# Patient Record
Sex: MALE | Race: WHITE | NOT HISPANIC OR LATINO | Employment: OTHER | URBAN - METROPOLITAN AREA
[De-identification: names, ages, dates, MRNs, and addresses within clinical notes are randomized per-mention and may not be internally consistent; named-entity substitution may affect disease eponyms.]

---

## 2017-01-09 ENCOUNTER — HOSPITAL ENCOUNTER (OUTPATIENT)
Dept: CT IMAGING | Facility: HOSPITAL | Age: 57
Discharge: HOME | End: 2017-01-09
Admitting: PHYSICIAN ASSISTANT

## 2017-01-09 DIAGNOSIS — I73.9 PVD (PERIPHERAL VASCULAR DISEASE) WITH CLAUDICATION (HCC): ICD-10-CM

## 2017-01-09 LAB — CREAT BLDA-MCNC: 0.9 MG/DL (ref 0.6–1.3)

## 2017-01-09 PROCEDURE — 0 IOPAMIDOL PER 1 ML: Performed by: PHYSICIAN ASSISTANT

## 2017-01-09 PROCEDURE — 82565 ASSAY OF CREATININE: CPT

## 2017-01-09 PROCEDURE — 75635 CT ANGIO ABDOMINAL ARTERIES: CPT

## 2017-01-09 RX ADMIN — IOPAMIDOL 90 ML: 755 INJECTION, SOLUTION INTRAVENOUS at 15:00

## 2017-01-11 ENCOUNTER — PREP FOR SURGERY (OUTPATIENT)
Dept: CARDIAC SURGERY | Facility: CLINIC | Age: 57
End: 2017-01-11

## 2017-01-11 DIAGNOSIS — I73.9 PERIPHERAL VASCULAR DISEASE (HCC): Primary | ICD-10-CM

## 2017-01-11 DIAGNOSIS — I73.9 PAD (PERIPHERAL ARTERY DISEASE) (HCC): Primary | ICD-10-CM

## 2017-01-12 RX ORDER — CHLORHEXIDINE GLUCONATE 500 MG/1
1 CLOTH TOPICAL EVERY 12 HOURS PRN
Status: CANCELLED | OUTPATIENT
Start: 2017-01-16

## 2017-01-16 ENCOUNTER — APPOINTMENT (OUTPATIENT)
Dept: PREADMISSION TESTING | Facility: HOSPITAL | Age: 57
End: 2017-01-16

## 2017-01-16 ENCOUNTER — HOSPITAL ENCOUNTER (OUTPATIENT)
Dept: GENERAL RADIOLOGY | Facility: HOSPITAL | Age: 57
Discharge: HOME | End: 2017-01-16
Admitting: PHYSICIAN ASSISTANT

## 2017-01-16 VITALS — BODY MASS INDEX: 41.25 KG/M2 | HEIGHT: 65 IN | WEIGHT: 247.58 LBS

## 2017-01-16 DIAGNOSIS — I73.9 PERIPHERAL VASCULAR DISEASE (HCC): ICD-10-CM

## 2017-01-16 LAB
ABO GROUP BLD: NORMAL
ANION GAP SERPL CALCULATED.3IONS-SCNC: 15 MMOL/L (ref 3–11)
APTT PPP: 29.4 SECONDS (ref 24–31)
BLD GP AB SCN SERPL QL: NEGATIVE
BUN BLD-MCNC: 25 MG/DL (ref 9–23)
BUN/CREAT SERPL: 20.8 (ref 7–25)
CALCIUM SPEC-SCNC: 10.3 MG/DL (ref 8.7–10.4)
CHLORIDE SERPL-SCNC: 96 MMOL/L (ref 99–109)
CO2 SERPL-SCNC: 26 MMOL/L (ref 20–31)
CREAT BLD-MCNC: 1.2 MG/DL (ref 0.6–1.3)
DEPRECATED RDW RBC AUTO: 59.8 FL (ref 37–54)
ERYTHROCYTE [DISTWIDTH] IN BLOOD BY AUTOMATED COUNT: 17.3 % (ref 11.3–14.5)
GFR SERPL CREATININE-BSD FRML MDRD: 63 ML/MIN/1.73
GLUCOSE BLD-MCNC: 95 MG/DL (ref 70–100)
HBA1C MFR BLD: 6.2 % (ref 4.8–5.6)
HCT VFR BLD AUTO: 38.9 % (ref 38.9–50.9)
HGB BLD-MCNC: 13.2 G/DL (ref 13.1–17.5)
INR PPP: 1.04
MCH RBC QN AUTO: 31.9 PG (ref 27–31)
MCHC RBC AUTO-ENTMCNC: 33.9 G/DL (ref 32–36)
MCV RBC AUTO: 94 FL (ref 80–99)
PLATELET # BLD AUTO: 135 10*3/MM3 (ref 150–450)
PMV BLD AUTO: 8.9 FL (ref 6–12)
POTASSIUM BLD-SCNC: 4.8 MMOL/L (ref 3.5–5.5)
PROTHROMBIN TIME: 11.3 SECONDS (ref 9.6–11.5)
RBC # BLD AUTO: 4.14 10*6/MM3 (ref 4.2–5.76)
RH BLD: POSITIVE
SODIUM BLD-SCNC: 137 MMOL/L (ref 132–146)
WBC NRBC COR # BLD: 4.01 10*3/MM3 (ref 3.5–10.8)

## 2017-01-16 PROCEDURE — 86920 COMPATIBILITY TEST SPIN: CPT

## 2017-01-16 RX ORDER — HYDROCODONE BITARTRATE AND ACETAMINOPHEN 7.5; 325 MG/1; MG/1
1 TABLET ORAL 2 TIMES DAILY PRN
COMMUNITY
End: 2017-03-22 | Stop reason: HOSPADM

## 2017-01-16 RX ORDER — FUROSEMIDE 20 MG/1
20 TABLET ORAL DAILY PRN
COMMUNITY
End: 2017-03-22 | Stop reason: HOSPADM

## 2017-01-16 RX ORDER — PANTOPRAZOLE SODIUM 40 MG/1
40 TABLET, DELAYED RELEASE ORAL EVERY OTHER DAY
COMMUNITY

## 2017-01-16 RX ORDER — TIZANIDINE 4 MG/1
4 TABLET ORAL NIGHTLY PRN
Status: ON HOLD | COMMUNITY
End: 2019-10-30

## 2017-01-16 RX ORDER — TAMSULOSIN HYDROCHLORIDE 0.4 MG/1
0.4 CAPSULE ORAL DAILY
COMMUNITY

## 2017-01-16 RX ORDER — FERROUS SULFATE TAB EC 324 MG (65 MG FE EQUIVALENT) 324 (65 FE) MG
324 TABLET DELAYED RESPONSE ORAL
COMMUNITY

## 2017-01-16 RX ORDER — ALBUTEROL SULFATE 90 UG/1
2 AEROSOL, METERED RESPIRATORY (INHALATION) EVERY 4 HOURS PRN
COMMUNITY

## 2017-01-16 NOTE — DISCHARGE INSTRUCTIONS
The following information and instructions were given:    NPO after MN except sips of water with routine prescribed medication (except blood thinner, diabetes, or weight reducing medication) unless otherwise instructed by your physician.  Do not eat, drink, smoke or chew gum after MN the night before surgery. This also includes no mints.    DO NOT shave, wear makeup or dark nail polish.    Remove all jewelry (advised to go to jeweler if unable to remove).    Leave anything you consider valuable at home.    Leave your suitcase in the car until after your surgery.    Bring the following with you (if applicable)   -picture ID and insurance cards   -Co-pay/deductible required by insurance   -Medications in the original bottles (not a list) including all over-the-counter  medications if not brought to PAT   -Copy of advance directive, living will or power of  documents if not  brought to PAT   -CPAP or BIPAP mask and tubing (do not bring machine)   -Skin prep instructions sheet   -PAT Pass   Education booklet, brochure, handout or binder given to patient.    Pain Control After Surgery handout given to patient.    Respirex use (handout given to patient) and pneumonia prevention.    Signs and Symptoms of infection.    DVT Prevention stressing the importance of ambulation.

## 2017-01-17 ENCOUNTER — ANESTHESIA EVENT (OUTPATIENT)
Dept: PERIOP | Facility: HOSPITAL | Age: 57
End: 2017-01-17

## 2017-01-17 ENCOUNTER — HOSPITAL ENCOUNTER (INPATIENT)
Facility: HOSPITAL | Age: 57
LOS: 1 days | Discharge: HOME | End: 2017-01-18
Attending: THORACIC SURGERY (CARDIOTHORACIC VASCULAR SURGERY) | Admitting: THORACIC SURGERY (CARDIOTHORACIC VASCULAR SURGERY)

## 2017-01-17 ENCOUNTER — APPOINTMENT (OUTPATIENT)
Dept: GENERAL RADIOLOGY | Facility: HOSPITAL | Age: 57
End: 2017-01-17

## 2017-01-17 ENCOUNTER — ANESTHESIA (OUTPATIENT)
Dept: PERIOP | Facility: HOSPITAL | Age: 57
End: 2017-01-17

## 2017-01-17 DIAGNOSIS — I73.9 PERIPHERAL VASCULAR DISEASE (HCC): ICD-10-CM

## 2017-01-17 LAB — GLUCOSE BLDC GLUCOMTR-MCNC: 117 MG/DL (ref 70–130)

## 2017-01-17 PROCEDURE — 25010000002 FENTANYL CITRATE (PF) 100 MCG/2ML SOLUTION: Performed by: NURSE ANESTHETIST, CERTIFIED REGISTERED

## 2017-01-17 PROCEDURE — 25010000002 NEOSTIGMINE 10 MG/10ML SOLUTION: Performed by: NURSE ANESTHETIST, CERTIFIED REGISTERED

## 2017-01-17 PROCEDURE — 25010000002 DEXAMETHASONE PER 1 MG: Performed by: NURSE ANESTHETIST, CERTIFIED REGISTERED

## 2017-01-17 PROCEDURE — 25010000002 CEFUROXIME PER 750 MG: Performed by: PHYSICIAN ASSISTANT

## 2017-01-17 PROCEDURE — C1769 GUIDE WIRE: HCPCS | Performed by: THORACIC SURGERY (CARDIOTHORACIC VASCULAR SURGERY)

## 2017-01-17 PROCEDURE — S0260 H&P FOR SURGERY: HCPCS | Performed by: THORACIC SURGERY (CARDIOTHORACIC VASCULAR SURGERY)

## 2017-01-17 PROCEDURE — 25010000002 PHENYLEPHRINE PER 1 ML: Performed by: NURSE ANESTHETIST, CERTIFIED REGISTERED

## 2017-01-17 PROCEDURE — 25010000002 MORPHINE PER 10 MG: Performed by: THORACIC SURGERY (CARDIOTHORACIC VASCULAR SURGERY)

## 2017-01-17 PROCEDURE — P9612 CATHETERIZE FOR URINE SPEC: HCPCS

## 2017-01-17 PROCEDURE — 82962 GLUCOSE BLOOD TEST: CPT

## 2017-01-17 PROCEDURE — 25010000002 HEPARIN (PORCINE) PER 1000 UNITS: Performed by: NURSE ANESTHETIST, CERTIFIED REGISTERED

## 2017-01-17 PROCEDURE — 94640 AIRWAY INHALATION TREATMENT: CPT

## 2017-01-17 PROCEDURE — B41GYZZ FLUOROSCOPY OF LEFT LOWER EXTREMITY ARTERIES USING OTHER CONTRAST: ICD-10-PCS | Performed by: THORACIC SURGERY (CARDIOTHORACIC VASCULAR SURGERY)

## 2017-01-17 PROCEDURE — 047L0DZ DILATION OF LEFT FEMORAL ARTERY WITH INTRALUMINAL DEVICE, OPEN APPROACH: ICD-10-PCS | Performed by: THORACIC SURGERY (CARDIOTHORACIC VASCULAR SURGERY)

## 2017-01-17 PROCEDURE — 25010000002 ONDANSETRON PER 1 MG: Performed by: NURSE ANESTHETIST, CERTIFIED REGISTERED

## 2017-01-17 PROCEDURE — 25010000002 PROPOFOL 10 MG/ML EMULSION: Performed by: NURSE ANESTHETIST, CERTIFIED REGISTERED

## 2017-01-17 PROCEDURE — C1876 STENT, NON-COA/NON-COV W/DEL: HCPCS | Performed by: THORACIC SURGERY (CARDIOTHORACIC VASCULAR SURGERY)

## 2017-01-17 PROCEDURE — C1894 INTRO/SHEATH, NON-LASER: HCPCS | Performed by: THORACIC SURGERY (CARDIOTHORACIC VASCULAR SURGERY)

## 2017-01-17 PROCEDURE — 75710 ARTERY X-RAYS ARM/LEG: CPT | Performed by: THORACIC SURGERY (CARDIOTHORACIC VASCULAR SURGERY)

## 2017-01-17 PROCEDURE — 37226 PR REVSC OPN/PRQ FEM/POP W/STNT/ANGIOP SM VSL: CPT | Performed by: THORACIC SURGERY (CARDIOTHORACIC VASCULAR SURGERY)

## 2017-01-17 PROCEDURE — C1725 CATH, TRANSLUMIN NON-LASER: HCPCS | Performed by: THORACIC SURGERY (CARDIOTHORACIC VASCULAR SURGERY)

## 2017-01-17 PROCEDURE — C1887 CATHETER, GUIDING: HCPCS | Performed by: THORACIC SURGERY (CARDIOTHORACIC VASCULAR SURGERY)

## 2017-01-17 PROCEDURE — 75710 ARTERY X-RAYS ARM/LEG: CPT

## 2017-01-17 PROCEDURE — 25010000002 HEPARIN (PORCINE) PER 1000 UNITS: Performed by: THORACIC SURGERY (CARDIOTHORACIC VASCULAR SURGERY)

## 2017-01-17 PROCEDURE — 0 IOPAMIDOL 61 % SOLUTION: Performed by: THORACIC SURGERY (CARDIOTHORACIC VASCULAR SURGERY)

## 2017-01-17 DEVICE — IMPLANTABLE DEVICE: Type: IMPLANTABLE DEVICE | Site: LEG | Status: FUNCTIONAL

## 2017-01-17 RX ORDER — BUPIVACAINE HYDROCHLORIDE AND EPINEPHRINE 5; 5 MG/ML; UG/ML
INJECTION, SOLUTION EPIDURAL; INTRACAUDAL; PERINEURAL AS NEEDED
Status: DISCONTINUED | OUTPATIENT
Start: 2017-01-17 | End: 2017-01-17 | Stop reason: HOSPADM

## 2017-01-17 RX ORDER — TAMSULOSIN HYDROCHLORIDE 0.4 MG/1
0.4 CAPSULE ORAL DAILY
Status: DISCONTINUED | OUTPATIENT
Start: 2017-01-17 | End: 2017-01-18 | Stop reason: HOSPADM

## 2017-01-17 RX ORDER — MORPHINE SULFATE 10 MG/ML
4 INJECTION INTRAMUSCULAR; INTRAVENOUS; SUBCUTANEOUS
Status: DISCONTINUED | OUTPATIENT
Start: 2017-01-17 | End: 2017-01-17 | Stop reason: SDUPTHER

## 2017-01-17 RX ORDER — TIZANIDINE 4 MG/1
4 TABLET ORAL NIGHTLY PRN
Status: DISCONTINUED | OUTPATIENT
Start: 2017-01-17 | End: 2017-01-18 | Stop reason: HOSPADM

## 2017-01-17 RX ORDER — MORPHINE SULFATE 10 MG/ML
2 INJECTION INTRAMUSCULAR; INTRAVENOUS; SUBCUTANEOUS
Status: DISCONTINUED | OUTPATIENT
Start: 2017-01-17 | End: 2017-01-18 | Stop reason: HOSPADM

## 2017-01-17 RX ORDER — SODIUM CHLORIDE 450 MG/100ML
100 INJECTION, SOLUTION INTRAVENOUS CONTINUOUS
Status: DISCONTINUED | OUTPATIENT
Start: 2017-01-17 | End: 2017-01-18 | Stop reason: HOSPADM

## 2017-01-17 RX ORDER — IODIXANOL 320 MG/ML
INJECTION, SOLUTION INTRAVASCULAR AS NEEDED
Status: DISCONTINUED | OUTPATIENT
Start: 2017-01-17 | End: 2017-01-17 | Stop reason: HOSPADM

## 2017-01-17 RX ORDER — PROPOFOL 10 MG/ML
VIAL (ML) INTRAVENOUS AS NEEDED
Status: DISCONTINUED | OUTPATIENT
Start: 2017-01-17 | End: 2017-01-17 | Stop reason: SURG

## 2017-01-17 RX ORDER — LIDOCAINE HYDROCHLORIDE 10 MG/ML
INJECTION, SOLUTION EPIDURAL; INFILTRATION; INTRACAUDAL; PERINEURAL AS NEEDED
Status: DISCONTINUED | OUTPATIENT
Start: 2017-01-17 | End: 2017-01-17 | Stop reason: SURG

## 2017-01-17 RX ORDER — DEXAMETHASONE SODIUM PHOSPHATE 4 MG/ML
INJECTION, SOLUTION INTRA-ARTICULAR; INTRALESIONAL; INTRAMUSCULAR; INTRAVENOUS; SOFT TISSUE AS NEEDED
Status: DISCONTINUED | OUTPATIENT
Start: 2017-01-17 | End: 2017-01-17 | Stop reason: SURG

## 2017-01-17 RX ORDER — ONDANSETRON 2 MG/ML
4 INJECTION INTRAMUSCULAR; INTRAVENOUS ONCE AS NEEDED
Status: DISCONTINUED | OUTPATIENT
Start: 2017-01-17 | End: 2017-01-17 | Stop reason: HOSPADM

## 2017-01-17 RX ORDER — FAMOTIDINE 20 MG/1
20 TABLET, FILM COATED ORAL 2 TIMES DAILY
Status: DISCONTINUED | OUTPATIENT
Start: 2017-01-17 | End: 2017-01-17 | Stop reason: HOSPADM

## 2017-01-17 RX ORDER — ATORVASTATIN CALCIUM 40 MG/1
40 TABLET, FILM COATED ORAL DAILY
Status: DISCONTINUED | OUTPATIENT
Start: 2017-01-17 | End: 2017-01-18 | Stop reason: HOSPADM

## 2017-01-17 RX ORDER — LISINOPRIL 40 MG/1
40 TABLET ORAL DAILY
Status: DISCONTINUED | OUTPATIENT
Start: 2017-01-17 | End: 2017-01-18 | Stop reason: HOSPADM

## 2017-01-17 RX ORDER — FENTANYL CITRATE 50 UG/ML
50 INJECTION, SOLUTION INTRAMUSCULAR; INTRAVENOUS
Status: DISCONTINUED | OUTPATIENT
Start: 2017-01-17 | End: 2017-01-17 | Stop reason: HOSPADM

## 2017-01-17 RX ORDER — NEOSTIGMINE METHYLSULFATE 1 MG/ML
INJECTION, SOLUTION INTRAVENOUS AS NEEDED
Status: DISCONTINUED | OUTPATIENT
Start: 2017-01-17 | End: 2017-01-17 | Stop reason: SURG

## 2017-01-17 RX ORDER — MORPHINE SULFATE 4 MG/ML
4 INJECTION, SOLUTION INTRAMUSCULAR; INTRAVENOUS
Status: DISCONTINUED | OUTPATIENT
Start: 2017-01-17 | End: 2017-01-18 | Stop reason: HOSPADM

## 2017-01-17 RX ORDER — ONDANSETRON 2 MG/ML
INJECTION INTRAMUSCULAR; INTRAVENOUS AS NEEDED
Status: DISCONTINUED | OUTPATIENT
Start: 2017-01-17 | End: 2017-01-17 | Stop reason: SURG

## 2017-01-17 RX ORDER — PANTOPRAZOLE SODIUM 40 MG/1
40 TABLET, DELAYED RELEASE ORAL EVERY OTHER DAY
Status: DISCONTINUED | OUTPATIENT
Start: 2017-01-18 | End: 2017-01-18 | Stop reason: HOSPADM

## 2017-01-17 RX ORDER — BUDESONIDE AND FORMOTEROL FUMARATE DIHYDRATE 80; 4.5 UG/1; UG/1
2 AEROSOL RESPIRATORY (INHALATION)
Status: DISCONTINUED | OUTPATIENT
Start: 2017-01-17 | End: 2017-01-18 | Stop reason: HOSPADM

## 2017-01-17 RX ORDER — HYDROCHLOROTHIAZIDE 25 MG/1
25 TABLET ORAL DAILY
Status: DISCONTINUED | OUTPATIENT
Start: 2017-01-17 | End: 2017-01-18 | Stop reason: HOSPADM

## 2017-01-17 RX ORDER — LIDOCAINE HYDROCHLORIDE 10 MG/ML
1 INJECTION, SOLUTION INFILTRATION; PERINEURAL ONCE
Status: COMPLETED | OUTPATIENT
Start: 2017-01-17 | End: 2017-01-17

## 2017-01-17 RX ORDER — GLYCOPYRROLATE 0.2 MG/ML
INJECTION INTRAMUSCULAR; INTRAVENOUS AS NEEDED
Status: DISCONTINUED | OUTPATIENT
Start: 2017-01-17 | End: 2017-01-17 | Stop reason: SURG

## 2017-01-17 RX ORDER — FUROSEMIDE 40 MG/1
20 TABLET ORAL DAILY PRN
Status: DISCONTINUED | OUTPATIENT
Start: 2017-01-17 | End: 2017-01-18 | Stop reason: HOSPADM

## 2017-01-17 RX ORDER — FERROUS SULFATE 325(65) MG
325 TABLET ORAL
Status: DISCONTINUED | OUTPATIENT
Start: 2017-01-17 | End: 2017-01-18 | Stop reason: HOSPADM

## 2017-01-17 RX ORDER — ATRACURIUM BESYLATE 10 MG/ML
INJECTION, SOLUTION INTRAVENOUS AS NEEDED
Status: DISCONTINUED | OUTPATIENT
Start: 2017-01-17 | End: 2017-01-17 | Stop reason: SURG

## 2017-01-17 RX ORDER — CLOPIDOGREL BISULFATE 75 MG/1
75 TABLET ORAL DAILY
Status: DISCONTINUED | OUTPATIENT
Start: 2017-01-18 | End: 2017-01-18 | Stop reason: HOSPADM

## 2017-01-17 RX ORDER — SODIUM CHLORIDE, SODIUM LACTATE, POTASSIUM CHLORIDE, CALCIUM CHLORIDE 600; 310; 30; 20 MG/100ML; MG/100ML; MG/100ML; MG/100ML
100 INJECTION, SOLUTION INTRAVENOUS CONTINUOUS
Status: DISCONTINUED | OUTPATIENT
Start: 2017-01-17 | End: 2017-01-18 | Stop reason: ALTCHOICE

## 2017-01-17 RX ORDER — MEPERIDINE HYDROCHLORIDE 25 MG/ML
12.5 INJECTION INTRAMUSCULAR; INTRAVENOUS; SUBCUTANEOUS
Status: DISCONTINUED | OUTPATIENT
Start: 2017-01-17 | End: 2017-01-17 | Stop reason: HOSPADM

## 2017-01-17 RX ORDER — CHLORHEXIDINE GLUCONATE 500 MG/1
1 CLOTH TOPICAL EVERY 12 HOURS PRN
Status: DISCONTINUED | OUTPATIENT
Start: 2017-01-17 | End: 2017-01-17 | Stop reason: HOSPADM

## 2017-01-17 RX ORDER — HYDROCODONE BITARTRATE AND ACETAMINOPHEN 7.5; 325 MG/1; MG/1
1 TABLET ORAL EVERY 4 HOURS PRN
Status: DISCONTINUED | OUTPATIENT
Start: 2017-01-17 | End: 2017-01-18 | Stop reason: HOSPADM

## 2017-01-17 RX ORDER — ASPIRIN 81 MG/1
81 TABLET, CHEWABLE ORAL DAILY
Status: DISCONTINUED | OUTPATIENT
Start: 2017-01-17 | End: 2017-01-18 | Stop reason: HOSPADM

## 2017-01-17 RX ORDER — HEPARIN SODIUM 1000 [USP'U]/ML
INJECTION, SOLUTION INTRAVENOUS; SUBCUTANEOUS AS NEEDED
Status: DISCONTINUED | OUTPATIENT
Start: 2017-01-17 | End: 2017-01-17 | Stop reason: SURG

## 2017-01-17 RX ORDER — FENTANYL CITRATE 50 UG/ML
INJECTION, SOLUTION INTRAMUSCULAR; INTRAVENOUS AS NEEDED
Status: DISCONTINUED | OUTPATIENT
Start: 2017-01-17 | End: 2017-01-17 | Stop reason: SURG

## 2017-01-17 RX ORDER — SODIUM CHLORIDE, SODIUM LACTATE, POTASSIUM CHLORIDE, CALCIUM CHLORIDE 600; 310; 30; 20 MG/100ML; MG/100ML; MG/100ML; MG/100ML
20 INJECTION, SOLUTION INTRAVENOUS CONTINUOUS
Status: DISCONTINUED | OUTPATIENT
Start: 2017-01-17 | End: 2017-01-18 | Stop reason: SDUPTHER

## 2017-01-17 RX ADMIN — EPHEDRINE SULFATE 10 MG: 50 INJECTION INTRAMUSCULAR; INTRAVENOUS; SUBCUTANEOUS at 09:02

## 2017-01-17 RX ADMIN — SODIUM CHLORIDE, POTASSIUM CHLORIDE, SODIUM LACTATE AND CALCIUM CHLORIDE: 600; 310; 30; 20 INJECTION, SOLUTION INTRAVENOUS at 09:25

## 2017-01-17 RX ADMIN — HEPARIN SODIUM 5000 UNITS: 1000 INJECTION, SOLUTION INTRAVENOUS; SUBCUTANEOUS at 08:56

## 2017-01-17 RX ADMIN — LIDOCAINE HYDROCHLORIDE 50 MG: 10 INJECTION, SOLUTION EPIDURAL; INFILTRATION; INTRACAUDAL; PERINEURAL at 08:26

## 2017-01-17 RX ADMIN — SODIUM CHLORIDE, POTASSIUM CHLORIDE, SODIUM LACTATE AND CALCIUM CHLORIDE: 600; 310; 30; 20 INJECTION, SOLUTION INTRAVENOUS at 08:19

## 2017-01-17 RX ADMIN — FENTANYL CITRATE 50 MCG: 50 INJECTION, SOLUTION INTRAMUSCULAR; INTRAVENOUS at 09:15

## 2017-01-17 RX ADMIN — EPHEDRINE SULFATE 5 MG: 50 INJECTION INTRAMUSCULAR; INTRAVENOUS; SUBCUTANEOUS at 08:56

## 2017-01-17 RX ADMIN — GLYCOPYRROLATE 0.4 MG: 0.2 INJECTION, SOLUTION INTRAMUSCULAR; INTRAVENOUS at 09:25

## 2017-01-17 RX ADMIN — SODIUM CHLORIDE, POTASSIUM CHLORIDE, SODIUM LACTATE AND CALCIUM CHLORIDE 20 ML/HR: 600; 310; 30; 20 INJECTION, SOLUTION INTRAVENOUS at 07:33

## 2017-01-17 RX ADMIN — ONDANSETRON 4 MG: 2 INJECTION INTRAMUSCULAR; INTRAVENOUS at 09:25

## 2017-01-17 RX ADMIN — FENTANYL CITRATE 50 MCG: 50 INJECTION, SOLUTION INTRAMUSCULAR; INTRAVENOUS at 10:55

## 2017-01-17 RX ADMIN — HYDROCHLOROTHIAZIDE 25 MG: 25 TABLET ORAL at 17:05

## 2017-01-17 RX ADMIN — FAMOTIDINE 20 MG: 20 TABLET ORAL at 07:20

## 2017-01-17 RX ADMIN — FENTANYL CITRATE 50 MCG: 50 INJECTION, SOLUTION INTRAMUSCULAR; INTRAVENOUS at 08:44

## 2017-01-17 RX ADMIN — FENTANYL CITRATE 50 MCG: 50 INJECTION, SOLUTION INTRAMUSCULAR; INTRAVENOUS at 09:30

## 2017-01-17 RX ADMIN — FENTANYL CITRATE 50 MCG: 50 INJECTION, SOLUTION INTRAMUSCULAR; INTRAVENOUS at 11:49

## 2017-01-17 RX ADMIN — HYDROCODONE BITARTRATE AND ACETAMINOPHEN 1 TABLET: 7.5; 325 TABLET ORAL at 21:18

## 2017-01-17 RX ADMIN — SODIUM CHLORIDE 100 ML/HR: 4.5 INJECTION, SOLUTION INTRAVENOUS at 10:38

## 2017-01-17 RX ADMIN — CEFUROXIME 1.5 G: 1.5 INJECTION, SOLUTION INTRAVENOUS at 08:19

## 2017-01-17 RX ADMIN — EPHEDRINE SULFATE 5 MG: 50 INJECTION INTRAMUSCULAR; INTRAVENOUS; SUBCUTANEOUS at 08:37

## 2017-01-17 RX ADMIN — TAMSULOSIN HYDROCHLORIDE 0.4 MG: 0.4 CAPSULE ORAL at 17:04

## 2017-01-17 RX ADMIN — BUDESONIDE AND FORMOTEROL FUMARATE DIHYDRATE 2 PUFF: 80; 4.5 AEROSOL RESPIRATORY (INHALATION) at 21:20

## 2017-01-17 RX ADMIN — DEXAMETHASONE SODIUM PHOSPHATE 4 MG: 4 INJECTION, SOLUTION INTRAMUSCULAR; INTRAVENOUS at 08:34

## 2017-01-17 RX ADMIN — LISINOPRIL 40 MG: 40 TABLET ORAL at 17:04

## 2017-01-17 RX ADMIN — FENTANYL CITRATE 100 MCG: 50 INJECTION, SOLUTION INTRAMUSCULAR; INTRAVENOUS at 08:26

## 2017-01-17 RX ADMIN — LIDOCAINE HYDROCHLORIDE 1 ML: 10 INJECTION, SOLUTION EPIDURAL; INFILTRATION; INTRACAUDAL; PERINEURAL at 07:47

## 2017-01-17 RX ADMIN — PHENYLEPHRINE HYDROCHLORIDE 100 MCG: 10 INJECTION INTRAVENOUS at 09:35

## 2017-01-17 RX ADMIN — DESMOPRESSIN ACETATE 40 MG: 0.2 TABLET ORAL at 13:33

## 2017-01-17 RX ADMIN — Medication 325 MG: at 17:05

## 2017-01-17 RX ADMIN — ASPIRIN 81 MG: 81 TABLET, CHEWABLE ORAL at 12:05

## 2017-01-17 RX ADMIN — ATRACURIUM BESYLATE 50 MG: 10 INJECTION, SOLUTION INTRAVENOUS at 08:26

## 2017-01-17 RX ADMIN — PROPOFOL 150 MG: 10 INJECTION, EMULSION INTRAVENOUS at 08:26

## 2017-01-17 RX ADMIN — HYDROCODONE BITARTRATE AND ACETAMINOPHEN 1 TABLET: 7.5; 325 TABLET ORAL at 10:32

## 2017-01-17 RX ADMIN — HYDROCODONE BITARTRATE AND ACETAMINOPHEN 1 TABLET: 7.5; 325 TABLET ORAL at 15:56

## 2017-01-17 RX ADMIN — MORPHINE SULFATE 4 MG: 4 INJECTION, SOLUTION INTRAMUSCULAR; INTRAVENOUS at 13:32

## 2017-01-17 RX ADMIN — NEOSTIGMINE METHYLSULFATE 3 MG: 1 INJECTION, SOLUTION INTRAVENOUS at 09:25

## 2017-01-17 NOTE — PLAN OF CARE
Problem: Patient Care Overview (Adult)  Goal: Plan of Care Review  Outcome: Ongoing (interventions implemented as appropriate)    01/17/17 6057   Outcome Evaluation   Outcome Summary/Follow up Plan Pt arrived to floor at 1400. Pt stable with no s/s of distress. VSS. Left groin site stable, no redness, drainage or swelling. Bedrest ends at 1800 1/17/2017. Pt resting well with no c/o pain at this time.        Goal: Adult Individualization and Mutuality  Outcome: Ongoing (interventions implemented as appropriate)  Goal: Discharge Needs Assessment  Outcome: Ongoing (interventions implemented as appropriate)    Problem: Revascularization/PAD, Lower Extremity (Adult)  Goal: Signs and Symptoms of Listed Potential Problems Will be Absent or Manageable (Revascularization/PAD, Lower Extremity)  Outcome: Ongoing (interventions implemented as appropriate)

## 2017-01-17 NOTE — OP NOTE
Operative Report    Preop Diagnosis: Left leg claudication.  Left superficial femoral artery stenosis documented by CT angiogram.  Previous right femoral to popliteal bypass.        Procedure: Left femoral artery cutdown.  Arteriogram of the left leg arteries.  Angioplasty and stent of the left superficial femoral artery with a 6 x 80 E V3 ever Flex self-expanding stent.  And a completion arteriogram left leg.  Total fluoroscopy time 6 minutes.  Total contrast 60 mL.        Surgeons: Heladio Cruz PAC        Indication: This patient is status post previous right femoral to popliteal bypass.  He had recently developed new onset claudication in the left leg is lifestyle limiting and documented by CT angiogram and abnormal duplex findings he understood the nature the procedure risk of bleeding infection contrast reaction and limb loss and no guarantees were made as to outcome and he agreed to proceed.        Description: Supine position.  Incision made below the inguinal crease over the proximal left superficial femoral artery this was done because the scan had demonstrated no disease proximal to this patient has a large and unfriendly pannus and significant truncal obesity and we wanted to avoid an incision over the proper common femoral artery.  Upon exposing the proximal superficial femoral artery a 4 Greek sheath was placed in retrograde fashion and a hand-held injection demonstrated no significant disease in the left common iliac left external iliac and left common femoral.  The sheath was then removed and over a guidewire was placed and antegrade fashion and a hand-held injection demonstrated severe heavy calcific disease in the mid to distal left superficial femoral artery.  I was ultimately able to cross this with a series of guide catheters and a hydrophilic Glidewire.  A hand-held injection confirmed I was in the true lumen at this point a 6 x 80 ever Flex stent was placed and then  ballooned with a 6 mm balloon to 12 cecilia.  A completion run demonstrated resolution of the stenosis with normal flow to the foot and ankle and a good-quality Doppler signal was obtained in the anterior tibial dorsalis pedis and posterior tibial vessel superficial femoral arteries primarily repaired with a proline suture.

## 2017-01-17 NOTE — IP AVS SNAPSHOT
AFTER VISIT SUMMARY             Sai Weinberg           About your hospitalization     You were admitted on:  January 17, 2017 You last received care in the:  [address]       Procedures & Surgeries      Procedure(s) (LRB):  AORTAGRAM WITH RUNOFFS and  STENT left SFA (N/A)  left femoral cutdown with aortagram and left SFA stent and angioplasty (N/A)     1/17/2017     Surgeon(s):  Heladio Rosa MD  -------------------      Medications    If you or your caregiver advised us that you are currently taking a medication and that medication is marked below as “Resume”, this simply indicates that we have reviewed those medications to make sure our new therapy recommendations do not interfere.  If you have concerns about medications other than those new ones which we are prescribing today, please consult the physician who prescribed them (or your primary physician).  Our review of your home medications is not meant to indicate that we are directing their use.             Your Medications      CONTINUE taking these medications     albuterol 108 (90 BASE) MCG/ACT inhaler   Inhale 2 puffs Every 4 (Four) Hours As Needed for wheezing.   Commonly known as:  PROVENTIL HFA;VENTOLIN HFA           aspirin 81 MG tablet   Take 81 mg by mouth daily.           atorvastatin 40 MG tablet   Take 40 mg by mouth daily.   Last time this was given:  1/17/2017  1:33 PM   Commonly known as:  LIPITOR           clopidogrel 75 MG tablet   Take 75 mg by mouth daily.   Commonly known as:  PLAVIX           ferrous sulfate 324 (65 FE) MG tablet delayed-release EC tablet   Take 324 mg by mouth Daily With Breakfast.           fluticasone-salmeterol 100-50 MCG/DOSE DISKUS   Inhale 2 puffs 2 (Two) Times a Day.   Commonly known as:  ADVAIR           furosemide 20 MG tablet   Take 20 mg by mouth Daily As Needed (SWELLING).   Commonly known as:  LASIX           hydrochlorothiazide 12.5 MG tablet   Take 25 mg by mouth Daily.   Last time  this was given:  1/17/2017  5:05 PM   Commonly known as:  HYDRODIURIL           HYDROcodone-acetaminophen 7.5-325 MG per tablet   Take 1 tablet by mouth 2 (Two) Times a Day As Needed for moderate pain (4-6).   Last time this was given:  1/17/2017  9:18 PM   Commonly known as:  NORCO           lisinopril 30 MG tablet   Take 40 mg by mouth Daily.   Last time this was given:  1/17/2017  5:04 PM   Commonly known as:  PRINIVIL,ZESTRIL           metFORMIN 500 MG tablet   Take 850 mg by mouth 2 (Two) Times a Day With Meals.   Commonly known as:  GLUCOPHAGE           pantoprazole 40 MG EC tablet   Take 40 mg by mouth Every Other Day.   Last time this was given:  1/18/2017  7:14 AM   Commonly known as:  PROTONIX           tamsulosin 0.4 MG capsule 24 hr capsule   Take 0.4 mg by mouth Daily.   Last time this was given:  1/17/2017  5:04 PM   Commonly known as:  FLOMAX           tiZANidine 4 MG tablet   Take 4 mg by mouth At Night As Needed for muscle spasms.   Commonly known as:  ZANAFLEX           VITAMIN D PO   Take 2,000 Units by mouth Daily.                      Your Medications      Your Medication List           Morning Noon Evening Bedtime As Needed    albuterol 108 (90 BASE) MCG/ACT inhaler   Inhale 2 puffs Every 4 (Four) Hours As Needed for wheezing.   Commonly known as:  PROVENTIL HFA;VENTOLIN HFA                                   aspirin 81 MG tablet   Take 81 mg by mouth daily.                                   atorvastatin 40 MG tablet   Take 40 mg by mouth daily.   Commonly known as:  LIPITOR                                   clopidogrel 75 MG tablet   Take 75 mg by mouth daily.   Commonly known as:  PLAVIX                                   ferrous sulfate 324 (65 FE) MG tablet delayed-release EC tablet   Take 324 mg by mouth Daily With Breakfast.                                   fluticasone-salmeterol 100-50 MCG/DOSE DISKUS   Inhale 2 puffs 2 (Two) Times a Day.   Commonly known as:  ADVAIR                                       furosemide 20 MG tablet   Take 20 mg by mouth Daily As Needed (SWELLING).   Commonly known as:  LASIX                                   hydrochlorothiazide 12.5 MG tablet   Take 25 mg by mouth Daily.   Commonly known as:  HYDRODIURIL                                   HYDROcodone-acetaminophen 7.5-325 MG per tablet   Take 1 tablet by mouth 2 (Two) Times a Day As Needed for moderate pain (4-6).   Commonly known as:  NORCO                                   lisinopril 30 MG tablet   Take 40 mg by mouth Daily.   Commonly known as:  PRINIVIL,ZESTRIL                                   metFORMIN 500 MG tablet   Take 850 mg by mouth 2 (Two) Times a Day With Meals.   Commonly known as:  GLUCOPHAGE                                      pantoprazole 40 MG EC tablet   Take 40 mg by mouth Every Other Day.   Commonly known as:  PROTONIX                                   tamsulosin 0.4 MG capsule 24 hr capsule   Take 0.4 mg by mouth Daily.   Commonly known as:  FLOMAX                                   tiZANidine 4 MG tablet   Take 4 mg by mouth At Night As Needed for muscle spasms.   Commonly known as:  ZANAFLEX                                   VITAMIN D PO   Take 2,000 Units by mouth Daily.                                            Instructions for After Discharge        Activity Instructions     Resume activity as tolerated.            Discharge References/Attachments     PERIPHERAL VASCULAR DISEASE (ENGLISH)    GROIN SURGICAL SITE CARE (ENGLISH)       Follow-ups for After Discharge        Follow-up Information     Follow up with Heladio Rosa MD .    Specialties:  Cardiothoracic Surgery, Cardiology    Why:  3 weeks  order faxed to office    Contact information:    [address]   [address] [place] [id number] [phone]        [email or web address] Signup     [facility] allows you to send messages to your doctor, view your test results, renew your prescriptions, schedule appointments, and more. To  sign up, go to [employer] and click on the Sign Up Now link in the New User? box. Enter your [employer] Activation Code exactly as it appears below along with the last four digits of your Social Security Number and your Date of Birth () to complete the sign-up process. If you do not sign up before the expiration date, you must request a new code.    [employer] Activation Code: [email or web address]  Expires: 2017  8:31 AM    If you have questions, you can email [email or web address] or call [phone] to talk to our [employer] staff. Remember, [employer] is NOT to be used for urgent needs. For medical emergencies, dial 911.           Summary of Your Hospitalization        Reason for Hospitalization     Your primary diagnosis was:  Not on File    Your diagnoses also included:  Peripheral Vascular Disease      Care Providers     Provider Service Role Specialty    Heladio Rosa MD Cardiothoracic Surgery Attending Provider Cardiothoracic Surgery    Heladio Rosa MD Cardiothoracic Surgery Surgeon  Cardiothoracic Surgery      Your Allergies  Date Reviewed: 2017    No active allergies      Patient Belongings Returned     Document Return of Belongings Flowsheet     Were the patient bedside belongings sent home?   Yes   Belongings Retrieved from Security & Sent Home   N/A    Belongings Sent to Safe   --   Medications Retrieved from Pharmacy & Sent Home   N/A              More Information      Peripheral Vascular Disease  Peripheral vascular disease (PVD) is a disease of the blood vessels that are not part of your heart and brain. A simple term for PVD is poor circulation. In most cases, PVD narrows the blood vessels that carry blood from your heart to the rest of your body. This can result in a decreased supply of blood to your arms, legs, and internal organs, like your stomach or kidneys. However, it most often affects a person's lower legs and feet.  There are two types of PVD.  · Organic PVD.  This is the more common type. It is caused by damage to the structure of blood vessels.  · Functional PVD. This is caused by conditions that make blood vessels contract and tighten (spasm).  Without treatment, PVD tends to get worse over time.  PVD can also lead to acute ischemic limb. This is when an arm or limb suddenly has trouble getting enough blood. This is a medical emergency.  CAUSES  Each type of PVD has many different causes. The most common cause of PVD is buildup of a fatty material (plaque) inside of your arteries (atherosclerosis). Small amounts of plaque can break off from the walls of the blood vessels and become lodged in a smaller artery. This blocks blood flow and can cause acute ischemic limb.  Other common causes of PVD include:  · Blood clots that form inside of blood vessels.  · Injuries to blood vessels.  · Diseases that cause inflammation of blood vessels or cause blood vessel spasms.  · Health behaviors and health history that increase your risk of developing PVD.  RISK FACTORS   You may have a greater risk of PVD if you:  · Have a family history of PVD.  · Have certain medical conditions, including:    High cholesterol.    Diabetes.    High blood pressure (hypertension).    Coronary heart disease.    Past problems with blood clots.    Past injury, such as burns or a broken bone. These may have damaged blood vessels in your limbs.    Buerger disease. This is caused by inflamed blood vessels in your hands and feet.    Some forms of arthritis.    Rare birth defects that affect the arteries in your legs.  · Use tobacco.  · Do not get enough exercise.  · Are obese.  · Are age 50 or older.  SIGNS AND SYMPTOMS   PVD may cause many different symptoms. Your symptoms depend on what part of your body is not getting enough blood. Some common signs and symptoms include:  · Cramps in your lower legs. This may be a symptom of poor leg circulation (claudication).  · Pain and weakness in your legs while  you are physically active that goes away when you rest (intermittent claudication).  · Leg pain when at rest.  · Leg numbness, tingling, or weakness.  · Coldness in a leg or foot, especially when compared with the other leg.  · Skin or hair changes. These can include:    Hair loss.    Shiny skin.    Pale or bluish skin.    Thick toenails.  · Inability to get or maintain an erection (erectile dysfunction).  People with PVD are more prone to developing ulcers and sores on their toes, feet, or legs. These may take longer than normal to heal.  DIAGNOSIS  Your health care provider may diagnose PVD from your signs and symptoms. The health care provider will also do a physical exam. You may have tests to find out what is causing your PVD and determine its severity. Tests may include:  · Blood pressure recordings from your arms and legs and measurements of the strength of your pulses (pulse volume recordings).  · Imaging studies using sound waves to take pictures of the blood flow through your blood vessels (Doppler ultrasound).  · Injecting a dye into your blood vessels before having imaging studies using:    X-rays (angiogram or arteriogram).    Computer-generated X-rays (CT angiogram).    A powerful electromagnetic field and a computer (magnetic resonance angiogram or MRA).  TREATMENT  Treatment for PVD depends on the cause of your condition and the severity of your symptoms. It also depends on your age. Underlying causes need to be treated and controlled. These include long-lasting (chronic) conditions, such as diabetes, high cholesterol, and high blood pressure. You may need to first try making lifestyle changes and taking medicines. Surgery may be needed if these do not work.  Lifestyle changes may include:  · Quitting smoking.  · Exercising regularly.  · Following a low-fat, low-cholesterol diet.  Medicines may include:  · Blood thinners to prevent blood clots.  · Medicines to improve blood flow.  · Medicines to  improve your blood cholesterol levels.  Surgical procedures may include:  · A procedure that uses an inflated balloon to open a blocked artery and improve blood flow (angioplasty).  · A procedure to put in a tube (stent) to keep a blocked artery open (stent implant).  · Surgery to reroute blood flow around a blocked artery (peripheral bypass surgery).  · Surgery to remove dead tissue from an infected wound on the affected limb.  · Amputation. This is surgical removal of the affected limb. This may be necessary in cases of acute ischemic limb that are not improved through medical or surgical treatments.  HOME CARE INSTRUCTIONS  · Take medicines only as directed by your health care provider.  · Do not use any tobacco products, including cigarettes, chewing tobacco, or electronic cigarettes.  If you need help quitting, ask your health care provider.  · Lose weight if you are overweight, and maintain a healthy weight as directed by your health care provider.  · Eat a diet that is low in fat and cholesterol. If you need help, ask your health care provider.  · Exercise regularly. Ask your health care provider to suggest some good activities for you.  · Use compression stockings or other mechanical devices as directed by your health care provider.  · Take good care of your feet.    Wear comfortable shoes that fit well.    Check your feet often for any cuts or sores.  SEEK MEDICAL CARE IF:  · You have cramps in your legs while walking.  · You have leg pain when you are at rest.  · You have coldness in a leg or foot.  · Your skin changes.  · You have erectile dysfunction.  · You have cuts or sores on your feet that are not healing.  SEEK IMMEDIATE MEDICAL CARE IF:  · Your arm or leg turns cold and blue.  · Your arms or legs become red, warm, swollen, painful, or numb.  · You have chest pain or trouble breathing.  · You suddenly have weakness in your face, arm, or leg.  · You become very confused or lose the ability to  speak.  · You suddenly have a very bad headache or lose your vision.     This information is not intended to replace advice given to you by your health care provider. Make sure you discuss any questions you have with your health care provider.     Document Released: 01/25/2006 Document Revised: 01/08/2016 Document Reviewed: 05/28/2015  [employer] Interactive Patient Education ©2016 [employer]          Groin Surgical Site Care  Refer to this sheet in the next few weeks. These instructions provide you with information about caring for yourself after your procedure. Your health care provider may also give you more specific instructions. Your treatment has been planned according to current medical practices, but problems sometimes occur. Call your health care provider if you have any problems or questions after your procedure.  WHAT TO EXPECT AFTER THE PROCEDURE  After your procedure, it is typical to have the following:  · Bruising at the groin site that usually fades within 1-2 weeks.  · Blood collecting in the tissue (hematoma) that may be painful to the touch. It should usually decrease in size and tenderness within 1-2 weeks.  HOME CARE INSTRUCTIONS  · Take medicines only as directed by your health care provider.  · You may shower 24-48 hours after the procedure or as directed by your health care provider. Remove the bandage (dressing) and gently wash the site with plain soap and water. Pat the area dry with a clean towel. Do not rub the site, because this may cause bleeding.  · Do not take baths, swim, or use a hot tub until your health care provider approves.  · Check your insertion site every day for redness, swelling, or drainage.  · Do not apply powder or lotion to the site.  · Limit use of stairs to twice a day for the first 2-3 days or as directed by your health care provider.  · Do not squat for the first 2-3 days or as directed by your health care provider.  · Do not lift over 10 lb (4.5 kg) for 5 days  after your procedure or as directed by your health care provider.  · Ask your health care provider when it is okay to:    Return to work or school.    Resume usual physical activities or sports.    Resume sexual activity.  · Do not drive home if you are discharged the same day as the procedure. Have someone else drive you.  · You may drive 24 hours after the procedure unless otherwise instructed by your health care provider.  · Do not operate machinery or power tools for 24 hours after the procedure or as directed by your health care provider.  · If your procedure was done as an outpatient procedure, which means that you went home the same day as your procedure, a responsible adult should be with you for the first 24 hours after you arrive home.  · Keep all follow-up visits as directed by your health care provider. This is important.  SEEK MEDICAL CARE IF:  · You have a fever.  · You have chills.  · You have increased bleeding from the groin site. Hold pressure on the site.  SEEK IMMEDIATE MEDICAL CARE IF:  · You have unusual pain at the groin site.  · You have redness, warmth, or swelling at the groin site.  · You have drainage (other than a small amount of blood on the dressing) from the groin site.  · The groin site is bleeding, and the bleeding does not stop after 30 minutes of holding steady pressure on the site.  · Your leg or foot becomes pale, cool, tingly, or numb.     This information is not intended to replace advice given to you by your health care provider. Make sure you discuss any questions you have with your health care provider.     Document Released: 08/21/2015 Document Reviewed: 08/21/2015  [employer] Interactive Patient Education ©2016 [employer]         PREVENTING SURGICAL SITE INFECTIONS     Surgical Site Infections FAQs  What is a Surgical Site Infection (SSI)?  A surgical site infection is an infection that occurs after surgery in the part of the body where the surgery took place. Most  patients who have surgery do not develop an infection. However, infections develop in about 1 to 3 out of every 100 patients who have surgery.  Some of the common symptoms of a surgical site infection are:  · Redness and pain around the area where you had surgery  · Drainage of cloudy fluid from your surgical wound  · Fever  Can SSIs be treated?  Yes. Most surgical site infections can be treated with antibiotics. The antibiotic given to you depends on the bacteria (germs) causing the infection. Sometimes patients with SSIs also need another surgery to treat the infection.  What are some of the things that hospitals are doing to prevent SSIs?  To prevent SSIs, doctors, nurses, and other healthcare providers:  · Clean their hands and arms up to their elbows with an antiseptic agent just before the surgery.  · Clean their hands with soap and water or an alcohol-based hand rub before and after caring for each patient.  · May remove some of your hair immediately before your surgery using electric clippers if the hair is in the same area where the procedure will occur. They should not shave you with a razor.  · Wear special hair covers, masks, gowns, and gloves during surgery to keep the surgery area clean.  · Give you antibiotics before your surgery starts. In most cases, you should get antibiotics within 60 minutes before the surgery starts and the antibiotics should be stopped within 24 hours after surgery.  · Clean the skin at the site of your surgery with a special soap that kills germs.  What can I do to help prevent SSIs?  Before your surgery:  · Tell your doctor about other medical problems you may have. Health problems such as allergies, diabetes, and obesity could affect your surgery and your treatment.  · Quit smoking. Patients who smoke get more infections. Talk to your doctor about how you can quit before your surgery.  · Do not shave near where you will have surgery. Shaving with a razor can irritate your  skin and make it easier to develop an infection.  At the time of your surgery:  · Speak up if someone tries to shave you with a razor before surgery. Ask why you need to be shaved and talk with your surgeon if you have any concerns.  · Ask if you will get antibiotics before surgery.  After your surgery:  · Make sure that your healthcare providers clean their hands before examining you, either with soap and water or an alcohol-based hand rub.    If you do not see your providers clean their hands, please ask them to do so.  · Family and friends who visit you should not touch the surgical wound or dressings.  · Family and friends should clean their hands with soap and water or an alcohol-based hand rub before and after visiting you. If you do not see them clean their hands, ask them to clean their hands.  What do I need to do when I go home from the hospital?  · Before you go home, your doctor or nurse should explain everything you need to know about taking care of your wound. Make sure you understand how to care for your wound before you leave the hospital.  · Always clean your hands before and after caring for your wound.  · Before you go home, make sure you know who to contact if you have questions or problems after you get home.  · If you have any symptoms of an infection, such as redness and pain at the surgery site, drainage, or fever, call your doctor immediately.  If you have additional questions, please ask your doctor or nurse.  Developed and co-sponsored by The Society for Healthcare Epidemiology of [place] (SHEA); Infectious Diseases Society of [place] (IDSA); American Hospital Association; Association for Professionals in Infection Control and Epidemiology (APIC); Centers for Disease Control and Prevention (CDC); and The Joint Commission.     This information is not intended to replace advice given to you by your health care provider. Make sure you discuss any questions you have with your health care  provider.     Document Released: 12/23/2014 Document Revised: 01/08/2016 Document Reviewed: 03/02/2016  [employer] Interactive Patient Education ©2016 [employer]             SYMPTOMS OF A STROKE    Call 911 or have someone take you to the Emergency Department if you have any of the following:    · Sudden numbness or weakness of your face, arm or leg especially on one side of the body  · Sudden confusion, diffiiculty speaking or trouble understanding   · Changes in your vision or loss of sight in one eye  · Sudden severe headache with no known cause  · sudden dizziness, trouble walking, loss of balance or coordination    It is important to seek emergency care right away if you have further stroke symptoms. If you get emergency help quickly, the powerful clot-dissolving medicines can reduce the disabilities caused by a stroke.     For more information:    American Stroke Association  5-513-7-STROKE  www.strokeassociation.org           IF YOU SMOKE OR USE TOBACCO PLEASE READ THE FOLLOWING:    Why is smoking bad for me?  Smoking increases the risk of heart disease, lung disease, vascular disease, stroke, and cancer.     If you smoke, STOP!    If you would like more information on quitting smoking, please visit the [employer] website: [email or web address]   This link will provide additional resources including the QUIT line and the [employer] support groups.     For more information:    American Cancer Society  ([phone] 230-4722    American Heart Association  1-408.586.4145               YOU ARE THE MOST IMPORTANT FACTOR IN YOUR RECOVERY.     Follow all instructions carefully.     I have reviewed my discharge instructions with my nurse, including the following information, if applicable:     Information about my illness and diagnosis   Follow up appointments (including lab draws)   Wound Care   Equipment Needs   Medications (new and continuing) along with side  effects   Preventative information such as vaccines and smoking cessations   Diet   Pain   I know when to contact my Doctor's office or seek emergency care      I want my nurse to describe the side effects of my medications: YES NO   If the answer is no, I understand the side effects of my medications: YES NO   My nurse described the side effects of my medications in a way that I could understand: YES NO   I have taken my personal belongings and my own medications with me at discharge: YES NO            I have received this information and my questions have been answered. I have discussed any concerns I see with this plan with the nurse or physician. I understand these instructions.    Signature of Patient or Responsible Person: _____________________________________    Date: _________________  Time: __________________    Signature of Healthcare Provider: _______________________________________  Date: _________________  Time: __________________

## 2017-01-17 NOTE — H&P
12/07/2016  Patient Information  Sai Weinberg   [address] [facility] [place] [address]   1960  'PCP/Referring Physician'  Dewayne Cole MD  [phone]  No ref. provider found          Chief Complaint   Patient presents with   • Follow-up       Having pain and swelling in left leg.   • Peripheral Vascular Disease         History of Present Illness:   This patient is status post right femoral to popliteal bypass over one year ago. He states his claudication symptoms on the right have completely resolved and they were resolved on the last visit with me in April of last year. He states now the claudication symptoms in his leg on the left side has increased significantly. He says he is unable to walk from the car less than 100 feet into the office waiting room before he has pain in the left calf and has to stop walking. He also has edema of both lower extremities and I have explained to him this is not due to arterial insufficiency but due to venous insufficiency and his truncal obesity. He has no rest pain at this time and no evidence of nonhealing or slow healing ulcers. He has an abnormal ankle brachial index on the left of 0.76 and an arterial duplex scan also demonstrating severe atherosclerotic disease in the left femoral and superficial femoral artery system. At this point he has significant risk factors, significant left leg claudication at a short distance, and 2 different noninvasive studies confirming left leg arterial insufficiency. Furthermore he has had a known occluded right sided superficial femoral artery which was corrected over one year ago with resolution of his symptoms.            Patient Active Problem List   Diagnosis   • Dyslipidemia   • Arthritis   • COLD (chronic obstructive lung disease)   • Diabetes mellitus   • Esophageal reflux   • Hypertension   • Malignant neoplasm of colon   • Heart attack   • Peripheral vascular disease   • Chewing tobacco use   • Peripheral arterial  disease       Medical History         Past Medical History   Diagnosis Date   • Arthritis     • Cataract     • COLD (chronic obstructive lung disease)     • Diabetes mellitus     • Dyslipidemia     • Esophageal reflux     • Flu     • Heart attack     • Hypertension     • Malignant neoplasm of colon     • Peripheral vascular disease            Surgical History           Past Surgical History   Procedure Laterality Date   • Colon surgery       • Bypass graft           non-vein - femoral-popliteal right   • Other surgical history           PTA ILIAC STENOSIS WITH STENT            Current Outpatient Prescriptions:   • aspirin 81 MG tablet, Take 81 mg by mouth daily., Disp: , Rfl:   • atenolol (TENORMIN) 25 MG tablet, Take 25 mg by mouth daily., Disp: , Rfl:   • atorvastatin (LIPITOR) 40 MG tablet, Take 40 mg by mouth daily., Disp: , Rfl:   • Cholecalciferol (VITAMIN D PO), Take by mouth., Disp: , Rfl:   • clopidogrel (PLAVIX) 75 MG tablet, Take 75 mg by mouth daily., Disp: , Rfl:   • fenofibrate 160 MG tablet, Take 160 mg by mouth daily., Disp: , Rfl:   • Fluticasone-Salmeterol (ADVAIR DISKUS IN), Inhale., Disp: , Rfl:   • hydrochlorothiazide (HYDRODIURIL) 12.5 MG tablet, Take 12.5 mg by mouth daily., Disp: , Rfl:   • ipratropium-albuterol (COMBIVENT)  MCG/ACT inhaler, Inhale 2 puffs every 6 (six) hours as needed for wheezing., Disp: , Rfl:   • lisinopril (PRINIVIL,ZESTRIL) 30 MG tablet, Take 30 mg by mouth daily., Disp: , Rfl:   • metFORMIN (GLUCOPHAGE) 500 MG tablet, Take 500 mg by mouth 2 (two) times a day with meals., Disp: , Rfl:   • omeprazole (PriLOSEC) 20 MG capsule, Take 20 mg by mouth daily., Disp: , Rfl:   • vitamin B-12 (CYANOCOBALAMIN) 500 MCG tablet, Take 500 mcg by mouth daily., Disp: , Rfl:   Allergies no known allergies   Social History    Social History            Social History   • Marital status:        Spouse name: N/A   • Number of children: N/A   • Years of education: N/A             Occupational History   • DISABLED          BACK AND LUNG PROBLEMS             Social History Main Topics   • Smoking status: Former Smoker       Packs/day: 2.00       Types: Cigarettes       Start date: 1980       Quit date: 2015   • Smokeless tobacco: Current User       Types: Chew         Comment: Stopped smoking 1 year ago. Smoked 35 years up to 2ppd.   • Alcohol use No   • Drug use: No   • Sexual activity: Not on file           Other Topics Concern   • Not on file      Social History Narrative               Family History   Problem Relation Age of Onset   • Lung cancer Mother     • Heart attack Mother     • Hypertension Mother     • Diabetes Mother     • Diabetes Father     • Hypertension Father     • Heart attack Father        Review of Systems   Constitution: Negative for chills, fever, malaise/fatigue, night sweats and weight loss.   HENT: Positive for hearing loss. Negative for headaches, odynophagia and sore throat.   Cardiovascular: Positive for claudication, dyspnea on exertion and leg swelling. Negative for chest pain, orthopnea and palpitations.   Respiratory: Positive for cough and shortness of breath. Negative for hemoptysis.   Endocrine: Negative for cold intolerance, heat intolerance, polydipsia, polyphagia and polyuria.   Hematologic/Lymphatic: Bruises/bleeds easily.   Skin: Positive for rash. Negative for itching.   Musculoskeletal: Negative for joint pain, joint swelling and myalgias.   Gastrointestinal: Negative for abdominal pain, constipation, diarrhea, hematemesis, hematochezia, melena, nausea and vomiting.   Genitourinary: Positive for frequency. Negative for dysuria and hematuria.   Neurological: Negative for focal weakness, numbness and seizures.   Psychiatric/Behavioral: Negative for suicidal ideas.   All other systems reviewed and are negative.      Vitals        Vitals:     12/07/16 0815   BP: (!) 146/107   BP Location: Left arm   Pulse: 82   Weight: 250 lb (113 kg)  "  Height: 65\" (165.1 cm)         Physical Exam   CONSTITUTIONAL: Alert and conversant, Well dressed, Well nourished, No acute distress  EYES: Sclera clean, Anicteric, Pupils equal  ENT: No nasal deviation, Trachea midline  NECK: No neck masses, Supple  LUNGS: No wheezing, Cough, non-congested  HEART: No rubs, No murmurs  ABDOMEN: Soft, non-distended, No masses, Non tender to palpation significant obesity NEURO: No motor deficits, No sensory deficits, Cranial Nerves 2 through 12 grossly intact  PSYCHIATRIC: Oriented to person, place and time, No memory deficits, Mood appropriate  VASCULAR: I am unable to palpate a posterior tibial or dorsalis pedis pulse on the left. The right foot is warm and viable. The left foot is cool but is viable. There is significant ankle edema bilaterally.     Labs/Imaging:   I have reviewed the office notes from Dr. Cole. I reviewed the abnormal arterial duplex and ankle-brachial index study.     Assessment/Plan:   As per my long discussion above, this patient has had a previous right femoral to popliteal bypass for an occluded right superficial femoral artery with resolution of his symptoms. He now has significant claudication symptoms on the left. He has an abnormal arterial duplex on the left leg as well as an abnormal ankle brachial index on the left demonstrating left superficial femoral and femoral artery stenosis. At this point, I would proceed with a CT angiogram and runoff for further evaluation. I would avoid an arteriogram formally at this time. He is aware of the risk of this bleeding, contrast reaction and nephrotoxicity and is agreeable to proceed.  Patient Active Problem List   Diagnosis   • Dyslipidemia   • Arthritis   • COLD (chronic obstructive lung disease)   • Diabetes mellitus   • Esophageal reflux   • Hypertension   • Malignant neoplasm of colon   • Heart attack   • Peripheral vascular disease   • Chewing tobacco use   • Peripheral arterial disease      Signed by: " Heladio Rosa M.D.      IMPRESSION:from CT angiogram of 1/9/17  See full discussion above. The most significant findings are  a patent right femoral-popliteal graft and severe calcific stenosis of  the midportion of the left superficial femoral artery.    Felt best treated by femoral cutdown, possible endarterectomy and or possible stent placement after angiogram.  No changes in exam or symptoms.  Lungs clear bilat.  CV without rubs murmers or gallops.    1/17/17    CC: Dewayne Cole MD

## 2017-01-18 VITALS
WEIGHT: 248 LBS | TEMPERATURE: 97.6 F | OXYGEN SATURATION: 97 % | RESPIRATION RATE: 16 BRPM | BODY MASS INDEX: 41.32 KG/M2 | DIASTOLIC BLOOD PRESSURE: 55 MMHG | HEIGHT: 65 IN | HEART RATE: 81 BPM | SYSTOLIC BLOOD PRESSURE: 115 MMHG

## 2017-01-18 LAB
ANION GAP SERPL CALCULATED.3IONS-SCNC: 8 MMOL/L (ref 3–11)
BASOPHILS # BLD AUTO: 0.02 10*3/MM3 (ref 0–0.2)
BASOPHILS NFR BLD AUTO: 0.3 % (ref 0–1)
BUN BLD-MCNC: 24 MG/DL (ref 9–23)
BUN/CREAT SERPL: 24 (ref 7–25)
CALCIUM SPEC-SCNC: 9.5 MG/DL (ref 8.7–10.4)
CHLORIDE SERPL-SCNC: 95 MMOL/L (ref 99–109)
CO2 SERPL-SCNC: 29 MMOL/L (ref 20–31)
CREAT BLD-MCNC: 1 MG/DL (ref 0.6–1.3)
DEPRECATED RDW RBC AUTO: 59.4 FL (ref 37–54)
EOSINOPHIL # BLD AUTO: 0.02 10*3/MM3 (ref 0.1–0.3)
EOSINOPHIL NFR BLD AUTO: 0.3 % (ref 0–3)
ERYTHROCYTE [DISTWIDTH] IN BLOOD BY AUTOMATED COUNT: 17.1 % (ref 11.3–14.5)
GFR SERPL CREATININE-BSD FRML MDRD: 77 ML/MIN/1.73
GLUCOSE BLD-MCNC: 117 MG/DL (ref 70–100)
HCT VFR BLD AUTO: 32.4 % (ref 38.9–50.9)
HGB BLD-MCNC: 11.3 G/DL (ref 13.1–17.5)
IMM GRANULOCYTES # BLD: 0.01 10*3/MM3 (ref 0–0.03)
IMM GRANULOCYTES NFR BLD: 0.1 % (ref 0–0.6)
LYMPHOCYTES # BLD AUTO: 1.42 10*3/MM3 (ref 0.6–4.8)
LYMPHOCYTES NFR BLD AUTO: 20.2 % (ref 24–44)
MCH RBC QN AUTO: 32.8 PG (ref 27–31)
MCHC RBC AUTO-ENTMCNC: 34.9 G/DL (ref 32–36)
MCV RBC AUTO: 93.9 FL (ref 80–99)
MONOCYTES # BLD AUTO: 0.85 10*3/MM3 (ref 0–1)
MONOCYTES NFR BLD AUTO: 12.1 % (ref 0–12)
NEUTROPHILS # BLD AUTO: 4.71 10*3/MM3 (ref 1.5–8.3)
NEUTROPHILS NFR BLD AUTO: 67 % (ref 41–71)
PLATELET # BLD AUTO: 127 10*3/MM3 (ref 150–450)
PMV BLD AUTO: 9.1 FL (ref 6–12)
POTASSIUM BLD-SCNC: 4.1 MMOL/L (ref 3.5–5.5)
RBC # BLD AUTO: 3.45 10*6/MM3 (ref 4.2–5.76)
SODIUM BLD-SCNC: 132 MMOL/L (ref 132–146)
WBC NRBC COR # BLD: 7.03 10*3/MM3 (ref 3.5–10.8)

## 2017-01-18 PROCEDURE — 80048 BASIC METABOLIC PNL TOTAL CA: CPT | Performed by: PHYSICIAN ASSISTANT

## 2017-01-18 PROCEDURE — 99231 SBSQ HOSP IP/OBS SF/LOW 25: CPT | Performed by: THORACIC SURGERY (CARDIOTHORACIC VASCULAR SURGERY)

## 2017-01-18 PROCEDURE — 85025 COMPLETE CBC W/AUTO DIFF WBC: CPT | Performed by: PHYSICIAN ASSISTANT

## 2017-01-18 RX ADMIN — PANTOPRAZOLE SODIUM 40 MG: 40 TABLET, DELAYED RELEASE ORAL at 07:14

## 2017-01-18 RX ADMIN — Medication 325 MG: at 09:06

## 2017-01-18 RX ADMIN — ASPIRIN 81 MG: 81 TABLET, CHEWABLE ORAL at 09:07

## 2017-01-18 RX ADMIN — DESMOPRESSIN ACETATE 40 MG: 0.2 TABLET ORAL at 09:07

## 2017-01-18 RX ADMIN — LISINOPRIL 40 MG: 40 TABLET ORAL at 09:07

## 2017-01-18 RX ADMIN — CLOPIDOGREL 75 MG: 75 TABLET, FILM COATED ORAL at 09:06

## 2017-01-18 NOTE — PROGRESS NOTES
CTS Progress Note       LOS: 1 day   Patient Care Team:  Dewayne Cole MD as PCP - General  Dewayne Cole MD as PCP - Family Medicine        Vital Signs  Temp:  [97.6 °F (36.4 °C)-98.5 °F (36.9 °C)] 97.6 °F (36.4 °C)  Heart Rate:  [62-93] 75  Resp:  [16-22] 16  BP: (100-151)/(43-78) 141/71    Physical Exam: Patient states left foot is warm and pain-free.  Left posterior tibial pulses palpable.       Results     Results from last 7 days  Lab Units 01/18/17  0531   WBC 10*3/mm3 7.03   HEMOGLOBIN g/dL 11.3*   HEMATOCRIT % 32.4*   PLATELETS 10*3/mm3 127*       Results from last 7 days  Lab Units 01/18/17  0531   SODIUM mmol/L 132   POTASSIUM mmol/L 4.1   CHLORIDE mmol/L 95*   TOTAL CO2 mmol/L 29.0   BUN mg/dL 24*   CREATININE mg/dL 1.00   GLUCOSE mg/dL 117*   CALCIUM mg/dL 9.5           Imaging Results (last 24 hours)     Procedure Component Value Units Date/Time    XR Cole OR Procedure [[id number]] Resulted:  01/17/17 0953     Updated:  01/17/17 0953          Assessment    Active Problems:    Peripheral vascular disease   satisfactory with result thus far plan to discharge home today.  Complete instructions given to the patient      Plan   Discharge patient home today.  Patient to remove left leg dressing in 48 hours on Friday, January 20.  Patient may shower on Saturday, January 21.  Aspirin and Plavix on discharge    Heladio Rosa MD  01/18/17  6:51 AM

## 2017-01-18 NOTE — PROGRESS NOTES
Discharge Planning Assessment  [facility]     Patient Name: Sai Weinberg  MRN: [id number]  Today's Date: 1/18/2017    Admit Date: 1/17/2017          Discharge Needs Assessment       01/18/17 0849    Living Environment    Lives With alone    Living Arrangements mobile home    Provides Primary Care For no one    Quality Of Family Relationships supportive;helpful;involved    Able to Return to Prior Living Arrangements yes    Discharge Needs Assessment    Concerns To Be Addressed no discharge needs identified;denies needs/concerns at this time    Anticipated Changes Related to Illness none    Equipment Currently Used at Home cane, straight    Equipment Needed After Discharge none    Transportation Available car;family or friend will provide    Discharge Disposition still a patient    Discharge Contact Information if Applicable [phone]            Discharge Plan       01/18/17 0857    Case Management/Social Work Plan    Plan Home    Patient/Family In Agreement With Plan yes    Additional Comments Met with pt at . Denies HH or DME. Is independent of ADL's but uses a cane prn. Denies any d/c needs at this time.         Discharge Placement     No information found        Expected Discharge Date and Time     Expected Discharge Date Expected Discharge Time    Jan 18, 2017               Demographic Summary       01/18/17 0846    Referral Information    Referral Source admission list    Contact Information    Permission Granted to Share Information With     Primary Care Physician Information    Name Dr. Dewayne Cole             Functional Status       01/18/17 0848    Functional Status Prior    Ambulation 1-->assistive equipment   cane    Transferring 0-->independent    Toileting 0-->independent    Bathing 0-->independent    Dressing 0-->independent    Eating 0-->independent    Communication 0-->understands/communicates without difficulty    Swallowing 0-->swallows foods/liquids without difficulty    IADL     Medications independent    Meal Preparation independent    Housekeeping independent    Laundry independent    Shopping independent    Oral Care independent    Activity Tolerance    Current Activity Limitations none    Usual Activity Tolerance moderate    Current Activity Tolerance fair            Psychosocial     None            Abuse/Neglect     None            Legal     None            Substance Abuse     None            Patient Forms     None          Fern Silvestre

## 2017-01-18 NOTE — PAYOR COMM NOTE
"Sai Hastings (56 y.o. Male) NOTIFICATION OF DISCHARGE ON 1/18/17.    Date of Birth Social Security Number Address Home Phone MRN    1960  [address] [address] [phone] [id number]    Denominational Marital Status          None        Admission Date Admission Type Admitting Provider Attending Provider Department, Room/Bed    1/17/17 Elective Heladio Rosa MD  [facility] 6A, N601/1    Discharge Date Discharge Disposition Discharge Destination        1/18/2017 Home or Self Care             Attending Provider: (none)    Allergies:  No Known Allergies    Isolation:  None   Infection:  None   Code Status:  FULL    Ht:  65\" (165.1 cm)   Wt:  248 lb (112 kg)    Admission Cmt:  None   Principal Problem:  None                Active Insurance as of 1/17/2017     Primary Coverage     Payor Plan Insurance Group Employer/Plan Group    [employer] [place] [employer] [place]      Payor Plan Address Payor Plan Phone Number Effective From Effective To    [address] [address] [phone] 10/21/2016     [place] [address]       Subscriber Name Subscriber Birth Date Member ID       SAI HASTINGS 1960 [id number]                 Emergency Contacts      (Rel.) Home Phone Work Phone Mobile Phone    Karina Brennan (Daughter) [phone] -- [phone]            Discharge Order     Start     Ordered    01/18/17 0758  Discharge patient  Once     Expected Discharge Date:  01/18/17    Discharge Disposition:  Home or Self Care        01/18/17 0757          "

## 2017-01-18 NOTE — PLAN OF CARE
Problem: Patient Care Overview (Adult)  Goal: Plan of Care Review  Outcome: Ongoing (interventions implemented as appropriate)    01/18/17 0457   Outcome Evaluation   Outcome Summary/Follow up Plan VSS. pt only c/o pain at beginning of shift that was relieved with lortab. no other complaints of pain or discomfort. pt has been ambulating in room with no issues. has been on his CPAP all evening. groin dressing stable with no drainage.   Coping/Psychosocial Response Interventions   Plan Of Care Reviewed With patient   Patient Care Overview   Progress improving         Problem: Revascularization/PAD, Lower Extremity (Adult)  Goal: Signs and Symptoms of Listed Potential Problems Will be Absent or Manageable (Revascularization/PAD, Lower Extremity)  Outcome: Ongoing (interventions implemented as appropriate)    01/18/17 6369   Revascularization/PAD, Lower Extremity   Problems Assessed (Lower Extremity Revascularization/PAD) all   Problems Present (Lower Extremity Revascularization/PAD) none

## 2017-01-18 NOTE — PAYOR COMM NOTE
"Rogelio Hastings (56 y.o. Male) Initial notification and clinicals.  Thanks,Jodi Pablo, RN      Date of Birth Social Security Number Address Home Phone MRN    1960  [address] [facility] [phone] [id number]    Yazidi Marital Status          None        Admission Date Admission Type Admitting Provider Attending Provider Department, Room/Bed    1/17/17 Elective Heladio Rosa MD Mitchell, Robert O, MD [facility] 6A, N601/1    Discharge Date Discharge Disposition Discharge Destination                      Attending Provider: Heladio Rosa MD     Allergies:  No Known Allergies    Isolation:  None   Infection:  None   Code Status:  FULL    Ht:  65\" (165.1 cm)   Wt:  248 lb (112 kg)    Admission Cmt:  None   Principal Problem:  None                Active Insurance as of 1/17/2017     Primary Coverage     Payor Plan Insurance Group Employer/Plan Group    [employer] [place] [employer] [place]ICA[place]      Payor Plan Address Payor Plan Phone Number Effective From Effective To    [address] [address] [phone] 10/21/2016     [place] [address]       Subscriber Name Subscriber Birth Date Member ID       ROGELIO HASTINGS 1960 [id number]                 Emergency Contacts      (Rel.) Home Phone Work Phone Mobile Phone    Karina Brennan (Daughter) [phone] -- [phone]               History & Physical      Heladio Rosa MD at 1/17/2017  8:03 AM          12/07/2016  Patient Information  Rogelio Hastings   [address] [facility] [address]   1960  'PCP/Referring Physician'  Dewayne Cole MD  [phone]  No ref. provider found          Chief Complaint   Patient presents with   • Follow-up       Having pain and swelling in left leg.   • Peripheral Vascular Disease         History of Present Illness:   This patient is status post right femoral to popliteal bypass over one year ago. He states his claudication symptoms on the " right have completely resolved and they were resolved on the last visit with me in April of last year. He states now the claudication symptoms in his leg on the left side has increased significantly. He says he is unable to walk from the car less than 100 feet into the office waiting room before he has pain in the left calf and has to stop walking. He also has edema of both lower extremities and I have explained to him this is not due to arterial insufficiency but due to venous insufficiency and his truncal obesity. He has no rest pain at this time and no evidence of nonhealing or slow healing ulcers. He has an abnormal ankle brachial index on the left of 0.76 and an arterial duplex scan also demonstrating severe atherosclerotic disease in the left femoral and superficial femoral artery system. At this point he has significant risk factors, significant left leg claudication at a short distance, and 2 different noninvasive studies confirming left leg arterial insufficiency. Furthermore he has had a known occluded right sided superficial femoral artery which was corrected over one year ago with resolution of his symptoms.            Patient Active Problem List   Diagnosis   • Dyslipidemia   • Arthritis   • COLD (chronic obstructive lung disease)   • Diabetes mellitus   • Esophageal reflux   • Hypertension   • Malignant neoplasm of colon   • Heart attack   • Peripheral vascular disease   • Chewing tobacco use   • Peripheral arterial disease       Medical History         Past Medical History   Diagnosis Date   • Arthritis     • Cataract     • COLD (chronic obstructive lung disease)     • Diabetes mellitus     • Dyslipidemia     • Esophageal reflux     • Flu     • Heart attack     • Hypertension     • Malignant neoplasm of colon     • Peripheral vascular disease            Surgical History           Past Surgical History   Procedure Laterality Date   • Colon surgery       • Bypass graft           non-vein -  femoral-popliteal right   • Other surgical history           PTA ILIAC STENOSIS WITH STENT            Current Outpatient Prescriptions:   • aspirin 81 MG tablet, Take 81 mg by mouth daily., Disp: , Rfl:   • atenolol (TENORMIN) 25 MG tablet, Take 25 mg by mouth daily., Disp: , Rfl:   • atorvastatin (LIPITOR) 40 MG tablet, Take 40 mg by mouth daily., Disp: , Rfl:   • Cholecalciferol (VITAMIN D PO), Take by mouth., Disp: , Rfl:   • clopidogrel (PLAVIX) 75 MG tablet, Take 75 mg by mouth daily., Disp: , Rfl:   • fenofibrate 160 MG tablet, Take 160 mg by mouth daily., Disp: , Rfl:   • Fluticasone-Salmeterol (ADVAIR DISKUS IN), Inhale., Disp: , Rfl:   • hydrochlorothiazide (HYDRODIURIL) 12.5 MG tablet, Take 12.5 mg by mouth daily., Disp: , Rfl:   • ipratropium-albuterol (COMBIVENT)  MCG/ACT inhaler, Inhale 2 puffs every 6 (six) hours as needed for wheezing., Disp: , Rfl:   • lisinopril (PRINIVIL,ZESTRIL) 30 MG tablet, Take 30 mg by mouth daily., Disp: , Rfl:   • metFORMIN (GLUCOPHAGE) 500 MG tablet, Take 500 mg by mouth 2 (two) times a day with meals., Disp: , Rfl:   • omeprazole (PriLOSEC) 20 MG capsule, Take 20 mg by mouth daily., Disp: , Rfl:   • vitamin B-12 (CYANOCOBALAMIN) 500 MCG tablet, Take 500 mcg by mouth daily., Disp: , Rfl:   Allergies no known allergies   Social History    Social History            Social History   • Marital status:        Spouse name: N/A   • Number of children: N/A   • Years of education: N/A            Occupational History   • DISABLED          BACK AND LUNG PROBLEMS             Social History Main Topics   • Smoking status: Former Smoker       Packs/day: 2.00       Types: Cigarettes       Start date: 1980       Quit date: 2015   • Smokeless tobacco: Current User       Types: Chew         Comment: Stopped smoking 1 year ago. Smoked 35 years up to 2ppd.   • Alcohol use No   • Drug use: No   • Sexual activity: Not on file           Other Topics Concern   • Not on file  "     Social History Narrative               Family History   Problem Relation Age of Onset   • Lung cancer Mother     • Heart attack Mother     • Hypertension Mother     • Diabetes Mother     • Diabetes Father     • Hypertension Father     • Heart attack Father        Review of Systems   Constitution: Negative for chills, fever, malaise/fatigue, night sweats and weight loss.   HENT: Positive for hearing loss. Negative for headaches, odynophagia and sore throat.   Cardiovascular: Positive for claudication, dyspnea on exertion and leg swelling. Negative for chest pain, orthopnea and palpitations.   Respiratory: Positive for cough and shortness of breath. Negative for hemoptysis.   Endocrine: Negative for cold intolerance, heat intolerance, polydipsia, polyphagia and polyuria.   Hematologic/Lymphatic: Bruises/bleeds easily.   Skin: Positive for rash. Negative for itching.   Musculoskeletal: Negative for joint pain, joint swelling and myalgias.   Gastrointestinal: Negative for abdominal pain, constipation, diarrhea, hematemesis, hematochezia, melena, nausea and vomiting.   Genitourinary: Positive for frequency. Negative for dysuria and hematuria.   Neurological: Negative for focal weakness, numbness and seizures.   Psychiatric/Behavioral: Negative for suicidal ideas.   All other systems reviewed and are negative.      Vitals        Vitals:     12/07/16 0815   BP: (!) 146/107   BP Location: Left arm   Pulse: 82   Weight: 250 lb (113 kg)   Height: 65\" (165.1 cm)         Physical Exam   CONSTITUTIONAL: Alert and conversant, Well dressed, Well nourished, No acute distress  EYES: Sclera clean, Anicteric, Pupils equal  ENT: No nasal deviation, Trachea midline  NECK: No neck masses, Supple  LUNGS: No wheezing, Cough, non-congested  HEART: No rubs, No murmurs  ABDOMEN: Soft, non-distended, No masses, Non tender to palpation significant obesity NEURO: No motor deficits, No sensory deficits, Cranial Nerves 2 through 12 grossly " intact  PSYCHIATRIC: Oriented to person, place and time, No memory deficits, Mood appropriate  VASCULAR: I am unable to palpate a posterior tibial or dorsalis pedis pulse on the left. The right foot is warm and viable. The left foot is cool but is viable. There is significant ankle edema bilaterally.     Labs/Imaging:   I have reviewed the office notes from Dr. Cole. I reviewed the abnormal arterial duplex and ankle-brachial index study.     Assessment/Plan:   As per my long discussion above, this patient has had a previous right femoral to popliteal bypass for an occluded right superficial femoral artery with resolution of his symptoms. He now has significant claudication symptoms on the left. He has an abnormal arterial duplex on the left leg as well as an abnormal ankle brachial index on the left demonstrating left superficial femoral and femoral artery stenosis. At this point, I would proceed with a CT angiogram and runoff for further evaluation. I would avoid an arteriogram formally at this time. He is aware of the risk of this bleeding, contrast reaction and nephrotoxicity and is agreeable to proceed.      Patient Active Problem List   Diagnosis   • Dyslipidemia   • Arthritis   • COLD (chronic obstructive lung disease)   • Diabetes mellitus   • Esophageal reflux   • Hypertension   • Malignant neoplasm of colon   • Heart attack   • Peripheral vascular disease   • Chewing tobacco use   • Peripheral arterial disease      Signed by: Heladio Rosa M.D.      IMPRESSION:from CT angiogram of 1/9/17  See full discussion above. The most significant findings are  a patent right femoral-popliteal graft and severe calcific stenosis of  the midportion of the left superficial femoral artery.    Felt best treated by femoral cutdown, possible endarterectomy and or possible stent placement after angiogram.  No changes in exam or symptoms.  Lungs clear bilat.  CV without rubs murmers or gallops.    1/17/17    CC: Dewayne  MD Douglas        Electronically signed by Heladio Rosa MD at 1/17/2017  8:16 AM        Vital Signs (last 24 hours)       01/16 0700  -  01/17 0659 01/17 0700  -  01/18 0232   Most Recent    Temp (°F)   97.6 -  98.5     97.6 (36.4)    Heart Rate   62 -  93     81    Resp   16 -  22     18    BP   100/43 -  151/72     113/55    SpO2 (%)   92 -  98     95          Intake & Output (last day)       01/17 0701 - 01/18 0700    P.O. 480    I.V. (mL/kg) 1412 (12.6)    Total Intake(mL/kg) 1892 (16.8)    Urine (mL/kg/hr) 800    Stool 0    Total Output 800    Net +1092         Unmeasured Urine Occurrence 1 x    Unmeasured Stool Occurrence 0 x        Hospital Medications (all)       Dose Frequency Start End    albuterol (PROVENTIL) nebulizer solution 2.5 mg 2.5 mg Every 4 Hours PRN 1/17/2017     Sig - Route: Take 0.5 mL by nebulization Every 4 (Four) Hours As Needed for wheezing. - Nebulization    aspirin chewable tablet 81 mg 81 mg Daily 1/17/2017     Sig - Route: Chew 1 tablet Daily. - Oral    atorvastatin (LIPITOR) tablet 40 mg 40 mg Daily 1/17/2017     Sig - Route: Take 1 tablet by mouth Daily. - Oral    budesonide-formoterol (SYMBICORT) 80-4.5 MCG/ACT inhaler 2 puff 2 puff 2 Times Daily - RT 1/17/2017     Sig - Route: Inhale 2 puffs 2 (Two) Times a Day. - Inhalation    cefuroxime (ZINACEF) IVPB 1.5 g 1.5 g Once 1/17/2017 1/17/2017    Sig - Route: Infuse 50 mL into a venous catheter 1 (One) Time. - Intravenous    clopidogrel (PLAVIX) tablet 75 mg 75 mg Daily 1/18/2017     Sig - Route: Take 1 tablet by mouth Daily. - Oral    ferrous sulfate tablet 325 mg 325 mg Daily With Breakfast 1/17/2017     Sig - Route: Take 1 tablet by mouth Daily With Breakfast. - Oral    furosemide (LASIX) tablet 20 mg 20 mg Daily PRN 1/17/2017     Sig - Route: Take 0.5 tablets by mouth Daily As Needed (SWELLING). - Oral    hydrochlorothiazide (HYDRODIURIL) tablet 25 mg 25 mg Daily 1/17/2017     Sig - Route: Take 1 tablet by mouth Daily. - Oral     HYDROcodone-acetaminophen (NORCO) 7.5-325 MG per tablet 1 tablet 1 tablet Every 4 Hours PRN 1/17/2017 1/27/2017    Sig - Route: Take 1 tablet by mouth Every 4 (Four) Hours As Needed for moderate pain (4-6). - Oral    lactated ringers infusion 20 mL/hr Continuous 1/17/2017     Sig - Route: Infuse 20 mL/hr into a venous catheter Continuous. - Intravenous    lactated ringers infusion 100 mL/hr Continuous 1/17/2017     Sig - Route: Infuse 100 mL/hr into a venous catheter Continuous. - Intravenous    lidocaine (XYLOCAINE) 1 % injection 1 mL 1 mL Once 1/17/2017 1/17/2017    Sig - Route: Inject 1 mL into the skin 1 (One) Time. - Intradermal    lisinopril (PRINIVIL,ZESTRIL) tablet 40 mg 40 mg Daily 1/17/2017     Sig - Route: Take 1 tablet by mouth Daily. - Oral    Morphine injection 2 mg 2 mg Every 3 Hours PRN 1/17/2017 1/27/2017    Sig - Route: Infuse 0.2 mL into a venous catheter Every 3 (Three) Hours As Needed for moderate pain (4-6). - Intravenous    Morphine sulfate (PF) injection 4 mg 4 mg Every 3 Hours PRN 1/17/2017 1/27/2017    Sig - Route: Infuse 1 mL into a venous catheter Every 3 (Three) Hours As Needed for severe pain (7-10). - Intravenous    pantoprazole (PROTONIX) EC tablet 40 mg 40 mg Every Other Day 1/18/2017     Sig - Route: Take 1 tablet by mouth Every Other Day. - Oral    sodium chloride 0.45 % infusion 100 mL/hr Continuous 1/17/2017     Sig - Route: Infuse 100 mL/hr into a venous catheter Continuous. - Intravenous    tamsulosin (FLOMAX) 24 hr capsule 0.4 mg 0.4 mg Daily 1/17/2017     Sig - Route: Take 1 capsule by mouth Daily. - Oral    tiZANidine (ZANAFLEX) tablet 4 mg 4 mg Nightly PRN 1/17/2017     Sig - Route: Take 1 tablet by mouth At Night As Needed for muscle spasms. - Oral    bupivacaine-EPINEPHrine PF (MARCAINE w/EPI) 0.5% -1:502906 injection (Discontinued)  As Needed 1/17/2017 1/17/2017    Sig: As Needed.    Reason for Discontinue: Patient Discharge    Chlorhexidine Gluconate Cloth 2 % pads 1  application (Discontinued) 1 application Every 12 Hours PRN 1/17/2017 1/17/2017    Sig - Route: Apply 1 application topically Every 12 (Twelve) Hours As Needed (12 hours night before surgery and repeat in AM prior to surgery.). - Topical    Reason for Discontinue: Patient Transfer    famotidine (PEPCID) tablet 20 mg (Discontinued) 20 mg 2 Times Daily 1/17/2017 1/17/2017    Sig - Route: Take 1 tablet by mouth 2 (Two) Times a Day. - Oral    Reason for Discontinue: Patient Transfer    FentaNYL Citrate (PF) (SUBLIMAZE) injection 50 mcg (Discontinued) 50 mcg Every 15 Minutes PRN 1/17/2017 1/17/2017    Sig - Route: Infuse 1 mL into a venous catheter Every 15 (Fifteen) Minutes As Needed for moderate pain (4-6) or severe pain (7-10). - Intravenous    Reason for Discontinue: Patient Transfer    iodixanol (VISIPAQUE) 320 MG/ML injection (Discontinued)  As Needed 1/17/2017 1/17/2017    Sig: As Needed.    Reason for Discontinue: Patient Discharge    iopamidol (ISOVUE-300) 61 % injection (Discontinued)  As Needed 1/17/2017 1/17/2017    Sig: As Needed.    Reason for Discontinue: Patient Discharge    meperidine (DEMEROL) injection 12.5 mg (Discontinued) 12.5 mg Every 5 Minutes PRN 1/17/2017 1/17/2017    Sig - Route: Infuse 0.5 mL into a venous catheter Every 5 (Five) Minutes As Needed for shivering (May repeat x 1). - Intravenous    Reason for Discontinue: Patient Transfer    Morphine injection 4 mg (Discontinued) 4 mg Every 3 Hours PRN 1/17/2017 1/17/2017    Sig - Route: Infuse 0.4 mL into a venous catheter Every 3 (Three) Hours As Needed for severe pain (7-10). - Intravenous    Reason for Discontinue: Duplicate order    ondansetron (ZOFRAN) injection 4 mg (Discontinued) 4 mg Once As Needed 1/17/2017 1/17/2017    Sig - Route: Infuse 2 mL into a venous catheter 1 (One) Time As Needed for nausea or vomiting. - Intravenous    Reason for Discontinue: Patient Transfer    sodium chloride 1,000 mL with heparin (porcine) 10,000 Units  mixture (Discontinued)  As Needed 1/17/2017 1/17/2017    Sig: As Needed.    Reason for Discontinue: Patient Discharge          Lab Results (last 24 hours)     Procedure Component Value Units Date/Time    POC Glucose Fingerstick [[id number]]  (Normal) Collected:  01/17/17 0728    Specimen:  Blood Updated:  01/17/17 0740     Glucose 117 mg/dL     Narrative:       Meter: [id number] : [id number] Michael Narayanan          Imaging Results (last 24 hours)     Procedure Component Value Units Date/Time    XR [place] OR Procedure [[id number]] Resulted:  01/17/17 0953     Updated:  01/17/17 0953             Operative/Procedure Notes (last 24 hours) (Notes from 1/17/2017  2:33 AM through 1/18/2017  2:33 AM)      Heladio Rosa MD at 1/17/2017  9:35 AM  Version 1 of 1         Operative Report    Preop Diagnosis: Left leg claudication.  Left superficial femoral artery stenosis documented by CT angiogram.  Previous right femoral to popliteal bypass.        Procedure: Left femoral artery cutdown.  Arteriogram of the left leg arteries.  Angioplasty and stent of the left superficial femoral artery with a 6 x 80 E V3 ever Flex self-expanding stent.  And a completion arteriogram left leg.  Total fluoroscopy time 6 minutes.  Total contrast 60 mL.        Surgeons: Heladio Cruz PAC        Indication: This patient is status post previous right femoral to popliteal bypass.  He had recently developed new onset claudication in the left leg is lifestyle limiting and documented by CT angiogram and abnormal duplex findings he understood the nature the procedure risk of bleeding infection contrast reaction and limb loss and no guarantees were made as to outcome and he agreed to proceed.        Description: Supine position.  Incision made below the inguinal crease over the proximal left superficial femoral artery this was done because the scan had demonstrated no disease proximal to this patient has a large and unfriendly  pannus and significant truncal obesity and we wanted to avoid an incision over the proper common femoral artery.  Upon exposing the proximal superficial femoral artery a 4 Chadian sheath was placed in retrograde fashion and a hand-held injection demonstrated no significant disease in the left common iliac left external iliac and left common femoral.  The sheath was then removed and over a guidewire was placed and antegrade fashion and a hand-held injection demonstrated severe heavy calcific disease in the mid to distal left superficial femoral artery.  I was ultimately able to cross this with a series of guide catheters and a hydrophilic Glidewire.  A hand-held injection confirmed I was in the true lumen at this point a 6 x 80 ever Flex stent was placed and then ballooned with a 6 mm balloon to 12 cecilia.  A completion run demonstrated resolution of the stenosis with normal flow to the foot and ankle and a good-quality Doppler signal was obtained in the anterior tibial dorsalis pedis and posterior tibial vessel superficial femoral arteries primarily repaired with a proline suture.             Electronically signed by Heladio Rosa MD at 1/17/2017  9:38 AM        Physician Progress Notes (last 24 hours) (Notes from 1/17/2017  2:33 AM through 1/18/2017  2:33 AM)     No notes of this type exist for this encounter.        Consult Notes (last 24 hours) (Notes from 1/17/2017  2:33 AM through 1/18/2017  2:33 AM)     No notes of this type exist for this encounter.

## 2017-01-20 LAB
ABO + RH BLD: NORMAL
ABO + RH BLD: NORMAL
BH BB BLOOD EXPIRATION DATE: NORMAL
BH BB BLOOD EXPIRATION DATE: NORMAL
BH BB BLOOD TYPE BARCODE: 6200
BH BB BLOOD TYPE BARCODE: 6200
BH BB DISPENSE STATUS: NORMAL
BH BB DISPENSE STATUS: NORMAL
BH BB PRODUCT CODE: NORMAL
BH BB PRODUCT CODE: NORMAL
BH BB UNIT NUMBER: NORMAL
BH BB UNIT NUMBER: NORMAL
CROSSMATCH INTERPRETATION: NORMAL
CROSSMATCH INTERPRETATION: NORMAL
UNIT  ABO: NORMAL
UNIT  ABO: NORMAL
UNIT  RH: NORMAL
UNIT  RH: NORMAL

## 2017-03-12 ENCOUNTER — HOSPITAL ENCOUNTER (INPATIENT)
Facility: HOSPITAL | Age: 57
LOS: 10 days | Discharge: HOME HEALTH | End: 2017-03-22
Attending: INTERNAL MEDICINE | Admitting: INTERNAL MEDICINE

## 2017-03-12 ENCOUNTER — APPOINTMENT (OUTPATIENT)
Dept: GENERAL RADIOLOGY | Facility: HOSPITAL | Age: 57
End: 2017-03-12

## 2017-03-12 DIAGNOSIS — Z74.09 IMPAIRED FUNCTIONAL MOBILITY, BALANCE, GAIT, AND ENDURANCE: ICD-10-CM

## 2017-03-12 DIAGNOSIS — Z78.9 IMPAIRED MOBILITY AND ADLS: ICD-10-CM

## 2017-03-12 DIAGNOSIS — S71.102A OPEN WOUND OF LEFT HIP AND THIGH WITH COMPLICATION, INITIAL ENCOUNTER: Primary | ICD-10-CM

## 2017-03-12 DIAGNOSIS — S71.002A OPEN WOUND OF LEFT HIP AND THIGH WITH COMPLICATION, INITIAL ENCOUNTER: Primary | ICD-10-CM

## 2017-03-12 DIAGNOSIS — Z74.09 IMPAIRED MOBILITY AND ADLS: ICD-10-CM

## 2017-03-12 PROBLEM — S81.802A LEG WOUND, LEFT: Status: ACTIVE | Noted: 2017-03-12

## 2017-03-12 PROBLEM — J60 COAL WORKERS PNEUMOCONIOSIS (HCC): Chronic | Status: ACTIVE | Noted: 2017-03-12

## 2017-03-12 PROBLEM — D50.9 IRON DEFICIENCY ANEMIA: Chronic | Status: ACTIVE | Noted: 2017-03-12

## 2017-03-12 PROBLEM — N17.9 AKI (ACUTE KIDNEY INJURY) (HCC): Status: ACTIVE | Noted: 2017-03-12

## 2017-03-12 PROBLEM — I25.10 CORONARY ARTERY DISEASE INVOLVING NATIVE HEART: Status: ACTIVE | Noted: 2017-03-12

## 2017-03-12 LAB
ALBUMIN SERPL-MCNC: 3.6 G/DL (ref 3.2–4.8)
ALBUMIN/GLOB SERPL: 0.9 G/DL (ref 1.5–2.5)
ALP SERPL-CCNC: 53 U/L (ref 25–100)
ALT SERPL W P-5'-P-CCNC: 31 U/L (ref 7–40)
ANION GAP SERPL CALCULATED.3IONS-SCNC: 4 MMOL/L (ref 3–11)
AST SERPL-CCNC: 63 U/L (ref 0–33)
BASOPHILS # BLD AUTO: 0.03 10*3/MM3 (ref 0–0.2)
BASOPHILS NFR BLD AUTO: 0.8 % (ref 0–1)
BILIRUB SERPL-MCNC: 1.2 MG/DL (ref 0.3–1.2)
BUN BLD-MCNC: 11 MG/DL (ref 9–23)
BUN/CREAT SERPL: 13.8 (ref 7–25)
CALCIUM SPEC-SCNC: 9.5 MG/DL (ref 8.7–10.4)
CHLORIDE SERPL-SCNC: 98 MMOL/L (ref 99–109)
CO2 SERPL-SCNC: 29 MMOL/L (ref 20–31)
CREAT BLD-MCNC: 0.8 MG/DL (ref 0.6–1.3)
D-LACTATE SERPL-SCNC: 1.4 MMOL/L (ref 0.5–2)
DEPRECATED RDW RBC AUTO: 52.6 FL (ref 37–54)
EOSINOPHIL # BLD AUTO: 0.11 10*3/MM3 (ref 0.1–0.3)
EOSINOPHIL NFR BLD AUTO: 2.8 % (ref 0–3)
ERYTHROCYTE [DISTWIDTH] IN BLOOD BY AUTOMATED COUNT: 14.6 % (ref 11.3–14.5)
GFR SERPL CREATININE-BSD FRML MDRD: 100 ML/MIN/1.73
GLOBULIN UR ELPH-MCNC: 3.8 GM/DL
GLUCOSE BLD-MCNC: 89 MG/DL (ref 70–100)
GLUCOSE BLDC GLUCOMTR-MCNC: 92 MG/DL (ref 70–130)
HCT VFR BLD AUTO: 30.5 % (ref 38.9–50.9)
HGB BLD-MCNC: 10.3 G/DL (ref 13.1–17.5)
IMM GRANULOCYTES # BLD: 0.02 10*3/MM3 (ref 0–0.03)
IMM GRANULOCYTES NFR BLD: 0.5 % (ref 0–0.6)
INR PPP: 1.11
LYMPHOCYTES # BLD AUTO: 1.31 10*3/MM3 (ref 0.6–4.8)
LYMPHOCYTES NFR BLD AUTO: 33.2 % (ref 24–44)
MCH RBC QN AUTO: 33.7 PG (ref 27–31)
MCHC RBC AUTO-ENTMCNC: 33.8 G/DL (ref 32–36)
MCV RBC AUTO: 99.7 FL (ref 80–99)
MONOCYTES # BLD AUTO: 0.46 10*3/MM3 (ref 0–1)
MONOCYTES NFR BLD AUTO: 11.7 % (ref 0–12)
NEUTROPHILS # BLD AUTO: 2.01 10*3/MM3 (ref 1.5–8.3)
NEUTROPHILS NFR BLD AUTO: 51 % (ref 41–71)
PLATELET # BLD AUTO: 148 10*3/MM3 (ref 150–450)
PMV BLD AUTO: 9.1 FL (ref 6–12)
POTASSIUM BLD-SCNC: 4.5 MMOL/L (ref 3.5–5.5)
PROT SERPL-MCNC: 7.4 G/DL (ref 5.7–8.2)
PROTHROMBIN TIME: 12.1 SECONDS (ref 9.6–11.5)
RBC # BLD AUTO: 3.06 10*6/MM3 (ref 4.2–5.76)
SODIUM BLD-SCNC: 131 MMOL/L (ref 132–146)
WBC NRBC COR # BLD: 3.94 10*3/MM3 (ref 3.5–10.8)

## 2017-03-12 PROCEDURE — 80053 COMPREHEN METABOLIC PANEL: CPT | Performed by: FAMILY MEDICINE

## 2017-03-12 PROCEDURE — 93005 ELECTROCARDIOGRAM TRACING: CPT | Performed by: FAMILY MEDICINE

## 2017-03-12 PROCEDURE — 85025 COMPLETE CBC W/AUTO DIFF WBC: CPT | Performed by: FAMILY MEDICINE

## 2017-03-12 PROCEDURE — 87040 BLOOD CULTURE FOR BACTERIA: CPT | Performed by: FAMILY MEDICINE

## 2017-03-12 PROCEDURE — 25010000002 VANCOMYCIN 1750 MG/500ML SOLUTION: Performed by: FAMILY MEDICINE

## 2017-03-12 PROCEDURE — 25010000002 HYDROMORPHONE PER 4 MG: Performed by: FAMILY MEDICINE

## 2017-03-12 PROCEDURE — 82962 GLUCOSE BLOOD TEST: CPT

## 2017-03-12 PROCEDURE — 71010 HC CHEST PA OR AP: CPT

## 2017-03-12 PROCEDURE — 99223 1ST HOSP IP/OBS HIGH 75: CPT | Performed by: FAMILY MEDICINE

## 2017-03-12 PROCEDURE — 85610 PROTHROMBIN TIME: CPT | Performed by: FAMILY MEDICINE

## 2017-03-12 PROCEDURE — 83605 ASSAY OF LACTIC ACID: CPT | Performed by: FAMILY MEDICINE

## 2017-03-12 PROCEDURE — 25010000002 PIPERACILLIN SOD-TAZOBACTAM PER 1 G: Performed by: FAMILY MEDICINE

## 2017-03-12 RX ORDER — VANCOMYCIN/0.9 % SOD CHLORIDE 1.75 G/5
1750 PLASTIC BAG, INJECTION (ML) INTRAVENOUS ONCE
Status: COMPLETED | OUTPATIENT
Start: 2017-03-12 | End: 2017-03-12

## 2017-03-12 RX ORDER — NICOTINE POLACRILEX 4 MG
15 LOZENGE BUCCAL
Status: DISCONTINUED | OUTPATIENT
Start: 2017-03-12 | End: 2017-03-22 | Stop reason: HOSPADM

## 2017-03-12 RX ORDER — FERROUS SULFATE 325(65) MG
325 TABLET ORAL 2 TIMES DAILY WITH MEALS
Status: DISCONTINUED | OUTPATIENT
Start: 2017-03-12 | End: 2017-03-22 | Stop reason: HOSPADM

## 2017-03-12 RX ORDER — NALOXONE HCL 0.4 MG/ML
0.4 VIAL (ML) INJECTION
Status: DISCONTINUED | OUTPATIENT
Start: 2017-03-12 | End: 2017-03-22 | Stop reason: HOSPADM

## 2017-03-12 RX ORDER — ATORVASTATIN CALCIUM 40 MG/1
40 TABLET, FILM COATED ORAL DAILY
Status: DISCONTINUED | OUTPATIENT
Start: 2017-03-13 | End: 2017-03-22 | Stop reason: HOSPADM

## 2017-03-12 RX ORDER — DEXTROSE AND SODIUM CHLORIDE 5; .45 G/100ML; G/100ML
50 INJECTION, SOLUTION INTRAVENOUS CONTINUOUS
Status: DISCONTINUED | OUTPATIENT
Start: 2017-03-13 | End: 2017-03-14

## 2017-03-12 RX ORDER — POTASSIUM CHLORIDE 750 MG/1
10 TABLET, FILM COATED, EXTENDED RELEASE ORAL 2 TIMES DAILY
COMMUNITY
End: 2017-03-22 | Stop reason: HOSPADM

## 2017-03-12 RX ORDER — HYDROCODONE BITARTRATE AND ACETAMINOPHEN 7.5; 325 MG/1; MG/1
1 TABLET ORAL 2 TIMES DAILY PRN
Status: DISCONTINUED | OUTPATIENT
Start: 2017-03-12 | End: 2017-03-22 | Stop reason: HOSPADM

## 2017-03-12 RX ORDER — SODIUM CHLORIDE 0.9 % (FLUSH) 0.9 %
1-10 SYRINGE (ML) INJECTION AS NEEDED
Status: DISCONTINUED | OUTPATIENT
Start: 2017-03-12 | End: 2017-03-22 | Stop reason: HOSPADM

## 2017-03-12 RX ORDER — TIZANIDINE 4 MG/1
4 TABLET ORAL NIGHTLY PRN
Status: DISCONTINUED | OUTPATIENT
Start: 2017-03-12 | End: 2017-03-22 | Stop reason: HOSPADM

## 2017-03-12 RX ORDER — PROMETHAZINE HYDROCHLORIDE 25 MG/ML
12.5 INJECTION, SOLUTION INTRAMUSCULAR; INTRAVENOUS EVERY 6 HOURS PRN
Status: DISCONTINUED | OUTPATIENT
Start: 2017-03-12 | End: 2017-03-22 | Stop reason: HOSPADM

## 2017-03-12 RX ORDER — DEXTROSE MONOHYDRATE 25 G/50ML
25 INJECTION, SOLUTION INTRAVENOUS
Status: DISCONTINUED | OUTPATIENT
Start: 2017-03-12 | End: 2017-03-22 | Stop reason: HOSPADM

## 2017-03-12 RX ORDER — SULFAMETHOXAZOLE AND TRIMETHOPRIM 800; 160 MG/1; MG/1
1 TABLET ORAL 2 TIMES DAILY
COMMUNITY
End: 2017-03-22 | Stop reason: HOSPADM

## 2017-03-12 RX ORDER — PROMETHAZINE HYDROCHLORIDE 12.5 MG/1
12.5 TABLET ORAL EVERY 6 HOURS PRN
Status: DISCONTINUED | OUTPATIENT
Start: 2017-03-12 | End: 2017-03-22 | Stop reason: HOSPADM

## 2017-03-12 RX ORDER — ERGOCALCIFEROL (VITAMIN D2) 10 MCG
2000 TABLET ORAL DAILY
Status: DISCONTINUED | OUTPATIENT
Start: 2017-03-13 | End: 2017-03-14

## 2017-03-12 RX ORDER — PROMETHAZINE HYDROCHLORIDE 12.5 MG/1
12.5 SUPPOSITORY RECTAL EVERY 6 HOURS PRN
Status: DISCONTINUED | OUTPATIENT
Start: 2017-03-12 | End: 2017-03-22 | Stop reason: HOSPADM

## 2017-03-12 RX ORDER — IPRATROPIUM BROMIDE AND ALBUTEROL SULFATE 2.5; .5 MG/3ML; MG/3ML
3 SOLUTION RESPIRATORY (INHALATION)
Status: DISCONTINUED | OUTPATIENT
Start: 2017-03-12 | End: 2017-03-16

## 2017-03-12 RX ORDER — CALCIUM CARBONATE 200(500)MG
2 TABLET,CHEWABLE ORAL 2 TIMES DAILY PRN
Status: DISCONTINUED | OUTPATIENT
Start: 2017-03-12 | End: 2017-03-22 | Stop reason: HOSPADM

## 2017-03-12 RX ORDER — BUDESONIDE AND FORMOTEROL FUMARATE DIHYDRATE 80; 4.5 UG/1; UG/1
2 AEROSOL RESPIRATORY (INHALATION)
Status: DISCONTINUED | OUTPATIENT
Start: 2017-03-12 | End: 2017-03-22 | Stop reason: HOSPADM

## 2017-03-12 RX ORDER — HYDROMORPHONE HYDROCHLORIDE 1 MG/ML
0.5 INJECTION, SOLUTION INTRAMUSCULAR; INTRAVENOUS; SUBCUTANEOUS
Status: DISCONTINUED | OUTPATIENT
Start: 2017-03-12 | End: 2017-03-22 | Stop reason: HOSPADM

## 2017-03-12 RX ORDER — TAMSULOSIN HYDROCHLORIDE 0.4 MG/1
0.4 CAPSULE ORAL DAILY
Status: DISCONTINUED | OUTPATIENT
Start: 2017-03-13 | End: 2017-03-22 | Stop reason: HOSPADM

## 2017-03-12 RX ORDER — PANTOPRAZOLE SODIUM 40 MG/1
40 TABLET, DELAYED RELEASE ORAL EVERY OTHER DAY
Status: DISCONTINUED | OUTPATIENT
Start: 2017-03-12 | End: 2017-03-22 | Stop reason: HOSPADM

## 2017-03-12 RX ORDER — BISACODYL 10 MG
10 SUPPOSITORY, RECTAL RECTAL DAILY PRN
Status: DISCONTINUED | OUTPATIENT
Start: 2017-03-12 | End: 2017-03-15 | Stop reason: SDUPTHER

## 2017-03-12 RX ADMIN — PANTOPRAZOLE SODIUM 40 MG: 40 TABLET, DELAYED RELEASE ORAL at 22:38

## 2017-03-12 RX ADMIN — TAZOBACTAM SODIUM AND PIPERACILLIN SODIUM 3.38 G: 375; 3 INJECTION, SOLUTION INTRAVENOUS at 22:37

## 2017-03-12 RX ADMIN — DEXTROSE AND SODIUM CHLORIDE 100 ML/HR: 5; 450 INJECTION, SOLUTION INTRAVENOUS at 23:52

## 2017-03-12 RX ADMIN — Medication 1750 MG: at 23:52

## 2017-03-12 RX ADMIN — HYDROMORPHONE HYDROCHLORIDE 0.5 MG: 1 INJECTION, SOLUTION INTRAMUSCULAR; INTRAVENOUS; SUBCUTANEOUS at 22:37

## 2017-03-12 RX ADMIN — Medication 325 MG: at 22:38

## 2017-03-13 ENCOUNTER — ANESTHESIA (OUTPATIENT)
Dept: PERIOP | Facility: HOSPITAL | Age: 57
End: 2017-03-13

## 2017-03-13 ENCOUNTER — APPOINTMENT (OUTPATIENT)
Dept: ULTRASOUND IMAGING | Facility: HOSPITAL | Age: 57
End: 2017-03-13

## 2017-03-13 ENCOUNTER — ANESTHESIA EVENT (OUTPATIENT)
Dept: PERIOP | Facility: HOSPITAL | Age: 57
End: 2017-03-13

## 2017-03-13 LAB
ABO GROUP BLD: NORMAL
ANION GAP SERPL CALCULATED.3IONS-SCNC: 5 MMOL/L (ref 3–11)
BLD GP AB SCN SERPL QL: NEGATIVE
BUN BLD-MCNC: 10 MG/DL (ref 9–23)
BUN/CREAT SERPL: 12.5 (ref 7–25)
CALCIUM SPEC-SCNC: 9.3 MG/DL (ref 8.7–10.4)
CHLORIDE SERPL-SCNC: 98 MMOL/L (ref 99–109)
CO2 SERPL-SCNC: 28 MMOL/L (ref 20–31)
CREAT BLD-MCNC: 0.8 MG/DL (ref 0.6–1.3)
GFR SERPL CREATININE-BSD FRML MDRD: 100 ML/MIN/1.73
GLUCOSE BLD-MCNC: 100 MG/DL (ref 70–100)
GLUCOSE BLDC GLUCOMTR-MCNC: 114 MG/DL (ref 70–130)
GLUCOSE BLDC GLUCOMTR-MCNC: 127 MG/DL (ref 70–130)
GLUCOSE BLDC GLUCOMTR-MCNC: 149 MG/DL (ref 70–130)
HBA1C MFR BLD: 5.3 % (ref 4.8–5.6)
POTASSIUM BLD-SCNC: 4.3 MMOL/L (ref 3.5–5.5)
RH BLD: POSITIVE
SODIUM BLD-SCNC: 131 MMOL/L (ref 132–146)

## 2017-03-13 PROCEDURE — 25010000002 HYDROMORPHONE PER 4 MG: Performed by: FAMILY MEDICINE

## 2017-03-13 PROCEDURE — 82962 GLUCOSE BLOOD TEST: CPT

## 2017-03-13 PROCEDURE — 99233 SBSQ HOSP IP/OBS HIGH 50: CPT | Performed by: FAMILY MEDICINE

## 2017-03-13 PROCEDURE — 87186 SC STD MICRODIL/AGAR DIL: CPT | Performed by: FAMILY MEDICINE

## 2017-03-13 PROCEDURE — 93010 ELECTROCARDIOGRAM REPORT: CPT | Performed by: INTERNAL MEDICINE

## 2017-03-13 PROCEDURE — 25010000002 FENTANYL CITRATE (PF) 100 MCG/2ML SOLUTION: Performed by: NURSE ANESTHETIST, CERTIFIED REGISTERED

## 2017-03-13 PROCEDURE — 87147 CULTURE TYPE IMMUNOLOGIC: CPT | Performed by: FAMILY MEDICINE

## 2017-03-13 PROCEDURE — 25010000002 PIPERACILLIN SOD-TAZOBACTAM PER 1 G: Performed by: FAMILY MEDICINE

## 2017-03-13 PROCEDURE — 80048 BASIC METABOLIC PNL TOTAL CA: CPT | Performed by: FAMILY MEDICINE

## 2017-03-13 PROCEDURE — 99024 POSTOP FOLLOW-UP VISIT: CPT | Performed by: THORACIC SURGERY (CARDIOTHORACIC VASCULAR SURGERY)

## 2017-03-13 PROCEDURE — 10180 I&D COMPLEX PO WOUND INFCTJ: CPT | Performed by: THORACIC SURGERY (CARDIOTHORACIC VASCULAR SURGERY)

## 2017-03-13 PROCEDURE — 25010000002 PHENYLEPHRINE PER 1 ML: Performed by: NURSE ANESTHETIST, CERTIFIED REGISTERED

## 2017-03-13 PROCEDURE — 94799 UNLISTED PULMONARY SVC/PX: CPT

## 2017-03-13 PROCEDURE — 86920 COMPATIBILITY TEST SPIN: CPT

## 2017-03-13 PROCEDURE — 87205 SMEAR GRAM STAIN: CPT | Performed by: FAMILY MEDICINE

## 2017-03-13 PROCEDURE — 86900 BLOOD TYPING SEROLOGIC ABO: CPT | Performed by: PHYSICIAN ASSISTANT

## 2017-03-13 PROCEDURE — 25010000002 ONDANSETRON PER 1 MG: Performed by: NURSE ANESTHETIST, CERTIFIED REGISTERED

## 2017-03-13 PROCEDURE — 86901 BLOOD TYPING SEROLOGIC RH(D): CPT | Performed by: PHYSICIAN ASSISTANT

## 2017-03-13 PROCEDURE — 94640 AIRWAY INHALATION TREATMENT: CPT

## 2017-03-13 PROCEDURE — 87077 CULTURE AEROBIC IDENTIFY: CPT | Performed by: FAMILY MEDICINE

## 2017-03-13 PROCEDURE — 25010000002 PROPOFOL 10 MG/ML EMULSION: Performed by: NURSE ANESTHETIST, CERTIFIED REGISTERED

## 2017-03-13 PROCEDURE — 86850 RBC ANTIBODY SCREEN: CPT | Performed by: PHYSICIAN ASSISTANT

## 2017-03-13 PROCEDURE — 76882 US LMTD JT/FCL EVL NVASC XTR: CPT

## 2017-03-13 PROCEDURE — 87070 CULTURE OTHR SPECIMN AEROBIC: CPT | Performed by: FAMILY MEDICINE

## 2017-03-13 PROCEDURE — 0J9C0ZZ DRAINAGE OF PELVIC REGION SUBCUTANEOUS TISSUE AND FASCIA, OPEN APPROACH: ICD-10-PCS | Performed by: THORACIC SURGERY (CARDIOTHORACIC VASCULAR SURGERY)

## 2017-03-13 PROCEDURE — 25010000002 VANCOMYCIN 1750 MG/500ML SOLUTION: Performed by: FAMILY MEDICINE

## 2017-03-13 PROCEDURE — 83036 HEMOGLOBIN GLYCOSYLATED A1C: CPT | Performed by: FAMILY MEDICINE

## 2017-03-13 PROCEDURE — 25010000002 VANCOMYCIN 1750 MG/500ML SOLUTION: Performed by: PHYSICIAN ASSISTANT

## 2017-03-13 RX ORDER — VANCOMYCIN/0.9 % SOD CHLORIDE 1.75 G/5
15 PLASTIC BAG, INJECTION (ML) INTRAVENOUS ONCE
Status: DISCONTINUED | OUTPATIENT
Start: 2017-03-13 | End: 2017-03-13 | Stop reason: SDUPTHER

## 2017-03-13 RX ORDER — ONDANSETRON 2 MG/ML
4 INJECTION INTRAMUSCULAR; INTRAVENOUS ONCE AS NEEDED
Status: DISCONTINUED | OUTPATIENT
Start: 2017-03-13 | End: 2017-03-13 | Stop reason: HOSPADM

## 2017-03-13 RX ORDER — ASPIRIN 81 MG/1
81 TABLET, CHEWABLE ORAL DAILY
Status: DISCONTINUED | OUTPATIENT
Start: 2017-03-14 | End: 2017-03-22 | Stop reason: HOSPADM

## 2017-03-13 RX ORDER — SODIUM CHLORIDE, SODIUM LACTATE, POTASSIUM CHLORIDE, CALCIUM CHLORIDE 600; 310; 30; 20 MG/100ML; MG/100ML; MG/100ML; MG/100ML
9 INJECTION, SOLUTION INTRAVENOUS CONTINUOUS
Status: DISCONTINUED | OUTPATIENT
Start: 2017-03-13 | End: 2017-03-19

## 2017-03-13 RX ORDER — ONDANSETRON 4 MG/1
4 TABLET, FILM COATED ORAL EVERY 6 HOURS PRN
Status: DISCONTINUED | OUTPATIENT
Start: 2017-03-13 | End: 2017-03-22 | Stop reason: HOSPADM

## 2017-03-13 RX ORDER — SODIUM CHLORIDE 0.9 % (FLUSH) 0.9 %
1-10 SYRINGE (ML) INJECTION AS NEEDED
Status: DISCONTINUED | OUTPATIENT
Start: 2017-03-13 | End: 2017-03-13 | Stop reason: HOSPADM

## 2017-03-13 RX ORDER — FENTANYL CITRATE 50 UG/ML
INJECTION, SOLUTION INTRAMUSCULAR; INTRAVENOUS AS NEEDED
Status: DISCONTINUED | OUTPATIENT
Start: 2017-03-13 | End: 2017-03-13 | Stop reason: SURG

## 2017-03-13 RX ORDER — DEXTROSE MONOHYDRATE 25 G/50ML
25 INJECTION, SOLUTION INTRAVENOUS
Status: DISCONTINUED | OUTPATIENT
Start: 2017-03-13 | End: 2017-03-13 | Stop reason: HOSPADM

## 2017-03-13 RX ORDER — PROPOFOL 10 MG/ML
VIAL (ML) INTRAVENOUS AS NEEDED
Status: DISCONTINUED | OUTPATIENT
Start: 2017-03-13 | End: 2017-03-13 | Stop reason: SURG

## 2017-03-13 RX ORDER — LIDOCAINE HYDROCHLORIDE 10 MG/ML
INJECTION, SOLUTION INFILTRATION; PERINEURAL AS NEEDED
Status: DISCONTINUED | OUTPATIENT
Start: 2017-03-13 | End: 2017-03-13 | Stop reason: SURG

## 2017-03-13 RX ORDER — BISACODYL 10 MG
10 SUPPOSITORY, RECTAL RECTAL DAILY PRN
Status: DISCONTINUED | OUTPATIENT
Start: 2017-03-13 | End: 2017-03-22 | Stop reason: HOSPADM

## 2017-03-13 RX ORDER — LIDOCAINE HYDROCHLORIDE 10 MG/ML
1 INJECTION, SOLUTION EPIDURAL; INFILTRATION; INTRACAUDAL; PERINEURAL ONCE
Status: DISCONTINUED | OUTPATIENT
Start: 2017-03-13 | End: 2017-03-13 | Stop reason: HOSPADM

## 2017-03-13 RX ORDER — ONDANSETRON 2 MG/ML
INJECTION INTRAMUSCULAR; INTRAVENOUS AS NEEDED
Status: DISCONTINUED | OUTPATIENT
Start: 2017-03-13 | End: 2017-03-13 | Stop reason: SURG

## 2017-03-13 RX ORDER — ONDANSETRON 2 MG/ML
4 INJECTION INTRAMUSCULAR; INTRAVENOUS EVERY 6 HOURS PRN
Status: DISCONTINUED | OUTPATIENT
Start: 2017-03-13 | End: 2017-03-22 | Stop reason: HOSPADM

## 2017-03-13 RX ORDER — SENNA AND DOCUSATE SODIUM 50; 8.6 MG/1; MG/1
2 TABLET, FILM COATED ORAL 2 TIMES DAILY PRN
Status: DISCONTINUED | OUTPATIENT
Start: 2017-03-13 | End: 2017-03-22 | Stop reason: HOSPADM

## 2017-03-13 RX ORDER — CLOPIDOGREL BISULFATE 75 MG/1
75 TABLET ORAL DAILY
Status: DISCONTINUED | OUTPATIENT
Start: 2017-03-14 | End: 2017-03-22 | Stop reason: HOSPADM

## 2017-03-13 RX ORDER — FENTANYL CITRATE 50 UG/ML
50 INJECTION, SOLUTION INTRAMUSCULAR; INTRAVENOUS
Status: DISCONTINUED | OUTPATIENT
Start: 2017-03-13 | End: 2017-03-13 | Stop reason: HOSPADM

## 2017-03-13 RX ORDER — FAMOTIDINE 20 MG/1
20 TABLET, FILM COATED ORAL
Status: DISCONTINUED | OUTPATIENT
Start: 2017-03-13 | End: 2017-03-13 | Stop reason: HOSPADM

## 2017-03-13 RX ORDER — VANCOMYCIN/0.9 % SOD CHLORIDE 1.75 G/5
1750 PLASTIC BAG, INJECTION (ML) INTRAVENOUS EVERY 12 HOURS
Status: DISCONTINUED | OUTPATIENT
Start: 2017-03-13 | End: 2017-03-14

## 2017-03-13 RX ORDER — SODIUM CHLORIDE 450 MG/100ML
35 INJECTION, SOLUTION INTRAVENOUS CONTINUOUS
Status: DISCONTINUED | OUTPATIENT
Start: 2017-03-13 | End: 2017-03-17

## 2017-03-13 RX ORDER — HYDROMORPHONE HYDROCHLORIDE 1 MG/ML
0.5 INJECTION, SOLUTION INTRAMUSCULAR; INTRAVENOUS; SUBCUTANEOUS
Status: DISCONTINUED | OUTPATIENT
Start: 2017-03-13 | End: 2017-03-13 | Stop reason: HOSPADM

## 2017-03-13 RX ORDER — NICOTINE POLACRILEX 4 MG
15 LOZENGE BUCCAL
Status: DISCONTINUED | OUTPATIENT
Start: 2017-03-13 | End: 2017-03-13 | Stop reason: HOSPADM

## 2017-03-13 RX ADMIN — IPRATROPIUM BROMIDE AND ALBUTEROL SULFATE 3 ML: .5; 3 SOLUTION RESPIRATORY (INHALATION) at 15:42

## 2017-03-13 RX ADMIN — IPRATROPIUM BROMIDE AND ALBUTEROL SULFATE 3 ML: .5; 3 SOLUTION RESPIRATORY (INHALATION) at 18:48

## 2017-03-13 RX ADMIN — SODIUM CHLORIDE, POTASSIUM CHLORIDE, SODIUM LACTATE AND CALCIUM CHLORIDE 9 ML/HR: 600; 310; 30; 20 INJECTION, SOLUTION INTRAVENOUS at 09:27

## 2017-03-13 RX ADMIN — Medication 1750 MG: at 21:25

## 2017-03-13 RX ADMIN — HYDROMORPHONE HYDROCHLORIDE 0.5 MG: 1 INJECTION, SOLUTION INTRAMUSCULAR; INTRAVENOUS; SUBCUTANEOUS at 20:45

## 2017-03-13 RX ADMIN — HYDROCODONE BITARTRATE AND ACETAMINOPHEN 1 TABLET: 7.5; 325 TABLET ORAL at 14:02

## 2017-03-13 RX ADMIN — LIDOCAINE HYDROCHLORIDE 40 MG: 10 INJECTION, SOLUTION INFILTRATION; PERINEURAL at 11:18

## 2017-03-13 RX ADMIN — IPRATROPIUM BROMIDE AND ALBUTEROL SULFATE 3 ML: .5; 3 SOLUTION RESPIRATORY (INHALATION) at 08:03

## 2017-03-13 RX ADMIN — Medication 1750 MG: at 13:58

## 2017-03-13 RX ADMIN — FAMOTIDINE 20 MG: 20 TABLET, FILM COATED ORAL at 09:28

## 2017-03-13 RX ADMIN — HYDROMORPHONE HYDROCHLORIDE 0.5 MG: 1 INJECTION, SOLUTION INTRAMUSCULAR; INTRAVENOUS; SUBCUTANEOUS at 22:34

## 2017-03-13 RX ADMIN — SODIUM CHLORIDE 35 ML/HR: 4.5 INJECTION, SOLUTION INTRAVENOUS at 13:58

## 2017-03-13 RX ADMIN — Medication 10 ML: at 09:28

## 2017-03-13 RX ADMIN — Medication 325 MG: at 13:58

## 2017-03-13 RX ADMIN — FENTANYL CITRATE 50 MCG: 50 INJECTION, SOLUTION INTRAMUSCULAR; INTRAVENOUS at 12:15

## 2017-03-13 RX ADMIN — PROPOFOL 150 MG: 10 INJECTION, EMULSION INTRAVENOUS at 11:18

## 2017-03-13 RX ADMIN — TAZOBACTAM SODIUM AND PIPERACILLIN SODIUM 3.38 G: 375; 3 INJECTION, SOLUTION INTRAVENOUS at 13:58

## 2017-03-13 RX ADMIN — ONDANSETRON 4 MG: 2 INJECTION INTRAMUSCULAR; INTRAVENOUS at 11:31

## 2017-03-13 RX ADMIN — FENTANYL CITRATE 50 MCG: 50 INJECTION, SOLUTION INTRAMUSCULAR; INTRAVENOUS at 11:18

## 2017-03-13 RX ADMIN — BUDESONIDE AND FORMOTEROL FUMARATE DIHYDRATE 2 PUFF: 80; 4.5 AEROSOL RESPIRATORY (INHALATION) at 08:03

## 2017-03-13 RX ADMIN — PHENYLEPHRINE HYDROCHLORIDE 100 MCG: 10 INJECTION INTRAVENOUS at 11:25

## 2017-03-13 RX ADMIN — HYDROMORPHONE HYDROCHLORIDE 0.5 MG: 1 INJECTION, SOLUTION INTRAMUSCULAR; INTRAVENOUS; SUBCUTANEOUS at 15:01

## 2017-03-13 RX ADMIN — TAZOBACTAM SODIUM AND PIPERACILLIN SODIUM 3.38 G: 375; 3 INJECTION, SOLUTION INTRAVENOUS at 05:56

## 2017-03-13 RX ADMIN — TAZOBACTAM SODIUM AND PIPERACILLIN SODIUM 3.38 G: 375; 3 INJECTION, SOLUTION INTRAVENOUS at 17:52

## 2017-03-13 RX ADMIN — BUDESONIDE AND FORMOTEROL FUMARATE DIHYDRATE 2 PUFF: 80; 4.5 AEROSOL RESPIRATORY (INHALATION) at 18:48

## 2017-03-13 RX ADMIN — Medication 325 MG: at 17:52

## 2017-03-13 RX ADMIN — FENTANYL CITRATE 50 MCG: 50 INJECTION, SOLUTION INTRAMUSCULAR; INTRAVENOUS at 11:48

## 2017-03-13 NOTE — PROGRESS NOTES
Discharge Planning Assessment  [facility]     Patient Name: Sai Weinberg  MRN: [id number]  Today's Date: 3/13/2017    Admit Date: 3/12/2017          Discharge Needs Assessment       03/13/17 1328    Living Environment    Lives With alone    Living Arrangements mobile home   He lives alone in a mobile home in [facility].  His wife lives next door in a house.   She can assist him at discharge.     Provides Primary Care For no one, unable/limited ability to care for self    Quality Of Family Relationships supportive    Able to Return to Prior Living Arrangements yes    Discharge Needs Assessment    Concerns To Be Addressed basic needs concerns    Readmission Within The Last 30 Days previous discharge plan unsuccessful    Outpatient/Agency/Support Group Needs --   He has never used/needed HH.     Equipment Currently Used at Home cane, straight   Uses a straight cane at home.     Transportation Available car;family or friend will provide            Discharge Plan       03/13/17 5740    Case Management/Social Work Plan    Plan Home    Patient/Family In Agreement With Plan yes    Additional Comments Spoke to Mr. Weinberg at bed and his wife.  He lives in a mobile home in [facility].  His wife lives next door in a house.  He is fairly independent c ADL's.  He uses a cane as needed.  He has never used HH or home O2.  His goal is to return home c assist from his wife.  CM will follow.        Discharge Placement     No information found        Expected Discharge Date and Time     Expected Discharge Date Expected Discharge Time    Mar 15, 2017               Demographic Summary       03/13/17 1327    Referral Information    Admission Type inpatient    Arrived From admitted as an inpatient    Referral Source admission list    Reason For Consult discharge planning    Contact Information    Permission Granted to Share Information With             Functional Status       03/13/17 1327    Functional Status Current     Ambulation 1-->assistive equipment    Transferring 2-->assistive person    Toileting 2-->assistive person    Bathing 0-->independent    Dressing 0-->independent    Eating 0-->independent    Communication 0-->understands/communicates without difficulty    Swallowing (if score 2 or more for any item, consult Rehab Services) 0-->swallows foods/liquids without difficulty    Functional Status Prior    Ambulation 1-->assistive equipment    Transferring 0-->independent    Toileting 0-->independent    Bathing 0-->independent    Dressing 0-->independent    Eating 0-->independent    Communication 0-->understands/communicates without difficulty    Swallowing 0-->swallows foods/liquids without difficulty    IADL    Medications independent    Meal Preparation independent    Housekeeping independent    Laundry independent    Shopping independent    Oral Care independent            Psychosocial     None            Abuse/Neglect     None            Legal     None            Substance Abuse     None            Patient Forms     None          Dilcia Kim RN

## 2017-03-13 NOTE — PROGRESS NOTES
CTS Progress Note       LOS: 1 day   Patient Care Team:  Dewayne Cole MD as PCP - General  Dewayne Cole MD as PCP - Family Medicine        Vital Signs  Temp:  [97.8 °F (36.6 °C)-98 °F (36.7 °C)] 97.8 °F (36.6 °C)  Heart Rate:  [65-77] 65  Resp:  [18] 18  BP: (128-139)/(62-94) 139/94    Physical Exam: Left groin wound is swollen and erythematous and appears to have clear serous drainage but is significantly swollen the left foot warm and viable       Results     Results from last 7 days  Lab Units 03/12/17  2218   WBC 10*3/mm3 3.94   HEMOGLOBIN g/dL 10.3*   HEMATOCRIT % 30.5*   PLATELETS 10*3/mm3 148*       Results from last 7 days  Lab Units 03/13/17  0631   SODIUM mmol/L 131*   POTASSIUM mmol/L 4.3   CHLORIDE mmol/L 98*   TOTAL CO2 mmol/L 28.0   BUN mg/dL 10   CREATININE mg/dL 0.80   GLUCOSE mg/dL 100   CALCIUM mg/dL 9.3           Imaging Results (last 24 hours)     Procedure Component Value Units Date/Time    XR Chest 1 View [[id number]]      Updated:  03/12/17 2300          Assessment    Principal Problem:    Open wound of left hip and thigh with complication  Active Problems:    COPD (chronic obstructive pulmonary disease)    Diabetes mellitus    Hypertension    Peripheral vascular disease    LANA (acute kidney injury)    Coronary artery disease involving native heart    Iron deficiency anemia    Coal workers pneumoconiosis    Leg wound, left    This patient status post previous right femoropopliteal bypass and most recently in January left femoral cutdown with antegrade catheter-based intervention of the left superficial femoral artery.  He admits to number of FOLLOW-ups recently has presented with a swollen and probably infected left groin in an outside hospital and transferred here.  This wound will need incision and drainage today patient is agreeable to proceed    Plan    nothing by mouth.  Permit for incision and drainage of left groin for today.    Heladio Rosa MD  03/13/17  7:45 AM

## 2017-03-13 NOTE — CONSULTS
[facility]    INPATIENT CONSULT NOTE / INITIAL HOSPITAL VISIT      PATIENT NAME:  Sai Weinberg  YOB: 1960  MEDICAL RECORD NUMBER:  [id number]    Date of Admission:  3/12/2017  Date of Consult: 3/13/2017    Requesting Provider: Heladio Rosa MD  Evaluating Physician: David Nguyen MD      Reason for Consultation:   Left groin/thigh abscess and cellulitis, status post recent left femoral cutdown    History of Present Illness:  Patient is a 56 y.o. male with a past medical history significant for obesity, diabetes, and peripheral vascular disease who was admitted to [facility] on 3/12/2017 after presenting to [facility] with complaints of left groin drainage and left thigh edema/erythema/warmth.  Patient previously underwent a right femoral popliteal bypass graft in 1/2016.  Patient had developed new onset claudication in the left leg and underwent left femoral cutdown with angioplasty and stent of the left superficial femoral artery on 1/17/17.  Patient states that he did well for around 3 weeks postoperatively.  Then, the incision suddenly opened up one evening around 3 weeks ago.  Since that time, he has had significant amounts of drainage from the left groin.  This is mostly pink and bloody.  He denies any purulent drainage or foul smell.  He was seen by his primary care physician in [place] and given an unknown antibiotic.  This did not help the drainage.  He did briefly have a few days of fevers, chills, and sweats but this has resolved.  Because of the ongoing drainage, he was metastatic to the hospital in [place].  He was placed on vancomycin and Zosyn and transferred to [facility] to be evaluated by Dr. Rosa.  Patient underwent incision and drainage with 200 mL of serous fluid removed.    I have been consulted to assist in workup and antimicrobial management of left groin/thigh abscess and cellulitis, status post recent left  femoral cutdown.    Past Medical History   Diagnosis Date   • Arthritis    • Black lung disease    • BPH (benign prostatic hyperplasia)    • Cataract    • Cataract    • COLD (chronic obstructive lung disease)    • Colon polyps    • Diabetes mellitus      SINCE 2014   • Dyslipidemia    • Esophageal reflux    • Flu      HX   • Heart attack      2006   • Herniated lumbar intervertebral disc    • Hypertension    • Malignant neoplasm of colon    • Peripheral vascular disease    • Sleep apnea with use of continuous positive airway pressure (CPAP)    • Wears dentures    • Wears glasses        Past Surgical History   Procedure Laterality Date   • Colon surgery     • Bypass graft       non-vein - femoral-popliteal right   • Other surgical history       PTA ILIAC STENOSIS WITH STENT   • Cardiac catheterization       NO INTERVENTION   • Colonoscopy     • Aortagram N/A 1/17/2017     Procedure: AORTAGRAM WITH RUNOFFS and  STENT left SFA;  Surgeon: Heladio Rosa MD;  Location:  [facility] OR ;  Service:    • Angioplasty femoral artery N/A 1/17/2017     Procedure: left femoral cutdown with aortagram and left SFA stent and angioplasty;  Surgeon: Heladio Rosa MD;  Location:  [facility] OR ;  Service:        Family History   Problem Relation Age of Onset   • Lung cancer Mother    • Heart attack Mother    • Hypertension Mother    • Diabetes Mother    • Diabetes Father    • Hypertension Father    • Heart attack Father        Social History     Social History   • Marital status:      Spouse name: N/A   • Number of children: N/A   • Years of education: N/A     Occupational History   • DISABLED       BACK AND LUNG PROBLEMS     Social History Main Topics   • Smoking status: Former Smoker     Packs/day: 2.00     Types: Cigarettes     Start date: 1980     Quit date: 2015   • Smokeless tobacco: Current User     Types: Chew      Comment: Stopped smoking 1 year ago. Smoked 35 years up to 2ppd.   • Alcohol  use No   • Drug use: No   • Sexual activity: Defer     Other Topics Concern   • Not on file     Social History Narrative         Allergies:  No Known Allergies    Home Medications:  No current facility-administered medications on file prior to encounter.      Current Outpatient Prescriptions on File Prior to Encounter   Medication Sig Dispense Refill   • albuterol (PROVENTIL HFA;VENTOLIN HFA) 108 (90 BASE) MCG/ACT inhaler Inhale 2 puffs Every 4 (Four) Hours As Needed for wheezing.     • aspirin 81 MG tablet Take 81 mg by mouth daily.     • atorvastatin (LIPITOR) 40 MG tablet Take 40 mg by mouth daily.     • Cholecalciferol (VITAMIN D PO) Take 2,000 Units by mouth Daily.     • clopidogrel (PLAVIX) 75 MG tablet Take 75 mg by mouth daily.     • ferrous sulfate 324 (65 FE) MG tablet delayed-release EC tablet Take 324 mg by mouth Daily With Breakfast.     • fluticasone-salmeterol (ADVAIR) 100-50 MCG/DOSE DISKUS Inhale 2 puffs 2 (Two) Times a Day.     • furosemide (LASIX) 20 MG tablet Take 20 mg by mouth Daily As Needed (SWELLING).     • hydrochlorothiazide (HYDRODIURIL) 12.5 MG tablet Take 25 mg by mouth Daily.     • HYDROcodone-acetaminophen (NORCO) 7.5-325 MG per tablet Take 1 tablet by mouth 2 (Two) Times a Day As Needed for moderate pain (4-6).     • lisinopril (PRINIVIL,ZESTRIL) 30 MG tablet Take 40 mg by mouth Daily.     • metFORMIN (GLUCOPHAGE) 500 MG tablet Take 850 mg by mouth 2 (Two) Times a Day With Meals.     • pantoprazole (PROTONIX) 40 MG EC tablet Take 40 mg by mouth Every Other Day.     • tamsulosin (FLOMAX) 0.4 MG capsule 24 hr capsule Take 0.4 mg by mouth Daily.     • tiZANidine (ZANAFLEX) 4 MG tablet Take 4 mg by mouth At Night As Needed for muscle spasms.         Current Hospital Medications:  Current Facility-Administered Medications   Medication Dose Route Frequency Provider Last Rate Last Dose   • ! vancomycin trough 11:30 3-14 hold for level > 20   Does not apply Continuous PRN Maribel Botello MD        • [START ON 3/14/2017] aspirin chewable tablet 81 mg  81 mg Oral Daily ROSCOE Hatch       • atorvastatin (LIPITOR) tablet 40 mg  40 mg Oral Daily Maribel Botello MD       • bisacodyl (DULCOLAX) EC tablet 5 mg  5 mg Oral Daily PRN Maribel Botello MD       • bisacodyl (DULCOLAX) suppository 10 mg  10 mg Rectal Daily PRN Maribel Botello MD       • bisacodyl (DULCOLAX) suppository 10 mg  10 mg Rectal Daily PRN ROSCOE Hatch       • budesonide-formoterol (SYMBICORT) 80-4.5 MCG/ACT inhaler 2 puff  2 puff Inhalation BID - RT Maribel Botello MD   2 puff at 03/13/17 0803   • calcium carbonate (TUMS) chewable tablet 500 mg (200 mg elemental)  2 tablet Oral BID PRN Maribel Botello MD       • [START ON 3/14/2017] cefuroxime (ZINACEF) IVPB 1.5 g  1.5 g Intravenous On Call to OR ROSCOE Kennedy       • [START ON 3/14/2017] clopidogrel (PLAVIX) tablet 75 mg  75 mg Oral Daily ROSCOE Hatch       • dextrose (D50W) solution 25 g  25 g Intravenous Q15 Min PRN Maribel Botello MD       • dextrose (GLUTOSE) oral gel 15 g  15 g Oral Q15 Min PRN Maribel Botello MD       • dextrose 5 % and sodium chloride 0.45 % infusion  100 mL/hr Intravenous Continuous Maribel Botello  mL/hr at 03/12/17 2352 100 mL/hr at 03/12/17 2352   • ferrous sulfate tablet 325 mg  325 mg Oral BID With Meals Maribel Botello MD   325 mg at 03/13/17 1358   • glucagon (GLUCAGEN) injection 1 mg  1 mg Subcutaneous Q15 Min PRN Maribel Botello MD       • HYDROcodone-acetaminophen (NORCO) 7.5-325 MG per tablet 1 tablet  1 tablet Oral BID PRN Maribel Botello MD   1 tablet at 03/13/17 1402   • HYDROmorphone (DILAUDID) injection 0.5 mg  0.5 mg Intravenous Q2H PRN Maribel Botello MD   0.5 mg at 03/12/17 1407    And   • naloxone (NARCAN) injection 0.4 mg  0.4 mg Intravenous Q5 Min PRN Maribel Botello MD       • insulin lispro (humaLOG) injection 2-7 Units  2-7 Units Subcutaneous 4x Daily AC & at Bedtime Maribel Botello MD   2 Units at  03/12/17 2243   • ipratropium-albuterol (DUO-NEB) nebulizer solution 3 mL  3 mL Nebulization 4x Daily - RT Maribel Botello MD   3 mL at 03/13/17 0803   • lactated ringers infusion  9 mL/hr Intravenous Continuous Edi Chirinos MD 9 mL/hr at 03/13/17 0927 9 mL/hr at 03/13/17 0927   • ondansetron (ZOFRAN) tablet 4 mg  4 mg Oral Q6H PRN ROSCOE Hatch        Or   • ondansetron (ZOFRAN) injection 4 mg  4 mg Intravenous Q6H PRN ROSCOE Hatch       • pantoprazole (PROTONIX) EC tablet 40 mg  40 mg Oral Every Other Day Maribel Botello MD   40 mg at 03/12/17 2238   • Pharmacy to dose vancomycin   Does not apply Continuous PRN Maribel Botello MD       • piperacillin-tazobactam (ZOSYN) 3.375 g in dextrose 50 mL IVPB (premix)  3.375 g Intravenous Q6H Maribel Botello MD 0 mL/hr at 03/12/17 2354 3.375 g at 03/13/17 1358   • promethazine (PHENERGAN) tablet 12.5 mg  12.5 mg Oral Q6H PRN Maribel Botello MD        Or   • promethazine (PHENERGAN) injection 12.5 mg  12.5 mg Intramuscular Q6H PRN Maribel Botello MD        Or   • promethazine (PHENERGAN) suppository 12.5 mg  12.5 mg Rectal Q6H PRN Maribel Botello MD       • sennosides-docusate sodium (SENOKOT-S) 8.6-50 MG tablet 2 tablet  2 tablet Oral BID PRN ROSCOE Hatch       • sodium chloride 0.45 % infusion  35 mL/hr Intravenous Continuous ROSCOE Hatch 35 mL/hr at 03/13/17 1358 35 mL/hr at 03/13/17 1358   • sodium chloride 0.9 % flush 1-10 mL  1-10 mL Intravenous PRN Maribel Botello MD       • tamsulosin (FLOMAX) 24 hr capsule 0.4 mg  0.4 mg Oral Daily Maribel Botello MD       • tiZANidine (ZANAFLEX) tablet 4 mg  4 mg Oral Nightly PRN Maribel Botello MD       • vancomycin IVPB 1750 mg in 0.9% Sodium Chloride (premix) 500 mL  1,750 mg Intravenous Q12H Maribel Botello MD   1,750 mg at 03/13/17 1358   • vancomycin IVPB 1750 mg in 0.9% Sodium Chloride (premix) 500 mL  15 mg/kg Intravenous Once ROSCOE Hatch       • Vitamin D (Cholecalciferol)  (CHOLECALCIFEROL) tablet 2,000 Units  2,000 Units Oral Daily Maribel Botello MD           Antimicrobials:  IV Anti-Infectives     Ordered     Dose/Rate Route Frequency Start Stop    03/13/17 0904  cefuroxime (ZINACEF) IVPB 1.5 g     Ordering Provider:  ROSCOE Kennedy    1.5 g  over 30 Minutes Intravenous On Call to O.R. 03/14/17 0600 03/15/17 0559    03/13/17 1145  vancomycin IVPB 1750 mg in 0.9% Sodium Chloride (premix) 500 mL     Ordering Provider:  ROSCOE Hatch    15 mg/kg × 115 kg Intravenous Once 03/13/17 2000      03/13/17 0103  vancomycin IVPB 1750 mg in 0.9% Sodium Chloride (premix) 500 mL     Ordering Provider:  Maribel Botello MD    1,750 mg Intravenous Every 12 Hours 03/13/17 1200      03/12/17 2243  vancomycin IVPB 1750 mg in 0.9% Sodium Chloride (premix) 500 mL     Ordering Provider:  Maribel Botello MD    1,750 mg Intravenous Once 03/12/17 2315 03/12/17 2352    03/12/17 2158  piperacillin-tazobactam (ZOSYN) 3.375 g in dextrose 50 mL IVPB (premix)     Ordering Provider:  Maribel Botello MD    3.375 g Intravenous Every 6 Hours Scheduled 03/12/17 2215      03/12/17 2158  Pharmacy to dose vancomycin     Ordering Provider:  Maribel Botello MD     Does not apply Continuous PRN 03/12/17 2150            Review of Systems:   Constitutional:  Transient fever, chills, or sweats; now resolved.  Appetite good.  No fatigue. No weight loss.  HEENT:  No new vision, hearing or throat complaints.  No oral sores.  No headache, photophobia or neck stiffness.  CV:  No chest pain, palpitations, or syncope.  Respiratory:  No SOB, cough, or hemoptysis.  GI:  No nausea, vomiting, or diarrhea.  No hematochezia, melena, or hematemesis. No jaundice or liver disease.  :  No dysuria, hematuria, urgency, or flank pain.  Lymph:  No swollen lymph nodes in neck, axilla, or groin.   Hematologic:  No easy bruising or bleeding.  Musculoskeletal:  No new swelling or pain of joints.  No new back pain.  Neurologic:  No  "acute focal weakness or numbness.  No seizures.  Skin:  No rashes, ulcers, or lesions.       Vital Signs:  Temp (24hrs), Av.4 °F (36.9 °C), Min:97.8 °F (36.6 °C), Max:98.6 °F (37 °C)    Visit Vitals   • /68   • Pulse 71   • Temp 98.3 °F (36.8 °C) (Axillary)   • Resp 18   • Ht 65\" (165.1 cm)   • Wt 253 lb 4 oz (115 kg)   • SpO2 95%   • BMI 42.14 kg/m2       Physical Examination:  GENERAL: Awake and alert, in no acute distress.   LINES:  Left arm peripheral IV  HEENT: Normocephalic, atraumatic. No conjunctival injection or subconjunctival hemorrhage. No icterus.  CV: RRR.  2/6 systolic murmur.  Normal S1S2.  LUNGS: Clear to auscultation bilaterally without wheezing, rales, rhonchi. Normal respiratory effort.  ABDOMEN: Soft, nontender, nondistended. Positive bowel sounds. No rebound or guarding.  No hepatosplenomegaly.  EXT:  No clubbing, cyanosis, or edema.  + bilateral pedal pulses.  : Normal appearing genitalia without Nunn catheter.  MSK: Left thigh is a bit edematous, erythematous, and warm.  There is a large dressing over the left groin with Betadine on the dressing.  SKIN: Warm and dry without rash or ulcer.  NEURO: A&Ox4. No focal deficits.  Face symmetric.  Speech fluent.  Moves all extremities well.   PSYCHIATRIC: Normal insight and judgement.  Cooperative.  Normal affect.      Laboratory Data:      Results from last 7 days  Lab Units 17  2218   WBC 10*3/mm3 3.94   HEMOGLOBIN g/dL 10.3*   HEMATOCRIT % 30.5*   PLATELETS 10*3/mm3 148*       Results from last 7 days  Lab Units 17  0631   SODIUM mmol/L 131*   POTASSIUM mmol/L 4.3   CHLORIDE mmol/L 98*   TOTAL CO2 mmol/L 28.0   BUN mg/dL 10   CREATININE mg/dL 0.80   GLUCOSE mg/dL 100   CALCIUM mg/dL 9.3       Results from last 7 days  Lab Units 17  2218   ALK PHOS U/L 53   BILIRUBIN mg/dL 1.2   ALT (SGPT) U/L 31   AST (SGOT) U/L 63*             Estimated Creatinine Clearance: 120.9 mL/min (by C-G formula based on Cr of " 0.8).    Results from last 7 days  Lab Units 03/12/17  2218   LACTATE mmol/L 1.4                 Microbiology:    BLOOD CULTURE   Date Value Ref Range Status   03/12/2017 No growth at less than 24 hours  Preliminary   03/12/2017 No growth at less than 24 hours  Preliminary     Wound Culture [[id number]] Collected: 03/13/17 0032        Lab Status: Preliminary result Specimen: Wound from Leg, Left Updated: 03/13/17 0757        Gram Stain Result Rare (1+) Epithelial cells seen          No WBCs seen          No organisms seen             Radiology:  Imaging Results (last 72 hours)     Procedure Component Value Units Date/Time    US Nonvascular Extremity Limited [[id number]] Collected:  03/13/17 0827     Updated:  03/13/17 0827    Narrative:       EXAMINATION: US NONVASCULAR EXTREMITY LIMITED-     INDICATION: Left femoral mass.     TECHNIQUE: Sonographic imaging was obtained of the left inguinal region  in both the sagittal and transverse planes.     COMPARISON: None.     FINDINGS: In the area of interest, there is a large complex hypoechoic  structure with internal septations. The area of interest appears to  extend to the skin surface. There is no evidence of internal blood flow.  This area measures 9.6 x 4.4 x 7.3 cm. Findings may suggest evidence of  a complex hematoma or seroma. There is edema identified in the  surrounding subcutaneous tissues.       Impression:       Large complex hypoechoic structure with internal septations  seen in the area of interest extending to the skin surface suggesting a  large complex hematoma or seroma. There is no internal blood flow. Edema  in the surrounding subcutaneous tissues. Consider CT scan for further  evaluation if clinically indicated with IV contrast.      D:  03/13/2017  E:  03/13/2017       XR Chest 1 View [[id number]] Collected:  03/13/17 1009     Updated:  03/13/17 1010    Narrative:       EXAMINATION: XR CHEST 1 VW-      INDICATION: COPD and coal workers' pneumoconiosis,  possible preop.      COMPARISON: 01/16/2017.     FINDINGS: Portable chest reveals heart to be borderline enlarged.  The  lung fields are grossly clear. No focal parenchymal opacification is  present.  No pleural effusion or pneumothorax. Degenerative change is  seen within the spine. Pulmonary vascularity is within normal limits.             Impression:       Stable chronic changes seen within the lung fields  bilaterally. The heart is enlarged with degenerative changes seen within  the spine.     D:  03/13/2017  E:  03/13/2017           I have personally reviewed the radiology images.          IMPRESSION:  56 y.o. male with history of diabetes and peripheral vascular disease who recently underwent a left femoral cutdown with angioplasty and stent placement is admitted with postoperative left thigh cellulitis and wound dehiscence with seroma versus abscess formation.   Incision and drainage performed with 200 milliliters of serous fluid.  Exam is consistent with a mild cellulitis of the left thigh.  Patient reported copious amounts of serosanguineous drainage at home for the past 2-3 weeks.    PROBLEM LIST:   1.  Postoperative left femoral fluid collection, seroma versus abscess, status post recent left femoral cutdown with left superficial femoral artery angioplasty with stent (no graft).  Status post incision and drainage with 200 mL serous fluid removed.  Culture is pending.  2.  Left thigh cellulitis, related to #1.  3.  Controlled diabetes mellitus type 2.  4.  Obesity.  5.  Peripheral vascular disease, status post prior right femoral to popliteal bypass graft and more recent left femoral cutdown with angioplasty and stent.    PLAN:  1.  Continue IV vancomycin and Zosyn for now, to cover for potential of hospital acquired organisms including MRSA and Pseudomonas.  2.  Follow up on pending culture data and tailor antibiotics as appropriate.  3.  Trend vitals and labs, including temperature, WBC, and Cr.  4.   Assess for adverse drug reactions from antibiotics and follow vancomycin troughs.  5.  Final plans pending clinical course.  Patient is agreeable to IV antibiotics at home if needed.  Pending culture data, we may be able to do an infusion of a long-acting IV antibiotic such as Dalvance prior to discharge given that he lives a distance away.    Thank you for the consultation.  I will continue to follow along with you.  Plan discussed with patient and family.    David Nguyen MD  3/13/2017  2:59 PM      Portions of this note may have been created with a voice recognition program.  Efforts were made to edit the dictations.  However, words are occassionally mistranscribed and sound alike errors may occur.

## 2017-03-13 NOTE — H&P
"    [facility] Medicine Services  HISTORY AND PHYSICAL    Primary Care Physician: Dewayne Cole MD    Subjective     Chief Complaint:  Left leg cellulitis    History of Present Illness:   Mr. Weinberg is a 56 year old  man who was transferred from [facility] this evening.  He was admitted there 3/11/2017 for left leg cellulitis associated with recent left superficial artery cutdown/angioplasty/stent performed by Dr. Rosa on 1/18/2017.  He did not follow up with Dr. Rosa.  About 3 weeks after the procedure the patient states the wound \"broke open\" and has been draining clear-pink tinged to \"pure blood\" ever since.  It has been painful and warm.  It has never looked frankly purulent or had a bad odor.  On Monday 3/6/2017, he saw his PCP who prescribed PO antibiotics (data deficit).  Yesterday, he saw his PCP again because there was no improvement and the patient was sent to the ER in [place], was admitted and given IV Vanco and Zosyn.  He had a negative venous duplex for DVT but no further imaging was done.  He was sent to [facility] today for higher level of care and to see Dr. Rosa who did his surgery.    He did have fever beginning 3/6 or 3/7 lasting for 3-4 days associated with cough, sore throat, congestion, nausea and vomiting.  He thought he had the flu and did not seek care.  He states that his wound did not change at all during this time.  Otherwise he has not had fever, chills, syncope, chest pain, palpitations, increased LE edema, abdominal pain, melena, hematochezia or increased SOA from baseline.    Review of Systems   Otherwise complete ROS performed and negative except as mentioned in the HPI.    Past Medical History   Diagnosis Date   • Arthritis    • Black lung disease    • BPH (benign prostatic hyperplasia)    • Cataract    • Cataract    • COLD (chronic obstructive lung disease)    • Colon polyps    • Diabetes mellitus      SINCE 2014   • Dyslipidemia    • " Esophageal reflux    • Flu      HX   • Heart attack      2006   • Herniated lumbar intervertebral disc    • Hypertension    • Malignant neoplasm of colon    • Peripheral vascular disease    • Sleep apnea with use of continuous positive airway pressure (CPAP)    • Wears dentures    • Wears glasses        Past Surgical History   Procedure Laterality Date   • Colon surgery     • Bypass graft       non-vein - femoral-popliteal right   • Other surgical history       PTA ILIAC STENOSIS WITH STENT   • Cardiac catheterization       NO INTERVENTION   • Colonoscopy     • Aortagram N/A 1/17/2017     Procedure: AORTAGRAM WITH RUNOFFS and  STENT left SFA;  Surgeon: Heladio Rosa MD;  Location: [facility] OR 15;  Service:    • Angioplasty femoral artery N/A 1/17/2017     Procedure: left femoral cutdown with aortagram and left SFA stent and angioplasty;  Surgeon: Heladio Rosa MD;  Location:  [facility] OR ;  Service:        Family History   Problem Relation Age of Onset   • Lung cancer Mother    • Heart attack Mother    • Hypertension Mother    • Diabetes Mother    • Diabetes Father    • Hypertension Father    • Heart attack Father        Social History     Social History   • Marital status:      Spouse name: N/A   • Number of children: N/A   • Years of education: N/A     Occupational History   • DISABLED       BACK AND LUNG PROBLEMS     Social History Main Topics   • Smoking status: Former Smoker     Packs/day: 2.00     Types: Cigarettes     Start date: 1980     Quit date: 2015   • Smokeless tobacco: Current User     Types: Chew      Comment: Stopped smoking 1 year ago. Smoked 35 years up to 2ppd.   • Alcohol use No   • Drug use: No   • Sexual activity: Defer     Other Topics Concern   • Not on file     Social History Narrative       Medications:  Prescriptions Prior to Admission   Medication Sig Dispense Refill Last Dose   • potassium chloride (K-DUR) 10 MEQ CR tablet Take 10 mEq by mouth 2  (Two) Times a Day.      • sulfamethoxazole-trimethoprim (BACTRIM DS,SEPTRA DS) 800-160 MG per tablet Take 1 tablet by mouth 2 (Two) Times a Day.      • albuterol (PROVENTIL HFA;VENTOLIN HFA) 108 (90 BASE) MCG/ACT inhaler Inhale 2 puffs Every 4 (Four) Hours As Needed for wheezing.   1/16/2017 at 2200   • aspirin 81 MG tablet Take 81 mg by mouth daily.   1/15/2017   • atorvastatin (LIPITOR) 40 MG tablet Take 40 mg by mouth daily.   1/16/2017 at 0800   • Cholecalciferol (VITAMIN D PO) Take 2,000 Units by mouth Daily.   1/16/2017 at 0800   • clopidogrel (PLAVIX) 75 MG tablet Take 75 mg by mouth daily.   1/15/2017   • ferrous sulfate 324 (65 FE) MG tablet delayed-release EC tablet Take 324 mg by mouth Daily With Breakfast.   1/16/2017 at 0800   • fluticasone-salmeterol (ADVAIR) 100-50 MCG/DOSE DISKUS Inhale 2 puffs 2 (Two) Times a Day.   1/16/2017 at 0800   • furosemide (LASIX) 20 MG tablet Take 20 mg by mouth Daily As Needed (SWELLING).   1/16/2017 at 0800   • hydrochlorothiazide (HYDRODIURIL) 12.5 MG tablet Take 25 mg by mouth Daily.   1/16/2017 at 0800   • HYDROcodone-acetaminophen (NORCO) 7.5-325 MG per tablet Take 1 tablet by mouth 2 (Two) Times a Day As Needed for moderate pain (4-6).   1/16/2017 at 1900   • lisinopril (PRINIVIL,ZESTRIL) 30 MG tablet Take 40 mg by mouth Daily.   1/17/2017 at 0530   • metFORMIN (GLUCOPHAGE) 500 MG tablet Take 850 mg by mouth 2 (Two) Times a Day With Meals.   1/16/2017 at 0800   • pantoprazole (PROTONIX) 40 MG EC tablet Take 40 mg by mouth Every Other Day.   1/16/2017 at 0800   • tamsulosin (FLOMAX) 0.4 MG capsule 24 hr capsule Take 0.4 mg by mouth Daily.   1/16/2017 at 0800   • tiZANidine (ZANAFLEX) 4 MG tablet Take 4 mg by mouth At Night As Needed for muscle spasms.   1/16/2017 at 2000       Allergies:  No Known Allergies      Objective     Physical Exam:  Vital Signs: There were no vitals taken for this visit.  Physical Exam     Constitutional: sitting on edge of bed, left leg  dangling off bed, in pain, awake, alert  Eyes: PERRLA, sclerae anicteric, no conjunctival injection  Neck: supple, no thyromegaly, trachea midline  Respiratory: Clear to auscultation bilaterally, nonlabored respirations   Cardiovascular: RRR, no murmurs, rubs, or gallops, palpable pedal pulses bilaterally  Gastrointestinal: Positive bowel sounds, soft, nontender, nondistended, obese  Musculoskeletal: 1+ bilateral ankle edema, no clubbing or cyanosis to bilateral lower extremities.  Left pedal pulse non palpable, but doppler +.  Foot warm.  Psychiatric: oriented x 3, appropriate affect, cooperative  Neurologic: Strength symmetric in all extremities, Cranial Nerves grossly intact to confrontation   Left leg, Cutdown site open, draining serous liquid, non purulent, no odor.  There is a large non-pulsatile mass, approximately 4x7 cm in an ovoid shape around the entry site.  There is some tenderness and a minimal amount of redness around the site.    Results Reviewed:        I reviewed a progress note, H and P, lab data and a radiology report of venous doppler studies from the outside hospital.    I have personally reviewed and interpreted available lab data, radiology studies and ECG obtained at time of admission.     Assessment / Plan     Assessment/Problem List:   Principal Problem:    Open wound of left hip and thigh with complication  Active Problems:    COPD (chronic obstructive pulmonary disease)    Diabetes mellitus    Hypertension    Peripheral vascular disease    LANA (acute kidney injury)    Coronary artery disease involving native heart    Iron deficiency anemia    Coal workers pneumoconiosis      Plan:  Left leg wound:  --s/p femoral cutdown and stenting/angioplasty of left superficial artery  --Cellulitis versus pseudoaneurysm or other structural issue: no elevated WBC or convincing fever.  Getting stat ultrasound  --Continue Vanco and Zosyn for now  --Follow outside cultures and repeat here (blood, wound).   If infected may require ID consultation.  --Consult Dr. Rosa (Dr. St accepted over weekend)  --NPO after midnight in case surgical intervention required    LANA:  --Up from baseline 1.0.  --Pharmacy to dose Vanco  --Holding ACE-I, lasix and HCTZ  --Could be from recent bactrim use  --Fluids  --Recheck in AM    T2DM:  --Hold orals, A1C, SSI, Accuchecks    COPD, Coal workers' pneumoconiosis  --Currently Stable    HTN:  --Hold ACE-I,  HCTZ due to LANA  --Monitor, add PRN's if needed    Iron deficiency anemia, chronic: (stable)  --11/33 at outside hospital   --Monitor/recheck here    Peripheral Vascular disease:  --See #1    CAD:  --Currently stable        DVT prophylaxis: Mechanical given possible surgical intervention  Code Status: Full  Admission Status: Patient will be admitted to  [facility]: Patient with complication of surgical wound of unknown etiology, requiring urgent workup and treatment as well as specialty consultation.  Diabetes is complicated all aspects of care.     Maribel Botello MD 03/12/17 9:40 PM

## 2017-03-13 NOTE — ANESTHESIA POSTPROCEDURE EVALUATION
Patient: Sai Weinberg    Procedure Summary     Date Anesthesia Start Anesthesia Stop Room / Location    03/13/17 1114 1141 [facility] 08 / [facility]       Procedure Diagnosis Surgeon Provider    INCISION AND DRAINAGE LEFT GROIN HEMATOMA (N/A Groin) No diagnosis on file. MD Edi Hernandes MD          Anesthesia Type: general  Last vitals  BP      Temp      Pulse     Resp      SpO2        Post Anesthesia Care and Evaluation    Patient location during evaluation: PACU  Patient participation: complete - patient participated  Level of consciousness: awake and alert  Pain score: 0  Pain management: adequate  Airway patency: patent  Anesthetic complications: No anesthetic complications  PONV Status: none  Cardiovascular status: hemodynamically stable and acceptable  Respiratory status: nonlabored ventilation, acceptable and nasal cannula  Hydration status: acceptable    Comments: Pt transported to PACU with O2 via nasal cannula at 6 L/MIN. Vital signs stable.  Pt shows no signs of distress.  Report given to PACU RN.

## 2017-03-13 NOTE — PLAN OF CARE
Problem: Patient Care Overview (Adult)  Goal: Plan of Care Review  Outcome: Ongoing (interventions implemented as appropriate)    03/13/17 0440   Coping/Psychosocial Response Interventions   Plan Of Care Reviewed With patient   Patient Care Overview   Progress no change   Outcome Evaluation   Outcome Summary/Follow up Plan Pt new admit this shift per Dr. Botello. Anticipate consult from Dr. Rosa today. POC to be determined.         Problem: Infection, Risk/Actual (Adult)  Goal: Identify Related Risk Factors and Signs and Symptoms  Outcome: Ongoing (interventions implemented as appropriate)  Goal: Infection Prevention/Resolution  Outcome: Ongoing (interventions implemented as appropriate)

## 2017-03-13 NOTE — ANESTHESIA PROCEDURE NOTES
Airway  Urgency: elective    Date/Time: 3/13/2017 11:19 AM  Airway not difficult    General Information and Staff    Patient location during procedure: OR  CRNA: LUZ MARINA ALEXANDRE    Indications and Patient Condition  Indications for airway management: airway protection    Preoxygenated: yes  Mask difficulty assessment: 1 - vent by mask    Final Airway Details  Final airway type: supraglottic airway      Successful airway: classic  Size 5    Number of attempts at approach: 1    Additional Comments  Smooth IV induction.  LMA placed without difficulty, ventilation with assist, equal breath sounds and symmetric chest rise and fall

## 2017-03-13 NOTE — PAYOR COMM NOTE
Rogeloi Weinberg (56 y.o. Male)     INITIAL NOTIFICATION AND CLINICALS      Date of Birth Social Security Number Address Home Phone MRN    [id number]  [id number] [facility] [address] [phone] [id number]    Taoist Marital Status          None        Admission Date Admission Type Admitting Provider Attending Provider Department, Room/Bed    3/12/17 Elective Maribel Botello MD Beck, Jennifer L, MD [facility] 6B, N635/1    Discharge Date Discharge Disposition Discharge Destination                      Attending Provider: Maribel Botello MD     Allergies:  No Known Allergies    Isolation:  None   Infection:  None   Code Status:  FULL    Ht:  --   Wt:  --    Admission Cmt:  None   Principal Problem:  Open wound of left hip and thigh with complication [S71.002A,S71.102A] More...                 Active Insurance as of 3/12/2017     Primary Coverage     Payor Plan Insurance Group Employer/Plan Group    [employer] [place] [employer] [place]ICA[place]      Payor Plan Address Payor Plan Phone Number Effective From Effective To    [address] [address] [phone] 10/21/2016     [place] [address]       Subscriber Name Subscriber Birth Date Member ID       ROGELIO WEINBERG [id number]                 Emergency Contacts      (Rel.) Home Phone Work Phone Mobile Phone    Karina Brennan (Daughter) [phone] -- --            Insurance Information                [facility] MEDICAID Phone: [phone]    Subscriber: Rogelio Weinberg Subscriber#: [id number]    Group#:  Precert#:              History & Physical      Maribel Botello MD at 3/12/2017  9:19 PM              [facility] Medicine Services  HISTORY AND PHYSICAL    Primary Care Physician: Dewayne Cole MD    Subjective     Chief Complaint:  Left leg cellulitis    History of Present Illness:   Mr. Weinberg is a 56 year old  man who was transferred from [facility] this  "evening.  He was admitted there 3/11/2017 for left leg cellulitis associated with recent left superficial artery cutdown/angioplasty/stent performed by Dr. Rosa on 1/18/2017.  He did not follow up with Dr. Rosa.  About 3 weeks after the procedure the patient states the wound \"broke open\" and has been draining clear-pink tinged to \"pure blood\" ever since.  It has been painful and warm.  It has never looked frankly purulent or had a bad odor.  On Monday 3/6/2017, he saw his PCP who prescribed PO antibiotics (data deficit).  Yesterday, he saw his PCP again because there was no improvement and the patient was sent to the ER in [place], was admitted and given IV Vanco and Zosyn.  He had a negative venous duplex for DVT but no further imaging was done.  He was sent to [facility] today for higher level of care and to see Dr. Rosa who did his surgery.    He did have fever beginning 3/6 or 3/7 lasting for 3-4 days associated with cough, sore throat, congestion, nausea and vomiting.  He thought he had the flu and did not seek care.  He states that his wound did not change at all during this time.  Otherwise he has not had fever, chills, syncope, chest pain, palpitations, increased LE edema, abdominal pain, melena, hematochezia or increased SOA from baseline.    Review of Systems   Otherwise complete ROS performed and negative except as mentioned in the HPI.    Past Medical History   Diagnosis Date   • Arthritis    • Black lung disease    • BPH (benign prostatic hyperplasia)    • Cataract    • Cataract    • COLD (chronic obstructive lung disease)    • Colon polyps    • Diabetes mellitus      SINCE 2014   • Dyslipidemia    • Esophageal reflux    • Flu      HX   • Heart attack      2006   • Herniated lumbar intervertebral disc    • Hypertension    • Malignant neoplasm of colon    • Peripheral vascular disease    • Sleep apnea with use of continuous positive airway pressure (CPAP)    • Wears dentures    • Wears glasses  "       Past Surgical History   Procedure Laterality Date   • Colon surgery     • Bypass graft       non-vein - femoral-popliteal right   • Other surgical history       PTA ILIAC STENOSIS WITH STENT   • Cardiac catheterization       NO INTERVENTION   • Colonoscopy     • Aortagram N/A 1/17/2017     Procedure: AORTAGRAM WITH RUNOFFS and  STENT left SFA;  Surgeon: Heladio Rosa MD;  Location: [facility] OR 15;  Service:    • Angioplasty femoral artery N/A 1/17/2017     Procedure: left femoral cutdown with aortagram and left SFA stent and angioplasty;  Surgeon: Heladio Rosa MD;  Location: [facility] HYBRID OR 15;  Service:        Family History   Problem Relation Age of Onset   • Lung cancer Mother    • Heart attack Mother    • Hypertension Mother    • Diabetes Mother    • Diabetes Father    • Hypertension Father    • Heart attack Father        Social History     Social History   • Marital status:      Spouse name: N/A   • Number of children: N/A   • Years of education: N/A     Occupational History   • DISABLED       BACK AND LUNG PROBLEMS     Social History Main Topics   • Smoking status: Former Smoker     Packs/day: 2.00     Types: Cigarettes     Start date: 1980     Quit date: 2015   • Smokeless tobacco: Current User     Types: Chew      Comment: Stopped smoking 1 year ago. Smoked 35 years up to 2ppd.   • Alcohol use No   • Drug use: No   • Sexual activity: Defer     Other Topics Concern   • Not on file     Social History Narrative       Medications:  Prescriptions Prior to Admission   Medication Sig Dispense Refill Last Dose   • potassium chloride (K-DUR) 10 MEQ CR tablet Take 10 mEq by mouth 2 (Two) Times a Day.      • sulfamethoxazole-trimethoprim (BACTRIM DS,SEPTRA DS) 800-160 MG per tablet Take 1 tablet by mouth 2 (Two) Times a Day.      • albuterol (PROVENTIL HFA;VENTOLIN HFA) 108 (90 BASE) MCG/ACT inhaler Inhale 2 puffs Every 4 (Four) Hours As Needed for wheezing.   1/16/2017 at 2200    • aspirin 81 MG tablet Take 81 mg by mouth daily.   1/15/2017   • atorvastatin (LIPITOR) 40 MG tablet Take 40 mg by mouth daily.   1/16/2017 at 0800   • Cholecalciferol (VITAMIN D PO) Take 2,000 Units by mouth Daily.   1/16/2017 at 0800   • clopidogrel (PLAVIX) 75 MG tablet Take 75 mg by mouth daily.   1/15/2017   • ferrous sulfate 324 (65 FE) MG tablet delayed-release EC tablet Take 324 mg by mouth Daily With Breakfast.   1/16/2017 at 0800   • fluticasone-salmeterol (ADVAIR) 100-50 MCG/DOSE DISKUS Inhale 2 puffs 2 (Two) Times a Day.   1/16/2017 at 0800   • furosemide (LASIX) 20 MG tablet Take 20 mg by mouth Daily As Needed (SWELLING).   1/16/2017 at 0800   • hydrochlorothiazide (HYDRODIURIL) 12.5 MG tablet Take 25 mg by mouth Daily.   1/16/2017 at 0800   • HYDROcodone-acetaminophen (NORCO) 7.5-325 MG per tablet Take 1 tablet by mouth 2 (Two) Times a Day As Needed for moderate pain (4-6).   1/16/2017 at 1900   • lisinopril (PRINIVIL,ZESTRIL) 30 MG tablet Take 40 mg by mouth Daily.   1/17/2017 at 0530   • metFORMIN (GLUCOPHAGE) 500 MG tablet Take 850 mg by mouth 2 (Two) Times a Day With Meals.   1/16/2017 at 0800   • pantoprazole (PROTONIX) 40 MG EC tablet Take 40 mg by mouth Every Other Day.   1/16/2017 at 0800   • tamsulosin (FLOMAX) 0.4 MG capsule 24 hr capsule Take 0.4 mg by mouth Daily.   1/16/2017 at 0800   • tiZANidine (ZANAFLEX) 4 MG tablet Take 4 mg by mouth At Night As Needed for muscle spasms.   1/16/2017 at 2000       Allergies:  No Known Allergies      Objective     Physical Exam:  Vital Signs: There were no vitals taken for this visit.  Physical Exam     Constitutional: sitting on edge of bed, left leg dangling off bed, in pain, awake, alert  Eyes: PERRLA, sclerae anicteric, no conjunctival injection  Neck: supple, no thyromegaly, trachea midline  Respiratory: Clear to auscultation bilaterally, nonlabored respirations   Cardiovascular: RRR, no murmurs, rubs, or gallops, palpable pedal pulses  bilaterally  Gastrointestinal: Positive bowel sounds, soft, nontender, nondistended, obese  Musculoskeletal: 1+ bilateral ankle edema, no clubbing or cyanosis to bilateral lower extremities.  Left pedal pulse non palpable, but doppler +.  Foot warm.  Psychiatric: oriented x 3, appropriate affect, cooperative  Neurologic: Strength symmetric in all extremities, Cranial Nerves grossly intact to confrontation   Left leg, Cutdown site open, draining serous liquid, non purulent, no odor.  There is a large non-pulsatile mass, approximately 4x7 cm in an ovoid shape around the entry site.  There is some tenderness and a minimal amount of redness around the site.    Results Reviewed:        I reviewed a progress note, H and P, lab data and a radiology report of venous doppler studies from the outside hospital.    I have personally reviewed and interpreted available lab data, radiology studies and ECG obtained at time of admission.     Assessment / Plan     Assessment/Problem List:   Principal Problem:    Open wound of left hip and thigh with complication  Active Problems:    COPD (chronic obstructive pulmonary disease)    Diabetes mellitus    Hypertension    Peripheral vascular disease    LANA (acute kidney injury)    Coronary artery disease involving native heart    Iron deficiency anemia    Coal workers pneumoconiosis      Plan:  Left leg wound:  --s/p femoral cutdown and stenting/angioplasty of left superficial artery  --Cellulitis versus pseudoaneurysm or other structural issue: no elevated WBC or convincing fever.  Getting stat ultrasound  --Continue Vanco and Zosyn for now  --Follow outside cultures and repeat here (blood, wound).  If infected may require ID consultation.  --Consult Dr. Rosa (Dr. St accepted over weekend)  --NPO after midnight in case surgical intervention required    LANA:  --Up from baseline 1.0.  --Pharmacy to dose Vanco  --Holding ACE-I, lasix and HCTZ  --Could be from recent bactrim  use  --Fluids  --Recheck in AM    T2DM:  --Hold orals, A1C, SSI, Accuchecks    COPD, Coal workers' pneumoconiosis  --Currently Stable    HTN:  --Hold ACE-I,  HCTZ due to LANA  --Monitor, add PRN's if needed    Iron deficiency anemia, chronic: (stable)  --11/33 at outside hospital   --Monitor/recheck here    Peripheral Vascular disease:  --See #1    CAD:  --Currently stable        DVT prophylaxis: Mechanical given possible surgical intervention  Code Status: Full  Admission Status: Patient will be admitted to  [facility]: Patient with complication of surgical wound of unknown etiology, requiring urgent workup and treatment as well as specialty consultation.  Diabetes is complicated all aspects of care.     Maribel Botello MD 03/12/17 9:40 PM           Electronically signed by Maribel Botello MD at 3/12/2017 10:00 PM        Hospital Medications (active)       Dose Frequency Start End    atorvastatin (LIPITOR) tablet 40 mg 40 mg Daily 3/13/2017     Sig - Route: Take 1 tablet by mouth Daily. - Oral    bisacodyl (DULCOLAX) EC tablet 5 mg 5 mg Daily PRN 3/12/2017     Sig - Route: Take 1 tablet by mouth Daily As Needed for Constipation. - Oral    bisacodyl (DULCOLAX) suppository 10 mg 10 mg Daily PRN 3/12/2017     Sig - Route: Insert 1 suppository into the rectum Daily As Needed for Constipation. - Rectal    budesonide-formoterol (SYMBICORT) 80-4.5 MCG/ACT inhaler 2 puff 2 puff 2 Times Daily - RT 3/12/2017     Sig - Route: Inhale 2 puffs 2 (Two) Times a Day. - Inhalation    calcium carbonate (TUMS) chewable tablet 500 mg (200 mg elemental) 2 tablet 2 Times Daily PRN 3/12/2017     Sig - Route: Chew 1,000 mg 2 (Two) Times a Day As Needed for heartburn. - Oral    dextrose (D50W) solution 25 g 25 g Every 15 Minutes PRN 3/12/2017     Sig - Route: Infuse 50 mL into a venous catheter Every 15 (Fifteen) Minutes As Needed for low blood sugar (Blood Sugar Less Than 70, Patient Has IV Access - Unresponsive, NPO or Unable To Safely  "Swallow). - Intravenous    dextrose (GLUTOSE) oral gel 15 g 15 g Every 15 Minutes PRN 3/12/2017     Sig - Route: Take 15 g by mouth Every 15 (Fifteen) Minutes As Needed for low blood sugar (Blood Sugar Less Than 70, Patient Alert, Is Not NPO & Can Safely Swallow). - Oral    dextrose 5 % and sodium chloride 0.45 % infusion 100 mL/hr Continuous 3/13/2017     Sig - Route: Infuse 100 mL/hr into a venous catheter Continuous. - Intravenous    ferrous sulfate tablet 325 mg 325 mg 2 Times Daily With Meals 3/12/2017     Sig - Route: Take 1 tablet by mouth 2 (Two) Times a Day With Meals. - Oral    glucagon (GLUCAGEN) injection 1 mg 1 mg Every 15 Minutes PRN 3/12/2017     Sig - Route: Inject 1 mg under the skin Every 15 (Fifteen) Minutes As Needed (Blood Glucose Less Than 70 - Patient Without IV Access - Unresponsive, NPO or Unable To Safely Swallow). - Subcutaneous    HYDROcodone-acetaminophen (NORCO) 7.5-325 MG per tablet 1 tablet 1 tablet 2 Times Daily PRN 3/12/2017     Sig - Route: Take 1 tablet by mouth 2 (Two) Times a Day As Needed for moderate pain (4-6). - Oral    HYDROmorphone (DILAUDID) injection 0.5 mg 0.5 mg Every 2 Hours PRN 3/12/2017 3/22/2017    Sig - Route: Infuse 0.5 mg into a venous catheter Every 2 (Two) Hours As Needed for severe pain (7-10). - Intravenous    Linked Group 1:  \"And\" Linked Group Details        insulin lispro (humaLOG) injection 2-7 Units 2-7 Units 4 Times Daily Before Meals & Nightly 3/12/2017     Sig - Route: Inject 2-7 Units under the skin 4 (Four) Times a Day Before Meals & at Bedtime. - Subcutaneous    ipratropium-albuterol (DUO-NEB) nebulizer solution 3 mL 3 mL 4 Times Daily - RT 3/12/2017     Sig - Route: Take 3 mL by nebulization 4 (Four) Times a Day. - Nebulization    naloxone (NARCAN) injection 0.4 mg 0.4 mg Every 5 Minutes PRN 3/12/2017     Sig - Route: Infuse 1 mL into a venous catheter Every 5 (Five) Minutes As Needed for respiratory depression. - Intravenous    Linked Group 1:  " "\"And\" Linked Group Details        pantoprazole (PROTONIX) EC tablet 40 mg 40 mg Every Other Day 3/12/2017     Sig - Route: Take 1 tablet by mouth Every Other Day. - Oral    Pharmacy to dose vancomycin  Continuous PRN 3/12/2017     Sig - Route: Continuous As Needed for consult. - Does not apply    piperacillin-tazobactam (ZOSYN) 3.375 g in dextrose 50 mL IVPB (premix) 3.375 g Every 6 Hours Scheduled 3/12/2017     Sig - Route: Infuse 50 mL into a venous catheter Every 6 (Six) Hours. - Intravenous    promethazine (PHENERGAN) injection 12.5 mg 12.5 mg Every 6 Hours PRN 3/12/2017     Sig - Route: Inject 0.5 mL into the shoulder, thigh, or buttocks Every 6 (Six) Hours As Needed for Nausea or Vomiting. - Intramuscular    Linked Group 2:  \"Or\" Linked Group Details        promethazine (PHENERGAN) suppository 12.5 mg 12.5 mg Every 6 Hours PRN 3/12/2017     Sig - Route: Insert 1 suppository into the rectum Every 6 (Six) Hours As Needed for Nausea or Vomiting. - Rectal    Linked Group 2:  \"Or\" Linked Group Details        promethazine (PHENERGAN) tablet 12.5 mg 12.5 mg Every 6 Hours PRN 3/12/2017     Sig - Route: Take 1 tablet by mouth Every 6 (Six) Hours As Needed for Nausea or Vomiting. - Oral    Linked Group 2:  \"Or\" Linked Group Details        sodium chloride 0.9 % flush 1-10 mL 1-10 mL As Needed 3/12/2017     Sig - Route: Infuse 1-10 mL into a venous catheter As Needed for line care. - Intravenous    tamsulosin (FLOMAX) 24 hr capsule 0.4 mg 0.4 mg Daily 3/13/2017     Sig - Route: Take 1 capsule by mouth Daily. - Oral    tiZANidine (ZANAFLEX) tablet 4 mg 4 mg Nightly PRN 3/12/2017     Sig - Route: Take 1 tablet by mouth At Night As Needed for muscle spasms. - Oral    Vitamin D (Cholecalciferol) (CHOLECALCIFEROL) tablet 2,000 Units 2,000 Units Daily 3/13/2017     Sig - Route: Take 5 tablets by mouth Daily. - Oral            Lab Results (last 24 hours)     Procedure Component Value Units Date/Time    POC Glucose Fingerstick " [[id number]]  (Normal) Collected:  03/12/17 1942    Specimen:  Blood Updated:  03/12/17 1943     Glucose 92 mg/dL     Narrative:       Meter: [id number] : [id number] [facility]        Imaging Results (last 24 hours)     ** No results found for the last 24 hours. **        Physician Progress Notes (last 24 hours) (Notes from 3/11/2017 10:23 PM through 3/12/2017 10:23 PM)     No notes of this type exist for this encounter.        Consult Notes (last 24 hours) (Notes from 3/11/2017 10:23 PM through 3/12/2017 10:23 PM)     No notes of this type exist for this encounter.

## 2017-03-13 NOTE — PROGRESS NOTES
Discharge Planning Assessment  [facility]     Patient Name: Sai Weinberg  MRN: [id number]  Today's Date: 3/13/2017    Admit Date: 3/12/2017          Discharge Needs Assessment       03/13/17 1328    Living Environment    Lives With alone    Living Arrangements mobile home   He lives alone in a mobile home in [facility].  His wife lives next door in a house.   She can assist him at discharge.     Provides Primary Care For no one, unable/limited ability to care for self    Quality Of Family Relationships supportive    Able to Return to Prior Living Arrangements yes    Discharge Needs Assessment    Concerns To Be Addressed basic needs concerns    Readmission Within The Last 30 Days previous discharge plan unsuccessful    Outpatient/Agency/Support Group Needs --   He has never used/needed HH.     Equipment Currently Used at Home cane, straight   Uses a straight cane at home.     Transportation Available car;family or friend will provide            Discharge Plan       03/13/17 1334    Case Management/Social Work Plan    Additional Comments He states his [employer] policy is current and up to date.      03/13/17 1330    Case Management/Social Work Plan    Plan Home    Patient/Family In Agreement With Plan yes    Additional Comments Spoke to Mr. Weinberg at bed and his wife.  He lives in a mobile home in [place].  His wife lives next door in a house.  He is fairly independent c ADL's.  He uses a cane as needed.  He has never used HH or home O2.  His goal is to return home c assist from his wife.  CM will follow.        Discharge Placement     No information found        Expected Discharge Date and Time     Expected Discharge Date Expected Discharge Time    Mar 15, 2017               Demographic Summary       03/13/17 1327    Referral Information    Admission Type inpatient    Arrived From admitted as an inpatient    Referral Source admission list    Reason For Consult discharge planning    Contact Information     Permission Granted to Share Information With             Functional Status       03/13/17 7594    Functional Status Current    Ambulation 1-->assistive equipment    Transferring 2-->assistive person    Toileting 2-->assistive person    Bathing 0-->independent    Dressing 0-->independent    Eating 0-->independent    Communication 0-->understands/communicates without difficulty    Swallowing (if score 2 or more for any item, consult Rehab Services) 0-->swallows foods/liquids without difficulty    Functional Status Prior    Ambulation 1-->assistive equipment    Transferring 0-->independent    Toileting 0-->independent    Bathing 0-->independent    Dressing 0-->independent    Eating 0-->independent    Communication 0-->understands/communicates without difficulty    Swallowing 0-->swallows foods/liquids without difficulty    IADL    Medications independent    Meal Preparation independent    Housekeeping independent    Laundry independent    Shopping independent    Oral Care independent            Psychosocial     None            Abuse/Neglect     None            Legal     None            Substance Abuse     None            Patient Forms     None          Dilcia Kim RN

## 2017-03-13 NOTE — PROGRESS NOTES
[facility] Medicine Services  INPATIENT PROGRESS NOTE    Date of Admission: 3/12/2017  Length of Stay: 1  Primary Care Physician: Dewayne Cole MD    Subjective   CC:femoral mass  HPI: Pt seen in preop. Going for debridement of Left groin incision.  Denies complaints       Review Of Systems:   Review of Systems   Constitutional: Positive for fever. Negative for fatigue.   Genitourinary: Negative.    Musculoskeletal: Positive for myalgias.   Skin: Positive for color change and rash.   Psychiatric/Behavioral: Negative.          Objective      Temp:  [97.8 °F (36.6 °C)-98.6 °F (37 °C)] 98.4 °F (36.9 °C)  Heart Rate:  [65-82] 69  Resp:  [18-20] 18  BP: (120-141)/(62-94) 136/70  Physical Exam  Constitutional: awake, alert, comfortable, well developed   Eyes: PERRLA, sclerae anicteric, no conjunctival injection  Neck: supple, no thyromegaly, trachea midline  Respiratory: Clear to auscultation bilaterally, nonlabored respirations   Cardiovascular: RRR, no murmurs, rubs, or gallops, palpable pedal pulses bilaterally  Gastrointestinal: Positive bowel sounds, soft, nontender, nondistended, obese  Musculoskeletal: 1+ bilateral ankle edema, no clubbing or cyanosis to bilateral lower extremities. Left pedal pulse non palpable, but doppler +. Foot warm.  Psychiatric: oriented x 3, appropriate affect, cooperative  Neurologic: Strength symmetric in all extremities, Cranial Nerves grossly intact to confrontation   Left leg, Cutdown site open, draining serous liquid, non purulent, no odor. There is a large non-pulsatile mass, approximately 4x7 cm in an ovoid shape around the entry site. There is some tenderness and a minimal amount of redness around the site    Results Review:    I have reviewed the labs, radiology results and diagnostic studies.      Results from last 7 days  Lab Units 03/12/17  2218   WBC 10*3/mm3 3.94   HEMOGLOBIN g/dL 10.3*   PLATELETS 10*3/mm3 148*       Results from last 7  days  Lab Units 03/13/17  0631   SODIUM mmol/L 131*   POTASSIUM mmol/L 4.3   CHLORIDE mmol/L 98*   TOTAL CO2 mmol/L 28.0   BUN mg/dL 10   CREATININE mg/dL 0.80   GLUCOSE mg/dL 100   CALCIUM mg/dL 9.3       Culture Data: Cultures:    BLOOD CULTURE   Date Value Ref Range Status   03/12/2017 No growth at less than 24 hours  Preliminary   03/12/2017 No growth at less than 24 hours  Preliminary       Radiology Data:     I have reviewed the medications.    Assessment/Plan     Problem List  Principal Problem:    Open wound of left hip and thigh with complication  Active Problems:    COPD (chronic obstructive pulmonary disease)    Diabetes mellitus    Hypertension    Peripheral vascular disease    LANA (acute kidney injury)    Coronary artery disease involving native heart    Iron deficiency anemia    Coal workers pneumoconiosis    Leg wound, left           Assessment/Plan:    Plan:  Left leg wound:  --s/p femoral cutdown and stenting/angioplasty of left superficial artery  --Continue Vanco and Zosyn   --Follow outside cultures and repeat here (blood, wound). If infected may require ID consultation.  --Dr. Rosa did ID today of lesion, await culture results  --consult ID      LANA:  --Up from baseline 1.0 on admssion, improved now   --Pharmacy to dose Vanco  --Holding ACE-I, lasix and HCTZ  --Could be from recent bactrim use  --follow Cr.      T2DM:  --Hold orals, A1C, SSI, Accuchecks     COPD, Coal workers' pneumoconiosis  --Currently Stable     HTN:  --Hold ACE-I, HCTZ due to LANA  --Monitor, add PRN's if needed     Iron deficiency anemia, chronic: (stable)  --11/33 at outside hospital   --Monitor/recheck here     Peripheral Vascular disease:     CAD:  --Currently stable     DVT prophylaxis: Mechanical given possible surgical intervention  Code Status: Full    DVT prophylaxis:  Discharge Planning: I expect patient to be discharged in a few days    Vangie Dangelo DO   03/13/17   2:32 PM    Please note that portions of  this note may have been completed with a voice recognition program. Efforts were made to edit the dictations, but occasionally words are mistranscribed.

## 2017-03-13 NOTE — OP NOTE
Operative Report    Preop Diagnosis: Infected left groin incision        Procedure: Incisional debridement left groin incision.        Surgeons: Heladio Cruz PAC        Indication: This patient had had a left femoral cutdown in January 2017 with antegrade exposure of the left superficial femoral artery for catheter-based intervention.  We did not cannulate the patient's right femoral artery as it had a previous femoral to popliteal bypass.  He has missed a number of follow-up appointments.  He was recently seen with swelling and edema and drainage of the left femoral incision with partial dehiscence.  He was aware of the risk of the procedure and our plans to open this wound and do dressing changes and packed the wound open.  He agreed to proceed        Description: Supine position.  Sterile prep.  Approximately 4 cm in length of the old incision was reopened.  Clear watery tissue and some bloody tissue were removed nearly 200 mL of serous fluid were removed.  We irrigated this area copiously with antibiotics solution packed this with Betadine gauze.  The approximate length of the incision was 4 cm the depth was 3 cm.  The width was 2 cm.  No other tissue was excised.

## 2017-03-14 LAB
ANION GAP SERPL CALCULATED.3IONS-SCNC: 1 MMOL/L (ref 3–11)
BASOPHILS # BLD AUTO: 0.02 10*3/MM3 (ref 0–0.2)
BASOPHILS NFR BLD AUTO: 0.5 % (ref 0–1)
BUN BLD-MCNC: 7 MG/DL (ref 9–23)
BUN/CREAT SERPL: 7 (ref 7–25)
CALCIUM SPEC-SCNC: 9.8 MG/DL (ref 8.7–10.4)
CHLORIDE SERPL-SCNC: 96 MMOL/L (ref 99–109)
CO2 SERPL-SCNC: 34 MMOL/L (ref 20–31)
CREAT BLD-MCNC: 1 MG/DL (ref 0.6–1.3)
DEPRECATED RDW RBC AUTO: 55 FL (ref 37–54)
EOSINOPHIL # BLD AUTO: 0.12 10*3/MM3 (ref 0.1–0.3)
EOSINOPHIL NFR BLD AUTO: 2.7 % (ref 0–3)
ERYTHROCYTE [DISTWIDTH] IN BLOOD BY AUTOMATED COUNT: 14.9 % (ref 11.3–14.5)
GFR SERPL CREATININE-BSD FRML MDRD: 77 ML/MIN/1.73
GLUCOSE BLD-MCNC: 107 MG/DL (ref 70–100)
GLUCOSE BLDC GLUCOMTR-MCNC: 117 MG/DL (ref 70–130)
GLUCOSE BLDC GLUCOMTR-MCNC: 120 MG/DL (ref 70–130)
GLUCOSE BLDC GLUCOMTR-MCNC: 141 MG/DL (ref 70–130)
GLUCOSE BLDC GLUCOMTR-MCNC: 148 MG/DL (ref 70–130)
HCT VFR BLD AUTO: 27.7 % (ref 38.9–50.9)
HGB BLD-MCNC: 9.2 G/DL (ref 13.1–17.5)
IMM GRANULOCYTES # BLD: 0.02 10*3/MM3 (ref 0–0.03)
IMM GRANULOCYTES NFR BLD: 0.5 % (ref 0–0.6)
LYMPHOCYTES # BLD AUTO: 1.41 10*3/MM3 (ref 0.6–4.8)
LYMPHOCYTES NFR BLD AUTO: 31.8 % (ref 24–44)
MCH RBC QN AUTO: 33.5 PG (ref 27–31)
MCHC RBC AUTO-ENTMCNC: 33.2 G/DL (ref 32–36)
MCV RBC AUTO: 100.7 FL (ref 80–99)
MONOCYTES # BLD AUTO: 0.51 10*3/MM3 (ref 0–1)
MONOCYTES NFR BLD AUTO: 11.5 % (ref 0–12)
NEUTROPHILS # BLD AUTO: 2.36 10*3/MM3 (ref 1.5–8.3)
NEUTROPHILS NFR BLD AUTO: 53 % (ref 41–71)
PLATELET # BLD AUTO: 153 10*3/MM3 (ref 150–450)
PMV BLD AUTO: 9.2 FL (ref 6–12)
POTASSIUM BLD-SCNC: 4.1 MMOL/L (ref 3.5–5.5)
RBC # BLD AUTO: 2.75 10*6/MM3 (ref 4.2–5.76)
SODIUM BLD-SCNC: 131 MMOL/L (ref 132–146)
VANCOMYCIN TROUGH SERPL-MCNC: 15.6 MCG/ML (ref 10–20)
WBC NRBC COR # BLD: 4.44 10*3/MM3 (ref 3.5–10.8)

## 2017-03-14 PROCEDURE — 97165 OT EVAL LOW COMPLEX 30 MIN: CPT

## 2017-03-14 PROCEDURE — 99024 POSTOP FOLLOW-UP VISIT: CPT | Performed by: THORACIC SURGERY (CARDIOTHORACIC VASCULAR SURGERY)

## 2017-03-14 PROCEDURE — 80202 ASSAY OF VANCOMYCIN: CPT | Performed by: FAMILY MEDICINE

## 2017-03-14 PROCEDURE — 94640 AIRWAY INHALATION TREATMENT: CPT

## 2017-03-14 PROCEDURE — 94799 UNLISTED PULMONARY SVC/PX: CPT

## 2017-03-14 PROCEDURE — 25010000002 FUROSEMIDE PER 20 MG: Performed by: PHYSICIAN ASSISTANT

## 2017-03-14 PROCEDURE — 25010000002 VANCOMYCIN 1750 MG/500ML SOLUTION: Performed by: FAMILY MEDICINE

## 2017-03-14 PROCEDURE — 85025 COMPLETE CBC W/AUTO DIFF WBC: CPT | Performed by: PHYSICIAN ASSISTANT

## 2017-03-14 PROCEDURE — 99233 SBSQ HOSP IP/OBS HIGH 50: CPT | Performed by: FAMILY MEDICINE

## 2017-03-14 PROCEDURE — 97162 PT EVAL MOD COMPLEX 30 MIN: CPT

## 2017-03-14 PROCEDURE — 82962 GLUCOSE BLOOD TEST: CPT

## 2017-03-14 PROCEDURE — 25010000002 PIPERACILLIN SOD-TAZOBACTAM PER 1 G: Performed by: FAMILY MEDICINE

## 2017-03-14 PROCEDURE — 80048 BASIC METABOLIC PNL TOTAL CA: CPT | Performed by: FAMILY MEDICINE

## 2017-03-14 PROCEDURE — 25010000002 HEPARIN (PORCINE) PER 1000 UNITS: Performed by: FAMILY MEDICINE

## 2017-03-14 PROCEDURE — 25010000002 HYDROMORPHONE PER 4 MG: Performed by: FAMILY MEDICINE

## 2017-03-14 RX ORDER — MELATONIN
2000 DAILY
Status: DISCONTINUED | OUTPATIENT
Start: 2017-03-14 | End: 2017-03-22 | Stop reason: HOSPADM

## 2017-03-14 RX ORDER — FUROSEMIDE 40 MG/1
40 TABLET ORAL DAILY
Status: DISCONTINUED | OUTPATIENT
Start: 2017-03-14 | End: 2017-03-17

## 2017-03-14 RX ORDER — HEPARIN SODIUM 5000 [USP'U]/ML
5000 INJECTION, SOLUTION INTRAVENOUS; SUBCUTANEOUS EVERY 8 HOURS SCHEDULED
Status: DISCONTINUED | OUTPATIENT
Start: 2017-03-14 | End: 2017-03-22 | Stop reason: HOSPADM

## 2017-03-14 RX ORDER — VANCOMYCIN/0.9 % SOD CHLORIDE 1.5G/250ML
1500 PLASTIC BAG, INJECTION (ML) INTRAVENOUS EVERY 12 HOURS
Status: DISCONTINUED | OUTPATIENT
Start: 2017-03-15 | End: 2017-03-16

## 2017-03-14 RX ORDER — FUROSEMIDE 10 MG/ML
60 INJECTION INTRAMUSCULAR; INTRAVENOUS ONCE
Status: COMPLETED | OUTPATIENT
Start: 2017-03-14 | End: 2017-03-14

## 2017-03-14 RX ADMIN — Medication: at 13:32

## 2017-03-14 RX ADMIN — Medication 325 MG: at 17:17

## 2017-03-14 RX ADMIN — HEPARIN SODIUM 5000 UNITS: 5000 INJECTION, SOLUTION INTRAVENOUS; SUBCUTANEOUS at 20:53

## 2017-03-14 RX ADMIN — VANCOMYCIN HYDROCHLORIDE 1500 MG: 1 INJECTION, POWDER, LYOPHILIZED, FOR SOLUTION INTRAVENOUS at 23:18

## 2017-03-14 RX ADMIN — HYDROMORPHONE HYDROCHLORIDE 0.5 MG: 1 INJECTION, SOLUTION INTRAMUSCULAR; INTRAVENOUS; SUBCUTANEOUS at 17:22

## 2017-03-14 RX ADMIN — TAZOBACTAM SODIUM AND PIPERACILLIN SODIUM 3.38 G: 375; 3 INJECTION, SOLUTION INTRAVENOUS at 17:16

## 2017-03-14 RX ADMIN — IPRATROPIUM BROMIDE AND ALBUTEROL SULFATE 3 ML: .5; 3 SOLUTION RESPIRATORY (INHALATION) at 20:09

## 2017-03-14 RX ADMIN — TAZOBACTAM SODIUM AND PIPERACILLIN SODIUM 3.38 G: 375; 3 INJECTION, SOLUTION INTRAVENOUS at 11:32

## 2017-03-14 RX ADMIN — TAMSULOSIN HYDROCHLORIDE 0.4 MG: 0.4 CAPSULE ORAL at 08:30

## 2017-03-14 RX ADMIN — DESMOPRESSIN ACETATE 40 MG: 0.2 TABLET ORAL at 08:30

## 2017-03-14 RX ADMIN — TAZOBACTAM SODIUM AND PIPERACILLIN SODIUM 3.38 G: 375; 3 INJECTION, SOLUTION INTRAVENOUS at 23:18

## 2017-03-14 RX ADMIN — TAZOBACTAM SODIUM AND PIPERACILLIN SODIUM 3.38 G: 375; 3 INJECTION, SOLUTION INTRAVENOUS at 05:23

## 2017-03-14 RX ADMIN — BUDESONIDE AND FORMOTEROL FUMARATE DIHYDRATE 2 PUFF: 80; 4.5 AEROSOL RESPIRATORY (INHALATION) at 20:10

## 2017-03-14 RX ADMIN — BUDESONIDE AND FORMOTEROL FUMARATE DIHYDRATE 2 PUFF: 80; 4.5 AEROSOL RESPIRATORY (INHALATION) at 09:03

## 2017-03-14 RX ADMIN — TAZOBACTAM SODIUM AND PIPERACILLIN SODIUM 3.38 G: 375; 3 INJECTION, SOLUTION INTRAVENOUS at 01:01

## 2017-03-14 RX ADMIN — HYDROMORPHONE HYDROCHLORIDE 0.5 MG: 1 INJECTION, SOLUTION INTRAMUSCULAR; INTRAVENOUS; SUBCUTANEOUS at 05:23

## 2017-03-14 RX ADMIN — HYDROMORPHONE HYDROCHLORIDE 0.5 MG: 1 INJECTION, SOLUTION INTRAMUSCULAR; INTRAVENOUS; SUBCUTANEOUS at 08:33

## 2017-03-14 RX ADMIN — Medication 1750 MG: at 12:26

## 2017-03-14 RX ADMIN — ASPIRIN 81 MG: 81 TABLET, CHEWABLE ORAL at 08:30

## 2017-03-14 RX ADMIN — IPRATROPIUM BROMIDE AND ALBUTEROL SULFATE 3 ML: .5; 3 SOLUTION RESPIRATORY (INHALATION) at 15:59

## 2017-03-14 RX ADMIN — FUROSEMIDE 60 MG: 10 INJECTION, SOLUTION INTRAMUSCULAR; INTRAVENOUS at 08:30

## 2017-03-14 RX ADMIN — VITAMIN D, TAB 1000IU (100/BT) 2000 UNITS: 25 TAB at 12:25

## 2017-03-14 RX ADMIN — IPRATROPIUM BROMIDE AND ALBUTEROL SULFATE 3 ML: .5; 3 SOLUTION RESPIRATORY (INHALATION) at 12:15

## 2017-03-14 RX ADMIN — FUROSEMIDE 40 MG: 40 TABLET ORAL at 17:22

## 2017-03-14 RX ADMIN — IPRATROPIUM BROMIDE AND ALBUTEROL SULFATE 3 ML: .5; 3 SOLUTION RESPIRATORY (INHALATION) at 09:03

## 2017-03-14 RX ADMIN — HYDROMORPHONE HYDROCHLORIDE 0.5 MG: 1 INJECTION, SOLUTION INTRAMUSCULAR; INTRAVENOUS; SUBCUTANEOUS at 20:54

## 2017-03-14 RX ADMIN — Medication 325 MG: at 08:30

## 2017-03-14 RX ADMIN — Medication 1 ML: at 20:52

## 2017-03-14 RX ADMIN — CLOPIDOGREL BISULFATE 75 MG: 75 TABLET, FILM COATED ORAL at 08:30

## 2017-03-14 RX ADMIN — PANTOPRAZOLE SODIUM 40 MG: 40 TABLET, DELAYED RELEASE ORAL at 08:30

## 2017-03-14 NOTE — PROGRESS NOTES
Acute Care - Physical Therapy Initial Evaluation  [facility]     Patient Name: Sai Weinberg  : 1960  MRN: [id number]  Today's Date: 3/14/2017   Onset of Illness/Injury or Date of Surgery Date: 17  Date of Referral to PT: 17  Referring Physician: MD Ayan      Admit Date: 3/12/2017     Visit Dx:    ICD-10-CM ICD-9-CM   1. Open wound of left hip and thigh with complication, initial encounter S71.002A 890.1    S71.102A    2. Impaired mobility and ADLs Z74.09 799.89   3. Impaired functional mobility, balance, gait, and endurance Z74.09 V49.89     Patient Active Problem List   Diagnosis   • Dyslipidemia   • Arthritis   • COPD (chronic obstructive pulmonary disease)   • Diabetes mellitus   • Esophageal reflux   • Hypertension   • Malignant neoplasm of colon   • Heart attack   • Peripheral vascular disease   • Chewing tobacco use   • Peripheral arterial disease   • LANA (acute kidney injury)   • Coronary artery disease involving native heart   • Iron deficiency anemia   • Coal workers pneumoconiosis   • Open wound of left hip and thigh with complication   • Leg wound, left     Past Medical History   Diagnosis Date   • Arthritis    • Black lung disease    • BPH (benign prostatic hyperplasia)    • Cataract    • Cataract    • COLD (chronic obstructive lung disease)    • Colon polyps    • Diabetes mellitus      SINCE    • Dyslipidemia    • Esophageal reflux    • Flu      HX   • Heart attack         • Herniated lumbar intervertebral disc    • Hypertension    • Malignant neoplasm of colon    • Peripheral vascular disease    • Sleep apnea with use of continuous positive airway pressure (CPAP)    • Wears dentures    • Wears glasses      Past Surgical History   Procedure Laterality Date   • Colon surgery     • Bypass graft       non-vein - femoral-popliteal right   • Other surgical history       PTA ILIAC STENOSIS WITH STENT   • Cardiac catheterization       NO INTERVENTION   • Colonoscopy     •  "Aortagram N/A 1/17/2017     Procedure: AORTAGRAM WITH RUNOFFS and  STENT left SFA;  Surgeon: Heladio Rosa MD;  Location:  [facility];  Service:    • Angioplasty femoral artery N/A 1/17/2017     Procedure: left femoral cutdown with aortagram and left SFA stent and angioplasty;  Surgeon: Heladio Rosa MD;  Location:  [facility];  Service:    • Femoral femoral bypass N/A 3/13/2017     Procedure: INCISION AND DRAINAGE LEFT GROIN HEMATOMA;  Surgeon: Heladio Rosa MD;  Location:  [facility];  Service:           PT ASSESSMENT (last 72 hours)      PT Evaluation       03/14/17 1358 03/14/17 0804    Rehab Evaluation    Document Type evaluation  -LS evaluation  -CL    Subjective Information agree to therapy;complains of;pain  -LS agree to therapy;complains of;pain  -CL    Patient Effort, Rehab Treatment good  -LS good  -CL    Symptoms Noted During/After Treatment  other (see comments)   Itching, sweating, RN aware  -CL    General Information    Patient Profile Review yes  -LS yes  -CL    Onset of Illness/Injury or Date of Surgery Date 03/12/17  -LS 03/12/17  -CL    Referring Physician MD Ayan  -LS MD Ayan  -CL    Pertinent History Of Current Problem To OSH with LLE cellulitis due to recent L superficial artery cutdown/angioplasty/stent 1/18/17; recent increase in wound site. Transferred to [facility] for HLOC. S/p L hip wound I and D on 3/13.   -LS Pt admitted to OSH on 03/11/17 2/2 to LLE cellulitis d/t recent L superficial artery cutdown/angioplasty/stent on 01/18/17. Pt presented to PCP on 03/06/17 2/2 to wound \"breaking open\" and presented to OSH ED. Pt t/f to [facility] for HLOC. PMH: COLD, Black Lung Disease  -CL    Precautions/Limitations fall precautions;other (see comments)   no prolonged sitting at 90 deg L hip flex  -LS fall precautions;oxygen therapy device and L/min;other (see comments)   No prolonged L hip flexion at 90 degrees  -CL    Prior Level of Function independent:;all household " mobility;mod assist:;dressing;bathing;dependent:;driving  -LS independent:;all household mobility;transfer;bed mobility;mod assist:;dressing;bathing;dependent:;driving   Assist for LBD and LBB  -CL    Equipment Currently Used at Home cane, straight  -LS cane, straight  -CL    Plans/Goals Discussed With patient and family;agreed upon  -LS patient and family;agreed upon  -CL    Risks Reviewed patient and family:;LOB;dizziness;increased discomfort  -LS patient and family:;LOB;nausea/vomiting;dizziness;increased discomfort;lines disloged  -CL    Benefits Reviewed patient and family:;improve function;increase independence;increase strength;increase balance  -LS patient and family:;improve function;increase independence;increase strength;increase balance;decrease pain;increase knowledge  -CL    Barriers to Rehab previous functional deficit  -LS previous functional deficit  -CL    Living Environment    Lives With alone  -LS alone  -CL    Living Arrangements mobile home  -LS mobile home  -CL    Home Accessibility stairs within home;tub/shower is not walk in  -LS stairs to enter home;tub/shower is not walk in  -CL    Number of Stairs to Enter Home 7  -LS 7  -CL    Stair Railings at Home  none  -CL    Living Environment Comment  Per CM, pt's wife lives in house next door. Pt reports wife is available for assist 24/7.   -CL    Clinical Impression    Date of Referral to PT 03/12/17  -LS     PT Diagnosis impaired functional mobility  -LS     Patient/Family Goals Statement return to PLOF; go home  -LS     Criteria for Skilled Therapeutic Interventions Met yes;treatment indicated  -LS     Rehab Potential good, to achieve stated therapy goals  -LS     Vital Signs    Pre Systolic BP Rehab 131  -  -CL    Pre Treatment Diastolic BP 57  -LS 63  -CL    Post Systolic BP Rehab  132  -CL    Post Treatment Diastolic BP  59  -CL    Pretreatment Heart Rate (beats/min) 93  -LS 82  -CL    Posttreatment Heart Rate (beats/min) 110  -LS 77   -CL    Pre SpO2 (%)  96  -CL    O2 Delivery Pre Treatment room air  -LS supplemental O2   2L  -CL    Post SpO2 (%) 95  -LS 94  -CL    O2 Delivery Post Treatment room air  -LS room air  -CL    Pre Patient Position Supine  -LS Supine  -CL    Intra Patient Position Standing  -LS Standing  -CL    Post Patient Position Sitting  -LS Sitting   reclined  -CL    Pain Assessment    Pain Assessment 0-10  -LS 0-10  -CL    Pain Score 5  -LS 7  -CL    Post Pain Score 8  -LS 7  -CL    Pain Type Chronic pain  -LS Acute pain  -CL    Pain Location Back  -LS Leg  -CL    Pain Orientation  Left  -CL    Pain Intervention(s) Repositioned;Ambulation/increased activity  -LS Medication (See MAR);Repositioned;Ambulation/increased activity   RN aware  -CL    Response to Interventions tolerated  -LS Tolerated.   -CL    Cognitive Assessment/Intervention    Current Cognitive/Communication Assessment functional  -LS functional  -CL    Orientation Status oriented x 4  -LS oriented x 4  -CL    Follows Commands/Answers Questions able to follow single-step instructions;100% of the time;needs cueing  -% of the time  -CL    Personal Safety mild impairment;decreased awareness, need for assist;decreased awareness, need for safety  -LS mild impairment;decreased awareness, need for assist;decreased awareness, need for safety  -CL    Personal Safety Interventions fall prevention program maintained;gait belt;nonskid shoes/slippers when out of bed  -LS fall prevention program maintained;gait belt;nonskid shoes/slippers when out of bed  -CL    ROM (Range of Motion)    General ROM no range of motion deficits identified   BLEs  -LS no range of motion deficits identified  -CL    General ROM Detail  BUE grossly WFL.   -CL    MMT (Manual Muscle Testing)    General MMT Assessment lower extremity strength deficits identified  -LS no strength deficits identified  -CL    General MMT Assessment Detail  BUE grossly WFL.   -CL    Lower Extremity    Lower Ext Manual  Muscle Testing Detail BLEs grossly 4/5  -LS     Bed Mobility, Assessment/Treatment    Bed Mobility, Assistive Device head of bed elevated;bed rails  -LS head of bed elevated;bed rails  -CL    Bed Mob, Supine to Sit, Harris supervision required;verbal cues required  -LS supervision required;verbal cues required  -CL    Bed Mobility, Comment  VCs for safety d/t lines.   -CL    Transfer Assessment/Treatment    Transfers, Bed-Chair Harris  minimum assist (75% patient effort);2 person assist required;verbal cues required  -CL    Transfers, Sit-Stand Harris contact guard assist;verbal cues required  -LS minimum assist (75% patient effort);2 person assist required;verbal cues required  -CL    Transfers, Stand-Sit Harris contact guard assist;verbal cues required  -LS contact guard assist;2 person assist required;verbal cues required  -CL    Transfer, Impairments strength decreased;impaired balance  -LS     Transfer, Comment VC's for hand placement.   -LS Pt declined to utilize RW for t/f, utilized HHA. Pt educated on benefits of utilizing RW for next t/f or ambulation for safety. Pt ambulated ~5-6 steps to chair w/ VCs for safety and sequencing of steps.   -CL    Gait Assessment/Treatment    Gait, Harris Level contact guard assist;verbal cues required  -LS     Gait, Assistive Device --   pt declined RWx use  -LS     Gait, Distance (Feet) 120  -LS     Gait, Gait Deviations forward flexed posture;step length decreased;remedios decreased  -LS     Gait, Comment Distance limited by c/o SOA (pt reports this is baseline; he only ambulated short distances PTA). VC's for PLB; 1 brief standing rest break.   -LS     Motor Skills/Interventions    Additional Documentation Balance Skills Training (Group)  -LS Balance Skills Training (Group)  -CL    Balance Skills Training    Sitting-Level of Assistance Close supervision  -LS Close supervision  -CL    Sitting-Balance Support Right upper extremity  supported;Left upper extremity supported  -LS Right upper extremity supported;Left upper extremity supported;Feet supported  -CL    Sitting-Balance Activities  Reaching for objects;Trunk control activities  -CL    Standing-Level of Assistance Contact guard  -LS Minimum assistance  -CL    Static Standing Balance Support  Right upper extremity supported;Left upper extremity supported  -CL    Standing-Balance Activities  Weight Shift R-L;Weight Shift A-P  -CL    Gait Balance-Level of Assistance  Minimum assistance;x2  -CL    Gait Balance Support  Left upper extremity supported  -CL    Gait Balance Activities  stepping over object  -CL    Therapy Exercises    Bilateral Lower Extremities AROM:;10 reps;sitting;ankle pumps/circles  -LS     Sensory Assessment/Intervention    Light Touch RLE;LLE  -LS LUE;RUE  -CL    LUE Light Touch  WNL  -CL    RUE Light Touch  WNL  -CL    LLE Light Touch WNL  -LS     RLE Light Touch WNL   slight tingling at inner thight; baseline  -LS     Positioning and Restraints    Pre-Treatment Position in bed  -LS in bed  -CL    Post Treatment Position chair  -LS chair  -CL    In Chair notified nsg;sitting;encouraged to call for assist;with family/caregiver  -LS notified nsg;reclined;call light within reach;encouraged to call for assist;with family/caregiver;with nsg;legs elevated  -CL      03/13/17 1328 03/12/17 1945    General Information    Equipment Currently Used at Home cane, straight   Uses a straight cane at home.   -     Living Environment    Lives With alone  - alone  -LSA    Living Arrangements mobile home   He lives alone in a mobile home in [place].  His wife lives next door in a house.   She can assist him at discharge.   - mobile home  -LSA    Home Accessibility  no concerns  -LSA    Stair Railings at Home  none  -LSA    Type of Financial/Environmental Concern  none  -LSA    Transportation Available car;family or friend will provide  - car;family or friend will provide  -LSA       03/12/17 1940       General Information    Equipment Currently Used at Home cane, straight  -LSA       User Key  (r) = Recorded By, (t) = Taken By, (c) = Cosigned By    Initials Name Provider Type    LS Leeanna Conroy, PT Physical Therapist    SNEHAL Velasco, RN Registered Nurse     Dilcia Kim RN Case Manager    CL Shweta Mobley, OT Occupational Therapist          Physical Therapy Education     Title: PT OT SLP Therapies (Active)     Topic: Physical Therapy (Active)     Point: Mobility training (Active)    Learning Progress Summary    Learner Readiness Method Response Comment Documented by Status   Patient Acceptance E,D NR Edu re: benefit of further ambulation throughout day with NSG and of sets of LE HEP.  03/14/17 1529 Active               Point: Home exercise program (Active)    Learning Progress Summary    Learner Readiness Method Response Comment Documented by Status   Patient Acceptance E,D NR Edu re: benefit of further ambulation throughout day with NSG and of sets of LE HEP.  03/14/17 1529 Active               Point: Body mechanics (Active)    Learning Progress Summary    Learner Readiness Method Response Comment Documented by Status   Patient Acceptance E,D NR Edu re: benefit of further ambulation throughout day with NSG and of sets of LE HEP.  03/14/17 1529 Active               Point: Precautions (Active)    Learning Progress Summary    Learner Readiness Method Response Comment Documented by Status   Patient Acceptance E,D NR Edu re: benefit of further ambulation throughout day with NSG and of sets of LE HEP.  03/14/17 1529 Active                      User Key     Initials Effective Dates Name Provider Type Discipline     06/19/15 -  Leeanna Conroy, PT Physical Therapist PT                PT Recommendation and Plan  Anticipated Discharge Disposition: home with assist  PT Frequency: daily  Plan of Care Review  Plan Of Care Reviewed With: patient, spouse  Outcome Summary/Follow up  Plan: PT evaluation complete. Pt demonstrates decreased indep re: functional mobility, warranting short episode of PT to promote PLOF and safe d/c. Recommend attempt at gait with STC next session. Ambulation limited today by c/o SOA.           IP PT Goals       03/14/17 1530          Bed Mobility PT LTG    Bed Mobility PT LTG, Date Established 03/14/17  -LS      Bed Mobility PT LTG, Time to Achieve 2 wks  -LS      Bed Mobility PT LTG, Activity Type supine to sit/sit to supine  -LS      Bed Mobility PT LTG, Josephine Level independent  -LS      Transfer Training PT LTG    Transfer Training PT LTG, Date Established 03/14/17  -LS      Transfer Training PT LTG, Time to Achieve 2 wks  -LS      Transfer Training PT LTG, Activity Type sit to stand/stand to sit  -LS      Transfer Training PT LTG, Josephine Level conditional independence  -LS      Transfer Training PT LTG, Assist Device cane, straight  -LS      Gait Training PT LTG    Gait Training Goal PT LTG, Date Established 03/14/17  -LS      Gait Training Goal PT LTG, Time to Achieve 2 wks  -LS      Gait Training Goal PT LTG, Josephine Level conditional independence  -LS      Gait Training Goal PT LTG, Assist Device cane, straight  -LS      Gait Training Goal PT LTG, Distance to Achieve 200  -LS      Stair Training PT LTG    Stair Training Goal PT LTG, Date Established 03/14/17  -LS      Stair Training Goal PT LTG, Time to Achieve 2 wks  -LS      Stair Training Goal PT LTG, Number of Steps 7  -LS      Stair Training Goal PT LTG, Josephine Level contact guard assist  -LS      Stair Training Goal PT LTG, Assist Device 1 handrail  -LS        User Key  (r) = Recorded By, (t) = Taken By, (c) = Cosigned By    Initials Name Provider Type    HIRAL Conroy, PT Physical Therapist                Outcome Measures       03/14/17 1358 03/14/17 0804       How much help from another person do you currently need...    Turning from your back to your side while in flat bed  without using bedrails? 4  -LS      Moving from lying on back to sitting on the side of a flat bed without bedrails? 4  -LS      Moving to and from a bed to a chair (including a wheelchair)? 3  -LS      Standing up from a chair using your arms (e.g., wheelchair, bedside chair)? 3  -LS      Climbing 3-5 steps with a railing? 2  -LS      To walk in hospital room? 3  -LS      AM-PAC 6 Clicks Score 19  -LS      How much help from another is currently needed...    Putting on and taking off regular lower body clothing?  2  -CL     Bathing (including washing, rinsing, and drying)  2  -CL     Toileting (which includes using toilet bed pan or urinal)  3  -CL     Putting on and taking off regular upper body clothing  3  -CL     Taking care of personal grooming (such as brushing teeth)  4  -CL     Eating meals  4  -CL     Score  18  -CL     Functional Assessment    Outcome Measure Options AM-PAC 6 Clicks Basic Mobility (PT)  -LS AM-PAC 6 Clicks Daily Activity (OT)  -CL       User Key  (r) = Recorded By, (t) = Taken By, (c) = Cosigned By    Initials Name Provider Type    HIRAL Conroy, PT Physical Therapist    CL Shweta Mobley, OT Occupational Therapist           Time Calculation:         PT Charges       03/14/17 1534          Time Calculation    Start Time 1358  -LS      PT Received On 03/14/17  -      PT Goal Re-Cert Due Date 03/24/17  -        User Key  (r) = Recorded By, (t) = Taken By, (c) = Cosigned By    Initials Name Provider Type    HIRAL Conroy, PT Physical Therapist          Therapy Charges for Today     Code Description Service Date Service Provider Modifiers Qty    [id number]  PT EVAL MOD COMPLEXITY 4 3/14/2017 Leeanna Conroy, PT GP 1    70747130880 HC PT THER SUPP EA 15 MIN 3/14/2017 Leeanna Conroy, PT GP 2          PT G-Codes  Outcome Measure Options: AM-PAC 6 Clicks Basic Mobility (PT)      Leeanna Conroy, PT  3/14/2017

## 2017-03-14 NOTE — PROGRESS NOTES
[facility] Medicine Services  INPATIENT PROGRESS NOTE    Date of Admission: 3/12/2017  Length of Stay: 2  Primary Care Physician: Dewayne Cole MD    Subjective   CC:femoral mass  HPI: Sitting up in chair, family at bedside. Denies complaints.       Review Of Systems:   Review of Systems   Constitutional: Positive for fever. Negative for fatigue.   Genitourinary: Negative.    Musculoskeletal: Positive for myalgias.   Skin: Positive for color change and rash.   Psychiatric/Behavioral: Negative.          Objective      Temp:  [98 °F (36.7 °C)-98.5 °F (36.9 °C)] 98.3 °F (36.8 °C)  Heart Rate:  [75-92] 78  Resp:  [12-18] 12  BP: (124-133)/(57-69) 131/57  Physical Exam  Constitutional: awake, alert, comfortable, well developed   Eyes: PERRLA, sclerae anicteric, no conjunctival injection  Neck: supple, no thyromegaly, trachea midline  Respiratory: Clear to auscultation bilaterally, nonlabored respirations   Cardiovascular: RRR, pos YEYO, rubs, or gallops, palpable pedal pulses bilaterally  Gastrointestinal: Positive bowel sounds, soft, nontender, nondistended, obese  Musculoskeletal: 1+ bilateral ankle edema, no clubbing or cyanosis to bilateral lower extremities. Left pedal pulse non palpable, but doppler +. Foot warm.  Psychiatric: oriented x 3, appropriate affect, cooperative  Neurologic: Strength symmetric in all extremities, Cranial Nerves grossly intact to confrontation     Results Review:    I have reviewed the labs, radiology results and diagnostic studies.      Results from last 7 days  Lab Units 03/14/17  0454   WBC 10*3/mm3 4.44   HEMOGLOBIN g/dL 9.2*   PLATELETS 10*3/mm3 153       Results from last 7 days  Lab Units 03/14/17  1111   SODIUM mmol/L 131*   POTASSIUM mmol/L 4.1   CHLORIDE mmol/L 96*   TOTAL CO2 mmol/L 34.0*   BUN mg/dL 7*   CREATININE mg/dL 1.00   GLUCOSE mg/dL 107*   CALCIUM mg/dL 9.8       Culture Data: Cultures:    BLOOD CULTURE   Date Value Ref Range Status    03/12/2017 No growth at less than 24 hours  Preliminary   03/12/2017 No growth at less than 24 hours  Preliminary       Radiology Data:     I have reviewed the medications.    Assessment/Plan     Problem List  Principal Problem:    Open wound of left hip and thigh with complication  Active Problems:    COPD (chronic obstructive pulmonary disease)    Diabetes mellitus    Hypertension    Peripheral vascular disease    LANA (acute kidney injury)    Coronary artery disease involving native heart    Iron deficiency anemia    Coal workers pneumoconiosis    Leg wound, left           Assessment/Plan:    Plan:  Left leg wound:  --s/p femoral cutdown and stenting/angioplasty of left superficial artery  --Continue Vanco and Zosyn , Id following   --Follow outside cultures and repeaedt here (blood, wound).   --Dr. Rosa did ID yesterday of lesion, await culture results       LANA:  --Up from baseline 1.0 on admssion, improved now   --Pharmacy to dose Vanco  --Holding ACE-I, lasix and HCTZ initally will restart lasix   --Could be from recent bactrim use  --follow Cr.      T2DM:  --Hold orals, A1C, SSI, Accuchecks     COPD, Coal workers' pneumoconiosis  --Currently Stable     HTN:  --Hold ACE-I, HCTZ due to LANA  --Monitor, add PRN's if needed     Iron deficiency anemia, chronic: (stable)  --11/33 at outside hospital , down to 9 now  --will follow     [facility]  --followed by Cards per pt     Peripheral Vascular disease:     CAD:  --Currently stable     Code Status: Full    DVT prophylaxis:sq heparin, teds and scds   Discharge Planning: I expect patient to be discharged in a few days    Vangie Dangelo,    03/14/17   4:32 PM    Please note that portions of this note may have been completed with a voice recognition program. Efforts were made to edit the dictations, but occasionally words are mistranscribed.

## 2017-03-14 NOTE — PROGRESS NOTES
Acute Care - Occupational Therapy Initial Evaluation  [facility]     Patient Name: Sai Weinberg  : 1960  MRN: [id number]  Today's Date: 3/14/2017  Onset of Illness/Injury or Date of Surgery Date: 17  Date of Referral to OT: 17  Referring Physician: MD Ayan    Admit Date: 3/12/2017       ICD-10-CM ICD-9-CM   1. Open wound of left hip and thigh with complication, initial encounter S71.002A 890.1    S71.102A    2. Impaired mobility and ADLs Z74.09 799.89     Patient Active Problem List   Diagnosis   • Dyslipidemia   • Arthritis   • COPD (chronic obstructive pulmonary disease)   • Diabetes mellitus   • Esophageal reflux   • Hypertension   • Malignant neoplasm of colon   • Heart attack   • Peripheral vascular disease   • Chewing tobacco use   • Peripheral arterial disease   • LANA (acute kidney injury)   • Coronary artery disease involving native heart   • Iron deficiency anemia   • Coal workers pneumoconiosis   • Open wound of left hip and thigh with complication   • Leg wound, left     Past Medical History   Diagnosis Date   • Arthritis    • Black lung disease    • BPH (benign prostatic hyperplasia)    • Cataract    • Cataract    • COLD (chronic obstructive lung disease)    • Colon polyps    • Diabetes mellitus      SINCE    • Dyslipidemia    • Esophageal reflux    • Flu      HX   • Heart attack         • Herniated lumbar intervertebral disc    • Hypertension    • Malignant neoplasm of colon    • Peripheral vascular disease    • Sleep apnea with use of continuous positive airway pressure (CPAP)    • Wears dentures    • Wears glasses      Past Surgical History   Procedure Laterality Date   • Colon surgery     • Bypass graft       non-vein - femoral-popliteal right   • Other surgical history       PTA ILIAC STENOSIS WITH STENT   • Cardiac catheterization       NO INTERVENTION   • Colonoscopy     • Aortagram N/A 2017     Procedure: AORTAGRAM WITH RUNOFFS and  STENT left SFA;  Surgeon:  "Heladio Rosa MD;  Location: [facility] HYBRID OR 15;  Service:    • Angioplasty femoral artery N/A 1/17/2017     Procedure: left femoral cutdown with aortagram and left SFA stent and angioplasty;  Surgeon: Heladio Rosa MD;  Location: [facility] HYBRID OR 15;  Service:           OT ASSESSMENT FLOWSHEET (last 72 hours)      OT Evaluation       03/14/17 0804 03/13/17 1328 03/13/17 1327 03/12/17 1945 03/12/17 1940    Rehab Evaluation    Document Type evaluation  -CL        Subjective Information agree to therapy;complains of;pain  -CL        Patient Effort, Rehab Treatment good  -CL        Symptoms Noted During/After Treatment other (see comments)   Itching, sweating, RN aware  -CL        General Information    Patient Profile Review yes  -CL        Onset of Illness/Injury or Date of Surgery Date 03/12/17  -CL        Referring Physician MD Ayan  -CL        Pertinent History Of Current Problem Pt admitted to OSH on 03/11/17 2/2 to LLE cellulitis d/t recent L superficial artery cutdown/angioplasty/stent on 01/18/17. Pt presented to PCP on 03/06/17 2/2 to wound \"breaking open\" and presented to OSH ED. Pt t/f to [facility] for HLOC. PMH: COLD, Black Lung Disease  -CL        Precautions/Limitations fall precautions;oxygen therapy device and L/min;other (see comments)   No prolonged L hip flexion at 90 degrees  -CL        Prior Level of Function independent:;all household mobility;transfer;bed mobility;mod assist:;dressing;bathing;dependent:;driving   Assist for LBD and LBB  -CL        Equipment Currently Used at Home cane, straight  -CL cane, straight   Uses a straight cane at home.   -HH   cane, straight  -LS    Plans/Goals Discussed With patient and family;agreed upon  -CL        Risks Reviewed patient and family:;LOB;nausea/vomiting;dizziness;increased discomfort;lines disloged  -CL        Benefits Reviewed patient and family:;improve function;increase independence;increase strength;increase balance;decrease pain;increase " knowledge  -CL        Barriers to Rehab previous functional deficit  -CL        Living Environment    Lives With alone  -CL alone  -HH  alone  -LS     Living Arrangements mobile home  -CL mobile home   He lives alone in a mobile home in [facility].  His wife lives next door in a house.   She can assist him at discharge.   -HH  mobile home  -LS     Home Accessibility stairs to enter home;tub/shower is not walk in  -CL   no concerns  -LS     Number of Stairs to Enter Home 7  -CL        Stair Railings at Home none  -CL   none  -LS     Type of Financial/Environmental Concern    none  -LS     Transportation Available  car;family or friend will provide  -HH  car;family or friend will provide  -LS     Living Environment Comment Per CM, pt's wife lives in house next door. Pt reports wife is available for assist 24/7.   -CL        Clinical Impression    Date of Referral to OT 03/12/17  -CL        OT Diagnosis Decreased independence in ADLs.   -CL        Patient/Family Goals Statement Return home w/ assist.   -CL        Criteria for Skilled Therapeutic Interventions Met yes;treatment indicated  -CL        Rehab Potential good, to achieve stated therapy goals  -CL        Therapy Frequency daily  -CL        Anticipated Equipment Needs At Discharge --   TBD  -CL        Anticipated Discharge Disposition home with assist;home with home health  -CL        Functional Level Prior    Ambulation   1-->assistive equipment  -HH      Transferring   0-->independent  -HH      Toileting   0-->independent  -HH      Bathing   0-->independent  -HH      Dressing   0-->independent  -HH      Eating   0-->independent  -HH      Communication   0-->understands/communicates without difficulty  -HH      Swallowing   0-->swallows foods/liquids without difficulty  -HH      Vital Signs    Pre Systolic BP Rehab 124  -CL        Pre Treatment Diastolic BP 63  -CL        Post Systolic BP Rehab 132  -CL        Post Treatment Diastolic BP 59  -CL         Pretreatment Heart Rate (beats/min) 82  -CL        Posttreatment Heart Rate (beats/min) 77  -CL        Pre SpO2 (%) 96  -CL        O2 Delivery Pre Treatment supplemental O2   2L  -CL        Post SpO2 (%) 94  -CL        O2 Delivery Post Treatment room air  -CL        Pre Patient Position Supine  -CL        Intra Patient Position Standing  -CL        Post Patient Position Sitting   reclined  -CL        Pain Assessment    Pain Assessment 0-10  -CL        Pain Score 7  -CL        Post Pain Score 7  -CL        Pain Type Acute pain  -CL        Pain Location Leg  -CL        Pain Orientation Left  -CL        Pain Intervention(s) Medication (See MAR);Repositioned;Ambulation/increased activity   RN aware  -CL        Response to Interventions Tolerated.   -CL        Cognitive Assessment/Intervention    Current Cognitive/Communication Assessment functional  -CL        Orientation Status oriented x 4  -CL        Follows Commands/Answers Questions 100% of the time  -CL        Personal Safety mild impairment;decreased awareness, need for assist;decreased awareness, need for safety  -CL        Personal Safety Interventions fall prevention program maintained;gait belt;nonskid shoes/slippers when out of bed  -CL        ROM (Range of Motion)    General ROM no range of motion deficits identified  -CL        General ROM Detail BUE grossly WFL.   -CL        MMT (Manual Muscle Testing)    General MMT Assessment no strength deficits identified  -CL        General MMT Assessment Detail BUE grossly WFL.   -CL        Bed Mobility, Assessment/Treatment    Bed Mobility, Assistive Device head of bed elevated;bed rails  -CL        Bed Mob, Supine to Sit, [place] supervision required;verbal cues required  -CL        Bed Mobility, Comment VCs for safety d/t lines.   -CL        Transfer Assessment/Treatment    Transfers, Bed-Chair [place] minimum assist (75% patient effort);2 person assist required;verbal cues required  -CL         Transfers, Sit-Stand [place] minimum assist (75% patient effort);2 person assist required;verbal cues required  -CL        Transfers, Stand-Sit [place] contact guard assist;2 person assist required;verbal cues required  -CL        Transfer, Comment Pt declined to utilize RW for t/f, utilized HHA. Pt educated on benefits of utilizing RW for next t/f or ambulation for safety. Pt ambulated ~5-6 steps to chair w/ VCs for safety and sequencing of steps.   -CL        Upper Body Dressing Assessment/Training    UB Dressing Assess/Train, Clothing Type doffing:;donning:;hospital gown  -CL        UB Dressing Assess/Train, Position supported sitting  -CL        UB Dressing Assess/Train, [place] moderate assist (50% patient effort);verbal cues required  -CL        UB Dressing Assess/Train, Comment Increased assist d/t lines.   -CL        Motor Skills/Interventions    Additional Documentation Balance Skills Training (Group)  -CL        Balance Skills Training    Sitting-Level of Assistance Close supervision  -CL        Sitting-Balance Support Right upper extremity supported;Left upper extremity supported;Feet supported  -CL        Sitting-Balance Activities Reaching for objects;Trunk control activities  -CL        Standing-Level of Assistance Minimum assistance  -CL        Static Standing Balance Support Right upper extremity supported;Left upper extremity supported  -CL        Standing-Balance Activities Weight Shift R-L;Weight Shift A-P  -CL        Gait Balance-Level of Assistance Minimum assistance;x2  -CL        Gait Balance Support Left upper extremity supported  -CL        Gait Balance Activities stepping over object  -CL        Sensory Assessment/Intervention    Light Touch LUE;RUE  -CL        LUE Light Touch WNL  -CL        RUE Light Touch WNL  -CL        General Therapy Interventions    Planned Therapy Interventions adaptive equipment training;ADL retraining;bed mobility training;energy  conservation;transfer training  -CL        Positioning and Restraints    Pre-Treatment Position in bed  -CL        Post Treatment Position chair  -CL        In Chair notified nsg;reclined;call light within reach;encouraged to call for assist;with family/caregiver;with nsg;legs elevated  -          03/12/17 1938                Functional Level Prior    Ambulation 1-->assistive equipment  -LS        Transferring 0-->independent  -LS        Toileting 0-->independent  -LS        Bathing 0-->independent  -LS        Dressing 0-->independent  -LS        Eating 0-->independent  -LS        Communication 0-->understands/communicates without difficulty  -LS        Swallowing 0-->swallows foods/liquids without difficulty  -LS          User Key  (r) = Recorded By, (t) = Taken By, (c) = Cosigned By    Initials Name Effective Dates    LS Tracy Velasco RN 06/16/16 -      Dilcia Kim RN 03/14/16 -     CL Shweta Mobley OT 06/08/16 -            Occupational Therapy Education     Title: PT OT SLP Therapies (Active)     Topic: Occupational Therapy (Active)     Point: ADL training (Active)    Description: Instruct learner(s) on proper safety adaptation and remediation techniques during self care or transfers.   Instruct in proper use of assistive devices.    Learning Progress Summary    Learner Readiness Method Response Comment Documented by Status   Patient Acceptance E,D NR Pt educated on appropriate safety precautions, benefits of a RW, appropriate t/f techniques, and benefits of therapy.  03/14/17 0944 Active   Family Acceptance E,D NR Pt educated on appropriate safety precautions, benefits of a RW, appropriate t/f techniques, and benefits of therapy.  03/14/17 0944 Active               Point: Precautions (Active)    Description: Instruct learner(s) on prescribed precautions during self-care and functional transfers.    Learning Progress Summary    Learner Readiness Method Response Comment Documented by Status    Patient Acceptance E,D NR Pt educated on appropriate safety precautions, benefits of a RW, appropriate t/f techniques, and benefits of therapy. CL 03/14/17 0944 Active   Family Acceptance E,D NR Pt educated on appropriate safety precautions, benefits of a RW, appropriate t/f techniques, and benefits of therapy. CL 03/14/17 0944 Active               Point: Body mechanics (Active)    Description: Instruct learner(s) on proper positioning and spine alignment during self-care, functional mobility activities and/or exercises.    Learning Progress Summary    Learner Readiness Method Response Comment Documented by Status   Patient Acceptance E,D NR Pt educated on appropriate safety precautions, benefits of a RW, appropriate t/f techniques, and benefits of therapy. CL 03/14/17 0944 Active   Family Acceptance E,D NR Pt educated on appropriate safety precautions, benefits of a RW, appropriate t/f techniques, and benefits of therapy. CL 03/14/17 0944 Active                      User Key     Initials Effective Dates Name Provider Type Discipline     06/08/16 -  Shweta Mobley, OT Occupational Therapist OT                  OT Recommendation and Plan  Anticipated Equipment Needs At Discharge:  (TBD)  Anticipated Discharge Disposition: home with assist, home with home health  Planned Therapy Interventions: adaptive equipment training, ADL retraining, bed mobility training, energy conservation, transfer training  Therapy Frequency: daily  Plan of Care Review  Plan Of Care Reviewed With: patient  Progress: progress toward functional goals as expected  Outcome Summary/Follow up Plan: OT Eval complete. Pt presents w/ decreased functional independence from baseline. Recommend cont skilled IPOT intervention for ADL retraining, pt education, and t/f training to increase safety/functional independence upon DC. Recommend pt DC home w/ assist and HH OT/PT services.           OT Goals       03/14/17 0945          Transfer Training OT LTG     Transfer Training OT LTG, Date Established 03/14/17  -CL      Transfer Training OT LTG, Time to Achieve by discharge  -CL      Transfer Training OT LTG, Activity Type bed to chair /chair to bed;sit to stand/stand to sit;toilet  -CL      Transfer Training OT LTG, [place] Level contact guard assist  -CL      Transfer Training OT LTG, Additional Goal AAD  -CL      Patient Education OT LTG    Patient Education OT LTG, Date Established 03/14/17  -CL      Patient Education OT LTG, Time to Achieve by discharge  -CL      Patient Education OT LTG, Education Type precautions per surgeon;posture/body mechanics;positioning;1 hand/rashad technique;home safety;adaptive equipment mgmt;energy conservation;adaptive breathing  -CL      Patient Education OT LTG, Education Understanding verbalizes understanding  -CL      Bathing OT LTG    Bathing Goal OT LTG, Date Established 03/14/17  -CL      Bathing Goal OT LTG, Time to Achieve by discharge  -CL      Bathing Goal OT LTG, Activity Type simulates;lower body bathing  -CL      Bathing Goal OT LTG, [place] Level supervision required  -CL      Bathing Goal OT LTG, Assist Device sponge, long handled  -CL      LB Dressing OT LTG    LB Dressing Goal OT LTG, Date Established 03/14/17  -CL      LB Dressing Goal OT LTG, Time to Achieve by discharge  -CL      LB Dressing Goal OT LTG, Activity Type Don/doff socks  -CL      LB Dressing Goal OT LTG, [place] Level moderate assist (50% patient effort)  -CL      LB Dressing Goal OT LTG, Additional Goal AAD  -CL        User Key  (r) = Recorded By, (t) = Taken By, (c) = Cosigned By    Initials Name Provider Type    CL Shweta Mobley OT Occupational Therapist                Outcome Measures       03/14/17 0804          How much help from another is currently needed...    Putting on and taking off regular lower body clothing? 2  -CL      Bathing (including washing, rinsing, and drying) 2  -CL      Toileting (which includes using toilet bed pan  or urinal) 3  -CL      Putting on and taking off regular upper body clothing 3  -CL      Taking care of personal grooming (such as brushing teeth) 4  -CL      Eating meals 4  -CL      Score 18  -CL      Functional Assessment    Outcome Measure Options AM-PAC 6 Clicks Daily Activity (OT)  -CL        User Key  (r) = Recorded By, (t) = Taken By, (c) = Cosigned By    Initials Name Provider Type    CL Shwtea Mobley OT Occupational Therapist          Time Calculation:   OT Start Time: 0804    Therapy Charges for Today     Code Description Service Date Service Provider Modifiers Qty    47222171241  OT EVAL LOW COMPLEXITY 4 3/14/2017 Shweta Mobley OT GO 1    [id number]  OT THER SUPP EA 15 MIN 3/14/2017 Shweta Mobley OT GO 1               Shweta Mobley OT  3/14/2017

## 2017-03-14 NOTE — PROGRESS NOTES
CTS Progress Note       LOS: 2 days   Patient Care Team:  Dewayne Cole MD as PCP - General  Dewayne Cole MD as PCP - Family Medicine        Vital Signs  Temp:  [98.1 °F (36.7 °C)-98.6 °F (37 °C)] 98.3 °F (36.8 °C)  Heart Rate:  [68-87] 87  Resp:  [18-20] 18  BP: (120-141)/(59-79) 124/63    Physical Exam: Left leg much less swollen and much less erythematous   Results     Results from last 7 days  Lab Units 03/14/17  0454   WBC 10*3/mm3 4.44   HEMOGLOBIN g/dL 9.2*   HEMATOCRIT % 27.7*   PLATELETS 10*3/mm3 153       Results from last 7 days  Lab Units 03/13/17  0631   SODIUM mmol/L 131*   POTASSIUM mmol/L 4.3   CHLORIDE mmol/L 98*   TOTAL CO2 mmol/L 28.0   BUN mg/dL 10   CREATININE mg/dL 0.80   GLUCOSE mg/dL 100   CALCIUM mg/dL 9.3           Imaging Results (last 24 hours)     Procedure Component Value Units Date/Time    US Nonvascular Extremity Limited [[id number]] Collected:  03/13/17 0827     Updated:  03/13/17 0827    Narrative:       EXAMINATION: US NONVASCULAR EXTREMITY LIMITED-     INDICATION: Left femoral mass.     TECHNIQUE: Sonographic imaging was obtained of the left inguinal region  in both the sagittal and transverse planes.     COMPARISON: None.     FINDINGS: In the area of interest, there is a large complex hypoechoic  structure with internal septations. The area of interest appears to  extend to the skin surface. There is no evidence of internal blood flow.  This area measures 9.6 x 4.4 x 7.3 cm. Findings may suggest evidence of  a complex hematoma or seroma. There is edema identified in the  surrounding subcutaneous tissues.       Impression:       Large complex hypoechoic structure with internal septations  seen in the area of interest extending to the skin surface suggesting a  large complex hematoma or seroma. There is no internal blood flow. Edema  in the surrounding subcutaneous tissues. Consider CT scan for further  evaluation if clinically indicated with IV contrast.      D:   03/13/2017  E:  03/13/2017       XR Chest 1 View [[id number]] Collected:  03/13/17 1009     Updated:  03/13/17 1010    Narrative:       EXAMINATION: XR CHEST 1 VW-      INDICATION: COPD and coal workers' pneumoconiosis, possible preop.      COMPARISON: 01/16/2017.     FINDINGS: Portable chest reveals heart to be borderline enlarged.  The  lung fields are grossly clear. No focal parenchymal opacification is  present.  No pleural effusion or pneumothorax. Degenerative change is  seen within the spine. Pulmonary vascularity is within normal limits.             Impression:       Stable chronic changes seen within the lung fields  bilaterally. The heart is enlarged with degenerative changes seen within  the spine.     D:  03/13/2017  E:  03/13/2017             Assessment    Principal Problem:    Open wound of left hip and thigh with complication  Active Problems:    COPD (chronic obstructive pulmonary disease)    Diabetes mellitus    Hypertension    Peripheral vascular disease    LANA (acute kidney injury)    Coronary artery disease involving native heart    Iron deficiency anemia    Coal workers pneumoconiosis    Leg wound, left    Yesterday I completed incision and drainage of a large fluid collection in the left groin.  Today I will begin twice a day dressing changes with Dakin's solution.  From a surgical standpoint I think he will probably need 72 hours of twice a day dressing changes in the hospital.  And then potentially discharge home with home health and normal saline dressing changes at time of discharge.  He is becoming somewhat fluid overloaded probably from the IV antibiotics and I will give one dose of Lasix.    Plan   Begin wet-to-dry dressing changes to the left groin twice a day with 1/8 strength Dakin solution.  Lasix 60 mg IV ×1 now.  Anticipate discharge home with home health and normal saline dressing changes in  probable 3-4 days.    Heladio Rosa MD  03/14/17  6:11 AM

## 2017-03-14 NOTE — PLAN OF CARE
Problem: Patient Care Overview (Adult)  Goal: Plan of Care Review  Outcome: Ongoing (interventions implemented as appropriate)    03/14/17 0945   Coping/Psychosocial Response Interventions   Plan Of Care Reviewed With patient   Patient Care Overview   Progress progress toward functional goals as expected   Outcome Evaluation   Outcome Summary/Follow up Plan OT Eval complete. Pt presents w/ decreased functional independence from baseline. Recommend cont skilled IPOT intervention for ADL retraining, pt education, and t/f training to increase safety/functional independence upon DC. Recommend pt DC home w/ assist and HH OT/PT services.          Problem: Inpatient Occupational Therapy  Goal: Transfer Training Goal 1 LTG- OT  Outcome: Ongoing (interventions implemented as appropriate)    03/14/17 0945   Transfer Training OT LTG   Transfer Training OT LTG, Date Established 03/14/17   Transfer Training OT LTG, Time to Achieve by discharge   Transfer Training OT LTG, Activity Type bed to chair /chair to bed;sit to stand/stand to sit;toilet   Transfer Training OT LTG, [place] Level contact guard assist   Transfer Training OT LTG, Additional Goal AAD       Goal: Patient Education Goal LTG- OT  Outcome: Ongoing (interventions implemented as appropriate)    03/14/17 0945   Patient Education OT LTG   Patient Education OT LTG, Date Established 03/14/17   Patient Education OT LTG, Time to Achieve by discharge   Patient Education OT LTG, Education Type precautions per surgeon;posture/body mechanics;positioning;1 hand/rashad technique;home safety;adaptive equipment mgmt;energy conservation;adaptive breathing   Patient Education OT LTG, Education Understanding verbalizes understanding       Goal: Bathing Goal LTG- OT  Outcome: Ongoing (interventions implemented as appropriate)    03/14/17 0945   Bathing OT LTG   Bathing Goal OT LTG, Date Established 03/14/17   Bathing Goal OT LTG, Time to Achieve by discharge   Bathing Goal OT LTG,  Activity Type simulates;lower body bathing   Bathing Goal OT LTG, [place] Level supervision required   Bathing Goal OT LTG, Assist Device sponge, long handled       Goal: LB Dressing Goal LTG- OT  Outcome: Ongoing (interventions implemented as appropriate)    03/14/17 0945   LB Dressing OT LTG   LB Dressing Goal OT LTG, Date Established 03/14/17   LB Dressing Goal OT LTG, Time to Achieve by discharge   LB Dressing Goal OT LTG, Activity Type Don/doff socks   LB Dressing Goal OT LTG, [place] Level moderate assist (50% patient effort)   LB Dressing Goal OT LTG, Additional Goal AAD

## 2017-03-14 NOTE — PLAN OF CARE
Problem: Patient Care Overview (Adult)  Goal: Plan of Care Review  Outcome: Ongoing (interventions implemented as appropriate)    03/14/17 1530   Coping/Psychosocial Response Interventions   Plan Of Care Reviewed With patient;spouse   Outcome Evaluation   Outcome Summary/Follow up Plan PT evaluation complete. Pt demonstrates decreased indep re: functional mobility, warranting short episode of PT to promote PLOF and safe d/c. Recommend attempt at gait with STC next session. Ambulation limited today by c/o SOA.          Problem: Inpatient Physical Therapy  Goal: Bed Mobility Goal LTG- PT  Outcome: Ongoing (interventions implemented as appropriate)    03/14/17 1530   Bed Mobility PT LTG   Bed Mobility PT LTG, Date Established 03/14/17   Bed Mobility PT LTG, Time to Achieve 2 wks   Bed Mobility PT LTG, Activity Type supine to sit/sit to supine   Bed Mobility PT LTG, [place] Level independent       Goal: Transfer Training Goal 1 LTG- PT  Outcome: Ongoing (interventions implemented as appropriate)    03/14/17 1530   Transfer Training PT LTG   Transfer Training PT LTG, Date Established 03/14/17   Transfer Training PT LTG, Time to Achieve 2 wks   Transfer Training PT LTG, Activity Type sit to stand/stand to sit   Transfer Training PT LTG, [place] Level conditional independence   Transfer Training PT LTG, Assist Device cane, straight       Goal: Gait Training Goal LTG- PT  Outcome: Ongoing (interventions implemented as appropriate)    03/14/17 1530   Gait Training PT LTG   Gait Training Goal PT LTG, Date Established 03/14/17   Gait Training Goal PT LTG, Time to Achieve 2 wks   Gait Training Goal PT LTG, [place] Level conditional independence   Gait Training Goal PT LTG, Assist Device cane, straight   Gait Training Goal PT LTG, Distance to Achieve 200       Goal: Stair Training Goal LTG- PT  Outcome: Ongoing (interventions implemented as appropriate)    03/14/17 1530   Stair Training PT LTG   Stair Training  Goal PT LTG, Date Established 03/14/17   Stair Training Goal PT LTG, Time to Achieve 2 wks   Stair Training Goal PT LTG, Number of Steps 7   Stair Training Goal PT LTG, [place] Level contact guard assist   Stair Training Goal PT LTG, Assist Device 1 handrail

## 2017-03-14 NOTE — PLAN OF CARE
Problem: Patient Care Overview (Adult)  Goal: Plan of Care Review  Outcome: Ongoing (interventions implemented as appropriate)    03/14/17 0451   Coping/Psychosocial Response Interventions   Plan Of Care Reviewed With patient;spouse   Patient Care Overview   Progress no change   Outcome Evaluation   Outcome Summary/Follow up Plan Patient rested well overnight. Pt hopes to d/c bedrest today.         Problem: Infection, Risk/Actual (Adult)  Goal: Identify Related Risk Factors and Signs and Symptoms  Outcome: Ongoing (interventions implemented as appropriate)  Goal: Infection Prevention/Resolution  Outcome: Ongoing (interventions implemented as appropriate)

## 2017-03-14 NOTE — PROGRESS NOTES
[facility]    INPATIENT PROGRESS NOTE  3/14/2017      PATIENT NAME: Sai Weinberg  :  1960  MRN:  [id number]  Date of Admission:  3/12/2017      Antimicrobials:  IV Anti-Infectives     Ordered     Dose/Rate Route Frequency Start Stop    17 1246  vancomycin IVPB 1500 mg in 0.9% NaCl (Premix) 500 mL     Ordering Provider:  Wesly Almazan RPH    1,500 mg Intravenous Every 12 Hours 03/15/17 0000      17 0904  cefuroxime (ZINACEF) IVPB 1.5 g     Ordering Provider:  ROSCOE Kennedy    1.5 g  over 30 Minutes Intravenous On Call to O.R. 17 0600 03/15/17 0559    17 2243  vancomycin IVPB 1750 mg in 0.9% Sodium Chloride (premix) 500 mL     Ordering Provider:  Maribel Botello MD    1,750 mg Intravenous Once 17 2315 17 2352    17 2158  piperacillin-tazobactam (ZOSYN) 3.375 g in dextrose 50 mL IVPB (premix)     Ordering Provider:  Maribel Botello MD    3.375 g Intravenous Every 6 Hours Scheduled 17 2215      17 2158  Pharmacy to dose vancomycin     Ordering Provider:  Maribel Botello MD     Does not apply Continuous PRN 17 2150            MAR reviewed.  Pertinent other medications include:  insulin      Reason for consultation:  Left groin/thigh abscess and cellulitis, status post recent left femoral cutdown    Interval history:  Patient doing well today.  No new issues.  Tolerating antibiotics well without diarrhea or rashes.  Dressing at the left groin was changed today with Dakin solution.  No fevers or chills.  Plans to remain hospitalized for the next 3-4 days for wound care, antibiotics, and clinical monitoring.  He also received a dose of Lasix this morning for fluid overload.  Patient reports good urine output.    ROS:  No fevers/chills overnight.  No new rashes.  No SOB or chest pain.  No nausea/vomiting/diarrhea.  Denies side effects from antimicrobials.    Objective:  Temp (24hrs), Av.3 °F (36.8 °C), Min:98  "°F (36.7 °C), Max:98.5 °F (36.9 °C)    Visit Vitals   • /59   • Pulse 85   • Temp 98 °F (36.7 °C) (Axillary)   • Resp 12   • Ht 65\" (165.1 cm)   • Wt 253 lb 4 oz (115 kg)   • SpO2 91%   • BMI 42.14 kg/m2       Physical Examination:  GENERAL: Awake and alert, in no acute distress.   LINES: Left arm peripheral IV  HEENT: Normocephalic, atraumatic. No conjunctival injection or subconjunctival hemorrhage. No icterus.  CV: RRR.  2/6 systolic murmur. Normal S1S2.  LUNGS: Clear to auscultation bilaterally without wheezing, rales, rhonchi. Normal respiratory effort.  ABDOMEN: Soft, nontender, nondistended. Positive bowel sounds.  MSK: Left thigh with improved edema, erythema, and warmth. There is a large clean dressing over the left groin.  SKIN: Warm and dry without rash or ulcer.  NEURO: A&Ox4. No focal deficits. Face symmetric. Speech fluent. Moves all extremities well.   PSYCHIATRIC: Normal insight and judgement. Cooperative. Normal affect.    Laboratory Data:      Results from last 7 days  Lab Units 03/14/17  0454 03/12/17  2218   WBC 10*3/mm3 4.44 3.94   HEMOGLOBIN g/dL 9.2* 10.3*   HEMATOCRIT % 27.7* 30.5*   PLATELETS 10*3/mm3 153 148*       Results from last 7 days  Lab Units 03/14/17  1111   SODIUM mmol/L 131*   POTASSIUM mmol/L 4.1   CHLORIDE mmol/L 96*   TOTAL CO2 mmol/L 34.0*   BUN mg/dL 7*   CREATININE mg/dL 1.00   GLUCOSE mg/dL 107*   CALCIUM mg/dL 9.8       Results from last 7 days  Lab Units 03/14/17  1111   VANCOMYCIN TR mcg/mL 15.60           Microbiology:  BLOOD CULTURE   Date Value Ref Range Status   03/12/2017 No growth at 24 hours  Preliminary   03/12/2017 No growth at 24 hours  Preliminary           WOUND CULTURE   Date Value Ref Range Status   03/13/2017 Scant growth (1+) Non-Lactose  (A)  Preliminary           Radiology:  None new      DISCUSSION:  56 y.o. male with history of diabetes and peripheral vascular disease who recently underwent a left femoral cutdown with angioplasty and " stent placement is admitted with postoperative left thigh cellulitis and wound dehiscence with seroma versus abscess formation. Incision and drainage performed with 200 milliliters of serous fluid. Exam is consistent with a mild cellulitis of the left thigh. Patient reported copious amounts of serosanguineous drainage at home for the past 2-3 weeks.     PROBLEM LIST:   1. Postoperative left femoral fluid collection, seroma plus likely abscess, status post recent left femoral cutdown with left superficial femoral artery angioplasty with stent (no graft). Status post incision and drainage with 200 mL serous fluid removed. Culture is preliminarily positive for a non-lactose fermenting gram-negative danny, suspicious for Pseudomonas.  2. Left thigh cellulitis, related to #1.  Improving.  3. Controlled diabetes mellitus type 2.  4. Obesity.  5. Peripheral vascular disease, status post prior right femoral to popliteal bypass graft and more recent left femoral cutdown with angioplasty and stent.     PLAN:  1. Continue IV vancomycin and Zosyn for now, to cover for potential of hospital acquired organisms including MRSA and Pseudomonas - non-lactose  preliminarily in culture, suspicious for Pseudomonas.  2. Follow up on pending culture data and tailor antibiotics as appropriate.  3. Trending vitals and labs, including temperature, WBC, and Cr.  4. Assessing for adverse drug reactions from antibiotics and following vancomycin troughs.  5. Final plans pending clinical course.  Hold off on PICC for now, pending culture data.  He will remain hospitalized for the next several days for close wound care and IV antibiotics.     Plan discussed with patient and family.    David Nguyen MD  3/14/2017  1:44 PM      Portions of this note may have been created with a voice recognition program.  Efforts were made to edit the dictations.  However, words are occassionally mistranscribed and sound alike errors may occur.

## 2017-03-14 NOTE — PROGRESS NOTES
Pharmacokinetic Consult - Vancomycin Dosing    Sai Weinberg is a 56 y.o. male who has been consulted for vancomycin dosing for leg wound/SSTI    Current Antimicrobials       * Vancomycin 1750mg q12h       * Zosyn 3.375gm q6h    Relevant clinical data and objective history reviewed:    Estimated Creatinine Clearance: 96.7 mL/min (by C-G formula based on Cr of 1).  Weight: 253 lb 4 oz (115 kg)  98 °F (36.7 °C) (Axillary)     Lab Results   Component Value Date    [facility] 15.60 03/14/2017   Vancomycin trough level drawn 11.5 hours following 3rd dose    Results from last 7 days   Lab Units 03/14/17  1111 03/13/17  0631 03/12/17  2218   SODIUM mmol/L 131* 131* 131*   POTASSIUM mmol/L 4.1 4.3 4.5   CHLORIDE mmol/L 96* 98* 98*   TOTAL CO2 mmol/L 34.0* 28.0 29.0   BUN mg/dL 7* 10 11   CREATININE mg/dL 1.00 0.80 0.80   CALCIUM mg/dL 9.8 9.3 9.5   BILIRUBIN mg/dL --  --  1.2   ALK PHOS U/L --  --  53   ALT (SGPT) U/L --  --  31   AST (SGOT) U/L --  --  63*   GLUCOSE mg/dL 107* 100 89    Results from last 7 days   Lab Units 03/14/17  0454 03/12/17  2218   WBC 10*3/mm3 4.44 3.94   HEMOGLOBIN g/dL 9.2* 10.3*   HEMATOCRIT % 27.7* 30.5*   PLATELETS 10*3/mm3 153 148*        Specimen: Wound from Leg, Left Updated: 03/14/17 1051        Wound Culture Scant growth (1+) Non-Lactose          Specimen: Blood from Arm, Right & right arm Updated: 03/13/17 2301        Blood Culture No growth at 24 hours         Assessment/Plan    3/14 Vancomycin trough - 15.6 drawn approximately 11.5 hours following 3rd dose  Level reflective of steady state concentrations  Goal trough: 10-15 mcg/ml  Will reduce to vancomycin 1500mg q12h  Consider trough level Friday and monitor BMP to monitor for potential nephrotoxicity  Will follow,    Thanks  Wesly Almazan [facility]  3/14/2017  12:45 PM

## 2017-03-15 LAB
ANION GAP SERPL CALCULATED.3IONS-SCNC: 7 MMOL/L (ref 3–11)
BUN BLD-MCNC: 6 MG/DL (ref 9–23)
BUN/CREAT SERPL: 6 (ref 7–25)
CALCIUM SPEC-SCNC: 9.1 MG/DL (ref 8.7–10.4)
CHLORIDE SERPL-SCNC: 98 MMOL/L (ref 99–109)
CO2 SERPL-SCNC: 28 MMOL/L (ref 20–31)
CREAT BLD-MCNC: 1 MG/DL (ref 0.6–1.3)
DEPRECATED RDW RBC AUTO: 54.8 FL (ref 37–54)
ERYTHROCYTE [DISTWIDTH] IN BLOOD BY AUTOMATED COUNT: 14.8 % (ref 11.3–14.5)
GFR SERPL CREATININE-BSD FRML MDRD: 77 ML/MIN/1.73
GLUCOSE BLD-MCNC: 116 MG/DL (ref 70–100)
GLUCOSE BLDC GLUCOMTR-MCNC: 111 MG/DL (ref 70–130)
GLUCOSE BLDC GLUCOMTR-MCNC: 117 MG/DL (ref 70–130)
GLUCOSE BLDC GLUCOMTR-MCNC: 124 MG/DL (ref 70–130)
GLUCOSE BLDC GLUCOMTR-MCNC: 124 MG/DL (ref 70–130)
GLUCOSE BLDC GLUCOMTR-MCNC: 136 MG/DL (ref 70–130)
HCT VFR BLD AUTO: 27.8 % (ref 38.9–50.9)
HGB BLD-MCNC: 9.4 G/DL (ref 13.1–17.5)
MCH RBC QN AUTO: 33.7 PG (ref 27–31)
MCHC RBC AUTO-ENTMCNC: 33.8 G/DL (ref 32–36)
MCV RBC AUTO: 99.6 FL (ref 80–99)
PLATELET # BLD AUTO: 174 10*3/MM3 (ref 150–450)
PMV BLD AUTO: 9.6 FL (ref 6–12)
POTASSIUM BLD-SCNC: 3.5 MMOL/L (ref 3.5–5.5)
RBC # BLD AUTO: 2.79 10*6/MM3 (ref 4.2–5.76)
SODIUM BLD-SCNC: 133 MMOL/L (ref 132–146)
WBC NRBC COR # BLD: 3.94 10*3/MM3 (ref 3.5–10.8)

## 2017-03-15 PROCEDURE — 25010000002 HYDROMORPHONE PER 4 MG: Performed by: FAMILY MEDICINE

## 2017-03-15 PROCEDURE — 85027 COMPLETE CBC AUTOMATED: CPT | Performed by: FAMILY MEDICINE

## 2017-03-15 PROCEDURE — 82962 GLUCOSE BLOOD TEST: CPT

## 2017-03-15 PROCEDURE — 80048 BASIC METABOLIC PNL TOTAL CA: CPT | Performed by: FAMILY MEDICINE

## 2017-03-15 PROCEDURE — 99232 SBSQ HOSP IP/OBS MODERATE 35: CPT | Performed by: FAMILY MEDICINE

## 2017-03-15 PROCEDURE — 25010000002 PIPERACILLIN SOD-TAZOBACTAM PER 1 G: Performed by: FAMILY MEDICINE

## 2017-03-15 PROCEDURE — 99024 POSTOP FOLLOW-UP VISIT: CPT | Performed by: THORACIC SURGERY (CARDIOTHORACIC VASCULAR SURGERY)

## 2017-03-15 PROCEDURE — 25010000002 VANCOMYCIN HCL IN NACL 1.5-0.9 GM/500ML-% SOLUTION

## 2017-03-15 PROCEDURE — 25010000002 HEPARIN (PORCINE) PER 1000 UNITS: Performed by: FAMILY MEDICINE

## 2017-03-15 RX ADMIN — ASPIRIN 81 MG: 81 TABLET, CHEWABLE ORAL at 09:06

## 2017-03-15 RX ADMIN — HEPARIN SODIUM 5000 UNITS: 5000 INJECTION, SOLUTION INTRAVENOUS; SUBCUTANEOUS at 05:26

## 2017-03-15 RX ADMIN — FUROSEMIDE 40 MG: 40 TABLET ORAL at 09:07

## 2017-03-15 RX ADMIN — TAZOBACTAM SODIUM AND PIPERACILLIN SODIUM 3.38 G: 375; 3 INJECTION, SOLUTION INTRAVENOUS at 17:13

## 2017-03-15 RX ADMIN — HYDROMORPHONE HYDROCHLORIDE 0.5 MG: 1 INJECTION, SOLUTION INTRAMUSCULAR; INTRAVENOUS; SUBCUTANEOUS at 06:06

## 2017-03-15 RX ADMIN — Medication 325 MG: at 17:13

## 2017-03-15 RX ADMIN — CLOPIDOGREL BISULFATE 75 MG: 75 TABLET, FILM COATED ORAL at 09:06

## 2017-03-15 RX ADMIN — HYDROMORPHONE HYDROCHLORIDE 0.5 MG: 1 INJECTION, SOLUTION INTRAMUSCULAR; INTRAVENOUS; SUBCUTANEOUS at 09:07

## 2017-03-15 RX ADMIN — Medication: at 22:08

## 2017-03-15 RX ADMIN — TAZOBACTAM SODIUM AND PIPERACILLIN SODIUM 3.38 G: 375; 3 INJECTION, SOLUTION INTRAVENOUS at 12:40

## 2017-03-15 RX ADMIN — TIZANIDINE 4 MG: 4 TABLET ORAL at 22:07

## 2017-03-15 RX ADMIN — TAMSULOSIN HYDROCHLORIDE 0.4 MG: 0.4 CAPSULE ORAL at 09:07

## 2017-03-15 RX ADMIN — VANCOMYCIN HYDROCHLORIDE 1500 MG: 1 INJECTION, POWDER, LYOPHILIZED, FOR SOLUTION INTRAVENOUS at 13:03

## 2017-03-15 RX ADMIN — Medication 325 MG: at 09:06

## 2017-03-15 RX ADMIN — DESMOPRESSIN ACETATE 40 MG: 0.2 TABLET ORAL at 09:06

## 2017-03-15 RX ADMIN — TAZOBACTAM SODIUM AND PIPERACILLIN SODIUM 3.38 G: 375; 3 INJECTION, SOLUTION INTRAVENOUS at 05:26

## 2017-03-15 RX ADMIN — HYDROMORPHONE HYDROCHLORIDE 0.5 MG: 1 INJECTION, SOLUTION INTRAMUSCULAR; INTRAVENOUS; SUBCUTANEOUS at 22:07

## 2017-03-15 RX ADMIN — Medication: at 09:07

## 2017-03-15 RX ADMIN — VITAMIN D, TAB 1000IU (100/BT) 2000 UNITS: 25 TAB at 09:06

## 2017-03-15 RX ADMIN — HEPARIN SODIUM 5000 UNITS: 5000 INJECTION, SOLUTION INTRAVENOUS; SUBCUTANEOUS at 13:02

## 2017-03-15 RX ADMIN — VANCOMYCIN HYDROCHLORIDE 1500 MG: 1 INJECTION, POWDER, LYOPHILIZED, FOR SOLUTION INTRAVENOUS at 23:41

## 2017-03-15 RX ADMIN — HEPARIN SODIUM 5000 UNITS: 5000 INJECTION, SOLUTION INTRAVENOUS; SUBCUTANEOUS at 22:07

## 2017-03-15 NOTE — PROGRESS NOTES
"[facility]    INPATIENT PROGRESS NOTE  3/15/2017      PATIENT NAME: Sai Weinberg  :  1960  MRN:  [id number]  Date of Admission:  3/12/2017      Antimicrobials:  IV Anti-Infectives     Ordered     Dose/Rate Route Frequency Start Stop    17 1246  vancomycin IVPB 1500 mg in 0.9% NaCl (Premix) 500 mL     Ordering Provider:  Wesly Almazan RPH    1,500 mg Intravenous Every 12 Hours 03/15/17 0000      17 0904  cefuroxime (ZINACEF) IVPB 1.5 g     Ordering Provider:  ROSCOE Kennedy    1.5 g  over 30 Minutes Intravenous On Call to O.R. 17 0600 03/15/17 0559    17 2243  vancomycin IVPB 1750 mg in 0.9% Sodium Chloride (premix) 500 mL     Ordering Provider:  Maribel Botello MD    1,750 mg Intravenous Once 17 2315 17 2352    17 2158  piperacillin-tazobactam (ZOSYN) 3.375 g in dextrose 50 mL IVPB (premix)     Ordering Provider:  Maribel Botello MD    3.375 g Intravenous Every 6 Hours Scheduled 17 2215      17 2158  Pharmacy to dose vancomycin     Ordering Provider:  Maribel Botello MD     Does not apply Continuous PRN 17 2150            MAR reviewed.  Pertinent other medications include:  insulin    Reason for consultation:  Left groin/thigh abscess and cellulitis, status post recent left femoral cutdown    Interval history:  Patient doing well today.  Ambulating more.  Continues to have some serous drainage from his left groin.  No side effects from antibiotics.  No fevers or chills.  No diarrhea.    ROS:  No fevers/chills overnight.  No new rashes.  No SOB or chest pain.  No nausea/vomiting/diarrhea.  Denies side effects from antimicrobials.    Objective:  Temp (24hrs), Av.5 °F (36.9 °C), Min:97.6 °F (36.4 °C), Max:99 °F (37.2 °C)    Visit Vitals   • /73 (BP Location: Right arm, Patient Position: Sitting)   • Pulse 80   • Temp 97.6 °F (36.4 °C) (Axillary)   • Resp 20   • Ht 65\" (165.1 cm)   • Wt 253 lb 4 oz " (115 kg)   • SpO2 91%   • BMI 42.14 kg/m2       Physical Examination:  GENERAL: Awake and alert, in no acute distress.   LINES: Left arm peripheral IV  HEENT: Normocephalic, atraumatic. No conjunctival injection or subconjunctival hemorrhage. No icterus.  CV: RRR. 2/6 systolic murmur. Normal S1S2.  LUNGS: Clear to auscultation bilaterally without wheezing, rales, rhonchi. Normal respiratory effort.  ABDOMEN: Soft, nontender, nondistended. Positive bowel sounds.  MSK: Left thigh with improved edema, erythema, and warmth. There is a large clean dressing over the left groin with serous drainage; mild induration surrounding the packed lesion.  SKIN: Warm and dry without rash or ulcer.  NEURO: A&Ox4. No focal deficits. Face symmetric. Speech fluent. Moves all extremities well.   PSYCHIATRIC: Normal insight and judgement. Cooperative. Normal affect.       Laboratory Data:      Results from last 7 days  Lab Units 03/15/17  0644 03/14/17  0454 03/12/17  2218   WBC 10*3/mm3 3.94 4.44 3.94   HEMOGLOBIN g/dL 9.4* 9.2* 10.3*   HEMATOCRIT % 27.8* 27.7* 30.5*   PLATELETS 10*3/mm3 174 153 148*       Results from last 7 days  Lab Units 03/15/17  0644   SODIUM mmol/L 133   POTASSIUM mmol/L 3.5   CHLORIDE mmol/L 98*   TOTAL CO2 mmol/L 28.0   BUN mg/dL 6*   CREATININE mg/dL 1.00   GLUCOSE mg/dL 116*   CALCIUM mg/dL 9.1       Results from last 7 days  Lab Units 03/12/17  2218   ALK PHOS U/L 53   BILIRUBIN mg/dL 1.2   ALT (SGPT) U/L 31   AST (SGOT) U/L 63*             Estimated Creatinine Clearance: 96.7 mL/min (by C-G formula based on Cr of 1).    Results from last 7 days  Lab Units 03/12/17  2218   LACTATE mmol/L 1.4           Results from last 7 days  Lab Units 03/14/17  1111   VANCOMYCIN TR mcg/mL 15.60           Microbiology:  BLOOD CULTURE   Date Value Ref Range Status   03/12/2017 No growth at 2 days  Preliminary   03/12/2017 No growth at 2 days  Preliminary             WOUND CULTURE   Date Value Ref Range Status   03/13/2017  Scant growth (1+) Pseudomonas aeruginosa (A)  Preliminary   03/13/2017 (A)  Preliminary    Scant growth (1+) Staphylococcus, coagulase negative      Pseudomonas aeruginosa          ALYSA         Aztreonam 16  Intermediate         Cefepime 16  Intermediate         Ceftazidime >16  Resistant         Gentamicin <=4  Susceptible         Levofloxacin <=2  Susceptible         Meropenem <=1  Susceptible         Piperacillin + Tazobactam <=16  Susceptible         Tobramycin <=4  Susceptible             EKG reviewed.  QTc Int : 463 ms      DISCUSSION:  56 y.o. male with history of diabetes and peripheral vascular disease who recently underwent a left femoral cutdown with angioplasty and stent placement is admitted with postoperative left thigh cellulitis and wound dehiscence with seroma versus abscess formation. Incision and drainage performed with 200 milliliters of serous fluid. Exam is consistent with a mild cellulitis of the left thigh. Patient reported copious amounts of serosanguineous drainage at home for the past 2-3 weeks.      PROBLEM LIST:   1. Postoperative left femoral fluid collection, seroma plus likely abscess, status post recent left femoral cutdown with left superficial femoral artery angioplasty with stent (no graft). Status post incision and drainage with 200 mL serous fluid removed. Culture is preliminarily positive for Pseudomonas and CoNS.  2. Left thigh cellulitis, related to #1. Improving.  3. Controlled diabetes mellitus type 2.  4. Obesity.  5. Peripheral vascular disease, status post prior right femoral to popliteal bypass graft and more recent left femoral cutdown with angioplasty and stent.  6. Prolonged QTc.      PLAN:  1. Continue IV vancomycin and Zosyn for now, to cover for cultured CoNS and Pseudomonas.  2. Pseudomonas is susceptible to fluoroquinolone but patient with prolonged QTc on EKG, so probably wise not to use oral Levaquin for discharge.  No other oral options for Pseudomonas.  May  need a PICC and home IV antibiotic infusion.  Will discuss with patient tomorrow.  He is willing to do dressing changes at home.  Await final ID and susceptibility of CoNS.  3. Probably home in next 1-2 days.    David Nguyen MD  3/15/2017  4:40 PM      Portions of this note may have been created with a voice recognition program.  Efforts were made to edit the dictations.  However, words are occassionally mistranscribed and sound alike errors may occur.

## 2017-03-15 NOTE — PLAN OF CARE
Problem: Patient Care Overview (Adult)  Goal: Plan of Care Review  Outcome: Ongoing (interventions implemented as appropriate)  Goal: Discharge Needs Assessment  Outcome: Ongoing (interventions implemented as appropriate)    Problem: Infection, Risk/Actual (Adult)  Goal: Identify Related Risk Factors and Signs and Symptoms  Outcome: Ongoing (interventions implemented as appropriate)  Goal: Infection Prevention/Resolution  Outcome: Ongoing (interventions implemented as appropriate)

## 2017-03-15 NOTE — PROGRESS NOTES
CTS Progress Note      POD #2 s/p I&D of Left Groin        Subjective  Doing well without complaints sitting up in chair.    Objective    Physical Exam:   Vital Signs   Temp:  [98 °F (36.7 °C)-98.9 °F (37.2 °C)] 98.5 °F (36.9 °C)  Heart Rate:  [75-94] 89  Resp:  [12-20] 20  BP: (123-132)/(57-78) 123/78   GEN: NAD   RESP: Clear to auscultation bilaterally no wheezes, rales or rhonchi    CV: Regular rate and rhythm with a II/VI holosystolic murmur   ABD: Soft, nontender/nondistended with normoactive bowel sounds    EXT: Warm with good color well-perfused 1-2+ bilateral lower extremity edema   INT: Left groin wet to dry dressing with mild serosanguineous drainage otherwise clean and intact    Intake/Output Summary (Last 24 hours) at 03/15/17 0844  Last data filed at 03/15/17 0801   Gross per 24 hour   Intake   4905 ml   Output    550 ml   Net   4355 ml     Results       Results from last 7 days  Lab Units 03/15/17  0644   WBC 10*3/mm3 3.94   HEMOGLOBIN g/dL 9.4*   HEMATOCRIT % 27.8*   PLATELETS 10*3/mm3 174       Results from last 7 days  Lab Units 03/15/17  0644   SODIUM mmol/L 133   POTASSIUM mmol/L 3.5   CHLORIDE mmol/L 98*   TOTAL CO2 mmol/L 28.0   BUN mg/dL 6*   CREATININE mg/dL 1.00   GLUCOSE mg/dL 116*   CALCIUM mg/dL 9.1       Results from last 7 days  Lab Units 03/12/17  2218   INR  1.11       Left groin wound cultures: Scant growth of non-lactose fermenting GNR    Assessment/Plan     Principal Problem:    Open wound of left hip and thigh with complication  Active Problems:    COPD (chronic obstructive pulmonary disease)    Diabetes mellitus    Hypertension    Peripheral vascular disease    LANA (acute kidney injury)    Coronary artery disease involving native heart    Iron deficiency anemia    Coal workers pneumoconiosis    Leg wound, left        Plan   Continue with BID dressing changes.  Await final cultures and sensitivities.  Continue IV antibiotics per ID.  Home later this week or over the weekend with  home health and BID saline wet to dry dressing changes.    ROSCOE Arciniega  03/15/17  8:44 AM    As above.  NTA to current Rx regimen.      Trung Rodriguez MD  [facility]  03/15/17   9:08 AM

## 2017-03-15 NOTE — PROGRESS NOTES
[facility] Medicine Services  INPATIENT PROGRESS NOTE    Date of Admission: 3/12/2017  Length of Stay: 3  Primary Care Physician: Dewayne Cole MD    Subjective   CC:femoral mass  HPI: Sitting up in chair, no family present.  Denies any complaints      Review Of Systems:   Review of Systems   Constitutional: Positive for fever. Negative for fatigue.   Genitourinary: Negative.    Musculoskeletal: Positive for myalgias.   Skin: Positive for color change and rash.   Psychiatric/Behavioral: Negative.          Objective      Temp:  [97.6 °F (36.4 °C)-99 °F (37.2 °C)] 97.6 °F (36.4 °C)  Heart Rate:  [76-97] 80  Resp:  [18-20] 20  BP: (123-137)/(58-78) 137/73  Physical Exam  Constitutional: awake, alert, comfortable, well developed   Eyes: PERRLA, sclerae anicteric, no conjunctival injection  Neck: supple, no thyromegaly, trachea midline  Respiratory: Clear to auscultation bilaterally, nonlabored respirations   Cardiovascular: RRR, pos YEYO, rubs, or gallops, palpable pedal pulses bilaterally  Gastrointestinal: Positive bowel sounds, soft, nontender, nondistended, obese  Musculoskeletal: 1+ bilateral ankle edema, no clubbing or cyanosis to bilateral lower extremities. Left pedal pulse non palpable, but doppler +. Foot warm.  Psychiatric: oriented x 3, appropriate affect, cooperative  Neurologic: Strength symmetric in all extremities, Cranial Nerves grossly intact to confrontation   Skin, left inguinal wound dressed with clean dressing, no erythema  Results Review:    I have reviewed the labs, radiology results and diagnostic studies.      Results from last 7 days  Lab Units 03/15/17  0644   WBC 10*3/mm3 3.94   HEMOGLOBIN g/dL 9.4*   PLATELETS 10*3/mm3 174       Results from last 7 days  Lab Units 03/15/17  0644   SODIUM mmol/L 133   POTASSIUM mmol/L 3.5   CHLORIDE mmol/L 98*   TOTAL CO2 mmol/L 28.0   BUN mg/dL 6*   CREATININE mg/dL 1.00   GLUCOSE mg/dL 116*   CALCIUM mg/dL 9.1       Culture  Data: Cultures:    BLOOD CULTURE   Date Value Ref Range Status   03/12/2017 No growth at less than 24 hours  Preliminary   03/12/2017 No growth at less than 24 hours  Preliminary       Radiology Data:     I have reviewed the medications.    Assessment/Plan     Problem List  Principal Problem:    Open wound of left hip and thigh with complication  Active Problems:    COPD (chronic obstructive pulmonary disease)    Diabetes mellitus    Hypertension    Peripheral vascular disease    LANA (acute kidney injury)    Coronary artery disease involving native heart    Iron deficiency anemia    Coal workers pneumoconiosis    Leg wound, left           Assessment/Plan:    Plan:  Left leg wound:  --s/p femoral cutdown and stenting/angioplasty of left superficial artery, continue twice a day dressing changes  --Continue Vanco and Zosyn , Id following , may need peak and home IV antibiotic infusion final ID plan discussed with patient tomorrow  --Wound positive for coag-negative staph and pseudomonas  --Dr. Rosa did ID of lesion on 3-14,     LANA:  --Up from baseline 1.0 on admssion, improved now   --Pharmacy to dose Vanco  --Holding ACE-I,  and HCTZ , l restarted lasix   --Could be from recent bactrim use  --follow Cr.      T2DM:  --Hold orals, A1C, SSI, Accuchecks     COPD, Coal workers' pneumoconiosis  --Currently Stable     HTN:  --Hold ACE-I, HCTZ due to LANA  --Monitor, add PRN's if needed     Iron deficiency anemia, chronic: (stable)  --11/33 at outside hospital , down to 9 now  --will follow     Heart Murmer  --followed by Cards per pt, sees Abordo      Peripheral Vascular disease:     CAD:  --Currently stable     Code Status: Full    DVT prophylaxis:sq heparin, teds and scds   Discharge Planning: I expect patient to be discharged in a few days    Vangie Dangelo, DO   03/15/17   6:15 PM    Please note that portions of this note may have been completed with a voice recognition program. Efforts were made to edit the  dictations, but occasionally words are mistranscribed.

## 2017-03-15 NOTE — PLAN OF CARE
Problem: Patient Care Overview (Adult)  Goal: Plan of Care Review  Outcome: Ongoing (interventions implemented as appropriate)    03/15/17 4998   Coping/Psychosocial Response Interventions   Plan Of Care Reviewed With patient   Patient Care Overview   Progress improving   Outcome Evaluation   Outcome Summary/Follow up Plan Pt continuing with wet-to-dry dressing changes to left groin site with Dakins. Pt rested well overnight with no acute overnight events. Continue with current POC.         Problem: Infection, Risk/Actual (Adult)  Goal: Identify Related Risk Factors and Signs and Symptoms  Outcome: Ongoing (interventions implemented as appropriate)  Goal: Infection Prevention/Resolution  Outcome: Ongoing (interventions implemented as appropriate)

## 2017-03-16 LAB
ANION GAP SERPL CALCULATED.3IONS-SCNC: 7 MMOL/L (ref 3–11)
BACTERIA SPEC AEROBE CULT: ABNORMAL
BACTERIA SPEC AEROBE CULT: ABNORMAL
BASOPHILS # BLD AUTO: 0.03 10*3/MM3 (ref 0–0.2)
BASOPHILS NFR BLD AUTO: 0.8 % (ref 0–1)
BUN BLD-MCNC: 6 MG/DL (ref 9–23)
BUN/CREAT SERPL: 5 (ref 7–25)
CALCIUM SPEC-SCNC: 9.3 MG/DL (ref 8.7–10.4)
CHLORIDE SERPL-SCNC: 101 MMOL/L (ref 99–109)
CO2 SERPL-SCNC: 27 MMOL/L (ref 20–31)
CREAT BLD-MCNC: 1.2 MG/DL (ref 0.6–1.3)
DEPRECATED RDW RBC AUTO: 54.3 FL (ref 37–54)
EOSINOPHIL # BLD AUTO: 0.13 10*3/MM3 (ref 0.1–0.3)
EOSINOPHIL NFR BLD AUTO: 3.5 % (ref 0–3)
ERYTHROCYTE [DISTWIDTH] IN BLOOD BY AUTOMATED COUNT: 14.9 % (ref 11.3–14.5)
GFR SERPL CREATININE-BSD FRML MDRD: 63 ML/MIN/1.73
GLUCOSE BLD-MCNC: 99 MG/DL (ref 70–100)
GLUCOSE BLDC GLUCOMTR-MCNC: 115 MG/DL (ref 70–130)
GLUCOSE BLDC GLUCOMTR-MCNC: 115 MG/DL (ref 70–130)
GLUCOSE BLDC GLUCOMTR-MCNC: 117 MG/DL (ref 70–130)
GLUCOSE BLDC GLUCOMTR-MCNC: 127 MG/DL (ref 70–130)
GRAM STN SPEC: ABNORMAL
HCT VFR BLD AUTO: 29.1 % (ref 38.9–50.9)
HGB BLD-MCNC: 9.7 G/DL (ref 13.1–17.5)
IMM GRANULOCYTES # BLD: 0.02 10*3/MM3 (ref 0–0.03)
IMM GRANULOCYTES NFR BLD: 0.5 % (ref 0–0.6)
LYMPHOCYTES # BLD AUTO: 1.03 10*3/MM3 (ref 0.6–4.8)
LYMPHOCYTES NFR BLD AUTO: 27.9 % (ref 24–44)
MCH RBC QN AUTO: 33.3 PG (ref 27–31)
MCHC RBC AUTO-ENTMCNC: 33.3 G/DL (ref 32–36)
MCV RBC AUTO: 100 FL (ref 80–99)
MONOCYTES # BLD AUTO: 0.44 10*3/MM3 (ref 0–1)
MONOCYTES NFR BLD AUTO: 11.9 % (ref 0–12)
NEUTROPHILS # BLD AUTO: 2.04 10*3/MM3 (ref 1.5–8.3)
NEUTROPHILS NFR BLD AUTO: 55.4 % (ref 41–71)
PLATELET # BLD AUTO: 181 10*3/MM3 (ref 150–450)
PMV BLD AUTO: 9.7 FL (ref 6–12)
POTASSIUM BLD-SCNC: 4 MMOL/L (ref 3.5–5.5)
RBC # BLD AUTO: 2.91 10*6/MM3 (ref 4.2–5.76)
SODIUM BLD-SCNC: 135 MMOL/L (ref 132–146)
WBC NRBC COR # BLD: 3.69 10*3/MM3 (ref 3.5–10.8)

## 2017-03-16 PROCEDURE — 99232 SBSQ HOSP IP/OBS MODERATE 35: CPT | Performed by: INTERNAL MEDICINE

## 2017-03-16 PROCEDURE — 25010000002 HEPARIN (PORCINE) PER 1000 UNITS: Performed by: FAMILY MEDICINE

## 2017-03-16 PROCEDURE — 25010000002 HYDROMORPHONE PER 4 MG: Performed by: FAMILY MEDICINE

## 2017-03-16 PROCEDURE — 80048 BASIC METABOLIC PNL TOTAL CA: CPT | Performed by: FAMILY MEDICINE

## 2017-03-16 PROCEDURE — 99024 POSTOP FOLLOW-UP VISIT: CPT | Performed by: THORACIC SURGERY (CARDIOTHORACIC VASCULAR SURGERY)

## 2017-03-16 PROCEDURE — 94640 AIRWAY INHALATION TREATMENT: CPT

## 2017-03-16 PROCEDURE — 82962 GLUCOSE BLOOD TEST: CPT

## 2017-03-16 PROCEDURE — 25010000002 VANCOMYCIN HCL IN NACL 1.5-0.9 GM/500ML-% SOLUTION

## 2017-03-16 PROCEDURE — 02HV33Z INSERTION OF INFUSION DEVICE INTO SUPERIOR VENA CAVA, PERCUTANEOUS APPROACH: ICD-10-PCS | Performed by: INTERNAL MEDICINE

## 2017-03-16 PROCEDURE — 85025 COMPLETE CBC W/AUTO DIFF WBC: CPT | Performed by: FAMILY MEDICINE

## 2017-03-16 PROCEDURE — 94799 UNLISTED PULMONARY SVC/PX: CPT

## 2017-03-16 PROCEDURE — 97116 GAIT TRAINING THERAPY: CPT

## 2017-03-16 PROCEDURE — 97110 THERAPEUTIC EXERCISES: CPT

## 2017-03-16 PROCEDURE — 25010000002 PIPERACILLIN SOD-TAZOBACTAM PER 1 G: Performed by: FAMILY MEDICINE

## 2017-03-16 RX ORDER — IPRATROPIUM BROMIDE AND ALBUTEROL SULFATE 2.5; .5 MG/3ML; MG/3ML
3 SOLUTION RESPIRATORY (INHALATION) EVERY 4 HOURS PRN
Status: DISCONTINUED | OUTPATIENT
Start: 2017-03-16 | End: 2017-03-22 | Stop reason: HOSPADM

## 2017-03-16 RX ORDER — LINEZOLID 600 MG/1
600 TABLET, FILM COATED ORAL EVERY 12 HOURS SCHEDULED
Status: DISCONTINUED | OUTPATIENT
Start: 2017-03-16 | End: 2017-03-22 | Stop reason: HOSPADM

## 2017-03-16 RX ADMIN — TAMSULOSIN HYDROCHLORIDE 0.4 MG: 0.4 CAPSULE ORAL at 08:22

## 2017-03-16 RX ADMIN — Medication: at 20:56

## 2017-03-16 RX ADMIN — HYDROMORPHONE HYDROCHLORIDE 0.5 MG: 1 INJECTION, SOLUTION INTRAMUSCULAR; INTRAVENOUS; SUBCUTANEOUS at 21:12

## 2017-03-16 RX ADMIN — LINEZOLID 600 MG: 600 TABLET, FILM COATED ORAL at 20:54

## 2017-03-16 RX ADMIN — HYDROCODONE BITARTRATE AND ACETAMINOPHEN 1 TABLET: 7.5; 325 TABLET ORAL at 20:54

## 2017-03-16 RX ADMIN — TAZOBACTAM SODIUM AND PIPERACILLIN SODIUM 3.38 G: 375; 3 INJECTION, SOLUTION INTRAVENOUS at 12:37

## 2017-03-16 RX ADMIN — Medication 325 MG: at 17:39

## 2017-03-16 RX ADMIN — HYDROMORPHONE HYDROCHLORIDE 0.5 MG: 1 INJECTION, SOLUTION INTRAMUSCULAR; INTRAVENOUS; SUBCUTANEOUS at 09:15

## 2017-03-16 RX ADMIN — Medication: at 09:15

## 2017-03-16 RX ADMIN — VITAMIN D, TAB 1000IU (100/BT) 2000 UNITS: 25 TAB at 08:22

## 2017-03-16 RX ADMIN — TAZOBACTAM SODIUM AND PIPERACILLIN SODIUM 3.38 G: 375; 3 INJECTION, SOLUTION INTRAVENOUS at 06:37

## 2017-03-16 RX ADMIN — PANTOPRAZOLE SODIUM 40 MG: 40 TABLET, DELAYED RELEASE ORAL at 08:23

## 2017-03-16 RX ADMIN — DESMOPRESSIN ACETATE 40 MG: 0.2 TABLET ORAL at 08:23

## 2017-03-16 RX ADMIN — HEPARIN SODIUM 5000 UNITS: 5000 INJECTION, SOLUTION INTRAVENOUS; SUBCUTANEOUS at 15:05

## 2017-03-16 RX ADMIN — Medication 325 MG: at 08:23

## 2017-03-16 RX ADMIN — FUROSEMIDE 40 MG: 40 TABLET ORAL at 08:23

## 2017-03-16 RX ADMIN — BUDESONIDE AND FORMOTEROL FUMARATE DIHYDRATE 2 PUFF: 80; 4.5 AEROSOL RESPIRATORY (INHALATION) at 07:51

## 2017-03-16 RX ADMIN — ASPIRIN 81 MG: 81 TABLET, CHEWABLE ORAL at 08:22

## 2017-03-16 RX ADMIN — HYDROMORPHONE HYDROCHLORIDE 0.5 MG: 1 INJECTION, SOLUTION INTRAMUSCULAR; INTRAVENOUS; SUBCUTANEOUS at 19:24

## 2017-03-16 RX ADMIN — HEPARIN SODIUM 5000 UNITS: 5000 INJECTION, SOLUTION INTRAVENOUS; SUBCUTANEOUS at 06:37

## 2017-03-16 RX ADMIN — BUDESONIDE AND FORMOTEROL FUMARATE DIHYDRATE 2 PUFF: 80; 4.5 AEROSOL RESPIRATORY (INHALATION) at 20:18

## 2017-03-16 RX ADMIN — TAZOBACTAM SODIUM AND PIPERACILLIN SODIUM 3.38 G: 375; 3 INJECTION, SOLUTION INTRAVENOUS at 02:00

## 2017-03-16 RX ADMIN — IPRATROPIUM BROMIDE AND ALBUTEROL SULFATE 3 ML: .5; 3 SOLUTION RESPIRATORY (INHALATION) at 07:51

## 2017-03-16 RX ADMIN — HYDROMORPHONE HYDROCHLORIDE 0.5 MG: 1 INJECTION, SOLUTION INTRAMUSCULAR; INTRAVENOUS; SUBCUTANEOUS at 06:37

## 2017-03-16 RX ADMIN — VANCOMYCIN HYDROCHLORIDE 1500 MG: 1 INJECTION, POWDER, LYOPHILIZED, FOR SOLUTION INTRAVENOUS at 12:38

## 2017-03-16 RX ADMIN — HEPARIN SODIUM 5000 UNITS: 5000 INJECTION, SOLUTION INTRAVENOUS; SUBCUTANEOUS at 20:54

## 2017-03-16 RX ADMIN — TIZANIDINE 4 MG: 4 TABLET ORAL at 21:12

## 2017-03-16 RX ADMIN — TAZOBACTAM SODIUM AND PIPERACILLIN SODIUM 3.38 G: 375; 3 INJECTION, SOLUTION INTRAVENOUS at 17:39

## 2017-03-16 RX ADMIN — CLOPIDOGREL BISULFATE 75 MG: 75 TABLET, FILM COATED ORAL at 08:23

## 2017-03-16 RX ADMIN — HYDROMORPHONE HYDROCHLORIDE 0.5 MG: 1 INJECTION, SOLUTION INTRAMUSCULAR; INTRAVENOUS; SUBCUTANEOUS at 13:01

## 2017-03-16 NOTE — PLAN OF CARE
Problem: Patient Care Overview (Adult)  Goal: Plan of Care Review  Outcome: Ongoing (interventions implemented as appropriate)    03/16/17 0436   Coping/Psychosocial Response Interventions   Plan Of Care Reviewed With patient   Patient Care Overview   Progress improving   Outcome Evaluation   Outcome Summary/Follow up Plan Continue wet to dry dressing changes bid.         Problem: Infection, Risk/Actual (Adult)  Goal: Identify Related Risk Factors and Signs and Symptoms  Outcome: Ongoing (interventions implemented as appropriate)

## 2017-03-16 NOTE — PLAN OF CARE
"Problem: Patient Care Overview (Adult)  Goal: Plan of Care Review  Outcome: Ongoing (interventions implemented as appropriate)    03/16/17 1059   Coping/Psychosocial Response Interventions   Plan Of Care Reviewed With patient   Patient Care Overview   Progress improving   Outcome Evaluation   Outcome Summary/Follow up Plan Pt. demo improved safety and independence w/ func. mob, ambulating 150 ft w/ HHA; however, patient readily fatigues and required 3 standing rest breaks. Pt. declining use of RW; recommend gait training w/ STC next visit. Pt w/ h/o falls at home d/t L LE \"giving out.\" Provided patient educ regarding benefits and improved safety of ambulating w/ AD. Cont PT per POC.          Problem: Inpatient Physical Therapy  Goal: Bed Mobility Goal LTG- PT  Outcome: Ongoing (interventions implemented as appropriate)    03/14/17 1530 03/16/17 1059   Bed Mobility PT LTG   Bed Mobility PT LTG, Date Established 03/14/17 --    Bed Mobility PT LTG, Time to Achieve 2 wks --    Bed Mobility PT LTG, Activity Type supine to sit/sit to supine --    Bed Mobility PT LTG, [place] Level independent --    Bed Mobility PT LTG, Outcome --  goal ongoing       Goal: Transfer Training Goal 1 LTG- PT  Outcome: Ongoing (interventions implemented as appropriate)    03/14/17 1530 03/16/17 1059   Transfer Training PT LTG   Transfer Training PT LTG, Date Established 03/14/17 --    Transfer Training PT LTG, Time to Achieve 2 wks --    Transfer Training PT LTG, Activity Type sit to stand/stand to sit --    Transfer Training PT LTG, [place] Level conditional independence --    Transfer Training PT LTG, Assist Device cane, straight --    Transfer Training PT LTG, Outcome --  goal ongoing       Goal: Gait Training Goal LTG- PT  Outcome: Ongoing (interventions implemented as appropriate)    03/14/17 1530 03/16/17 1059   Gait Training PT LTG   Gait Training Goal PT LTG, Date Established 03/14/17 --    Gait Training Goal PT LTG, Time to " Achieve 2 wks --    Gait Training Goal PT LTG, [place] Level conditional independence --    Gait Training Goal PT LTG, Assist Device cane, straight --    Gait Training Goal PT LTG, Distance to Achieve 200 --    Gait Training Goal PT LTG, Outcome --  goal ongoing       Goal: Stair Training Goal LTG- PT  Outcome: Ongoing (interventions implemented as appropriate)    03/14/17 1530 03/16/17 1059   Stair Training PT LTG   Stair Training Goal PT LTG, Date Established 03/14/17 --    Stair Training Goal PT LTG, Time to Achieve 2 wks --    Stair Training Goal PT LTG, Number of Steps 7 --    Stair Training Goal PT LTG, [place] Level contact guard assist --    Stair Training Goal PT LTG, Assist Device 1 handrail --    Stair Training Goal PT LTG, Outcome --  goal ongoing

## 2017-03-16 NOTE — PROGRESS NOTES
Acute Care - Physical Therapy Treatment Note  [facility]     Patient Name: Sai Weinberg  : 1960  MRN: [id number]  Today's Date: 3/16/2017  Onset of Illness/Injury or Date of Surgery Date: 17  Date of Referral to PT: 17  Referring Physician: MD Ayan    Admit Date: 3/12/2017    Visit Dx:    ICD-10-CM ICD-9-CM   1. Open wound of left hip and thigh with complication, initial encounter S71.002A 890.1    S71.102A    2. Impaired mobility and ADLs Z74.09 799.89   3. Impaired functional mobility, balance, gait, and endurance Z74.09 V49.89     Patient Active Problem List   Diagnosis   • Dyslipidemia   • Arthritis   • COPD (chronic obstructive pulmonary disease)   • Diabetes mellitus   • Esophageal reflux   • Hypertension   • Malignant neoplasm of colon   • Heart attack   • Peripheral vascular disease   • Chewing tobacco use   • Peripheral arterial disease   • LANA (acute kidney injury)   • Coronary artery disease involving native heart   • Iron deficiency anemia   • Coal workers pneumoconiosis   • Open wound of left hip and thigh with complication   • Leg wound, left               Adult Rehabilitation Note       17 0930          Rehab Assessment/Intervention    Discipline physical therapist  -MB      Document Type therapy note (daily note)  -MB      Subjective Information agree to therapy;complains of;fatigue;pain  -MB      Patient Effort, Rehab Treatment good  -MB      Symptoms Noted During/After Treatment fatigue;increased pain  -MB      Precautions/Limitations fall precautions;other (see comments)   No prolonged sitting at 90 degrees L hip flexion.  -MB      Recorded by [MB] Emmie Thompson, PT      Vital Signs    Pre SpO2 (%) 96  -MB      O2 Delivery Pre Treatment room air  -MB      Post SpO2 (%) 92  -MB      O2 Delivery Post Treatment room air  -MB      Pre Patient Position Sitting  -MB      Intra Patient Position Standing  -MB      Post Patient Position Sitting  -MB      Recorded by [MB]  Emmie Thompson, PT      Pain Assessment    Pain Assessment 0-10  -MB      Pain Score 10  -MB      Post Pain Score 5  -MB      Pain Type Chronic pain  -MB      Pain Location Knee  -MB      Pain Orientation Left  -MB      Pain Intervention(s) Repositioned;Medication (See MAR);Ambulation/increased activity   Nsg aware.  -MB      Response to Interventions Improved  -MB      Recorded by [MB] Emmie Thompson, PT      Cognitive Assessment/Intervention    Current Cognitive/Communication Assessment functional  -MB      Orientation Status oriented x 4  -MB      Follows Commands/Answers Questions 100% of the time;able to follow single-step instructions  -MB      Personal Safety mild impairment;decreased awareness, need for assist;decreased awareness, need for safety  -MB      Personal Safety Interventions fall prevention program maintained;gait belt;muscle strengthening facilitated;nonskid shoes/slippers when out of bed  -MB      Recorded by [MB] Emmie Thompson, PT      Bed Mobility, Assessment/Treatment    Bed Mob, Supine to Sit, [place] not tested   Pt. received [facility].  -MB      Recorded by [MB] Emmie Thompson, PT      Transfer Assessment/Treatment    Transfers, Sit-Stand [place] contact guard assist  -MB      Transfers, Stand-Sit [place] contact guard assist;verbal cues required  -MB      Transfers, Sit-Stand-Sit, Assist Device other (see comments)   HHA.  Pt. declined RW.  -MB      Transfer, Safety Issues balance decreased during turns;weight-shifting ability decreased  -MB      Transfer, Impairments strength decreased;impaired balance;pain  -MB      Transfer, Comment Pt. declined RW for transfer.  Pt. required VCs for forward weight shift and safe hand placement.  -MB      Recorded by [MB] Emmie Thompson, PT      Gait Assessment/Treatment    Gait, [place] Level contact guard assist;verbal cues required  -MB      Gait, Assistive Device --   HHA; Pt. declined RW.  -MB      Gait, Distance  (Feet) 150  -MB      Gait, Gait Pattern Analysis swing-through gait  -MB      Gait, Gait Deviations left:;antalgic;remedios decreased;bilateral:;step length decreased  -MB      Gait, Safety Issues balance decreased during turns;weight-shifting ability decreased  -MB      Gait, Impairments strength decreased;impaired balance;pain  -MB      Gait, Comment Pt. amb slowly w/ HHA and VCs for PLB.  Pt. required 3 standing rest breaks and c/o increased L knee pain w/ ambulation.  Provided education regarding use of AD to improve safety, dec fall risk, and dec pain while ambulating.  -MB      Recorded by [MB] Emmie Thompson, MARJORIE      Motor Skills/Interventions    Additional Documentation Balance Skills Training (Group)  -MB      Recorded by [MB] Emmie Thompson PT      Balance Skills Training    Standing-Level of Assistance Contact guard  -MB      Static Standing Balance Support Left upper extremity supported  -MB      Standing-Balance Activities Weight Shift R-L;Weight Shift A-P  -MB      Gait Balance-Level of Assistance Contact guard  -MB      Gait Balance Support Left upper extremity supported  -MB      Gait Balance Activities scanning environment R/L;stepping over object  -MB      Recorded by [MB] Emmie Thompson PT      Therapy Exercises    Bilateral Lower Extremities AROM:;10 reps;sitting;ankle pumps/circles;LAQ  -MB      Recorded by [MB] Emmie Thompson PT      Positioning and Restraints    Pre-Treatment Position sitting in chair/recliner  -MB      Post Treatment Position chair  -MB      In Chair notified nsg;sitting;call light within reach;encouraged to call for assist;legs elevated  -MB      Recorded by [MB] Emmie Thompson PT        User Key  (r) = Recorded By, (t) = Taken By, (c) = Cosigned By    Initials Name Effective Dates    SUE Thompson, PT 03/14/16 -                 IP PT Goals       03/16/17 1059 03/14/17 1530       Bed Mobility PT LTG    Bed Mobility PT LTG, Date Established   03/14/17  -LS     Bed Mobility PT LTG, Time to Achieve  2 wks  -LS     Bed Mobility PT LTG, Activity Type  supine to sit/sit to supine  -LS     Bed Mobility PT LTG, [place] Level  independent  -LS     Bed Mobility PT LTG, Outcome goal ongoing  -MB      Transfer Training PT LTG    Transfer Training PT LTG, Date Established  03/14/17  -LS     Transfer Training PT LTG, Time to Achieve  2 wks  -LS     Transfer Training PT LTG, Activity Type  sit to stand/stand to sit  -LS     Transfer Training PT LTG, [place] Level  conditional independence  -LS     Transfer Training PT LTG, Assist Device  cane, straight  -LS     Transfer Training PT LTG, Outcome goal ongoing  -MB      Gait Training PT LTG    Gait Training Goal PT LTG, Date Established  03/14/17  -LS     Gait Training Goal PT LTG, Time to Achieve  2 wks  -LS     Gait Training Goal PT LTG, [place] Level  conditional independence  -LS     Gait Training Goal PT LTG, Assist Device  cane, straight  -LS     Gait Training Goal PT LTG, Distance to Achieve  200  -LS     Gait Training Goal PT LTG, Outcome goal ongoing  -MB      Stair Training PT LTG    Stair Training Goal PT LTG, Date Established  03/14/17  -LS     Stair Training Goal PT LTG, Time to Achieve  2 wks  -LS     Stair Training Goal PT LTG, Number of Steps  7  -LS     Stair Training Goal PT LTG, [place] Level  contact guard assist  -LS     Stair Training Goal PT LTG, Assist Device  1 handrail  -LS     Stair Training Goal PT LTG, Outcome goal ongoing  -MB        User Key  (r) = Recorded By, (t) = Taken By, (c) = Cosigned By    Initials Name Provider Type    LS Leeanna Conroy, PT Physical Therapist    SUE Thompson, PT Physical Therapist          Physical Therapy Education     Title: PT OT SLP Therapies (Active)     Topic: Physical Therapy (Active)     Point: Mobility training (Active)    Learning Progress Summary    Learner Readiness Method Response Comment Documented by Status   Patient  Acceptance E NR fall precautions, PLB, gait mechanics, home safety MB 03/16/17 1059 Active    Acceptance E,D NR Edu re: benefit of further ambulation throughout day with NSG and of sets of LE HEP.  03/14/17 1529 Active               Point: Home exercise program (Active)    Learning Progress Summary    Learner Readiness Method Response Comment Documented by Status   Patient Acceptance E NR fall precautions, PLB, gait mechanics, home safety MB 03/16/17 1059 Active    Acceptance E,D NR Edu re: benefit of further ambulation throughout day with NSG and of sets of LE HEP.  03/14/17 1529 Active               Point: Body mechanics (Active)    Learning Progress Summary    Learner Readiness Method Response Comment Documented by Status   Patient Acceptance E NR fall precautions, PLB, gait mechanics, home safety MB 03/16/17 1059 Active    Acceptance E,D NR Edu re: benefit of further ambulation throughout day with NSG and of sets of LE HEP.  03/14/17 1529 Active               Point: Precautions (Active)    Learning Progress Summary    Learner Readiness Method Response Comment Documented by Status   Patient Acceptance E NR fall precautions, PLB, gait mechanics, home safety MB 03/16/17 1059 Active    Acceptance E,D NR Edu re: benefit of further ambulation throughout day with NSG and of sets of LE HEP.  03/14/17 1529 Active                      User Key     Initials Effective Dates Name Provider Type Discipline     06/19/15 -  Leeanna Conroy, PT Physical Therapist PT    MB 03/14/16 -  Emmie Thompson, PT Physical Therapist PT                    PT Recommendation and Plan  Anticipated Discharge Disposition: home with assist  PT Frequency: daily  Plan of Care Review  Plan Of Care Reviewed With: patient  Progress: improving  Outcome Summary/Follow up Plan: PtElizabeth metcalf improved safety and independence w/ func. mob, ambulating 150 ft w/ HHA;  however, patient readily fatigues and required 3 standing rest breaks.  Pt. declining  "use of RW; recommend gait training w/ STC next visit.  Pt w/ h/o falls at home d/t L LE \"giving out.\"  Provided patient educ regarding benefits and improved safety of ambulating w/ AD.  Cont PT per POC.            Outcome Measures       03/16/17 0930 03/14/17 1358 03/14/17 0804    How much help from another person do you currently need...    Turning from your back to your side while in flat bed without using bedrails? 4  -MB 4  -LS     Moving from lying on back to sitting on the side of a flat bed without bedrails? 4  -MB 4  -LS     Moving to and from a bed to a chair (including a wheelchair)? 3  -MB 3  -LS     Standing up from a chair using your arms (e.g., wheelchair, bedside chair)? 3  -MB 3  -LS     Climbing 3-5 steps with a railing? 2  -MB 2  -LS     To walk in hospital room? 3  -MB 3  -LS     AM-PAC 6 Clicks Score 19  -MB 19  -LS     How much help from another is currently needed...    Putting on and taking off regular lower body clothing?   2  -CL    Bathing (including washing, rinsing, and drying)   2  -CL    Toileting (which includes using toilet bed pan or urinal)   3  -CL    Putting on and taking off regular upper body clothing   3  -CL    Taking care of personal grooming (such as brushing teeth)   4  -CL    Eating meals   4  -CL    Score   18  -CL    Functional Assessment    Outcome Measure Options AM-PAC 6 Clicks Basic Mobility (PT)  -MB AM-PAC 6 Clicks Basic Mobility (PT)  -LS AM-PAC 6 Clicks Daily Activity (OT)  -CL      User Key  (r) = Recorded By, (t) = Taken By, (c) = Cosigned By    Initials Name Provider Type    LS Leeanna Conroy, PT Physical Therapist    MB Emmie Thompson, PT Physical Therapist    MIRANDA Mobley, OT Occupational Therapist           Time Calculation:         PT Charges       03/16/17 1103          Time Calculation    Start Time 0930  -MB      PT Received On 03/16/17  -MB      PT Goal Re-Cert Due Date 03/24/17  -MB      Time Calculation- PT    Total Timed Code Minutes- PT 34 " minute(s)  -MB        User Key  (r) = Recorded By, (t) = Taken By, (c) = Cosigned By    Initials Name Provider Type    SUE Thompson, PT Physical Therapist          Therapy Charges for Today     Code Description Service Date Service Provider Modifiers Qty    72102203409 HC GAIT TRAINING EA 15 MIN 3/16/2017 Emmie Thompson, PT GP 1    [id number]  PT THER PROC EA 15 MIN 3/16/2017 Emmie Thompson, PT GP 1          PT G-Codes  Outcome Measure Options: AM-PAC 6 Clicks Basic Mobility (PT)    Emmie Thompson, PT  3/16/2017

## 2017-03-16 NOTE — PROGRESS NOTES
Continued Stay Note   [facility]     Patient Name: Sai Weinberg  MRN: [id number]  Today's Date: 3/16/2017    Admit Date: 3/12/2017          Discharge Plan       03/16/17 1027    Case Management/Social Work Plan    Plan home with home health    Patient/Family In Agreement With Plan yes    Additional Comments Met with patient at bedside. His plan remains to return home at discharge with the assistance of his family. He is agreeable to home health at [place] and does not have a preference for  provider ([facility]). Family can transport via car at discharge. Will await home health orders and recs from ID. CM following.               Discharge Codes     None        Expected Discharge Date and Time     Expected Discharge Date Expected Discharge Time    Mar 17, 2017             Kathryn Pozo RN

## 2017-03-16 NOTE — PROGRESS NOTES
[facility] Medicine Services  INPATIENT PROGRESS NOTE    Date of Admission: 3/12/2017  Length of Stay: 4  Primary Care Physician: Dewayne Cole MD    Subjective   CC:femoral mass  HPI:sitting on chair , no complaints today , denies fever,chest pain/SOB        Review of Systems   No fever , No Chest pain   Otherwise the 10 system review was negative      Objective      Temp:  [97.7 °F (36.5 °C)-98.8 °F (37.1 °C)] 98.5 °F (36.9 °C)  Heart Rate:  [] 77  Resp:  [12-18] 18  BP: (127-148)/(60-82) 144/60  Physical Exam     Constitutional: awake, alert, comfortable, well developed   Eyes: PERRLA, sclerae anicteric, no conjunctival injection  Neck: supple, , trachea midline  Respiratory: Clear to auscultation bilaterally, nonlabored respirations   Cardiovascular: RRR, pos YEYO, rubs, or gallops.  Gastrointestinal: Positive bowel sounds, soft, nontender, nondistended, obese  Psychiatric: oriented x 3, appropriate affect, cooperative  Neurologic: Strength symmetric in all extremities, Cranial Nerves grossly intact to confrontation   Skin, left inguinal wound dressed with clean dressing    Results Review:    I have reviewed the labs, radiology results and diagnostic studies.      Results from last 7 days  Lab Units 03/16/17  0539   WBC 10*3/mm3 3.69   HEMOGLOBIN g/dL 9.7*   PLATELETS 10*3/mm3 181       Results from last 7 days  Lab Units 03/16/17  0539   SODIUM mmol/L 135   POTASSIUM mmol/L 4.0   CHLORIDE mmol/L 101   TOTAL CO2 mmol/L 27.0   BUN mg/dL 6*   CREATININE mg/dL 1.20   GLUCOSE mg/dL 99   CALCIUM mg/dL 9.3       Culture Data: Cultures:    BLOOD CULTURE   Date Value Ref Range Status   03/12/2017 No growth at 3 days  Preliminary   03/12/2017 No growth at 3 days  Preliminary     WOUND CULTURE   Date Value Ref Range Status   03/13/2017 Scant growth (1+) Pseudomonas aeruginosa (A)  Final   03/13/2017 Scant growth (1+) Staphylococcus epidermidis (A)  Final     Comment:     This isolate  does not demonstrate inducible clindamycin resistance in vitro.         Radiology Data:     I have reviewed the medications.    Assessment/Plan     Problem List  Principal Problem:    Open wound of left hip and thigh with complication  Active Problems:    COPD (chronic obstructive pulmonary disease)    Diabetes mellitus    Hypertension    Peripheral vascular disease    LANA (acute kidney injury)    Coronary artery disease involving native heart    Iron deficiency anemia    Coal workers pneumoconiosis    Leg wound, left           Assessment/Plan:    Left leg wound:  --s/p femoral stenting/angioplasty of left superficial artery, continue twice a day dressing changes  --Continue Vanco and Zosyn , Id following   --Wound positive for coag-negative staph and pseudomonas  --Dr. Rosa did I and D of lesion on 3-14     LANA:  --Up from baseline 1.0 on admssion, now up to 1.2 today  --Pharmacy to dose Vanco  --Could be from recent bactrim use  --follow Cr.       T2DM:  --Hold orals, A1C, SSI, Accuchecks      COPD, Coal workers' pneumoconiosis  --Currently Stable      HTN:  --Hold ACE-I, HCTZ due to LANA  --Monitor, add PRN's if needed      Iron deficiency anemia, chronic: (stable)  --11/33 at outside hospital , down to 9 now  --will follow      Heart [place]  --followed by Cards per pt, sees [facility]       Peripheral Vascular disease.      CAD:  --Currently stable      Code Status: Full     DVT prophylaxis:sq heparin, teds and scds       Discharge Planning: I expect patient to be discharged to home  in several days days.    Jamarcus Carl MD   03/16/17   3:30 PM    Please note that portions of this note may have been completed with a voice recognition program. Efforts were made to edit the dictations, but occasionally words are mistranscribed.

## 2017-03-16 NOTE — PROGRESS NOTES
"[facility]    INPATIENT PROGRESS NOTE  3/16/2017      PATIENT NAME: Sai Weinberg  :  1960  MRN:  [id number]  Date of Admission:  3/12/2017      Antimicrobials:  IV Anti-Infectives     Ordered     Dose/Rate Route Frequency Start Stop    17 1246  vancomycin IVPB 1500 mg in 0.9% NaCl (Premix) 500 mL     Ordering Provider:  Wesly Almzaan RPH    1,500 mg Intravenous Every 12 Hours 03/15/17 0000      17 0904  cefuroxime (ZINACEF) IVPB 1.5 g     Ordering Provider:  ROSCOE Kennedy    1.5 g  over 30 Minutes Intravenous On Call to O.R. 17 0600 03/15/17 0559    17 2243  vancomycin IVPB 1750 mg in 0.9% Sodium Chloride (premix) 500 mL     Ordering Provider:  Maribel Botello MD    1,750 mg Intravenous Once 17 2315 17 2352    17 2158  piperacillin-tazobactam (ZOSYN) 3.375 g in dextrose 50 mL IVPB (premix)     Ordering Provider:  Maribel Botello MD    3.375 g Intravenous Every 6 Hours Scheduled 17 2215      17 2158  Pharmacy to dose vancomycin     Ordering Provider:  Maribel Botello MD     Does not apply Continuous PRN 17 2150            MAR reviewed.  Pertinent other medications include:  Insulin, Plavix, Protonix      Reason for consultation:  Left groin/thigh abscess and cellulitis, status post recent left femoral cutdown    Interval history:  Doing well today.  Dressing changed with Dakin's this am.  No fevers.  No diarrhea.  Wants to go home soon.    ROS:  No fevers/chills overnight.  No new rashes.  No SOB or chest pain.  No nausea/vomiting/diarrhea.  Denies side effects from antimicrobials.      Objective:  Temp (24hrs), Av.3 °F (36.8 °C), Min:97.7 °F (36.5 °C), Max:98.8 °F (37.1 °C)    Visit Vitals   • /60   • Pulse 77   • Temp 98.5 °F (36.9 °C) (Oral)   • Resp 18   • Ht 65\" (165.1 cm)   • Wt 253 lb 4 oz (115 kg)   • SpO2 91%   • BMI 42.14 kg/m2       Physical Examination:  GENERAL: Awake and alert, in " no acute distress.   LINES: Left arm peripheral IV  HEENT: Normocephalic, atraumatic. No conjunctival injection or subconjunctival hemorrhage. No icterus.  MSK: Left thigh with improved but ongoing edema, erythema, and warmth. There is a large clean dressing over the left groin with serous drainage; mild induration surrounding the packed lesion continues.  SKIN: Warm and dry without rash.  NEURO: A&Ox4. No focal deficits. Face symmetric. Speech fluent. Moves all extremities well.   PSYCHIATRIC: Normal insight and judgement. Cooperative. Normal affect.       Laboratory Data:      Results from last 7 days  Lab Units 03/16/17  0539 03/15/17  0644 03/14/17  0454   WBC 10*3/mm3 3.69 3.94 4.44   HEMOGLOBIN g/dL 9.7* 9.4* 9.2*   HEMATOCRIT % 29.1* 27.8* 27.7*   PLATELETS 10*3/mm3 181 174 153       Results from last 7 days  Lab Units 03/16/17  0539   SODIUM mmol/L 135   POTASSIUM mmol/L 4.0   CHLORIDE mmol/L 101   TOTAL CO2 mmol/L 27.0   BUN mg/dL 6*   CREATININE mg/dL 1.20   GLUCOSE mg/dL 99   CALCIUM mg/dL 9.3       Results from last 7 days  Lab Units 03/12/17  2218   ALK PHOS U/L 53   BILIRUBIN mg/dL 1.2   ALT (SGPT) U/L 31   AST (SGOT) U/L 63*             Estimated Creatinine Clearance: 80.6 mL/min (by C-G formula based on Cr of 1.2).    Results from last 7 days  Lab Units 03/12/17  2218   LACTATE mmol/L 1.4           Results from last 7 days  Lab Units 03/14/17  1111   VANCOMYCIN TR mcg/mL 15.60         Microbiology:  BLOOD CULTURE   Date Value Ref Range Status   03/12/2017 No growth at 3 days  Preliminary   03/12/2017 No growth at 3 days  Preliminary        WOUND CULTURE   Date Value Ref Range Status   03/13/2017 Scant growth (1+) Pseudomonas aeruginosa (A)  Final   03/13/2017 Scant growth (1+) Staphylococcus epidermidis (A)  Final     Comment:     This isolate does not demonstrate inducible clindamycin resistance in vitro.                   Susceptibility         Pseudomonas aeruginosa Staphylococcus epidermidis          ALYSA ALYSA         Aztreonam 16  Intermediate          Cefepime 16  Intermediate          Ceftazidime >16  Resistant          Clindamycin  <=0.5  Susceptible         Daptomycin  <=0.5  Susceptible         Erythromycin  >4  Resistant         Gentamicin <=4  Susceptible <=4  Susceptible         Levofloxacin <=2  Susceptible <=1  Susceptible         Linezolid  <=1  Susceptible         Meropenem <=1  Susceptible          Oxacillin  >2  Resistant         Penicillin G  >8  Resistant         Piperacillin + Tazobactam <=16  Susceptible          Quinupristin + Dalfopristin  <=0.5  Susceptible         Rifampin  <=1  Susceptible         Tetracycline  <=4  Susceptible         Tobramycin <=4  Susceptible          Trimethoprim + Sulfamethoxazole  >2/38  Resistant         Vancomycin  1  Susceptible                                   Radiology:  None new      DISCUSSION:  56 y.o. male with history of diabetes and peripheral vascular disease who recently underwent a left femoral cutdown with angioplasty and stent placement is admitted with postoperative left thigh cellulitis and wound dehiscence with seroma versus abscess formation. Incision and drainage performed with 200 milliliters of serous fluid. Exam is consistent with a mild cellulitis of the left thigh. Patient reported copious amounts of serosanguineous drainage at home for the past 2-3 weeks.      PROBLEM LIST:   1. Postoperative left femoral fluid collection, seroma plus likely abscess, status post recent left femoral cutdown with left superficial femoral artery angioplasty with stent (no graft). Status post incision and drainage with 200 mL serous fluid removed. Culture is positive for Pseudomonas (S-FQ, Zosyn, meropenem; intermediate to cefepime and aztreonam; resistant to ceftazidime) and Staph epi, methicillin resistant.  2. Left thigh cellulitis, related to #1. Improving.  3. Controlled diabetes mellitus type 2.  4. Obesity.  5. Peripheral vascular disease, status  post prior right femoral to popliteal bypass graft and more recent left femoral cutdown with angioplasty and stent.  6. Prolonged QTc.  Pseudomonas is susceptible to fluoroquinolone but patient with prolonged QTc on EKG, so probably wise not to use oral Levaquin for discharge.      PLAN:  1.  Discussed IV home infusion and patient willing to do PICC.  As we cannot do FQ given prolonged QTc, best option is going to be continuous infusion Zosyn, given the susceptibility profile.  2.  Transition to linezolid for MRSE; can do oral at home - will be simpler than IV vancomycin infusions multiple times daily at home and trough monitoring.  3.  Proceed with PICC.  4.  Patient agreeable to home IV abx infusion, PICC, and home dressing changes.  Will consult CM to arrange.  F/U with me in office in one week.  Probably will need 2 more weeks of IV therapy.    DISCHARGE ORDERS:  Okay from my perspective for discharge tomorrow if PICC placed and home antibiotics arranged.  Discharge plan discussed with patient.  Patient is to be discharged on Zosyn 13.5 g iv daily continuous infusion and linezolid 600 mg po bid for tentatively 2 weeks, through 3/31. IV antibiotics to be supplied by home infusion and given via PICC.  Weekly labs to include CBC/diff, CMP, ESR, CRP and to be faxed to [facility] office at [phone] ATTN: Dr. Nguyen.    Weekly PICC dressing changes to be performed by home health.    Follow-up with me at [facility] office ([phone]) in one week.    Utilization management:  I spent a total of 30 minutes on coordination of care for this discharge.      David Nguyen MD  3/16/2017  4:28 PM      Portions of this note may have been created with a voice recognition program.  Efforts were made to edit the dictations.  However, words are occassionally mistranscribed and sound alike errors may occur.

## 2017-03-16 NOTE — PROGRESS NOTES
CTS Progress Note      POD 3 s/p I&D of left groin        Subjective  No complaints.  Feels good today.  States he's less swollen than recently      Objective    Physical Exam:   Vital Signs   Temp:  [97.6 °F (36.4 °C)-99 °F (37.2 °C)] 97.7 °F (36.5 °C)  Heart Rate:  [76-97] 83  Resp:  [12-20] 12  BP: (123-148)/(58-82) 143/82   GEN: NAD   CV: Clear with positive systolic murmur    RESP: Clear to auscultation unlabored    EXT: Warm with trace edema   Incision: Bandage with scant serous drainage      Results       Results from last 7 days  Lab Units 03/16/17  0539   WBC 10*3/mm3 3.69   HEMOGLOBIN g/dL 9.7*   HEMATOCRIT % 29.1*   PLATELETS 10*3/mm3 181       Results from last 7 days  Lab Units 03/16/17  0539   SODIUM mmol/L 135   POTASSIUM mmol/L 4.0   CHLORIDE mmol/L 101   TOTAL CO2 mmol/L 27.0   BUN mg/dL 6*   CREATININE mg/dL 1.20   GLUCOSE mg/dL 99   CALCIUM mg/dL 9.3       Results from last 7 days  Lab Units 03/12/17  2218   INR  1.11           Assessment/Plan   Status post I&D of left groin status post left femoral artery cutdown with stenting and angioplasty of the left SFA  Principal Problem:    Open wound of left hip and thigh with complication  Active Problems:    COPD (chronic obstructive pulmonary disease)    Diabetes mellitus    Hypertension    Peripheral vascular disease    LANA (acute kidney injury)    Coronary artery disease involving native heart    Iron deficiency anemia    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  Home soon with wound care and antibiotics    ROSCOE Justice  03/16/17  8:11 AM        As above.  Wants to go home.  OK to D/C when all agree and HH arranged for wound care and antibiotics.      Trung Rodriguez MD  [facility]  03/16/17   10:51 AM

## 2017-03-17 LAB
ABO + RH BLD: NORMAL
ABO + RH BLD: NORMAL
ANION GAP SERPL CALCULATED.3IONS-SCNC: 3 MMOL/L (ref 3–11)
ANION GAP SERPL CALCULATED.3IONS-SCNC: 4 MMOL/L (ref 3–11)
BACTERIA SPEC AEROBE CULT: NORMAL
BACTERIA SPEC AEROBE CULT: NORMAL
BH BB BLOOD EXPIRATION DATE: NORMAL
BH BB BLOOD EXPIRATION DATE: NORMAL
BH BB BLOOD TYPE BARCODE: 6200
BH BB BLOOD TYPE BARCODE: 6200
BH BB DISPENSE STATUS: NORMAL
BH BB DISPENSE STATUS: NORMAL
BH BB PRODUCT CODE: NORMAL
BH BB PRODUCT CODE: NORMAL
BH BB UNIT NUMBER: NORMAL
BH BB UNIT NUMBER: NORMAL
BUN BLD-MCNC: 10 MG/DL (ref 9–23)
BUN BLD-MCNC: 11 MG/DL (ref 9–23)
BUN/CREAT SERPL: 4.5 (ref 7–25)
BUN/CREAT SERPL: 5 (ref 7–25)
CALCIUM SPEC-SCNC: 9 MG/DL (ref 8.7–10.4)
CALCIUM SPEC-SCNC: 9.1 MG/DL (ref 8.7–10.4)
CHLORIDE SERPL-SCNC: 98 MMOL/L (ref 99–109)
CHLORIDE SERPL-SCNC: 98 MMOL/L (ref 99–109)
CO2 SERPL-SCNC: 28 MMOL/L (ref 20–31)
CO2 SERPL-SCNC: 30 MMOL/L (ref 20–31)
CREAT BLD-MCNC: 2.2 MG/DL (ref 0.6–1.3)
CREAT BLD-MCNC: 2.2 MG/DL (ref 0.6–1.3)
CROSSMATCH INTERPRETATION: NORMAL
CROSSMATCH INTERPRETATION: NORMAL
DEPRECATED RDW RBC AUTO: 53.6 FL (ref 37–54)
ERYTHROCYTE [DISTWIDTH] IN BLOOD BY AUTOMATED COUNT: 14.8 % (ref 11.3–14.5)
GFR SERPL CREATININE-BSD FRML MDRD: 31 ML/MIN/1.73
GFR SERPL CREATININE-BSD FRML MDRD: 31 ML/MIN/1.73
GLUCOSE BLD-MCNC: 103 MG/DL (ref 70–100)
GLUCOSE BLD-MCNC: 93 MG/DL (ref 70–100)
GLUCOSE BLDC GLUCOMTR-MCNC: 113 MG/DL (ref 70–130)
GLUCOSE BLDC GLUCOMTR-MCNC: 85 MG/DL (ref 70–130)
GLUCOSE BLDC GLUCOMTR-MCNC: 94 MG/DL (ref 70–130)
GLUCOSE BLDC GLUCOMTR-MCNC: 97 MG/DL (ref 70–130)
HCT VFR BLD AUTO: 27.3 % (ref 38.9–50.9)
HGB BLD-MCNC: 9.1 G/DL (ref 13.1–17.5)
MCH RBC QN AUTO: 33.1 PG (ref 27–31)
MCHC RBC AUTO-ENTMCNC: 33.3 G/DL (ref 32–36)
MCV RBC AUTO: 99.3 FL (ref 80–99)
PLATELET # BLD AUTO: 187 10*3/MM3 (ref 150–450)
PMV BLD AUTO: 9.4 FL (ref 6–12)
POTASSIUM BLD-SCNC: 3.4 MMOL/L (ref 3.5–5.5)
POTASSIUM BLD-SCNC: 3.8 MMOL/L (ref 3.5–5.5)
RBC # BLD AUTO: 2.75 10*6/MM3 (ref 4.2–5.76)
SODIUM BLD-SCNC: 130 MMOL/L (ref 132–146)
SODIUM BLD-SCNC: 131 MMOL/L (ref 132–146)
UNIT  ABO: NORMAL
UNIT  ABO: NORMAL
UNIT  RH: NORMAL
UNIT  RH: NORMAL
WBC NRBC COR # BLD: 4.35 10*3/MM3 (ref 3.5–10.8)

## 2017-03-17 PROCEDURE — 80048 BASIC METABOLIC PNL TOTAL CA: CPT | Performed by: INTERNAL MEDICINE

## 2017-03-17 PROCEDURE — 25010000002 PIPERACILLIN SOD-TAZOBACTAM PER 1 G: Performed by: FAMILY MEDICINE

## 2017-03-17 PROCEDURE — C1894 INTRO/SHEATH, NON-LASER: HCPCS

## 2017-03-17 PROCEDURE — 97530 THERAPEUTIC ACTIVITIES: CPT

## 2017-03-17 PROCEDURE — 94640 AIRWAY INHALATION TREATMENT: CPT

## 2017-03-17 PROCEDURE — 99232 SBSQ HOSP IP/OBS MODERATE 35: CPT | Performed by: NURSE PRACTITIONER

## 2017-03-17 PROCEDURE — 82962 GLUCOSE BLOOD TEST: CPT

## 2017-03-17 PROCEDURE — 80048 BASIC METABOLIC PNL TOTAL CA: CPT | Performed by: NURSE PRACTITIONER

## 2017-03-17 PROCEDURE — 25010000002 HEPARIN (PORCINE) PER 1000 UNITS: Performed by: FAMILY MEDICINE

## 2017-03-17 PROCEDURE — 25010000002 HYDROMORPHONE PER 4 MG: Performed by: FAMILY MEDICINE

## 2017-03-17 PROCEDURE — 97116 GAIT TRAINING THERAPY: CPT

## 2017-03-17 PROCEDURE — 99024 POSTOP FOLLOW-UP VISIT: CPT | Performed by: THORACIC SURGERY (CARDIOTHORACIC VASCULAR SURGERY)

## 2017-03-17 PROCEDURE — 85027 COMPLETE CBC AUTOMATED: CPT | Performed by: INTERNAL MEDICINE

## 2017-03-17 RX ORDER — SODIUM CHLORIDE 0.9 % (FLUSH) 0.9 %
10 SYRINGE (ML) INJECTION AS NEEDED
Status: DISCONTINUED | OUTPATIENT
Start: 2017-03-17 | End: 2017-03-22 | Stop reason: HOSPADM

## 2017-03-17 RX ORDER — OXYCODONE AND ACETAMINOPHEN 7.5; 325 MG/1; MG/1
1 TABLET ORAL EVERY 6 HOURS PRN
Qty: 20 TABLET | Refills: 0 | Status: SHIPPED | OUTPATIENT
Start: 2017-03-17 | End: 2017-03-27

## 2017-03-17 RX ORDER — SODIUM CHLORIDE 9 MG/ML
75 INJECTION, SOLUTION INTRAVENOUS CONTINUOUS
Status: DISCONTINUED | OUTPATIENT
Start: 2017-03-17 | End: 2017-03-19

## 2017-03-17 RX ORDER — OXYCODONE AND ACETAMINOPHEN 7.5; 325 MG/1; MG/1
1 TABLET ORAL EVERY 6 HOURS PRN
Status: DISCONTINUED | OUTPATIENT
Start: 2017-03-17 | End: 2017-03-22 | Stop reason: HOSPADM

## 2017-03-17 RX ADMIN — TAZOBACTAM SODIUM AND PIPERACILLIN SODIUM 3.38 G: 375; 3 INJECTION, SOLUTION INTRAVENOUS at 17:22

## 2017-03-17 RX ADMIN — TAZOBACTAM SODIUM AND PIPERACILLIN SODIUM 3.38 G: 375; 3 INJECTION, SOLUTION INTRAVENOUS at 12:10

## 2017-03-17 RX ADMIN — FUROSEMIDE 40 MG: 40 TABLET ORAL at 08:53

## 2017-03-17 RX ADMIN — CLOPIDOGREL BISULFATE 75 MG: 75 TABLET, FILM COATED ORAL at 08:52

## 2017-03-17 RX ADMIN — HEPARIN SODIUM 5000 UNITS: 5000 INJECTION, SOLUTION INTRAVENOUS; SUBCUTANEOUS at 20:45

## 2017-03-17 RX ADMIN — HYDROMORPHONE HYDROCHLORIDE 0.5 MG: 1 INJECTION, SOLUTION INTRAMUSCULAR; INTRAVENOUS; SUBCUTANEOUS at 10:00

## 2017-03-17 RX ADMIN — Medication: at 08:53

## 2017-03-17 RX ADMIN — DESMOPRESSIN ACETATE 40 MG: 0.2 TABLET ORAL at 08:52

## 2017-03-17 RX ADMIN — TAZOBACTAM SODIUM AND PIPERACILLIN SODIUM 3.38 G: 375; 3 INJECTION, SOLUTION INTRAVENOUS at 00:34

## 2017-03-17 RX ADMIN — TAMSULOSIN HYDROCHLORIDE 0.4 MG: 0.4 CAPSULE ORAL at 08:53

## 2017-03-17 RX ADMIN — LINEZOLID 600 MG: 600 TABLET, FILM COATED ORAL at 08:52

## 2017-03-17 RX ADMIN — Medication 325 MG: at 17:22

## 2017-03-17 RX ADMIN — HYDROMORPHONE HYDROCHLORIDE 0.5 MG: 1 INJECTION, SOLUTION INTRAMUSCULAR; INTRAVENOUS; SUBCUTANEOUS at 13:31

## 2017-03-17 RX ADMIN — BUDESONIDE AND FORMOTEROL FUMARATE DIHYDRATE 2 PUFF: 80; 4.5 AEROSOL RESPIRATORY (INHALATION) at 08:33

## 2017-03-17 RX ADMIN — HYDROMORPHONE HYDROCHLORIDE 0.5 MG: 1 INJECTION, SOLUTION INTRAMUSCULAR; INTRAVENOUS; SUBCUTANEOUS at 06:08

## 2017-03-17 RX ADMIN — ASPIRIN 81 MG: 81 TABLET, CHEWABLE ORAL at 08:53

## 2017-03-17 RX ADMIN — OXYCODONE HYDROCHLORIDE AND ACETAMINOPHEN 1 TABLET: 7.5; 325 TABLET ORAL at 20:45

## 2017-03-17 RX ADMIN — TAZOBACTAM SODIUM AND PIPERACILLIN SODIUM 3.38 G: 375; 3 INJECTION, SOLUTION INTRAVENOUS at 05:31

## 2017-03-17 RX ADMIN — VITAMIN D, TAB 1000IU (100/BT) 2000 UNITS: 25 TAB at 08:52

## 2017-03-17 RX ADMIN — HYDROMORPHONE HYDROCHLORIDE 0.5 MG: 1 INJECTION, SOLUTION INTRAMUSCULAR; INTRAVENOUS; SUBCUTANEOUS at 18:25

## 2017-03-17 RX ADMIN — LINEZOLID 600 MG: 600 TABLET, FILM COATED ORAL at 20:45

## 2017-03-17 RX ADMIN — Medication 325 MG: at 08:52

## 2017-03-17 RX ADMIN — HEPARIN SODIUM 5000 UNITS: 5000 INJECTION, SOLUTION INTRAVENOUS; SUBCUTANEOUS at 13:24

## 2017-03-17 RX ADMIN — HEPARIN SODIUM 5000 UNITS: 5000 INJECTION, SOLUTION INTRAVENOUS; SUBCUTANEOUS at 05:56

## 2017-03-17 NOTE — THERAPY DISCHARGE NOTE
Acute Care - Occupational Therapy Treatment Note/Discharge  [facility]     Patient Name: Sai Weinberg  : 1960  MRN: [id number]  Today's Date: 3/17/2017  Onset of Illness/Injury or Date of Surgery Date: 17  Date of Referral to OT: 17  Referring Physician: MD Ayan      Admit Date: 3/12/2017    Visit Dx:     ICD-10-CM ICD-9-CM   1. Open wound of left hip and thigh with complication, initial encounter S71.002A 890.1    S71.102A    2. Impaired mobility and ADLs Z74.09 799.89   3. Impaired functional mobility, balance, gait, and endurance Z74.09 V49.89     Patient Active Problem List   Diagnosis   • Dyslipidemia   • Arthritis   • COPD (chronic obstructive pulmonary disease)   • Diabetes mellitus   • Esophageal reflux   • Hypertension   • Malignant neoplasm of colon   • Heart attack   • Peripheral vascular disease   • Chewing tobacco use   • Peripheral arterial disease   • LANA (acute kidney injury)   • Coronary artery disease involving native heart   • Iron deficiency anemia   • Coal workers pneumoconiosis   • Open wound of left hip and thigh with complication   • Leg wound, left             Adult Rehabilitation Note       17 1005 17 0930       Rehab Assessment/Intervention    Discipline occupational therapist  -MATT physical therapist  -MB     Document Type therapy note (daily note);discharge summary  -MATT therapy note (daily note)  -MB     Subjective Information agree to therapy;no complaints  -MATT agree to therapy;complains of;fatigue;pain  -MB     Patient Effort, Rehab Treatment excellent  -MATT good  -MB     Symptoms Noted During/After Treatment none  -MATT fatigue;increased pain  -MB     Precautions/Limitations fall precautions  -MATT fall precautions;other (see comments)   No prolonged sitting at 90 degrees L hip flexion.  -MB     Equipment Issued to Patient bathing equipment;dressing equipment  -MATT      Recorded by [MATT] Leela Burkett, OT [MB] Emmie Thompson PT     Vital Signs    Pre  Systolic BP Rehab 142  -MATT      Pre Treatment Diastolic BP 76  -AMTT      Pretreatment Heart Rate (beats/min) 80  -MATT      Pre SpO2 (%)  96  -MB     O2 Delivery Pre Treatment  room air  -MB     Post SpO2 (%)  92  -MB     O2 Delivery Post Treatment  room air  -MB     Pre Patient Position Supine  -MATT Sitting  -MB     Intra Patient Position Standing  -MATT Standing  -MB     Post Patient Position Sitting  -MATT Sitting  -MB     Recorded by [MATT] Leela Burkett, OT [MB] Emmie Thompson, PT     Pain Assessment    Pain Assessment No/denies pain  -MATT 0-10  -MB     Pain Score  10  -MB     Post Pain Score  5  -MB     Pain Type  Chronic pain  -MB     Pain Location  Knee  -MB     Pain Orientation  Left  -MB     Pain Intervention(s)  Repositioned;Medication (See MAR);Ambulation/increased activity   Nsg aware.  -MB     Response to Interventions  Improved  -MB     Recorded by [MATT] Leela Burkett, OT [MB] Emmie Thompson, PT     Cognitive Assessment/Intervention    Current Cognitive/Communication Assessment functional  -MATT functional  -MB     Orientation Status  oriented x 4  -MB     Follows Commands/Answers Questions 100% of the time;able to follow single-step instructions  -MATT 100% of the time;able to follow single-step instructions  -MB     Personal Safety WNL/WFL  -MATT mild impairment;decreased awareness, need for assist;decreased awareness, need for safety  -MB     Personal Safety Interventions fall prevention program maintained;nonskid shoes/slippers when out of bed  -MATT fall prevention program maintained;gait belt;muscle strengthening facilitated;nonskid shoes/slippers when out of bed  -MB     Recorded by [MATT] Leela Burkett, OT [MB] Emmie Thompson, PT     Bed Mobility, Assessment/Treatment    Bed Mobility, Scoot/Bridge, [place] conditional independence  -MATT      Bed Mob, Supine to Sit, [place] conditional independence  -MATT not tested   Pt. received [facility].  -MB     Recorded by [MATT] Leela Burkett OT [MB] Emmie MICHAELS  Jay, PT     Transfer Assessment/Treatment    Transfers, Bed-Chair [place] independent  -MATT      Transfers, Chair-Bed [place] independent  -MATT      Transfers, Sit-Stand [place] independent  -MATT contact guard assist  -MB     Transfers, Stand-Sit [place] independent  -MATT contact guard assist;verbal cues required  -MB     Transfers, Sit-Stand-Sit, Assist Device  other (see comments)   HHA.  Pt. declined RW.  -MB     Transfer, Safety Issues  balance decreased during turns;weight-shifting ability decreased  -MB     Transfer, Impairments  strength decreased;impaired balance;pain  -MB     Transfer, Comment  Pt. declined RW for transfer.  Pt. required VCs for forward weight shift and safe hand placement.  -MB     Recorded by [MATT] Leela Burkett OT [MB] Emmie Thompson, PT     Gait Assessment/Treatment    Gait, [place] Level  contact guard assist;verbal cues required  -MB     Gait, Assistive Device  --   HHA; Pt. declined RW.  -MB     Gait, Distance (Feet)  150  -MB     Gait, Gait Pattern Analysis  swing-through gait  -MB     Gait, Gait Deviations  left:;antalgic;remedios decreased;bilateral:;step length decreased  -MB     Gait, Safety Issues  balance decreased during turns;weight-shifting ability decreased  -MB     Gait, Impairments  strength decreased;impaired balance;pain  -MB     Gait, Comment  Pt. amb slowly w/ HHA and VCs for PLB.  Pt. required 3 standing rest breaks and c/o increased L knee pain w/ ambulation.  Provided education regarding use of AD to improve safety, dec fall risk, and dec pain while ambulating.  -MB     Recorded by  [MB] Emmie Thompson, PT     Lower Body Bathing Assessment/Training    LB Bathing Assess/Train Assistive Device long-handled sponge  -MATT      LB Bathing Assess/Train, Position sitting  -MATT      LB Bathing Assess/Train, [place] Level set up required;verbal cues required  -MATT      LB Bathing Assess/Train, Impairments ROM decreased;decreased  flexibility;pain  -MATT      LB Bathing Assess/Train, Comment Post education patient was able to simulate washing legs and feet with bath spong and toe sponge.  -MATT      Recorded by [MATT] Leela Burkett OT      Lower Body Dressing Assessment/Training    LB Dressing Assess/Train, Clothing Type doffing:;donning:;slipper socks;pants;shoes  -MATT      LB Dressing Assess/Train, Assist Device sock-aid;long-handled shoe horn;reacher  -MATT      LB Dressing Assess/Train, Position sitting  -MATT      LB Dressing Assess/Train, [place] set up required;supervision required  -MATT      LB Dressing Assess/Train, Impairments ROM decreased;decreased flexibility;pain  -MATT      LB Dressing Assess/Train, Comment Pt. post education and demonstration using AE he was able to demonstrate use back.  -MATT      Recorded by [MATT] Leela Burkett OT      Motor Skills/Interventions    Additional Documentation  Balance Skills Training (Group)  -MB     Recorded by  [MB] Emmie Thompson, PT     Balance Skills Training    Sitting-Level of Assistance Independent  -MATT      Standing-Level of Assistance Independent  -MATT Contact guard  -MB     Static Standing Balance Support  Left upper extremity supported  -MB     Standing-Balance Activities  Weight Shift R-L;Weight Shift A-P  -MB     Gait Balance-Level of Assistance  Contact guard  -MB     Gait Balance Support  Left upper extremity supported  -MB     Gait Balance Activities  scanning environment R/L;stepping over object  -MB     Recorded by [MATT] Leela Burkett OT [MB] Emmie Thompson, PT     Therapy Exercises    Bilateral Lower Extremities  AROM:;10 reps;sitting;ankle pumps/circles;LAQ  -MB     Recorded by  [MB] Emmie Thompson, PT     Positioning and Restraints    Pre-Treatment Position in bed  -MATT sitting in chair/recliner  -MB     Post Treatment Position chair  -MATT chair  -MB     In Chair sitting;call light within reach;encouraged to call for assist  -MATT notified nsg;sitting;call light within  reach;encouraged to call for assist;legs elevated  -MB     Recorded by [MATT] Leela Burkett, OT [MB] Emmie Thompson, PT       User Key  (r) = Recorded By, (t) = Taken By, (c) = Cosigned By    Initials Name Effective Dates    MATT Shermanny Kathy Burkett, OT 06/22/15 -     SUE Thompson, PT 03/14/16 -                 OT Goals       03/17/17 1057 03/14/17 0945       Transfer Training OT LTG    Transfer Training OT LTG, Date Established  03/14/17  -CL     Transfer Training OT LTG, Time to Achieve  by discharge  -CL     Transfer Training OT LTG, Activity Type  bed to chair /chair to bed;sit to stand/stand to sit;toilet  -CL     Transfer Training OT LTG, [place] Level  contact guard assist  -CL     Transfer Training OT LTG, Additional Goal  AAD  -CL     Transfer Training OT LTG, Outcome goal met   pt demonstrated or simulated all  -MATT      Patient Education OT LTG    Patient Education OT LTG, Date Established  03/14/17  -CL     Patient Education OT LTG, Time to Achieve  by discharge  -CL     Patient Education OT LTG, Education Type  precautions per surgeon;posture/body mechanics;positioning;1 hand/rashad technique;home safety;adaptive equipment mgmt;energy conservation;adaptive breathing  -CL     Patient Education OT LTG, Education Understanding  verbalizes understanding  -CL     Patient Education OT LTG Outcome goal met  -MATT      Bathing OT LTG    Bathing Goal OT LTG, Date Established  03/14/17  -CL     Bathing Goal OT LTG, Time to Achieve  by discharge  -CL     Bathing Goal OT LTG, Activity Type  simulates;lower body bathing  -CL     Bathing Goal OT LTG, [place] Level  supervision required  -CL     Bathing Goal OT LTG, Assist Device  sponge, long handled  -CL     Bathing Goal OT LTG, Outcome goal met  -MATT      LB Dressing OT LTG    LB Dressing Goal OT LTG, Date Established  03/14/17  -CL     LB Dressing Goal OT LTG, Time to Achieve  by discharge  -CL     LB Dressing Goal OT LTG, Activity Type  Don/doff socks   -CL     LB Dressing Goal OT LTG, [place] Level  moderate assist (50% patient effort)  -CL     LB Dressing Goal OT LTG, Additional Goal  AAD  -CL     LB Dressing Goal OT LTG, Outcome goal met   also met with pants and shoes  -MATT        User Key  (r) = Recorded By, (t) = Taken By, (c) = Cosigned By    Initials Name Provider Type    MATT Burkett, OT Occupational Therapist    CL Shweta Mobley, OT Occupational Therapist          Occupational Therapy Education     Title: PT OT SLP Therapies (Active)     Topic: Occupational Therapy (Active)     Point: ADL training (Active)    Description: Instruct learner(s) on proper safety adaptation and remediation techniques during self care or transfers.   Instruct in proper use of assistive devices.    Learning Progress Summary    Learner Readiness Method Response Comment Documented by Status   Patient Acceptance E,D,H ELENA BULLOCK AE issued and educ given and pt. demonstrated back.  Pt. then issued EC/WS handout and reviewed packet with pt.  Pt. verb. understanding stating things already doing and things could do.  03/17/17 1056 Done    Acceptance E,D NR Pt educated on appropriate safety precautions, benefits of a RW, appropriate t/f techniques, and benefits of therapy.  03/14/17 0944 Active   Family Acceptance E,D NR Pt educated on appropriate safety precautions, benefits of a RW, appropriate t/f techniques, and benefits of therapy.  03/14/17 0944 Active               Point: Precautions (Active)    Description: Instruct learner(s) on prescribed precautions during self-care and functional transfers.    Learning Progress Summary    Learner Readiness Method Response Comment Documented by Status   Patient Acceptance E,D,H ELENA BULLOCK AE issued and educ given and pt. demonstrated back.  Pt. then issued EC/WS handout and reviewed packet with pt.  Pt. verb. understanding stating things already doing and things could do.  03/17/17 1056 Done    Acceptance E,D NR Pt educated on  appropriate safety precautions, benefits of a RW, appropriate t/f techniques, and benefits of therapy.  03/14/17 0944 Active   Family Acceptance E,D NR Pt educated on appropriate safety precautions, benefits of a RW, appropriate t/f techniques, and benefits of therapy.  03/14/17 0944 Active               Point: Body mechanics (Active)    Description: Instruct learner(s) on proper positioning and spine alignment during self-care, functional mobility activities and/or exercises.    Learning Progress Summary    Learner Readiness Method Response Comment Documented by Status   Patient Acceptance E,D NR Pt educated on appropriate safety precautions, benefits of a RW, appropriate t/f techniques, and benefits of therapy.  03/14/17 0944 Active   Family Acceptance E,D NR Pt educated on appropriate safety precautions, benefits of a RW, appropriate t/f techniques, and benefits of therapy.  03/14/17 0944 Active                      User Key     Initials Effective Dates Name Provider Type Discipline    MATT 06/22/15 -  Leela Burkett, OT Occupational Therapist OT    CL 06/08/16 -  Shweta Mobley, OT Occupational Therapist OT                OT Recommendation and Plan  Anticipated Equipment Needs At Discharge:  (TBD)  Anticipated Discharge Disposition: home with assist, home with home health  Planned Therapy Interventions: adaptive equipment training, ADL retraining, bed mobility training, energy conservation, transfer training  Therapy Frequency: daily  Plan of Care Review  Plan Of Care Reviewed With: patient  Progress: progress toward functional goals as expected  Outcome Summary/Follow up Plan: Pt. met all goals dressing and undressing self Post AE issued and educated on.  Pt. performing transfers without assist, but still limited endurance.  EC/WS handouts issued and reviewed with pt.  All goals met ready for possible discharge home today.          Outcome Measures       03/17/17 1005 03/16/17 0930 03/14/17 1358    How much  help from another person do you currently need...    Turning from your back to your side while in flat bed without using bedrails?  4  -MB 4  -LS    Moving from lying on back to sitting on the side of a flat bed without bedrails?  4  -MB 4  -LS    Moving to and from a bed to a chair (including a wheelchair)?  3  -MB 3  -LS    Standing up from a chair using your arms (e.g., wheelchair, bedside chair)?  3  -MB 3  -LS    Climbing 3-5 steps with a railing?  2  -MB 2  -LS    To walk in hospital room?  3  -MB 3  -LS    AM-PAC 6 Clicks Score  19  -MB 19  -LS    How much help from another is currently needed...    Putting on and taking off regular lower body clothing? 3   set up only  -MATT      Bathing (including washing, rinsing, and drying) 3   set up only  -MATT      Toileting (which includes using toilet bed pan or urinal) 4  -MATT      Putting on and taking off regular upper body clothing 4  -MATT      Taking care of personal grooming (such as brushing teeth) 4  -MATT      Eating meals 4  -MATT      Score 22  -MATT      Functional Assessment    Outcome Measure Options AM-PAC 6 Clicks Daily Activity (OT)  -MATT AM-PAC 6 Clicks Basic Mobility (PT)  -MB AM-PAC 6 Clicks Basic Mobility (PT)  -      User Key  (r) = Recorded By, (t) = Taken By, (c) = Cosigned By    Initials Name Provider Type    MATT Burkett OT Occupational Therapist    HIRAL Conroy, PT Physical Therapist    SUE Thompson, PT Physical Therapist           Time Calculation:          Time Calculation- OT       03/17/17 1100          Time Calculation- OT    OT Start Time 1005  -      Total Timed Code Minutes- OT 36 minute(s)  -MATT      OT Received On 03/17/17  -MATT      OT Goal Re-Cert Due Date 03/24/17  -MATT        User Key  (r) = Recorded By, (t) = Taken By, (c) = Cosigned By    Initials Name Provider Type    MATT Bukrett OT Occupational Therapist          Therapy Charges for Today     Code Description Service Date Service Provider Modifiers Qty     [id number]  OT THERAPEUTIC ACT EA 15 MIN 3/17/2017 Leela Burkett OT GO 2               OT Discharge Summary  Anticipated Discharge Disposition: home with assist, home with home health  Reason for Discharge: All goals achieved  Outcomes Achieved: Able to achieve all goals within established timeline  Discharge Destination: Home with assist, Home with home health    Leela Burkett OT  3/17/2017

## 2017-03-17 NOTE — PROGRESS NOTES
"Adult Nutrition  Assessment/PES    Patient Name:  Sai Weinberg  YOB: 1960  MRN: [id number]  Admit Date:  3/12/2017    Assessment Date:  3/17/2017        Reason for Assessment       03/17/17 1236    Reason for Assessment    Endocrine DM    Pulmonary/Critical Care COPD      03/17/17 1233    Reason for Assessment    Reason For Assessment/Visit length of stay    Time Spent (min) 20    Other diagnosis --   open wound L hip/thigh with complication, left leg wound;  s/p I & D  left groin this adm.                Anthropometrics       03/17/17 1234    Anthropometrics    Height 165.1 cm (65\")    Weight 115 kg (253 lb)    Ideal Body Weight (IBW)    Ideal Body Weight (IBW), Male (kg) 62.51    % Ideal Body Weight 183.97    Body Mass Index (BMI)    BMI (kg/m2) 42.19            Labs/Tests/Procedures/Meds       03/17/17 1234    Labs/Tests/Procedures/Meds    Labs/Tests Review Reviewed                Nutrition Prescription Ordered       03/17/17 1234    Nutrition Prescription PO    Current PO Diet Regular    Common Modifiers Cardiac;Consistent Carbohydrate            Evaluation of Received Nutrient/Fluid Intake       03/17/17 1234    PO Evaluation    Number of Meals 4    % PO Intake 100              Problem/Interventions:        Problem 1       03/17/17 1236    Nutrition Diagnoses Problem 1    Problem 1 Increased Nutrient Needs    Macronutrient Protein    Etiology (related to) Medical Diagnosis   open wound L hip/thigh    Signs/Symptoms (evidenced by) --   open wound documented and pt s/p I &D L groin this adm.                    Intervention Goal       03/17/17 1235    Intervention Goal    PO Maintain intake            Nutrition Intervention       03/17/17 1235    Nutrition Intervention    RD/Tech Action Follow Tx progress;Supplement provided            Nutrition Prescription       03/17/17 1238    Nutrition Prescription PO    New PO Prescription Ordered? Yes      03/17/17 1235    Nutrition Prescription PO    PO " Prescription Begin/change supplement    Supplement Boost Glucose Control    Supplement Frequency 2 times a day   one boost glucose control and one prostat/d            Education/Evaluation       03/17/17 1235    Monitor/Evaluation    Monitor Per protocol        Comments:      Electronically signed by:  Mili Young MS,RD,LD  03/17/17 12:40 PM

## 2017-03-17 NOTE — PROGRESS NOTES
"[facility]    INPATIENT PROGRESS NOTE  3/17/2017      PATIENT NAME: Sai Weinberg  :  1960  MRN:  [id number]  Date of Admission:  3/12/2017      Antimicrobials:  linezolid 600 mg po bid  Zosyn 3.375 g iv q6h    MAR reviewed.      Reason for consultation:  Left groin/thigh abscess and cellulitis, status post recent left femoral cutdown; Pseudomonas and MRSE    Interval history:  PICC placed today.  Discharge on hold today due to increasing Cr.  Urinating well.  No diarrhea.  Drinking plenty of water.  Vancomycin stopped yesterday and started on linezolid.    ROS:  No fevers/chills overnight.  No new rashes.  No SOB or chest pain.  No nausea/vomiting/diarrhea.  Denies side effects from antimicrobials.    Objective:  Temp (24hrs), Av.3 °F (36.8 °C), Min:97.5 °F (36.4 °C), Max:99.1 °F (37.3 °C)    Visit Vitals   • /76 (BP Location: Left arm, Patient Position: Lying)   • Pulse 71   • Temp 97.5 °F (36.4 °C) (Oral)   • Resp 16   • Ht 65\" (165.1 cm)   • Wt 253 lb (115 kg)   • SpO2 91%   • BMI 42.1 kg/m2       Physical Examination:  GENERAL: Awake and alert, in no acute distress.   LINES: right UE PICC.  HEENT: Normocephalic, atraumatic. No conjunctival injection or subconjunctival hemorrhage. No icterus.  MSK: Left thigh with improved edema and nearly resolved erythema, and warmth. There is a large clean dressing over the left groin.  SKIN: Warm and dry without rash.  EXT:  Chronic 2+ B JACQUELINE.  Dry skin on legs.  NEURO: A&Ox4. No focal deficits. Face symmetric. Speech fluent. Moves all extremities well.   PSYCHIATRIC: Normal insight and judgement. Cooperative. Normal affect.    Laboratory Data:      Results from last 7 days  Lab Units 17  0549 17  0539 03/15/17  0644   WBC 10*3/mm3 4.35 3.69 3.94   HEMOGLOBIN g/dL 9.1* 9.7* 9.4*   HEMATOCRIT % 27.3* 29.1* 27.8*   PLATELETS 10*3/mm3 187 181 174       Results from last 7 days  Lab Units 17  1239   SODIUM mmol/L " 130*   POTASSIUM mmol/L 3.4*   CHLORIDE mmol/L 98*   TOTAL CO2 mmol/L 28.0   BUN mg/dL 11   CREATININE mg/dL 2.20*   GLUCOSE mg/dL 103*   CALCIUM mg/dL 9.1     Cr up from 1.2          Results from last 7 days  Lab Units 03/14/17  1111   VANCOMYCIN TR mcg/mL 15.60           Microbiology:  BLOOD CULTURE   Date Value Ref Range Status   03/12/2017 No growth at 4 days  Preliminary   03/12/2017 No growth at 4 days  Preliminary           WOUND CULTURE   Date Value Ref Range Status   03/13/2017 Scant growth (1+) Pseudomonas aeruginosa (A)  Final   03/13/2017 Scant growth (1+) Staphylococcus epidermidis (A)  Final     Comment:     This isolate does not demonstrate inducible clindamycin resistance in vitro.           Radiology:  None new    DISCUSSION:  56 y.o. male with history of diabetes and peripheral vascular disease who recently underwent a left femoral cutdown with angioplasty and stent placement is admitted with postoperative left thigh cellulitis and wound dehiscence with seroma versus abscess formation. Incision and drainage performed with 200 milliliters of serous fluid. Exam is consistent with a mild cellulitis of the left thigh. Patient reported copious amounts of serosanguineous drainage at home for 2-3 weeks.      PROBLEM LIST:   1. Postoperative left femoral fluid collection, seroma plus likely abscess, status post recent left femoral cutdown with left superficial femoral artery angioplasty with stent (no graft). Status post incision and drainage with 200 mL serous fluid removed. Culture is positive for Pseudomonas (S-FQ, Zosyn, meropenem; intermediate to cefepime and aztreonam; resistant to ceftazidime) and Staph epi, methicillin resistant.  2. Left thigh cellulitis, related to #1. Slowly improving.  3. Controlled diabetes mellitus type 2.  4. Obesity.  5. Peripheral vascular disease, status post prior right femoral to popliteal bypass graft and more recent left femoral cutdown with angioplasty and stent.  6.  Prolonged QTc. Pseudomonas is susceptible to fluoroquinolone but patient with prolonged QTc on EKG, so probably wise not to use oral Levaquin for discharge.  7. LANA, new.  Possible vancomycin contributing.      PLAN:  1. PICC placed today.   2. As we cannot use FQ given prolonged QTc, best option for home is continuous infusion Zosyn, given the susceptibility profile.  3. Continue linezolid for MRSE; will be simpler than IV vancomycin infusions multiple times daily at home and trough monitoring.  Vancomycin may be contributing to his LANA, as well, and this has been discontinued.  4. Home infusion has been arranged.  Pt ok for discharge from my perspective over the weekend if Cr is improving.       DISCHARGE ORDERS:  Okay from my perspective for discharge over weekend if renal function improving.  Discharge plan discussed with patient.  Patient is to be discharged on Zosyn 13.5 g iv daily continuous infusion and linezolid 600 mg po bid (script on chart) for tentatively 2 weeks, through 3/31. IV antibiotics to be supplied by home infusion and given via PICC.  Weekly labs to include CBC/diff, CMP, ESR, CRP and to be faxed to [facility] office at [phone] ATTN: Dr. Nguyen.   Weekly PICC dressing changes to be performed by [facility].   Follow-up with me at [facility] office ([phone]) in one week.    David Nguyen MD  3/17/2017  3:56 PM      Portions of this note may have been created with a voice recognition program.  Efforts were made to edit the dictations.  However, words are occassionally mistranscribed and sound alike errors may occur.

## 2017-03-17 NOTE — PLAN OF CARE
Problem: Patient Care Overview (Adult)  Goal: Plan of Care Review  Outcome: Ongoing (interventions implemented as appropriate)    03/17/17 0326   Coping/Psychosocial Response Interventions   Plan Of Care Reviewed With patient   Patient Care Overview   Progress improving       Goal: Adult Individualization and Mutuality  Outcome: Ongoing (interventions implemented as appropriate)  Goal: Discharge Needs Assessment  Outcome: Ongoing (interventions implemented as appropriate)    03/17/17 0326   Discharge Needs Assessment   Concerns To Be Addressed basic needs concerns   Readmission Within The Last 30 Days previous discharge plan unsuccessful   Community Agency Name(S) awaiting [facility] placement   Equipment Needed After Discharge wound care supplies   Current Health   Outpatient/Agency/Support Group Needs inpatient rehabilitation facility (specify)   Self-Care   Equipment Currently Used at Home cane, straight   Living Environment   Transportation Available car;family or friend will provide         Problem: Infection, Risk/Actual (Adult)  Goal: Identify Related Risk Factors and Signs and Symptoms  Outcome: Ongoing (interventions implemented as appropriate)    03/17/17 0326   Infection, Risk/Actual   Infection, Risk/Actual: Related Risk Factors skin integrity impairment;surgery/procedure;tissue perfusion altered   Signs and Symptoms (Infection, Risk/Actual) drainage;edema;weakness       Goal: Infection Prevention/Resolution  Outcome: Ongoing (interventions implemented as appropriate)    03/17/17 0326   Infection, Risk/Actual (Adult)   Infection Prevention/Resolution making progress toward outcome

## 2017-03-17 NOTE — PLAN OF CARE
Problem: Patient Care Overview (Adult)  Goal: Plan of Care Review  Outcome: Ongoing (interventions implemented as appropriate)    03/17/17 1332   Coping/Psychosocial Response Interventions   Plan Of Care Reviewed With patient   Patient Care Overview   Progress progress toward functional goals as expected   Outcome Evaluation   Outcome Summary/Follow up Plan Pt with limited ambulation d/t SOA and L knee pain/buckling. Reports this is as far as he ever walks at home. Pt did not require direct assistance with ambulation today, but still displays safety issues with OOB mobility. Pt will continue to benefit from IPPT to address these concerns.         Problem: Inpatient Physical Therapy  Goal: Bed Mobility Goal LTG- PT  Outcome: Ongoing (interventions implemented as appropriate)    03/17/17 1332   Bed Mobility PT LTG   Bed Mobility PT LTG, Outcome goal ongoing       Goal: Transfer Training Goal 1 LTG- PT  Outcome: Outcome(s) achieved Date Met:  03/17/17 03/14/17 1530 03/17/17 1332   Transfer Training PT LTG   Transfer Training PT LTG, Date Established 03/14/17 --    Transfer Training PT LTG, Time to Achieve 2 wks --    Transfer Training PT LTG, Activity Type sit to stand/stand to sit --    Transfer Training PT LTG, [place] Level conditional independence --    Transfer Training PT LTG, Assist Device cane, straight --    Transfer Training PT LTG, Outcome --  goal met       Goal: Gait Training Goal LTG- PT  Outcome: Ongoing (interventions implemented as appropriate)    03/14/17 1530 03/17/17 1332   Gait Training PT LTG   Gait Training Goal PT LTG, Date Established 03/14/17 --    Gait Training Goal PT LTG, Time to Achieve 2 wks --    Gait Training Goal PT LTG, [place] Level conditional independence --    Gait Training Goal PT LTG, Assist Device cane, straight --    Gait Training Goal PT LTG, Distance to Achieve 200 --    Gait Training Goal PT LTG, Outcome --  goal ongoing       Goal: Stair Training Goal LTG-  PT  Outcome: Ongoing (interventions implemented as appropriate)    03/14/17 1530 03/17/17 1332   Stair Training PT LTG   Stair Training Goal PT LTG, Date Established 03/14/17 --    Stair Training Goal PT LTG, Time to Achieve 2 wks --    Stair Training Goal PT LTG, Number of Steps 7 --    Stair Training Goal PT LTG, [place] Level contact guard assist --    Stair Training Goal PT LTG, Assist Device 1 handrail --    Stair Training Goal PT LTG, Outcome --  goal ongoing

## 2017-03-17 NOTE — PROGRESS NOTES
[facility] Medicine Services  INPATIENT PROGRESS NOTE    Date of Admission: 3/12/2017  Length of Stay: 5  Primary Care Physician: Dewayne Cole MD    Subjective   CC:  femoral mass  HPI:  Patient is sitting up in chair without any new complaints or issues.  Denies chest pain, shortness of breath or abdominal pain.  No fever or chills.  Making arrangements for discharge home with home health.        Review of Systems   Gen-no fevers, chills  CV-no chest pain, palpitations  Resp-no cough, + dyspnea with exertion  GI-no N/V/D, abd pain    Objective      Temp:  [97.5 °F (36.4 °C)-99.1 °F (37.3 °C)] 97.5 °F (36.4 °C)  Heart Rate:  [58-77] 71  Resp:  [16-20] 16  BP: (107-152)/(52-76) 142/76  Physical Exam     Constitutional: awake, alert, comfortable, well developed, sitting up in chair  Eyes: PERRLA, sclerae anicteric, no conjunctival injection  Respiratory: Clear to auscultation bilaterally, nonlabored respirations   Cardiovascular: RRR, pos YEYO, rubs, or gallops.  Gastrointestinal: Positive bowel sounds, soft, nontender, nondistended, obese  Psychiatric: oriented x 3, appropriate affect, cooperative  Neurologic: Strength symmetric in all extremities, Cranial Nerves grossly intact to confrontation   Skin, left inguinal wound dressed with clean dressing    Results Review:    I have reviewed the labs, radiology results and diagnostic studies.      Results from last 7 days  Lab Units 03/17/17  0549   WBC 10*3/mm3 4.35   HEMOGLOBIN g/dL 9.1*   PLATELETS 10*3/mm3 187       Results from last 7 days  Lab Units 03/17/17  1239   SODIUM mmol/L 130*   POTASSIUM mmol/L 3.4*   CHLORIDE mmol/L 98*   TOTAL CO2 mmol/L 28.0   BUN mg/dL 11   CREATININE mg/dL 2.20*   GLUCOSE mg/dL 103*   CALCIUM mg/dL 9.1       Culture Data: Cultures:    BLOOD CULTURE   Date Value Ref Range Status   03/12/2017 No growth at 3 days  Preliminary   03/12/2017 No growth at 3 days  Preliminary     WOUND CULTURE   Date Value Ref  Range Status   03/13/2017 Scant growth (1+) Pseudomonas aeruginosa (A)  Final   03/13/2017 Scant growth (1+) Staphylococcus epidermidis (A)  Final     Comment:     This isolate does not demonstrate inducible clindamycin resistance in vitro.         Radiology Data:     I have reviewed the medications.    Assessment/Plan     Problem List  Principal Problem:    Open wound of left hip and thigh with complication  Active Problems:    COPD (chronic obstructive pulmonary disease)    Diabetes mellitus    Hypertension    Peripheral vascular disease    LANA (acute kidney injury)    Coronary artery disease involving native heart    Iron deficiency anemia    Coal workers pneumoconiosis    Leg wound, left           Assessment/Plan:    Left leg wound:  --s/p femoral stenting/angioplasty of left superficial artery, continue twice a day dressing changes  --Continue IV zosyn and PO linezolid, ID following   --Wound positive for coag-negative staph and pseudomonas  --Dr. Rosa did I and D of lesion on 3-14     LANA:  --increased to 2.2 today (1.2 yesterday), repeated and same result  --Could be from recent bactrim use  --monitor   --hold Lasix  --Bladder scan  --NS @75 overnight      T2DM:  --Hold orals, A1C, SSI, Accuchecks      COPD, Coal workers' pneumoconiosis  --Currently Stable      HTN:  --Hold ACE-I, HCTZ due to LANA  --Monitor, add PRN's if needed      Iron deficiency anemia, chronic: (stable)  --11/33 at outside hospital , down to 9 now  --will follow      Heart [place]  --followed by Cards per pt, sees [facility]       Peripheral Vascular disease.      CAD:  --Currently stable      Code Status: Full     DVT prophylaxis:sq heparin, teds and scds       Discharge Planning: I expect patient to be discharged to home tomorrow if creatinine better.  CM has made arrangements for home health for IV infusion, dressing change, PICC care and weekly labs faxed to [facility].  Zosyn to be delivered to hospital room prior to discharge but cannot  deliver until discharge order placed.      Suzy Boogie, APRN   03/17/17   2:04 PM    Please note that portions of this note may have been completed with a voice recognition program. Efforts were made to edit the dictations, but occasionally words are mistranscribed.

## 2017-03-17 NOTE — PAYOR COMM NOTE
"Rogelio Hastings (56 y.o. Male) CLINICAL UPDATE.    Date of Birth Social Security Number Address Home Phone MRN    1960  [address] [facility] [address] [phone] [id number]    Faith Marital Status          None        Admission Date Admission Type Admitting Provider Attending Provider Department, Room/Bed    3/12/17 Elective Jamarcus Carl MD Kakaji, Hazem, MD [facility] 6B, N635/1    Discharge Date Discharge Disposition Discharge Destination                      Attending Provider: Jamarcus Carl MD     Allergies:  No Known Allergies    Isolation:  None   Infection:  None   Code Status:  FULL    Ht:  65\" (165.1 cm)   Wt:  253 lb (115 kg)    Admission Cmt:  None   Principal Problem:  Open wound of left hip and thigh with complication [S71.002A,S71.102A] More...                 Active Insurance as of 3/12/2017     Primary Coverage     Payor Plan Insurance Group Employer/Plan Group    [employer] [place] [employer] [place]      Payor Plan Address Payor Plan Phone Number Effective From Effective To    [address] [address] [phone] 10/21/2016     [place] [address]       Subscriber Name Subscriber Birth Date Member ID       ROGELIO HASTINGS 1960 [id number]                 Emergency Contacts      (Rel.) Home Phone Work Phone Mobile Phone    Karina Brennan (Daughter) [phone] -- --            Lab Results (last 72 hours)     Procedure Component Value Units Date/Time    POC Glucose Fingerstick [[id number]]  (Abnormal) Collected:  03/14/17 1647    Specimen:  Blood Updated:  03/14/17 1649     Glucose 141 (H) mg/dL     Narrative:       Meter: [id number] : [id number] Amanda Zapata    POC Glucose Fingerstick [[id number]]  (Abnormal) Collected:  03/14/17 2059    Specimen:  Blood Updated:  03/14/17 2101     Glucose 148 (H) mg/dL     Narrative:       Meter: [id number] : [id number] Gloria Cortes    POC Glucose Fingerstick [[id number]]  (Abnormal) Collected: "  03/15/17 0530    Specimen:  Blood Updated:  03/15/17 0531     Glucose 136 (H) mg/dL     Narrative:       Meter: [id number] : [id number] Gloria Cortes    POC Glucose Fingerstick [[id number]]  (Normal) Collected:  03/15/17 0732    Specimen:  Blood Updated:  03/15/17 0734     Glucose 124 mg/dL     Narrative:       Meter: [id number] : [id number] Amanda Zapata    CBC (No Diff) [[id number]]  (Abnormal) Collected:  03/15/17 0644    Specimen:  Blood Updated:  03/15/17 0736     WBC 3.94 10*3/mm3      RBC 2.79 (L) 10*6/mm3      Hemoglobin 9.4 (L) g/dL      Hematocrit 27.8 (L) %      MCV 99.6 (H) fL      MCH 33.7 (H) pg      MCHC 33.8 g/dL      RDW 14.8 (H) %      RDW-SD 54.8 (H) fl      MPV 9.6 fL      Platelets 174 10*3/mm3     Basic Metabolic Panel [[id number]]  (Abnormal) Collected:  03/15/17 0644    Specimen:  Blood Updated:  03/15/17 0758     Glucose 116 (H) mg/dL      BUN 6 (L) mg/dL      Creatinine 1.00 mg/dL      Sodium 133 mmol/L      Potassium 3.5 mmol/L      Chloride 98 (L) mmol/L      CO2 28.0 mmol/L      Calcium 9.1 mg/dL      eGFR Non African Amer 77 mL/min/1.73      BUN/Creatinine Ratio 6.0 (L)      Anion Gap 7.0 mmol/L     Narrative:       National Kidney Foundation Guidelines    Stage                           Description                             GFR                      1                               Normal or High                          90+  2                               Mild decrease                            60-89  3                               Moderate decrease                   30-59  4                               Severe decrease                       15-29  5                               Kidney failure                             <15    POC Glucose Fingerstick [[id number]]  (Normal) Collected:  03/15/17 1142    Specimen:  Blood Updated:  03/15/17 1146     Glucose 117 mg/dL     Narrative:       Meter: [id number] : [id number] Angelito Evans    POC Glucose Fingerstick [[id number]]   (Normal) Collected:  03/15/17 1705    Specimen:  Blood Updated:  03/15/17 1709     Glucose 124 mg/dL     Narrative:       Meter: [id number] : [id number] Angelito Evans    POC Glucose Fingerstick [[id number]]  (Normal) Collected:  03/15/17 2030    Specimen:  Blood Updated:  03/15/17 2031     Glucose 111 mg/dL     Narrative:       Meter: [id number] : [id number] Francisco Coburn    CBC & Differential [[id number]] Collected:  03/16/17 0539    Specimen:  Blood Updated:  03/16/17 0620    Narrative:       The following orders were created for panel order CBC & Differential.  Procedure                               Abnormality         Status                     ---------                               -----------         ------                     CBC Auto Differential[[id number]]         Abnormal            Final result                 Please view results for these tests on the individual orders.    CBC Auto Differential [[id number]]  (Abnormal) Collected:  03/16/17 0539    Specimen:  Blood Updated:  03/16/17 0620     WBC 3.69 10*3/mm3      RBC 2.91 (L) 10*6/mm3      Hemoglobin 9.7 (L) g/dL      Hematocrit 29.1 (L) %      .0 (H) fL      MCH 33.3 (H) pg      MCHC 33.3 g/dL      RDW 14.9 (H) %      RDW-SD 54.3 (H) fl      MPV 9.7 fL      Platelets 181 10*3/mm3      Neutrophil % 55.4 %      Lymphocyte % 27.9 %      Monocyte % 11.9 %      Eosinophil % 3.5 (H) %      Basophil % 0.8 %      Immature Grans % 0.5 %      Neutrophils, Absolute 2.04 10*3/mm3      Lymphocytes, Absolute 1.03 10*3/mm3      Monocytes, Absolute 0.44 10*3/mm3      Eosinophils, Absolute 0.13 10*3/mm3      Basophils, Absolute 0.03 10*3/mm3      Immature Grans, Absolute 0.02 10*3/mm3     Basic Metabolic Panel [[id number]]  (Abnormal) Collected:  03/16/17 0539    Specimen:  Blood Updated:  03/16/17 0642     Glucose 99 mg/dL      BUN 6 (L) mg/dL      Creatinine 1.20 mg/dL      Sodium 135 mmol/L      Potassium 4.0 mmol/L      Chloride 101 mmol/L      CO2 27.0  mmol/L      Calcium 9.3 mg/dL      eGFR Non African Amer 63 mL/min/1.73      BUN/Creatinine Ratio 5.0 (L)      Anion Gap 7.0 mmol/L     Narrative:       National Kidney Foundation Guidelines    Stage                           Description                             GFR                      1                               Normal or High                          90+  2                               Mild decrease                            60-89  3                               Moderate decrease                   30-59  4                               Severe decrease                       15-29  5                               Kidney failure                             <15    POC Glucose Fingerstick [[id number]]  (Normal) Collected:  03/16/17 0820    Specimen:  Blood Updated:  03/16/17 0821     Glucose 117 mg/dL     Narrative:       Meter: [id number] : [id number] [place]    POC Glucose Fingerstick [[id number]]  (Normal) Collected:  03/16/17 1143    Specimen:  Blood Updated:  03/16/17 1145     Glucose 115 mg/dL     Narrative:       Meter: [id number] : [id number] [place]    Wound Culture [[id number]]  (Abnormal)  (Susceptibility) Collected:  03/13/17 0032    Specimen:  Wound from Leg, Left Updated:  03/16/17 1207     Wound Culture Scant growth (1+) Pseudomonas aeruginosa (A)              Scant growth (1+) Staphylococcus epidermidis (A)      This isolate does not demonstrate inducible clindamycin resistance in vitro.          Gram Stain Result Rare (1+) Epithelial cells seen       No WBCs seen       No organisms seen     Susceptibility      Pseudomonas aeruginosa     ALYSA     Aztreonam 16 ug/ml Intermediate     Cefepime 16 ug/ml Intermediate     Ceftazidime >16 ug/ml Resistant     Gentamicin <=4 ug/ml Susceptible     Levofloxacin <=2 ug/ml Susceptible     Meropenem <=1 ug/ml Susceptible     Piperacillin + Tazobactam <=16 ug/ml Susceptible     Tobramycin <=4 ug/ml Susceptible                Susceptibility       Staphylococcus epidermidis     ALYSA     Clindamycin <=0.5 ug/ml Susceptible     Daptomycin <=0.5 ug/ml Susceptible     Erythromycin >4 ug/ml Resistant     Gentamicin <=4 ug/ml Susceptible     Levofloxacin <=1 ug/ml Susceptible     Linezolid <=1 ug/ml Susceptible     Oxacillin >2 ug/ml Resistant     Penicillin G >8 ug/ml Resistant     Quinupristin + Dalfopristin <=0.5 ug/ml Susceptible     Rifampin <=1 ug/ml Susceptible     Tetracycline <=4 ug/ml Susceptible     Trimethoprim + Sulfamethoxazole >2/38 ug/ml Resistant     Vancomycin 1 ug/ml Susceptible                    POC Glucose Fingerstick [[id number]]  (Normal) Collected:  03/16/17 1650    Specimen:  Blood Updated:  03/16/17 1651     Glucose 115 mg/dL     Narrative:       Meter: [id number] : [id number] Amanda Zapata    POC Glucose Fingerstick [[id number]]  (Normal) Collected:  03/16/17 2025    Specimen:  Blood Updated:  03/16/17 2028     Glucose 127 mg/dL     Narrative:       Meter: [id number] : [id number] Costella Procesa    Blood Culture [[id number]]  (Normal) Collected:  03/12/17 2218    Specimen:  Blood from Arm, Right Updated:  03/16/17 2301     Blood Culture No growth at 4 days     Blood Culture [[id number]]  (Normal) Collected:  03/12/17 2218    Specimen:  Blood from Hand, Right Updated:  03/16/17 2301     Blood Culture No growth at 4 days     CBC (No Diff) [[id number]]  (Abnormal) Collected:  03/17/17 0549    Specimen:  Blood Updated:  03/17/17 0654     WBC 4.35 10*3/mm3      RBC 2.75 (L) 10*6/mm3      Hemoglobin 9.1 (L) g/dL      Hematocrit 27.3 (L) %      MCV 99.3 (H) fL      MCH 33.1 (H) pg      MCHC 33.3 g/dL      RDW 14.8 (H) %      RDW-SD 53.6 fl      MPV 9.4 fL      Platelets 187 10*3/mm3     Basic Metabolic Panel [[id number]]  (Abnormal) Collected:  03/17/17 0549    Specimen:  Blood Updated:  03/17/17 0724     Glucose 93 mg/dL      BUN 10 mg/dL      Creatinine 2.20 (H) mg/dL      Sodium 131 (L) mmol/L      Potassium 3.8 mmol/L      Chloride 98 (L)  mmol/L      CO2 30.0 mmol/L      Calcium 9.0 mg/dL      eGFR Non African Amer 31 (L) mL/min/1.73      BUN/Creatinine Ratio 4.5 (L)      Anion Gap 3.0 mmol/L     Narrative:       National Kidney Foundation Guidelines    Stage                           Description                             GFR                      1                               Normal or High                          90+  2                               Mild decrease                            60-89  3                               Moderate decrease                   30-59  4                               Severe decrease                       15-29  5                               Kidney failure                             <15    POC Glucose Fingerstick [[id number]]  (Normal) Collected:  03/17/17 0832    Specimen:  Blood Updated:  03/17/17 0846     Glucose 113 mg/dL     Narrative:       Meter: [id number] : [id number] [employer]    POC Glucose Fingerstick [[id number]]  (Normal) Collected:  03/17/17 1213    Specimen:  Blood Updated:  03/17/17 1222     Glucose 97 mg/dL     Narrative:       Meter: [id number] : [id number] [employer]    Basic Metabolic Panel [[id number]]  (Abnormal) Collected:  03/17/17 1239    Specimen:  Blood Updated:  03/17/17 1319     Glucose 103 (H) mg/dL      BUN 11 mg/dL      Creatinine 2.20 (H) mg/dL      Sodium 130 (L) mmol/L      Potassium 3.4 (L) mmol/L      Chloride 98 (L) mmol/L      CO2 28.0 mmol/L      Calcium 9.1 mg/dL      eGFR Non African Amer 31 (L) mL/min/1.73      BUN/Creatinine Ratio 5.0 (L)      Anion Gap 4.0 mmol/L     Narrative:       National Kidney Foundation Guidelines    Stage                           Description                             GFR                      1                               Normal or High                          90+  2                               Mild decrease                            60-89  3                               Moderate decrease                   30-59  4                                Severe decrease                       15-29  5                               Kidney failure                             <15          Imaging Results (last 72 hours)     Procedure Component Value Units Date/Time    XR Chest 1 View [[id number]] Collected:  03/13/17 1009     Updated:  03/15/17 0935    Narrative:       EXAMINATION: XR CHEST 1 VW-      INDICATION: COPD and coal workers' pneumoconiosis, possible preop.      COMPARISON: 01/16/2017.     FINDINGS: Portable chest reveals heart to be borderline enlarged.  The  lung fields are grossly clear. No focal parenchymal opacification is  present.  No pleural effusion or pneumothorax. Degenerative change is  seen within the spine. Pulmonary vascularity is within normal limits.             Impression:       Stable chronic changes seen within the lung fields  bilaterally. The heart is enlarged with degenerative changes seen within  the spine.     D:  03/13/2017  E:  03/13/2017     This report was finalized on 3/15/2017 9:33 AM by Dr. Naty Panchal MD.       US Nonvascular Extremity Limited [[id number]] Collected:  03/13/17 0827     Updated:  03/15/17 0943    Narrative:       EXAMINATION: US NONVASCULAR EXTREMITY LIMITED-     INDICATION: Left femoral mass.     TECHNIQUE: Sonographic imaging was obtained of the left inguinal region  in both the sagittal and transverse planes.     COMPARISON: None.     FINDINGS: In the area of interest, there is a large complex hypoechoic  structure with internal septations. The area of interest appears to  extend to the skin surface. There is no evidence of internal blood flow.  This area measures 9.6 x 4.4 x 7.3 cm. Findings may suggest evidence of  a complex hematoma or seroma. There is edema identified in the  surrounding subcutaneous tissues.       Impression:       Large complex hypoechoic structure with internal septations  seen in the area of interest extending to the skin surface suggesting a  large complex  hematoma or seroma. There is no internal blood flow. Edema  in the surrounding subcutaneous tissues. Consider CT scan for further  evaluation if clinically indicated with IV contrast.      D:  03/13/2017  E:  03/13/2017     This report was finalized on 3/15/2017 9:41 AM by Dr. Naty Panchal MD.             ECG/EMG Results (last 72 hours)     ** No results found for the last 72 hours. **        Consult Orders (last 72 hours) (72h ago through future)    Start     Ordered    03/17/17 1316  Insert PICC line per MD  Once     Provider:  (Not yet assigned)    03/17/17 1315    03/17/17 0838  Inpatient Consult to Case Management   Once     Comments:  Patient will need home health for saline wet-to-dry twice a day dressing changes and wound management.   Provider:  (Not yet assigned)    03/17/17 0837    03/16/17 1641  Inpatient Consult For PICC  Once     Comments:  After Line Placement, Flushes and Line Care Should Be Ordered Using the Line Care (Flushes & Dressing Changes) - All Line Types Order Set   Provider:  (Not yet assigned)    03/16/17 1641    03/16/17 1640  Inpatient Consult to Case Management   Once     Comments:  Zosyn 13.5 g iv daily continuous infusion and linezolid 600 mg po bid for tentatively 2 weeks, through 3/31. IV antibiotics to be supplied by home infusion and given via PICC.  Script for linezolid on chart.  Weekly labs to include CBC/diff, CMP, ESR, CRP and to be faxed to [facility] office at [phone] ATTN: Dr. Nguyen.  Weekly PICC dressing changes to be performed by home health.   Provider:  (Not yet assigned)    03/16/17 1641          Operative/Procedure Notes (last 72 hours) (Notes from 3/14/2017  4:46 PM through 3/17/2017  4:46 PM)     No notes of this type exist for this encounter.           Physician Progress Notes (last 72 hours) (Notes from 3/14/2017  4:46 PM through 3/17/2017  4:46 PM)      Trung Rodriguez MD at 3/15/2017  8:44 AM  Version 1 of 1         CTS  Progress Note      POD #2 s/p I&D of Left Groin        Subjective  Doing well without complaints sitting up in chair.    Objective    Physical Exam:   Vital Signs   Temp:  [98 °F (36.7 °C)-98.9 °F (37.2 °C)] 98.5 °F (36.9 °C)  Heart Rate:  [75-94] 89  Resp:  [12-20] 20  BP: (123-132)/(57-78) 123/78   GEN: NAD   RESP: Clear to auscultation bilaterally no wheezes, rales or rhonchi    CV: Regular rate and rhythm with a II/VI holosystolic murmur   ABD: Soft, nontender/nondistended with normoactive bowel sounds    EXT: Warm with good color well-perfused 1-2+ bilateral lower extremity edema   INT: Left groin wet to dry dressing with mild serosanguineous drainage otherwise clean and intact    Intake/Output Summary (Last 24 hours) at 03/15/17 0844  Last data filed at 03/15/17 0801   Gross per 24 hour   Intake   4905 ml   Output    550 ml   Net   4355 ml     Results       Results from last 7 days  Lab Units 03/15/17  0644   WBC 10*3/mm3 3.94   HEMOGLOBIN g/dL 9.4*   HEMATOCRIT % 27.8*   PLATELETS 10*3/mm3 174       Results from last 7 days  Lab Units 03/15/17  0644   SODIUM mmol/L 133   POTASSIUM mmol/L 3.5   CHLORIDE mmol/L 98*   TOTAL CO2 mmol/L 28.0   BUN mg/dL 6*   CREATININE mg/dL 1.00   GLUCOSE mg/dL 116*   CALCIUM mg/dL 9.1       Results from last 7 days  Lab Units 03/12/17  2218   INR  1.11     groin wound cultures: Scant growth of non-lactose fermenting GNR    Assessment&#47;Plan     Principal Problem:    Open wound of left hip and thigh with complication  Active Problems:    COPD (chronic obstructive pulmonary disease)    Diabetes mellitus    Hypertension    Peripheral vascular disease    LANA (acute kidney injury)    Coronary artery disease involving native heart    Iron deficiency anemia    Coal workers pneumoconiosis    Leg wound, left        Plan   Continue with BID dressing changes.final cultures and sensitivities.IV antibiotics per ID.later this week or over the weekend with home health and BID saline wet to  dry dressing changes.    ROSCOE Arciniega  03/15/17  8:44 AM  above.  NTA to current Rx regimen.      Trung Rodriguez MD  [facility]  03/15/17   9:08 AM                     Electronically signed by Trung Rodriguez MD at 3/15/2017  9:08 AM      David Nguyen MD at 3/15/2017  4:40 PM  Version 1 of [address] [place] [address] [facility]    INPATIENT PROGRESS NOTE  3/15/2017      PATIENT NAME: Sai Weinberg  :  1960  MRN:  [id number]  Date of Admission:  3/12/2017      Antimicrobials:  IV Anti-Infectives     Ordered     Dose/Rate Route Frequency Start Stop    17 1246  vancomycin IVPB 1500 mg in 0.9% NaCl (Premix) 500 mL     Ordering Provider:  Wesly Almazan RPH    1,500 mg Intravenous Every 12 Hours 03/15/17 0000      17 0904  cefuroxime (ZINACEF) IVPB 1.5 g     Ordering Provider:  ROSCOE Kennedy    1.5 g  over 30 Minutes Intravenous On Call to O.R. 17 0600 03/15/17 0559    17 2243  vancomycin IVPB 1750 mg in 0.9% Sodium Chloride (premix) 500 mL     Ordering Provider:  Maribel Botello MD    1,750 mg Intravenous Once 17 2315 17 2352    17 2158  piperacillin-tazobactam (ZOSYN) 3.375 g in dextrose 50 mL IVPB (premix)     Ordering Provider:  Maribel Botello MD    3.375 g Intravenous Every 6 Hours Scheduled 17 2215      17 2158  Pharmacy to dose vancomycin     Ordering Provider:  Maribel Botello MD     Does not apply Continuous PRN 17 2150            MAR reviewed.  Pertinent other medications include:  insulin    Reason for consultation:  Left groin/thigh abscess and cellulitis, status post recent left femoral cutdown    Interval history:  Patient doing well today.  Ambulating more.  Continues to have some serous drainage from his left groin.  No side effects from antibiotics.  No fevers or chills.  No diarrhea.    ROS:  No fevers/chills overnight.  No new rashes.  No SOB or chest pain.  No nausea/vomiting/diarrhea.  Denies side  "effects from antimicrobials.    Objective:  Temp (24hrs), Av.5 °F (36.9 °C), Min:97.6 °F (36.4 °C), Max:99 °F (37.2 °C)    Visit Vitals   • /73 (BP Location: Right arm, Patient Position: Sitting)   • Pulse 80   • Temp 97.6 °F (36.4 °C) (Axillary)   • Resp 20   • Ht 65\" (165.1 cm)   • Wt 253 lb 4 oz (115 kg)   • SpO2 91%   • BMI 42.14 kg/m2       Physical Examination:  GENERAL: Awake and alert, in no acute distress.   LINES: Left arm peripheral IV  HEENT: Normocephalic, atraumatic. No conjunctival injection or subconjunctival hemorrhage. No icterus.  CV: RRR. 2/6 systolic murmur. Normal S1S2.  LUNGS: Clear to auscultation bilaterally without wheezing, rales, rhonchi. Normal respiratory effort.  ABDOMEN: Soft, nontender, nondistended. Positive bowel sounds.  MSK: Left thigh with improved edema, erythema, and warmth. There is a large clean dressing over the left groin with serous drainage; mild induration surrounding the packed lesion.  SKIN: Warm and dry without rash or ulcer.  NEURO: A&Ox4. No focal deficits. Face symmetric. Speech fluent. Moves all extremities well.   PSYCHIATRIC: Normal insight and judgement. Cooperative. Normal affect.       Laboratory Data:      Results from last 7 days  Lab Units 03/15/17  0644 17  0454 17  2218   WBC 10*3/mm3 3.94 4.44 3.94   HEMOGLOBIN g/dL 9.4* 9.2* 10.3*   HEMATOCRIT % 27.8* 27.7* 30.5*   PLATELETS 10*3/mm3 174 153 148*       Results from last 7 days  Lab Units 03/15/17  0644   SODIUM mmol/L 133   POTASSIUM mmol/L 3.5   CHLORIDE mmol/L 98*   TOTAL CO2 mmol/L 28.0   BUN mg/dL 6*   CREATININE mg/dL 1.00   GLUCOSE mg/dL 116*   CALCIUM mg/dL 9.1       Results from last 7 days  Lab Units 17  2218   ALK PHOS U/L 53   BILIRUBIN mg/dL 1.2   ALT (SGPT) U/L 31   AST (SGOT) U/L 63*             Estimated Creatinine Clearance: 96.7 mL/min (by C-G formula based on Cr of 1).    Results from last 7 days  Lab Units 17  2218   LACTATE mmol/L 1.4       "     Results from last 7 days  Lab Units 03/14/17  1111   VANCOMYCIN TR mcg/mL 15.60           Microbiology:  BLOOD CULTURE   Date Value Ref Range Status   03/12/2017 No growth at 2 days  Preliminary   03/12/2017 No growth at 2 days  Preliminary             WOUND CULTURE   Date Value Ref Range Status   03/13/2017 Scant growth (1+) Pseudomonas aeruginosa (A)  Preliminary   03/13/2017 (A)  Preliminary    Scant growth (1+) Staphylococcus, coagulase negative      Pseudomonas aeruginosa          ALYSA         Aztreonam 16  Intermediate         Cefepime 16  Intermediate         Ceftazidime >16  Resistant         Gentamicin <=4  Susceptible         Levofloxacin <=2  Susceptible         Meropenem <=1  Susceptible         Piperacillin + Tazobactam <=16  Susceptible         Tobramycin <=4  Susceptible             EKG reviewed.  QTc Int : 463 ms      DISCUSSION:  56 y.o. male with history of diabetes and peripheral vascular disease who recently underwent a left femoral cutdown with angioplasty and stent placement is admitted with postoperative left thigh cellulitis and wound dehiscence with seroma versus abscess formation. Incision and drainage performed with 200 milliliters of serous fluid. Exam is consistent with a mild cellulitis of the left thigh. Patient reported copious amounts of serosanguineous drainage at home for the past 2-3 weeks.      PROBLEM LIST:   1. Postoperative left femoral fluid collection, seroma plus likely abscess, status post recent left femoral cutdown with left superficial femoral artery angioplasty with stent (no graft). Status post incision and drainage with 200 mL serous fluid removed. Culture is preliminarily positive for Pseudomonas and CoNS.  2. Left thigh cellulitis, related to #1. Improving.  3. Controlled diabetes mellitus type 2.  4. Obesity.  5. Peripheral vascular disease, status post prior right femoral to popliteal bypass graft and more recent left femoral cutdown with angioplasty and  stent.  6. Prolonged QTc.      PLAN:  1. Continue IV vancomycin and Zosyn for now, to cover for cultured CoNS and Pseudomonas.  2. Pseudomonas is susceptible to fluoroquinolone but patient with prolonged QTc on EKG, so probably wise not to use oral Levaquin for discharge.  No other oral options for Pseudomonas.  May need a PICC and home IV antibiotic infusion.  Will discuss with patient tomorrow.  He is willing to do dressing changes at home.  Await final ID and susceptibility of CoNS.  3. Probably home in next 1-2 days.    David Nguyen MD  3/15/2017  4:40 PM      Portions of this note may have been created with a voice recognition program.  Efforts were made to edit the dictations.  However, words are occassionally mistranscribed and sound alike errors may occur.       Electronically signed by David Nguyen MD at 3/15/2017  4:50 PM      Vangie Dangelo DO at 3/15/2017  6:14 PM  Version 1 of 1             [facility] Medicine Services  INPATIENT PROGRESS NOTE    Date of Admission: 3/12/2017  Length of Stay: 3  Primary Care Physician: Dewayne Cole MD    Subjective   CC:femoral mass  HPI: Sitting up in chair, no family present.  Denies any complaints      Review Of Systems:   Review of Systems   Constitutional: Positive for fever. Negative for fatigue.   Genitourinary: Negative.    Musculoskeletal: Positive for myalgias.   Skin: Positive for color change and rash.   Psychiatric/Behavioral: Negative.          Objective      Temp:  [97.6 °F (36.4 °C)-99 °F (37.2 °C)] 97.6 °F (36.4 °C)  Heart Rate:  [76-97] 80  Resp:  [18-20] 20  BP: (123-137)/(58-78) 137/73  Physical Exam  Constitutional: awake, alert, comfortable, well developed   Eyes: PERRLA, sclerae anicteric, no conjunctival injection  Neck: supple, no thyromegaly, trachea midline  Respiratory: Clear to auscultation bilaterally, nonlabored respirations   Cardiovascular: RRR, pos YEYO, rubs, or gallops, palpable pedal pulses  bilaterally  Gastrointestinal: Positive bowel sounds, soft, nontender, nondistended, obese  Musculoskeletal: 1+ bilateral ankle edema, no clubbing or cyanosis to bilateral lower extremities. Left pedal pulse non palpable, but doppler +. Foot warm.  Psychiatric: oriented x 3, appropriate affect, cooperative  Neurologic: Strength symmetric in all extremities, Cranial Nerves grossly intact to confrontation   Skin, left inguinal wound dressed with clean dressing, no erythema  Results Review:    I have reviewed the labs, radiology results and diagnostic studies.      Results from last 7 days  Lab Units 03/15/17  0644   WBC 10*3/mm3 3.94   HEMOGLOBIN g/dL 9.4*   PLATELETS 10*3/mm3 174       Results from last 7 days  Lab Units 03/15/17  0644   SODIUM mmol/L 133   POTASSIUM mmol/L 3.5   CHLORIDE mmol/L 98*   TOTAL CO2 mmol/L 28.0   BUN mg/dL 6*   CREATININE mg/dL 1.00   GLUCOSE mg/dL 116*   CALCIUM mg/dL 9.1       Culture Data: Cultures:    BLOOD CULTURE   Date Value Ref Range Status   03/12/2017 No growth at less than 24 hours  Preliminary   03/12/2017 No growth at less than 24 hours  Preliminary       Radiology Data:     I have reviewed the medications.    Assessment/Plan     Problem List  Principal Problem:    Open wound of left hip and thigh with complication  Active Problems:    COPD (chronic obstructive pulmonary disease)    Diabetes mellitus    Hypertension    Peripheral vascular disease    LANA (acute kidney injury)    Coronary artery disease involving native heart    Iron deficiency anemia    Coal workers pneumoconiosis    Leg wound, left           Assessment/Plan:    Plan:  Left leg wound:  --s/p femoral cutdown and stenting/angioplasty of left superficial artery, continue twice a day dressing changes  --Continue Vanco and Zosyn , Id following , may need peak and home IV antibiotic infusion final ID plan discussed with patient tomorrow  --Wound positive for coag-negative staph and pseudomonas  --Dr. Rosa did ID  of lesion on 3-14,     LANA:  --Up from baseline 1.0 on admssion, improved now   --Pharmacy to dose Vanco  --Holding ACE-I,  and HCTZ , l restarted lasix   --Could be from recent bactrim use  --follow Cr.      T2DM:  --Hold orals, A1C, SSI, Accuchecks     COPD, Coal workers' pneumoconiosis  --Currently Stable     HTN:  --Hold ACE-I, HCTZ due to LANA  --Monitor, add PRN's if needed     Iron deficiency anemia, chronic: (stable)  --11/33 at outside hospital , down to 9 now  --will follow     Heart Murmer  --followed by Cards per pt, sees Abordo      Peripheral Vascular disease:     CAD:  --Currently stable     Code Status: Full    DVT prophylaxis:sq heparin, teds and scds   Discharge Planning: I expect patient to be discharged in a few days    Vangie Dangelo DO   03/15/17   6:15 PM    Please note that portions of this note may have been completed with a voice recognition program. Efforts were made to edit the dictations, but occasionally words are mistranscribed.       Electronically signed by Vangie Dangelo DO at 3/15/2017  6:18 PM      Trung Rodriguez MD at 3/16/2017  8:11 AM  Version 2 of 2           CTS Progress Note      POD 3 s/p I&D of left groin        Subjectivecomplaints.  Feels good today.  States he's less swollen than recently      Objective    Physical Exam:   Vital Signs   Temp:  [97.6 °F (36.4 °C)-99 °F (37.2 °C)] 97.7 °F (36.5 °C)  Heart Rate:  [76-97] 83  Resp:  [12-20] 12  BP: (123-148)/(58-82) 143/82   GEN: NAD   CV: Clear with positive systolic murmur    RESP: Clear to auscultation unlabored    EXT: Warm with trace edema   Incision: Bandage with scant serous drainage      Results       Results from last 7 days  Lab Units 03/16/17  0539   WBC 10*3/mm3 3.69   HEMOGLOBIN g/dL 9.7*   HEMATOCRIT % 29.1*   PLATELETS 10*3/mm3 181       Results from last 7 days  Lab Units 03/16/17  0539   SODIUM mmol/L 135   POTASSIUM mmol/L 4.0   CHLORIDE mmol/L 101   TOTAL CO2 mmol/L 27.0   BUN mg/dL 6*   CREATININE  mg/dL 1.20   GLUCOSE mg/dL 99   CALCIUM mg/dL 9.3       Results from last 7 days  Lab Units 03/12/17  2218   INR  1.11           Assessment&#47;Plan   Status post I&D of left groin status post left femoral artery cutdown with stenting and angioplasty of the left SFA  Principal Problem:    Open wound of left hip and thigh with complication  Active Problems:    COPD (chronic obstructive pulmonary disease)    Diabetes mellitus    Hypertension    Peripheral vascular disease    LANA (acute kidney injury)    Coronary artery disease involving native heart    Iron deficiency anemia    Coal workers pneumoconiosis    Leg wound, left        Plan wound care.  Home soon with wound care and antibiotics    ROSCOE Justice  03/16/17  8:11 AM        As above.  Wants to go home.  OK to D/C when all agree and HH arranged for wound care and antibiotics.      Trung Rodriguez MD  CTSurgery  03/16/17   10:51 AM                   Electronically signed by Trung Rodriguez MD at 3/16/2017 10:51 AM      ROSCOE Sosa at 3/16/2017  8:11 AM  Version 1 of 2           CTS Progress Note      POD 3 s/p I&D of left groin        Subjectivecomplaints.  Feels good today.  States he's less swollen than recently      Objective    Physical Exam:   Vital Signs   Temp:  [97.6 °F (36.4 °C)-99 °F (37.2 °C)] 97.7 °F (36.5 °C)  Heart Rate:  [76-97] 83  Resp:  [12-20] 12  BP: (123-148)/(58-82) 143/82   GEN: NAD   CV: Clear with positive systolic murmur    RESP: Clear to auscultation unlabored    EXT: Warm with trace edema   Incision: Bandage with scant serous drainage      Results       Results from last 7 days  Lab Units 03/16/17  0539   WBC 10*3/mm3 3.69   HEMOGLOBIN g/dL 9.7*   HEMATOCRIT % 29.1*   PLATELETS 10*3/mm3 181       Results from last 7 days  Lab Units 03/16/17  0539   SODIUM mmol/L 135   POTASSIUM mmol/L 4.0   CHLORIDE mmol/L 101   TOTAL CO2 mmol/L 27.0   BUN mg/dL 6*   CREATININE mg/dL 1.20   GLUCOSE mg/dL 99   CALCIUM mg/dL 9.3       Results  from last 7 days  Lab Units 03/12/17  2218   INR  1.11           Assessment&#47;Plan   Status post I&D of left groin status post left femoral artery cutdown with stenting and angioplasty of the left SFA  Principal Problem:    Open wound of left hip and thigh with complication  Active Problems:    COPD (chronic obstructive pulmonary disease)    Diabetes mellitus    Hypertension    Peripheral vascular disease    LANA (acute kidney injury)    Coronary artery disease involving native heart    Iron deficiency anemia    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  Home soon with wound care and antibiotics    ROSCOE Justice  03/16/17  8:11 AM                     Electronically signed by ROSCOE Sosa at 3/16/2017  8:14 AM      Jamarcus Carl MD at 3/16/2017  3:29 PM  Version 1 of 1             [facility] Medicine Services  INPATIENT PROGRESS NOTE    Date of Admission: 3/12/2017  Length of Stay: 4  Primary Care Physician: Dewayne Cole MD    Subjective   CC:femoral mass  HPI:sitting on chair , no complaints today , denies fever,chest pain/SOB        Review of Systems   No fever , No Chest pain   Otherwise the 10 system review was negative      Objective      Temp:  [97.7 °F (36.5 °C)-98.8 °F (37.1 °C)] 98.5 °F (36.9 °C)  Heart Rate:  [] 77  Resp:  [12-18] 18  BP: (127-148)/(60-82) 144/60  Physical Exam     Constitutional: awake, alert, comfortable, well developed   Eyes: PERRLA, sclerae anicteric, no conjunctival injection  Neck: supple, , trachea midline  Respiratory: Clear to auscultation bilaterally, nonlabored respirations   Cardiovascular: RRR, pos YEYO, rubs, or gallops.  Gastrointestinal: Positive bowel sounds, soft, nontender, nondistended, obese  Psychiatric: oriented x 3, appropriate affect, cooperative  Neurologic: Strength symmetric in all extremities, Cranial Nerves grossly intact to confrontation   Skin, left inguinal wound dressed  with clean dressing    Results Review:    I have reviewed the labs, radiology results and diagnostic studies.      Results from last 7 days  Lab Units 03/16/17  0539   WBC 10*3/mm3 3.69   HEMOGLOBIN g/dL 9.7*   PLATELETS 10*3/mm3 181       Results from last 7 days  Lab Units 03/16/17  0539   SODIUM mmol/L 135   POTASSIUM mmol/L 4.0   CHLORIDE mmol/L 101   TOTAL CO2 mmol/L 27.0   BUN mg/dL 6*   CREATININE mg/dL 1.20   GLUCOSE mg/dL 99   CALCIUM mg/dL 9.3       Culture Data: Cultures:    BLOOD CULTURE   Date Value Ref Range Status   03/12/2017 No growth at 3 days  Preliminary   03/12/2017 No growth at 3 days  Preliminary     WOUND CULTURE   Date Value Ref Range Status   03/13/2017 Scant growth (1+) Pseudomonas aeruginosa (A)  Final   03/13/2017 Scant growth (1+) Staphylococcus epidermidis (A)  Final     Comment:     This isolate does not demonstrate inducible clindamycin resistance in vitro.         Radiology Data:     I have reviewed the medications.    Assessment/Plan     Problem List  Principal Problem:    Open wound of left hip and thigh with complication  Active Problems:    COPD (chronic obstructive pulmonary disease)    Diabetes mellitus    Hypertension    Peripheral vascular disease    LANA (acute kidney injury)    Coronary artery disease involving native heart    Iron deficiency anemia    Coal workers pneumoconiosis    Leg wound, left           Assessment/Plan:    Left leg wound:  --s/p femoral stenting/angioplasty of left superficial artery, continue twice a day dressing changes  --Continue Vanco and Zosyn , Id following   --Wound positive for coag-negative staph and pseudomonas  --Dr. Rosa did I and D of lesion on 3-14     LANA:  --Up from baseline 1.0 on admssion, now up to 1.2 today  --Pharmacy to dose Vanco  --Could be from recent bactrim use  --follow Cr.       T2DM:  --Hold orals, A1C, SSI, Accuchecks      COPD, Coal workers' pneumoconiosis  --Currently Stable      HTN:  --Hold ACE-I, HCTZ due to  LANA  --Monitor, add PRN's if needed      Iron deficiency anemia, chronic: (stable)  -- at outside hospital , down to 9 now  --will follow      Heart Murmer  --followed by Cards per pt, sees Abordo       Peripheral Vascular disease.      CAD:  --Currently stable      Code Status: Full     DVT prophylaxis:sq heparin, teds and scds       Discharge Planning: I expect patient to be discharged to home  in several days days.    Jamarcus Carl MD   17   3:30 PM    Please note that portions of this note may have been completed with a voice recognition program. Efforts were made to edit the dictations, but occasionally words are mistranscribed.     Electronically signed by Jamarcus Carl MD at 3/16/2017  3:37 PM      David Nguyen MD at 3/16/2017  4:28 PM  Version 1 of 1         [facility]    INPATIENT PROGRESS NOTE  3/16/2017      PATIENT NAME: Sai Weinberg  :  1960  MRN:  [id number]  Date of Admission:  3/12/2017      Antimicrobials:  IV Anti-Infectives     Ordered     Dose/Rate Route Frequency Start Stop    17 1246  vancomycin IVPB 1500 mg in 0.9% NaCl (Premix) 500 mL     Ordering Provider:  Wesly Almazan RPH    1,500 mg Intravenous Every 12 Hours 03/15/17 0000      17 0904  cefuroxime (ZINACEF) IVPB 1.5 g     Ordering Provider:  ROSCOE Kennedy    1.5 g  over 30 Minutes Intravenous On Call to O.R. 17 0600 03/15/17 0559    17 2243  vancomycin IVPB 1750 mg in 0.9% Sodium Chloride (premix) 500 mL     Ordering Provider:  Maribel Botello MD    1,750 mg Intravenous Once 17 2315 17 2352    17 215  piperacillin-tazobactam (ZOSYN) 3.375 g in dextrose 50 mL IVPB (premix)     Ordering Provider:  Maribel Botello MD    3.375 g Intravenous Every 6 Hours Scheduled 17 2215      17 215  Pharmacy to dose vancomycin     Ordering Provider:  Maribel Botello MD     Does not apply Continuous PRN 17            MAR  "reviewed.  Pertinent other medications include:  Insulin, Plavix, Protonix      Reason for consultation:  Left groin/thigh abscess and cellulitis, status post recent left femoral cutdown    Interval history:  Doing well today.  Dressing changed with Dakin's this am.  No fevers.  No diarrhea.  Wants to go home soon.    ROS:  No fevers/chills overnight.  No new rashes.  No SOB or chest pain.  No nausea/vomiting/diarrhea.  Denies side effects from antimicrobials.      Objective:  Temp (24hrs), Av.3 °F (36.8 °C), Min:97.7 °F (36.5 °C), Max:98.8 °F (37.1 °C)    Visit Vitals   • /60   • Pulse 77   • Temp 98.5 °F (36.9 °C) (Oral)   • Resp 18   • Ht 65\" (165.1 cm)   • Wt 253 lb 4 oz (115 kg)   • SpO2 91%   • BMI 42.14 kg/m2       Physical Examination:  GENERAL: Awake and alert, in no acute distress.   LINES: Left arm peripheral IV  HEENT: Normocephalic, atraumatic. No conjunctival injection or subconjunctival hemorrhage. No icterus.  MSK: Left thigh with improved but ongoing edema, erythema, and warmth. There is a large clean dressing over the left groin with serous drainage; mild induration surrounding the packed lesion continues.  SKIN: Warm and dry without rash.  NEURO: A&Ox4. No focal deficits. Face symmetric. Speech fluent. Moves all extremities well.   PSYCHIATRIC: Normal insight and judgement. Cooperative. Normal affect.       Laboratory Data:      Results from last 7 days  Lab Units 17  0539 03/15/17  0644 17  0454   WBC 10*3/mm3 3.69 3.94 4.44   HEMOGLOBIN g/dL 9.7* 9.4* 9.2*   HEMATOCRIT % 29.1* 27.8* 27.7*   PLATELETS 10*3/mm3 181 174 153       Results from last 7 days  Lab Units 17  0539   SODIUM mmol/L 135   POTASSIUM mmol/L 4.0   CHLORIDE mmol/L 101   TOTAL CO2 mmol/L 27.0   BUN mg/dL 6*   CREATININE mg/dL 1.20   GLUCOSE mg/dL 99   CALCIUM mg/dL 9.3       Results from last 7 days  Lab Units 17  2218   ALK PHOS U/L 53   BILIRUBIN mg/dL 1.2   ALT (SGPT) U/L 31   AST (SGOT) U/L " 63*             Estimated Creatinine Clearance: 80.6 mL/min (by C-G formula based on Cr of 1.2).    Results from last 7 days  Lab Units 03/12/17  2218   LACTATE mmol/L 1.4           Results from last 7 days  Lab Units 03/14/17  1111   VANCOMYCIN TR mcg/mL 15.60         Microbiology:  BLOOD CULTURE   Date Value Ref Range Status   03/12/2017 No growth at 3 days  Preliminary   03/12/2017 No growth at 3 days  Preliminary        WOUND CULTURE   Date Value Ref Range Status   03/13/2017 Scant growth (1+) Pseudomonas aeruginosa (A)  Final   03/13/2017 Scant growth (1+) Staphylococcus epidermidis (A)  Final     Comment:     This isolate does not demonstrate inducible clindamycin resistance in vitro.                   Susceptibility         Pseudomonas aeruginosa Staphylococcus epidermidis         ALYSA ALYSA         Aztreonam 16  Intermediate          Cefepime 16  Intermediate          Ceftazidime >16  Resistant          Clindamycin  <=0.5  Susceptible         Daptomycin  <=0.5  Susceptible         Erythromycin  >4  Resistant         Gentamicin <=4  Susceptible <=4  Susceptible         Levofloxacin <=2  Susceptible <=1  Susceptible         Linezolid  <=1  Susceptible         Meropenem <=1  Susceptible          Oxacillin  >2  Resistant         Penicillin G  >8  Resistant         Piperacillin + Tazobactam <=16  Susceptible          Quinupristin + Dalfopristin  <=0.5  Susceptible         Rifampin  <=1  Susceptible         Tetracycline  <=4  Susceptible         Tobramycin <=4  Susceptible          Trimethoprim + Sulfamethoxazole  >2/38  Resistant         Vancomycin  1  Susceptible                                   Radiology:  None new      DISCUSSION:  56 y.o. male with history of diabetes and peripheral vascular disease who recently underwent a left femoral cutdown with angioplasty and stent placement is admitted with postoperative left thigh cellulitis and wound dehiscence with seroma versus abscess formation. Incision and  drainage performed with 200 milliliters of serous fluid. Exam is consistent with a mild cellulitis of the left thigh. Patient reported copious amounts of serosanguineous drainage at home for the past 2-3 weeks.      PROBLEM LIST:   1. Postoperative left femoral fluid collection, seroma plus likely abscess, status post recent left femoral cutdown with left superficial femoral artery angioplasty with stent (no graft). Status post incision and drainage with 200 mL serous fluid removed. Culture is positive for Pseudomonas (S-FQ, Zosyn, meropenem; intermediate to cefepime and aztreonam; resistant to ceftazidime) and Staph epi, methicillin resistant.  2. Left thigh cellulitis, related to #1. Improving.  3. Controlled diabetes mellitus type 2.  4. Obesity.  5. Peripheral vascular disease, status post prior right femoral to popliteal bypass graft and more recent left femoral cutdown with angioplasty and stent.  6. Prolonged QTc.  Pseudomonas is susceptible to fluoroquinolone but patient with prolonged QTc on EKG, so probably wise not to use oral Levaquin for discharge.      PLAN:  1.  Discussed IV home infusion and patient willing to do PICC.  As we cannot do FQ given prolonged QTc, best option is going to be continuous infusion Zosyn, given the susceptibility profile.  2.  Transition to linezolid for MRSE; can do oral at home - will be simpler than IV vancomycin infusions multiple times daily at home and trough monitoring.  3.  Proceed with PICC.  4.  Patient agreeable to home IV abx infusion, PICC, and home dressing changes.  Will consult CM to arrange.  F/U with me in office in one week.  Probably will need 2 more weeks of IV therapy.    DISCHARGE ORDERS:  Okay from my perspective for discharge tomorrow if PICC placed and home antibiotics arranged.  Discharge plan discussed with patient.  Patient is to be discharged on Zosyn 13.5 g iv daily continuous infusion and linezolid 600 mg po bid for tentatively 2 weeks, through  3/31. IV antibiotics to be supplied by home infusion and given via PICC.  Weekly labs to include CBC/diff, CMP, ESR, CRP and to be faxed to [facility] office at [phone] ATTN: Dr. Nguyen.    Weekly PICC dressing changes to be performed by home health.    Follow-up with me at [facility] office ([phone]) in one week.    Utilization management:  I spent a total of 30 minutes on coordination of care for this discharge.      David Nguyen MD  3/16/2017  4:28 PM      Portions of this note may have been created with a voice recognition program.  Efforts were made to edit the dictations.  However, words are occassionally mistranscribed and sound alike errors may occur.       Electronically signed by David Nguyen MD at 3/16/2017  4:39 PM      Trung Rodriguez MD at 3/17/2017  7:28 AM  Version 2 of 2         CTS Progress Note      POD 3 s/p I&D of left groin    Subjectivewell without complaints, lying in bed.  Denies any fever or chills.      Objective    Physical Exam:   Vital Signs   Temp:  [97.5 °F (36.4 °C)-99.1 °F (37.3 °C)] 97.5 °F (36.4 °C)  Heart Rate:  [] 58  Resp:  [12-18] 18  BP: (107-152)/(52-67) 107/55   GEN: NAD   RESP: Clear to auscultation bilaterally    CV: Regular rate and rhythm, no murmurs, rubs or gallops   ABD: Soft, nontender/nondistended with normoactive bowel sounds    EXT: Warm with good color and well-perfused with 2-3+ bilateral lower extremity edema   INT: Left groin/thigh incision with continued wet-to-dry dressing changes with mild serosanguineous drainage, erythema resolving       Intake/Output Summary (Last 24 hours) at 03/17/17 0728  Last data filed at 03/17/17 0450   Gross per 24 hour   Intake   1340 ml   Output   1775 ml   Net   -435 ml     Results       Results from last 7 days  Lab Units 03/17/17  0549   WBC 10*3/mm3 4.35   HEMOGLOBIN g/dL 9.1*   HEMATOCRIT % 27.3*   PLATELETS 10*3/mm3 187       Results from last 7 days  Lab Units 03/17/17  0549   SODIUM mmol/L 131*   POTASSIUM mmol/L  3.8   CHLORIDE mmol/L 98*   TOTAL CO2 mmol/L 30.0   BUN mg/dL 10   CREATININE mg/dL 2.20*   GLUCOSE mg/dL 93   CALCIUM mg/dL 9.0       Results from last 7 days  Lab Units 03/12/17  2218   INR  1.11         Assessment&#47;Plan     Principal Problem:    Open wound of left hip and thigh with complication  Active Problems:    COPD (chronic obstructive pulmonary disease)    Diabetes mellitus    Hypertension    Peripheral vascular disease    LANA (acute kidney injury)    Coronary artery disease involving native heart    Iron deficiency anemia    Coal workers pneumoconiosis    Leg wound, left        Plan DC home from a surgical perspective.with saline wet-to-dry dressing changes twice a day at home with home health  IV antibiotics per ID  PICC line to be placed today  Follow up with Dr Rosa in office in 1-2 weeks.    ROSCOE Arciniega  03/17/17  7:28 AM  above.  Agree.      Trung Rodriguez MD  CTSurgery  03/17/17   3:53 PM                     Electronically signed by Trung Rodriguez MD at 3/17/2017  3:53 PM      ROSCOE Hatch at 3/17/2017  7:28 AM  Version 1 of 2         CTS Progress Note      POD 3 s/p I&D of left groin    Subjectivewell without complaints, lying in bed.  Denies any fever or chills.      Objective    Physical Exam:   Vital Signs   Temp:  [97.5 °F (36.4 °C)-99.1 °F (37.3 °C)] 97.5 °F (36.4 °C)  Heart Rate:  [] 58  Resp:  [12-18] 18  BP: (107-152)/(52-67) 107/55   GEN: NAD   RESP: Clear to auscultation bilaterally    CV: Regular rate and rhythm, no murmurs, rubs or gallops   ABD: Soft, nontender/nondistended with normoactive bowel sounds    EXT: Warm with good color and well-perfused with 2-3+ bilateral lower extremity edema   INT: Left groin/thigh incision with continued wet-to-dry dressing changes with mild serosanguineous drainage, erythema resolving       Intake/Output Summary (Last 24 hours) at 03/17/17 0728  Last data filed at 03/17/17 0450   Gross per 24 hour   Intake   1340 ml   Output    1775 ml   Net   -435 ml     Results       Results from last 7 days  Lab Units 03/17/17  0549   WBC 10*3/mm3 4.35   HEMOGLOBIN g/dL 9.1*   HEMATOCRIT % 27.3*   PLATELETS 10*3/mm3 187       Results from last 7 days  Lab Units 03/17/17  0549   SODIUM mmol/L 131*   POTASSIUM mmol/L 3.8   CHLORIDE mmol/L 98*   TOTAL CO2 mmol/L 30.0   BUN mg/dL 10   CREATININE mg/dL 2.20*   GLUCOSE mg/dL 93   CALCIUM mg/dL 9.0       Results from last 7 days  Lab Units 03/12/17  2218   INR  1.11         Assessment&#47;Plan     Principal Problem:    Open wound of left hip and thigh with complication  Active Problems:    COPD (chronic obstructive pulmonary disease)    Diabetes mellitus    Hypertension    Peripheral vascular disease    LANA (acute kidney injury)    Coronary artery disease involving native heart    Iron deficiency anemia    Coal workers pneumoconiosis    Leg wound, left        Plan   May DC home from a surgical perspective.with saline wet-to-dry dressing changes twice a day at home with home health  IV antibiotics per ID  PICC line to be placed today  Follow up with Dr Rosa in office in 1-2 weeks.    ROSCOE Arciniega  03/17/17  7:28 AM                   Electronically signed by ROSCOE Hatch at 3/17/2017  8:36 AM      GLORIA Elliott at 3/17/2017  2:04 PM  Version 1 of 1             [facility] Medicine Services  INPATIENT PROGRESS NOTE    Date of Admission: 3/12/2017  Length of Stay: 5  Primary Care Physician: Dewayne Cole MD    Subjective   CC:  femoral mass  HPI:  Patient is sitting up in chair without any new complaints or issues.  Denies chest pain, shortness of breath or abdominal pain.  No fever or chills.  Making arrangements for discharge home with home health.        Review of Systems   Gen-no fevers, chills  CV-no chest pain, palpitations  Resp-no cough, + dyspnea with exertion  GI-no N/V/D, abd pain    Objective      Temp:  [97.5 °F (36.4 °C)-99.1 °F (37.3 °C)] 97.5 °F  (36.4 °C)  Heart Rate:  [58-77] 71  Resp:  [16-20] 16  BP: (107-152)/(52-76) 142/76  Physical Exam     Constitutional: awake, alert, comfortable, well developed, sitting up in chair  Eyes: PERRLA, sclerae anicteric, no conjunctival injection  Respiratory: Clear to auscultation bilaterally, nonlabored respirations   Cardiovascular: RRR, pos YEYO, rubs, or gallops.  Gastrointestinal: Positive bowel sounds, soft, nontender, nondistended, obese  Psychiatric: oriented x 3, appropriate affect, cooperative  Neurologic: Strength symmetric in all extremities, Cranial Nerves grossly intact to confrontation   Skin, left inguinal wound dressed with clean dressing    Results Review:    I have reviewed the labs, radiology results and diagnostic studies.      Results from last 7 days  Lab Units 03/17/17  0549   WBC 10*3/mm3 4.35   HEMOGLOBIN g/dL 9.1*   PLATELETS 10*3/mm3 187       Results from last 7 days  Lab Units 03/17/17  1239   SODIUM mmol/L 130*   POTASSIUM mmol/L 3.4*   CHLORIDE mmol/L 98*   TOTAL CO2 mmol/L 28.0   BUN mg/dL 11   CREATININE mg/dL 2.20*   GLUCOSE mg/dL 103*   CALCIUM mg/dL 9.1       Culture Data: Cultures:    BLOOD CULTURE   Date Value Ref Range Status   03/12/2017 No growth at 3 days  Preliminary   03/12/2017 No growth at 3 days  Preliminary     WOUND CULTURE   Date Value Ref Range Status   03/13/2017 Scant growth (1+) Pseudomonas aeruginosa (A)  Final   03/13/2017 Scant growth (1+) Staphylococcus epidermidis (A)  Final     Comment:     This isolate does not demonstrate inducible clindamycin resistance in vitro.         Radiology Data:     I have reviewed the medications.    Assessment/Plan     Problem List  Principal Problem:    Open wound of left hip and thigh with complication  Active Problems:    COPD (chronic obstructive pulmonary disease)    Diabetes mellitus    Hypertension    Peripheral vascular disease    LANA (acute kidney injury)    Coronary artery disease involving native heart    Iron deficiency  anemia    Coal workers pneumoconiosis    Leg wound, left           Assessment/Plan:    Left leg wound:  --s/p femoral stenting/angioplasty of left superficial artery, continue twice a day dressing changes  --Continue IV zosyn and PO linezolid, ID following   --Wound positive for coag-negative staph and pseudomonas  --Dr. Rosa did I and D of lesion on 3-14     LANA:  --increased to 2.2 today (1.2 yesterday), repeated and same result  --Could be from recent bactrim use  --monitor   --hold Lasix  --Bladder scan  --NS @75 overnight      T2DM:  --Hold orals, A1C, SSI, Accuchecks      COPD, Coal workers' pneumoconiosis  --Currently Stable      HTN:  --Hold ACE-I, HCTZ due to LANA  --Monitor, add PRN's if needed      Iron deficiency anemia, chronic: (stable)  -- at outside hospital , down to 9 now  --will follow      Heart Murmer  --followed by Cards per pt, sees Abordo       Peripheral Vascular disease.      CAD:  --Currently stable      Code Status: Full     DVT prophylaxis:sq heparin, teds and scds       Discharge Planning: I expect patient to be discharged to home tomorrow if creatinine better.  CM has made arrangements for home health for IV infusion, dressing change, PICC care and weekly labs faxed to [facility].  Zosyn to be delivered to hospital room prior to discharge but cannot deliver until discharge order placed.      GLORIA Elliott   17   2:04 PM    Please note that portions of this note may have been completed with a voice recognition program. Efforts were made to edit the dictations, but occasionally words are mistranscribed.     Electronically signed by GLORIA Elliott at 3/17/2017  2:14 PM      David Nguyen MD at 3/17/2017  3:56 PM  Version 1 of 1         [facility]    INPATIENT PROGRESS NOTE  3/17/2017      PATIENT NAME: Sai Weinberg  :  1960  MRN:  [id number]  Date of Admission:  3/12/2017      Antimicrobials:  linezolid 600 mg po bid  Zosyn 3.375 g  "iv q6h    MAR reviewed.      Reason for consultation:  Left groin/thigh abscess and cellulitis, status post recent left femoral cutdown; Pseudomonas and MRSE    Interval history:  PICC placed today.  Discharge on hold today due to increasing Cr.  Urinating well.  No diarrhea.  Drinking plenty of water.  Vancomycin stopped yesterday and started on linezolid.    ROS:  No fevers/chills overnight.  No new rashes.  No SOB or chest pain.  No nausea/vomiting/diarrhea.  Denies side effects from antimicrobials.    Objective:  Temp (24hrs), Av.3 °F (36.8 °C), Min:97.5 °F (36.4 °C), Max:99.1 °F (37.3 °C)    Visit Vitals   • /76 (BP Location: Left arm, Patient Position: Lying)   • Pulse 71   • Temp 97.5 °F (36.4 °C) (Oral)   • Resp 16   • Ht 65\" (165.1 cm)   • Wt 253 lb (115 kg)   • SpO2 91%   • BMI 42.1 kg/m2       Physical Examination:  GENERAL: Awake and alert, in no acute distress.   LINES: right UE PICC.  HEENT: Normocephalic, atraumatic. No conjunctival injection or subconjunctival hemorrhage. No icterus.  MSK: Left thigh with improved edema and nearly resolved erythema, and warmth. There is a large clean dressing over the left groin.  SKIN: Warm and dry without rash.  EXT:  Chronic 2+ B JACQUELINE.  Dry skin on legs.  NEURO: A&Ox4. No focal deficits. Face symmetric. Speech fluent. Moves all extremities well.   PSYCHIATRIC: Normal insight and judgement. Cooperative. Normal affect.    Laboratory Data:      Results from last 7 days  Lab Units 17  0549 17  0539 03/15/17  0644   WBC 10*3/mm3 4.35 3.69 3.94   HEMOGLOBIN g/dL 9.1* 9.7* 9.4*   HEMATOCRIT % 27.3* 29.1* 27.8*   PLATELETS 10*3/mm3 187 181 174       Results from last 7 days  Lab Units 17  1239   SODIUM mmol/L 130*   POTASSIUM mmol/L 3.4*   CHLORIDE mmol/L 98*   TOTAL CO2 mmol/L 28.0   BUN mg/dL 11   CREATININE mg/dL 2.20*   GLUCOSE mg/dL 103*   CALCIUM mg/dL 9.1     Cr up from 1.2          Results from last 7 days  Lab Units 17  1111 "   VANCOMYCIN TR mcg/mL 15.60           Microbiology:  BLOOD CULTURE   Date Value Ref Range Status   03/12/2017 No growth at 4 days  Preliminary   03/12/2017 No growth at 4 days  Preliminary           WOUND CULTURE   Date Value Ref Range Status   03/13/2017 Scant growth (1+) Pseudomonas aeruginosa (A)  Final   03/13/2017 Scant growth (1+) Staphylococcus epidermidis (A)  Final     Comment:     This isolate does not demonstrate inducible clindamycin resistance in vitro.           Radiology:  None new    DISCUSSION:  56 y.o. male with history of diabetes and peripheral vascular disease who recently underwent a left femoral cutdown with angioplasty and stent placement is admitted with postoperative left thigh cellulitis and wound dehiscence with seroma versus abscess formation. Incision and drainage performed with 200 milliliters of serous fluid. Exam is consistent with a mild cellulitis of the left thigh. Patient reported copious amounts of serosanguineous drainage at home for 2-3 weeks.      PROBLEM LIST:   1. Postoperative left femoral fluid collection, seroma plus likely abscess, status post recent left femoral cutdown with left superficial femoral artery angioplasty with stent (no graft). Status post incision and drainage with 200 mL serous fluid removed. Culture is positive for Pseudomonas (S-FQ, Zosyn, meropenem; intermediate to cefepime and aztreonam; resistant to ceftazidime) and Staph epi, methicillin resistant.  2. Left thigh cellulitis, related to #1. Slowly improving.  3. Controlled diabetes mellitus type 2.  4. Obesity.  5. Peripheral vascular disease, status post prior right femoral to popliteal bypass graft and more recent left femoral cutdown with angioplasty and stent.  6. Prolonged QTc. Pseudomonas is susceptible to fluoroquinolone but patient with prolonged QTc on EKG, so probably wise not to use oral Levaquin for discharge.  7. LANA, new.  Possible vancomycin contributing.      PLAN:  1. PICC placed  today.   2. As we cannot use FQ given prolonged QTc, best option for home is continuous infusion Zosyn, given the susceptibility profile.  3. Continue linezolid for MRSLUCIAN; will be simpler than IV vancomycin infusions multiple times daily at home and trough monitoring.  Vancomycin may be contributing to his LANA, as well, and this has been discontinued.  4. Home infusion has been arranged.  Pt ok for discharge from my perspective over the weekend if Cr is improving.       DISCHARGE ORDERS:  Okay from my perspective for discharge over weekend if renal function improving.  Discharge plan discussed with patient.  Patient is to be discharged on Zosyn 13.5 g iv daily continuous infusion and linezolid 600 mg po bid (script on chart) for tentatively 2 weeks, through 3/31. IV antibiotics to be supplied by home infusion and given via PICC.  Weekly labs to include CBC/diff, CMP, ESR, CRP and to be faxed to [facility] office at [phone] ATTN: Dr. Nguyen.   Weekly PICC dressing changes to be performed by [facility].   Follow-up with me at [facility] office ([phone]) in one week.    David Nguyen MD  3/17/2017  3:56 PM      Portions of this note may have been created with a voice recognition program.  Efforts were made to edit the dictations.  However, words are occassionally mistranscribed and sound alike errors may occur.       Electronically signed by David Nguyen MD at 3/17/2017  4:05 PM        Consult Notes (last 72 hours) (Notes from 3/14/2017  4:46 PM through 3/17/2017  4:46 PM)     No notes of this type exist for this encounter.

## 2017-03-17 NOTE — DISCHARGE INSTR - OTHER ORDERS
Your antibiotic infusions of Merrem are being provided by [facility]. They will deliver the medication to your room prior to discharge. If you have any questions or concerns, please call their office at [phone].     Home health nursing has been arranged through [facility] to assist with wound care and home infusion. They are scheduled to see you tomorrow, Thursday, March 23. If you have not heard from them by tomorrow, please call their office at [phone].

## 2017-03-17 NOTE — PROGRESS NOTES
Continued Stay Note  [facility]     Patient Name: Sai Weinberg  MRN: [id number]  Today's Date: 3/17/2017    Admit Date: 3/12/2017          Discharge Plan       03/17/17 0932    Case Management/Social Work Plan    Plan home with home health    Patient/Family In Agreement With Plan yes    Additional Comments Referral received to arrange for home antibiotics and home health. Per order, patient will need Zosyn 13.5g IV daily continuous infusion and linezolid 600mg PO BID for tentatively 2 weeks. Call to Jodi at [facility], she will run patient's insurance and notify CM of cost to patient. Patient to receive PICC line today for infusions. Patient stated he did not have a preference for home health. Patient will need  SN for home abx infusions and dressing changes/wound management. Patient stated his wife can assist him with infusions and wound care. Will await call back from Home Infusion. CM following.               Discharge Codes     None        Expected Discharge Date and Time     Expected Discharge Date Expected Discharge Time    Mar 17, 2017             Kathryn Pozo RN

## 2017-03-17 NOTE — PROGRESS NOTES
Continued Stay Note  [facility]     Patient Name: Sai Weinberg  MRN: [id number]  Today's Date: 3/17/2017    Admit Date: 3/12/2017          Discharge Plan       03/17/17 1003    Case Management/Social Work Plan    Plan home with home health    Patient/Family In Agreement With Plan yes    Additional Comments Per Jodi at [facility], patient is covered in full for continuous infusion of Zosyn and there is no cost. Jodi will have infusion delivered to patient's room today prior to discharge. Call to [facility] [employer] [facility] [phone], spoke with Joya. Provided demographic and clinical information via phone and efaxed orders and clinicals to her at [phone]. She states they will call CM back if there are any questions. She will have patient seen tomorrow for SN for wound care/home infusion. Updated patient on plan, he is in agreement. No other needs voiced at this time. CM following.       03/17/17 0932    Case Management/Social Work Plan    Plan home with home health    Patient/Family In Agreement With Plan yes    Additional Comments Referral received to arrange for home antibiotics and home health. Per order, patient will need Zosyn 13.5g IV daily continuous infusion and linezolid 600mg PO BID for tentatively 2 weeks. Call to Jodi at [facility], she will run patient's insurance and notify CM of cost to patient. Patient to receive PICC line today for infusions. Patient stated he did not have a preference for home health. Patient will need  SN for home abx infusions and dressing changes/wound management. Patient stated his wife can assist him with infusions and wound care. Will await call back from Home Infusion. CM following.               Discharge Codes     None        Expected Discharge Date and Time     Expected Discharge Date Expected Discharge Time    Mar 17, 2017             Kathryn Pozo, RN

## 2017-03-17 NOTE — PROGRESS NOTES
CTS Progress Note      POD 3 s/p I&D of left groin    Subjective  Doing well without complaints, lying in bed.  Denies any fever or chills.      Objective    Physical Exam:   Vital Signs   Temp:  [97.5 °F (36.4 °C)-99.1 °F (37.3 °C)] 97.5 °F (36.4 °C)  Heart Rate:  [] 58  Resp:  [12-18] 18  BP: (107-152)/(52-67) 107/55   GEN: NAD   RESP: Clear to auscultation bilaterally    CV: Regular rate and rhythm, no murmurs, rubs or gallops   ABD: Soft, nontender/nondistended with normoactive bowel sounds    EXT: Warm with good color and well-perfused with 2-3+ bilateral lower extremity edema   INT: Left groin/thigh incision with continued wet-to-dry dressing changes with mild serosanguineous drainage, erythema resolving       Intake/Output Summary (Last 24 hours) at 03/17/17 0728  Last data filed at 03/17/17 0450   Gross per 24 hour   Intake   1340 ml   Output   1775 ml   Net   -435 ml     Results       Results from last 7 days  Lab Units 03/17/17  0549   WBC 10*3/mm3 4.35   HEMOGLOBIN g/dL 9.1*   HEMATOCRIT % 27.3*   PLATELETS 10*3/mm3 187       Results from last 7 days  Lab Units 03/17/17  0549   SODIUM mmol/L 131*   POTASSIUM mmol/L 3.8   CHLORIDE mmol/L 98*   TOTAL CO2 mmol/L 30.0   BUN mg/dL 10   CREATININE mg/dL 2.20*   GLUCOSE mg/dL 93   CALCIUM mg/dL 9.0       Results from last 7 days  Lab Units 03/12/17  2218   INR  1.11         Assessment/Plan     Principal Problem:    Open wound of left hip and thigh with complication  Active Problems:    COPD (chronic obstructive pulmonary disease)    Diabetes mellitus    Hypertension    Peripheral vascular disease    LANA (acute kidney injury)    Coronary artery disease involving native heart    Iron deficiency anemia    Coal workers pneumoconiosis    Leg wound, left        Plan   May DC home from a surgical perspective.  Continue with saline wet-to-dry dressing changes twice a day at home with home health  IV antibiotics per ID  PICC line to be placed today  Follow up with  Dr Rosa in office in 1-2 weeks.    ROSCOE Arciniega  03/17/17  7:28 AM    As above.  Agree.      Trung Rodriguez MD  CTSurgery  03/17/17   3:53 PM

## 2017-03-17 NOTE — DISCHARGE PLACEMENT REQUEST
"Rogelio Hastings (56 y.o. Male)     : YAZ EARLY [phone]    Date of Birth Social Security Number Address Home Phone MRN    1960  [address] [address] [phone] [id number]    Adventism Marital Status          None        Admission Date Admission Type Admitting Provider Attending Provider Department, Room/Bed    3/12/17 Elective Jamarcus Carl MD Kakaji, Hazem, MD [facility] 6B, N635/1    Discharge Date Discharge Disposition Discharge Destination                      Attending Provider: Jamarcus Carl MD     Allergies:  No Known Allergies    Isolation:  None   Infection:  None   Code Status:  FULL    Ht:  65\" (165.1 cm)   Wt:  253 lb 4 oz (115 kg)    Admission Cmt:  None   Principal Problem:  Open wound of left hip and thigh with complication [S71.002A,S71.102A] More...                 Active Insurance as of 3/12/2017     Primary Coverage     Payor Plan Insurance Group Employer/Plan Group    [employer] [place] [employer] [place]ICA[place]      Payor Plan Address Payor Plan Phone Number Effective From Effective To    [address] [address] [phone] 10/21/2016     [place] [address]       Subscriber Name Subscriber Birth Date Member ID       ROGELIO HASTINGS 1960 [id number]                 Emergency Contacts      (Rel.) Home Phone Work Phone Mobile Phone    Karina Brennan (Daughter) [phone] -- --               History & Physical      Maribel Botello MD at 3/12/2017  9:19 PM              [facility] Medicine Services  HISTORY AND PHYSICAL    Primary Care Physician: Dewayne Cole MD    Subjective     Chief Complaint:  Left leg cellulitis    History of Present Illness:   Mr. Hastings is a 56 year old  man who was transferred from [facility] this evening.  He was admitted there 3/11/2017 for left leg cellulitis associated with recent left superficial artery cutdown/angioplasty/stent performed by Dr." "Moe on 1/18/2017.  He did not follow up with Dr. Rosa.  About 3 weeks after the procedure the patient states the wound \"broke open\" and has been draining clear-pink tinged to \"pure blood\" ever since.  It has been painful and warm.  It has never looked frankly purulent or had a bad odor.  On Monday 3/6/2017, he saw his PCP who prescribed PO antibiotics (data deficit).  Yesterday, he saw his PCP again because there was no improvement and the patient was sent to the ER in [place], was admitted and given IV Vanco and Zosyn.  He had a negative venous duplex for DVT but no further imaging was done.  He was sent to [facility] today for higher level of care and to see Dr. Rosa who did his surgery.    He did have fever beginning 3/6 or 3/7 lasting for 3-4 days associated with cough, sore throat, congestion, nausea and vomiting.  He thought he had the flu and did not seek care.  He states that his wound did not change at all during this time.  Otherwise he has not had fever, chills, syncope, chest pain, palpitations, increased LE edema, abdominal pain, melena, hematochezia or increased SOA from baseline.    Review of Systems   Otherwise complete ROS performed and negative except as mentioned in the HPI.    Past Medical History   Diagnosis Date   • Arthritis    • Black lung disease    • BPH (benign prostatic hyperplasia)    • Cataract    • Cataract    • COLD (chronic obstructive lung disease)    • Colon polyps    • Diabetes mellitus      SINCE 2014   • Dyslipidemia    • Esophageal reflux    • Flu      HX   • Heart attack      2006   • Herniated lumbar intervertebral disc    • Hypertension    • Malignant neoplasm of colon    • Peripheral vascular disease    • Sleep apnea with use of continuous positive airway pressure (CPAP)    • Wears dentures    • Wears glasses        Past Surgical History   Procedure Laterality Date   • Colon surgery     • Bypass graft       non-vein - femoral-popliteal right   • Other surgical history  "      PTA ILIAC STENOSIS WITH STENT   • Cardiac catheterization       NO INTERVENTION   • Colonoscopy     • Aortagram N/A 1/17/2017     Procedure: AORTAGRAM WITH RUNOFFS and  STENT left SFA;  Surgeon: Heladio Rosa MD;  Location: [facility] HYBRID OR 15;  Service:    • Angioplasty femoral artery N/A 1/17/2017     Procedure: left femoral cutdown with aortagram and left SFA stent and angioplasty;  Surgeon: Heladio Rosa MD;  Location: [facility] HYBRID OR 15;  Service:        Family History   Problem Relation Age of Onset   • Lung cancer Mother    • Heart attack Mother    • Hypertension Mother    • Diabetes Mother    • Diabetes Father    • Hypertension Father    • Heart attack Father        Social History     Social History   • Marital status:      Spouse name: N/A   • Number of children: N/A   • Years of education: N/A     Occupational History   • DISABLED       BACK AND LUNG PROBLEMS     Social History Main Topics   • Smoking status: Former Smoker     Packs/day: 2.00     Types: Cigarettes     Start date: 1980     Quit date: 2015   • Smokeless tobacco: Current User     Types: Chew      Comment: Stopped smoking 1 year ago. Smoked 35 years up to 2ppd.   • Alcohol use No   • Drug use: No   • Sexual activity: Defer     Other Topics Concern   • Not on file     Social History Narrative       Medications:  Prescriptions Prior to Admission   Medication Sig Dispense Refill Last Dose   • potassium chloride (K-DUR) 10 MEQ CR tablet Take 10 mEq by mouth 2 (Two) Times a Day.      • sulfamethoxazole-trimethoprim (BACTRIM DS,SEPTRA DS) 800-160 MG per tablet Take 1 tablet by mouth 2 (Two) Times a Day.      • albuterol (PROVENTIL HFA;VENTOLIN HFA) 108 (90 BASE) MCG/ACT inhaler Inhale 2 puffs Every 4 (Four) Hours As Needed for wheezing.   1/16/2017 at 2200   • aspirin 81 MG tablet Take 81 mg by mouth daily.   1/15/2017   • atorvastatin (LIPITOR) 40 MG tablet Take 40 mg by mouth daily.   1/16/2017 at 0800   •  Cholecalciferol (VITAMIN D PO) Take 2,000 Units by mouth Daily.   1/16/2017 at 0800   • clopidogrel (PLAVIX) 75 MG tablet Take 75 mg by mouth daily.   1/15/2017   • ferrous sulfate 324 (65 FE) MG tablet delayed-release EC tablet Take 324 mg by mouth Daily With Breakfast.   1/16/2017 at 0800   • fluticasone-salmeterol (ADVAIR) 100-50 MCG/DOSE DISKUS Inhale 2 puffs 2 (Two) Times a Day.   1/16/2017 at 0800   • furosemide (LASIX) 20 MG tablet Take 20 mg by mouth Daily As Needed (SWELLING).   1/16/2017 at 0800   • hydrochlorothiazide (HYDRODIURIL) 12.5 MG tablet Take 25 mg by mouth Daily.   1/16/2017 at 0800   • HYDROcodone-acetaminophen (NORCO) 7.5-325 MG per tablet Take 1 tablet by mouth 2 (Two) Times a Day As Needed for moderate pain (4-6).   1/16/2017 at 1900   • lisinopril (PRINIVIL,ZESTRIL) 30 MG tablet Take 40 mg by mouth Daily.   1/17/2017 at 0530   • metFORMIN (GLUCOPHAGE) 500 MG tablet Take 850 mg by mouth 2 (Two) Times a Day With Meals.   1/16/2017 at 0800   • pantoprazole (PROTONIX) 40 MG EC tablet Take 40 mg by mouth Every Other Day.   1/16/2017 at 0800   • tamsulosin (FLOMAX) 0.4 MG capsule 24 hr capsule Take 0.4 mg by mouth Daily.   1/16/2017 at 0800   • tiZANidine (ZANAFLEX) 4 MG tablet Take 4 mg by mouth At Night As Needed for muscle spasms.   1/16/2017 at 2000       Allergies:  No Known Allergies      Objective     Physical Exam:  Vital Signs: There were no vitals taken for this visit.  Physical Exam     Constitutional: sitting on edge of bed, left leg dangling off bed, in pain, awake, alert  Eyes: PERRLA, sclerae anicteric, no conjunctival injection  Neck: supple, no thyromegaly, trachea midline  Respiratory: Clear to auscultation bilaterally, nonlabored respirations   Cardiovascular: RRR, no murmurs, rubs, or gallops, palpable pedal pulses bilaterally  Gastrointestinal: Positive bowel sounds, soft, nontender, nondistended, obese  Musculoskeletal: 1+ bilateral ankle edema, no clubbing or cyanosis to  bilateral lower extremities.  Left pedal pulse non palpable, but doppler +.  Foot warm.  Psychiatric: oriented x 3, appropriate affect, cooperative  Neurologic: Strength symmetric in all extremities, Cranial Nerves grossly intact to confrontation   Left leg, Cutdown site open, draining serous liquid, non purulent, no odor.  There is a large non-pulsatile mass, approximately 4x7 cm in an ovoid shape around the entry site.  There is some tenderness and a minimal amount of redness around the site.    Results Reviewed:        I reviewed a progress note, H and P, lab data and a radiology report of venous doppler studies from the outside hospital.    I have personally reviewed and interpreted available lab data, radiology studies and ECG obtained at time of admission.     Assessment / Plan     Assessment/Problem List:   Principal Problem:    Open wound of left hip and thigh with complication  Active Problems:    COPD (chronic obstructive pulmonary disease)    Diabetes mellitus    Hypertension    Peripheral vascular disease    LANA (acute kidney injury)    Coronary artery disease involving native heart    Iron deficiency anemia    Coal workers pneumoconiosis      Plan:  Left leg wound:  --s/p femoral cutdown and stenting/angioplasty of left superficial artery  --Cellulitis versus pseudoaneurysm or other structural issue: no elevated WBC or convincing fever.  Getting stat ultrasound  --Continue Vanco and Zosyn for now  --Follow outside cultures and repeat here (blood, wound).  If infected may require ID consultation.  --Consult Dr. Rosa (Dr. St accepted over weekend)  --NPO after midnight in case surgical intervention required    LANA:  --Up from baseline 1.0.  --Pharmacy to dose Vanco  --Holding ACE-I, lasix and HCTZ  --Could be from recent bactrim use  --Fluids  --Recheck in AM    T2DM:  --Hold orals, A1C, SSI, Accuchecks    COPD, Coal workers' pneumoconiosis  --Currently Stable    HTN:  --Hold ACE-I,  HCTZ due to  LANA  --Monitor, add PRN's if needed    Iron deficiency anemia, chronic: (stable)  --11/33 at outside hospital   --Monitor/recheck here    Peripheral Vascular disease:  --See #1    CAD:  --Currently stable        DVT prophylaxis: Mechanical given possible surgical intervention  Code Status: Full  Admission Status: Patient will be admitted to  [facility]: Patient with complication of surgical wound of unknown etiology, requiring urgent workup and treatment as well as specialty consultation.  Diabetes is complicated all aspects of care.     Maribel Botello MD 03/12/17 9:40 PM           Electronically signed by Maribel Botello MD at 3/12/2017 10:00 PM        Hospital Medications (active)       Dose Frequency Start End    aspirin chewable tablet 81 mg 81 mg Daily 3/14/2017     Sig - Route: Chew 1 tablet Daily. - Oral    atorvastatin (LIPITOR) tablet 40 mg 40 mg Daily 3/13/2017     Sig - Route: Take 1 tablet by mouth Daily. - Oral    bisacodyl (DULCOLAX) EC tablet 5 mg 5 mg Daily PRN 3/12/2017     Sig - Route: Take 1 tablet by mouth Daily As Needed for Constipation. - Oral    bisacodyl (DULCOLAX) suppository 10 mg 10 mg Daily PRN 3/13/2017     Sig - Route: Insert 1 suppository into the rectum Daily As Needed for Constipation. - Rectal    budesonide-formoterol (SYMBICORT) 80-4.5 MCG/ACT inhaler 2 puff 2 puff 2 Times Daily - RT 3/12/2017     Sig - Route: Inhale 2 puffs 2 (Two) Times a Day. - Inhalation    calcium carbonate (TUMS) chewable tablet 500 mg (200 mg elemental) 2 tablet 2 Times Daily PRN 3/12/2017     Sig - Route: Chew 1,000 mg 2 (Two) Times a Day As Needed for heartburn. - Oral    cholecalciferol (VITAMIN D3) tablet 2,000 Units 2,000 Units Daily 3/14/2017     Sig - Route: Take 2 tablets by mouth Daily. - Oral    Cosign for Ordering: Required by Vangie Dangelo, DO    clopidogrel (PLAVIX) tablet 75 mg 75 mg Daily 3/14/2017     Sig - Route: Take 1 tablet by mouth Daily. - Oral    dextrose (D50W) solution 25 g 25 g  "Every 15 Minutes PRN 3/12/2017     Sig - Route: Infuse 50 mL into a venous catheter Every 15 (Fifteen) Minutes As Needed for Low Blood Sugar (Blood Sugar Less Than 70, Patient Has IV Access - Unresponsive, NPO or Unable To Safely Swallow). - Intravenous    dextrose (GLUTOSE) oral gel 15 g 15 g Every 15 Minutes PRN 3/12/2017     Sig - Route: Take 15 g by mouth Every 15 (Fifteen) Minutes As Needed for Low Blood Sugar (Blood Sugar Less Than 70, Patient Alert, Is Not NPO & Can Safely Swallow). - Oral    ferrous sulfate tablet 325 mg 325 mg 2 Times Daily With Meals 3/12/2017     Sig - Route: Take 1 tablet by mouth 2 (Two) Times a Day With Meals. - Oral    furosemide (LASIX) tablet 40 mg 40 mg Daily 3/14/2017     Sig - Route: Take 1 tablet by mouth Daily. - Oral    glucagon (GLUCAGEN) injection 1 mg 1 mg Every 15 Minutes PRN 3/12/2017     Sig - Route: Inject 1 mg under the skin Every 15 (Fifteen) Minutes As Needed (Blood Glucose Less Than 70 - Patient Without IV Access - Unresponsive, NPO or Unable To Safely Swallow). - Subcutaneous    heparin (porcine) 5000 UNIT/ML injection 5,000 Units 5,000 Units Every 8 Hours Scheduled 3/14/2017     Sig - Route: Inject 1 mL under the skin Every 8 (Eight) Hours. - Subcutaneous    HYDROcodone-acetaminophen (NORCO) 7.5-325 MG per tablet 1 tablet 1 tablet 2 Times Daily PRN 3/12/2017     Sig - Route: Take 1 tablet by mouth 2 (Two) Times a Day As Needed for Moderate Pain (4-6). - Oral    HYDROmorphone (DILAUDID) injection 0.5 mg 0.5 mg Every 2 Hours PRN 3/12/2017 3/22/2017    Sig - Route: Infuse 0.5 mg into a venous catheter Every 2 (Two) Hours As Needed for Severe Pain (7-10). - Intravenous    Linked Group 1:  \"And\" Linked Group Details        insulin lispro (humaLOG) injection 2-7 Units 2-7 Units 4 Times Daily Before Meals & Nightly 3/12/2017     Sig - Route: Inject 2-7 Units under the skin 4 (Four) Times a Day Before Meals & at Bedtime. - Subcutaneous    ipratropium-albuterol (DUO-NEB) " "nebulizer solution 3 mL 3 mL Every 4 Hours PRN 3/16/2017     Sig - Route: Take 3 mL by nebulization Every 4 (Four) Hours As Needed for Shortness of Air. - Nebulization    lactated ringers infusion 9 mL/hr Continuous 3/13/2017     Sig - Route: Infuse 9 mL/hr into a venous catheter Continuous. - Intravenous    linezolid (ZYVOX) tablet 600 mg 600 mg Every 12 Hours Scheduled 3/16/2017     Sig - Route: Take 1 tablet by mouth Every 12 (Twelve) Hours. - Oral    naloxone (NARCAN) injection 0.4 mg 0.4 mg Every 5 Minutes PRN 3/12/2017     Sig - Route: Infuse 1 mL into a venous catheter Every 5 (Five) Minutes As Needed for Respiratory Depression. - Intravenous    Linked Group 1:  \"And\" Linked Group Details        ondansetron (ZOFRAN) injection 4 mg 4 mg Every 6 Hours PRN 3/13/2017     Sig - Route: Infuse 2 mL into a venous catheter Every 6 (Six) Hours As Needed for Nausea or Vomiting. - Intravenous    Linked Group 2:  \"Or\" Linked Group Details        ondansetron (ZOFRAN) tablet 4 mg 4 mg Every 6 Hours PRN 3/13/2017     Sig - Route: Take 1 tablet by mouth Every 6 (Six) Hours As Needed for Nausea or Vomiting. - Oral    Linked Group 2:  \"Or\" Linked Group Details        oxyCODONE-acetaminophen (PERCOCET) 7.5-325 MG per tablet 1 tablet 1 tablet Every 6 Hours PRN 3/17/2017 3/27/2017    Sig - Route: Take 1 tablet by mouth Every 6 (Six) Hours As Needed for Severe Pain (7-10). - Oral    pantoprazole (PROTONIX) EC tablet 40 mg 40 mg Every Other Day 3/12/2017     Sig - Route: Take 1 tablet by mouth Every Other Day. - Oral    piperacillin-tazobactam (ZOSYN) 3.375 g in dextrose 50 mL IVPB (premix) 3.375 g Every 6 Hours Scheduled 3/12/2017     Sig - Route: Infuse 50 mL into a venous catheter Every 6 (Six) Hours. - Intravenous    promethazine (PHENERGAN) injection 12.5 mg 12.5 mg Every 6 Hours PRN 3/12/2017     Sig - Route: Inject 0.5 mL into the shoulder, thigh, or buttocks Every 6 (Six) Hours As Needed for Nausea or Vomiting. - " "Intramuscular    Linked Group 3:  \"Or\" Linked Group Details        promethazine (PHENERGAN) suppository 12.5 mg 12.5 mg Every 6 Hours PRN 3/12/2017     Sig - Route: Insert 1 suppository into the rectum Every 6 (Six) Hours As Needed for Nausea or Vomiting. - Rectal    Linked Group 3:  \"Or\" Linked Group Details        promethazine (PHENERGAN) tablet 12.5 mg 12.5 mg Every 6 Hours PRN 3/12/2017     Sig - Route: Take 1 tablet by mouth Every 6 (Six) Hours As Needed for Nausea or Vomiting. - Oral    Linked Group 3:  \"Or\" Linked Group Details        sennosides-docusate sodium (SENOKOT-S) 8.6-50 MG tablet 2 tablet 2 tablet 2 Times Daily PRN 3/13/2017     Sig - Route: Take 2 tablets by mouth 2 (Two) Times a Day As Needed for Constipation. - Oral    sodium chloride 0.45 % infusion 35 mL/hr Continuous 3/13/2017     Sig - Route: Infuse 35 mL/hr into a venous catheter Continuous. - Intravenous    sodium chloride 0.9 % flush 1-10 mL 1-10 mL As Needed 3/12/2017     Sig - Route: Infuse 1-10 mL into a venous catheter As Needed for Line Care. - Intravenous    sodium hypochlorite (DAKIN'S) 0.025 % topical solution solution  2 Times Daily 3/14/2017     Sig - Route: Apply  topically 2 (Two) Times a Day. - Topical    Cosign for Ordering: Accepted by Heladio Rosa MD on 3/14/2017 11:29 AM    tamsulosin (FLOMAX) 24 hr capsule 0.4 mg 0.4 mg Daily 3/13/2017     Sig - Route: Take 1 capsule by mouth Daily. - Oral    tiZANidine (ZANAFLEX) tablet 4 mg 4 mg Nightly PRN 3/12/2017     Sig - Route: Take 1 tablet by mouth At Night As Needed for Muscle Spasms. - Oral    Obtain vancomycin trough prior to 12 noon dose 3/17, do not give dose until level is evaluated for potential dose adjustments (Discontinued)  Once 3/17/2017 3/17/2017    Sig - Route: 1 (One) Time. - Does not apply    Pharmacy to dose vancomycin (Discontinued)  Continuous PRN 3/12/2017 3/17/2017    Sig - Route: Continuous As Needed for Consult. - Does not apply    Reason for " Discontinue: Alternate therapy    vancomycin IVPB 1500 mg in 0.9% NaCl (Premix) 500 mL (Discontinued) 1,500 mg Every 12 Hours 3/15/2017 3/16/2017    Sig - Route: Infuse 500 mL into a venous catheter Every 12 (Twelve) Hours. - Intravenous             Operative/Procedure Notes (most recent note)      Heladio Rosa MD at 3/13/2017 12:38 PM  Version 1 of 1         Operative Report    Preop Diagnosis: Infected left groin incision        Procedure: Incisional debridement left groin incision.        Surgeons: Heladio Cruz PAC        Indication: This patient had had a left femoral cutdown in January 2017 with antegrade exposure of the left superficial femoral artery for catheter-based intervention.  We did not cannulate the patient's right femoral artery as it had a previous femoral to popliteal bypass.  He has missed a number of follow-up appointments.  He was recently seen with swelling and edema and drainage of the left femoral incision with partial dehiscence.  He was aware of the risk of the procedure and our plans to open this wound and do dressing changes and packed the wound open.  He agreed to proceed        Description: Supine position.  Sterile prep.  Approximately 4 cm in length of the old incision was reopened.  Clear watery tissue and some bloody tissue were removed nearly 200 mL of serous fluid were removed.  We irrigated this area copiously with antibiotics solution packed this with Betadine gauze.  The approximate length of the incision was 4 cm the depth was 3 cm.  The width was 2 cm.  No other tissue was excised.             Electronically signed by Heladio Rosa MD at 3/13/2017 12:40 PM           Physician Progress Notes (last 24 hours) (Notes from 3/16/2017 10:02 AM through 3/17/2017 10:02 AM)      Jamarcus Carl MD at 3/16/2017  3:29 PM  Version 1 of 1             [facility] Medicine Services  INPATIENT PROGRESS NOTE    Date of Admission:  3/12/2017  Length of Stay: 4  Primary Care Physician: Dewayne Cole MD    Subjective   CC:femoral mass  HPI:sitting on chair , no complaints today , denies fever,chest pain/SOB        Review of Systems   No fever , No Chest pain   Otherwise the 10 system review was negative      Objective      Temp:  [97.7 °F (36.5 °C)-98.8 °F (37.1 °C)] 98.5 °F (36.9 °C)  Heart Rate:  [] 77  Resp:  [12-18] 18  BP: (127-148)/(60-82) 144/60  Physical Exam     Constitutional: awake, alert, comfortable, well developed   Eyes: PERRLA, sclerae anicteric, no conjunctival injection  Neck: supple, , trachea midline  Respiratory: Clear to auscultation bilaterally, nonlabored respirations   Cardiovascular: RRR, pos YEYO, rubs, or gallops.  Gastrointestinal: Positive bowel sounds, soft, nontender, nondistended, obese  Psychiatric: oriented x 3, appropriate affect, cooperative  Neurologic: Strength symmetric in all extremities, Cranial Nerves grossly intact to confrontation   Skin, left inguinal wound dressed with clean dressing    Results Review:    I have reviewed the labs, radiology results and diagnostic studies.      Results from last 7 days  Lab Units 03/16/17  0539   WBC 10*3/mm3 3.69   HEMOGLOBIN g/dL 9.7*   PLATELETS 10*3/mm3 181       Results from last 7 days  Lab Units 03/16/17  0539   SODIUM mmol/L 135   POTASSIUM mmol/L 4.0   CHLORIDE mmol/L 101   TOTAL CO2 mmol/L 27.0   BUN mg/dL 6*   CREATININE mg/dL 1.20   GLUCOSE mg/dL 99   CALCIUM mg/dL 9.3       Culture Data: Cultures:    BLOOD CULTURE   Date Value Ref Range Status   03/12/2017 No growth at 3 days  Preliminary   03/12/2017 No growth at 3 days  Preliminary     WOUND CULTURE   Date Value Ref Range Status   03/13/2017 Scant growth (1+) Pseudomonas aeruginosa (A)  Final   03/13/2017 Scant growth (1+) Staphylococcus epidermidis (A)  Final     Comment:     This isolate does not demonstrate inducible clindamycin resistance in vitro.         Radiology Data:     I have  reviewed the medications.    Assessment/Plan     Problem List  Principal Problem:    Open wound of left hip and thigh with complication  Active Problems:    COPD (chronic obstructive pulmonary disease)    Diabetes mellitus    Hypertension    Peripheral vascular disease    LANA (acute kidney injury)    Coronary artery disease involving native heart    Iron deficiency anemia    Coal workers pneumoconiosis    Leg wound, left           Assessment/Plan:    Left leg wound:  --s/p femoral stenting/angioplasty of left superficial artery, continue twice a day dressing changes  --Continue Vanco and Zosyn , Id following   --Wound positive for coag-negative staph and pseudomonas  --Dr. oRsa did I and D of lesion on 3-14     LANA:  --Up from baseline 1.0 on admssion, now up to 1.2 today  --Pharmacy to dose Vanco  --Could be from recent bactrim use  --follow Cr.       T2DM:  --Hold orals, A1C, SSI, Accuchecks      COPD, Coal workers' pneumoconiosis  --Currently Stable      HTN:  --Hold ACE-I, HCTZ due to LANA  --Monitor, add PRN's if needed      Iron deficiency anemia, chronic: (stable)  -- at outside hospital , down to 9 now  --will follow      Heart Murmer  --followed by Cards per pt, sees Abordo       Peripheral Vascular disease.      CAD:  --Currently stable      Code Status: Full     DVT prophylaxis:sq heparin, teds and scds       Discharge Planning: I expect patient to be discharged to home  in several days days.    Jamarcus Carl MD   17   3:30 PM    Please note that portions of this note may have been completed with a voice recognition program. Efforts were made to edit the dictations, but occasionally words are mistranscribed.     Electronically signed by Jamarcus Carl MD at 3/16/2017  3:37 PM      David gNuyen MD at 3/16/2017  4:28 PM  Version 1 of 1         [facility]    INPATIENT PROGRESS NOTE  3/16/2017      PATIENT NAME: Sai Weinberg  :  1960  MRN:  [id number]  Date  "of Admission:  3/12/2017      Antimicrobials:  IV Anti-Infectives     Ordered     Dose/Rate Route Frequency Start Stop    17 1246  vancomycin IVPB 1500 mg in 0.9% NaCl (Premix) 500 mL     Ordering Provider:  Wesly Almazan RPH    1,500 mg Intravenous Every 12 Hours 03/15/17 0000      17 0904  cefuroxime (ZINACEF) IVPB 1.5 g     Ordering Provider:  ROSCOE Kennedy    1.5 g  over 30 Minutes Intravenous On Call to O.R. 17 0600 03/15/17 0559    17 2243  vancomycin IVPB 1750 mg in 0.9% Sodium Chloride (premix) 500 mL     Ordering Provider:  Maribel Botello MD    1,750 mg Intravenous Once 17 2315 17 2352    17 2158  piperacillin-tazobactam (ZOSYN) 3.375 g in dextrose 50 mL IVPB (premix)     Ordering Provider:  Maribel Botello MD    3.375 g Intravenous Every 6 Hours Scheduled 17 2215      17 215  Pharmacy to dose vancomycin     Ordering Provider:  Maribel Botello MD     Does not apply Continuous PRN 17 2150            MAR reviewed.  Pertinent other medications include:  Insulin, Plavix, Protonix      Reason for consultation:  Left groin/thigh abscess and cellulitis, status post recent left femoral cutdown    Interval history:  Doing well today.  Dressing changed with Dakin's this am.  No fevers.  No diarrhea.  Wants to go home soon.    ROS:  No fevers/chills overnight.  No new rashes.  No SOB or chest pain.  No nausea/vomiting/diarrhea.  Denies side effects from antimicrobials.      Objective:  Temp (24hrs), Av.3 °F (36.8 °C), Min:97.7 °F (36.5 °C), Max:98.8 °F (37.1 °C)    Visit Vitals   • /60   • Pulse 77   • Temp 98.5 °F (36.9 °C) (Oral)   • Resp 18   • Ht 65\" (165.1 cm)   • Wt 253 lb 4 oz (115 kg)   • SpO2 91%   • BMI 42.14 kg/m2       Physical Examination:  GENERAL: Awake and alert, in no acute distress.   LINES: Left arm peripheral IV  HEENT: Normocephalic, atraumatic. No conjunctival injection or subconjunctival hemorrhage. No " icterus.  MSK: Left thigh with improved but ongoing edema, erythema, and warmth. There is a large clean dressing over the left groin with serous drainage; mild induration surrounding the packed lesion continues.  SKIN: Warm and dry without rash.  NEURO: A&Ox4. No focal deficits. Face symmetric. Speech fluent. Moves all extremities well.   PSYCHIATRIC: Normal insight and judgement. Cooperative. Normal affect.       Laboratory Data:      Results from last 7 days  Lab Units 03/16/17  0539 03/15/17  0644 03/14/17  0454   WBC 10*3/mm3 3.69 3.94 4.44   HEMOGLOBIN g/dL 9.7* 9.4* 9.2*   HEMATOCRIT % 29.1* 27.8* 27.7*   PLATELETS 10*3/mm3 181 174 153       Results from last 7 days  Lab Units 03/16/17  0539   SODIUM mmol/L 135   POTASSIUM mmol/L 4.0   CHLORIDE mmol/L 101   TOTAL CO2 mmol/L 27.0   BUN mg/dL 6*   CREATININE mg/dL 1.20   GLUCOSE mg/dL 99   CALCIUM mg/dL 9.3       Results from last 7 days  Lab Units 03/12/17  2218   ALK PHOS U/L 53   BILIRUBIN mg/dL 1.2   ALT (SGPT) U/L 31   AST (SGOT) U/L 63*             Estimated Creatinine Clearance: 80.6 mL/min (by C-G formula based on Cr of 1.2).    Results from last 7 days  Lab Units 03/12/17  2218   LACTATE mmol/L 1.4           Results from last 7 days  Lab Units 03/14/17  1111   VANCOMYCIN TR mcg/mL 15.60         Microbiology:  BLOOD CULTURE   Date Value Ref Range Status   03/12/2017 No growth at 3 days  Preliminary   03/12/2017 No growth at 3 days  Preliminary        WOUND CULTURE   Date Value Ref Range Status   03/13/2017 Scant growth (1+) Pseudomonas aeruginosa (A)  Final   03/13/2017 Scant growth (1+) Staphylococcus epidermidis (A)  Final     Comment:     This isolate does not demonstrate inducible clindamycin resistance in vitro.                   Susceptibility         Pseudomonas aeruginosa Staphylococcus epidermidis         ALYSA ALYSA         Aztreonam 16  Intermediate          Cefepime 16  Intermediate          Ceftazidime >16  Resistant          Clindamycin  <=0.5   Susceptible         Daptomycin  <=0.5  Susceptible         Erythromycin  >4  Resistant         Gentamicin <=4  Susceptible <=4  Susceptible         Levofloxacin <=2  Susceptible <=1  Susceptible         Linezolid  <=1  Susceptible         Meropenem <=1  Susceptible          Oxacillin  >2  Resistant         Penicillin G  >8  Resistant         Piperacillin + Tazobactam <=16  Susceptible          Quinupristin + Dalfopristin  <=0.5  Susceptible         Rifampin  <=1  Susceptible         Tetracycline  <=4  Susceptible         Tobramycin <=4  Susceptible          Trimethoprim + Sulfamethoxazole  >2/38  Resistant         Vancomycin  1  Susceptible                                   Radiology:  None new      DISCUSSION:  56 y.o. male with history of diabetes and peripheral vascular disease who recently underwent a left femoral cutdown with angioplasty and stent placement is admitted with postoperative left thigh cellulitis and wound dehiscence with seroma versus abscess formation. Incision and drainage performed with 200 milliliters of serous fluid. Exam is consistent with a mild cellulitis of the left thigh. Patient reported copious amounts of serosanguineous drainage at home for the past 2-3 weeks.      PROBLEM LIST:   1. Postoperative left femoral fluid collection, seroma plus likely abscess, status post recent left femoral cutdown with left superficial femoral artery angioplasty with stent (no graft). Status post incision and drainage with 200 mL serous fluid removed. Culture is positive for Pseudomonas (S-FQ, Zosyn, meropenem; intermediate to cefepime and aztreonam; resistant to ceftazidime) and Staph epi, methicillin resistant.  2. Left thigh cellulitis, related to #1. Improving.  3. Controlled diabetes mellitus type 2.  4. Obesity.  5. Peripheral vascular disease, status post prior right femoral to popliteal bypass graft and more recent left femoral cutdown with angioplasty and stent.  6. Prolonged QTc.  Pseudomonas  is susceptible to fluoroquinolone but patient with prolonged QTc on EKG, so probably wise not to use oral Levaquin for discharge.      PLAN:  1.  Discussed IV home infusion and patient willing to do PICC.  As we cannot do FQ given prolonged QTc, best option is going to be continuous infusion Zosyn, given the susceptibility profile.  2.  Transition to linezolid for MRSE; can do oral at home - will be simpler than IV vancomycin infusions multiple times daily at home and trough monitoring.  3.  Proceed with PICC.  4.  Patient agreeable to home IV abx infusion, PICC, and home dressing changes.  Will consult CM to arrange.  F/U with me in office in one week.  Probably will need 2 more weeks of IV therapy.    DISCHARGE ORDERS:  Okay from my perspective for discharge tomorrow if PICC placed and home antibiotics arranged.  Discharge plan discussed with patient.  Patient is to be discharged on Zosyn 13.5 g iv daily continuous infusion and linezolid 600 mg po bid for tentatively 2 weeks, through 3/31. IV antibiotics to be supplied by home infusion and given via PICC.  Weekly labs to include CBC/diff, CMP, ESR, CRP and to be faxed to [facility] office at [phone] ATTN: Dr. Nguyen.    Weekly PICC dressing changes to be performed by home health.    Follow-up with me at [facility] office ([phone]) in one week.    Utilization management:  I spent a total of 30 minutes on coordination of care for this discharge.      David Nguyen MD  3/16/2017  4:28 PM      Portions of this note may have been created with a voice recognition program.  Efforts were made to edit the dictations.  However, words are occassionally mistranscribed and sound alike errors may occur.       Electronically signed by David Nguyen MD at 3/16/2017  4:39 PM      ROSCOE Hatch at 3/17/2017  7:28 AM  Version 1 of 1         CTS Progress Note      POD 3 s/p I&D of left groin    Subjectivewell without complaints, lying in bed.  Denies any fever or  chills.      Objective    Physical Exam:   Vital Signs   Temp:  [97.5 °F (36.4 °C)-99.1 °F (37.3 °C)] 97.5 °F (36.4 °C)  Heart Rate:  [] 58  Resp:  [12-18] 18  BP: (107-152)/(52-67) 107/55   GEN: NAD   RESP: Clear to auscultation bilaterally    CV: Regular rate and rhythm, no murmurs, rubs or gallops   ABD: Soft, nontender/nondistended with normoactive bowel sounds    EXT: Warm with good color and well-perfused with 2-3+ bilateral lower extremity edema   INT: Left groin/thigh incision with continued wet-to-dry dressing changes with mild serosanguineous drainage, erythema resolving       Intake/Output Summary (Last 24 hours) at 03/17/17 0728  Last data filed at 03/17/17 0450   Gross per 24 hour   Intake   1340 ml   Output   1775 ml   Net   -435 ml     Results       Results from last 7 days  Lab Units 03/17/17  0549   WBC 10*3/mm3 4.35   HEMOGLOBIN g/dL 9.1*   HEMATOCRIT % 27.3*   PLATELETS 10*3/mm3 187       Results from last 7 days  Lab Units 03/17/17  0549   SODIUM mmol/L 131*   POTASSIUM mmol/L 3.8   CHLORIDE mmol/L 98*   TOTAL CO2 mmol/L 30.0   BUN mg/dL 10   CREATININE mg/dL 2.20*   GLUCOSE mg/dL 93   CALCIUM mg/dL 9.0       Results from last 7 days  Lab Units 03/12/17  2218   INR  1.11         Assessment&#47;Plan     Principal Problem:    Open wound of left hip and thigh with complication  Active Problems:    COPD (chronic obstructive pulmonary disease)    Diabetes mellitus    Hypertension    Peripheral vascular disease    LANA (acute kidney injury)    Coronary artery disease involving native heart    Iron deficiency anemia    Coal workers pneumoconiosis    Leg wound, left        Plan   May DC home from a surgical perspective.with saline wet-to-dry dressing changes twice a day at home with home health  IV antibiotics per ID  PICC line to be placed today  Follow up with Dr Rosa in office in 1-2 weeks.    ROSCOE Arciniega  03/17/17  7:28 AM                   Electronically signed by ROSCOE Hatch  at 3/17/2017  8:36 AM          Ambulatory Referral to Home Health [[id number]] (Order [id number])   Outpatient Referral    Date: 3/17/2017   Department: [address]   Ordering/Authorizing: Jamarcus Carl MD           Order History  Outpatient       Date/Time Action Taken User Additional Information       03/17/17 1001 Sign Yaz Early RN Ordering Mode: Telephone with readback           Order Details        Frequency Duration Priority Order Class       None None Routine Outgoing Referral           Start Date/Time        Start Date       03/17/17           Order Questions        Question Answer Comment       Face to Face Visit Date 3/17/2017        Follow-up Provider for Plan of Care? I treated the patient in an acute care facility and will not continue treatment after discharge.        Follow-up Provider SELINA LIPSCOMB        Reason/Clinical Findings wound care/dressing changes and teaching, home infusion abx        Describe mobility limitations that make leaving home difficult Continuous antibiotic infusions        Nursing/Therapeutic Services Requested Skilled Nursing        Skilled nursing orders: Wound care dressing/changes Zosyn infusion provided by [facility]        Infusion therapy        Instructions: Dressing changes to left leg wound BID wet to dry with normal saline and wound management/teaching        Frequency: Other            Verbal Order Info        Action Created on Order Mode Entered by Responsible Provider Signed by Signed on       Ordering 03/17/17 1001 Telephone with readback Yaz Early, RN Jamarcus Carl MD             Source Order Set / Preference List        Preference List       YAZ EARLY ([id number]) FREQUENT ORDERS PREFERENCE [facility] [[id number]]           Clinical Indications           ICD-10-CM ICD-9-CM       Open wound of left hip and thigh with complication, initial encounter  - Primary     S71.002A  S71.102A 890.1       Impaired mobility  and ADLs     Z74.09 799.89       Impaired functional mobility, balance, gait, and endurance     Z74.09 V49.89           Reprint Order Requisition        AMB REFERRAL TO HOME HEALTH (Order #[id number]) on 3/17/17         Encounter-Level Cardiology Documents:        There are no encounter-level cardiology documents.         Encounter        View Encounter         Order Provider Info            Office phone Pager E-mail       Ordering User Kathryn Pozo RN -- -- --       Authorizing Provider Jamarcus Carl [phone] -- --       Attending Provider Jamarcus Carl [phone] -- --       Entered By Kathryn Pozo RN -- -- --       Ordering Provider Jamarcus Carl [phone] -- --           Linked Charges        No charges linked to this procedure         Order Transmittal Tracking        AMB REFERRAL TO HOME HEALTH (Order #[id number]) on 3/17/17         Authorized by: Jamarcus Carl MD (NPI: [id number])

## 2017-03-17 NOTE — PLAN OF CARE
Problem: Patient Care Overview (Adult)  Goal: Plan of Care Review  Outcome: Ongoing (interventions implemented as appropriate)  Goal: Adult Individualization and Mutuality  Outcome: Ongoing (interventions implemented as appropriate)  Goal: Discharge Needs Assessment  Outcome: Ongoing (interventions implemented as appropriate)    Problem: Infection, Risk/Actual (Adult)  Goal: Identify Related Risk Factors and Signs and Symptoms  Outcome: Outcome(s) achieved Date Met:  03/17/17  Goal: Infection Prevention/Resolution  Outcome: Ongoing (interventions implemented as appropriate)

## 2017-03-17 NOTE — PLAN OF CARE
Problem: Patient Care Overview (Adult)  Goal: Plan of Care Review  Outcome: Ongoing (interventions implemented as appropriate)    03/17/17 1057   Coping/Psychosocial Response Interventions   Plan Of Care Reviewed With patient   Patient Care Overview   Progress progress toward functional goals as expected   Outcome Evaluation   Outcome Summary/Follow up Plan Pt. met all goals dressing and undressing self Post AE issued and educated on. Pt. performing transfers without assist, but still limited endurance. EC/WS handouts issued and reviewed with pt. All goals met ready for possible discharge home today.         Problem: Inpatient Occupational Therapy  Goal: Transfer Training Goal 1 LTG- OT  Outcome: Outcome(s) achieved Date Met:  03/17/17 03/14/17 0945 03/17/17 1057   Transfer Training OT LTG   Transfer Training OT LTG, Date Established 03/14/17 --    Transfer Training OT LTG, Time to Achieve by discharge --    Transfer Training OT LTG, Activity Type bed to chair /chair to bed;sit to stand/stand to sit;toilet --    Transfer Training OT LTG, [place] Level contact guard assist --    Transfer Training OT LTG, Additional Goal AAD --    Transfer Training OT LTG, Outcome --  goal met  (pt demonstrated or simulated all)       Goal: Patient Education Goal LTG- OT  Outcome: Outcome(s) achieved Date Met:  03/17/17 03/14/17 0945 03/17/17 1057   Patient Education OT LTG   Patient Education OT LTG, Date Established 03/14/17 --    Patient Education OT LTG, Time to Achieve by discharge --    Patient Education OT LTG, Education Type precautions per surgeon;posture/body mechanics;positioning;1 hand/rashad technique;home safety;adaptive equipment mgmt;energy conservation;adaptive breathing --    Patient Education OT LTG, Education Understanding verbalizes understanding --    Patient Education OT LTG Outcome --  goal met       Goal: Bathing Goal LTG- OT  Outcome: Outcome(s) achieved Date Met:  03/17/17 03/14/17 0945 03/17/17  1057   Bathing OT LTG   Bathing Goal OT LTG, Date Established 03/14/17 --    Bathing Goal OT LTG, Time to Achieve by discharge --    Bathing Goal OT LTG, Activity Type simulates;lower body bathing --    Bathing Goal OT LTG, [place] Level supervision required --    Bathing Goal OT LTG, Assist Device sponge, long handled --    Bathing Goal OT LTG, Outcome --  goal met       Goal: LB Dressing Goal LTG- OT  Outcome: Outcome(s) achieved Date Met:  03/17/17 03/14/17 0945 03/17/17 1057   LB Dressing OT LTG   LB Dressing Goal OT LTG, Date Established 03/14/17 --    LB Dressing Goal OT LTG, Time to Achieve by discharge --    LB Dressing Goal OT LTG, Activity Type Don/doff socks --    LB Dressing Goal OT LTG, [place] Level moderate assist (50% patient effort) --    LB Dressing Goal OT LTG, Additional Goal AAD --    LB Dressing Goal OT LTG, Outcome --  goal met  (also met with pants and shoes)

## 2017-03-17 NOTE — PROGRESS NOTES
Continued Stay Note  [facility]     Patient Name: Sai Weinberg  MRN: [id number]  Today's Date: 3/17/2017    Admit Date: 3/12/2017          Discharge Plan       03/17/17 1508    Case Management/Social Work Plan    Plan home with home health    Patient/Family In Agreement With Plan yes    Additional Comments Per Suzy CASTRO, patient is not medically ready for discharge, may be ready tomorrow. Notified Jodi at [facility], she asks to have weekend CM call tomorrow to notify them if patient will need abx delivered. Notified [facility] of possible patient discharge tomorrow, will need to call dc date. Notified weekend CM.               Discharge Codes     None        Expected Discharge Date and Time     Expected Discharge Date Expected Discharge Time    Mar 17, 2017             Kathryn Pozo RN

## 2017-03-18 LAB
ANION GAP SERPL CALCULATED.3IONS-SCNC: 6 MMOL/L (ref 3–11)
BILIRUB UR QL STRIP: NEGATIVE
BUN BLD-MCNC: 10 MG/DL (ref 9–23)
BUN/CREAT SERPL: 4.5 (ref 7–25)
CALCIUM SPEC-SCNC: 8.8 MG/DL (ref 8.7–10.4)
CHLORIDE SERPL-SCNC: 100 MMOL/L (ref 99–109)
CLARITY UR: CLEAR
CO2 SERPL-SCNC: 25 MMOL/L (ref 20–31)
COLOR UR: YELLOW
CREAT BLD-MCNC: 2.2 MG/DL (ref 0.6–1.3)
CREAT UR-MCNC: 108.8 MG/DL
DEPRECATED RDW RBC AUTO: 53.5 FL (ref 37–54)
ERYTHROCYTE [DISTWIDTH] IN BLOOD BY AUTOMATED COUNT: 14.9 % (ref 11.3–14.5)
GFR SERPL CREATININE-BSD FRML MDRD: 31 ML/MIN/1.73
GLUCOSE BLD-MCNC: 87 MG/DL (ref 70–100)
GLUCOSE BLDC GLUCOMTR-MCNC: 103 MG/DL (ref 70–130)
GLUCOSE BLDC GLUCOMTR-MCNC: 137 MG/DL (ref 70–130)
GLUCOSE BLDC GLUCOMTR-MCNC: 87 MG/DL (ref 70–130)
GLUCOSE BLDC GLUCOMTR-MCNC: 99 MG/DL (ref 70–130)
GLUCOSE UR STRIP-MCNC: NEGATIVE MG/DL
HCT VFR BLD AUTO: 26.6 % (ref 38.9–50.9)
HGB BLD-MCNC: 9.1 G/DL (ref 13.1–17.5)
HGB UR QL STRIP.AUTO: NEGATIVE
KETONES UR QL STRIP: NEGATIVE
LEUKOCYTE ESTERASE UR QL STRIP.AUTO: NEGATIVE
MCH RBC QN AUTO: 33.6 PG (ref 27–31)
MCHC RBC AUTO-ENTMCNC: 34.2 G/DL (ref 32–36)
MCV RBC AUTO: 98.2 FL (ref 80–99)
NITRITE UR QL STRIP: NEGATIVE
PH UR STRIP.AUTO: 5.5 [PH] (ref 5–8)
PLATELET # BLD AUTO: 184 10*3/MM3 (ref 150–450)
PMV BLD AUTO: 9.1 FL (ref 6–12)
POTASSIUM BLD-SCNC: 3.5 MMOL/L (ref 3.5–5.5)
PROT UR QL STRIP: NEGATIVE
RBC # BLD AUTO: 2.71 10*6/MM3 (ref 4.2–5.76)
SODIUM BLD-SCNC: 131 MMOL/L (ref 132–146)
SODIUM UR-SCNC: 21 MMOL/L (ref 30–90)
SP GR UR STRIP: 1.01 (ref 1–1.03)
UROBILINOGEN UR QL STRIP: NORMAL
WBC NRBC COR # BLD: 4.78 10*3/MM3 (ref 3.5–10.8)

## 2017-03-18 PROCEDURE — 81003 URINALYSIS AUTO W/O SCOPE: CPT | Performed by: INTERNAL MEDICINE

## 2017-03-18 PROCEDURE — 25010000002 ONDANSETRON PER 1 MG: Performed by: PHYSICIAN ASSISTANT

## 2017-03-18 PROCEDURE — 25010000002 PIPERACILLIN SOD-TAZOBACTAM PER 1 G: Performed by: INTERNAL MEDICINE

## 2017-03-18 PROCEDURE — 94640 AIRWAY INHALATION TREATMENT: CPT

## 2017-03-18 PROCEDURE — 82962 GLUCOSE BLOOD TEST: CPT

## 2017-03-18 PROCEDURE — 84300 ASSAY OF URINE SODIUM: CPT | Performed by: INTERNAL MEDICINE

## 2017-03-18 PROCEDURE — 94799 UNLISTED PULMONARY SVC/PX: CPT

## 2017-03-18 PROCEDURE — 85027 COMPLETE CBC AUTOMATED: CPT | Performed by: NURSE PRACTITIONER

## 2017-03-18 PROCEDURE — 80048 BASIC METABOLIC PNL TOTAL CA: CPT | Performed by: NURSE PRACTITIONER

## 2017-03-18 PROCEDURE — 25010000002 HEPARIN (PORCINE) PER 1000 UNITS: Performed by: FAMILY MEDICINE

## 2017-03-18 PROCEDURE — 82570 ASSAY OF URINE CREATININE: CPT | Performed by: INTERNAL MEDICINE

## 2017-03-18 PROCEDURE — 25010000002 HYDROMORPHONE PER 4 MG: Performed by: FAMILY MEDICINE

## 2017-03-18 PROCEDURE — 25010000002 PIPERACILLIN SOD-TAZOBACTAM PER 1 G: Performed by: FAMILY MEDICINE

## 2017-03-18 PROCEDURE — 99232 SBSQ HOSP IP/OBS MODERATE 35: CPT | Performed by: NURSE PRACTITIONER

## 2017-03-18 RX ADMIN — HYDROMORPHONE HYDROCHLORIDE 0.5 MG: 1 INJECTION, SOLUTION INTRAMUSCULAR; INTRAVENOUS; SUBCUTANEOUS at 17:12

## 2017-03-18 RX ADMIN — Medication: at 08:14

## 2017-03-18 RX ADMIN — HYDROMORPHONE HYDROCHLORIDE 0.5 MG: 1 INJECTION, SOLUTION INTRAMUSCULAR; INTRAVENOUS; SUBCUTANEOUS at 06:13

## 2017-03-18 RX ADMIN — DESMOPRESSIN ACETATE 40 MG: 0.2 TABLET ORAL at 08:13

## 2017-03-18 RX ADMIN — LINEZOLID 600 MG: 600 TABLET, FILM COATED ORAL at 08:13

## 2017-03-18 RX ADMIN — Medication: at 01:54

## 2017-03-18 RX ADMIN — Medication 325 MG: at 17:12

## 2017-03-18 RX ADMIN — HYDROMORPHONE HYDROCHLORIDE 0.5 MG: 1 INJECTION, SOLUTION INTRAMUSCULAR; INTRAVENOUS; SUBCUTANEOUS at 12:05

## 2017-03-18 RX ADMIN — LINEZOLID 600 MG: 600 TABLET, FILM COATED ORAL at 20:49

## 2017-03-18 RX ADMIN — TAZOBACTAM SODIUM AND PIPERACILLIN SODIUM 3.38 G: 375; 3 INJECTION, SOLUTION INTRAVENOUS at 05:58

## 2017-03-18 RX ADMIN — ASPIRIN 81 MG: 81 TABLET, CHEWABLE ORAL at 08:13

## 2017-03-18 RX ADMIN — CLOPIDOGREL BISULFATE 75 MG: 75 TABLET, FILM COATED ORAL at 08:13

## 2017-03-18 RX ADMIN — HEPARIN SODIUM 5000 UNITS: 5000 INJECTION, SOLUTION INTRAVENOUS; SUBCUTANEOUS at 05:58

## 2017-03-18 RX ADMIN — HYDROCODONE BITARTRATE AND ACETAMINOPHEN 1 TABLET: 7.5; 325 TABLET ORAL at 12:06

## 2017-03-18 RX ADMIN — TAZOBACTAM SODIUM AND PIPERACILLIN SODIUM 3.38 G: 375; 3 INJECTION, SOLUTION INTRAVENOUS at 12:00

## 2017-03-18 RX ADMIN — TAMSULOSIN HYDROCHLORIDE 0.4 MG: 0.4 CAPSULE ORAL at 08:13

## 2017-03-18 RX ADMIN — HEPARIN SODIUM 5000 UNITS: 5000 INJECTION, SOLUTION INTRAVENOUS; SUBCUTANEOUS at 13:41

## 2017-03-18 RX ADMIN — BUDESONIDE AND FORMOTEROL FUMARATE DIHYDRATE 2 PUFF: 80; 4.5 AEROSOL RESPIRATORY (INHALATION) at 19:44

## 2017-03-18 RX ADMIN — PANTOPRAZOLE SODIUM 40 MG: 40 TABLET, DELAYED RELEASE ORAL at 08:13

## 2017-03-18 RX ADMIN — TAZOBACTAM SODIUM AND PIPERACILLIN SODIUM 3.38 G: 375; 3 INJECTION, SOLUTION INTRAVENOUS at 01:16

## 2017-03-18 RX ADMIN — HYDROMORPHONE HYDROCHLORIDE 0.5 MG: 1 INJECTION, SOLUTION INTRAMUSCULAR; INTRAVENOUS; SUBCUTANEOUS at 01:53

## 2017-03-18 RX ADMIN — VITAMIN D, TAB 1000IU (100/BT) 2000 UNITS: 25 TAB at 08:13

## 2017-03-18 RX ADMIN — HYDROMORPHONE HYDROCHLORIDE 0.5 MG: 1 INJECTION, SOLUTION INTRAMUSCULAR; INTRAVENOUS; SUBCUTANEOUS at 20:49

## 2017-03-18 RX ADMIN — BUDESONIDE AND FORMOTEROL FUMARATE DIHYDRATE 2 PUFF: 80; 4.5 AEROSOL RESPIRATORY (INHALATION) at 08:51

## 2017-03-18 RX ADMIN — PIPERACILLIN AND TAZOBACTAM 2.25 G: 3; .375 INJECTION, POWDER, LYOPHILIZED, FOR SOLUTION INTRAVENOUS; PARENTERAL at 17:12

## 2017-03-18 RX ADMIN — Medication 325 MG: at 08:13

## 2017-03-18 RX ADMIN — OXYCODONE HYDROCHLORIDE AND ACETAMINOPHEN 1 TABLET: 7.5; 325 TABLET ORAL at 20:48

## 2017-03-18 RX ADMIN — OXYCODONE HYDROCHLORIDE AND ACETAMINOPHEN 1 TABLET: 7.5; 325 TABLET ORAL at 01:53

## 2017-03-18 RX ADMIN — HEPARIN SODIUM 5000 UNITS: 5000 INJECTION, SOLUTION INTRAVENOUS; SUBCUTANEOUS at 20:49

## 2017-03-18 RX ADMIN — ONDANSETRON 4 MG: 2 INJECTION INTRAMUSCULAR; INTRAVENOUS at 08:13

## 2017-03-18 NOTE — PROGRESS NOTES
[facility] Medicine Services  INPATIENT PROGRESS NOTE    Date of Admission: 3/12/2017  Length of Stay: 6  Primary Care Physician: Dewayne Cole MD    Subjective   CC:  Femoral mass  HPI:  Patient is sitting up in chair.  Denies any new health issues.  Denies chest pain or worsening shortness of breath, on room air.  Patient tolerating PO, having regular BMs.  Liliana    Review Of Systems:   Review of Systems   Constitutional: Negative for chills and fever.   HENT: Negative for trouble swallowing.    Respiratory: Negative for cough and chest tightness.         + for dyspnea on exertion.   Cardiovascular: Negative for chest pain.   Gastrointestinal: Negative for abdominal pain, constipation, diarrhea and vomiting.         Objective      Temp:  [98.2 °F (36.8 °C)-99 °F (37.2 °C)] 98.2 °F (36.8 °C)  Heart Rate:  [69-83] 83  Resp:  [18-20] 20  BP: (131-145)/(66-76) 136/66  Physical Exam   Constitutional: He is oriented to person, place, and time. He appears well-developed and well-nourished. No distress.   HENT:   Head: Normocephalic.   Eyes: Pupils are equal, round, and reactive to light.   Neck: Normal range of motion.   Cardiovascular: Normal rate and regular rhythm.    Murmur heard.  Pulmonary/Chest: Effort normal and breath sounds normal. He has no wheezes.   Abdominal: Soft. Bowel sounds are normal. He exhibits no distension. There is no tenderness.   Musculoskeletal: Normal range of motion.   Neurological: He is alert and oriented to person, place, and time.   Skin:   Left groin with large bandage intact.  There is visible dried clear-yellow drainage noted on bandage.   Psychiatric: He has a normal mood and affect. His behavior is normal.   Vitals reviewed.        Results Review:    I have reviewed the labs, radiology results and diagnostic studies.      Results from last 7 days  Lab Units 03/18/17  0521   WBC 10*3/mm3 4.78   HEMOGLOBIN g/dL 9.1*   PLATELETS 10*3/mm3 184       Results  from last 7 days  Lab Units 03/18/17  0521   SODIUM mmol/L 131*   POTASSIUM mmol/L 3.5   CHLORIDE mmol/L 100   TOTAL CO2 mmol/L 25.0   BUN mg/dL 10   CREATININE mg/dL 2.20*   GLUCOSE mg/dL 87   CALCIUM mg/dL 8.8       Culture Data: Cultures:    BLOOD CULTURE   Date Value Ref Range Status   03/12/2017 No growth at 5 days  Final   03/12/2017 No growth at 5 days  Final     WOUND CULTURE   Date Value Ref Range Status   03/13/2017 Scant growth (1+) Pseudomonas aeruginosa (A)  Final   03/13/2017 Scant growth (1+) Staphylococcus epidermidis (A)  Final     Comment:     This isolate does not demonstrate inducible clindamycin resistance in vitro.         Radiology Data:     I have reviewed the medications.    Assessment/Plan     Problem List  Principal Problem:    Open wound of left hip and thigh with complication  Active Problems:    COPD (chronic obstructive pulmonary disease)    Diabetes mellitus    Hypertension    Peripheral vascular disease    LANA (acute kidney injury)    Coronary artery disease involving native heart    Iron deficiency anemia    Coal workers pneumoconiosis    Leg wound, left           Assessment/Plan:  Left leg wound  -s/p femoral stenting/angioplasty of left superficial artery, continue dressing changes 2x daily  -Continue IV zosyn and PO linezolid  -ID following  -Wound cx posiitive for coag-negative staph and pseudo  -s/p I&D per Dr. Rosa on 3/14/17    LANA  -creatinine today still 2.2 (1.2 on 3/16/17).  -Lasix dose held  -NS @ 75/hr  -recheck CMP in am  -Bladder scan pending  -Patient may be discharged once obstruction is ruled out and creatinine stabilizes.    T2DM  -Holding orals   -A1c on 3/13/17= 5.3%  -SSI, Accuchecks    COPD, coal worker's pneumoconiosis  -stable    HTN  -Hold ACE-I, HCTZ due to LANA  -Monitor, add PRNs if needed    Iron deficiency anemia  -Chronic, stable  -Continue to monitor    Heart Murmur/CAD  -followed by cardiology ([place])          DVT prophylaxis: heparin, teds,  scds  Discharge Planning: I expect patient to be discharged once LANA resolved/stable      Tanya RIANNAElizabeth Klein, APRN   03/18/17   1:18 PM    Please note that portions of this note may have been completed with a voice recognition program. Efforts were made to edit the dictations, but occasionally words are mistranscribed.

## 2017-03-18 NOTE — PLAN OF CARE
Problem: Patient Care Overview (Adult)  Goal: Plan of Care Review  Outcome: Ongoing (interventions implemented as appropriate)    03/18/17 1518   Coping/Psychosocial Response Interventions   Plan Of Care Reviewed With patient   Patient Care Overview   Progress improving   Outcome Evaluation   Outcome Summary/Follow up Plan Patient reports feeling a little better today, but is still in pain r/t infected groin site. Has been pleasant and no other complaints noted. Continue to monitor.       Goal: Adult Individualization and Mutuality  Outcome: Ongoing (interventions implemented as appropriate)  Goal: Discharge Needs Assessment  Outcome: Ongoing (interventions implemented as appropriate)

## 2017-03-18 NOTE — PLAN OF CARE
Problem: Patient Care Overview (Adult)  Goal: Plan of Care Review  Outcome: Ongoing (interventions implemented as appropriate)    03/18/17 0434   Coping/Psychosocial Response Interventions   Plan Of Care Reviewed With patient   Patient Care Overview   Progress progress toward functional goals as expected       Goal: Adult Individualization and Mutuality  Outcome: Ongoing (interventions implemented as appropriate)  Goal: Discharge Needs Assessment  Outcome: Ongoing (interventions implemented as appropriate)    Problem: Infection, Risk/Actual (Adult)  Goal: Infection Prevention/Resolution  Outcome: Ongoing (interventions implemented as appropriate)    03/18/17 0434   Infection, Risk/Actual (Adult)   Infection Prevention/Resolution making progress toward outcome

## 2017-03-19 ENCOUNTER — APPOINTMENT (OUTPATIENT)
Dept: CARDIOLOGY | Facility: HOSPITAL | Age: 57
End: 2017-03-19
Attending: INTERNAL MEDICINE

## 2017-03-19 ENCOUNTER — APPOINTMENT (OUTPATIENT)
Dept: GENERAL RADIOLOGY | Facility: HOSPITAL | Age: 57
End: 2017-03-19

## 2017-03-19 LAB
ALBUMIN SERPL-MCNC: 3.2 G/DL (ref 3.2–4.8)
ALBUMIN/GLOB SERPL: 0.9 G/DL (ref 1.5–2.5)
ALP SERPL-CCNC: 43 U/L (ref 25–100)
ALT SERPL W P-5'-P-CCNC: 26 U/L (ref 7–40)
ANION GAP SERPL CALCULATED.3IONS-SCNC: 7 MMOL/L (ref 3–11)
AST SERPL-CCNC: 35 U/L (ref 0–33)
BILIRUB SERPL-MCNC: 0.7 MG/DL (ref 0.3–1.2)
BUN BLD-MCNC: 12 MG/DL (ref 9–23)
BUN/CREAT SERPL: 5.2 (ref 7–25)
CALCIUM SPEC-SCNC: 8.7 MG/DL (ref 8.7–10.4)
CHLORIDE SERPL-SCNC: 102 MMOL/L (ref 99–109)
CO2 SERPL-SCNC: 25 MMOL/L (ref 20–31)
CREAT BLD-MCNC: 2.3 MG/DL (ref 0.6–1.3)
GFR SERPL CREATININE-BSD FRML MDRD: 30 ML/MIN/1.73
GLOBULIN UR ELPH-MCNC: 3.6 GM/DL
GLUCOSE BLD-MCNC: 85 MG/DL (ref 70–100)
GLUCOSE BLDC GLUCOMTR-MCNC: 100 MG/DL (ref 70–130)
GLUCOSE BLDC GLUCOMTR-MCNC: 112 MG/DL (ref 70–130)
GLUCOSE BLDC GLUCOMTR-MCNC: 117 MG/DL (ref 70–130)
GLUCOSE BLDC GLUCOMTR-MCNC: 95 MG/DL (ref 70–130)
POTASSIUM BLD-SCNC: 4 MMOL/L (ref 3.5–5.5)
PROT SERPL-MCNC: 6.8 G/DL (ref 5.7–8.2)
SODIUM BLD-SCNC: 134 MMOL/L (ref 132–146)

## 2017-03-19 PROCEDURE — 71010 HC CHEST PA OR AP: CPT

## 2017-03-19 PROCEDURE — 94799 UNLISTED PULMONARY SVC/PX: CPT

## 2017-03-19 PROCEDURE — 94640 AIRWAY INHALATION TREATMENT: CPT

## 2017-03-19 PROCEDURE — 99232 SBSQ HOSP IP/OBS MODERATE 35: CPT | Performed by: NURSE PRACTITIONER

## 2017-03-19 PROCEDURE — 80053 COMPREHEN METABOLIC PANEL: CPT | Performed by: NURSE PRACTITIONER

## 2017-03-19 PROCEDURE — 93970 EXTREMITY STUDY: CPT

## 2017-03-19 PROCEDURE — 25010000002 HYDROMORPHONE PER 4 MG: Performed by: FAMILY MEDICINE

## 2017-03-19 PROCEDURE — 82962 GLUCOSE BLOOD TEST: CPT

## 2017-03-19 PROCEDURE — 25010000002 PIPERACILLIN SOD-TAZOBACTAM PER 1 G: Performed by: INTERNAL MEDICINE

## 2017-03-19 PROCEDURE — 25010000002 HEPARIN (PORCINE) PER 1000 UNITS: Performed by: FAMILY MEDICINE

## 2017-03-19 PROCEDURE — 93970 EXTREMITY STUDY: CPT | Performed by: INTERNAL MEDICINE

## 2017-03-19 RX ADMIN — HYDROMORPHONE HYDROCHLORIDE 0.5 MG: 1 INJECTION, SOLUTION INTRAMUSCULAR; INTRAVENOUS; SUBCUTANEOUS at 17:18

## 2017-03-19 RX ADMIN — Medication: at 05:49

## 2017-03-19 RX ADMIN — HYDROMORPHONE HYDROCHLORIDE 0.5 MG: 1 INJECTION, SOLUTION INTRAMUSCULAR; INTRAVENOUS; SUBCUTANEOUS at 10:16

## 2017-03-19 RX ADMIN — HEPARIN SODIUM 5000 UNITS: 5000 INJECTION, SOLUTION INTRAVENOUS; SUBCUTANEOUS at 21:25

## 2017-03-19 RX ADMIN — LINEZOLID 600 MG: 600 TABLET, FILM COATED ORAL at 21:25

## 2017-03-19 RX ADMIN — ASPIRIN 81 MG: 81 TABLET, CHEWABLE ORAL at 08:05

## 2017-03-19 RX ADMIN — Medication: at 21:29

## 2017-03-19 RX ADMIN — PIPERACILLIN AND TAZOBACTAM 2.25 G: 3; .375 INJECTION, POWDER, LYOPHILIZED, FOR SOLUTION INTRAVENOUS; PARENTERAL at 17:18

## 2017-03-19 RX ADMIN — OXYCODONE HYDROCHLORIDE AND ACETAMINOPHEN 1 TABLET: 7.5; 325 TABLET ORAL at 05:35

## 2017-03-19 RX ADMIN — PIPERACILLIN AND TAZOBACTAM 2.25 G: 3; .375 INJECTION, POWDER, LYOPHILIZED, FOR SOLUTION INTRAVENOUS; PARENTERAL at 11:29

## 2017-03-19 RX ADMIN — Medication 325 MG: at 08:06

## 2017-03-19 RX ADMIN — HYDROMORPHONE HYDROCHLORIDE 0.5 MG: 1 INJECTION, SOLUTION INTRAMUSCULAR; INTRAVENOUS; SUBCUTANEOUS at 21:25

## 2017-03-19 RX ADMIN — DESMOPRESSIN ACETATE 40 MG: 0.2 TABLET ORAL at 08:06

## 2017-03-19 RX ADMIN — Medication 325 MG: at 17:18

## 2017-03-19 RX ADMIN — HYDROMORPHONE HYDROCHLORIDE 0.5 MG: 1 INJECTION, SOLUTION INTRAMUSCULAR; INTRAVENOUS; SUBCUTANEOUS at 05:35

## 2017-03-19 RX ADMIN — LINEZOLID 600 MG: 600 TABLET, FILM COATED ORAL at 08:06

## 2017-03-19 RX ADMIN — Medication: at 08:06

## 2017-03-19 RX ADMIN — VITAMIN D, TAB 1000IU (100/BT) 2000 UNITS: 25 TAB at 08:06

## 2017-03-19 RX ADMIN — CLOPIDOGREL BISULFATE 75 MG: 75 TABLET, FILM COATED ORAL at 08:06

## 2017-03-19 RX ADMIN — BUDESONIDE AND FORMOTEROL FUMARATE DIHYDRATE 2 PUFF: 80; 4.5 AEROSOL RESPIRATORY (INHALATION) at 19:56

## 2017-03-19 RX ADMIN — OXYCODONE HYDROCHLORIDE AND ACETAMINOPHEN 1 TABLET: 7.5; 325 TABLET ORAL at 21:25

## 2017-03-19 RX ADMIN — TAMSULOSIN HYDROCHLORIDE 0.4 MG: 0.4 CAPSULE ORAL at 08:06

## 2017-03-19 RX ADMIN — HYDROCODONE BITARTRATE AND ACETAMINOPHEN 1 TABLET: 7.5; 325 TABLET ORAL at 10:16

## 2017-03-19 RX ADMIN — SODIUM CHLORIDE 75 ML/HR: 900 INJECTION INTRAVENOUS at 11:29

## 2017-03-19 RX ADMIN — HEPARIN SODIUM 5000 UNITS: 5000 INJECTION, SOLUTION INTRAVENOUS; SUBCUTANEOUS at 13:48

## 2017-03-19 RX ADMIN — HEPARIN SODIUM 5000 UNITS: 5000 INJECTION, SOLUTION INTRAVENOUS; SUBCUTANEOUS at 05:35

## 2017-03-19 RX ADMIN — PIPERACILLIN AND TAZOBACTAM 2.25 G: 3; .375 INJECTION, POWDER, LYOPHILIZED, FOR SOLUTION INTRAVENOUS; PARENTERAL at 00:46

## 2017-03-19 RX ADMIN — BUDESONIDE AND FORMOTEROL FUMARATE DIHYDRATE 2 PUFF: 80; 4.5 AEROSOL RESPIRATORY (INHALATION) at 08:49

## 2017-03-19 RX ADMIN — SODIUM CHLORIDE 75 ML/HR: 900 INJECTION INTRAVENOUS at 05:35

## 2017-03-19 RX ADMIN — PIPERACILLIN AND TAZOBACTAM 2.25 G: 3; .375 INJECTION, POWDER, LYOPHILIZED, FOR SOLUTION INTRAVENOUS; PARENTERAL at 05:36

## 2017-03-19 NOTE — PROGRESS NOTES
Cardiothoracic Surgery Progress Note      POD # 4 s/p left groin I&D       LOS: 7 days      Subjective:  Doing well.  No complaints, and up in chair    Objective:  Vital Signs  Temp:  [97.9 °F (36.6 °C)-98.3 °F (36.8 °C)] 97.9 °F (36.6 °C)  Heart Rate:  [76-80] 76  Resp:  [16-18] 16  BP: (117-145)/(67-79) 134/74    Physical Exam:   General Appearance: alert, appears stated age and cooperative   Lungs: clear to auscultation, respirations regular, respirations even and respirations unlabored   Heart: regular rhythm & normal rate, normal S1, S2, no murmur, no yuridia, no rub and no click   Skin: Incision c/d/i     Results:    Results from last 7 days  Lab Units 03/18/17  0521   WBC 10*3/mm3 4.78   HEMOGLOBIN g/dL 9.1*   HEMATOCRIT % 26.6*   PLATELETS 10*3/mm3 184       Results from last 7 days  Lab Units 03/19/17  0609   SODIUM mmol/L 134   POTASSIUM mmol/L 4.0   CHLORIDE mmol/L 102   TOTAL CO2 mmol/L 25.0   BUN mg/dL 12   CREATININE mg/dL 2.30*   GLUCOSE mg/dL 85   CALCIUM mg/dL 8.7         Assessment:  POD # 4 s/p left groin I&D  Acute rise in creatinine    Plan:  Arrange wound vac for healing and to manage drainage  Nephrology consult  IV ABX as per ID

## 2017-03-19 NOTE — PROGRESS NOTES
"    [facility] Medicine Services  INPATIENT PROGRESS NOTE    Date of Admission: 3/12/2017  Length of Stay: 7  Primary Care Physician: Dewayne Cole MD    Subjective   CC: f/u Left groin abscess/cellulitis     HPI:  Delayed entry note.  Pt seen at 11:10 am today sitting upright in chair.  Wife at bs.  Denies c/p, soa, n/v.  Tolerating po diet.  Reports his chronic BLE edema got \"really bad the last few days\" but is slightly better today.  Still with significant edema. Also reports scrotal irritation from bm and from sweating.  Requesting \"cream\" to help.  Reports LLE pain stable.  Hopeful to dc home tomorrow if renal fxn improved.        Review of Systems  Constitutional: Negative for chills and fever.   HENT: Negative for trouble swallowing.   Respiratory: Negative for cough and chest tightness.   + for dyspnea on exertion.   Cardiovascular: Negative for chest pain. Positive for BLE swelling   Gastrointestinal: Negative for abdominal pain, constipation, diarrhea and vomiting.   Genitalia-negative for pain, discharge. + for scrotal irritation from BM and sweating.  Not itching.        Objective      Temp:  [97.7 °F (36.5 °C)-98.3 °F (36.8 °C)] 97.7 °F (36.5 °C)  Heart Rate:  [76-80] 79  Resp:  [16-20] 20  BP: (117-156)/(67-79) 156/79     Physical Exam  Constitutional: He is oriented to person, place, and time. He appears well-developed and well-nourished. No distress. Wife at .   HENT:   Head: Normocephalic.   Eyes: Pupils are equal, round, and reactive to light.   Neck: Normal range of motion.   Cardiovascular: Normal rate and regular rhythm.   Faint Murmur heard.  Pulmonary/Chest: Effort normal and breath sounds normal. He has no wheezes. Decreased bilaterally, likely due to body habitus however noted BLE edema also.    Abdominal: Soft. Morbidly obese. Bowel sounds are normal. He exhibits no distension. There is no tenderness.   Musculoskeletal: Normal range of motion. BLE 3-4+ pitting " edema (equal bilaterally)  Neurological: He is alert and oriented to person, place, and time. Obeys all   Skin:   Left groin with large bandage intact. There is visible dried clear-yellow drainage noted on bandage.   Psychiatric: He has a normal mood and affect. His behavior is normal. Calm and cooperative.    Vitals reviewed.       Results Review:    I have reviewed the labs, radiology results and diagnostic studies.      Results from last 7 days  Lab Units 03/18/17  0521   WBC 10*3/mm3 4.78   HEMOGLOBIN g/dL 9.1*   PLATELETS 10*3/mm3 184       Results from last 7 days  Lab Units 03/19/17  0609   SODIUM mmol/L 134   POTASSIUM mmol/L 4.0   CHLORIDE mmol/L 102   TOTAL CO2 mmol/L 25.0   BUN mg/dL 12   CREATININE mg/dL 2.30*   GLUCOSE mg/dL 85   CALCIUM mg/dL 8.7       Culture Data: Cultures:    BLOOD CULTURE   Date Value Ref Range Status   03/12/2017 No growth at 5 days  Final   03/12/2017 No growth at 5 days  Final     WOUND CULTURE   Date Value Ref Range Status   03/13/2017 Scant growth (1+) Pseudomonas aeruginosa (A)  Final   03/13/2017 Scant growth (1+) Staphylococcus epidermidis (A)  Final     Comment:     This isolate does not demonstrate inducible clindamycin resistance in vitro.         Radiology Data:   Imaging Results (last 24 hours)     Procedure Component Value Units Date/Time    XR Chest 1 View [[id number]]     PENDING  Updated:  03/19/17 1241            I have reviewed the medications.  Scheduled Meds:  aspirin 81 mg Oral Daily   atorvastatin 40 mg Oral Daily   budesonide-formoterol 2 puff Inhalation BID - RT   cholecalciferol 2,000 Units Oral Daily   clopidogrel 75 mg Oral Daily   ferrous sulfate 325 mg Oral BID With Meals   heparin (porcine) 5,000 Units Subcutaneous Q8H   insulin lispro 2-7 Units Subcutaneous 4x Daily AC & at Bedtime   linezolid 600 mg Oral Q12H   pantoprazole 40 mg Oral Every Other Day   piperacillin-tazobactam 2.25 g Intravenous Q6H   sodium hypochlorite  Topical BID   tamsulosin 0.4  mg Oral Daily     Continuous Infusions:   PRN Meds:.bisacodyl  •  bisacodyl  •  calcium carbonate  •  dextrose  •  dextrose  •  glucagon (human recombinant)  •  HYDROcodone-acetaminophen  •  HYDROmorphone **AND** naloxone  •  ipratropium-albuterol  •  ondansetron **OR** ondansetron  •  oxyCODONE-acetaminophen  •  promethazine **OR** promethazine **OR** promethazine  •  sennosides-docusate sodium  •  sodium chloride  •  sodium chloride  •  tiZANidine      Assessment/Plan     Problem List  Principal Problem:    Open wound of left hip and thigh with complication  Active Problems:    COPD (chronic obstructive pulmonary disease)    Diabetes mellitus    Hypertension    Peripheral vascular disease    LANA (acute kidney injury)    Coronary artery disease involving native heart    Iron deficiency anemia    Coal workers pneumoconiosis    Leg wound, left    Assessment/Plan:  Left leg wound  -s/p femoral stenting/angioplasty of left superficial artery, continue dressing changes 2x daily  -Continue IV zosyn and PO linezolid  -ID following  -Wound cx positive for coag-negative staph and pseudo  -s/p I&D per Dr. Rosa on 3/14/17     LANA  -creatinine today 2.3 (1.2 on 3/16/17 and 2.2 yesterday.  Likely ATN due to abx for infection/multifactoral--monitor labs). ID following for abx mgmt.   -Consult nephrology if no improvement  -Lasix dose held for now.  Consider restarting at dc if creatinine improved due to his worsened chronic edema   -NS @ 75/hr--discontinued today 3/19 due to edema   -recheck cbc/CMP in am  -Patient may be discharged once obstruction is ruled out and creatinine stabilizes.      Bilateral LE edema (acute on chronic)  -due to hx and current edema will ck cxr, and dc IVF's for now.  -elevated BLE     T2DM  -glucoses stable ( last 24 hours)  -Holding orals   -A1c on 3/13/17= 5.3%  -SSI, Accuchecks     COPD, coal worker's pneumoconiosis  -stable     HTN  -Hold ACE-I, HCTZ due to LANA  -Monitor, add PRNs if  needed     Iron deficiency anemia  -Chronic, stable  -Continue to monitor     Heart Murmur/CAD  -followed by cardiology ([place])     Scrotal irritation  -barrier cream  -keep dry        DVT prophylaxis: heparin, teds, scds  Discharge Planning: I expect patient to be discharged once LANA resolved/stable to home with home health.        Areli Gudino, APRN   03/19/17   4:19 PM    Please note that portions of this note may have been completed with a voice recognition program. Efforts were made to edit the dictations, but occasionally words are mistranscribed.

## 2017-03-19 NOTE — PROGRESS NOTES
CTS Progress Note      POD 3 s/p I&D of left groin    Subjective  Doing well without complaints, sitting in chair.  Denies any fever or chills.    Objective    Physical Exam:   Vital Signs   Temp:  [97.9 °F (36.6 °C)-98.7 °F (37.1 °C)] 97.9 °F (36.6 °C)  Heart Rate:  [70-83] 80  Resp:  [16-20] 18  BP: (131-145)/(66-76) 145/67   GEN: NAD   RESP: Clear to auscultation bilaterally    CV: Regular rate and rhythm, no murmurs, rubs or gallops   ABD: Soft, nontender/nondistended with normoactive bowel sounds    EXT: Warm with good color and well-perfused with 2-3+ bilateral lower extremity edema   INT: Left groin/thigh incision with continued wet-to-dry dressing changes with mild serosanguineous drainage, erythema resolving       Intake/Output Summary (Last 24 hours) at 03/19/17 0028  Last data filed at 03/18/17 1700   Gross per 24 hour   Intake    960 ml   Output   1425 ml   Net   -465 ml     Results       Results from last 7 days  Lab Units 03/18/17  0521   WBC 10*3/mm3 4.78   HEMOGLOBIN g/dL 9.1*   HEMATOCRIT % 26.6*   PLATELETS 10*3/mm3 184       Results from last 7 days  Lab Units 03/18/17  0521   SODIUM mmol/L 131*   POTASSIUM mmol/L 3.5   CHLORIDE mmol/L 100   TOTAL CO2 mmol/L 25.0   BUN mg/dL 10   CREATININE mg/dL 2.20*   GLUCOSE mg/dL 87   CALCIUM mg/dL 8.8       Results from last 7 days  Lab Units 03/12/17  2218   INR  1.11         Assessment/Plan     Principal Problem:    Open wound of left hip and thigh with complication  Active Problems:    COPD (chronic obstructive pulmonary disease)    Diabetes mellitus    Hypertension    Peripheral vascular disease    LANA (acute kidney injury)    Coronary artery disease involving native heart    Iron deficiency anemia    Coal workers pneumoconiosis    Leg wound, left        Plan   May D/C home from a surgical perspective.  Creatinine up to 2.2 from 1.2.  Nephrology consult  Continue with saline wet-to-dry dressing changes twice a day at home with home health  IV antibiotics  per ID  PICC line placed  Follow up with Dr Rosa in office in 1-2 weeks.    Reyes Cadet MD  03/19/17  12:28 AM

## 2017-03-19 NOTE — PLAN OF CARE
Problem: Patient Care Overview (Adult)  Goal: Plan of Care Review  Outcome: Ongoing (interventions implemented as appropriate)    03/19/17 0239   Coping/Psychosocial Response Interventions   Plan Of Care Reviewed With patient;spouse   Patient Care Overview   Progress improving       Goal: Adult Individualization and Mutuality  Outcome: Ongoing (interventions implemented as appropriate)  Goal: Discharge Needs Assessment  Outcome: Ongoing (interventions implemented as appropriate)    03/19/17 0239   Discharge Needs Assessment   Concerns To Be Addressed basic needs concerns   Readmission Within The Last 30 Days previous discharge plan unsuccessful   Equipment Needed After Discharge wound care supplies   Current Health   Anticipated Changes Related to Illness inability to care for self   Self-Care   Equipment Currently Used at Home cane, straight   Living Environment   Transportation Available car;family or friend will provide         Problem: Infection, Risk/Actual (Adult)  Goal: Infection Prevention/Resolution  Outcome: Ongoing (interventions implemented as appropriate)    03/19/17 0239   Infection, Risk/Actual (Adult)   Infection Prevention/Resolution making progress toward outcome

## 2017-03-20 ENCOUNTER — APPOINTMENT (OUTPATIENT)
Dept: ULTRASOUND IMAGING | Facility: HOSPITAL | Age: 57
End: 2017-03-20

## 2017-03-20 LAB
ANION GAP SERPL CALCULATED.3IONS-SCNC: 6 MMOL/L (ref 3–11)
BH CV ECHO MEAS - BSA(HAYCOCK): 2.4 M^2
BH CV ECHO MEAS - BSA: 2.2 M^2
BH CV ECHO MEAS - BZI_BMI: 42.1 KILOGRAMS/M^2
BH CV ECHO MEAS - BZI_METRIC_HEIGHT: 165.1 CM
BH CV ECHO MEAS - BZI_METRIC_WEIGHT: 114.8 KG
BH CV LOWER VASCULAR LEFT COMMON FEMORAL AUGMENT: NORMAL
BH CV LOWER VASCULAR LEFT COMMON FEMORAL COMPRESS: NORMAL
BH CV LOWER VASCULAR LEFT COMMON FEMORAL PHASIC: NORMAL
BH CV LOWER VASCULAR LEFT COMMON FEMORAL SPONT: NORMAL
BH CV LOWER VASCULAR LEFT DISTAL FEMORAL COMPRESS: NORMAL
BH CV LOWER VASCULAR LEFT GASTRONEMIUS COMPRESS: NORMAL
BH CV LOWER VASCULAR LEFT GREATER SAPH AK COMPRESS: NORMAL
BH CV LOWER VASCULAR LEFT GREATER SAPH BK COMPRESS: NORMAL
BH CV LOWER VASCULAR LEFT MID FEMORAL AUGMENT: NORMAL
BH CV LOWER VASCULAR LEFT MID FEMORAL COMPRESS: NORMAL
BH CV LOWER VASCULAR LEFT MID FEMORAL PHASIC: NORMAL
BH CV LOWER VASCULAR LEFT MID FEMORAL SPONT: NORMAL
BH CV LOWER VASCULAR LEFT PERONEAL COMPRESS: NORMAL
BH CV LOWER VASCULAR LEFT POPLITEAL AUGMENT: NORMAL
BH CV LOWER VASCULAR LEFT POPLITEAL COMPRESS: NORMAL
BH CV LOWER VASCULAR LEFT POPLITEAL PHASIC: NORMAL
BH CV LOWER VASCULAR LEFT POPLITEAL SPONT: NORMAL
BH CV LOWER VASCULAR LEFT POSTERIOR TIBIAL COMPRESS: NORMAL
BH CV LOWER VASCULAR LEFT PROXIMAL FEMORAL COMPRESS: NORMAL
BH CV LOWER VASCULAR LEFT SAPHENOFEMORAL JUNCTION AUGMENT: NORMAL
BH CV LOWER VASCULAR LEFT SAPHENOFEMORAL JUNCTION COMPRESS: NORMAL
BH CV LOWER VASCULAR LEFT SAPHENOFEMORAL JUNCTION PHASIC: NORMAL
BH CV LOWER VASCULAR LEFT SAPHENOFEMORAL JUNCTION SPONT: NORMAL
BH CV LOWER VASCULAR RIGHT COMMON FEMORAL AUGMENT: NORMAL
BH CV LOWER VASCULAR RIGHT COMMON FEMORAL COMPRESS: NORMAL
BH CV LOWER VASCULAR RIGHT COMMON FEMORAL PHASIC: NORMAL
BH CV LOWER VASCULAR RIGHT COMMON FEMORAL SPONT: NORMAL
BH CV LOWER VASCULAR RIGHT DISTAL FEMORAL COMPRESS: NORMAL
BH CV LOWER VASCULAR RIGHT GASTRONEMIUS COMPRESS: NORMAL
BH CV LOWER VASCULAR RIGHT GREATER SAPH AK COMPRESS: NORMAL
BH CV LOWER VASCULAR RIGHT GREATER SAPH BK COMPRESS: NORMAL
BH CV LOWER VASCULAR RIGHT MID FEMORAL AUGMENT: NORMAL
BH CV LOWER VASCULAR RIGHT MID FEMORAL COMPRESS: NORMAL
BH CV LOWER VASCULAR RIGHT MID FEMORAL PHASIC: NORMAL
BH CV LOWER VASCULAR RIGHT MID FEMORAL SPONT: NORMAL
BH CV LOWER VASCULAR RIGHT PERONEAL COMPRESS: NORMAL
BH CV LOWER VASCULAR RIGHT POPLITEAL AUGMENT: NORMAL
BH CV LOWER VASCULAR RIGHT POPLITEAL COMPRESS: NORMAL
BH CV LOWER VASCULAR RIGHT POPLITEAL PHASIC: NORMAL
BH CV LOWER VASCULAR RIGHT POPLITEAL SPONT: NORMAL
BH CV LOWER VASCULAR RIGHT POSTERIOR TIBIAL COMPRESS: NORMAL
BH CV LOWER VASCULAR RIGHT PROXIMAL FEMORAL COMPRESS: NORMAL
BH CV LOWER VASCULAR RIGHT SAPHENOFEMORAL JUNCTION AUGMENT: NORMAL
BH CV LOWER VASCULAR RIGHT SAPHENOFEMORAL JUNCTION COMPRESS: NORMAL
BH CV LOWER VASCULAR RIGHT SAPHENOFEMORAL JUNCTION PHASIC: NORMAL
BH CV LOWER VASCULAR RIGHT SAPHENOFEMORAL JUNCTION SPONT: NORMAL
BUN BLD-MCNC: 15 MG/DL (ref 9–23)
BUN/CREAT SERPL: 7.5 (ref 7–25)
CALCIUM SPEC-SCNC: 9 MG/DL (ref 8.7–10.4)
CHLORIDE SERPL-SCNC: 103 MMOL/L (ref 99–109)
CO2 SERPL-SCNC: 26 MMOL/L (ref 20–31)
CREAT BLD-MCNC: 2 MG/DL (ref 0.6–1.3)
EOSINOPHIL SPEC QL MICRO: 0 % EOS/100 CELLS (ref 0–0)
GFR SERPL CREATININE-BSD FRML MDRD: 35 ML/MIN/1.73
GLUCOSE BLD-MCNC: 88 MG/DL (ref 70–100)
GLUCOSE BLDC GLUCOMTR-MCNC: 100 MG/DL (ref 70–130)
GLUCOSE BLDC GLUCOMTR-MCNC: 131 MG/DL (ref 70–130)
GLUCOSE BLDC GLUCOMTR-MCNC: 90 MG/DL (ref 70–130)
GLUCOSE BLDC GLUCOMTR-MCNC: 99 MG/DL (ref 70–130)
HCT VFR BLD AUTO: 26.8 % (ref 38.9–50.9)
HGB BLD-MCNC: 9.1 G/DL (ref 13.1–17.5)
POTASSIUM BLD-SCNC: 3.7 MMOL/L (ref 3.5–5.5)
SODIUM BLD-SCNC: 135 MMOL/L (ref 132–146)

## 2017-03-20 PROCEDURE — 25010000002 PIPERACILLIN SOD-TAZOBACTAM PER 1 G: Performed by: INTERNAL MEDICINE

## 2017-03-20 PROCEDURE — 85018 HEMOGLOBIN: CPT | Performed by: INTERNAL MEDICINE

## 2017-03-20 PROCEDURE — 85014 HEMATOCRIT: CPT | Performed by: INTERNAL MEDICINE

## 2017-03-20 PROCEDURE — 97110 THERAPEUTIC EXERCISES: CPT

## 2017-03-20 PROCEDURE — 25010000002 HYDROMORPHONE PER 4 MG: Performed by: FAMILY MEDICINE

## 2017-03-20 PROCEDURE — 99232 SBSQ HOSP IP/OBS MODERATE 35: CPT | Performed by: NURSE PRACTITIONER

## 2017-03-20 PROCEDURE — 25010000002 HEPARIN (PORCINE) PER 1000 UNITS: Performed by: FAMILY MEDICINE

## 2017-03-20 PROCEDURE — 25010000002 MEROPENEM: Performed by: INTERNAL MEDICINE

## 2017-03-20 PROCEDURE — 76775 US EXAM ABDO BACK WALL LIM: CPT

## 2017-03-20 PROCEDURE — 63710000001 PROMETHAZINE PER 12.5 MG: Performed by: FAMILY MEDICINE

## 2017-03-20 PROCEDURE — 87205 SMEAR GRAM STAIN: CPT | Performed by: INTERNAL MEDICINE

## 2017-03-20 PROCEDURE — 94760 N-INVAS EAR/PLS OXIMETRY 1: CPT

## 2017-03-20 PROCEDURE — 99024 POSTOP FOLLOW-UP VISIT: CPT | Performed by: THORACIC SURGERY (CARDIOTHORACIC VASCULAR SURGERY)

## 2017-03-20 PROCEDURE — 97116 GAIT TRAINING THERAPY: CPT

## 2017-03-20 PROCEDURE — 82962 GLUCOSE BLOOD TEST: CPT

## 2017-03-20 PROCEDURE — 94640 AIRWAY INHALATION TREATMENT: CPT

## 2017-03-20 PROCEDURE — 94799 UNLISTED PULMONARY SVC/PX: CPT

## 2017-03-20 PROCEDURE — 97605 NEG PRS WND THER DME<=50SQCM: CPT

## 2017-03-20 PROCEDURE — 80048 BASIC METABOLIC PNL TOTAL CA: CPT | Performed by: INTERNAL MEDICINE

## 2017-03-20 RX ORDER — LACTOBACILLUS RHAMNOSUS GG 10B CELL
1 CAPSULE ORAL DAILY
Status: DISCONTINUED | OUTPATIENT
Start: 2017-03-20 | End: 2017-03-22 | Stop reason: HOSPADM

## 2017-03-20 RX ADMIN — ASPIRIN 81 MG: 81 TABLET, CHEWABLE ORAL at 08:46

## 2017-03-20 RX ADMIN — Medication 325 MG: at 08:46

## 2017-03-20 RX ADMIN — VITAMIN D, TAB 1000IU (100/BT) 2000 UNITS: 25 TAB at 08:46

## 2017-03-20 RX ADMIN — OXYCODONE HYDROCHLORIDE AND ACETAMINOPHEN 1 TABLET: 7.5; 325 TABLET ORAL at 04:56

## 2017-03-20 RX ADMIN — MEROPENEM 1 G: 1 INJECTION, POWDER, FOR SOLUTION INTRAVENOUS at 18:12

## 2017-03-20 RX ADMIN — LINEZOLID 600 MG: 600 TABLET, FILM COATED ORAL at 21:27

## 2017-03-20 RX ADMIN — PIPERACILLIN AND TAZOBACTAM 2.25 G: 3; .375 INJECTION, POWDER, LYOPHILIZED, FOR SOLUTION INTRAVENOUS; PARENTERAL at 05:50

## 2017-03-20 RX ADMIN — HYDROMORPHONE HYDROCHLORIDE 0.5 MG: 1 INJECTION, SOLUTION INTRAMUSCULAR; INTRAVENOUS; SUBCUTANEOUS at 14:01

## 2017-03-20 RX ADMIN — TAMSULOSIN HYDROCHLORIDE 0.4 MG: 0.4 CAPSULE ORAL at 08:45

## 2017-03-20 RX ADMIN — PANTOPRAZOLE SODIUM 40 MG: 40 TABLET, DELAYED RELEASE ORAL at 08:46

## 2017-03-20 RX ADMIN — DESMOPRESSIN ACETATE 40 MG: 0.2 TABLET ORAL at 08:46

## 2017-03-20 RX ADMIN — HEPARIN SODIUM 5000 UNITS: 5000 INJECTION, SOLUTION INTRAVENOUS; SUBCUTANEOUS at 21:27

## 2017-03-20 RX ADMIN — LINEZOLID 600 MG: 600 TABLET, FILM COATED ORAL at 08:46

## 2017-03-20 RX ADMIN — BUDESONIDE AND FORMOTEROL FUMARATE DIHYDRATE 2 PUFF: 80; 4.5 AEROSOL RESPIRATORY (INHALATION) at 20:00

## 2017-03-20 RX ADMIN — HYDROCODONE BITARTRATE AND ACETAMINOPHEN 1 TABLET: 7.5; 325 TABLET ORAL at 18:13

## 2017-03-20 RX ADMIN — CLOPIDOGREL BISULFATE 75 MG: 75 TABLET, FILM COATED ORAL at 08:46

## 2017-03-20 RX ADMIN — PIPERACILLIN AND TAZOBACTAM 2.25 G: 3; .375 INJECTION, POWDER, LYOPHILIZED, FOR SOLUTION INTRAVENOUS; PARENTERAL at 00:11

## 2017-03-20 RX ADMIN — PROMETHAZINE HYDROCHLORIDE 12.5 MG: 12.5 TABLET ORAL at 05:50

## 2017-03-20 RX ADMIN — HYDROMORPHONE HYDROCHLORIDE 0.5 MG: 1 INJECTION, SOLUTION INTRAMUSCULAR; INTRAVENOUS; SUBCUTANEOUS at 05:50

## 2017-03-20 RX ADMIN — HYDROMORPHONE HYDROCHLORIDE 0.5 MG: 1 INJECTION, SOLUTION INTRAMUSCULAR; INTRAVENOUS; SUBCUTANEOUS at 18:13

## 2017-03-20 RX ADMIN — PIPERACILLIN AND TAZOBACTAM 2.25 G: 3; .375 INJECTION, POWDER, LYOPHILIZED, FOR SOLUTION INTRAVENOUS; PARENTERAL at 12:23

## 2017-03-20 RX ADMIN — HEPARIN SODIUM 5000 UNITS: 5000 INJECTION, SOLUTION INTRAVENOUS; SUBCUTANEOUS at 14:02

## 2017-03-20 RX ADMIN — Medication 1 CAPSULE: at 18:12

## 2017-03-20 RX ADMIN — OXYCODONE HYDROCHLORIDE AND ACETAMINOPHEN 1 TABLET: 7.5; 325 TABLET ORAL at 12:32

## 2017-03-20 RX ADMIN — OXYCODONE HYDROCHLORIDE AND ACETAMINOPHEN 1 TABLET: 7.5; 325 TABLET ORAL at 21:27

## 2017-03-20 RX ADMIN — BUDESONIDE AND FORMOTEROL FUMARATE DIHYDRATE 2 PUFF: 80; 4.5 AEROSOL RESPIRATORY (INHALATION) at 08:32

## 2017-03-20 RX ADMIN — Medication 325 MG: at 18:12

## 2017-03-20 RX ADMIN — HEPARIN SODIUM 5000 UNITS: 5000 INJECTION, SOLUTION INTRAVENOUS; SUBCUTANEOUS at 05:50

## 2017-03-20 NOTE — PROGRESS NOTES
Acute Care - Wound/Debridement Initial Evaluation  [facility]     Patient Name: Sai Weinberg  : 1960  MRN: [id number]  Today's Date: 3/20/2017  Onset of Illness/Injury or Date of Surgery Date: 17  Date of Referral to PT: 17   Referring Physician: MD Ayan      Admit Date: 3/12/2017    Visit Dx:    ICD-10-CM ICD-9-CM   1. Open wound of left hip and thigh with complication, initial encounter S71.002A 890.1    S71.102A    2. Impaired mobility and ADLs Z74.09 799.89   3. Impaired functional mobility, balance, gait, and endurance Z74.09 V49.89       Patient Active Problem List   Diagnosis   • Dyslipidemia   • Arthritis   • COPD (chronic obstructive pulmonary disease)   • Diabetes mellitus   • Esophageal reflux   • Hypertension   • Malignant neoplasm of colon   • Heart attack   • Peripheral vascular disease   • Chewing tobacco use   • Peripheral arterial disease   • LANA (acute kidney injury)   • Coronary artery disease involving native heart   • Iron deficiency anemia   • Coal workers pneumoconiosis   • Open wound of left hip and thigh with complication   • Leg wound, left        Past Medical History   Diagnosis Date   • Arthritis    • Black lung disease    • BPH (benign prostatic hyperplasia)    • Cataract    • Cataract    • COLD (chronic obstructive lung disease)    • Colon polyps    • Diabetes mellitus      SINCE    • Dyslipidemia    • Esophageal reflux    • Flu      HX   • Heart attack         • Herniated lumbar intervertebral disc    • Hypertension    • Malignant neoplasm of colon    • Peripheral vascular disease    • Sleep apnea with use of continuous positive airway pressure (CPAP)    • Wears dentures    • Wears glasses         Past Surgical History   Procedure Laterality Date   • Colon surgery     • Bypass graft       non-vein - femoral-popliteal right   • Other surgical history       PTA ILIAC STENOSIS WITH STENT   • Cardiac catheterization       NO INTERVENTION   • Colonoscopy      • Aortagram N/A 1/17/2017     Procedure: AORTAGRAM WITH RUNOFFS and  STENT left SFA;  Surgeon: Heladio Rosa MD;  Location:  [facility];  Service:    • Angioplasty femoral artery N/A 1/17/2017     Procedure: left femoral cutdown with aortagram and left SFA stent and angioplasty;  Surgeon: Heladio Rosa MD;  Location:  [facility];  Service:    • Femoral femoral bypass N/A 3/13/2017     Procedure: INCISION AND DRAINAGE LEFT GROIN HEMATOMA;  Surgeon: Heladio Rosa MD;  Location:  [facility];  Service:                LDA Wound       03/20/17 1315          Incision 03/13/17 1138 Left groin    Incision - Properties Group Placement Date: 03/13/17  -ABHIJEET Placement Time: 1138 -ABHIJEET Side: Left  -ABHIJEET Location: groin  -ABHIJEET    Incision WDL WDL  -MF      Dressing Appearance dry;intact  -MF      Appearance drainage;moist;redness  -MF      Incision Length (cm) 6.5  -MF      Incision Width (cm) 3.2  -MF      Incision Depth (cm) 3.2  -MF      Undermining (Depth/Location) 1.0cm from 7:00 to 3:00  -MF      Drainage Characteristics/Odor serosanguineous  -MF      Drainage Amount small  -MF      Wound Cleaning irrigated with;other (see comments)   phase one  -MF      Therapy Setting (Negative Pressure Wound Therapy) continuous therapy  -MF      Pressure Setting (Negative Pressure Wound Therapy) 125 mmHg  -MF      Dressing (Negative Pressure Wound Therapy) foam, black;foam, white  -MF      Sponges Inserted (Negative Pressure Wound Therapy) 2   1 white and 1 black  -MF      Sponges Removed (Negative Pressure Wound Therapy) 2   gauze  -MF      Output (mL) --   0  -MF        User Key  (r) = Recorded By, (t) = Taken By, (c) = Cosigned By    Initials Name Provider Type    SÁNCHEZ Olivo, PT Physical Therapist    ABHIJEET Jenna Kessler, RN Registered Nurse            Finger sweep and visual inspection performed. Empty wound bed confirmed.  External label applied and verified.         PT ASSESSMENT (last 72 hours)      PT  Evaluation       03/20/17 1315 03/19/17 0239    Rehab Evaluation    Document Type therapy note (daily note);evaluation   wound care eval  -MF     Subjective Information agree to therapy;complains of;weakness;fatigue;pain  -MF     General Information    Equipment Currently Used at Home  cane, straight  -LD    Living Environment    Transportation Available  car;family or friend will provide  -LD    Pain Assessment    Pain Assessment 0-10  -MF     Pain Score 9  -MF     Post Pain Score 8  -     Pain Type Acute pain  -MF     Pain Location Groin  -MF     Pain Orientation Left  -     Pain Intervention(s) Medication (See MAR);Repositioned  -     Positioning and Restraints    Pre-Treatment Position in bed  -MF     Post Treatment Position bed  -MF     In Bed supine;call light within reach;with family/caregiver  -       03/18/17 0800 03/17/17 2000    Sensory Assessment/Intervention    Light Touch --  -KM RLE;LLE  -LD    LUE Light Touch --  -KM WNL  -LD    RUE Light Touch --  -KM WNL  -LD    LLE Light Touch --  -KM WNL  -LD    RLE Light Touch --  -KM WNL  -LD      User Key  (r) = Recorded By, (t) = Taken By, (c) = Cosigned By    Initials Name Provider Type     Milad Olivo, PT Physical Therapist     Sheila Fernandez, RN Registered Nurse    LD Daysi Rodriguez, RN Registered Nurse        Physical Therapy Education     Title: PT OT SLP Therapies (Active)     Topic: Physical Therapy (Active)     Point: Mobility training (Active)    Learning Progress Summary    Learner Readiness Method Response Comment Documented by Status   Patient Acceptance E NR  MC 03/17/17 1332 Active    Acceptance E NR fall precautions, PLB, gait mechanics, home safety MB 03/16/17 1059 Active    Acceptance E,D NR Edu re: benefit of further ambulation throughout day with NSG and of sets of LE HEP. LS 03/14/17 1529 Active               Point: Home exercise program (Active)    Learning Progress Summary    Learner Readiness Method Response Comment  Documented by Status   Patient Acceptance E NR   03/17/17 1332 Active    Acceptance E NR fall precautions, PLB, gait mechanics, home safety MB 03/16/17 1059 Active    Acceptance E,D NR Edu re: benefit of further ambulation throughout day with NSG and of sets of LE HEP.  03/14/17 1529 Active               Point: Body mechanics (Done)    Learning Progress Summary    Learner Readiness Method Response Comment Documented by Status   Patient Acceptance E VU  LD 03/19/17 0249 Done    Acceptance E NR   03/17/17 1332 Active    Acceptance E NR fall precautions, PLB, gait mechanics, home safety MB 03/16/17 1059 Active    Acceptance E,D NR Edu re: benefit of further ambulation throughout day with NSG and of sets of LE HEP.  03/14/17 1529 Active               Point: Precautions (Done)    Learning Progress Summary    Learner Readiness Method Response Comment Documented by Status   Patient Acceptance E VU   03/19/17 0249 Done    Acceptance E NR   03/17/17 1332 Active    Acceptance E NR fall precautions, PLB, gait mechanics, home safety MB 03/16/17 1059 Active    Acceptance E,D NR Edu re: benefit of further ambulation throughout day with NSG and of sets of LE HEP.  03/14/17 1529 Active                      User Key     Initials Effective Dates Name Provider Type Discipline     06/19/15 -  Leeanna Conroy, PT Physical Therapist PT    MB 03/14/16 -  Emmie Thompson, PT Physical Therapist PT     03/14/16 -  Alissa Escamilla, PT Physical Therapist PT     01/20/17 -  Daysi Rodriguez, RN Registered Nurse Nurse                    Recommendation and Plan  Anticipated Discharge Disposition: home with assist  PT Frequency: daily    Plan Of Care Reviewed With: patient       Outcome Summary/Follow up Plan: Pt presents with complex open wound to L groin now s/p i and d.  Pt cont to have moderate exudate from wound with masceration and irritation of periwound skin.  Pt will benefit from wound vac to help manage exudate,  increase granulation, and improve skin integrity.              IP PT Goals       03/20/17 1315 03/17/17 1332 03/16/17 1059    Bed Mobility PT LTG    Bed Mobility PT LTG, Outcome  goal ongoing  -MC goal ongoing  -MB    Transfer Training PT LTG    Transfer Training PT LTG, Outcome  goal met  -MC goal ongoing  -MB    Gait Training PT LTG    Gait Training Goal PT LTG, Outcome  goal ongoing  -MC goal ongoing  -MB    Stair Training PT LTG    Stair Training Goal PT LTG, Outcome  goal ongoing  -MC goal ongoing  -MB    Wound Care PT LTG    Wound Care PT LTG 1, Date Established 03/20/17  -MF      Wound Care PT LTG 1, Time to Achieve other (see comments)   10 days  -MF      Wound Care PT LTG 1, Location L groin  -MF      Wound Care PT LTG 1, No S&S of Infection yes  -      Wound Care PT LTG 1, Decrease Wound Size 5%  -      Wound Care PT LTG 1, Decrease Exudate scant  -MF      Wound Care PT LTG 1, No New Skin Break Down yes  -MF        03/14/17 1530          Bed Mobility PT LTG    Bed Mobility PT LTG, Date Established 03/14/17  -LS      Bed Mobility PT LTG, Time to Achieve 2 wks  -LS      Bed Mobility PT LTG, Activity Type supine to sit/sit to supine  -LS      Bed Mobility PT LTG, [place] Level independent  -LS      Transfer Training PT LTG    Transfer Training PT LTG, Date Established 03/14/17  -LS      Transfer Training PT LTG, Time to Achieve 2 wks  -LS      Transfer Training PT LTG, Activity Type sit to stand/stand to sit  -LS      Transfer Training PT LTG, [place] Level conditional independence  -LS      Transfer Training PT LTG, Assist Device cane, straight  -LS      Gait Training PT LTG    Gait Training Goal PT LTG, Date Established 03/14/17  -LS      Gait Training Goal PT LTG, Time to Achieve 2 wks  -LS      Gait Training Goal PT LTG, [place] Level conditional independence  -LS      Gait Training Goal PT LTG, Assist Device cane, straight  -LS      Gait Training Goal PT LTG, Distance to Achieve 200   -LS      Stair Training PT LTG    Stair Training Goal PT LTG, Date Established 03/14/17  -LS      Stair Training Goal PT LTG, Time to Achieve 2 wks  -LS      Stair Training Goal PT LTG, Number of Steps 7  -LS      Stair Training Goal PT LTG, [place] Level contact guard assist  -LS      Stair Training Goal PT LTG, Assist Device 1 handrail  -LS        User Key  (r) = Recorded By, (t) = Taken By, (c) = Cosigned By    Initials Name Provider Type     Milad Olivo, PT Physical Therapist    LS Leeanna Conroy, PT Physical Therapist    MB Emmie Thompson, PT Physical Therapist    MC Alissa Escamilla, PT Physical Therapist                  Time Calculation        PT Charges       03/20/17 1315          Time Calculation    Start Time 1315  -      PT Goal Re-Cert Due Date 03/24/17  -      Time Calculation- PT    Total Timed Code Minutes- PT 45 minute(s)  -        User Key  (r) = Recorded By, (t) = Taken By, (c) = Cosigned By    Initials Name Provider Type     Milad Olivo, PT Physical Therapist            Therapy Charges for Today     Code Description Service Date Service Provider Modifiers Qty    52979598334 HC PT NEG PRESS WOUND TO 50SQCM DME1 3/20/2017 Milad Olivo, PT  1    59809893187 HC PT THER PROC EA 15 MIN 3/20/2017 Milad Olivo, PT GP 1    99531264428 HC PT THER SUPP EA 15 MIN 3/20/2017 Milad Olivo, PT GP 1            PT G-Codes  Outcome Measure Options: AM-PAC 6 Clicks Basic Mobility (PT)       Milad Olivo, PT  3/20/2017

## 2017-03-20 NOTE — PROGRESS NOTES
Continued Stay Note  [facility]     Patient Name: Sai Weinberg  MRN: [id number]  Today's Date: 3/20/2017    Admit Date: 3/12/2017          Discharge Plan       03/20/17 1424    Case Management/Social Work Plan    Plan home with home health    Patient/Family In Agreement With Plan yes    Additional Comments Patient is not medically ready for discharge. Patient had previously been set up to receive continuous Zosyn infusions, provided by [facility]. Will await ID recs to confirm no changes need to be made to abx. Notified Jeffy at [facility], he asks CM to notify him once patient is ready for discharge. PT Wound Care to see place wound vac. Call to [facility] and spoke with Yue. She asks CM to call when patient is ready for discharge. CM continues to follow.               Discharge Codes     None        Expected Discharge Date and Time     Expected Discharge Date Expected Discharge Time    Mar 20, 2017             Kathryn Pozo RN

## 2017-03-20 NOTE — PROGRESS NOTES
"    [facility] Medicine Services  INPATIENT PROGRESS NOTE    Date of Admission: 3/12/2017  Length of Stay: 8  Primary Care Physician: Dewayne Cole MD    Subjective   CC: follow-up Lt groin abscess/cellulitis     HPI:  Pt is seen resting in bed in NAD with wife at .  Reports mild nausea but no vomiting this am.  Tolerating diet.  Lt groin pain controlled.  Denies cp, increased SOA from baseline.  Positive BLE edema and wants to take \"water pill\".  No hx of CKD but understands creatinine remains elevated and agrees to nephrology consult today.  Awaiting PT to place wound vac.  Then will be dc'd home in next 1-2 days with home wd vac pending insurance approval. No new issues.       Review of Systems  Constitutional: Negative for chills and fever.   HENT: Negative for trouble swallowing.   Respiratory: Negative for cough and chest tightness.   + for dyspnea on exertion.   Cardiovascular: Negative for chest pain. Positive for BLE swelling   Gastrointestinal: Negative for abdominal pain, constipation, diarrhea and vomiting. Positive for mild intermittent nausea.    Genitalia-negative for pain, discharge. + for scrotal irritation from BM and sweating. Not itching    Objective      Temp:  [97.5 °F (36.4 °C)-98.3 °F (36.8 °C)] 97.5 °F (36.4 °C)  Heart Rate:  [76-80] 77  Resp:  [18-20] 18  BP: (108-156)/(71-79) 108/78     Physical Exam  Constitutional: Awake, He is oriented to person, place, and time. Resting in bed on lt side. He appears well-developed and well-nourished. No distress. Wife at bs.   HENT:   Head: Normocephalic.   Eyes: Pupils are equal, round, and reactive to light.   Neck: Normal range of motion. Supple.  Trachea midline   Cardiovascular: Normal rate and regular rhythm.   Faint Murmur heard.  Pulmonary/Chest: Effort normal and breath sounds normal. He has no wheezes. Decreased bilaterally, likely due to body habitus however noted BLE edema also. Few fine bibasilar " rales  Abdominal: Soft. Morbidly obese. Bowel sounds are normal. He exhibits no distension. There is no tenderness.   Musculoskeletal: Normal range of motion. BLE 3-4+ pitting edema (equal bilaterally)Unchanged compared to yesterday (although pt states was worse last night and improved overnight with lying in bed.   Neurological: He is alert and oriented to person, place, and time. Obeys all   Skin:   Left groin with large bandage clean, dry and intact   Psychiatric: He has a normal mood and affect. His behavior is normal. Calm and cooperative.   Vitals reviewed.    Results Review:    I have reviewed the labs, radiology results and diagnostic studies.      Results from last 7 days  Lab Units 03/20/17  1004 03/18/17  0521   WBC 10*3/mm3  --  4.78   HEMOGLOBIN g/dL 9.1* 9.1*   PLATELETS 10*3/mm3  --  184       Results from last 7 days  Lab Units 03/20/17  1004   SODIUM mmol/L 135   POTASSIUM mmol/L 3.7   CHLORIDE mmol/L 103   TOTAL CO2 mmol/L 26.0   BUN mg/dL 15   CREATININE mg/dL 2.00*   GLUCOSE mg/dL 88   CALCIUM mg/dL 9.0       Culture Data: Cultures:     Wound Culture   Order: [id number]   Status:  Final result   Visible to patient:  No (Not Released)   Specimen Information: Leg, Left; Wound        Culture   Scant growth (1+) Pseudomonas aeruginosa (A)      Scant growth (1+) Staphylococcus epidermidis (A)   This isolate does not demonstrate inducible clindamycin resistance in vitro.      Stain   Rare (1+) Epithelial cells seen      No WBCs seen      No organisms seen                  Radiology Data:   Imaging Results (last 24 hours)     Procedure Component Value Units Date/Time    XR Chest 1 View [[id number]] Collected:  03/20/17 0929     Updated:  03/20/17 0929    Narrative:       EXAMINATION: XR CHEST 1 VW- 03/19/2017     INDICATION: S71.002A-Unspecified open wound, left hip, initial  encounter; S71.102A-Unspecified open wound, left thigh, initial  encounter; Z74.09-Other reduced mobility;  shortness of breath      COMPARISON: 03/12/2017     FINDINGS: Portable chest reveals heart to be borderline enlarged.  Ill-defined opacification left lung base with small left pleural  effusion. Degenerative changes seen within the spine. PICC line catheter  identified on the right tip in the SVC.           Impression:       Heart is enlarged with small left pleural effusion and mild  increased markings at the left lung base. PICC line catheter identified  on the right tip in the SVC.     D:  03/20/2017  E:  03/20/2017                  I have reviewed the medications.  Scheduled Meds:  aspirin 81 mg Oral Daily   atorvastatin 40 mg Oral Daily   budesonide-formoterol 2 puff Inhalation BID - RT   cholecalciferol 2,000 Units Oral Daily   clopidogrel 75 mg Oral Daily   ferrous sulfate 325 mg Oral BID With Meals   heparin (porcine) 5,000 Units Subcutaneous Q8H   insulin lispro 2-7 Units Subcutaneous 4x Daily AC & at Bedtime   linezolid 600 mg Oral Q12H   pantoprazole 40 mg Oral Every Other Day   piperacillin-tazobactam 2.25 g Intravenous Q6H   sodium hypochlorite  Topical BID   tamsulosin 0.4 mg Oral Daily     Continuous Infusions:   PRN Meds:.bisacodyl  •  bisacodyl  •  calcium carbonate  •  dextrose  •  dextrose  •  glucagon (human recombinant)  •  HYDROcodone-acetaminophen  •  HYDROmorphone **AND** naloxone  •  ipratropium-albuterol  •  ondansetron **OR** ondansetron  •  oxyCODONE-acetaminophen  •  promethazine **OR** promethazine **OR** promethazine  •  sennosides-docusate sodium  •  sodium chloride  •  sodium chloride  •  tiZANidine      Assessment/Plan     Problem List  Principal Problem:    Open wound of left hip and thigh with complication  Active Problems:    COPD (chronic obstructive pulmonary disease)    Diabetes mellitus    Hypertension    Peripheral vascular disease    LANA (acute kidney injury)    Coronary artery disease involving native heart    Iron deficiency anemia    Coal workers pneumoconiosis    Leg wound,  left    Assessment/Plan:  Left leg wound  -s/p femoral stenting/angioplasty of left superficial artery, continue dressing changes 2x daily  -Continue IV zosyn and PO linezolid  -ID following  -Wound cx positive for coag-negative staph and pseudo  -s/p I&D per Dr. Rosa on 3/14/17  -PT following.  To place wound vac today.  CM working on arranging home health/with wound vac at dc.       LANA  -creatinine today 2.3 (1.2 on 3/16/17 and 2.2 yesterday. Likely ATN due to abx for infection/multifactoral--monitor labs). ID following for abx mgmt.   -Lasix dose held for now. Consider restarting at dc if creatinine improved due to his worsened chronic edema   -NS @ 75/hr--discontinued 3/19 due to edema   -recheck cbc/bmp in am  -Patient may be discharged once obstruction is ruled out and creatinine stabilizes.  -slight improvement to LANA today with creatinine now 2.0 but no prior CKD.  Pt also requesting diuresis for significant A/Chronic BLE edema.    -Will consult nephrology today  -am cmp/cbc    Bilateral LE edema (acute on chronic)  -due to hx and current edema ckd cxr, and dc IVF's yesterday.  -elevate BLE  -continues to be very edematous.  Now few fine bibasilar rales.  No increased SOA but pt definitely could use diuresis.  Due to LANA poss ATN will consult nephrology today    T2DM  -glucoses stable  -Holding orals   -A1c on 3/13/17= 5.3%  -SSI, Accuchecks      COPD, coal worker's pneumoconiosis  -stable      HTN  -Holding ACE-I, HCTZ due to LANA (and BP wnl)  -Monitor, add PRNs if needed      Iron deficiency anemia  -Chronic, stable  -Continue to monitor      Heart Murmur/CAD  -followed by cardiology ([place])      Scrotal irritation  -barrier cream  -keep dry          DVT prophylaxis: heparin, teds, scds  Discharge Planning: I expect patient to be discharged once LANA resolved/stable to home with home health.          Areli Gudino, APRN   03/20/17   12:26 PM    Please note that portions of this note may  have been completed with a voice recognition program. Efforts were made to edit the dictations, but occasionally words are mistranscribed.

## 2017-03-20 NOTE — PROGRESS NOTES
Acute Care - Physical Therapy Treatment Note  [facility]     Patient Name: Sai Weinberg  : 1960  MRN: [id number]  Today's Date: 3/20/2017  Onset of Illness/Injury or Date of Surgery Date: 17  Date of Referral to PT: 17  Referring Physician: MD Ayan    Admit Date: 3/12/2017    Visit Dx:    ICD-10-CM ICD-9-CM   1. Open wound of left hip and thigh with complication, initial encounter S71.002A 890.1    S71.102A    2. Impaired mobility and ADLs Z74.09 799.89   3. Impaired functional mobility, balance, gait, and endurance Z74.09 V49.89     Patient Active Problem List   Diagnosis   • Dyslipidemia   • Arthritis   • COPD (chronic obstructive pulmonary disease)   • Diabetes mellitus   • Esophageal reflux   • Hypertension   • Malignant neoplasm of colon   • Heart attack   • Peripheral vascular disease   • Chewing tobacco use   • Peripheral arterial disease   • LANA (acute kidney injury)   • Coronary artery disease involving native heart   • Iron deficiency anemia   • Coal workers pneumoconiosis   • Open wound of left hip and thigh with complication   • Leg wound, left               Adult Rehabilitation Note       17 1605 17 1315       Rehab Assessment/Intervention    Discipline physical therapist  -DM physical therapist  -MF     Document Type therapy note (daily note)   mobility  -DM therapy note (daily note);evaluation   wound care eval  -MF     Subjective Information agree to therapy;complains of;weakness;pain;swelling;fatigue  -DM agree to therapy;complains of;weakness;fatigue;pain  -MF     Patient Effort, Rehab Treatment good  -DM      Symptoms Noted During/After Treatment increased pain  -DM      Precautions/Limitations fall precautions;other (see comments)   wound vac; decub.   -DM      Recorded by [DM] Zahra Dorantes, PT [MF] Milad Olivo, PT     Vital Signs    Pre Systolic BP Rehab 146  -DM      Pre Treatment Diastolic BP 68  -DM      Post Systolic BP Rehab 154  -DM      Post  Treatment Diastolic BP 75  -DM      Pretreatment Heart Rate (beats/min) 77  -DM      Intratreatment Heart Rate (beats/min) 84  -DM      Posttreatment Heart Rate (beats/min) 78  -DM      O2 Delivery Pre Treatment room air  -DM      O2 Delivery Post Treatment room air  -DM      Pre Patient Position Sitting  -DM      Intra Patient Position Standing  -DM      Post Patient Position Sitting  -DM      Recorded by [DM] Zahra Dorantes, PT      Pain Assessment    Pain Assessment 0-10  -DM 0-10  -MF     Pain Score 7  -DM 9  -MF     Post Pain Score 8  -DM 8  -MF     Pain Type Acute pain  -DM Acute pain  -MF     Pain Location Groin  -DM Groin  -MF     Pain Orientation Left   & B feet  -DM Left  -MF     Pain Descriptors Aching  -DM      Pain Frequency Constant/continuous  -DM      Patient's Stated Pain Goal No pain  -DM      Pain Intervention(s) Medication (See MAR);Repositioned  -DM Medication (See MAR);Repositioned  -MF     Recorded by [DM] Zahra Dorantes, PT [MF] Milad Olivo, PT     Cognitive Assessment/Intervention    Current Cognitive/Communication Assessment functional  -DM      Orientation Status oriented x 4  -DM      Follows Commands/Answers Questions 100% of the time;able to follow single-step instructions;needs cueing  -DM      Personal Safety mild impairment;decreased awareness, need for assist;decreased awareness, need for safety;decreased insight to deficits  -DM      Personal Safety Interventions gait belt;fall prevention program maintained;nonskid shoes/slippers when out of bed  -DM      Recorded by [DM] Zahra Dorantes, PT      Bed Mobility, Assessment/Treatment    Bed Mobility, Comment UIC  -DM      Recorded by [DM] Zahra Dorantes, PT      Transfer Assessment/Treatment    Transfers, Sit-Stand [place] independent  -DM      Transfers, Stand-Sit [place] contact guard assist   B knees unsteady;incr.L groin pain  -DM      Transfer, Impairments strength decreased;impaired balance;pain  -DM       Transfer, Comment pt c/o signif. discomfort B feet d/t swelling  -DM      Recorded by [DM] Zahra Dorantes, PT      Gait Assessment/Treatment    Gait, [place] Level contact guard assist  -DM      Gait, Assistive Device --   gt belt; UE supp.;decl. cane  -DM      Gait, Distance (Feet) 14   7 ft x 2; p.t. held wd.vac tubing  -DM      Gait, Gait Pattern Analysis swing-to gait  -DM      Gait, Gait Deviations antalgic;bilateral:;remedios decreased;decreased heel strike;knee buckling;limb motion velocity decreased;step length decreased;stride length decreased   wide KELBY  -DM      Gait, Maintain Weight Bearing Status able to maintain weight bearing status  -DM      Gait, Safety Issues step length decreased;weight-shifting ability decreased  -DM      Gait, Impairments strength decreased;impaired balance;pain  -DM      Gait, Comment chair to EOB,RESTED 5 min,then ret. to chair  -DM      Recorded by [DM] Zahra Dorantes, PT      Therapy Exercises    Bilateral Lower Extremities AROM:;10 reps;sitting;ankle pumps/circles;glut sets;heel slides;hip abduction/adduction;hip flexion;LAQ;quad sets   HS sets;pillow squeeze w/R LE  -DM      Bilateral Upper Extremity AROM:;10 reps;sitting;elbow flexion/extension;shoulder abduction/adduction;shoulder extension/flexion;shoulder horizontal abd/add  -DM      Recorded by [DM] Zahra Dorantes, PT      Positioning and Restraints    Pre-Treatment Position sitting in chair/recliner  -DM in bed  -MF     Post Treatment Position chair  -DM bed  -MF     In Bed  supine;call light within reach;with family/caregiver  -MF     In Chair notified nsg;sitting;call light within reach   ALT.btwn leg rest down, & ret. to bed w/LE'S supported  -DM      Recorded by [DM] Zahra Dorantes, PT [MF] Milad Olivo, PT       User Key  (r) = Recorded By, (t) = Taken By, (c) = Cosigned By    Initials Name Effective Dates    SÁNCHEZ Olivo, PT 06/19/15 -     DM Zahra Dorantes, PT 06/19/15 -                  IP PT Goals       03/20/17 1605 03/20/17 1315 03/17/17 1332    Bed Mobility PT LTG    Bed Mobility PT LTG, Date Established 03/14/17  -DM      Bed Mobility PT LTG, Time to Achieve 2 wks  -DM      Bed Mobility PT LTG, Activity Type supine to sit/sit to supine  -DM      Bed Mobility PT LTG, [place] Level independent  -DM      Bed Mobility PT LTG, Outcome goal ongoing  -DM  goal ongoing  -MC    Transfer Training PT LTG    Transfer Training PT LTG, Outcome   goal met  -    Gait Training PT LTG    Gait Training Goal PT LTG, Date Established 03/14/17  -DM      Gait Training Goal PT LTG, Time to Achieve 2 wks  -DM      Gait Training Goal PT LTG, [place] Level conditional independence  -DM      Gait Training Goal PT LTG, Assist Device cane, straight  -DM      Gait Training Goal PT LTG, Distance to Achieve 200  -DM      Gait Training Goal PT LTG, Outcome goal ongoing  -DM  goal ongoing  -    Stair Training PT LTG    Stair Training Goal PT LTG, Date Established 03/14/17  -DM      Stair Training Goal PT LTG, Time to Achieve 2 wks  -DM      Stair Training Goal PT LTG, Number of Steps 7  -DM      Stair Training Goal PT LTG, [place] Level contact guard assist  -DM      Stair Training Goal PT LTG, Assist Device 1 handrail  -DM      Stair Training Goal PT LTG, Outcome goal ongoing  -DM  goal ongoing  -MC    Wound Care PT LTG    Wound Care PT LTG 1, Date Established  03/20/17  -MF     Wound Care PT LTG 1, Time to Achieve  other (see comments)   10 days  -MF     Wound Care PT LTG 1, Location  L groin  -MF     Wound Care PT LTG 1, No S&S of Infection  yes  -MF     Wound Care PT LTG 1, Decrease Wound Size  5%  -MF     Wound Care PT LTG 1, Decrease Exudate  scant  -MF     Wound Care PT LTG 1, No New Skin Break Down  yes  -MF       03/16/17 1059 03/14/17 1530       Bed Mobility PT LTG    Bed Mobility PT LTG, Date Established  03/14/17  -LS     Bed Mobility PT LTG, Time to Achieve  2 wks  -LS     Bed Mobility PT LTG,  Activity Type  supine to sit/sit to supine  -LS     Bed Mobility PT LTG, [place] Level  independent  -LS     Bed Mobility PT LTG, Outcome goal ongoing  -MB      Transfer Training PT LTG    Transfer Training PT LTG, Date Established  03/14/17  -LS     Transfer Training PT LTG, Time to Achieve  2 wks  -LS     Transfer Training PT LTG, Activity Type  sit to stand/stand to sit  -LS     Transfer Training PT LTG, [place] Level  conditional independence  -LS     Transfer Training PT LTG, Assist Device  cane, straight  -LS     Transfer Training PT LTG, Outcome goal ongoing  -MB      Gait Training PT LTG    Gait Training Goal PT LTG, Date Established  03/14/17  -LS     Gait Training Goal PT LTG, Time to Achieve  2 wks  -LS     Gait Training Goal PT LTG, [place] Level  conditional independence  -LS     Gait Training Goal PT LTG, Assist Device  cane, straight  -LS     Gait Training Goal PT LTG, Distance to Achieve  200  -LS     Gait Training Goal PT LTG, Outcome goal ongoing  -MB      Stair Training PT LTG    Stair Training Goal PT LTG, Date Established  03/14/17  -LS     Stair Training Goal PT LTG, Time to Achieve  2 wks  -LS     Stair Training Goal PT LTG, Number of Steps  7  -LS     Stair Training Goal PT LTG, [place] Level  contact guard assist  -LS     Stair Training Goal PT LTG, Assist Device  1 handrail  -LS     Stair Training Goal PT LTG, Outcome goal ongoing  -MB        User Key  (r) = Recorded By, (t) = Taken By, (c) = Cosigned By    Initials Name Provider Type     Milad Olivo, PT Physical Therapist    BEV Dorantes, PT Physical Therapist    HIRAL Conroy, PT Physical Therapist    SUE Thompson, PT Physical Therapist    BUCK Escamilla, PT Physical Therapist          Physical Therapy Education     Title: PT OT SLP Therapies (Active)     Topic: Physical Therapy (Active)     Point: Mobility training (Active)    Learning Progress Summary    Learner Readiness Method  Response Comment Documented by Status   Patient Eager E,D NR  DM 03/20/17 1645 Active    Acceptance E NR   03/17/17 1332 Active    Acceptance E NR fall precautions, PLB, gait mechanics, home safety MB 03/16/17 1059 Active    Acceptance E,D NR Edu re: benefit of further ambulation throughout day with NSG and of sets of LE HEP.  03/14/17 1529 Active               Point: Home exercise program (Active)    Learning Progress Summary    Learner Readiness Method Response Comment Documented by Status   Patient Eager E,D NR  DM 03/20/17 1645 Active    Acceptance E NR   03/17/17 1332 Active    Acceptance E NR fall precautions, PLB, gait mechanics, home safety MB 03/16/17 1059 Active    Acceptance E,D NR Edu re: benefit of further ambulation throughout day with NSG and of sets of LE HEP.  03/14/17 1529 Active               Point: Body mechanics (Active)    Learning Progress Summary    Learner Readiness Method Response Comment Documented by Status   Patient Eager E,D NR  DM 03/20/17 1645 Active    Acceptance E VU   03/19/17 0249 Done    Acceptance E NR   03/17/17 1332 Active    Acceptance E NR fall precautions, PLB, gait mechanics, home safety MB 03/16/17 1059 Active    Acceptance E,D NR Edu re: benefit of further ambulation throughout day with NSG and of sets of LE HEP.  03/14/17 1529 Active               Point: Precautions (Active)    Learning Progress Summary    Learner Readiness Method Response Comment Documented by Status   Patient Eager E,D NR  DM 03/20/17 1645 Active    Acceptance E VU   03/19/17 0249 Done    Acceptance E NR   03/17/17 1332 Active    Acceptance E NR fall precautions, PLB, gait mechanics, home safety MB 03/16/17 1059 Active    Acceptance E,D NR Edu re: benefit of further ambulation throughout day with NSG and of sets of LE HEP.  03/14/17 1529 Active                      User Key     Initials Effective Dates Name Provider Type Discipline     06/19/15 -  Zahra Dorantes, PT Physical  Therapist PT    LS 06/19/15 -  Leeanna Conroy, PT Physical Therapist PT    MB 03/14/16 -  Emmie Thompson, PT Physical Therapist PT    MC 03/14/16 -  Alissa Escamilla, PT Physical Therapist PT    LD 01/20/17 -  Daysi Rodriguez, RN Registered Nurse Nurse                    PT Recommendation and Plan  Anticipated Discharge Disposition: home with assist  PT Frequency: daily  Plan of Care Review  Plan Of Care Reviewed With: patient  Progress: progress toward functional goals is gradual  Outcome Summary/Follow up Plan: limited by severe swelling/L groin & [place]. foot pain, antalgic gt & elev. BP (HAD BP med); will attempt gt w/ cane to price next session as able          Outcome Measures       03/20/17 1605          How much help from another person do you currently need...    Turning from your back to your side while in flat bed without using bedrails? 4  -DM      Moving from lying on back to sitting on the side of a flat bed without bedrails? 3  -DM      Moving to and from a bed to a chair (including a wheelchair)? 3  -DM      Standing up from a chair using your arms (e.g., wheelchair, bedside chair)? 3  -DM      Climbing 3-5 steps with a railing? 2  -DM      To walk in hospital room? 3  -DM      AM-PAC 6 Clicks Score 18  -DM      Functional Assessment    Outcome Measure Options AM-PAC 6 Clicks Basic Mobility (PT)  -DM        User Key  (r) = Recorded By, (t) = Taken By, (c) = Cosigned By    Initials Name Provider Type    DM Zahra Dorantes, PT Physical Therapist           Time Calculation:         PT Charges       03/20/17 1645 03/20/17 1315       Time Calculation    Start Time 1605  -DM 1315  -     PT Received On 03/20/17  -DM      PT Goal Re-Cert Due Date 03/24/17  -DM 03/24/17  -MF     Time Calculation- PT    Total Timed Code Minutes- PT 28 minute(s)  -DM 45 minute(s)  -       User Key  (r) = Recorded By, (t) = Taken By, (c) = Cosigned By    Initials Name Provider Type    MF Milad Olivo, PT Physical  Therapist    DM Zahra Dorantes, PT Physical Therapist          Therapy Charges for Today     Code Description Service Date Service Provider Modifiers Qty    50826507554 HC PT THER PROC EA 15 MIN 3/20/2017 Zahra Dorantes, PT GP 1    04218823563 HC GAIT TRAINING EA 15 MIN 3/20/2017 Zahra Dorantes, PT GP 1          PT G-Codes  Outcome Measure Options: AM-PAC 6 Clicks Basic Mobility (PT)    Zahra Dorantes, PT  3/20/2017

## 2017-03-20 NOTE — PLAN OF CARE
Problem: Patient Care Overview (Adult)  Goal: Plan of Care Review  Outcome: Ongoing (interventions implemented as appropriate)    Problem: Infection, Risk/Actual (Adult)  Goal: Infection Prevention/Resolution  Outcome: Ongoing (interventions implemented as appropriate)

## 2017-03-20 NOTE — PROGRESS NOTES
CTS Progress Note       LOS: 8 days   Patient Care Team:  Dewayne Cole MD as PCP - General  Dewayne Cole MD as PCP - Family Medicine        Vital Signs  Temp:  [97.5 °F (36.4 °C)-98.3 °F (36.8 °C)] 97.5 °F (36.4 °C)  Heart Rate:  [76-80] 80  Resp:  [16-20] 18  BP: (108-156)/(71-79) 108/78    Physical Exam:       Results     Results from last 7 days  Lab Units 03/18/17  0521   WBC 10*3/mm3 4.78   HEMOGLOBIN g/dL 9.1*   HEMATOCRIT % 26.6*   PLATELETS 10*3/mm3 184       Results from last 7 days  Lab Units 03/19/17  0609   SODIUM mmol/L 134   POTASSIUM mmol/L 4.0   CHLORIDE mmol/L 102   TOTAL CO2 mmol/L 25.0   BUN mg/dL 12   CREATININE mg/dL 2.30*   GLUCOSE mg/dL 85   CALCIUM mg/dL 8.7           Imaging Results (last 24 hours)     Procedure Component Value Units Date/Time    XR Chest 1 View [[id number]]      Updated:  03/19/17 1241          Assessment    Principal Problem:    Open wound of left hip and thigh with complication  Active Problems:    COPD (chronic obstructive pulmonary disease)    Diabetes mellitus    Hypertension    Peripheral vascular disease    LANA (acute kidney injury)    Coronary artery disease involving native heart    Iron deficiency anemia    Coal workers pneumoconiosis    Leg wound, left    Ready for discharge home any time from my standpoint.  However at this point awaiting today's BMP level and nephrology evaluation of increased creatinine.    Plan   [facility] to see today.  I cannot determine if they were consult yesterday as requested    Heladio Rosa MD  03/20/17  7:22 AM

## 2017-03-20 NOTE — CONSULTS
"NAL Consult Note    Sai Weinberg  1960  [id number]    Date of Admit:  3/12/2017    Date of Consult: 3/20/2017        Requesting Provider: Ginger Pedroza MD  Evaluating Physician: Isak Umaña MD        Reason for Consultation: LANA, leg cellulitis    History of present illness:    Patient is a 56 y.o.  Yr old male who was transferred from [facility] on 3/12/17. He was admitted there 3/11/2017 for left leg cellulitis associated with recent left superficial artery cutdown/angioplasty/stent performed by Dr. Rosa on 1/18/2017. He did not follow up with Dr. Rosa. About 3 weeks after the procedure the patient stated the wound \"broke open\" and has been draining clear-pink tinged to \"pure blood\" ever since. It had been painful and warm. It has never looked frankly purulent or had a bad odor. On Monday 3/6/2017, he saw his PCP who prescribed PO antibiotics (data deficit). Yesterday, he saw his PCP again because there was no improvement and the patient was sent to the ER in [place], was admitted and given IV Vanco and Zosyn.     Initial creatinine was 0.8 which initially risen to 1.0--1.2 than 2.2 3/18/17 and 2.3 3/19/17- we are consulted for the ris e in cr. Due to edema he had been getting diuretics also     Past Medical History   Diagnosis Date   • Arthritis    • Black lung disease    • BPH (benign prostatic hyperplasia)    • Cataract    • Cataract    • COLD (chronic obstructive lung disease)    • Colon polyps    • Diabetes mellitus      SINCE 2014   • Dyslipidemia    • Esophageal reflux    • Flu      HX   • Heart attack      2006   • Herniated lumbar intervertebral disc    • Hypertension    • Malignant neoplasm of colon    • Peripheral vascular disease    • Sleep apnea with use of continuous positive airway pressure (CPAP)    • Wears dentures    • Wears glasses        Past Surgical History   Procedure Laterality Date   • Colon surgery     • Bypass graft       non-vein - femoral-popliteal right   • Other " surgical history       PTA ILIAC STENOSIS WITH STENT   • Cardiac catheterization       NO INTERVENTION   • Colonoscopy     • Aortagram N/A 1/17/2017     Procedure: AORTAGRAM WITH RUNOFFS and  STENT left SFA;  Surgeon: Heladio Rosa MD;  Location:  [facility];  Service:    • Angioplasty femoral artery N/A 1/17/2017     Procedure: left femoral cutdown with aortagram and left SFA stent and angioplasty;  Surgeon: Heladio Rosa MD;  Location:  [facility];  Service:    • Femoral femoral bypass N/A 3/13/2017     Procedure: INCISION AND DRAINAGE LEFT GROIN HEMATOMA;  Surgeon: Heladio Rosa MD;  Location:  [facility];  Service:        Social History     Social History   • Marital status:      Spouse name: N/A   • Number of children: N/A   • Years of education: N/A     Occupational History   • DISABLED       BACK AND LUNG PROBLEMS     Social History Main Topics   • Smoking status: Former Smoker     Packs/day: 2.00     Types: Cigarettes     Start date: 1980     Quit date: 2015   • Smokeless tobacco: Current User     Types: Chew      Comment: Stopped smoking 1 year ago. Smoked 35 years up to 2ppd.   • Alcohol use No   • Drug use: No   • Sexual activity: Defer     Other Topics Concern   • None     Social History Narrative       family history includes Diabetes in his father and mother; Heart attack in his father and mother; Hypertension in his father and mother; Lung cancer in his mother.    No Known Allergies    Medication:    Current Facility-Administered Medications:   •  aspirin chewable tablet 81 mg, 81 mg, Oral, Daily, ROSCOE Hatch, 81 mg at 03/20/17 0846  •  atorvastatin (LIPITOR) tablet 40 mg, 40 mg, Oral, Daily, Maribel Botello MD, 40 mg at 03/20/17 0846  •  bisacodyl (DULCOLAX) EC tablet 5 mg, 5 mg, Oral, Daily PRN, Maribel Botello MD  •  bisacodyl (DULCOLAX) suppository 10 mg, 10 mg, Rectal, Daily PRN, ROSCOE Hatch  •  budesonide-formoterol (SYMBICORT)  80-4.5 MCG/ACT inhaler 2 puff, 2 puff, Inhalation, BID - RT, Maribel Botello MD, 2 puff at 03/20/17 0832  •  calcium carbonate (TUMS) chewable tablet 500 mg (200 mg elemental), 2 tablet, Oral, BID PRN, Maribel Botello MD  •  cholecalciferol (VITAMIN D3) tablet 2,000 Units, 2,000 Units, Oral, Daily, Vangie Dangelo DO, 2,000 Units at 03/20/17 0846  •  clopidogrel (PLAVIX) tablet 75 mg, 75 mg, Oral, Daily, ROSCOE Hatch, 75 mg at 03/20/17 0846  •  dextrose (D50W) solution 25 g, 25 g, Intravenous, Q15 Min PRN, Maribel Botello MD  •  dextrose (GLUTOSE) oral gel 15 g, 15 g, Oral, Q15 Min PRN, Maribel Botello MD  •  ferrous sulfate tablet 325 mg, 325 mg, Oral, BID With Meals, Maribel Botello MD, 325 mg at 03/20/17 0846  •  glucagon (GLUCAGEN) injection 1 mg, 1 mg, Subcutaneous, Q15 Min PRN, Maribel Botello MD  •  heparin (porcine) 5000 UNIT/ML injection 5,000 Units, 5,000 Units, Subcutaneous, Q8H, Vangie Dangelo DO, 5,000 Units at 03/20/17 1402  •  HYDROcodone-acetaminophen (NORCO) 7.5-325 MG per tablet 1 tablet, 1 tablet, Oral, BID PRN, Maribel Botello MD, 1 tablet at 03/19/17 1016  •  HYDROmorphone (DILAUDID) injection 0.5 mg, 0.5 mg, Intravenous, Q2H PRN, 0.5 mg at 03/20/17 1401 **AND** naloxone (NARCAN) injection 0.4 mg, 0.4 mg, Intravenous, Q5 Min PRN, Maribel Botello MD  •  insulin lispro (humaLOG) injection 2-7 Units, 2-7 Units, Subcutaneous, 4x Daily AC & at Bedtime, Maribel Botello MD, 2 Units at 03/12/17 2243  •  ipratropium-albuterol (DUO-NEB) nebulizer solution 3 mL, 3 mL, Nebulization, Q4H PRN, Jamarcus Carl MD, 3 mL at 03/16/17 0751  •  linezolid (ZYVOX) tablet 600 mg, 600 mg, Oral, Q12H, David Nguyen MD, 600 mg at 03/20/17 0846  •  ondansetron (ZOFRAN) tablet 4 mg, 4 mg, Oral, Q6H PRN **OR** ondansetron (ZOFRAN) injection 4 mg, 4 mg, Intravenous, Q6H PRN, ROSCOE Hatch, 4 mg at 03/18/17 0813  •  oxyCODONE-acetaminophen (PERCOCET) 7.5-325 MG per tablet 1 tablet, 1 tablet, Oral, Q6H  PRN, Trung Rodriguez MD, 1 tablet at 03/20/17 1232  •  pantoprazole (PROTONIX) EC tablet 40 mg, 40 mg, Oral, Every Other Day, Maribel Botello MD, 40 mg at 03/20/17 0846  •  piperacillin-tazobactam (ZOSYN) 2.25 g in sodium chloride 0.9 % 100 mL IVPB, 2.25 g, Intravenous, Q6H, David Nguyen MD, 2.25 g at 03/20/17 1223  •  promethazine (PHENERGAN) tablet 12.5 mg, 12.5 mg, Oral, Q6H PRN, 12.5 mg at 03/20/17 0550 **OR** promethazine (PHENERGAN) injection 12.5 mg, 12.5 mg, Intramuscular, Q6H PRN **OR** promethazine (PHENERGAN) suppository 12.5 mg, 12.5 mg, Rectal, Q6H PRN, Maribel Botello MD  •  sennosides-docusate sodium (SENOKOT-S) 8.6-50 MG tablet 2 tablet, 2 tablet, Oral, BID PRN, ROSCOE Hatch  •  sodium chloride 0.9 % flush 1-10 mL, 1-10 mL, Intravenous, PRN, Maribel Botello MD  •  sodium chloride 0.9 % flush 10 mL, 10 mL, Intravenous, PRN, David Nguyen MD  •  sodium hypochlorite (DAKIN'S) 0.025 % topical solution solution, , Topical, BID, Heladio Rsoa MD  •  tamsulosin (FLOMAX) 24 hr capsule 0.4 mg, 0.4 mg, Oral, Daily, Maribel Botello MD, 0.4 mg at 03/20/17 0845  •  tiZANidine (ZANAFLEX) tablet 4 mg, 4 mg, Oral, Nightly PRN, Maribel Botello MD, 4 mg at 03/16/17 2112    Prescriptions Prior to Admission   Medication Sig Dispense Refill Last Dose   • potassium chloride (K-DUR) 10 MEQ CR tablet Take 10 mEq by mouth 2 (Two) Times a Day.      • sulfamethoxazole-trimethoprim (BACTRIM DS,SEPTRA DS) 800-160 MG per tablet Take 1 tablet by mouth 2 (Two) Times a Day.      • albuterol (PROVENTIL HFA;VENTOLIN HFA) 108 (90 BASE) MCG/ACT inhaler Inhale 2 puffs Every 4 (Four) Hours As Needed for wheezing.   1/16/2017 at 2200   • aspirin 81 MG tablet Take 81 mg by mouth daily.   1/15/2017   • atorvastatin (LIPITOR) 40 MG tablet Take 40 mg by mouth daily.   1/16/2017 at 0800   • Cholecalciferol (VITAMIN D PO) Take 2,000 Units by mouth Daily.   1/16/2017 at 0800   • clopidogrel (PLAVIX) 75 MG tablet Take 75 mg by  mouth daily.   1/15/2017   • ferrous sulfate 324 (65 FE) MG tablet delayed-release EC tablet Take 324 mg by mouth Daily With Breakfast.   1/16/2017 at 0800   • fluticasone-salmeterol (ADVAIR) 100-50 MCG/DOSE DISKUS Inhale 2 puffs 2 (Two) Times a Day.   1/16/2017 at 0800   • furosemide (LASIX) 20 MG tablet Take 20 mg by mouth Daily As Needed (SWELLING).   1/16/2017 at 0800   • hydrochlorothiazide (HYDRODIURIL) 12.5 MG tablet Take 25 mg by mouth Daily.   1/16/2017 at 0800   • HYDROcodone-acetaminophen (NORCO) 7.5-325 MG per tablet Take 1 tablet by mouth 2 (Two) Times a Day As Needed for moderate pain (4-6).   1/16/2017 at 1900   • lisinopril (PRINIVIL,ZESTRIL) 30 MG tablet Take 40 mg by mouth Daily.   1/17/2017 at 0530   • metFORMIN (GLUCOPHAGE) 500 MG tablet Take 850 mg by mouth 2 (Two) Times a Day With Meals.   1/16/2017 at 0800   • pantoprazole (PROTONIX) 40 MG EC tablet Take 40 mg by mouth Every Other Day.   1/16/2017 at 0800   • tamsulosin (FLOMAX) 0.4 MG capsule 24 hr capsule Take 0.4 mg by mouth Daily.   1/16/2017 at 0800   • tiZANidine (ZANAFLEX) 4 MG tablet Take 4 mg by mouth At Night As Needed for muscle spasms.   1/16/2017 at 2000       Review of Systems:    Constitutional-- No Fever, chills or sweats. No significant change in weight  Eye-- no diplopia, no conjunctivitis  ENT-- No new hearing or throat complaints.  No epistaxis or oral sores. No odynophagia or dysphagia. No headache, photophobia or neck stiffness.  CV-- No chest pain, palpitations, soa, ++ edema  Resp-- No cough/Hemoptysis, + soa at baseline due to copd  GI- No nausea, vomiting, or diarrhea.  No hematochezia, melena, or hematemesis.  -- No dysuria, hematuria, or flank pain. No lower tract obstructive symptoms  Skin-- No rash, warm and dry  Lymph- no painful or swollen lymph nodes in neck/axilla or groin.   Heme- No active bruising or bleeding; no Hx of DVT or PE.  MS-- no pain in the joints, + edema, left thigh pain and swelling drain in  "place  Neuro-- No acute focal weakness or numbness in the arms or legs.  No seizures.  Psych--No anxiety or depression  Endo--No cold or heat intolerance.  No polyuria, polydipsia, or polyphagia    Full review of systems reviewed and negative otherwise for acute complaints    Physical Exam:   Vital Signs   Blood pressure 148/72, pulse 83, temperature 98.4 °F (36.9 °C), temperature source Oral, resp. rate 18, height 65\" (165.1 cm), weight 253 lb (115 kg), SpO2 91 %.     GENERAL: Awake and alert, in no acute distress.   HEENT: Normocephalic, atraumatic.  PER.  No conjunctivitis. No icterus. Oropharynx clear without evidence of thrush or exudate. No evidence of peridontal disease.    NECK: Supple without nuchal rigidity. No mass.  LYMPH: No painful cervical, axillary or inguinal lymphadenopathy.  HEART: RRR; No murmur, rubs, gallops. No bruits in neck, abdomen, or groins, no edema  LUNGS: Normal respiratory effort. Nonlabored. No dullness.  No crepitus.  Clear to auscultation bilaterally without wheezing, rales, rhonchi.  ABDOMEN: Soft, nontender, nondistended. Positive bowel sounds. No rebound or guarding. NO mass or HSM.  JOINTS:  Full range of motion, no redness or tenderness.  EXT:  No cyanosis, clubbing , ++edema. Left thigh has a drain now- he feels better and less pressure  :  External genitalia without swelling or lesion  SKIN: Warm and dry without rash  NEURO: Oriented to PPT. No focal neurological deficits. Strength equal bilateral  PSYCHIATRIC: Normal insight and judgement. Cooperative with PE    Laboratory Data    Results from last 7 days  Lab Units 03/20/17  1004 03/18/17  0521 03/17/17  0549   HEMOGLOBIN g/dL 9.1* 9.1* 9.1*   HEMATOCRIT % 26.8* 26.6* 27.3*       Results from last 7 days  Lab Units 03/20/17  1004 03/19/17  0609 03/18/17  0521 03/17/17  1239   SODIUM mmol/L 135 134 131* 130*   POTASSIUM mmol/L 3.7 4.0 3.5 3.4*   CHLORIDE mmol/L 103 102 100 98*   TOTAL CO2 mmol/L 26.0 25.0 25.0 28.0   BUN " "mg/dL 15 12 10 11   CREATININE mg/dL 2.00* 2.30* 2.20* 2.20*   CALCIUM mg/dL 9.0 8.7 8.8 9.1   ALBUMIN g/dL  --  3.20  --   --        Results from last 7 days  Lab Units 03/20/17  1004   GLUCOSE mg/dL 88       Results from last 7 days  Lab Units 03/18/17  1618   COLOR UA  Yellow   CLARITY UA  Clear   PH, URINE  5.5   SPECIFIC GRAVITY, URINE  1.014   GLUCOSE UA  Negative   KETONES UA  Negative   BILIRUBIN UA  Negative   PROTEIN UA  Negative   BLOOD UA  Negative   LEUKOCYTES UA  Negative   NITRITE UA  Negative       Results from last 7 days  Lab Units 03/19/17  0609   ALK PHOS U/L 43   BILIRUBIN mg/dL 0.7   ALT (SGPT) U/L 26   AST (SGOT) U/L 35*     Estimated Creatinine Clearance: 48.4 mL/min (by C-G formula based on Cr of 2).    Radiology:      Renal Imaging:    I personally read  the radiographic studies    Impression:   LANA: Possibly ATN  vs AIN from abx such as vancomycin ( did nt get it for last 3 days) and zosyn  EDEMA; Not sure why he off and on get edema and needs lasix- does he have venous insufficiency vs CHF- he has no hx of liver disease nor does he have any proteinuria and baseline cr is around 0.8- we are not left with cardiac cause vs lumph edema- it is pitting though   Anemia\" ?? Chronic disease  Cellulitis;  COPD;  DM;      PLAN: Thank you for asking us to see Sai Weinberg, I recommend the following:     Hold off on diuretic use for now  US kidneys  Check iron studies  ECHO   Urine and LANA labs  Please change zosyn to some other non renal toxic abx    Isak Umaña MD  3/20/2017  2:32 PM                  "

## 2017-03-20 NOTE — PLAN OF CARE
Problem: Patient Care Overview (Adult)  Goal: Plan of Care Review  Outcome: Ongoing (interventions implemented as appropriate)    03/20/17 0898   Coping/Psychosocial Response Interventions   Plan Of Care Reviewed With patient;spouse   Patient Care Overview   Progress no change   Outcome Evaluation   Outcome Summary/Follow up Plan Patient still very swollen from retention of fluid. Wound now has wound vac in place and nephrology has evaluated for diuresis. Hopefully home tomorrow if creatinine continues to decrease and swelling is resolved. Continue to monitor.       Goal: Discharge Needs Assessment  Outcome: Ongoing (interventions implemented as appropriate)    Problem: Fluid Volume Excess (Adult,Obstetrics,Pediatric)  Goal: Identify Related Risk Factors and Signs and Symptoms  Outcome: Ongoing (interventions implemented as appropriate)  Goal: Stable Weight  Outcome: Ongoing (interventions implemented as appropriate)  Goal: Balanced Intake/Output  Outcome: Ongoing (interventions implemented as appropriate)

## 2017-03-20 NOTE — PLAN OF CARE
Problem: Patient Care Overview (Adult)  Goal: Plan of Care Review  Outcome: Ongoing (interventions implemented as appropriate)    03/20/17 1645   Coping/Psychosocial Response Interventions   Plan Of Care Reviewed With patient   Patient Care Overview   Progress progress toward functional goals is gradual   Outcome Evaluation   Outcome Summary/Follow up Plan limited by severe swelling/L groin & [place]. foot pain, antalgic gt & elev. BP (HAD BP med); will attempt gt w/ cane to price next session as able         Problem: Inpatient Physical Therapy  Goal: Bed Mobility Goal LTG- PT  Outcome: Ongoing (interventions implemented as appropriate)    03/20/17 1605   Bed Mobility PT LTG   Bed Mobility PT LTG, Date Established 03/14/17   Bed Mobility PT LTG, Time to Achieve 2 wks   Bed Mobility PT LTG, Activity Type supine to sit/sit to supine   Bed Mobility PT LTG, [place] Level independent   Bed Mobility PT LTG, Outcome goal ongoing       Goal: Gait Training Goal LTG- PT  Outcome: Ongoing (interventions implemented as appropriate)    03/20/17 1605   Gait Training PT LTG   Gait Training Goal PT LTG, Date Established 03/14/17   Gait Training Goal PT LTG, Time to Achieve 2 wks   Gait Training Goal PT LTG, [place] Level conditional independence   Gait Training Goal PT LTG, Assist Device cane, straight   Gait Training Goal PT LTG, Distance to Achieve 200   Gait Training Goal PT LTG, Outcome goal ongoing       Goal: Stair Training Goal LTG- PT  Outcome: Ongoing (interventions implemented as appropriate)    03/20/17 1605   Stair Training PT LTG   Stair Training Goal PT LTG, Date Established 03/14/17   Stair Training Goal PT LTG, Time to Achieve 2 wks   Stair Training Goal PT LTG, Number of Steps 7   Stair Training Goal PT LTG, [place] Level contact guard assist   Stair Training Goal PT LTG, Assist Device 1 handrail   Stair Training Goal PT LTG, Outcome goal ongoing

## 2017-03-20 NOTE — PLAN OF CARE
Problem: Patient Care Overview (Adult)  Goal: Plan of Care Review  Outcome: Ongoing (interventions implemented as appropriate)    03/20/17 1315   Coping/Psychosocial Response Interventions   Plan Of Care Reviewed With patient   Outcome Evaluation   Outcome Summary/Follow up Plan Pt presents with complex open wound to L groin now s/p i and d. Pt cont to have moderate exudate from wound with masceration and irritation of periwound skin. Pt will benefit from wound vac to help manage exudate, increase granulation, and improve skin integrity.          Problem: Inpatient Physical Therapy  Goal: Wound Care Goal 1 LTG- PT  Outcome: Ongoing (interventions implemented as appropriate)    03/20/17 1315   Wound Care PT LTG   Wound Care PT LTG 1, Date Established 03/20/17   Wound Care PT LTG 1, Time to Achieve other (see comments)  (10 days)   Wound Care PT LTG 1, Location L groin   Wound Care PT LTG 1, No S&S of Infection yes   Wound Care PT LTG 1, Decrease Wound Size 5%   Wound Care PT LTG 1, Decrease Exudate scant   Wound Care PT LTG 1, No New Skin Break Down yes

## 2017-03-20 NOTE — PROGRESS NOTES
"[facility]    INPATIENT PROGRESS NOTE  3/20/2017      PATIENT NAME: Sai Weinberg  :  1960  MRN:  [id number]  Date of Admission:  3/12/2017      Antimicrobials:  IV Anti-Infectives     Ordered     Dose/Rate Route Frequency Start Stop    17 1337  piperacillin-tazobactam (ZOSYN) 2.25 g in sodium chloride 0.9 % 100 mL IVPB     Ordering Provider:  David Nguyen MD    2.25 g Intravenous Every 6 Hours 17 1800      17 1641  linezolid (ZYVOX) tablet 600 mg     Ordering Provider:  David Nguyen MD    600 mg Oral Every 12 Hours Scheduled 17 2100      17 0904  cefuroxime (ZINACEF) IVPB 1.5 g     Ordering Provider:  ROSCOE Kennedy    1.5 g  over 30 Minutes Intravenous On Call to O.R. 17 0600 03/15/17 0559    17 2243  vancomycin IVPB 1750 mg in 0.9% Sodium Chloride (premix) 500 mL     Ordering Provider:  Maribel Botello MD    1,750 mg Intravenous Once 17 2315 17 2352          MAR reviewed.  Pertinent other medications include:  Plavix, PPI    Reason for consultation:  Left groin/thigh abscess and cellulitis, status post recent left femoral cutdown; Pseudomonas and MRSE    Interval history:  Patient remained in hospital over weekend as no improvement in renal function.  Nephrology consulted today.  Slight improvement in Cr today, down to 2.0.  Good UOP.  No fevers.  Has had some loose stools but no terrie watery diarrhea.  Wound VAC placed today.    ROS:  No fevers/chills overnight.  No new rashes.  No SOB or chest pain.  No nausea/vomiting.  Mild loose stool.  Denies side effects from antimicrobials.    Objective:  Temp (24hrs), Av.1 °F (36.7 °C), Min:97.5 °F (36.4 °C), Max:98.4 °F (36.9 °C)    Visit Vitals   • /72   • Pulse 83   • Temp 98.4 °F (36.9 °C) (Oral)   • Resp 18   • Ht 65\" (165.1 cm)   • Wt 253 lb (115 kg)   • SpO2 91%   • BMI 42.1 kg/m2       Physical Examination:  GENERAL: Awake and alert, in no acute " distress.   LINES: right UE PICC.  HEENT: Normocephalic, atraumatic. No conjunctival injection or subconjunctival hemorrhage. No icterus.  MSK: Left thigh with improved edema and nearly resolved erythema; no warmth; mild edema. New wound VAC over the left groin wound.  SKIN: Warm and dry without rash.  EXT: Chronic 2+ B JACQUELINE. Dry skin on legs.  NEURO: A&Ox4. No focal deficits. Face symmetric. Speech fluent. Moves all extremities well.   PSYCHIATRIC: Normal insight and judgement. Cooperative. Normal affect.    Laboratory Data:      Results from last 7 days  Lab Units 03/20/17  1004 03/18/17  0521 03/17/17  0549 03/16/17  0539   WBC 10*3/mm3  --  4.78 4.35 3.69   HEMOGLOBIN g/dL 9.1* 9.1* 9.1* 9.7*   HEMATOCRIT % 26.8* 26.6* 27.3* 29.1*   PLATELETS 10*3/mm3  --  184 187 181       Results from last 7 days  Lab Units 03/20/17  1004   SODIUM mmol/L 135   POTASSIUM mmol/L 3.7   CHLORIDE mmol/L 103   TOTAL CO2 mmol/L 26.0   BUN mg/dL 15   CREATININE mg/dL 2.00*   GLUCOSE mg/dL 88   CALCIUM mg/dL 9.0       Results from last 7 days  Lab Units 03/19/17  0609   ALK PHOS U/L 43   BILIRUBIN mg/dL 0.7   ALT (SGPT) U/L 26   AST (SGOT) U/L 35*             Estimated Creatinine Clearance: 48.4 mL/min (by C-G formula based on Cr of 2).            Results from last 7 days  Lab Units 03/14/17  1111   VANCOMYCIN TR mcg/mL 15.60       Cr down from 2.3      Microbiology:  None new    Radiology:  Imaging Results (last 72 hours)     Procedure Component Value Units Date/Time    XR Chest 1 View [[id number]] Collected:  03/20/17 0929     Updated:  03/20/17 1436    Narrative:       EXAMINATION: XR CHEST 1 VW- 03/19/2017     INDICATION: S71.002A-Unspecified open wound, left hip, initial  encounter; S71.102A-Unspecified open wound, left thigh, initial  encounter; Z74.09-Other reduced mobility;  shortness of breath     COMPARISON: 03/12/2017     FINDINGS: Portable chest reveals heart to be borderline enlarged.  Ill-defined opacification left lung base  with small left pleural  effusion. Degenerative changes seen within the spine. PICC line catheter  identified on the right tip in the SVC.           Impression:       Heart is enlarged with small left pleural effusion and mild  increased markings at the left lung base. PICC line catheter identified  on the right tip in the SVC.     D:  03/20/2017  E:  03/20/2017     This report was finalized on 3/20/2017 2:34 PM by Dr. Naty Panchal MD.               DISCUSSION:  56 y.o. male with history of diabetes and peripheral vascular disease who recently underwent a left femoral cutdown with angioplasty and stent placement is admitted with postoperative left thigh cellulitis and wound dehiscence with seroma versus abscess formation. Incision and drainage performed with 200 milliliters of serous fluid. Exam is consistent with a mild cellulitis of the left thigh. Patient reported copious amounts of serosanguineous drainage at home for 2-3 weeks.      PROBLEM LIST:   1. Postoperative left femoral fluid collection, seroma plus likely abscess, status post recent left femoral cutdown with left superficial femoral artery angioplasty with stent (no graft). Status post incision and drainage with 200 mL serous fluid removed. Culture is positive for Pseudomonas (S-FQ, Zosyn, meropenem; intermediate to cefepime and aztreonam; resistant to ceftazidime) and Staph epi, methicillin resistant.  2. Left thigh cellulitis, related to #1. Slowly improving.  3. Controlled diabetes mellitus type 2.  4. Obesity.  5. Peripheral vascular disease, status post prior right femoral to popliteal bypass graft and more recent left femoral cutdown with angioplasty and stent.  6. Prolonged QTc. Pseudomonas is susceptible to fluoroquinolone but patient with prolonged QTc on EKG, so probably wise not to use oral Levaquin for discharge.  7. LANA, new; slight improvement. Possible vancomycin and/or Zosyn contributing.  8. Loose stool.  No crampy abd pain, no  fevers, no leukocytosis.  Probably antibiotic associated.      PLAN:  1.  Cannot use Levaquin given prolonged QTc.  2.  Will discontinue Zosyn d/t concern for contribution to LANA.  3.  For Pseudomonas, will replace with meropenem 1 g iv q12h.  4.  Continue linezolid for MRSE; concern for IV vancomycin contributing to LANA.  5.  Note wound VAC placed.  6.  Add probiotic.  7.  Hopefully will be able to discharge in next 1-2 days if renal function improves.  Tentatively plan on abx through 3/31.    Plan discussed with patient, family and nursing.    David Nguyen MD  3/20/2017  4:08 PM      Portions of this note may have been created with a voice recognition program.  Efforts were made to edit the dictations.  However, words are occassionally mistranscribed and sound alike errors may occur.

## 2017-03-21 ENCOUNTER — APPOINTMENT (OUTPATIENT)
Dept: CARDIOLOGY | Facility: HOSPITAL | Age: 57
End: 2017-03-21
Attending: INTERNAL MEDICINE

## 2017-03-21 LAB
ALBUMIN SERPL-MCNC: 3.3 G/DL (ref 3.2–4.8)
ALBUMIN/GLOB SERPL: 0.9 G/DL (ref 1.5–2.5)
ALP SERPL-CCNC: 49 U/L (ref 25–100)
ALT SERPL W P-5'-P-CCNC: 25 U/L (ref 7–40)
ANION GAP SERPL CALCULATED.3IONS-SCNC: 10 MMOL/L (ref 3–11)
AST SERPL-CCNC: 44 U/L (ref 0–33)
BASOPHILS # BLD AUTO: 0.05 10*3/MM3 (ref 0–0.2)
BASOPHILS NFR BLD AUTO: 0.8 % (ref 0–1)
BH CV ECHO MEAS - AO MAX PG (FULL): 79 MMHG
BH CV ECHO MEAS - AO MAX PG: 78.5 MMHG
BH CV ECHO MEAS - AO MEAN PG (FULL): 29 MMHG
BH CV ECHO MEAS - AO MEAN PG: 32 MMHG
BH CV ECHO MEAS - AO ROOT AREA (BSA CORRECTED): 1.4
BH CV ECHO MEAS - AO ROOT AREA: 7.1 CM^2
BH CV ECHO MEAS - AO ROOT DIAM: 3 CM
BH CV ECHO MEAS - AO V2 MAX: 449 CM/SEC
BH CV ECHO MEAS - AO V2 MEAN: 259 CM/SEC
BH CV ECHO MEAS - AO V2 VTI: 82 CM
BH CV ECHO MEAS - AVA(I,A): 1 CM^2
BH CV ECHO MEAS - AVA(I,D): 1 CM^2
BH CV ECHO MEAS - AVA(V,A): 1 CM^2
BH CV ECHO MEAS - AVA(V,D): 1 CM^2
BH CV ECHO MEAS - BSA(HAYCOCK): 2.4 M^2
BH CV ECHO MEAS - BSA: 2.2 M^2
BH CV ECHO MEAS - BZI_BMI: 42.1 KILOGRAMS/M^2
BH CV ECHO MEAS - BZI_METRIC_HEIGHT: 165.1 CM
BH CV ECHO MEAS - BZI_METRIC_WEIGHT: 114.8 KG
BH CV ECHO MEAS - CONTRAST EF (2CH): 80.7 ML/M^2
BH CV ECHO MEAS - CONTRAST EF 4CH: 81.9 ML/M^2
BH CV ECHO MEAS - EDV(CUBED): 201.2 ML
BH CV ECHO MEAS - EDV(MOD-SP2): 161 ML
BH CV ECHO MEAS - EDV(MOD-SP4): 166 ML
BH CV ECHO MEAS - EDV(TEICH): 170.5 ML
BH CV ECHO MEAS - EF(CUBED): 55.9 %
BH CV ECHO MEAS - EF(MOD-SP2): 80.7 %
BH CV ECHO MEAS - EF(MOD-SP4): 81.9 %
BH CV ECHO MEAS - EF(TEICH): 46.9 %
BH CV ECHO MEAS - ESV(CUBED): 88.7 ML
BH CV ECHO MEAS - ESV(MOD-SP2): 31 ML
BH CV ECHO MEAS - ESV(MOD-SP4): 30 ML
BH CV ECHO MEAS - ESV(TEICH): 90.5 ML
BH CV ECHO MEAS - FS: 23.9 %
BH CV ECHO MEAS - IVS/LVPW: 0.98
BH CV ECHO MEAS - IVSD: 1.1 CM
BH CV ECHO MEAS - LA DIMENSION: 4.2 CM
BH CV ECHO MEAS - LA/AO: 1.4
BH CV ECHO MEAS - LV DIASTOLIC VOL/BSA (35-75): 75.9 ML/M^2
BH CV ECHO MEAS - LV MASS(C)D: 285.2 GRAMS
BH CV ECHO MEAS - LV MASS(C)DI: 130.5 GRAMS/M^2
BH CV ECHO MEAS - LV MAX PG: 8.2 MMHG
BH CV ECHO MEAS - LV MEAN PG: 4 MMHG
BH CV ECHO MEAS - LV SYSTOLIC VOL/BSA (12-30): 13.7 ML/M^2
BH CV ECHO MEAS - LV V1 MAX: 143 CM/SEC
BH CV ECHO MEAS - LV V1 MEAN: 88.5 CM/SEC
BH CV ECHO MEAS - LV V1 VTI: 26.4 CM
BH CV ECHO MEAS - LVIDD: 5.9 CM
BH CV ECHO MEAS - LVIDS: 4.5 CM
BH CV ECHO MEAS - LVLD AP2: 9.1 CM
BH CV ECHO MEAS - LVLD AP4: 9 CM
BH CV ECHO MEAS - LVLS AP2: 7.9 CM
BH CV ECHO MEAS - LVLS AP4: 6.5 CM
BH CV ECHO MEAS - LVOT AREA (M): 3.1 CM^2
BH CV ECHO MEAS - LVOT AREA: 3.1 CM^2
BH CV ECHO MEAS - LVOT DIAM: 2 CM
BH CV ECHO MEAS - LVPWD: 1.2 CM
BH CV ECHO MEAS - PA ACC SLOPE: 841 CM/SEC^2
BH CV ECHO MEAS - PA ACC TIME: 0.12 SEC
BH CV ECHO MEAS - PA PR(ACCEL): 25 MMHG
BH CV ECHO MEAS - RAP SYSTOLE: 8 MMHG
BH CV ECHO MEAS - RV MAX PG: 2.4 MMHG
BH CV ECHO MEAS - RV V1 MAX: 77 CM/SEC
BH CV ECHO MEAS - RVSP: 40 MMHG
BH CV ECHO MEAS - SI(AO): 267.7 ML/M^2
BH CV ECHO MEAS - SI(CUBED): 51.5 ML/M^2
BH CV ECHO MEAS - SI(LVOT): 37.9 ML/M^2
BH CV ECHO MEAS - SI(MOD-SP2): 59.5 ML/M^2
BH CV ECHO MEAS - SI(MOD-SP4): 62.2 ML/M^2
BH CV ECHO MEAS - SI(TEICH): 36.6 ML/M^2
BH CV ECHO MEAS - SV(AO): 585.3 ML
BH CV ECHO MEAS - SV(CUBED): 112.5 ML
BH CV ECHO MEAS - SV(LVOT): 82.9 ML
BH CV ECHO MEAS - SV(MOD-SP2): 130 ML
BH CV ECHO MEAS - SV(MOD-SP4): 136 ML
BH CV ECHO MEAS - SV(TEICH): 80 ML
BH CV ECHO MEAS - TAPSE (>1.6): 2.5 CM2
BH CV ECHO MEAS - TR MAX VEL: 286 CM/SEC
BH CV XLRA - RV BASE: 3.5 CM
BH CV XLRA - RV LENGTH: 8.2 CM
BH CV XLRA - RV MID: 3.1 CM
BH CV XLRA - TDI S': 12.4 CM/SEC
BILIRUB SERPL-MCNC: 0.8 MG/DL (ref 0.3–1.2)
BUN BLD-MCNC: 18 MG/DL (ref 9–23)
BUN/CREAT SERPL: 10 (ref 7–25)
CALCIUM SPEC-SCNC: 9.1 MG/DL (ref 8.7–10.4)
CHLORIDE SERPL-SCNC: 102 MMOL/L (ref 99–109)
CO2 SERPL-SCNC: 22 MMOL/L (ref 20–31)
CREAT BLD-MCNC: 1.8 MG/DL (ref 0.6–1.3)
DEPRECATED RDW RBC AUTO: 53.4 FL (ref 37–54)
EOSINOPHIL # BLD AUTO: 0.14 10*3/MM3 (ref 0.1–0.3)
EOSINOPHIL NFR BLD AUTO: 2.1 % (ref 0–3)
ERYTHROCYTE [DISTWIDTH] IN BLOOD BY AUTOMATED COUNT: 14.9 % (ref 11.3–14.5)
FERRITIN SERPL-MCNC: 162 NG/ML (ref 22–322)
GFR SERPL CREATININE-BSD FRML MDRD: 39 ML/MIN/1.73
GLOBULIN UR ELPH-MCNC: 3.6 GM/DL
GLUCOSE BLD-MCNC: 99 MG/DL (ref 70–100)
GLUCOSE BLDC GLUCOMTR-MCNC: 103 MG/DL (ref 70–130)
GLUCOSE BLDC GLUCOMTR-MCNC: 116 MG/DL (ref 70–130)
GLUCOSE BLDC GLUCOMTR-MCNC: 91 MG/DL (ref 70–130)
GLUCOSE BLDC GLUCOMTR-MCNC: 96 MG/DL (ref 70–130)
HCT VFR BLD AUTO: 27.6 % (ref 38.9–50.9)
HGB BLD-MCNC: 9.5 G/DL (ref 13.1–17.5)
IMM GRANULOCYTES # BLD: 0.01 10*3/MM3 (ref 0–0.03)
IMM GRANULOCYTES NFR BLD: 0.2 % (ref 0–0.6)
IRON 24H UR-MRATE: 123 MCG/DL (ref 50–175)
IRON SATN MFR SERPL: 41 % (ref 20–50)
LV EF 2D ECHO EST: 75 %
LYMPHOCYTES # BLD AUTO: 1.4 10*3/MM3 (ref 0.6–4.8)
LYMPHOCYTES NFR BLD AUTO: 21.4 % (ref 24–44)
MCH RBC QN AUTO: 33.7 PG (ref 27–31)
MCHC RBC AUTO-ENTMCNC: 34.4 G/DL (ref 32–36)
MCV RBC AUTO: 97.9 FL (ref 80–99)
MONOCYTES # BLD AUTO: 0.62 10*3/MM3 (ref 0–1)
MONOCYTES NFR BLD AUTO: 9.5 % (ref 0–12)
NEUTROPHILS # BLD AUTO: 4.31 10*3/MM3 (ref 1.5–8.3)
NEUTROPHILS NFR BLD AUTO: 66 % (ref 41–71)
PLATELET # BLD AUTO: 186 10*3/MM3 (ref 150–450)
PMV BLD AUTO: 9.1 FL (ref 6–12)
POTASSIUM BLD-SCNC: 3.7 MMOL/L (ref 3.5–5.5)
PROT SERPL-MCNC: 6.9 G/DL (ref 5.7–8.2)
RBC # BLD AUTO: 2.82 10*6/MM3 (ref 4.2–5.76)
SODIUM BLD-SCNC: 134 MMOL/L (ref 132–146)
TIBC SERPL-MCNC: 301 MCG/DL (ref 250–450)
WBC NRBC COR # BLD: 6.53 10*3/MM3 (ref 3.5–10.8)

## 2017-03-21 PROCEDURE — 94799 UNLISTED PULMONARY SVC/PX: CPT

## 2017-03-21 PROCEDURE — 25010000002 HEPARIN (PORCINE) PER 1000 UNITS: Performed by: FAMILY MEDICINE

## 2017-03-21 PROCEDURE — 25010000002 MEROPENEM

## 2017-03-21 PROCEDURE — 80053 COMPREHEN METABOLIC PANEL: CPT | Performed by: NURSE PRACTITIONER

## 2017-03-21 PROCEDURE — 85025 COMPLETE CBC W/AUTO DIFF WBC: CPT | Performed by: NURSE PRACTITIONER

## 2017-03-21 PROCEDURE — 25010000002 SULFUR HEXAFLUORIDE MICROSPH 60.7-25 MG RECONSTITUTED SUSPENSION: Performed by: INTERNAL MEDICINE

## 2017-03-21 PROCEDURE — 82728 ASSAY OF FERRITIN: CPT | Performed by: INTERNAL MEDICINE

## 2017-03-21 PROCEDURE — 83540 ASSAY OF IRON: CPT | Performed by: INTERNAL MEDICINE

## 2017-03-21 PROCEDURE — C8929 TTE W OR WO FOL WCON,DOPPLER: HCPCS

## 2017-03-21 PROCEDURE — 93306 TTE W/DOPPLER COMPLETE: CPT | Performed by: INTERNAL MEDICINE

## 2017-03-21 PROCEDURE — 83550 IRON BINDING TEST: CPT | Performed by: INTERNAL MEDICINE

## 2017-03-21 PROCEDURE — 25010000002 MEROPENEM: Performed by: INTERNAL MEDICINE

## 2017-03-21 PROCEDURE — 97605 NEG PRS WND THER DME<=50SQCM: CPT

## 2017-03-21 PROCEDURE — 99232 SBSQ HOSP IP/OBS MODERATE 35: CPT | Performed by: NURSE PRACTITIONER

## 2017-03-21 PROCEDURE — 94640 AIRWAY INHALATION TREATMENT: CPT

## 2017-03-21 PROCEDURE — 82962 GLUCOSE BLOOD TEST: CPT

## 2017-03-21 PROCEDURE — 99024 POSTOP FOLLOW-UP VISIT: CPT | Performed by: THORACIC SURGERY (CARDIOTHORACIC VASCULAR SURGERY)

## 2017-03-21 PROCEDURE — 25010000002 HYDROMORPHONE PER 4 MG: Performed by: FAMILY MEDICINE

## 2017-03-21 RX ORDER — NYSTATIN 100000 U/G
CREAM TOPICAL EVERY 12 HOURS SCHEDULED
Status: DISCONTINUED | OUTPATIENT
Start: 2017-03-21 | End: 2017-03-22 | Stop reason: HOSPADM

## 2017-03-21 RX ADMIN — Medication: at 21:00

## 2017-03-21 RX ADMIN — HYDROMORPHONE HYDROCHLORIDE 0.5 MG: 1 INJECTION, SOLUTION INTRAMUSCULAR; INTRAVENOUS; SUBCUTANEOUS at 17:45

## 2017-03-21 RX ADMIN — BUDESONIDE AND FORMOTEROL FUMARATE DIHYDRATE 2 PUFF: 80; 4.5 AEROSOL RESPIRATORY (INHALATION) at 09:15

## 2017-03-21 RX ADMIN — ASPIRIN 81 MG: 81 TABLET, CHEWABLE ORAL at 09:22

## 2017-03-21 RX ADMIN — MEROPENEM 1 G: 1 INJECTION, POWDER, FOR SOLUTION INTRAVENOUS at 05:33

## 2017-03-21 RX ADMIN — HYDROCODONE BITARTRATE AND ACETAMINOPHEN 1 TABLET: 7.5; 325 TABLET ORAL at 09:29

## 2017-03-21 RX ADMIN — LINEZOLID 600 MG: 600 TABLET, FILM COATED ORAL at 09:22

## 2017-03-21 RX ADMIN — NYSTATIN: 100000 CREAM TOPICAL at 22:24

## 2017-03-21 RX ADMIN — Medication 325 MG: at 09:22

## 2017-03-21 RX ADMIN — Medication 325 MG: at 17:41

## 2017-03-21 RX ADMIN — HYDROCODONE BITARTRATE AND ACETAMINOPHEN 1 TABLET: 7.5; 325 TABLET ORAL at 22:19

## 2017-03-21 RX ADMIN — Medication 1 CAPSULE: at 09:22

## 2017-03-21 RX ADMIN — MEROPENEM 1 G: 1 INJECTION, POWDER, FOR SOLUTION INTRAVENOUS at 14:51

## 2017-03-21 RX ADMIN — LINEZOLID 600 MG: 600 TABLET, FILM COATED ORAL at 22:19

## 2017-03-21 RX ADMIN — NYSTATIN 1 APPLICATION: 100000 CREAM TOPICAL at 12:13

## 2017-03-21 RX ADMIN — VITAMIN D, TAB 1000IU (100/BT) 2000 UNITS: 25 TAB at 09:22

## 2017-03-21 RX ADMIN — BUDESONIDE AND FORMOTEROL FUMARATE DIHYDRATE 2 PUFF: 80; 4.5 AEROSOL RESPIRATORY (INHALATION) at 20:50

## 2017-03-21 RX ADMIN — DESMOPRESSIN ACETATE 40 MG: 0.2 TABLET ORAL at 09:23

## 2017-03-21 RX ADMIN — CLOPIDOGREL BISULFATE 75 MG: 75 TABLET, FILM COATED ORAL at 09:23

## 2017-03-21 RX ADMIN — OXYCODONE HYDROCHLORIDE AND ACETAMINOPHEN 1 TABLET: 7.5; 325 TABLET ORAL at 05:33

## 2017-03-21 RX ADMIN — TAMSULOSIN HYDROCHLORIDE 0.4 MG: 0.4 CAPSULE ORAL at 09:25

## 2017-03-21 RX ADMIN — HYDROMORPHONE HYDROCHLORIDE 0.5 MG: 1 INJECTION, SOLUTION INTRAMUSCULAR; INTRAVENOUS; SUBCUTANEOUS at 05:33

## 2017-03-21 RX ADMIN — HEPARIN SODIUM 5000 UNITS: 5000 INJECTION, SOLUTION INTRAVENOUS; SUBCUTANEOUS at 14:50

## 2017-03-21 RX ADMIN — SULFUR HEXAFLUORIDE 3 ML: KIT at 14:20

## 2017-03-21 RX ADMIN — HEPARIN SODIUM 5000 UNITS: 5000 INJECTION, SOLUTION INTRAVENOUS; SUBCUTANEOUS at 05:33

## 2017-03-21 RX ADMIN — HYDROMORPHONE HYDROCHLORIDE 0.5 MG: 1 INJECTION, SOLUTION INTRAMUSCULAR; INTRAVENOUS; SUBCUTANEOUS at 09:55

## 2017-03-21 RX ADMIN — HEPARIN SODIUM 5000 UNITS: 5000 INJECTION, SOLUTION INTRAVENOUS; SUBCUTANEOUS at 22:19

## 2017-03-21 NOTE — PROGRESS NOTES
Acute Care - Wound/Debridement Treatment Note  [facility]     Patient Name: Sai Weinberg  : 1960  MRN: [id number]  Today's Date: 3/21/2017  Onset of Illness/Injury or Date of Surgery Date: 17   Date of Referral to PT: 17   Referring Physician: MD Ayan       Admit Date: 3/12/2017    Visit Dx:    ICD-10-CM ICD-9-CM   1. Open wound of left hip and thigh with complication, initial encounter S71.002A 890.1    S71.102A    2. Impaired mobility and ADLs Z74.09 799.89   3. Impaired functional mobility, balance, gait, and endurance Z74.09 V49.89       Patient Active Problem List   Diagnosis   • Dyslipidemia   • Arthritis   • COPD (chronic obstructive pulmonary disease)   • Diabetes mellitus   • Esophageal reflux   • Hypertension   • Malignant neoplasm of colon   • Heart attack   • Peripheral vascular disease   • Chewing tobacco use   • Peripheral arterial disease   • LANA (acute kidney injury)   • Coronary artery disease involving native heart   • Iron deficiency anemia   • Coal workers pneumoconiosis   • Open wound of left hip and thigh with complication   • Leg wound, left               LDA Wound       17 1000 17 1315       Incision 17 1138 Left groin    Incision - Properties Group Placement Date: 17  -ABHIJEET Placement Time:   -ABHIJEET Side: Left  -ABHIJEET Location: groin  -ABHIJEET    Incision WDL WDL  -MF WDL  -MF     Dressing Appearance intact;dry  -MF dry;intact  -MF     Appearance drainage;moist;redness  -MF drainage;moist;redness  -MF     Incision Length (cm)  6.5  -MF     Incision Width (cm)  3.2  -MF     Incision Depth (cm)  3.2  -MF     Undermining (Depth/Location)  1.0cm from 7:00 to 3:00  -MF     Drainage Characteristics/Odor serosanguineous  -MF serosanguineous  -MF     Drainage Amount small  -MF small  -MF     Wound Cleaning irrigated with;sterile normal saline  -MF irrigated with;other (see comments)   phase one  -MF     Therapy Setting (Negative Pressure Wound Therapy)  continuous therapy  -MF continuous therapy  -MF     Pressure Setting (Negative Pressure Wound Therapy) 125 mmHg  - mmHg  -MF     Dressing (Negative Pressure Wound Therapy) foam, black;foam, white  -MF foam, black;foam, white  -MF     Sponges Inserted (Negative Pressure Wound Therapy) 2   1 white 1 black  -MF 2   1 white and 1 black  -MF     Sponges Removed (Negative Pressure Wound Therapy) other (see comments)   gauze packing removed  -MF 2   gauze  -MF     Output (mL) 25  -MF --   0  -MF       User Key  (r) = Recorded By, (t) = Taken By, (c) = Cosigned By    Initials Name Provider Type    MF Milad Olivo, PT Physical Therapist    ABHIJEET Jenna Kessler, RN Registered Nurse         Finger sweep and visual inspection performed. Empty wound bed confirmed.  External label applied and verified.            Adult Rehabilitation Note       03/21/17 1000 03/20/17 1605 03/20/17 1315    Rehab Assessment/Intervention    Discipline physical therapist  -MF physical therapist  -DM physical therapist  -MF    Document Type therapy note (daily note)  -MF therapy note (daily note)   mobility  -DM therapy note (daily note);evaluation   wound care eval  -MF    Subjective Information agree to therapy;complains of;weakness;pain  -MF agree to therapy;complains of;weakness;pain;swelling;fatigue  -DM agree to therapy;complains of;weakness;fatigue;pain  -MF    Patient Effort, Rehab Treatment  good  -DM     Symptoms Noted During/After Treatment  increased pain  -DM     Precautions/Limitations  fall precautions;other (see comments)   wound vac; decub.   -DM     Recorded by [MF] Milad Olivo, PT [DM] Zahra Dorantes PT [MF] Milad Olivo, PT    Vital Signs    Pre Systolic BP Rehab  146  -DM     Pre Treatment Diastolic BP  68  -DM     Post Systolic BP Rehab  154  -DM     Post Treatment Diastolic BP  75  -DM     Pretreatment Heart Rate (beats/min)  77  -DM     Intratreatment Heart Rate (beats/min)  84  -DM     Posttreatment Heart  Rate (beats/min)  78  -DM     O2 Delivery Pre Treatment  room air  -DM     O2 Delivery Post Treatment  room air  -DM     Pre Patient Position  Sitting  -DM     Intra Patient Position  Standing  -DM     Post Patient Position  Sitting  -DM     Recorded by  [DM] Zahra Dorantes, PT     Pain Assessment    Pain Assessment 0-10  -MF 0-10  -DM 0-10  -MF    Pain Score 0  -MF 7  -DM 9  -MF    Post Pain Score 4  -MF 8  -DM 8  -MF    Pain Type  Acute pain  -DM Acute pain  -MF    Pain Location  Groin  -DM Groin  -MF    Pain Orientation  Left   & B feet  -DM Left  -MF    Pain Descriptors  Aching  -DM     Pain Frequency  Constant/continuous  -DM     Patient's Stated Pain Goal  No pain  -DM     Pain Intervention(s) Medication (See MAR);Repositioned  -MF Medication (See MAR);Repositioned  -DM Medication (See MAR);Repositioned  -MF    Recorded by [MF] Milad Olivo, PT [DM] Zahra Dorantes, PT [MF] Milad Olivo, PT    Cognitive Assessment/Intervention    Current Cognitive/Communication Assessment  functional  -DM     Orientation Status  oriented x 4  -DM     Follows Commands/Answers Questions  100% of the time;able to follow single-step instructions;needs cueing  -DM     Personal Safety  mild impairment;decreased awareness, need for assist;decreased awareness, need for safety;decreased insight to deficits  -DM     Personal Safety Interventions  gait belt;fall prevention program maintained;nonskid shoes/slippers when out of bed  -DM     Recorded by  [DM] Zahra Dorantes, PT     Bed Mobility, Assessment/Treatment    Bed Mobility, Comment  UIC  -DM     Recorded by  [DM] Zahra Dorantes, PT     Transfer Assessment/Treatment    Transfers, Sit-Stand [place]  independent  -DM     Transfers, Stand-Sit [place]  contact guard assist   B knees unsteady;incr.L groin pain  -DM     Transfer, Impairments  strength decreased;impaired balance;pain  -DM     Transfer, Comment  pt c/o signif. discomfort B feet d/t swelling  -DM      Recorded by  [DM] Zahra Dorantes, PT     Gait Assessment/Treatment    Gait, [place] Level  contact guard assist  -DM     Gait, Assistive Device  --   gt belt; UE supp.;decl. cane  -DM     Gait, Distance (Feet)  14   7 ft x 2; p.t. held wd.vac tubing  -DM     Gait, Gait Pattern Analysis  swing-to gait  -DM     Gait, Gait Deviations  antalgic;bilateral:;remedios decreased;decreased heel strike;knee buckling;limb motion velocity decreased;step length decreased;stride length decreased   wide KELBY  -DM     Gait, Maintain Weight Bearing Status  able to maintain weight bearing status  -DM     Gait, Safety Issues  step length decreased;weight-shifting ability decreased  -DM     Gait, Impairments  strength decreased;impaired balance;pain  -DM     Gait, Comment  chair to EOB,RESTED 5 min,then ret. to chair  -DM     Recorded by  [DM] Zahra Dorantes, PT     Therapy Exercises    Bilateral Lower Extremities  AROM:;10 reps;sitting;ankle pumps/circles;glut sets;heel slides;hip abduction/adduction;hip flexion;LAQ;quad sets   HS sets;pillow squeeze w/R LE  -DM     Bilateral Upper Extremity  AROM:;10 reps;sitting;elbow flexion/extension;shoulder abduction/adduction;shoulder extension/flexion;shoulder horizontal abd/add  -DM     Recorded by  [DM] Zahra Dorantes PT     Positioning and Restraints    Pre-Treatment Position in bed  - sitting in chair/recliner  -DM in bed  -MF    Post Treatment Position bed  - chair  -DM bed  -MF    In Bed supine;call light within reach  -  supine;call light within reach;with family/caregiver  -    In Chair  notified nsg;sitting;call light within reach   ALT.btwn leg rest down, & ret. to bed w/LE'S supported  -DM     Recorded by [MF] Milad Olivo, PT [DM] Zahra Dorantes, PT [MF] Milad Olivo, PT      User Key  (r) = Recorded By, (t) = Taken By, (c) = Cosigned By    Initials Name Effective Dates     Milad Olivo, PT 06/19/15 -     DM Zahra Dorantes, PT 06/19/15 -                  IP PT Goals       03/21/17 1000 03/20/17 1605 03/20/17 1315    Bed Mobility PT LTG    Bed Mobility PT LTG, Date Established  03/14/17  -DM     Bed Mobility PT LTG, Time to Achieve  2 wks  -DM     Bed Mobility PT LTG, Activity Type  supine to sit/sit to supine  -DM     Bed Mobility PT LTG, [place] Level  independent  -DM     Bed Mobility PT LTG, Outcome  goal ongoing  -DM     Gait Training PT LTG    Gait Training Goal PT LTG, Date Established  03/14/17  -DM     Gait Training Goal PT LTG, Time to Achieve  2 wks  -DM     Gait Training Goal PT LTG, [place] Level  conditional independence  -DM     Gait Training Goal PT LTG, Assist Device  cane, straight  -DM     Gait Training Goal PT LTG, Distance to Achieve  200  -DM     Gait Training Goal PT LTG, Outcome  goal ongoing  -DM     Stair Training PT LTG    Stair Training Goal PT LTG, Date Established  03/14/17  -DM     Stair Training Goal PT LTG, Time to Achieve  2 wks  -DM     Stair Training Goal PT LTG, Number of Steps  7  -DM     Stair Training Goal PT LTG, [place] Level  contact guard assist  -DM     Stair Training Goal PT LTG, Assist Device  1 handrail  -DM     Stair Training Goal PT LTG, Outcome  goal ongoing  -DM     Wound Care PT LTG    Wound Care PT LTG 1, Date Established   03/20/17  -MF    Wound Care PT LTG 1, Time to Achieve   other (see comments)   10 days  -MF    Wound Care PT LTG 1, Location   L groin  -MF    Wound Care PT LTG 1, No S&S of Infection   yes  -MF    Wound Care PT LTG 1, Decrease Wound Size   5%  -MF    Wound Care PT LTG 1, Decrease Exudate   scant  -MF    Wound Care PT LTG 1, No New Skin Break Down   yes  -MF    Wound Care PT LTG 1, Outcome goal ongoing  -MF        03/17/17 1332 03/16/17 1059 03/14/17 1530    Bed Mobility PT LTG    Bed Mobility PT LTG, Date Established   03/14/17  -LS    Bed Mobility PT LTG, Time to Achieve   2 wks  -LS    Bed Mobility PT LTG, Activity Type   supine to sit/sit to supine  -LS    Bed Mobility  PT LTG, [place] Level   independent  -LS    Bed Mobility PT LTG, Outcome goal ongoing  [facility] goal ongoing  -MB     Transfer Training PT LTG    Transfer Training PT LTG, Date Established   03/14/17  -LS    Transfer Training PT LTG, Time to Achieve   2 wks  -LS    Transfer Training PT LTG, Activity Type   sit to stand/stand to sit  -LS    Transfer Training PT LTG, [place] Level   conditional independence  -LS    Transfer Training PT LTG, Assist Device   cane, straight  -LS    Transfer Training PT LTG, Outcome goal met  - goal ongoing  -MB     Gait Training PT LTG    Gait Training Goal PT LTG, Date Established   03/14/17  -LS    Gait Training Goal PT LTG, Time to Achieve   2 wks  -LS    Gait Training Goal PT LTG, [place] Level   conditional independence  -LS    Gait Training Goal PT LTG, Assist Device   cane, straight  -LS    Gait Training Goal PT LTG, Distance to Achieve   200  -LS    Gait Training Goal PT LTG, Outcome goal ongoing  [facility] goal ongoing  -MB     Stair Training PT LTG    Stair Training Goal PT LTG, Date Established   03/14/17  -LS    Stair Training Goal PT LTG, Time to Achieve   2 wks  -LS    Stair Training Goal PT LTG, Number of Steps   7  -LS    Stair Training Goal PT LTG, [place] Level   contact guard assist  -LS    Stair Training Goal PT LTG, Assist Device   1 handrail  -LS    Stair Training Goal PT LTG, Outcome goal ongoing  - goal ongoing  -MB       User Key  (r) = Recorded By, (t) = Taken By, (c) = Cosigned By    Initials Name Provider Type    SÁNCHEZ Olivo, PT Physical Therapist    BEV Dorantes, PT Physical Therapist    HIRAL Conroy, PT Physical Therapist    SUE Thompson, PT Physical Therapist    MC Alissa Escamilla, PT Physical Therapist          Physical Therapy Education     Title: PT OT SLP Therapies (Active)     Topic: Physical Therapy (Active)     Point: Mobility training (Active)    Learning Progress Summary    Learner Readiness Method  Response Comment Documented by Status   Patient Eager E,D NR  DM 03/20/17 1645 Active    Acceptance E NR   03/17/17 1332 Active    Acceptance E NR fall precautions, PLB, gait mechanics, home safety MB 03/16/17 1059 Active    Acceptance E,D NR Edu re: benefit of further ambulation throughout day with NSG and of sets of LE HEP.  03/14/17 1529 Active               Point: Home exercise program (Active)    Learning Progress Summary    Learner Readiness Method Response Comment Documented by Status   Patient Eager E,D NR  DM 03/20/17 1645 Active    Acceptance E NR   03/17/17 1332 Active    Acceptance E NR fall precautions, PLB, gait mechanics, home safety MB 03/16/17 1059 Active    Acceptance E,D NR Edu re: benefit of further ambulation throughout day with NSG and of sets of LE HEP.  03/14/17 1529 Active               Point: Body mechanics (Active)    Learning Progress Summary    Learner Readiness Method Response Comment Documented by Status   Patient Eager E,D NR  DM 03/20/17 1645 Active    Acceptance E VU   03/19/17 0249 Done    Acceptance E NR   03/17/17 1332 Active    Acceptance E NR fall precautions, PLB, gait mechanics, home safety MB 03/16/17 1059 Active    Acceptance E,D NR Edu re: benefit of further ambulation throughout day with NSG and of sets of LE HEP.  03/14/17 1529 Active               Point: Precautions (Active)    Learning Progress Summary    Learner Readiness Method Response Comment Documented by Status   Patient Eager E,D NR  DM 03/20/17 1645 Active    Acceptance E VU   03/19/17 0249 Done    Acceptance E NR   03/17/17 1332 Active    Acceptance E NR fall precautions, PLB, gait mechanics, home safety MB 03/16/17 1059 Active    Acceptance E,D NR Edu re: benefit of further ambulation throughout day with NSG and of sets of LE HEP.  03/14/17 1529 Active                      User Key     Initials Effective Dates Name Provider Type Discipline     06/19/15 -  Zahra Dorantes, PT Physical  Therapist PT    LS 06/19/15 -  Leeanna Conroy, PT Physical Therapist PT    MB 03/14/16 -  Emmie Thompson, PT Physical Therapist PT    MC 03/14/16 -  Alissa Escamilla, PT Physical Therapist PT    LD 01/20/17 -  Daysi Rodriguez, RN Registered Nurse Nurse                   PT ASSESSMENT (last 72 hours)      PT Evaluation       03/21/17 1000 03/20/17 1605    Rehab Evaluation    Document Type therapy note (daily note)  - therapy note (daily note)   mobility  -DM    Subjective Information agree to therapy;complains of;weakness;pain  -MF agree to therapy;complains of;weakness;pain;swelling;fatigue  -DM    Patient Effort, Rehab Treatment  good  -DM    Symptoms Noted During/After Treatment  increased pain  -DM    General Information    Precautions/Limitations  fall precautions;other (see comments)   wound vac; decub.   -DM    Vital Signs    Pre Systolic BP Rehab  146  -DM    Pre Treatment Diastolic BP  68  -DM    Post Systolic BP Rehab  154  -DM    Post Treatment Diastolic BP  75  -DM    Pretreatment Heart Rate (beats/min)  77  -DM    Intratreatment Heart Rate (beats/min)  84  -DM    Posttreatment Heart Rate (beats/min)  78  -DM    O2 Delivery Pre Treatment  room air  -DM    O2 Delivery Post Treatment  room air  -DM    Pre Patient Position  Sitting  -DM    Intra Patient Position  Standing  -DM    Post Patient Position  Sitting  -DM    Pain Assessment    Pain Assessment 0-10  -MF 0-10  -DM    Pain Score 0  -MF 7  -DM    Post Pain Score 4  -MF 8  -DM    Pain Type  Acute pain  -DM    Pain Location  Groin  -DM    Pain Orientation  Left   & B feet  -DM    Pain Descriptors  Aching  -DM    Pain Frequency  Constant/continuous  -DM    Patient's Stated Pain Goal  No pain  -DM    Pain Intervention(s) Medication (See MAR);Repositioned  -MF Medication (See MAR);Repositioned  -DM    Cognitive Assessment/Intervention    Current Cognitive/Communication Assessment  functional  -DM    Orientation Status  oriented x 4  -DM    Follows  Commands/Answers Questions  100% of the time;able to follow single-step instructions;needs cueing  -DM    Personal Safety  mild impairment;decreased awareness, need for assist;decreased awareness, need for safety;decreased insight to deficits  -DM    Personal Safety Interventions  gait belt;fall prevention program maintained;nonskid shoes/slippers when out of bed  -DM    Bed Mobility, Assessment/Treatment    Bed Mobility, Comment  UIC  -DM    Transfer Assessment/Treatment    Transfers, Sit-Stand [place]  independent  -DM    Transfers, Stand-Sit [place]  contact guard assist   B knees unsteady;incr.L groin pain  -DM    Transfer, Impairments  strength decreased;impaired balance;pain  -DM    Transfer, Comment  pt c/o signif. discomfort B feet d/t swelling  -DM    Gait Assessment/Treatment    Gait, [place] Level  contact guard assist  -DM    Gait, Assistive Device  --   gt belt; UE supp.;decl. cane  -DM    Gait, Distance (Feet)  14   7 ft x 2; p.t. held wd.vac tubing  -DM    Gait, Gait Pattern Analysis  swing-to gait  -DM    Gait, Gait Deviations  antalgic;bilateral:;remedios decreased;decreased heel strike;knee buckling;limb motion velocity decreased;step length decreased;stride length decreased   wide KELBY  -DM    Gait, Maintain Weight Bearing Status  able to maintain weight bearing status  -DM    Gait, Safety Issues  step length decreased;weight-shifting ability decreased  -DM    Gait, Impairments  strength decreased;impaired balance;pain  -DM    Gait, Comment  chair to EOB,RESTED 5 min,then ret. to chair  -DM    Therapy Exercises    Bilateral Lower Extremities  AROM:;10 reps;sitting;ankle pumps/circles;glut sets;heel slides;hip abduction/adduction;hip flexion;LAQ;quad sets   HS sets;pillow squeeze w/R LE  -DM    Bilateral Upper Extremity  AROM:;10 reps;sitting;elbow flexion/extension;shoulder abduction/adduction;shoulder extension/flexion;shoulder horizontal abd/add  -DM    Positioning and Restraints     Pre-Treatment Position in bed  -MF sitting in chair/recliner  -DM    Post Treatment Position bed  -MF chair  -DM    In Bed supine;call light within reach  -MF     In Chair  notified nsg;sitting;call light within reach   ALT.btwn leg rest down, & ret. to bed w/LE'S supported  -DM      03/20/17 1315 03/19/17 0239    Rehab Evaluation    Document Type therapy note (daily note);evaluation   wound care eval  -MF     Subjective Information agree to therapy;complains of;weakness;fatigue;pain  -     General Information    Equipment Currently Used at Home  cane, straight  -LD    Living Environment    Transportation Available  car;family or friend will provide  -LD    Pain Assessment    Pain Assessment 0-10  -     Pain Score 9  -MF     Post Pain Score 8  -     Pain Type Acute pain  -     Pain Location Groin  -     Pain Orientation Left  -     Pain Intervention(s) Medication (See MAR);Repositioned  -MF     Positioning and Restraints    Pre-Treatment Position in bed  -MF     Post Treatment Position bed  -MF     In Bed supine;call light within reach;with family/caregiver  -       User Key  (r) = Recorded By, (t) = Taken By, (c) = Cosigned By    Initials Name Provider Type     Milad Olivo, PT Physical Therapist    BEV Dorantes, PT Physical Therapist    LONDON Rodriguez, RN Registered Nurse            PT Recommendation and Plan  Anticipated Discharge Disposition: home with assist  PT Frequency: daily    Plan Of Care Reviewed With: patient       Outcome Summary/Follow up Plan: wound cont to appear healthy with no issues noted today. wound vac lost seal last night and RN replaced dressing.  PT added stomapaste to help improve seal and prevent leaking again.            Outcome Measures       03/20/17 1605          How much help from another person do you currently need...    Turning from your back to your side while in flat bed without using bedrails? 4  -DM      Moving from lying on back to sitting on  the side of a flat bed without bedrails? 3  -DM      Moving to and from a bed to a chair (including a wheelchair)? 3  -DM      Standing up from a chair using your arms (e.g., wheelchair, bedside chair)? 3  -DM      Climbing 3-5 steps with a railing? 2  -DM      To walk in hospital room? 3  -DM      AM-PAC 6 Clicks Score 18  -DM      Functional Assessment    Outcome Measure Options AM-PAC 6 Clicks Basic Mobility (PT)  -DM        User Key  (r) = Recorded By, (t) = Taken By, (c) = Cosigned By    Initials Name Provider Type    DM Zahra Dorantes, PT Physical Therapist              Time Calculation        PT Charges       03/21/17 1000          Time Calculation    Start Time 1000  -      PT Goal Re-Cert Due Date 03/24/17  -      Time Calculation- PT    Total Timed Code Minutes- PT 25 minute(s)  -        User Key  (r) = Recorded By, (t) = Taken By, (c) = Cosigned By    Initials Name Provider Type     Milad Olivo, PT Physical Therapist             Therapy Charges for Today     Code Description Service Date Service Provider Modifiers Qty    15627353006 HC PT NEG PRESS WOUND TO 50SQCM DME1 3/20/2017 Milad Olivo, PT  1    93006565078 HC PT THER PROC EA 15 MIN 3/20/2017 Milad Olivo, PT GP 1    78912651826 HC PT THER SUPP EA 15 MIN 3/20/2017 Milad Olivo, PT GP 1    69016409596 HC PT NEG PRESS WOUND TO 50SQCM DME1 3/21/2017 Milad Olivo, PT  1    23293262841 HC PT THER SUPP EA 15 MIN 3/21/2017 iMlad Olivo, PT GP 1            PT G-Codes  Outcome Measure Options: AM-PAC 6 Clicks Basic Mobility (PT)        Milad Olivo, PT  3/21/2017

## 2017-03-21 NOTE — PROGRESS NOTES
CTS Progress Note       LOS: 9 days   Patient Care Team:  Dewayne Cole MD as PCP - General  Dewayne Cole MD as PCP - Family Medicine        Vital Signs  Temp:  [98 °F (36.7 °C)-98.4 °F (36.9 °C)] 98 °F (36.7 °C)  Heart Rate:  [74-87] 79  Resp:  [16-18] 18  BP: (125-158)/(64-75) 125/64    Physical Exam:       Results     Results from last 7 days  Lab Units 03/20/17  1004 03/18/17  0521   WBC 10*3/mm3  --  4.78   HEMOGLOBIN g/dL 9.1* 9.1*   HEMATOCRIT % 26.8* 26.6*   PLATELETS 10*3/mm3  --  184       Results from last 7 days  Lab Units 03/20/17  1004   SODIUM mmol/L 135   POTASSIUM mmol/L 3.7   CHLORIDE mmol/L 103   TOTAL CO2 mmol/L 26.0   BUN mg/dL 15   CREATININE mg/dL 2.00*   GLUCOSE mg/dL 88   CALCIUM mg/dL 9.0           Imaging Results (last 24 hours)     Procedure Component Value Units Date/Time    XR Chest 1 View [[id number]] Collected:  03/20/17 0929     Updated:  03/20/17 1436    Narrative:       EXAMINATION: XR CHEST 1 VW- 03/19/2017     INDICATION: S71.002A-Unspecified open wound, left hip, initial  encounter; S71.102A-Unspecified open wound, left thigh, initial  encounter; Z74.09-Other reduced mobility;  shortness of breath     COMPARISON: 03/12/2017     FINDINGS: Portable chest reveals heart to be borderline enlarged.  Ill-defined opacification left lung base with small left pleural  effusion. Degenerative changes seen within the spine. PICC line catheter  identified on the right tip in the SVC.           Impression:       Heart is enlarged with small left pleural effusion and mild  increased markings at the left lung base. PICC line catheter identified  on the right tip in the SVC.     D:  03/20/2017  E:  03/20/2017     This report was finalized on 3/20/2017 2:34 PM by Dr. Naty Panchal MD.        Renal Limited [[id number]]      Updated:  03/20/17 8246          Assessment    Principal Problem:    Open wound of left hip and thigh with complication  Active Problems:    COPD (chronic  obstructive pulmonary disease)    Diabetes mellitus    Hypertension    Peripheral vascular disease    LANA (acute kidney injury)    Coronary artery disease involving native heart    Iron deficiency anemia    [employer]al workers pneumoconiosis    Leg wound, left    I have nothing further to add from a surgical standpoint this time.  Patient may be transferred or discharged once renal function has stabilized.  I am also concerned about intermittent edema.  This patient certainly is at significant risk for underlying coronary and/or valvular disease.  Possibly we should obtain an echocardiogram to look at ejection fraction and valvular function as a contributor to his edema.  I will defer that to the hospitalist    Plan   As stated above plus or minus echocardiogram per hospitalist    Please note that portions of this note were completed with a voice recognition program. Efforts were made to edit the dictations, but occasionally words are mistranscribed.    Heladio Rosa MD  03/21/17  6:51 AM

## 2017-03-21 NOTE — PROGRESS NOTES
[facility] Medicine Services  INPATIENT PROGRESS NOTE    Date of Admission: 3/12/2017  Length of Stay: 9  Primary Care Physician: Dewayne Cole MD    Subjective     CC: follow-up Lt groin abscess/cellulitis     HPI:  Patient is seen at 9:30 AM resting upright in chair in no acute distress.  Wife at bedside.  Patient up to bathroom and notedly increased shortness of air with exertion.  Still complaining of bilateral lower extremity edema, unchanged.  Tolerating new antibiotic without noted difficulty.  Has undergone echo and renal ultrasound today with echo results pending.  As nausea, vomiting, chest pain or shortness of air at rest.  Ports his left groin wound VAC came loose this morning and quit working.  Awaiting PT to replace wound VAC.  Currently with dressing in place.  No new issues.      Review of Systems  Constitutional: Negative for chills and fever.   HENT: Negative for trouble swallowing.   Respiratory: Negative for cough and chest tightness.   + for dyspnea on exertion.   Cardiovascular: Negative for chest pain. Positive for BLE swelling (unchanged)   Gastrointestinal: Negative for abdominal pain, constipation, diarrhea and vomiting. Positive for mild intermittent nausea (but reports better today).    Genitalia-negative for pain, discharge. + for scrotal irritation from BM and sweating. Mild itching today.        Objective      Temp:  [98 °F (36.7 °C)-98.2 °F (36.8 °C)] 98.1 °F (36.7 °C)  Heart Rate:  [78-87] 86  Resp:  [16-18] 16  BP: (125-165)/(64-76) 165/76     Physical Exam    Constitutional: Awake, He is oriented to person, place, and time. Resting upright in chair. He appears well-developed and well-nourished. No distress. Wife at bs.   HENT:   Head: Normocephalic. Atraumatic   Eyes: Pupils are equal, round, and reactive to light.   Neck: Normal range of motion. Supple. Trachea midline   Cardiovascular: Normal rate and regular rhythm. Faint Murmur  heard.  Pulmonary/Chest: Effort normal and breath sounds normal. He has no wheezes. Decreased bilaterally, likely due to body habitus however noted BLE edema also. No rales appreciated currently.   Abdominal: Soft. Morbidly obese. Bowel sounds are normal. He exhibits no distension. There is no tenderness.   Musculoskeletal: Normal range of motion. BLE 3-4+ pitting edema (equal bilaterally) Unchanged over the last 2 days.  Neurological: He is alert and oriented to person, place, and time. Obeys all   Skin: Left groin with large bandage clean, dry and intact.  Had a wound vac placed yesterday but reports it came loose this am.  Currently with drsg only in place awaiting PT.   Psychiatric: He has a normal mood and affect. His behavior is normal. Calm and cooperative. Pleasant.     Results Review:    I have reviewed the labs, radiology results and diagnostic studies.      Results from last 7 days  Lab Units 03/21/17  0716   WBC 10*3/mm3 6.53   HEMOGLOBIN g/dL 9.5*   PLATELETS 10*3/mm3 186       Results from last 7 days  Lab Units 03/21/17  0716 03/20/17  1004 03/19/17  0609   SODIUM mmol/L 134 135 134   POTASSIUM mmol/L 3.7 3.7 4.0   CHLORIDE mmol/L 102 103 102   TOTAL CO2 mmol/L 22.0 26.0 25.0   BUN mg/dL 18 15 12   CREATININE mg/dL 1.80* 2.00* 2.30*   GLUCOSE mg/dL 99 88 85   CALCIUM mg/dL 9.1 9.0 8.7           Radiology Data:   Imaging Results (last 24 hours)     Procedure Component Value Units Date/Time     Renal Limited [[id number]] Collected:  03/21/17 1103     Updated:  03/21/17 1131    Narrative:       EXAMINATION: US RENAL LIMITED- 03/20/2017     INDICATION: S71.002A-Unspecified open wound, left hip, initial  encounter; S71.102A-Unspecified open wound, left thigh, initial  encounter; Z74.09-Other reduced mobility; acute renal insufficiency     TECHNIQUE: Multiple images through the retroperitoneum were obtained  with a 4 MHz probe.     COMPARISON: NONE     FINDINGS:   Right kidney:  The right kidney measures  12.5 x 6.8 x 7.5 cm. There is no renal mass or  hydronephrosis.     Left kidney:  The left kidney measures 12.2 x 8.4 x 6.6 cm. There is no renal mass or  hydronephrosis.     The bladder is mostly collapsed.       Impression:       No evidence of obstructive uropathy or renal pathology.     D:  03/21/2017  E:  03/21/2017         This report was finalized on 3/21/2017 11:29 AM by Dr. Thomas Briggs MD.               I have reviewed the medications.  Scheduled Meds:  aspirin 81 mg Oral Daily   atorvastatin 40 mg Oral Daily   budesonide-formoterol 2 puff Inhalation BID - RT   cholecalciferol 2,000 Units Oral Daily   clopidogrel 75 mg Oral Daily   ferrous sulfate 325 mg Oral BID With Meals   heparin (porcine) 5,000 Units Subcutaneous Q8H   insulin lispro 2-7 Units Subcutaneous 4x Daily AC & at Bedtime   linezolid 600 mg Oral Q12H   meropenem 1 g Intravenous Q8H   nystatin  Topical Q12H   pantoprazole 40 mg Oral Every Other Day   probiotic 1 capsule Oral Daily   sodium hypochlorite  Topical BID   tamsulosin 0.4 mg Oral Daily     Continuous Infusions:   PRN Meds:.bisacodyl  •  bisacodyl  •  calcium carbonate  •  dextrose  •  dextrose  •  glucagon (human recombinant)  •  HYDROcodone-acetaminophen  •  HYDROmorphone **AND** naloxone  •  ipratropium-albuterol  •  ondansetron **OR** ondansetron  •  oxyCODONE-acetaminophen  •  promethazine **OR** promethazine **OR** promethazine  •  sennosides-docusate sodium  •  sodium chloride  •  sodium chloride  •  tiZANidine      Assessment/Plan     Problem List  Principal Problem:    Open wound of left hip and thigh with complication  Active Problems:    COPD (chronic obstructive pulmonary disease)    Diabetes mellitus    Hypertension    Peripheral vascular disease    LANA (acute kidney injury)    Coronary artery disease involving native heart    Iron deficiency anemia    Coal workers pneumoconiosis    Leg wound, left    Assessment/Plan:    Left leg wound  -s/p femoral stenting/angioplasty of  left superficial artery, PT following for wound vac   -Changed to IV merrem and continued PO linezolid yesterday per ID (per Renal recs to adjust abx for renal fxn)  -ID following  -PICC to RUE in place   -Wound cx positive for coag-negative staph and pseudo  -s/p I&D per Dr. Rosa on 3/14/17  -PT following. Wound vac came off this am.  Replacing per PT and pending home vac as noted.   -CM working on arranging home health/with wound vac at [place]. Still pending insurance approval for home wound vac as of this afternoon.       LANA (slowly improving)  -creatinine down today (1.2 on 3/16/17, then as high as 2.3) down to 2.0 yesterday and now 1.8 today. Poss ATN/AIN due to abx for infection/multifactoral--monitor labs). ID following for abx mgmt.   -consulted nephrology yesterday.  Pt seen by Dr Umaña.  Labs, renal u/s, and and echo ordered.   -Renal u/s with no obvious pathology  -Lasix dose held for now. Continue to hold per renal recs  -recheck cbc/bmp in am  -improvement to LANA today with creatinine now 1.8 but no prior CKD.   -am cmp/cbc     Bilateral LE edema (acute on chronic)  -still with 3-4+ pitting BLE edema  -elevate BLE  -continues to be very edematous.  No increased SOA  At rest but some dyspnea with exertion.  Renal recs NO diuretics for now.        T2DM  -glucoses stable  -Holding orals   -A1c on 3/13/17= 5.3%  -SSI, Accuchecks      COPD, coal worker's pneumoconiosis  -stable      HTN  -Holding ACE-I, HCTZ due to LANA (and BP wnl)  -Monitor, add PRNs if needed      Iron deficiency anemia  -Chronic, stable  -Continue to monitor  -iron studies ok      Heart Murmur/CAD  -followed by cardiology ([place])  -checking echo per Renal/cards recs---done but pending       Scrotal irritation  --add nystatin cream  -barrier cream  -keep dry           DVT prophylaxis: heparin, teds, scds  Discharge Planning: I expect patient to be discharged once LANA resolved/stable to home with home health.                  Areli  Radha Gudino, APRN   03/21/17   4:24 PM    Please note that portions of this note may have been completed with a voice recognition program. Efforts were made to edit the dictations, but occasionally words are mistranscribed.      Addendum:  -echo w/ bicuspid aortic valve w/ stenosis. Discussed w/ dr. Rosa; recommended get cardiology involved. May need repair in future after recovers from this infection. Will consult cards tomorrow morning for medical recs for the valvular disease and input regarding need for surgical eval.

## 2017-03-21 NOTE — PROGRESS NOTES
"[facility]    INPATIENT PROGRESS NOTE  3/21/2017      PATIENT NAME: Sai Weinberg  :  1960  MRN:  [id number]  Date of Admission:  3/12/2017      Antimicrobials:  linezolid 600 mg po q12h  Meropenem 1 g iv q8h    MAR reviewed.  Pertinent other medications include:  Plavix, insulin, probiotic      Reason for consultation:  Left groin/thigh abscess and cellulitis, status post recent left femoral cutdown; Pseudomonas and MRSE    Interval history:  Wound VAC came loose and had to be replaced today.  No fevers.  Still with some soft stool but no diarrhea.  No issues with change to meropenem yesterday.  No issues with PICC.    ROS:  No fevers/chills overnight.  No new rashes.  No SOB or chest pain.  No nausea/vomiting/diarrhea.  Denies side effects from antimicrobials.      Objective:  Temp (24hrs), Av.1 °F (36.7 °C), Min:98 °F (36.7 °C), Max:98.2 °F (36.8 °C)    Visit Vitals   • /76   • Pulse 86   • Temp 98.1 °F (36.7 °C) (Oral)   • Resp 16   • Ht 65\" (165.1 cm)   • Wt 253 lb (115 kg)   • SpO2 91%   • BMI 42.1 kg/m2       Physical Examination:  GENERAL: Awake and alert, in no acute distress.   LINES: right UE PICC.  CV:  RRR, normal S1S2  Lungs:  CTAB, no wheezing.  On RA.  MSK: Left thigh with improved edema and nearly resolved erythema; no warmth; mild edema. +wound VAC over the left groin wound.  SKIN: Warm and dry without rash.  EXT: Chronic 2+ B JACQUELINE. Dry skin on legs.  NEURO: A&Ox4. No focal deficits. Face symmetric. Speech fluent. Moves all extremities well.   PSYCHIATRIC: Normal insight and judgement. Cooperative. Normal affect.    Laboratory Data:      Results from last 7 days  Lab Units 17  0716 17  1004 17  0521 17  0549   WBC 10*3/mm3 6.53  --  4.78 4.35   HEMOGLOBIN g/dL 9.5* 9.1* 9.1* 9.1*   HEMATOCRIT % 27.6* 26.8* 26.6* 27.3*   PLATELETS 10*3/mm3 186  --  184 187       Results from last 7 days  Lab Units 17  0716   SODIUM mmol/L " 134   POTASSIUM mmol/L 3.7   CHLORIDE mmol/L 102   TOTAL CO2 mmol/L 22.0   BUN mg/dL 18   CREATININE mg/dL 1.80*   GLUCOSE mg/dL 99   CALCIUM mg/dL 9.1       Results from last 7 days  Lab Units 03/21/17  0716   ALK PHOS U/L 49   BILIRUBIN mg/dL 0.8   ALT (SGPT) U/L 25   AST (SGOT) U/L 44*             Estimated Creatinine Clearance: 53.7 mL/min (by C-G formula based on Cr of 1.8).                      Microbiology:   none new    Radiology:    Updated:  03/21/17 1131    Narrative:       EXAMINATION: US RENAL LIMITED- 03/20/2017     INDICATION: S71.002A-Unspecified open wound, left hip, initial  encounter; S71.102A-Unspecified open wound, left thigh, initial  encounter; Z74.09-Other reduced mobility; acute renal insufficiency     TECHNIQUE: Multiple images through the retroperitoneum were obtained  with a 4 MHz probe.     COMPARISON: NONE     FINDINGS:   Right kidney:  The right kidney measures 12.5 x 6.8 x 7.5 cm. There is no renal mass or  hydronephrosis.     Left kidney:  The left kidney measures 12.2 x 8.4 x 6.6 cm. There is no renal mass or  hydronephrosis.     The bladder is mostly collapsed.       Impression:       No evidence of obstructive uropathy or renal pathology.     D:  03/21/2017  E:  03/21/2017         This report was finalized on 3/21/2017 11:29 AM by Dr. Thomas Briggs MD.               DISCUSSION:  56 y.o. male with history of diabetes and peripheral vascular disease who recently underwent a left femoral cutdown with angioplasty and stent placement is admitted with postoperative left thigh cellulitis and wound dehiscence with seroma versus abscess formation. Incision and drainage performed with 200 milliliters of serous fluid. Exam is consistent with a mild cellulitis of the left thigh. Patient reported copious amounts of serosanguineous drainage at home for 2-3 weeks.      PROBLEM LIST:   1. Postoperative left femoral fluid collection, seroma plus likely abscess, status post recent left femoral  cutdown with left superficial femoral artery angioplasty with stent (no graft). Status post incision and drainage with 200 mL serous fluid removed. Culture is positive for Pseudomonas (S-FQ, Zosyn, meropenem; intermediate to cefepime and aztreonam; resistant to ceftazidime) and Staph epi, methicillin resistant.  2. Left thigh cellulitis, related to #1. Slowly improving.  3. Controlled diabetes mellitus type 2.  4. Obesity.  5. Peripheral vascular disease, status post prior right femoral to popliteal bypass graft and more recent left femoral cutdown with angioplasty and stent.  6. Prolonged QTc. Pseudomonas is susceptible to fluoroquinolone but patient with prolonged QTc on EKG, so cannot use oral Levaquin.  7. LANA, new; continued improvement. Possible vancomycin and/or Zosyn contributing.  8. Loose stool. No crampy abd pain, no fevers, no leukocytosis. Probably antibiotic associated.      PLAN:  1. Continue with meropenem 1 g iv, up to q8h now that renal function improving, for Psuedomonas.  2. Continue linezolid 600 mg po q12h for MRSE; no vancomycin d/t concern for contributing to LANA.  3. wound VAC.  4. probiotic.  5. Hopefully will be able to discharge in next 1-2 days.  Tentatively abx through 3/31.  Will have CM look into meropenem pricing for home infusion.    David Nguyen MD  3/21/2017  6:19 PM      Portions of this note may have been created with a voice recognition program.  Efforts were made to edit the dictations.  However, words are occassionally mistranscribed and sound alike errors may occur.

## 2017-03-21 NOTE — PLAN OF CARE
Problem: Patient Care Overview (Adult)  Goal: Plan of Care Review  Outcome: Ongoing (interventions implemented as appropriate)    Problem: Infection, Risk/Actual (Adult)  Goal: Infection Prevention/Resolution  Outcome: Ongoing (interventions implemented as appropriate)    Problem: Fluid Volume Excess (Adult,Obstetrics,Pediatric)  Goal: Identify Related Risk Factors and Signs and Symptoms  Outcome: Ongoing (interventions implemented as appropriate)  Goal: Stable Weight  Outcome: Ongoing (interventions implemented as appropriate)  Goal: Balanced Intake/Output  Outcome: Ongoing (interventions implemented as appropriate)

## 2017-03-21 NOTE — PROGRESS NOTES
"NAL Progress Note    3/12/2017        Reason for visit:  LANA, Leg wound    ROS:    Pulm:  No SOA  CV:  No CP    I&O:    Intake/Output Summary (Last 24 hours) at 03/21/17 1320  Last data filed at 03/21/17 1000   Gross per 24 hour   Intake    240 ml   Output    575 ml   Net   -335 ml       PE:   Blood pressure 165/76, pulse 86, temperature 98.1 °F (36.7 °C), temperature source Oral, resp. rate 16, height 65\" (165.1 cm), weight 253 lb (115 kg), SpO2 91 %.    GENERAL: Awake and alert, in no acute distress.   HEENT: PER, No conjunctivitis  NECK: Supple, no JVD     HEART: RRR; No murmur, rubs, gallops.   LUNGS: Clear to auscultation bilaterally without wheezing, rales, rhonchi. Normal respiratory effort. Nonlabored. No dullness.  ABDOMEN: Soft, nontender, nondistended. Positive bowel sounds. No rebound or guarding. NO mass or HSM.  EXT:  No cyanosis, clubbing , +2edema. Left thigh has a drain  : Genitalia generally unremarkable.  Without Nunn catheter.  SKIN: Warm and dry without cutaneous eruptions on Inspection/palpation.    NEURO: Alert and oriented x 3, Motor 5/5 bilaterally    Medications:    Current Facility-Administered Medications:   •  aspirin chewable tablet 81 mg, 81 mg, Oral, Daily, ROSCOE Hatch, 81 mg at 03/21/17 0922  •  atorvastatin (LIPITOR) tablet 40 mg, 40 mg, Oral, Daily, Maribel Botello MD, 40 mg at 03/21/17 0923  •  bisacodyl (DULCOLAX) EC tablet 5 mg, 5 mg, Oral, Daily PRN, Maribel Boetllo MD  •  bisacodyl (DULCOLAX) suppository 10 mg, 10 mg, Rectal, Daily PRN, ROSCOE Hatch  •  budesonide-formoterol (SYMBICORT) 80-4.5 MCG/ACT inhaler 2 puff, 2 puff, Inhalation, BID - RT, Maribel Botello MD, 2 puff at 03/21/17 0915  •  calcium carbonate (TUMS) chewable tablet 500 mg (200 mg elemental), 2 tablet, Oral, BID PRN, Maribel Botello MD  •  cholecalciferol (VITAMIN D3) tablet 2,000 Units, 2,000 Units, Oral, Daily, Vangie Dangelo DO, 2,000 Units at 03/21/17 0922  •  clopidogrel (PLAVIX) " tablet 75 mg, 75 mg, Oral, Daily, ROSCOE Hatch, 75 mg at 03/21/17 0923  •  dextrose (D50W) solution 25 g, 25 g, Intravenous, Q15 Min PRN, Maribel Botello MD  •  dextrose (GLUTOSE) oral gel 15 g, 15 g, Oral, Q15 Min PRN, Maribel Botello MD  •  ferrous sulfate tablet 325 mg, 325 mg, Oral, BID With Meals, Maribel Botello MD, 325 mg at 03/21/17 0922  •  glucagon (GLUCAGEN) injection 1 mg, 1 mg, Subcutaneous, Q15 Min PRN, Maribel Botello MD  •  heparin (porcine) 5000 UNIT/ML injection 5,000 Units, 5,000 Units, Subcutaneous, Q8H, Vangie Dangelo DO, 5,000 Units at 03/21/17 0533  •  HYDROcodone-acetaminophen (NORCO) 7.5-325 MG per tablet 1 tablet, 1 tablet, Oral, BID PRN, Maribel Botello MD, 1 tablet at 03/21/17 0929  •  HYDROmorphone (DILAUDID) injection 0.5 mg, 0.5 mg, Intravenous, Q2H PRN, 0.5 mg at 03/21/17 0955 **AND** naloxone (NARCAN) injection 0.4 mg, 0.4 mg, Intravenous, Q5 Min PRN, Maribel Botello MD  •  insulin lispro (humaLOG) injection 2-7 Units, 2-7 Units, Subcutaneous, 4x Daily AC & at Bedtime, Maribel Botello MD, 2 Units at 03/12/17 2243  •  ipratropium-albuterol (DUO-NEB) nebulizer solution 3 mL, 3 mL, Nebulization, Q4H PRN, Jamarcus Carl MD, 3 mL at 03/16/17 0751  •  linezolid (ZYVOX) tablet 600 mg, 600 mg, Oral, Q12H, David Nguyen MD, 600 mg at 03/21/17 0922  •  meropenem (MERREM) 1 g/100 mL 0.9% NS VTB (mbp), 1 g, Intravenous, Q8H, Wesly Almazan [facility]  •  nystatin (MYCOSTATIN) 902938 UNIT/GM cream, , Topical, Q12H, Areli Gudino, GLORIA, 1 application at 03/21/17 1213  •  ondansetron (ZOFRAN) tablet 4 mg, 4 mg, Oral, Q6H PRN **OR** ondansetron (ZOFRAN) injection 4 mg, 4 mg, Intravenous, Q6H PRN, ROSCOE Hatch, 4 mg at 03/18/17 0813  •  oxyCODONE-acetaminophen (PERCOCET) 7.5-325 MG per tablet 1 tablet, 1 tablet, Oral, Q6H PRN, Trung Rodriguez MD, 1 tablet at 03/21/17 0637  •  pantoprazole (PROTONIX) EC tablet 40 mg, 40 mg, Oral, Every Other Day, Maribel Botello MD, 40 mg  at 03/20/17 0846  •  probiotic (CULTURELLE) capsule 1 capsule, 1 capsule, Oral, Daily, David Nguyen MD, 1 capsule at 03/21/17 0922  •  promethazine (PHENERGAN) tablet 12.5 mg, 12.5 mg, Oral, Q6H PRN, 12.5 mg at 03/20/17 0550 **OR** promethazine (PHENERGAN) injection 12.5 mg, 12.5 mg, Intramuscular, Q6H PRN **OR** promethazine (PHENERGAN) suppository 12.5 mg, 12.5 mg, Rectal, Q6H PRN, Maribel Botello MD  •  sennosides-docusate sodium (SENOKOT-S) 8.6-50 MG tablet 2 tablet, 2 tablet, Oral, BID PRN, ROSCOE Hatch  •  sodium chloride 0.9 % flush 1-10 mL, 1-10 mL, Intravenous, PRN, Maribel Botello MD  •  sodium chloride 0.9 % flush 10 mL, 10 mL, Intravenous, PRN, David Nguyen MD  •  sodium hypochlorite (DAKIN'S) 0.025 % topical solution solution, , Topical, BID, Heladio Rosa MD  •  tamsulosin (FLOMAX) 24 hr capsule 0.4 mg, 0.4 mg, Oral, Daily, Maribel Botello MD, 0.4 mg at 03/21/17 0925  •  tiZANidine (ZANAFLEX) tablet 4 mg, 4 mg, Oral, Nightly PRN, Maribel Botello MD, 4 mg at 03/16/17 2112    Meds reviewed and doses adjusted for renal function    Labs:    Results from last 7 days  Lab Units 03/21/17  0716 03/20/17  1004 03/18/17  0521 03/17/17  0549   WBC 10*3/mm3 6.53  --  4.78 4.35   HEMOGLOBIN g/dL 9.5* 9.1* 9.1* 9.1*   HEMATOCRIT % 27.6* 26.8* 26.6* 27.3*   PLATELETS 10*3/mm3 186  --  184 187       Results from last 7 days  Lab Units 03/21/17  0716 03/20/17  1004 03/19/17  0609 03/18/17  0521   SODIUM mmol/L 134 135 134 131*   POTASSIUM mmol/L 3.7 3.7 4.0 3.5   CHLORIDE mmol/L 102 103 102 100   TOTAL CO2 mmol/L 22.0 26.0 25.0 25.0   BUN mg/dL 18 15 12 10   CREATININE mg/dL 1.80* 2.00* 2.30* 2.20*   GLUCOSE mg/dL 99 88 85 87   CALCIUM mg/dL 9.1 9.0 8.7 8.8       Results from last 7 days  Lab Units 03/21/17  0716   ALK PHOS U/L 49   BILIRUBIN mg/dL 0.8   ALT (SGPT) U/L 25   AST (SGOT) U/L 44*     Invalid input(s): UCOL, UCLR, UPH, USG, UPRO, UBLD, UNITR, ELEU, URBC, UFC, UBACT    Estimated Creatinine  Clearance: 53.7 mL/min (by C-G formula based on Cr of 1.8).    Radiology:  Imaging Results (last 72 hours)     Procedure Component Value Units Date/Time    XR Chest 1 View [[id number]] Collected:  03/20/17 0929     Updated:  03/20/17 1436    Narrative:       EXAMINATION: XR CHEST 1 VW- 03/19/2017     INDICATION: S71.002A-Unspecified open wound, left hip, initial  encounter; S71.102A-Unspecified open wound, left thigh, initial  encounter; Z74.09-Other reduced mobility;  shortness of breath     COMPARISON: 03/12/2017     FINDINGS: Portable chest reveals heart to be borderline enlarged.  Ill-defined opacification left lung base with small left pleural  effusion. Degenerative changes seen within the spine. PICC line catheter  identified on the right tip in the SVC.           Impression:       Heart is enlarged with small left pleural effusion and mild  increased markings at the left lung base. PICC line catheter identified  on the right tip in the SVC.     D:  03/20/2017  E:  03/20/2017     This report was finalized on 3/20/2017 2:34 PM by Dr. Naty Panchal MD.       US Renal Limited [[id number]] Collected:  03/21/17 1103     Updated:  03/21/17 1131    Narrative:       EXAMINATION: US RENAL LIMITED- 03/20/2017     INDICATION: S71.002A-Unspecified open wound, left hip, initial  encounter; S71.102A-Unspecified open wound, left thigh, initial  encounter; Z74.09-Other reduced mobility; acute renal insufficiency     TECHNIQUE: Multiple images through the retroperitoneum were obtained  with a 4 MHz probe.     COMPARISON: NONE     FINDINGS:   Right kidney:  The right kidney measures 12.5 x 6.8 x 7.5 cm. There is no renal mass or  hydronephrosis.     Left kidney:  The left kidney measures 12.2 x 8.4 x 6.6 cm. There is no renal mass or  hydronephrosis.     The bladder is mostly collapsed.       Impression:       No evidence of obstructive uropathy or renal pathology.     D:  03/21/2017  E:  03/21/2017         This report was  "finalized on 3/21/2017 11:29 AM by Dr. Thomas Briggs MD.             Renal Imaging:  reviewed      IMPRESSION:  LANA: Possibly ATN vs AIN from abx such as vancomycin ( did nt get it for last 3 days) and zosyn  EDEMA; Not sure why he off and on get edema and needs lasix- does he have venous insufficiency vs CHF- he has no hx of liver disease nor does he have any proteinuria and baseline cr is around 0.8- we are not left with cardiac cause vs lumph edema- it is pitting though   Anemia\" ?? Chronic disease  Cellulitis;  COPD;  DM;     RECOMMENDATIONS:        Hold off on diuretic use for now  US kidneys done no obvious pathology  Check iron studies- iron ok  ECHO - pending  Cr improving         Isak Umaña MD  3/21/2017  1:20 PM        "

## 2017-03-21 NOTE — PLAN OF CARE
Problem: Patient Care Overview (Adult)  Goal: Plan of Care Review  Outcome: Ongoing (interventions implemented as appropriate)    03/21/17 1701   Coping/Psychosocial Response Interventions   Plan Of Care Reviewed With patient;spouse   Patient Care Overview   Progress progress toward functional goals is gradual   Outcome Evaluation   Outcome Summary/Follow up Plan Pt. A&O and has done well today. Wound Vac reapplied per PT wound and pt. tolerared well. Pt. continues to have excoration/reddness to groin area and left upper thigh where wound vac is applied. Nystatin cream ordered. Edema noted in [place]. legs and feet.        Goal: Adult Individualization and Mutuality  Outcome: Ongoing (interventions implemented as appropriate)  Goal: Discharge Needs Assessment  Outcome: Ongoing (interventions implemented as appropriate)    Problem: Infection, Risk/Actual (Adult)  Goal: Infection Prevention/Resolution  Outcome: Ongoing (interventions implemented as appropriate)    Problem: Fluid Volume Excess (Adult,Obstetrics,Pediatric)  Goal: Identify Related Risk Factors and Signs and Symptoms  Outcome: Ongoing (interventions implemented as appropriate)  Goal: Stable Weight  Outcome: Ongoing (interventions implemented as appropriate)  Goal: Balanced Intake/Output  Outcome: Ongoing (interventions implemented as appropriate)

## 2017-03-21 NOTE — PROGRESS NOTES
Continued Stay Note  [facility]     Patient Name: Sai Weinberg  MRN: [id number]  Today's Date: 3/21/2017    Admit Date: 3/12/2017          Discharge Plan       03/21/17 1413    Case Management/Social Work Plan    Plan home with home health    Patient/Family In Agreement With Plan yes    Additional Comments Patient not medically ready for discharge, awaiting approval on home wound vac per PT Wound Care. ID has changed home abx to meropenem 1gm IV daily until 3/31. Call to Jodi at [facility], [facility], she will check patient's insurance and notify CM of cost to patient. Will need to call dc date to [facility] when patient is ready for discharge. CM continues to follow.               Discharge Codes     None        Expected Discharge Date and Time     Expected Discharge Date Expected Discharge Time    Mar 22, 2017             Kathryn Pozo RN

## 2017-03-21 NOTE — PLAN OF CARE
Problem: Patient Care Overview (Adult)  Goal: Plan of Care Review  Outcome: Ongoing (interventions implemented as appropriate)    03/21/17 1000   Coping/Psychosocial Response Interventions   Plan Of Care Reviewed With patient   Outcome Evaluation   Outcome Summary/Follow up Plan wound cont to appear healthy with no issues noted today. wound vac lost seal last night and RN replaced dressing. PT added stomapaste to help improve seal and prevent leaking again.          Problem: Inpatient Physical Therapy  Goal: Wound Care Goal 1 LTG- PT  Outcome: Ongoing (interventions implemented as appropriate)    03/20/17 1315 03/21/17 1000   Wound Care PT LTG   Wound Care PT LTG 1, Date Established 03/20/17 --    Wound Care PT LTG 1, Time to Achieve other (see comments)  (10 days) --    Wound Care PT LTG 1, Location L groin --    Wound Care PT LTG 1, No S&S of Infection yes --    Wound Care PT LTG 1, Decrease Wound Size 5% --    Wound Care PT LTG 1, Decrease Exudate scant --    Wound Care PT LTG 1, No New Skin Break Down yes --    Wound Care PT LTG 1, Outcome --  goal ongoing

## 2017-03-22 VITALS
OXYGEN SATURATION: 91 % | DIASTOLIC BLOOD PRESSURE: 71 MMHG | HEIGHT: 65 IN | HEART RATE: 89 BPM | RESPIRATION RATE: 16 BRPM | SYSTOLIC BLOOD PRESSURE: 133 MMHG | TEMPERATURE: 98.5 F | BODY MASS INDEX: 42.15 KG/M2 | WEIGHT: 253 LBS

## 2017-03-22 LAB
ANION GAP SERPL CALCULATED.3IONS-SCNC: 4 MMOL/L (ref 3–11)
BASOPHILS # BLD AUTO: 0.05 10*3/MM3 (ref 0–0.2)
BASOPHILS NFR BLD AUTO: 1.2 % (ref 0–1)
BUN BLD-MCNC: 16 MG/DL (ref 9–23)
BUN/CREAT SERPL: 10 (ref 7–25)
CALCIUM SPEC-SCNC: 9.4 MG/DL (ref 8.7–10.4)
CHLORIDE SERPL-SCNC: 102 MMOL/L (ref 99–109)
CO2 SERPL-SCNC: 29 MMOL/L (ref 20–31)
CREAT BLD-MCNC: 1.6 MG/DL (ref 0.6–1.3)
DEPRECATED RDW RBC AUTO: 54.4 FL (ref 37–54)
EOSINOPHIL # BLD AUTO: 0.09 10*3/MM3 (ref 0.1–0.3)
EOSINOPHIL NFR BLD AUTO: 2.2 % (ref 0–3)
ERYTHROCYTE [DISTWIDTH] IN BLOOD BY AUTOMATED COUNT: 15.2 % (ref 11.3–14.5)
GFR SERPL CREATININE-BSD FRML MDRD: 45 ML/MIN/1.73
GLUCOSE BLD-MCNC: 118 MG/DL (ref 70–100)
GLUCOSE BLDC GLUCOMTR-MCNC: 107 MG/DL (ref 70–130)
GLUCOSE BLDC GLUCOMTR-MCNC: 110 MG/DL (ref 70–130)
HCT VFR BLD AUTO: 26.8 % (ref 38.9–50.9)
HGB BLD-MCNC: 9.1 G/DL (ref 13.1–17.5)
IMM GRANULOCYTES # BLD: 0.01 10*3/MM3 (ref 0–0.03)
IMM GRANULOCYTES NFR BLD: 0.2 % (ref 0–0.6)
LYMPHOCYTES # BLD AUTO: 0.71 10*3/MM3 (ref 0.6–4.8)
LYMPHOCYTES NFR BLD AUTO: 17.1 % (ref 24–44)
MCH RBC QN AUTO: 33.5 PG (ref 27–31)
MCHC RBC AUTO-ENTMCNC: 34 G/DL (ref 32–36)
MCV RBC AUTO: 98.5 FL (ref 80–99)
MONOCYTES # BLD AUTO: 0.25 10*3/MM3 (ref 0–1)
MONOCYTES NFR BLD AUTO: 6 % (ref 0–12)
NEUTROPHILS # BLD AUTO: 3.04 10*3/MM3 (ref 1.5–8.3)
NEUTROPHILS NFR BLD AUTO: 73.3 % (ref 41–71)
PLATELET # BLD AUTO: 163 10*3/MM3 (ref 150–450)
PMV BLD AUTO: 9.3 FL (ref 6–12)
POTASSIUM BLD-SCNC: 3.5 MMOL/L (ref 3.5–5.5)
RBC # BLD AUTO: 2.72 10*6/MM3 (ref 4.2–5.76)
SODIUM BLD-SCNC: 135 MMOL/L (ref 132–146)
WBC NRBC COR # BLD: 4.15 10*3/MM3 (ref 3.5–10.8)

## 2017-03-22 PROCEDURE — 99239 HOSP IP/OBS DSCHRG MGMT >30: CPT | Performed by: NURSE PRACTITIONER

## 2017-03-22 PROCEDURE — 85025 COMPLETE CBC W/AUTO DIFF WBC: CPT | Performed by: INTERNAL MEDICINE

## 2017-03-22 PROCEDURE — 97110 THERAPEUTIC EXERCISES: CPT

## 2017-03-22 PROCEDURE — 80048 BASIC METABOLIC PNL TOTAL CA: CPT | Performed by: INTERNAL MEDICINE

## 2017-03-22 PROCEDURE — 94640 AIRWAY INHALATION TREATMENT: CPT

## 2017-03-22 PROCEDURE — 97605 NEG PRS WND THER DME<=50SQCM: CPT

## 2017-03-22 PROCEDURE — 25010000002 HEPARIN (PORCINE) PER 1000 UNITS: Performed by: FAMILY MEDICINE

## 2017-03-22 PROCEDURE — 99024 POSTOP FOLLOW-UP VISIT: CPT | Performed by: THORACIC SURGERY (CARDIOTHORACIC VASCULAR SURGERY)

## 2017-03-22 PROCEDURE — 25010000002 HYDROMORPHONE PER 4 MG: Performed by: FAMILY MEDICINE

## 2017-03-22 PROCEDURE — 82962 GLUCOSE BLOOD TEST: CPT

## 2017-03-22 PROCEDURE — 25010000002 MEROPENEM

## 2017-03-22 RX ORDER — LINEZOLID 600 MG/1
600 TABLET, FILM COATED ORAL EVERY 12 HOURS SCHEDULED
Qty: 6 TABLET | Refills: 0 | Status: ON HOLD
Start: 2017-03-22 | End: 2019-10-30

## 2017-03-22 RX ORDER — NYSTATIN 100000 U/G
CREAM TOPICAL EVERY 12 HOURS SCHEDULED
Qty: 30 G | Refills: 0 | Status: SHIPPED | OUTPATIENT
Start: 2017-03-22 | End: 2017-03-28

## 2017-03-22 RX ORDER — LACTOBACILLUS RHAMNOSUS GG 10B CELL
1 CAPSULE ORAL DAILY
Qty: 30 CAPSULE | Refills: 1 | Status: SHIPPED | OUTPATIENT
Start: 2017-03-22

## 2017-03-22 RX ADMIN — HYDROMORPHONE HYDROCHLORIDE 0.5 MG: 1 INJECTION, SOLUTION INTRAMUSCULAR; INTRAVENOUS; SUBCUTANEOUS at 14:06

## 2017-03-22 RX ADMIN — MEROPENEM 1 G: 1 INJECTION, POWDER, FOR SOLUTION INTRAVENOUS at 06:04

## 2017-03-22 RX ADMIN — HYDROMORPHONE HYDROCHLORIDE 0.5 MG: 1 INJECTION, SOLUTION INTRAMUSCULAR; INTRAVENOUS; SUBCUTANEOUS at 08:20

## 2017-03-22 RX ADMIN — CLOPIDOGREL BISULFATE 75 MG: 75 TABLET, FILM COATED ORAL at 08:20

## 2017-03-22 RX ADMIN — MEROPENEM 1 G: 1 INJECTION, POWDER, FOR SOLUTION INTRAVENOUS at 14:05

## 2017-03-22 RX ADMIN — HYDROCODONE BITARTRATE AND ACETAMINOPHEN 1 TABLET: 7.5; 325 TABLET ORAL at 08:20

## 2017-03-22 RX ADMIN — TAMSULOSIN HYDROCHLORIDE 0.4 MG: 0.4 CAPSULE ORAL at 08:20

## 2017-03-22 RX ADMIN — PANTOPRAZOLE SODIUM 40 MG: 40 TABLET, DELAYED RELEASE ORAL at 08:20

## 2017-03-22 RX ADMIN — HEPARIN SODIUM 5000 UNITS: 5000 INJECTION, SOLUTION INTRAVENOUS; SUBCUTANEOUS at 14:05

## 2017-03-22 RX ADMIN — LINEZOLID 600 MG: 600 TABLET, FILM COATED ORAL at 08:20

## 2017-03-22 RX ADMIN — DESMOPRESSIN ACETATE 40 MG: 0.2 TABLET ORAL at 08:20

## 2017-03-22 RX ADMIN — Medication 1 CAPSULE: at 08:20

## 2017-03-22 RX ADMIN — HEPARIN SODIUM 5000 UNITS: 5000 INJECTION, SOLUTION INTRAVENOUS; SUBCUTANEOUS at 06:03

## 2017-03-22 RX ADMIN — BUDESONIDE AND FORMOTEROL FUMARATE DIHYDRATE 2 PUFF: 80; 4.5 AEROSOL RESPIRATORY (INHALATION) at 09:47

## 2017-03-22 RX ADMIN — NYSTATIN: 100000 CREAM TOPICAL at 08:20

## 2017-03-22 RX ADMIN — ASPIRIN 81 MG: 81 TABLET, CHEWABLE ORAL at 08:20

## 2017-03-22 RX ADMIN — Medication 325 MG: at 08:20

## 2017-03-22 RX ADMIN — VITAMIN D, TAB 1000IU (100/BT) 2000 UNITS: 25 TAB at 08:20

## 2017-03-22 RX ADMIN — OXYCODONE HYDROCHLORIDE AND ACETAMINOPHEN 1 TABLET: 7.5; 325 TABLET ORAL at 14:05

## 2017-03-22 NOTE — THERAPY DISCHARGE NOTE
Acute Care - Wound/Debridement Treatment Note/Discharge  [facility]     Patient Name: Sai Weinberg  : 1960  MRN: [id number]  Today's Date: 3/22/2017  Onset of Illness/Injury or Date of Surgery Date: 17   Date of Referral to PT: 17   Referring Physician: MD Ayan       Admit Date: 3/12/2017    Visit Dx:    ICD-10-CM ICD-9-CM   1. Open wound of left hip and thigh with complication, initial encounter S71.002A 890.1    S71.102A    2. Impaired mobility and ADLs Z74.09 799.89   3. Impaired functional mobility, balance, gait, and endurance Z74.09 V49.89       Patient Active Problem List   Diagnosis   • Dyslipidemia   • Arthritis   • COPD (chronic obstructive pulmonary disease)   • Diabetes mellitus   • Esophageal reflux   • Hypertension   • Malignant neoplasm of colon   • Heart attack   • Peripheral vascular disease   • Chewing tobacco use   • Peripheral arterial disease   • LANA (acute kidney injury)   • Coronary artery disease involving native heart   • Iron deficiency anemia   • Coal workers pneumoconiosis   • Open wound of left hip and thigh with complication   • Leg wound, left               LDA Wound       17 1115          Incision 17 1138 Left groin    Incision - Properties Group Placement Date: 17  -ABHIJEET Placement Time:   -ABHIJEET Side: Left  -ABHIJEET Location: groin  -ABHIJEET    Incision WDL WDL  -MF      Dressing Appearance dry;intact  -MF      Therapy Setting (Negative Pressure Wound Therapy) continuous therapy   wound vac converted to home vac unit  -MF      Pressure Setting (Negative Pressure Wound Therapy) 150 mmHg  -MF      Output (mL) 50  -MF        User Key  (r) = Recorded By, (t) = Taken By, (c) = Cosigned By    Initials Name Provider Type    SÁNCHEZ Olivo, PT Physical Therapist    ABHIJEET Kessler, RN Registered Nurse              WOUND DEBRIDEMENT                     Adult Rehabilitation Note       17 1115 17 1000 17 1605    Rehab Assessment/Intervention     Discipline physical therapist  -MF physical therapist  -MF physical therapist  -DM    Document Type therapy note (daily note)  - therapy note (daily note)  - therapy note (daily note)   mobility  -DM    Subjective Information agree to therapy;no complaints  - agree to therapy;complains of;weakness;pain  - agree to therapy;complains of;weakness;pain;swelling;fatigue  -DM    Patient Effort, Rehab Treatment   good  -DM    Symptoms Noted During/After Treatment   increased pain  -DM    Precautions/Limitations   fall precautions;other (see comments)   wound vac; decub.   -DM    Recorded by [MF] Milad Olivo, PT [MF] Milad Olivo, PT [DM] Zahra Dorantes, PT    Vital Signs    Pre Systolic BP Rehab   146  -DM    Pre Treatment Diastolic BP   68  -DM    Post Systolic BP Rehab   154  -DM    Post Treatment Diastolic BP   75  -DM    Pretreatment Heart Rate (beats/min)   77  -DM    Intratreatment Heart Rate (beats/min)   84  -DM    Posttreatment Heart Rate (beats/min)   78  -DM    O2 Delivery Pre Treatment   room air  -DM    O2 Delivery Post Treatment   room air  -DM    Pre Patient Position   Sitting  -DM    Intra Patient Position   Standing  -DM    Post Patient Position   Sitting  -DM    Recorded by   [DM] Zahra Dorantes, PT    Pain Assessment    Pain Assessment Draper-Terrell FACES  - 0-10  - 0-10  -DM    Draper-Terrell FACES Pain Rating 0  -MF      Pain Score  0  -MF 7  -DM    Post Pain Score  4  -MF 8  -DM    Pain Type   Acute pain  -DM    Pain Location   Groin  -DM    Pain Orientation   Left   & B feet  -DM    Pain Descriptors   Aching  -DM    Pain Frequency   Constant/continuous  -DM    Patient's Stated Pain Goal   No pain  -DM    Pain Intervention(s) Repositioned  - Medication (See MAR);Repositioned  - Medication (See MAR);Repositioned  -DM    Recorded by [MF] Milad Olivo, PT [MF] Milad Olivo, PT [DM] Zahra Dorantes, PT    Cognitive Assessment/Intervention    Current  Cognitive/Communication Assessment   functional  -DM    Orientation Status   oriented x 4  -DM    Follows Commands/Answers Questions   100% of the time;able to follow single-step instructions;needs cueing  -DM    Personal Safety   mild impairment;decreased awareness, need for assist;decreased awareness, need for safety;decreased insight to deficits  -DM    Personal Safety Interventions   gait belt;fall prevention program maintained;nonskid shoes/slippers when out of bed  -DM    Recorded by   [DM] Zahra Dorantes, PT    Bed Mobility, Assessment/Treatment    Bed Mobility, Comment   UIC  -DM    Recorded by   [DM] Zahra Dorantes, PT    Transfer Assessment/Treatment    Transfers, Sit-Stand [place]   independent  -DM    Transfers, Stand-Sit [place]   contact guard assist   B knees unsteady;incr.L groin pain  -DM    Transfer, Impairments   strength decreased;impaired balance;pain  -DM    Transfer, Comment   pt c/o signif. discomfort B feet d/t swelling  -DM    Recorded by   [DM] Zahra Dorantes, PT    Gait Assessment/Treatment    Gait, [place] Level   contact guard assist  -DM    Gait, Assistive Device   --   gt belt; UE supp.;decl. cane  -DM    Gait, Distance (Feet)   14   7 ft x 2; p.t. held wd.vac tubing  -DM    Gait, Gait Pattern Analysis   swing-to gait  -DM    Gait, Gait Deviations   antalgic;bilateral:;remedios decreased;decreased heel strike;knee buckling;limb motion velocity decreased;step length decreased;stride length decreased   wide KELBY  -DM    Gait, Maintain Weight Bearing Status   able to maintain weight bearing status  -DM    Gait, Safety Issues   step length decreased;weight-shifting ability decreased  -DM    Gait, Impairments   strength decreased;impaired balance;pain  -DM    Gait, Comment   chair to EOB,RESTED 5 min,then ret. to chair  -DM    Recorded by   [DM] Zahra Dorantes, PT    Therapy Exercises    Bilateral Lower Extremities   AROM:;10 reps;sitting;ankle pumps/circles;glut sets;heel  slides;hip abduction/adduction;hip flexion;LAQ;quad sets   HS sets;pillow squeeze w/R LE  -DM    Bilateral Upper Extremity   AROM:;10 reps;sitting;elbow flexion/extension;shoulder abduction/adduction;shoulder extension/flexion;shoulder horizontal abd/add  -DM    Recorded by   [DM] Zahra Dorantes, PT    Positioning and Restraints    Pre-Treatment Position sitting in chair/recliner  -MF in bed  - sitting in chair/recliner  -DM    Post Treatment Position chair  -MF bed  - chair  -DM    In Bed  supine;call light within reach  -MF     In Chair sitting;call light within reach  -  notified nsg;sitting;call light within reach   ALT.btwn leg rest down, & ret. to bed w/LE'S supported  -DM    Recorded by [MF] Milad Olivo, PT [MF] Milad Olivo, PT [DM] Zahra Dorantes, PT      03/20/17 1315          Rehab Assessment/Intervention    Discipline physical therapist  -      Document Type therapy note (daily note);evaluation   wound care eval  -      Subjective Information agree to therapy;complains of;weakness;fatigue;pain  -MF      Recorded by [MF] Milad Olivo, PT      Pain Assessment    Pain Assessment 0-10  -      Pain Score 9  -      Post Pain Score 8  -      Pain Type Acute pain  -      Pain Location Groin  -      Pain Orientation Left  -      Pain Intervention(s) Medication (See MAR);Repositioned  -MF      Recorded by [MF] Milad Olivo PT      Positioning and Restraints    Pre-Treatment Position in bed  -      Post Treatment Position bed  -MF      In Bed supine;call light within reach;with family/caregiver  -MF      Recorded by [MF] Milad Olivo PT        User Key  (r) = Recorded By, (t) = Taken By, (c) = Cosigned By    Initials Name Effective Dates     Milad Olivo, PT 06/19/15 -     DM Zahra Dorantes, PT 06/19/15 -                 IP PT Goals       03/22/17 1115 03/21/17 1000 03/20/17 1605    Bed Mobility PT LTG    Bed Mobility PT LTG, Date Established   03/14/17   -DM    Bed Mobility PT LTG, Time to Achieve   2 wks  -DM    Bed Mobility PT LTG, Activity Type   supine to sit/sit to supine  -DM    Bed Mobility PT LTG, [place] Level   independent  -DM    Bed Mobility PT LTG, Outcome   goal ongoing  -DM    Gait Training PT LTG    Gait Training Goal PT LTG, Date Established   03/14/17  -DM    Gait Training Goal PT LTG, Time to Achieve   2 wks  -DM    Gait Training Goal PT LTG, [place] Level   conditional independence  -DM    Gait Training Goal PT LTG, Assist Device   cane, straight  -DM    Gait Training Goal PT LTG, Distance to Achieve   200  -DM    Gait Training Goal PT LTG, Outcome   goal ongoing  -DM    Stair Training PT LTG    Stair Training Goal PT LTG, Date Established   03/14/17  -DM    Stair Training Goal PT LTG, Time to Achieve   2 wks  -DM    Stair Training Goal PT LTG, Number of Steps   7  -DM    Stair Training Goal PT LTG, [place] Level   contact guard assist  -DM    Stair Training Goal PT LTG, Assist Device   1 handrail  -DM    Stair Training Goal PT LTG, Outcome   goal ongoing  -DM    Wound Care PT LTG    Wound Care PT LTG 1, Outcome goal partially met  -MF goal ongoing  -MF       03/20/17 1315 03/17/17 1332 03/16/17 1059    Bed Mobility PT LTG    Bed Mobility PT LTG, Outcome  goal ongoing  -MC goal ongoing  -MB    Transfer Training PT LTG    Transfer Training PT LTG, Outcome  goal met  -MC goal ongoing  -MB    Gait Training PT LTG    Gait Training Goal PT LTG, Outcome  goal ongoing  -MC goal ongoing  -MB    Stair Training PT LTG    Stair Training Goal PT LTG, Outcome  goal ongoing  -MC goal ongoing  -MB    Wound Care PT LTG    Wound Care PT LTG 1, Date Established 03/20/17  -      Wound Care PT LTG 1, Time to Achieve other (see comments)   10 days  -      Wound Care PT LTG 1, Location L groin  -      Wound Care PT LTG 1, No S&S of Infection yes  -      Wound Care PT LTG 1, Decrease Wound Size 5%  -      Wound Care PT LTG 1, Decrease Exudate  scant  -MF      Wound Care PT LTG 1, No New Skin Break Down yes  -MF        03/14/17 1530          Bed Mobility PT LTG    Bed Mobility PT LTG, Date Established 03/14/17  -LS      Bed Mobility PT LTG, Time to Achieve 2 wks  -LS      Bed Mobility PT LTG, Activity Type supine to sit/sit to supine  -LS      Bed Mobility PT LTG, [place] Level independent  -LS      Transfer Training PT LTG    Transfer Training PT LTG, Date Established 03/14/17  -LS      Transfer Training PT LTG, Time to Achieve 2 wks  -LS      Transfer Training PT LTG, Activity Type sit to stand/stand to sit  -LS      Transfer Training PT LTG, [place] Level conditional independence  -LS      Transfer Training PT LTG, Assist Device cane, straight  -LS      Gait Training PT LTG    Gait Training Goal PT LTG, Date Established 03/14/17  -LS      Gait Training Goal PT LTG, Time to Achieve 2 wks  -LS      Gait Training Goal PT LTG, [place] Level conditional independence  -LS      Gait Training Goal PT LTG, Assist Device cane, straight  -LS      Gait Training Goal PT LTG, Distance to Achieve 200  -LS      Stair Training PT LTG    Stair Training Goal PT LTG, Date Established 03/14/17  -LS      Stair Training Goal PT LTG, Time to Achieve 2 wks  -LS      Stair Training Goal PT LTG, Number of Steps 7  -LS      Stair Training Goal PT LTG, [place] Level contact guard assist  -LS      Stair Training Goal PT LTG, Assist Device 1 handrail  -LS        User Key  (r) = Recorded By, (t) = Taken By, (c) = Cosigned By    Initials Name Provider Type     Milad Olivo, PT Physical Therapist    BEV Dorantes, PT Physical Therapist    HIRAL Conroy, PT Physical Therapist    SUE Thompson, PT Physical Therapist    BUCK Escamilla, PT Physical Therapist          Physical Therapy Education     Title: PT OT SLP Therapies (Active)     Topic: Physical Therapy (Active)     Point: Mobility training (Active)    Learning Progress Summary     Learner Readiness Method Response Comment Documented by Status   Patient Eager E,D NR  DM 03/20/17 1645 Active    Acceptance E NR   03/17/17 1332 Active    Acceptance E NR fall precautions, PLB, gait mechanics, home safety MB 03/16/17 1059 Active    Acceptance E,D NR Edu re: benefit of further ambulation throughout day with NSG and of sets of LE HEP.  03/14/17 1529 Active               Point: Home exercise program (Active)    Learning Progress Summary    Learner Readiness Method Response Comment Documented by Status   Patient Eager E,D NR  DM 03/20/17 1645 Active    Acceptance E NR   03/17/17 1332 Active    Acceptance E NR fall precautions, PLB, gait mechanics, home safety MB 03/16/17 1059 Active    Acceptance E,D NR Edu re: benefit of further ambulation throughout day with NSG and of sets of LE HEP.  03/14/17 1529 Active               Point: Body mechanics (Active)    Learning Progress Summary    Learner Readiness Method Response Comment Documented by Status   Patient Eager E,D NR  DM 03/20/17 1645 Active    Acceptance E VU   03/19/17 0249 Done    Acceptance E NR   03/17/17 1332 Active    Acceptance E NR fall precautions, PLB, gait mechanics, home safety MB 03/16/17 1059 Active    Acceptance E,D NR Edu re: benefit of further ambulation throughout day with NSG and of sets of LE HEP.  03/14/17 1529 Active               Point: Precautions (Active)    Learning Progress Summary    Learner Readiness Method Response Comment Documented by Status   Patient Eager E,D NR  DM 03/20/17 1645 Active    Acceptance E VU  LD 03/19/17 0249 Done    Acceptance E NR   03/17/17 1332 Active    Acceptance E NR fall precautions, PLB, gait mechanics, home safety MB 03/16/17 1059 Active    Acceptance E,D NR Edu re: benefit of further ambulation throughout day with NSG and of sets of LE HEP.  03/14/17 1529 Active                      User Key     Initials Effective Dates Name Provider Type Discipline     06/19/15 -  Zahra MICHAELS  Jayna, PT Physical Therapist PT    LS 06/19/15 -  Leeanna Conroy, PT Physical Therapist PT    MB 03/14/16 -  Emmie Thompson, PT Physical Therapist PT    MC 03/14/16 -  Alissa Escamilla, PT Physical Therapist PT    LD 01/20/17 -  Daysi Rodriguez, RN Registered Nurse Nurse                   PT ASSESSMENT (last 72 hours)      PT Evaluation       03/22/17 1115 03/21/17 1000    Rehab Evaluation    Document Type therapy note (daily note)  -MF therapy note (daily note)  -MF    Subjective Information agree to therapy;no complaints  -MF agree to therapy;complains of;weakness;pain  -MF    Pain Assessment    Pain Assessment Draper-Terrell FACES  -MF 0-10  -MF    Draper-Terrell FACES Pain Rating 0  -MF     Pain Score  0  -MF    Post Pain Score  4  -MF    Pain Intervention(s) Repositioned  -MF Medication (See MAR);Repositioned  -MF    Positioning and Restraints    Pre-Treatment Position sitting in chair/recliner  -MF in bed  -MF    Post Treatment Position chair  - bed  -MF    In Bed  supine;call light within reach  -MF    In Chair sitting;call light within reach  -MF       03/20/17 1605 03/20/17 1315    Rehab Evaluation    Document Type therapy note (daily note)   mobility  -DM therapy note (daily note);evaluation   wound care eval  -MF    Subjective Information agree to therapy;complains of;weakness;pain;swelling;fatigue  -DM agree to therapy;complains of;weakness;fatigue;pain  -MF    Patient Effort, Rehab Treatment good  -DM     Symptoms Noted During/After Treatment increased pain  -DM     General Information    Precautions/Limitations fall precautions;other (see comments)   wound vac; decub.   -DM     Vital Signs    Pre Systolic BP Rehab 146  -DM     Pre Treatment Diastolic BP 68  -DM     Post Systolic BP Rehab 154  -DM     Post Treatment Diastolic BP 75  -DM     Pretreatment Heart Rate (beats/min) 77  -DM     Intratreatment Heart Rate (beats/min) 84  -DM     Posttreatment Heart Rate (beats/min) 78  -DM     O2 Delivery Pre  Treatment room air  -DM     O2 Delivery Post Treatment room air  -DM     Pre Patient Position Sitting  -DM     Intra Patient Position Standing  -DM     Post Patient Position Sitting  -DM     Pain Assessment    Pain Assessment 0-10  -DM 0-10  -MF    Pain Score 7  -DM 9  -MF    Post Pain Score 8  -DM 8  -MF    Pain Type Acute pain  -DM Acute pain  -MF    Pain Location Groin  -DM Groin  -MF    Pain Orientation Left   & B feet  -DM Left  -MF    Pain Descriptors Aching  -DM     Pain Frequency Constant/continuous  -DM     Patient's Stated Pain Goal No pain  -DM     Pain Intervention(s) Medication (See MAR);Repositioned  -DM Medication (See MAR);Repositioned  -MF    Cognitive Assessment/Intervention    Current Cognitive/Communication Assessment functional  -DM     Orientation Status oriented x 4  -DM     Follows Commands/Answers Questions 100% of the time;able to follow single-step instructions;needs cueing  -DM     Personal Safety mild impairment;decreased awareness, need for assist;decreased awareness, need for safety;decreased insight to deficits  -DM     Personal Safety Interventions gait belt;fall prevention program maintained;nonskid shoes/slippers when out of bed  -DM     Bed Mobility, Assessment/Treatment    Bed Mobility, Comment UIC  -DM     Transfer Assessment/Treatment    Transfers, Sit-Stand [place] independent  -DM     Transfers, Stand-Sit [place] contact guard assist   B knees unsteady;incr.L groin pain  -DM     Transfer, Impairments strength decreased;impaired balance;pain  -DM     Transfer, Comment pt c/o signif. discomfort B feet d/t swelling  -DM     Gait Assessment/Treatment    Gait, [place] Level contact guard assist  -DM     Gait, Assistive Device --   gt belt; UE supp.;decl. cane  -DM     Gait, Distance (Feet) 14   7 ft x 2; p.t. held wd.vac tubing  -DM     Gait, Gait Pattern Analysis swing-to gait  -DM     Gait, Gait Deviations antalgic;bilateral:;remedios decreased;decreased heel  strike;knee buckling;limb motion velocity decreased;step length decreased;stride length decreased   wide KELBY  -DM     Gait, Maintain Weight Bearing Status able to maintain weight bearing status  -DM     Gait, Safety Issues step length decreased;weight-shifting ability decreased  -DM     Gait, Impairments strength decreased;impaired balance;pain  -DM     Gait, Comment chair to EOB,RESTED 5 min,then ret. to chair  -DM     Therapy Exercises    Bilateral Lower Extremities AROM:;10 reps;sitting;ankle pumps/circles;glut sets;heel slides;hip abduction/adduction;hip flexion;LAQ;quad sets   HS sets;pillow squeeze w/R LE  -DM     Bilateral Upper Extremity AROM:;10 reps;sitting;elbow flexion/extension;shoulder abduction/adduction;shoulder extension/flexion;shoulder horizontal abd/add  -DM     Positioning and Restraints    Pre-Treatment Position sitting in chair/recliner  -DM in bed  -MF    Post Treatment Position chair  -DM bed  -MF    In Bed  supine;call light within reach;with family/caregiver  -MF    In Chair notified nsg;sitting;call light within reach   ALT.btwn leg rest down, & ret. to bed w/LE'S supported  -DM       User Key  (r) = Recorded By, (t) = Taken By, (c) = Cosigned By    Initials Name Provider Type    SÁNCHEZ Olivo, PT Physical Therapist    BEV Dorantes, PT Physical Therapist            PT Recommendation and Plan  Anticipated Discharge Disposition: home with home health  PT Frequency: daily    Plan Of Care Reviewed With: patient, spouse       Outcome Summary/Follow up Plan: wound vac converted to home vac unit to allow for transition to home.  educated pt and family on canister changes and management of wuodn vac unit.             Outcome Measures       03/20/17 1609          How much help from another person do you currently need...    Turning from your back to your side while in flat bed without using bedrails? 4  -DM      Moving from lying on back to sitting on the side of a flat bed without  bedrails? 3  -DM      Moving to and from a bed to a chair (including a wheelchair)? 3  -DM      Standing up from a chair using your arms (e.g., wheelchair, bedside chair)? 3  -DM      Climbing 3-5 steps with a railing? 2  -DM      To walk in hospital room? 3  -DM      AM-PAC 6 Clicks Score 18  -DM      Functional Assessment    Outcome Measure Options AM-PAC 6 Clicks Basic Mobility (PT)  -DM        User Key  (r) = Recorded By, (t) = Taken By, (c) = Cosigned By    Initials Name Provider Type    BEV Dorantes, PT Physical Therapist            Time Calculation        PT Charges       03/22/17 1115          Time Calculation    Start Time 1115  -      Time Calculation- PT    Total Timed Code Minutes- PT 45 minute(s)  -        User Key  (r) = Recorded By, (t) = Taken By, (c) = Cosigned By    Initials Name Provider Type     Milad Olivo, PT Physical Therapist            Therapy Charges for Today     Code Description Service Date Service Provider Modifiers Qty    60152006764 HC PT NEG PRESS WOUND TO 50SQCM DME1 3/21/2017 Milad Olivo, PT  1    99434796204 HC PT THER SUPP EA 15 MIN 3/21/2017 Milad Olivo, PT GP 1    53758983783 HC PT NEG PRESS WOUND TO 50SQCM DME1 3/22/2017 Milad Olivo, PT  1    38167813402 HC PT THER PROC EA 15 MIN 3/22/2017 Milad Olivo, PT GP 1    66397580556 HC PT THER SUPP EA 15 MIN 3/22/2017 Milad Olivo, PT GP 1            PT G-Codes  Outcome Measure Options: AM-PAC 6 Clicks Basic Mobility (PT)      PT Discharge Summary  Anticipated Discharge Disposition: home with home health  Reason for Discharge: Discharge from facility  Outcomes Achieved: Patient able to partially acheive established goals  Discharge Destination: Home with home health        Milad Olivo, PT  3/22/2017

## 2017-03-22 NOTE — DISCHARGE PLACEMENT REQUEST
"Rogelio Hastings (56 y.o. Male)     : YAZ EARLY [phone]    Date of Birth Social Security Number Address Home Phone MRN    1960  [address] [facility] [address] [phone] [id number]    Rastafari Marital Status          None        Admission Date Admission Type Admitting Provider Attending Provider Department, Room/Bed    3/12/17 Elective Irvin Garzon MD West, Christopher R, MD [facility] 6B, N635/1    Discharge Date Discharge Disposition Discharge Destination                      Attending Provider: Irvin Garzon MD     Allergies:  No Known Allergies    Isolation:  None   Infection:  None   Code Status:  FULL    Ht:  65\" (165.1 cm)   Wt:  253 lb (115 kg)    Admission Cmt:  None   Principal Problem:  Open wound of left hip and thigh with complication [S71.002A,S71.102A] More...                 Active Insurance as of 3/12/2017     Primary Coverage     Payor Plan Insurance Group Employer/Plan Group    [employer] [place] [employer] [place]ICAID      Payor Plan Address Payor Plan Phone Number Effective From Effective To    [address] [address] [phone] 10/21/2016     [place] [address]       Subscriber Name Subscriber Birth Date Member ID       ROGELIO HASTINGS 1960 [id number]                 Emergency Contacts      (Rel.) Home Phone Work Phone Mobile Phone    Karina Brennan (Daughter) [phone] -- --        Inpatient Consult to Case Management  [[id number]] (Order [id number])   Consult    Date: 3/21/2017   Department: [address]   Ordering/Authorizing: David Nguyen MD         Standing Order Information      Remaining Occurrences Interval Last Released              0/1 Once 3/21/2017          Order History  Inpatient     Date/Time Action Taken User Additional Information     03/21/17 1832 Sign David Nguyen MD      03/21/17 1832 Release Instance David Nguyen MD (auto-released) " Released Order: [id number]     03/21/17 1834 Acknowledge Kendy Granados RN New Order       Order Details      Frequency Duration Priority Order Class     Once 1  occurrence Routine Hospital Performed       Comments      New orders for home antibiotics:  Meropenem 1 g iv q8h through 3/31/17.  Weekly CBC/diff, CMP, ESR, CRP and fax to Dr. Nguyen at [facility] [phone].  Weekly PICC dressing change.  Follow-up with me 3/31/17.       Order Questions      Question Answer Comment     Reason for Consult? home antibiotics        Source Order Set / Preference List      Preference List     [facility] FACILITY CONSULTS [[id number]]       Consult Order Info      ID Description Priority Start Date Start Time     [id number] Inpatient Consult to Case Management  Routine 03/21/2017  6:32 PM         Provider Specialty Referred to     ______________________________________ _____________________________________       Acknowledgement Info      For At Acknowledged By Acknowledged On     Placing Order 03/21/17 1832 Kendy Granados RN 03/21/17 1834       Reprint Order Requisition      Inpatient Consult to Case Management  (Order #[id number]) on 3/21/17       Encounter      View Encounter           Order Provider Info          Office phone Pager E-mail     Ordering User David Nguyen [phone] -- --     Authorizing Provider David Nguyen [phone] -- --     Attending Provider Irvin Garzon [phone] -- --       Order Transmittal Tracking      Inpatient Consult to Case Management  (Order #[id number]) on 3/21/17                History & Physical      Maribel Botello MD at 3/12/2017  9:19 PM              [facility] Medicine Services  HISTORY AND PHYSICAL    Primary Care Physician: Dewayne Cole MD    Subjective     Chief Complaint:  Left leg cellulitis    History of Present Illness:   Mr. Weinberg is a 56 year old  man who was transferred from [facility]  "this evening.  He was admitted there 3/11/2017 for left leg cellulitis associated with recent left superficial artery cutdown/angioplasty/stent performed by Dr. Rosa on 1/18/2017.  He did not follow up with Dr. Rosa.  About 3 weeks after the procedure the patient states the wound \"broke open\" and has been draining clear-pink tinged to \"pure blood\" ever since.  It has been painful and warm.  It has never looked frankly purulent or had a bad odor.  On Monday 3/6/2017, he saw his PCP who prescribed PO antibiotics (data deficit).  Yesterday, he saw his PCP again because there was no improvement and the patient was sent to the ER in [place], was admitted and given IV Vanco and Zosyn.  He had a negative venous duplex for DVT but no further imaging was done.  He was sent to [facility] today for higher level of care and to see Dr. Rosa who did his surgery.    He did have fever beginning 3/6 or 3/7 lasting for 3-4 days associated with cough, sore throat, congestion, nausea and vomiting.  He thought he had the flu and did not seek care.  He states that his wound did not change at all during this time.  Otherwise he has not had fever, chills, syncope, chest pain, palpitations, increased LE edema, abdominal pain, melena, hematochezia or increased SOA from baseline.    Review of Systems   Otherwise complete ROS performed and negative except as mentioned in the HPI.    Past Medical History   Diagnosis Date   • Arthritis    • Black lung disease    • BPH (benign prostatic hyperplasia)    • Cataract    • Cataract    • COLD (chronic obstructive lung disease)    • Colon polyps    • Diabetes mellitus      SINCE 2014   • Dyslipidemia    • Esophageal reflux    • Flu      HX   • Heart attack      2006   • Herniated lumbar intervertebral disc    • Hypertension    • Malignant neoplasm of colon    • Peripheral vascular disease    • Sleep apnea with use of continuous positive airway pressure (CPAP)    • Wears dentures    • Wears glasses  "       Past Surgical History   Procedure Laterality Date   • Colon surgery     • Bypass graft       non-vein - femoral-popliteal right   • Other surgical history       PTA ILIAC STENOSIS WITH STENT   • Cardiac catheterization       NO INTERVENTION   • Colonoscopy     • Aortagram N/A 1/17/2017     Procedure: AORTAGRAM WITH RUNOFFS and  STENT left SFA;  Surgeon: Heladio Rosa MD;  Location: [facility] OR 15;  Service:    • Angioplasty femoral artery N/A 1/17/2017     Procedure: left femoral cutdown with aortagram and left SFA stent and angioplasty;  Surgeon: Heladio Rosa MD;  Location: [facility] HYBRID OR 15;  Service:        Family History   Problem Relation Age of Onset   • Lung cancer Mother    • Heart attack Mother    • Hypertension Mother    • Diabetes Mother    • Diabetes Father    • Hypertension Father    • Heart attack Father        Social History     Social History   • Marital status:      Spouse name: N/A   • Number of children: N/A   • Years of education: N/A     Occupational History   • DISABLED       BACK AND LUNG PROBLEMS     Social History Main Topics   • Smoking status: Former Smoker     Packs/day: 2.00     Types: Cigarettes     Start date: 1980     Quit date: 2015   • Smokeless tobacco: Current User     Types: Chew      Comment: Stopped smoking 1 year ago. Smoked 35 years up to 2ppd.   • Alcohol use No   • Drug use: No   • Sexual activity: Defer     Other Topics Concern   • Not on file     Social History Narrative       Medications:  Prescriptions Prior to Admission   Medication Sig Dispense Refill Last Dose   • potassium chloride (K-DUR) 10 MEQ CR tablet Take 10 mEq by mouth 2 (Two) Times a Day.      • sulfamethoxazole-trimethoprim (BACTRIM DS,SEPTRA DS) 800-160 MG per tablet Take 1 tablet by mouth 2 (Two) Times a Day.      • albuterol (PROVENTIL HFA;VENTOLIN HFA) 108 (90 BASE) MCG/ACT inhaler Inhale 2 puffs Every 4 (Four) Hours As Needed for wheezing.   1/16/2017 at 2200    • aspirin 81 MG tablet Take 81 mg by mouth daily.   1/15/2017   • atorvastatin (LIPITOR) 40 MG tablet Take 40 mg by mouth daily.   1/16/2017 at 0800   • Cholecalciferol (VITAMIN D PO) Take 2,000 Units by mouth Daily.   1/16/2017 at 0800   • clopidogrel (PLAVIX) 75 MG tablet Take 75 mg by mouth daily.   1/15/2017   • ferrous sulfate 324 (65 FE) MG tablet delayed-release EC tablet Take 324 mg by mouth Daily With Breakfast.   1/16/2017 at 0800   • fluticasone-salmeterol (ADVAIR) 100-50 MCG/DOSE DISKUS Inhale 2 puffs 2 (Two) Times a Day.   1/16/2017 at 0800   • furosemide (LASIX) 20 MG tablet Take 20 mg by mouth Daily As Needed (SWELLING).   1/16/2017 at 0800   • hydrochlorothiazide (HYDRODIURIL) 12.5 MG tablet Take 25 mg by mouth Daily.   1/16/2017 at 0800   • HYDROcodone-acetaminophen (NORCO) 7.5-325 MG per tablet Take 1 tablet by mouth 2 (Two) Times a Day As Needed for moderate pain (4-6).   1/16/2017 at 1900   • lisinopril (PRINIVIL,ZESTRIL) 30 MG tablet Take 40 mg by mouth Daily.   1/17/2017 at 0530   • metFORMIN (GLUCOPHAGE) 500 MG tablet Take 850 mg by mouth 2 (Two) Times a Day With Meals.   1/16/2017 at 0800   • pantoprazole (PROTONIX) 40 MG EC tablet Take 40 mg by mouth Every Other Day.   1/16/2017 at 0800   • tamsulosin (FLOMAX) 0.4 MG capsule 24 hr capsule Take 0.4 mg by mouth Daily.   1/16/2017 at 0800   • tiZANidine (ZANAFLEX) 4 MG tablet Take 4 mg by mouth At Night As Needed for muscle spasms.   1/16/2017 at 2000       Allergies:  No Known Allergies      Objective     Physical Exam:  Vital Signs: There were no vitals taken for this visit.  Physical Exam     Constitutional: sitting on edge of bed, left leg dangling off bed, in pain, awake, alert  Eyes: PERRLA, sclerae anicteric, no conjunctival injection  Neck: supple, no thyromegaly, trachea midline  Respiratory: Clear to auscultation bilaterally, nonlabored respirations   Cardiovascular: RRR, no murmurs, rubs, or gallops, palpable pedal pulses  bilaterally  Gastrointestinal: Positive bowel sounds, soft, nontender, nondistended, obese  Musculoskeletal: 1+ bilateral ankle edema, no clubbing or cyanosis to bilateral lower extremities.  Left pedal pulse non palpable, but doppler +.  Foot warm.  Psychiatric: oriented x 3, appropriate affect, cooperative  Neurologic: Strength symmetric in all extremities, Cranial Nerves grossly intact to confrontation   Left leg, Cutdown site open, draining serous liquid, non purulent, no odor.  There is a large non-pulsatile mass, approximately 4x7 cm in an ovoid shape around the entry site.  There is some tenderness and a minimal amount of redness around the site.    Results Reviewed:        I reviewed a progress note, H and P, lab data and a radiology report of venous doppler studies from the outside hospital.    I have personally reviewed and interpreted available lab data, radiology studies and ECG obtained at time of admission.     Assessment / Plan     Assessment/Problem List:   Principal Problem:    Open wound of left hip and thigh with complication  Active Problems:    COPD (chronic obstructive pulmonary disease)    Diabetes mellitus    Hypertension    Peripheral vascular disease    LANA (acute kidney injury)    Coronary artery disease involving native heart    Iron deficiency anemia    Coal workers pneumoconiosis      Plan:  Left leg wound:  --s/p femoral cutdown and stenting/angioplasty of left superficial artery  --Cellulitis versus pseudoaneurysm or other structural issue: no elevated WBC or convincing fever.  Getting stat ultrasound  --Continue Vanco and Zosyn for now  --Follow outside cultures and repeat here (blood, wound).  If infected may require ID consultation.  --Consult Dr. Rosa (Dr. St accepted over weekend)  --NPO after midnight in case surgical intervention required    LANA:  --Up from baseline 1.0.  --Pharmacy to dose Vanco  --Holding ACE-I, lasix and HCTZ  --Could be from recent bactrim  "use  --Fluids  --Recheck in AM    T2DM:  --Hold orals, A1C, SSI, Accuchecks    COPD, Coal workers' pneumoconiosis  --Currently Stable    HTN:  --Hold ACE-I,  HCTZ due to LANA  --Monitor, add PRN's if needed    Iron deficiency anemia, chronic: (stable)  --11/33 at outside hospital   --Monitor/recheck here    Peripheral Vascular disease:  --See #1    CAD:  --Currently stable        DVT prophylaxis: Mechanical given possible surgical intervention  Code Status: Full  Admission Status: Patient will be admitted to  [facility]: Patient with complication of surgical wound of unknown etiology, requiring urgent workup and treatment as well as specialty consultation.  Diabetes is complicated all aspects of care.     Maribel Botello MD 03/12/17 9:40 PM           Electronically signed by Maribel Botello MD at 3/12/2017 10:00 PM           Physician Progress Notes (last 24 hours) (Notes from 3/21/2017 11:41 AM through 3/22/2017 11:41 AM)      Isak Umaña MD at 3/21/2017  1:20 PM  Version 1 of 1         NAL Progress Note    3/12/2017        Reason for visit:  LANA, Leg wound    ROS:    Pulm:  No SOA  CV:  No CP    I&O:    Intake/Output Summary (Last 24 hours) at 03/21/17 1320  Last data filed at 03/21/17 1000   Gross per 24 hour   Intake    240 ml   Output    575 ml   Net   -335 ml       PE:   Blood pressure 165/76, pulse 86, temperature 98.1 °F (36.7 °C), temperature source Oral, resp. rate 16, height 65\" (165.1 cm), weight 253 lb (115 kg), SpO2 91 %.    GENERAL: Awake and alert, in no acute distress.   HEENT: PER, No conjunctivitis  NECK: Supple, no JVD     HEART: RRR; No murmur, rubs, gallops.   LUNGS: Clear to auscultation bilaterally without wheezing, rales, rhonchi. Normal respiratory effort. Nonlabored. No dullness.  ABDOMEN: Soft, nontender, nondistended. Positive bowel sounds. No rebound or guarding. NO mass or HSM.  EXT:  No cyanosis, clubbing , +2edema. Left thigh has a drain  : Genitalia generally unremarkable.  " Without Nunn catheter.  SKIN: Warm and dry without cutaneous eruptions on Inspection/palpation.    NEURO: Alert and oriented x 3, Motor 5/5 bilaterally    Medications:    Current Facility-Administered Medications:   •  aspirin chewable tablet 81 mg, 81 mg, Oral, Daily, ROSCOE Hatch, 81 mg at 03/21/17 0922  •  atorvastatin (LIPITOR) tablet 40 mg, 40 mg, Oral, Daily, Maribel Botello MD, 40 mg at 03/21/17 0923  •  bisacodyl (DULCOLAX) EC tablet 5 mg, 5 mg, Oral, Daily PRN, Maribel Botello MD  •  bisacodyl (DULCOLAX) suppository 10 mg, 10 mg, Rectal, Daily PRN, ROSCOE Hatch  •  budesonide-formoterol (SYMBICORT) 80-4.5 MCG/ACT inhaler 2 puff, 2 puff, Inhalation, BID - RT, Maribel Botello MD, 2 puff at 03/21/17 0915  •  calcium carbonate (TUMS) chewable tablet 500 mg (200 mg elemental), 2 tablet, Oral, BID PRN, Maribel Botello MD  •  cholecalciferol (VITAMIN D3) tablet 2,000 Units, 2,000 Units, Oral, Daily, Vangie Dangelo DO, 2,000 Units at 03/21/17 0922  •  clopidogrel (PLAVIX) tablet 75 mg, 75 mg, Oral, Daily, ROSCOE Hatch, 75 mg at 03/21/17 0923  •  dextrose (D50W) solution 25 g, 25 g, Intravenous, Q15 Min PRN, Maribel Botello MD  •  dextrose (GLUTOSE) oral gel 15 g, 15 g, Oral, Q15 Min PRN, Maribel Botello MD  •  ferrous sulfate tablet 325 mg, 325 mg, Oral, BID With Meals, Maribel Botello MD, 325 mg at 03/21/17 0922  •  glucagon (GLUCAGEN) injection 1 mg, 1 mg, Subcutaneous, Q15 Min PRN, Maribel Botello MD  •  heparin (porcine) 5000 UNIT/ML injection 5,000 Units, 5,000 Units, Subcutaneous, Q8H, Vangie Dangelo DO, 5,000 Units at 03/21/17 0533  •  HYDROcodone-acetaminophen (NORCO) 7.5-325 MG per tablet 1 tablet, 1 tablet, Oral, BID PRN, Maribel Botello MD, 1 tablet at 03/21/17 0929  •  HYDROmorphone (DILAUDID) injection 0.5 mg, 0.5 mg, Intravenous, Q2H PRN, 0.5 mg at 03/21/17 0955 **AND** naloxone (NARCAN) injection 0.4 mg, 0.4 mg, Intravenous, Q5 Min PRN, Maribel Botello MD  •   insulin lispro (humaLOG) injection 2-7 Units, 2-7 Units, Subcutaneous, 4x Daily AC & at Bedtime, Maribel Botello MD, 2 Units at 03/12/17 2243  •  ipratropium-albuterol (DUO-NEB) nebulizer solution 3 mL, 3 mL, Nebulization, Q4H PRN, Jamarcus Carl MD, 3 mL at 03/16/17 0751  •  linezolid (ZYVOX) tablet 600 mg, 600 mg, Oral, Q12H, David Nguyen MD, 600 mg at 03/21/17 0922  •  meropenem (MERREM) 1 g/100 mL 0.9% NS VTB (mbp), 1 g, Intravenous, Q8H, Wesly Almazan [facility]  •  nystatin (MYCOSTATIN) 218547 UNIT/GM cream, , Topical, Q12H, GLORIA Bryant, 1 application at 03/21/17 1213  •  ondansetron (ZOFRAN) tablet 4 mg, 4 mg, Oral, Q6H PRN **OR** ondansetron (ZOFRAN) injection 4 mg, 4 mg, Intravenous, Q6H PRN, ROSCOE Hatch, 4 mg at 03/18/17 0813  •  oxyCODONE-acetaminophen (PERCOCET) 7.5-325 MG per tablet 1 tablet, 1 tablet, Oral, Q6H PRN, Trung Rodriguez MD, 1 tablet at 03/21/17 0533  •  pantoprazole (PROTONIX) EC tablet 40 mg, 40 mg, Oral, Every Other Day, Maribel Botello MD, 40 mg at 03/20/17 0846  •  probiotic (CULTURELLE) capsule 1 capsule, 1 capsule, Oral, Daily, David Nguyen MD, 1 capsule at 03/21/17 0922  •  promethazine (PHENERGAN) tablet 12.5 mg, 12.5 mg, Oral, Q6H PRN, 12.5 mg at 03/20/17 0550 **OR** promethazine (PHENERGAN) injection 12.5 mg, 12.5 mg, Intramuscular, Q6H PRN **OR** promethazine (PHENERGAN) suppository 12.5 mg, 12.5 mg, Rectal, Q6H PRN, Maribel Botello MD  •  sennosides-docusate sodium (SENOKOT-S) 8.6-50 MG tablet 2 tablet, 2 tablet, Oral, BID PRN, ROSCEO Hatch  •  sodium chloride 0.9 % flush 1-10 mL, 1-10 mL, Intravenous, PRN, Maribel Botello MD  •  sodium chloride 0.9 % flush 10 mL, 10 mL, Intravenous, PRN, David Nguyen MD  •  sodium hypochlorite (DAKIN'S) 0.025 % topical solution solution, , Topical, BID, Heladio Rosa MD  •  tamsulosin (FLOMAX) 24 hr capsule 0.4 mg, 0.4 mg, Oral, Daily, Maribel Botello MD, 0.4 mg at 03/21/17 0936  •  tiZANidine (ZANAFLEX)  tablet 4 mg, 4 mg, Oral, Nightly PRN, Maribel Botello MD, 4 mg at 03/16/17 2112    Meds reviewed and doses adjusted for renal function    Labs:    Results from last 7 days  Lab Units 03/21/17  0716 03/20/17  1004 03/18/17  0521 03/17/17  0549   WBC 10*3/mm3 6.53  --  4.78 4.35   HEMOGLOBIN g/dL 9.5* 9.1* 9.1* 9.1*   HEMATOCRIT % 27.6* 26.8* 26.6* 27.3*   PLATELETS 10*3/mm3 186  --  184 187       Results from last 7 days  Lab Units 03/21/17  0716 03/20/17  1004 03/19/17  0609 03/18/17  0521   SODIUM mmol/L 134 135 134 131*   POTASSIUM mmol/L 3.7 3.7 4.0 3.5   CHLORIDE mmol/L 102 103 102 100   TOTAL CO2 mmol/L 22.0 26.0 25.0 25.0   BUN mg/dL 18 15 12 10   CREATININE mg/dL 1.80* 2.00* 2.30* 2.20*   GLUCOSE mg/dL 99 88 85 87   CALCIUM mg/dL 9.1 9.0 8.7 8.8       Results from last 7 days  Lab Units 03/21/17  0716   ALK PHOS U/L 49   BILIRUBIN mg/dL 0.8   ALT (SGPT) U/L 25   AST (SGOT) U/L 44*     Invalid input(s): UCOL, UCLR, UPH, USG, UPRO, UBLD, UNITR, ELEU, URBC, UFC, UBACT    Estimated Creatinine Clearance: 53.7 mL/min (by C-G formula based on Cr of 1.8).    Radiology:  Imaging Results (last 72 hours)     Procedure Component Value Units Date/Time    XR Chest 1 View [[id number]] Collected:  03/20/17 0929     Updated:  03/20/17 1436    Narrative:       EXAMINATION: XR CHEST 1 VW- 03/19/2017     INDICATION: S71.002A-Unspecified open wound, left hip, initial  encounter; S71.102A-Unspecified open wound, left thigh, initial  encounter; Z74.09-Other reduced mobility;  shortness of breath     COMPARISON: 03/12/2017     FINDINGS: Portable chest reveals heart to be borderline enlarged.  Ill-defined opacification left lung base with small left pleural  effusion. Degenerative changes seen within the spine. PICC line catheter  identified on the right tip in the SVC.           Impression:       Heart is enlarged with small left pleural effusion and mild  increased markings at the left lung base. PICC line catheter identified  on  "the right tip in the SVC.     D:  03/20/2017  E:  03/20/2017     This report was finalized on 3/20/2017 2:34 PM by Dr. Naty Panchal MD.       US Renal Limited [[id number]] Collected:  03/21/17 1103     Updated:  03/21/17 1131    Narrative:       EXAMINATION: US RENAL LIMITED- 03/20/2017     INDICATION: S71.002A-Unspecified open wound, left hip, initial  encounter; S71.102A-Unspecified open wound, left thigh, initial  encounter; Z74.09-Other reduced mobility; acute renal insufficiency     TECHNIQUE: Multiple images through the retroperitoneum were obtained  with a 4 MHz probe.     COMPARISON: NONE     FINDINGS:   Right kidney:  The right kidney measures 12.5 x 6.8 x 7.5 cm. There is no renal mass or  hydronephrosis.     Left kidney:  The left kidney measures 12.2 x 8.4 x 6.6 cm. There is no renal mass or  hydronephrosis.     The bladder is mostly collapsed.       Impression:       No evidence of obstructive uropathy or renal pathology.     D:  03/21/2017  E:  03/21/2017         This report was finalized on 3/21/2017 11:29 AM by Dr. Thomas Briggs MD.             Renal Imaging:  reviewed      IMPRESSION:  LANA: Possibly ATN vs AIN from abx such as vancomycin ( did nt get it for last 3 days) and zosyn  EDEMA; Not sure why he off and on get edema and needs lasix- does he have venous insufficiency vs CHF- he has no hx of liver disease nor does he have any proteinuria and baseline cr is around 0.8- we are not left with cardiac cause vs lumph edema- it is pitting though   Anemia\" ?? Chronic disease  Cellulitis;  COPD;  DM;     RECOMMENDATIONS:        Hold off on diuretic use for now  US kidneys done no obvious pathology  Check iron studies- iron ok  ECHO - pending  Cr improving         Isak Umaña MD  3/21/2017  1:20 PM           Electronically signed by Isak Umaña MD at 3/21/2017  1:22 PM      Irvin Garzon MD at 3/21/2017  4:24 PM  Version 2 of 2             [facility] Medicine " Services  INPATIENT PROGRESS NOTE    Date of Admission: 3/12/2017  Length of Stay: 9  Primary Care Physician: Dewayne Cole MD    Subjective     CC: follow-up Lt groin abscess/cellulitis     HPI:  Patient is seen at 9:30 AM resting upright in chair in no acute distress.  Wife at bedside.  Patient up to bathroom and notedly increased shortness of air with exertion.  Still complaining of bilateral lower extremity edema, unchanged.  Tolerating new antibiotic without noted difficulty.  Has undergone echo and renal ultrasound today with echo results pending.  As nausea, vomiting, chest pain or shortness of air at rest.  Ports his left groin wound VAC came loose this morning and quit working.  Awaiting PT to replace wound VAC.  Currently with dressing in place.  No new issues.      Review of Systems  Constitutional: Negative for chills and fever.   HENT: Negative for trouble swallowing.   Respiratory: Negative for cough and chest tightness.   + for dyspnea on exertion.   Cardiovascular: Negative for chest pain. Positive for BLE swelling (unchanged)   Gastrointestinal: Negative for abdominal pain, constipation, diarrhea and vomiting. Positive for mild intermittent nausea (but reports better today).    Genitalia-negative for pain, discharge. + for scrotal irritation from BM and sweating. Mild itching today.        Objective      Temp:  [98 °F (36.7 °C)-98.2 °F (36.8 °C)] 98.1 °F (36.7 °C)  Heart Rate:  [78-87] 86  Resp:  [16-18] 16  BP: (125-165)/(64-76) 165/76     Physical Exam    Constitutional: Awake, He is oriented to person, place, and time. Resting upright in chair. He appears well-developed and well-nourished. No distress. Wife at bs.   HENT:   Head: Normocephalic. Atraumatic   Eyes: Pupils are equal, round, and reactive to light.   Neck: Normal range of motion. Supple. Trachea midline   Cardiovascular: Normal rate and regular rhythm. Faint Murmur heard.  Pulmonary/Chest: Effort normal and breath sounds normal.  He has no wheezes. Decreased bilaterally, likely due to body habitus however noted BLE edema also. No rales appreciated currently.   Abdominal: Soft. Morbidly obese. Bowel sounds are normal. He exhibits no distension. There is no tenderness.   Musculoskeletal: Normal range of motion. BLE 3-4+ pitting edema (equal bilaterally) Unchanged over the last 2 days.  Neurological: He is alert and oriented to person, place, and time. Obeys all   Skin: Left groin with large bandage clean, dry and intact.  Had a wound vac placed yesterday but reports it came loose this am.  Currently with drsg only in place awaiting PT.   Psychiatric: He has a normal mood and affect. His behavior is normal. Calm and cooperative. Pleasant.     Results Review:    I have reviewed the labs, radiology results and diagnostic studies.      Results from last 7 days  Lab Units 03/21/17  0716   WBC 10*3/mm3 6.53   HEMOGLOBIN g/dL 9.5*   PLATELETS 10*3/mm3 186       Results from last 7 days  Lab Units 03/21/17  0716 03/20/17  1004 03/19/17  0609   SODIUM mmol/L 134 135 134   POTASSIUM mmol/L 3.7 3.7 4.0   CHLORIDE mmol/L 102 103 102   TOTAL CO2 mmol/L 22.0 26.0 25.0   BUN mg/dL 18 15 12   CREATININE mg/dL 1.80* 2.00* 2.30*   GLUCOSE mg/dL 99 88 85   CALCIUM mg/dL 9.1 9.0 8.7           Radiology Data:   Imaging Results (last 24 hours)     Procedure Component Value Units Date/Time    US Renal Limited [[id number]] Collected:  03/21/17 1103     Updated:  03/21/17 1131    Narrative:       EXAMINATION: US RENAL LIMITED- 03/20/2017     INDICATION: S71.002A-Unspecified open wound, left hip, initial  encounter; S71.102A-Unspecified open wound, left thigh, initial  encounter; Z74.09-Other reduced mobility; acute renal insufficiency     TECHNIQUE: Multiple images through the retroperitoneum were obtained  with a 4 MHz probe.     COMPARISON: NONE     FINDINGS:   Right kidney:  The right kidney measures 12.5 x 6.8 x 7.5 cm. There is no renal mass or  hydronephrosis.      Left kidney:  The left kidney measures 12.2 x 8.4 x 6.6 cm. There is no renal mass or  hydronephrosis.     The bladder is mostly collapsed.       Impression:       No evidence of obstructive uropathy or renal pathology.     D:  03/21/2017  E:  03/21/2017         This report was finalized on 3/21/2017 11:29 AM by Dr. Thomas Briggs MD.               I have reviewed the medications.  Scheduled Meds:  aspirin 81 mg Oral Daily   atorvastatin 40 mg Oral Daily   budesonide-formoterol 2 puff Inhalation BID - RT   cholecalciferol 2,000 Units Oral Daily   clopidogrel 75 mg Oral Daily   ferrous sulfate 325 mg Oral BID With Meals   heparin (porcine) 5,000 Units Subcutaneous Q8H   insulin lispro 2-7 Units Subcutaneous 4x Daily AC & at Bedtime   linezolid 600 mg Oral Q12H   meropenem 1 g Intravenous Q8H   nystatin  Topical Q12H   pantoprazole 40 mg Oral Every Other Day   probiotic 1 capsule Oral Daily   sodium hypochlorite  Topical BID   tamsulosin 0.4 mg Oral Daily     Continuous Infusions:   PRN Meds:.bisacodyl  •  bisacodyl  •  calcium carbonate  •  dextrose  •  dextrose  •  glucagon (human recombinant)  •  HYDROcodone-acetaminophen  •  HYDROmorphone **AND** naloxone  •  ipratropium-albuterol  •  ondansetron **OR** ondansetron  •  oxyCODONE-acetaminophen  •  promethazine **OR** promethazine **OR** promethazine  •  sennosides-docusate sodium  •  sodium chloride  •  sodium chloride  •  tiZANidine      Assessment/Plan     Problem List  Principal Problem:    Open wound of left hip and thigh with complication  Active Problems:    COPD (chronic obstructive pulmonary disease)    Diabetes mellitus    Hypertension    Peripheral vascular disease    LANA (acute kidney injury)    Coronary artery disease involving native heart    Iron deficiency anemia    Coal workers pneumoconiosis    Leg wound, left    Assessment/Plan:    Left leg wound  -s/p femoral stenting/angioplasty of left superficial artery, PT following for wound vac   -Changed to  IV merrem and continued PO linezolid yesterday per ID (per Renal recs to adjust abx for renal fxn)  -ID following  -PICC to RUE in place   -Wound cx positive for coag-negative staph and pseudo  -s/p I&D per Dr. Rosa on 3/14/17  -PT following. Wound vac came off this am.  Replacing per PT and pending home vac as noted.   -CM working on arranging home health/with wound vac at [place]. Still pending insurance approval for home wound vac as of this afternoon.       LANA (slowly improving)  -creatinine down today (1.2 on 3/16/17, then as high as 2.3) down to 2.0 yesterday and now 1.8 today. Poss ATN/AIN due to abx for infection/multifactoral--monitor labs). ID following for abx mgmt.   -consulted nephrology yesterday.  Pt seen by Dr Umaña.  Labs, renal u/s, and and echo ordered.   -Renal u/s with no obvious pathology  -Lasix dose held for now. Continue to hold per renal recs  -recheck cbc/bmp in am  -improvement to LANA today with creatinine now 1.8 but no prior CKD.   -am cmp/cbc     Bilateral LE edema (acute on chronic)  -still with 3-4+ pitting BLE edema  -elevate BLE  -continues to be very edematous.  No increased SOA  At rest but some dyspnea with exertion.  Renal recs NO diuretics for now.        T2DM  -glucoses stable  -Holding orals   -A1c on 3/13/17= 5.3%  -SSI, Accuchecks      COPD, coal worker's pneumoconiosis  -stable      HTN  -Holding ACE-I, HCTZ due to LANA (and BP wnl)  -Monitor, add PRNs if needed      Iron deficiency anemia  -Chronic, stable  -Continue to monitor  -iron studies ok      Heart Murmur/CAD  -followed by cardiology ([facility])  -checking echo per Renal/cards recs---done but pending       Scrotal irritation  --add nystatin cream  -barrier cream  -keep dry           DVT prophylaxis: heparin, teds, scds  Discharge Planning: I expect patient to be discharged once LANA resolved/stable to home with home health.                  Areli Gduino, APRN   03/21/17   4:24 PM    Please note that  portions of this note may have been completed with a voice recognition program. Efforts were made to edit the dictations, but occasionally words are mistranscribed.      Addendum:  -echo w/ bicuspid aortic valve w/ stenosis. Discussed w/ dr. Rosa; recommended get cardiology involved. May need repair in future after recovers from this infection. Will consult cards tomorrow morning for medical recs for the valvular disease and input regarding need for surgical eval.     Electronically signed by Irvin Garzon MD at 3/21/2017  7:51 PM      GLORIA Bryant at 3/21/2017  4:24 PM  Version 1 of 2             [facility]  INPATIENT PROGRESS NOTE    Date of Admission: 3/12/2017  Length of Stay: 9  Primary Care Physician: Dewayne Cole MD    Subjective     CC: follow-up Lt groin abscess/cellulitis     HPI:  Patient is seen at 9:30 AM resting upright in chair in no acute distress.  Wife at bedside.  Patient up to bathroom and notedly increased shortness of air with exertion.  Still complaining of bilateral lower extremity edema, unchanged.  Tolerating new antibiotic without noted difficulty.  Has undergone echo and renal ultrasound today with echo results pending.  As nausea, vomiting, chest pain or shortness of air at rest.  Ports his left groin wound VAC came loose this morning and quit working.  Awaiting PT to replace wound VAC.  Currently with dressing in place.  No new issues.      Review of Systems  Constitutional: Negative for chills and fever.   HENT: Negative for trouble swallowing.   Respiratory: Negative for cough and chest tightness.   + for dyspnea on exertion.   Cardiovascular: Negative for chest pain. Positive for BLE swelling (unchanged)   Gastrointestinal: Negative for abdominal pain, constipation, diarrhea and vomiting. Positive for mild intermittent nausea (but reports better today).    Genitalia-negative for pain, discharge. + for scrotal  irritation from BM and sweating. Mild itching today.        Objective      Temp:  [98 °F (36.7 °C)-98.2 °F (36.8 °C)] 98.1 °F (36.7 °C)  Heart Rate:  [78-87] 86  Resp:  [16-18] 16  BP: (125-165)/(64-76) 165/76     Physical Exam    Constitutional: Awake, He is oriented to person, place, and time. Resting upright in chair. He appears well-developed and well-nourished. No distress. Wife at bs.   HENT:   Head: Normocephalic. Atraumatic   Eyes: Pupils are equal, round, and reactive to light.   Neck: Normal range of motion. Supple. Trachea midline   Cardiovascular: Normal rate and regular rhythm. Faint Murmur heard.  Pulmonary/Chest: Effort normal and breath sounds normal. He has no wheezes. Decreased bilaterally, likely due to body habitus however noted BLE edema also. No rales appreciated currently.   Abdominal: Soft. Morbidly obese. Bowel sounds are normal. He exhibits no distension. There is no tenderness.   Musculoskeletal: Normal range of motion. BLE 3-4+ pitting edema (equal bilaterally) Unchanged over the last 2 days.  Neurological: He is alert and oriented to person, place, and time. Obeys all   Skin: Left groin with large bandage clean, dry and intact.  Had a wound vac placed yesterday but reports it came loose this am.  Currently with drsg only in place awaiting PT.   Psychiatric: He has a normal mood and affect. His behavior is normal. Calm and cooperative. Pleasant.     Results Review:    I have reviewed the labs, radiology results and diagnostic studies.      Results from last 7 days  Lab Units 03/21/17  0716   WBC 10*3/mm3 6.53   HEMOGLOBIN g/dL 9.5*   PLATELETS 10*3/mm3 186       Results from last 7 days  Lab Units 03/21/17  0716 03/20/17  1004 03/19/17  0609   SODIUM mmol/L 134 135 134   POTASSIUM mmol/L 3.7 3.7 4.0   CHLORIDE mmol/L 102 103 102   TOTAL CO2 mmol/L 22.0 26.0 25.0   BUN mg/dL 18 15 12   CREATININE mg/dL 1.80* 2.00* 2.30*   GLUCOSE mg/dL 99 88 85   CALCIUM mg/dL 9.1 9.0 8.7            Radiology Data:   Imaging Results (last 24 hours)     Procedure Component Value Units Date/Time    US Renal Limited [[id number]] Collected:  03/21/17 1103     Updated:  03/21/17 1131    Narrative:       EXAMINATION: US RENAL LIMITED- 03/20/2017     INDICATION: S71.002A-Unspecified open wound, left hip, initial  encounter; S71.102A-Unspecified open wound, left thigh, initial  encounter; Z74.09-Other reduced mobility; acute renal insufficiency     TECHNIQUE: Multiple images through the retroperitoneum were obtained  with a 4 MHz probe.     COMPARISON: NONE     FINDINGS:   Right kidney:  The right kidney measures 12.5 x 6.8 x 7.5 cm. There is no renal mass or  hydronephrosis.     Left kidney:  The left kidney measures 12.2 x 8.4 x 6.6 cm. There is no renal mass or  hydronephrosis.     The bladder is mostly collapsed.       Impression:       No evidence of obstructive uropathy or renal pathology.     D:  03/21/2017  E:  03/21/2017         This report was finalized on 3/21/2017 11:29 AM by Dr. Thomas Briggs MD.               I have reviewed the medications.  Scheduled Meds:  aspirin 81 mg Oral Daily   atorvastatin 40 mg Oral Daily   budesonide-formoterol 2 puff Inhalation BID - RT   cholecalciferol 2,000 Units Oral Daily   clopidogrel 75 mg Oral Daily   ferrous sulfate 325 mg Oral BID With Meals   heparin (porcine) 5,000 Units Subcutaneous Q8H   insulin lispro 2-7 Units Subcutaneous 4x Daily AC & at Bedtime   linezolid 600 mg Oral Q12H   meropenem 1 g Intravenous Q8H   nystatin  Topical Q12H   pantoprazole 40 mg Oral Every Other Day   probiotic 1 capsule Oral Daily   sodium hypochlorite  Topical BID   tamsulosin 0.4 mg Oral Daily     Continuous Infusions:   PRN Meds:.bisacodyl  •  bisacodyl  •  calcium carbonate  •  dextrose  •  dextrose  •  glucagon (human recombinant)  •  HYDROcodone-acetaminophen  •  HYDROmorphone **AND** naloxone  •  ipratropium-albuterol  •  ondansetron **OR** ondansetron  •   oxyCODONE-acetaminophen  •  promethazine **OR** promethazine **OR** promethazine  •  sennosides-docusate sodium  •  sodium chloride  •  sodium chloride  •  tiZANidine      Assessment/Plan     Problem List  Principal Problem:    Open wound of left hip and thigh with complication  Active Problems:    COPD (chronic obstructive pulmonary disease)    Diabetes mellitus    Hypertension    Peripheral vascular disease    LANA (acute kidney injury)    Coronary artery disease involving native heart    Iron deficiency anemia    Coal workers pneumoconiosis    Leg wound, left    Assessment/Plan:    Left leg wound  -s/p femoral stenting/angioplasty of left superficial artery, PT following for wound vac   -Changed to IV merrem and continued PO linezolid yesterday per ID (per Renal recs to adjust abx for renal fxn)  -ID following  -PICC to RUE in place   -Wound cx positive for coag-negative staph and pseudo  -s/p I&D per Dr. Rosa on 3/14/17  -PT following. Wound vac came off this am.  Replacing per PT and pending home vac as noted.   -CM working on arranging home health/with wound vac at [place]. Still pending insurance approval for home wound vac as of this afternoon.       LANA (slowly improving)  -creatinine down today (1.2 on 3/16/17, then as high as 2.3) down to 2.0 yesterday and now 1.8 today. Poss ATN/AIN due to abx for infection/multifactoral--monitor labs). ID following for abx mgmt.   -consulted nephrology yesterday.  Pt seen by Dr Umaña.  Labs, renal u/s, and and echo ordered.   -Renal u/s with no obvious pathology  -Lasix dose held for now. Continue to hold per renal recs  -recheck cbc/bmp in am  -improvement to LANA today with creatinine now 1.8 but no prior CKD.   -am cmp/cbc     Bilateral LE edema (acute on chronic)  -still with 3-4+ pitting BLE edema  -elevate BLE  -continues to be very edematous.  No increased SOA  At rest but some dyspnea with exertion.  Renal recs NO diuretics for now.        T2DM  -glucoses  stable  -Holding orals   -A1c on 3/13/17= 5.3%  -SSI, Accuchecks      COPD, coal worker's pneumoconiosis  -stable      HTN  -Holding ACE-I, HCTZ due to LANA (and BP wnl)  -Monitor, add PRNs if needed      Iron deficiency anemia  -Chronic, stable  -Continue to monitor  -iron studies ok      Heart Murmur/CAD  -followed by cardiology ([place])  -checking echo per Renal/cards recs---done but pending       Scrotal irritation  --add nystatin cream  -barrier cream  -keep dry           DVT prophylaxis: heparin, teds, scds  Discharge Planning: I expect patient to be discharged once LANA resolved/stable to home with home health.                  GLORIA Bryant   17   4:24 PM    Please note that portions of this note may have been completed with a voice recognition program. Efforts were made to edit the dictations, but occasionally words are mistranscribed.       Electronically signed by GLORIA Bryant at 3/21/2017  4:52 PM      David Nguyne MD at 3/21/2017  6:19 PM  Version 1 of 1         [facility]    INPATIENT PROGRESS NOTE  3/21/2017      PATIENT NAME: Sai Weinberg  :  1960  MRN:  [id number]  Date of Admission:  3/12/2017      Antimicrobials:  linezolid 600 mg po q12h  Meropenem 1 g iv q8h    MAR reviewed.  Pertinent other medications include:  Plavix, insulin, probiotic      Reason for consultation:  Left groin/thigh abscess and cellulitis, status post recent left femoral cutdown; Pseudomonas and MRSE    Interval history:  Wound VAC came loose and had to be replaced today.  No fevers.  Still with some soft stool but no diarrhea.  No issues with change to meropenem yesterday.  No issues with PICC.    ROS:  No fevers/chills overnight.  No new rashes.  No SOB or chest pain.  No nausea/vomiting/diarrhea.  Denies side effects from antimicrobials.      Objective:  Temp (24hrs), Av.1 °F (36.7 °C), Min:98 °F (36.7 °C), Max:98.2 °F (36.8 °C)    Visit  "Vitals   • /76   • Pulse 86   • Temp 98.1 °F (36.7 °C) (Oral)   • Resp 16   • Ht 65\" (165.1 cm)   • Wt 253 lb (115 kg)   • SpO2 91%   • BMI 42.1 kg/m2       Physical Examination:  GENERAL: Awake and alert, in no acute distress.   LINES: right UE PICC.  CV:  RRR, normal S1S2  Lungs:  CTAB, no wheezing.  On RA.  MSK: Left thigh with improved edema and nearly resolved erythema; no warmth; mild edema. +wound VAC over the left groin wound.  SKIN: Warm and dry without rash.  EXT: Chronic 2+ B JACQUELINE. Dry skin on legs.  NEURO: A&Ox4. No focal deficits. Face symmetric. Speech fluent. Moves all extremities well.   PSYCHIATRIC: Normal insight and judgement. Cooperative. Normal affect.    Laboratory Data:      Results from last 7 days  Lab Units 03/21/17  0716 03/20/17  1004 03/18/17  0521 03/17/17  0549   WBC 10*3/mm3 6.53  --  4.78 4.35   HEMOGLOBIN g/dL 9.5* 9.1* 9.1* 9.1*   HEMATOCRIT % 27.6* 26.8* 26.6* 27.3*   PLATELETS 10*3/mm3 186  --  184 187       Results from last 7 days  Lab Units 03/21/17  0716   SODIUM mmol/L 134   POTASSIUM mmol/L 3.7   CHLORIDE mmol/L 102   TOTAL CO2 mmol/L 22.0   BUN mg/dL 18   CREATININE mg/dL 1.80*   GLUCOSE mg/dL 99   CALCIUM mg/dL 9.1       Results from last 7 days  Lab Units 03/21/17  0716   ALK PHOS U/L 49   BILIRUBIN mg/dL 0.8   ALT (SGPT) U/L 25   AST (SGOT) U/L 44*             Estimated Creatinine Clearance: 53.7 mL/min (by C-G formula based on Cr of 1.8).                      Microbiology:   none new    Radiology:    Updated:  03/21/17 1131    Narrative:       EXAMINATION: US RENAL LIMITED- 03/20/2017     INDICATION: S71.002A-Unspecified open wound, left hip, initial  encounter; S71.102A-Unspecified open wound, left thigh, initial  encounter; Z74.09-Other reduced mobility; acute renal insufficiency     TECHNIQUE: Multiple images through the retroperitoneum were obtained  with a 4 MHz probe.     COMPARISON: NONE     FINDINGS:   Right kidney:  The right kidney measures 12.5 x 6.8 x " 7.5 cm. There is no renal mass or  hydronephrosis.     Left kidney:  The left kidney measures 12.2 x 8.4 x 6.6 cm. There is no renal mass or  hydronephrosis.     The bladder is mostly collapsed.       Impression:       No evidence of obstructive uropathy or renal pathology.     D:  03/21/2017  E:  03/21/2017         This report was finalized on 3/21/2017 11:29 AM by Dr. Thomas Briggs MD.               DISCUSSION:  56 y.o. male with history of diabetes and peripheral vascular disease who recently underwent a left femoral cutdown with angioplasty and stent placement is admitted with postoperative left thigh cellulitis and wound dehiscence with seroma versus abscess formation. Incision and drainage performed with 200 milliliters of serous fluid. Exam is consistent with a mild cellulitis of the left thigh. Patient reported copious amounts of serosanguineous drainage at home for 2-3 weeks.      PROBLEM LIST:   1. Postoperative left femoral fluid collection, seroma plus likely abscess, status post recent left femoral cutdown with left superficial femoral artery angioplasty with stent (no graft). Status post incision and drainage with 200 mL serous fluid removed. Culture is positive for Pseudomonas (S-FQ, Zosyn, meropenem; intermediate to cefepime and aztreonam; resistant to ceftazidime) and Staph epi, methicillin resistant.  2. Left thigh cellulitis, related to #1. Slowly improving.  3. Controlled diabetes mellitus type 2.  4. Obesity.  5. Peripheral vascular disease, status post prior right femoral to popliteal bypass graft and more recent left femoral cutdown with angioplasty and stent.  6. Prolonged QTc. Pseudomonas is susceptible to fluoroquinolone but patient with prolonged QTc on EKG, so cannot use oral Levaquin.  7. LANA, new; continued improvement. Possible vancomycin and/or Zosyn contributing.  8. Loose stool. No crampy abd pain, no fevers, no leukocytosis. Probably antibiotic associated.      PLAN:  1. Continue  with meropenem 1 g iv, up to q8h now that renal function improving, for Psuedomonas.  2. Continue linezolid 600 mg po q12h for MRSE; no vancomycin d/t concern for contributing to LANA.  3. wound VAC.  4. probiotic.  5. Hopefully will be able to discharge in next 1-2 days.  Tentatively abx through 3/31.  Will have CM look into meropenem pricing for home infusion.    David Nguyen MD  3/21/2017  6:19 PM      Portions of this note may have been created with a voice recognition program.  Efforts were made to edit the dictations.  However, words are occassionally mistranscribed and sound alike errors may occur.       Electronically signed by David Nguyen MD at 3/21/2017  6:29 PM      Heladio Rosa MD at 3/22/2017  6:58 AM  Version 1 of 1         CTS Progress Note       LOS: 10 days   Patient Care Team:  Dewayne Cole MD as PCP - General  Dewayne Cole MD as PCP - Family Medicine        Vital Signs  Temp:  [98 °F (36.7 °C)-98.6 °F (37 °C)] 98 °F (36.7 °C)  Heart Rate:  [79-86] 83  Resp:  [16-18] 18  BP: (134-148)/(73-75) 134/75    Physical Exam: Breathing unlabored feet warm and viable       Results     Results from last 7 days  Lab Units 03/21/17  0716   WBC 10*3/mm3 6.53   HEMOGLOBIN g/dL 9.5*   HEMATOCRIT % 27.6*   PLATELETS 10*3/mm3 186       Results from last 7 days  Lab Units 03/21/17  0716   SODIUM mmol/L 134   POTASSIUM mmol/L 3.7   CHLORIDE mmol/L 102   TOTAL CO2 mmol/L 22.0   BUN mg/dL 18   CREATININE mg/dL 1.80*   GLUCOSE mg/dL 99   CALCIUM mg/dL 9.1           Imaging Results (last 24 hours)     Procedure Component Value Units Date/Time    US Renal Limited [[id number]] Collected:  03/21/17 1103     Updated:  03/21/17 1131    Narrative:       EXAMINATION: US RENAL LIMITED- 03/20/2017     INDICATION: S71.002A-Unspecified open wound, left hip, initial  encounter; S71.102A-Unspecified open wound, left thigh, initial  encounter; Z74.09-Other reduced mobility; acute renal insufficiency     TECHNIQUE:  Multiple images through the retroperitoneum were obtained  with a 4 MHz probe.     COMPARISON: NONE     FINDINGS:   Right kidney:  The right kidney measures 12.5 x 6.8 x 7.5 cm. There is no renal mass or  hydronephrosis.     Left kidney:  The left kidney measures 12.2 x 8.4 x 6.6 cm. There is no renal mass or  hydronephrosis.     The bladder is mostly collapsed.       Impression:       No evidence of obstructive uropathy or renal pathology.     D:  03/21/2017  E:  03/21/2017         This report was finalized on 3/21/2017 11:29 AM by Dr. Thomas Briggs MD.             Assessment    Principal Problem:    Open wound of left hip and thigh with complication  Active Problems:    COPD (chronic obstructive pulmonary disease)    Diabetes mellitus    Hypertension    Peripheral vascular disease    LANA (acute kidney injury)    Coronary artery disease involving native heart    Iron deficiency anemia    Coal workers pneumoconiosis    Leg wound, left    Patient wants to go home.  Still in process of arranging for IV antibiotics.  He does have aortic valvular stenosis and bicuspid aortic valve and chronic shortness of breath of which certainly sound is related to his obesity and COPD but also could be related to his aortic valvular stenosis.  The patient will at some point need aortic valve replacement but this can easily be done in 3 months to 6 months once all infection issues have completely resolved.    Plan   Home any time from my standpoint    Please note that portions of this note were completed with a voice recognition program. Efforts were made to edit the dictations, but occasionally words are mistranscribed.    Heladio Rosa MD  03/22/17  6:58 AM         Electronically signed by Heladio Rosa MD at 3/22/2017  6:59 AM

## 2017-03-22 NOTE — DISCHARGE SUMMARY
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       Presenting Problem/History of Present Illness  Leg wound, left [S81.802A]     Chief Complaint on Day of Discharge: follow-up Lt groin abscess/cellulitis     History of Present Illness on Day of Discharge:   Patient is seen sitting upright in chair this a.m. in no acute distress.  Wife at bedside.  Reports his bilateral lower extremity swelling has somewhat improved.  Tolerating diet.  Denies nausea, vomiting, chest pain, shortness of air at rest.  Still mild dyspnea with exertion but somewhat improved today as well.  Left groin wound VAC in place and intact currently being changed by PT to a home wound VAC for discharge likely today.  No new issues feels ready for discharge home with home health, wound VAC, IV antibiotics at home infusion as planned.    Hospital Course  Patient is a 56 y.o. male past medical history significant for black lung disease, BPH, chronic obstructive lung disease, diabetes type 2, arthritis, dyslipidemia, reflux, MI in 2006, hypertension, colon cancer, PVD, and sleep apnea with CPAP use.  Patient was  "transferred from [facility] to [facility] on 3/11 for a left lower extremity cellulitis associated with a recent left superficial artery cutdown/angioplasty/stent performed by Dr. Rosa on 1/18/17.  Today it did not follow-up with Dr. Rosa as scheduled.  He reported approximately 3 weeks after the procedure his wound \"broke open\" and began draining pink liquid to pure blood and has continued since.  Developed pain and warmth.  Saw his PCP on 3/6 is given antibiotics.  Follow-up with PCP 3/10 showed no improvement and patient was seen and [place] [facility].  He was given IV Vanco and Zosyn had negative venous duplex for DVTs and was transferred to [facility] for higher level of care and to follow-up with Dr. Rosa his primary surgeon.    His admitted to hospital medicine service and CT surgery was consulted and followed.  Continued on IV antibiotics her infectious disease management.  PICC line was placed.  Dr. Rosa performed an I & D to his left groin site on 3/14 and wound cultures were obtained.  Due to having 3-4+ bilateral lower extremity edema.  Mild shortness of air with exertion.  His creatinine elevated from a baseline of 1.2 to a high of 2.3.  Nephrology was consulted.  [place] likely secondary to ATN/AIN from antibiotics.  Infectious disease change antibiotic therapy and his creatinine has trended down.  Today creatinine is 1.6.  His diuretics and Ace have been held during hospital stay secondary to the above.  He did have an echocardiogram completed due to his shortness of air and lower extremity edema which did reveal aortic valvular stenosis and bicuspid aortic valve.  It is chronic shortness of air and chronic edema it is possible that this is multifactorial however could be related to his COPD/obesity and/or possibly due to his valvular stenosis.  He will follow-up outpatient with his PCP who is also his cardiologist for ongoing management.  Recommendations per CT surgery during " hospital stay were to consider aortic valve replacement in approximately 3-6 months once all infection issues have completely resolved.    Patient was followed by PT during hospital stay and had a wound VAC placed to his left groin site.  Insurance has now improved a home wound VAC and home health/PT/wound care, and PICC line management and care have been arranged for breath at [facility] with plans to discharge home today.  And cleared for discharge from nephrology standpoint off of his face and diuretics with instructions to take his home dose of Lasix for any increased weight gain of 3-4 pounds and to notify his PCP if this is required.  He is currently hemodynamically stable and afebrile off of these medications.  Per infectious disease recommendations patient will discharge home on Merrem 1 g IV every 8 via PICC line home infusion and Zyvox 600 mg by mouth every 12 as well as probiotics.  Recommended duration for antibiotic therapy at least until 3/31/17.  Plans are to discharge home today to follow up with CT surgery, nephrology, infectious disease, and PCP/cardiology as listed below.    Procedures Performed  Procedure(s):  INCISION AND DRAINAGE LEFT GROIN HEMATOMA       Consults:   Consults     Date and Time Order Name Status Description    3/21/2017 1950 Inpatient Consult to Cardiology      3/20/2017 1225 Inpatient Consult to Nephrology      3/13/2017 1145 Inpatient Consult to Infectious Diseases Completed     3/12/2017 2158 Inpatient Consult to Cardiothoracic Surgery Completed           Pertinent Test Results:   Imaging Results (last 24 hours)     ** No results found for the last 24 hours. **          Results from last 7 days  Lab Units 03/22/17  1027   WBC 10*3/mm3 4.15   HEMOGLOBIN g/dL 9.1*   HEMATOCRIT % 26.8*   PLATELETS 10*3/mm3 163       Results from last 7 days  Lab Units 03/22/17  1027   SODIUM mmol/L 135   POTASSIUM mmol/L 3.5   CHLORIDE mmol/L 102   TOTAL CO2 mmol/L 29.0   BUN mg/dL 16  "  CREATININE mg/dL 1.60*   GLUCOSE mg/dL 118*   CALCIUM mg/dL 9.4         Condition on Discharge:  Stable     Physical Exam on Discharge:/71  Pulse 89  Temp 98.5 °F (36.9 °C) (Oral)   Resp 16  Ht 65\" (165.1 cm)  Wt 253 lb (115 kg)  SpO2 91%  BMI 42.1 kg/m2     Physical Exam  Constitutional: Awake, He is oriented to person, place, and time. Resting upright in chair. He appears well-developed and well-nourished. No distress. Wife at .   HENT:   Head: Normocephalic. Atraumatic   Eyes: Pupils are equal, round, and reactive to light.   Neck: Normal range of motion. Supple. Trachea midline   Cardiovascular: Normal rate and regular rhythm. Faint Murmur heard.  Pulmonary/Chest: Effort normal and breath sounds normal. He has no wheezes. Decreased bilaterally, likely due to body habitus however noted BLE edema also. No rales appreciated currently.   Abdominal: Soft. Morbidly obese. Bowel sounds are normal. He exhibits no distension. There is no tenderness.   Musculoskeletal: Normal range of motion. BLE 3-4+ pitting edema (equal bilaterally) Unchanged over the last 2 days to my exam however pt reports \"a little better today\".  Neurological: He is alert and oriented to person, place, and time. Obeys all commands  Skin: Left groin with wound vac, dry and intact. PT at  changing out to home wound vac now.  Psychiatric: He has a normal mood and affect. His behavior is normal. Calm and cooperative. Pleasant.        Discharge Disposition  Home or Self Care    Discharge Medications   Sai Weinberg   Home Medication Instructions ELIZABETH:[id number]    Printed on:03/22/17 1442   Medication Information                      albuterol (PROVENTIL HFA;VENTOLIN HFA) 108 (90 BASE) MCG/ACT inhaler  Inhale 2 puffs Every 4 (Four) Hours As Needed for wheezing.             aspirin 81 MG tablet  Take 81 mg by mouth daily.             atorvastatin (LIPITOR) 40 MG tablet  Take 40 mg by mouth daily.             Cholecalciferol (VITAMIN " D PO)  Take 2,000 Units by mouth Daily.             clopidogrel (PLAVIX) 75 MG tablet  Take 75 mg by mouth daily.             ferrous sulfate 324 (65 FE) MG tablet delayed-release EC tablet  Take 324 mg by mouth Daily With Breakfast.             fluticasone-salmeterol (ADVAIR) 100-50 MCG/DOSE DISKUS  Inhale 2 puffs 2 (Two) Times a Day.             linezolid (ZYVOX) 600 MG tablet  Take 1 tablet by mouth Every 12 (Twelve) Hours. See handwritten rx per ID  Indications: Skin and Soft Tissue Infection             meropenem (MERREM) 1 g/100 mL 0.9% NS VTB (mbp)  Infuse 100 mL into a venous catheter Every 8 (Eight) Hours. Indications: Skin and Soft Tissue Infection             metFORMIN (GLUCOPHAGE) 500 MG tablet  Take 850 mg by mouth 2 (Two) Times a Day With Meals.             nystatin (MYCOSTATIN) 404712 UNIT/GM cream  Apply  topically Every 12 (Twelve) Hours for 6 days.             oxyCODONE-acetaminophen (PERCOCET) 7.5-325 MG per tablet  Take 1 tablet by mouth Every 6 (Six) Hours As Needed for Severe Pain (7-10) for up to 10 days.             pantoprazole (PROTONIX) 40 MG EC tablet  Take 40 mg by mouth Every Other Day.             probiotic (CULTURELLE) capsule capsule  Take 1 capsule by mouth Daily.             sodium hypochlorite (DAKIN'S) 0.025 % solution topical solution  Apply 1,000 mL topically 2 (Two) Times a Day. Wound care per  with wound vac.  Drsg changes per PT recs             tamsulosin (FLOMAX) 0.4 MG capsule 24 hr capsule  Take 0.4 mg by mouth Daily.             tiZANidine (ZANAFLEX) 4 MG tablet  Take 4 mg by mouth At Night As Needed for muscle spasms.                 Discharge Diet:   Diet Instructions     Diet: Regular, Cardiac, Consistent Carbohydrate; Thin Liquids, No Restrictions       Discharge Diet:   Regular  Cardiac  Consistent Carbohydrate      Fluid Consistency:  Thin Liquids, No Restrictions                 Discharge Care Plan / Instructions:    Activity at Discharge:   Activity  Instructions     Activity as Tolerated                     Follow-up Appointments  Future Appointments  Date Time Provider Department Center   4/5/2017 8:00 AM Heladio Rosa MD [facility] None     Additional Instructions for the Follow-ups that You Need to Schedule     Ambulatory Referral to Home Health    As directed    Face to Face Visit Date:  3/17/2017   Follow-up Provider for Plan of Care?:  I treated the patient in an acute care facility and will not continue treatment after discharge.   Follow-up Provider:  SELINA LIPSCOMB   Reason/Clinical Findings:  wound care/dressing changes and teaching, home infusion abx   Describe mobility limitations that make leaving home difficult:  Continuous antibiotic infusions   Nursing/Therapeutic Services Requested:  Skilled Nursing   Skilled nursing orders:   Wound care dressing/changes Comment - Zosyn infusion provided by [facility]  Infusion therapy      Instructions:  Dressing changes to left leg wound BID wet to dry with normal saline and wound management/teaching   Frequency:  Other       Discharge Follow-Up With Specified Provider    As directed    To:  Dr Umaña per his recs in 2-3 weeks       Discharge Follow-up with PCP    As directed    Follow Up Details:  1 week (for general f/u care and for cardiac care--Pts PCP is his cardiologist- Dr Rodriguez)       Discharge Follow-up with Specialty    As directed    Specialty:  Dr Nguyen ([facility]) per her recs       Discharge Follow-up with Specified Provider    As directed    To:  David Nguyen MD at [facility]   Follow Up:  1 Week                    Areli Gudino, APRN 03/22/17 2:42 PM    Time: 65 minutes    Please note that portions of this note may have been completed with a voice recognition program. Efforts were made to edit the dictations, but occasionally words are mistranscribed.  I supervised care of the patient on day of discharge with direct care provided by the advanced care provider  (APC).

## 2017-03-22 NOTE — PROGRESS NOTES
"[facility]    INPATIENT PROGRESS NOTE  3/22/2017      PATIENT NAME: Sai Weinberg  :  1960  MRN:  [id number]  Date of Admission:  3/12/2017      Antimicrobials:  linezolid 600 mg po bid  Meropenem 1 g iv q8h    MAR reviewed.  Pertinent other medications include:  Plavix, insulin, probiotic      Reason for consultation:  Left groin/thigh abscess and cellulitis, status post recent left femoral cutdown; Pseudomonas and MRSE    Interval history:  Doing well.  No new events.  Tolerating meropenem well.  No issues with PICC.  No nausea or vomiting.  No diarrhea.  No rashes.  Plans for discharge today.  Wound VAC and IV meropenem has been approved for home.    ROS:  No fevers/chills overnight.  No new rashes.  No SOB or chest pain.  No nausea/vomiting/diarrhea.  Denies side effects from antimicrobials.      Objective:  Temp (24hrs), Av.3 °F (36.8 °C), Min:98 °F (36.7 °C), Max:98.6 °F (37 °C)    /71  Pulse 89  Temp 98.5 °F (36.9 °C) (Oral)   Resp 16  Ht 65\" (165.1 cm)  Wt 253 lb (115 kg)  SpO2 91%  BMI 42.1 kg/m2    Physical Examination:  GENERAL: Awake and alert, in no acute distress. Sitting up in chair.  LINES: right UE PICC.  EXT: Chronic 2+ B JACQUELINE. Dry skin on legs.  NEURO: A&Ox4. No focal deficits. Face symmetric. Speech fluent. Moves all extremities well.   PSYCHIATRIC: Normal insight and judgement. Cooperative. Normal affect.    Laboratory Data:      Results from last 7 days  Lab Units 17  1027 17  0716 17  1004 17  0521   WBC 10*3/mm3 4.15 6.53  --  4.78   HEMOGLOBIN g/dL 9.1* 9.5* 9.1* 9.1*   HEMATOCRIT % 26.8* 27.6* 26.8* 26.6*   PLATELETS 10*3/mm3 163 186  --  184       Results from last 7 days  Lab Units 17  1027   SODIUM mmol/L 135   POTASSIUM mmol/L 3.5   CHLORIDE mmol/L 102   TOTAL CO2 mmol/L 29.0   BUN mg/dL 16   CREATININE mg/dL 1.60*   GLUCOSE mg/dL 118*   CALCIUM mg/dL 9.4     Creatinine down from 1.8 " yesterday      Results from last 7 days  Lab Units 03/21/17  0716   ALK PHOS U/L 49   BILIRUBIN mg/dL 0.8   ALT (SGPT) U/L 25   AST (SGOT) U/L 44*             Estimated Creatinine Clearance: 60.4 mL/min (by C-G formula based on Cr of 1.6).                  Microbiology:  None new        DISCUSSION:  56 y.o. male with history of diabetes and peripheral vascular disease who recently underwent a left femoral cutdown with angioplasty and stent placement is admitted with postoperative left thigh cellulitis and wound dehiscence with seroma versus abscess formation. Incision and drainage performed with 200 milliliters of serous fluid. Exam is consistent with a mild cellulitis of the left thigh. Patient reported copious amounts of serosanguineous drainage at home for 2-3 weeks.      PROBLEM LIST:   1. Postoperative left femoral fluid collection, seroma plus likely abscess, status post recent left femoral cutdown with left superficial femoral artery angioplasty with stent (no graft). Status post incision and drainage with 200 mL serous fluid removed. Culture is positive for Pseudomonas (S-FQ, Zosyn, meropenem; intermediate to cefepime and aztreonam; resistant to ceftazidime) and Staph epi, methicillin resistant.  2. Left thigh cellulitis, related to #1. Slowly improving.  3. Controlled diabetes mellitus type 2.  4. Obesity.  5. Peripheral vascular disease, status post prior right femoral to popliteal bypass graft and more recent left femoral cutdown with angioplasty and stent.  6. Prolonged QTc. Pseudomonas is susceptible to fluoroquinolone but patient with prolonged QTc on EKG, so cannot use oral Levaquin.  7. LANA, new; continued improvement. Possible vancomycin and/or Zosyn contributing.  8. Loose stool. No crampy abd pain, no fevers, no leukocytosis. Probably antibiotic associated.      PLAN:  1. Continue with meropenem 1 g iv, up to q8h now that renal function improving, for Psuedomonas.  Avoiding Zosyn d/t possible  contribution to LANA.  2. Continue linezolid 600 mg po q12h for MRSE; no vancomycin d/t concern for contributing to LANA.  3. wound VAC per surgery.  4. OK for discharge today.    DISCHARGE ORDERS:  Okay from my perspective for discharge today.  Discharge plan discussed with patient, family, my office, and case management.  Patient is to be discharged on linezolid 600 mg by mouth twice a day and meropenem 1 g IV every 8 hours for tentatively through 3/31/2017. Antibiotics to be supplied by home infusion company and given via PICC.  Weekly labs to include CBC/diff, CMP, ESR, CRP and to be faxed to [facility] office at [phone] ATTN: Dr. Nguyen.    Weekly PICC dressing changes to be performed by home [facility].    Follow-up with me at [facility] office ([phone]) on 3/31/17.    Utilization management:  I spent a total of 30 minutes on coordination of care for this discharge.      David Nguyen MD  3/22/2017  2:57 PM      Portions of this note may have been created with a voice recognition program.  Efforts were made to edit the dictations.  However, words are occassionally mistranscribed and sound alike errors may occur.

## 2017-03-22 NOTE — PROGRESS NOTES
CTS Progress Note       LOS: 10 days   Patient Care Team:  Dewayne Cole MD as PCP - General  Dewayne Cole MD as PCP - Family Medicine        Vital Signs  Temp:  [98 °F (36.7 °C)-98.6 °F (37 °C)] 98 °F (36.7 °C)  Heart Rate:  [79-86] 83  Resp:  [16-18] 18  BP: (134-148)/(73-75) 134/75    Physical Exam: Breathing unlabored feet warm and viable       Results     Results from last 7 days  Lab Units 03/21/17  0716   WBC 10*3/mm3 6.53   HEMOGLOBIN g/dL 9.5*   HEMATOCRIT % 27.6*   PLATELETS 10*3/mm3 186       Results from last 7 days  Lab Units 03/21/17  0716   SODIUM mmol/L 134   POTASSIUM mmol/L 3.7   CHLORIDE mmol/L 102   TOTAL CO2 mmol/L 22.0   BUN mg/dL 18   CREATININE mg/dL 1.80*   GLUCOSE mg/dL 99   CALCIUM mg/dL 9.1           Imaging Results (last 24 hours)     Procedure Component Value Units Date/Time    US Renal Limited [[id number]] Collected:  03/21/17 1103     Updated:  03/21/17 1131    Narrative:       EXAMINATION: US RENAL LIMITED- 03/20/2017     INDICATION: S71.002A-Unspecified open wound, left hip, initial  encounter; S71.102A-Unspecified open wound, left thigh, initial  encounter; Z74.09-Other reduced mobility; acute renal insufficiency     TECHNIQUE: Multiple images through the retroperitoneum were obtained  with a 4 MHz probe.     COMPARISON: NONE     FINDINGS:   Right kidney:  The right kidney measures 12.5 x 6.8 x 7.5 cm. There is no renal mass or  hydronephrosis.     Left kidney:  The left kidney measures 12.2 x 8.4 x 6.6 cm. There is no renal mass or  hydronephrosis.     The bladder is mostly collapsed.       Impression:       No evidence of obstructive uropathy or renal pathology.     D:  03/21/2017  E:  03/21/2017         This report was finalized on 3/21/2017 11:29 AM by Dr. Thomas Briggs MD.             Assessment    Principal Problem:    Open wound of left hip and thigh with complication  Active Problems:    COPD (chronic obstructive pulmonary disease)    Diabetes mellitus     Hypertension    Peripheral vascular disease    LANA (acute kidney injury)    Coronary artery disease involving native heart    Iron deficiency anemia    Coal workers pneumoconiosis    Leg wound, left    Patient wants to go home.  Still in process of arranging for IV antibiotics.  He does have aortic valvular stenosis and bicuspid aortic valve and chronic shortness of breath of which certainly sound is related to his obesity and COPD but also could be related to his aortic valvular stenosis.  The patient will at some point need aortic valve replacement but this can easily be done in 3 months to 6 months once all infection issues have completely resolved.    Plan   Home any time from my standpoint    Please note that portions of this note were completed with a voice recognition program. Efforts were made to edit the dictations, but occasionally words are mistranscribed.    Heladio Rosa MD  03/22/17  6:58 AM

## 2017-03-22 NOTE — PROGRESS NOTES
"NAL Progress Note    3/12/2017        Reason for visit:  LANA, Leg wound    Feels well  ROS:    Pulm:  No SOA  CV:  No CP    I&O:    Intake/Output Summary (Last 24 hours) at 03/22/17 1332  Last data filed at 03/22/17 1300   Gross per 24 hour   Intake             1060 ml   Output             1100 ml   Net              -40 ml       PE:   Blood pressure 133/71, pulse 89, temperature 98.5 °F (36.9 °C), temperature source Oral, resp. rate 16, height 65\" (165.1 cm), weight 253 lb (115 kg), SpO2 91 %.    GENERAL: Awake and alert, in no acute distress.   HEENT: PER, No conjunctivitis  NECK: Supple, no JVD     HEART: RRR; No murmur, rubs, gallops.   LUNGS: Clear to auscultation bilaterally without wheezing, rales, rhonchi. Normal respiratory effort. Nonlabored. No dullness.  ABDOMEN: Soft, nontender, nondistended. Positive bowel sounds. No rebound or guarding. NO mass or HSM.  EXT:  No cyanosis, clubbing , +2edema. Left thigh has a drain  : Genitalia generally unremarkable.  Without Nunn catheter.  SKIN: Warm and dry without cutaneous eruptions on Inspection/palpation.    NEURO: Alert and oriented x 3, Motor 5/5 bilaterally    Medications:    Current Facility-Administered Medications:   •  aspirin chewable tablet 81 mg, 81 mg, Oral, Daily, ROSCOE Hatch, 81 mg at 03/22/17 0820  •  atorvastatin (LIPITOR) tablet 40 mg, 40 mg, Oral, Daily, Maribel Botello MD, 40 mg at 03/22/17 0820  •  bisacodyl (DULCOLAX) EC tablet 5 mg, 5 mg, Oral, Daily PRN, Maribel Botello MD  •  bisacodyl (DULCOLAX) suppository 10 mg, 10 mg, Rectal, Daily PRN, ROSCOE Hatch  •  budesonide-formoterol (SYMBICORT) 80-4.5 MCG/ACT inhaler 2 puff, 2 puff, Inhalation, BID - RT, Maribel Botello MD, 2 puff at 03/22/17 0947  •  calcium carbonate (TUMS) chewable tablet 500 mg (200 mg elemental), 2 tablet, Oral, BID PRN, Maribel Botello MD  •  cholecalciferol (VITAMIN D3) tablet 2,000 Units, 2,000 Units, Oral, Daily, Vangie Dangelo DO, 2,000 Units " at 03/22/17 0820  •  clopidogrel (PLAVIX) tablet 75 mg, 75 mg, Oral, Daily, ROSCOE Hatch, 75 mg at 03/22/17 0820  •  dextrose (D50W) solution 25 g, 25 g, Intravenous, Q15 Min PRN, Maribel Botello MD  •  dextrose (GLUTOSE) oral gel 15 g, 15 g, Oral, Q15 Min PRN, Maribel Botello MD  •  ferrous sulfate tablet 325 mg, 325 mg, Oral, BID With Meals, Maribel Botello MD, 325 mg at 03/22/17 0820  •  glucagon (GLUCAGEN) injection 1 mg, 1 mg, Subcutaneous, Q15 Min PRN, Maribel Botello MD  •  heparin (porcine) 5000 UNIT/ML injection 5,000 Units, 5,000 Units, Subcutaneous, Q8H, Vangie Dangelo, DO, 5,000 Units at 03/22/17 0603  •  HYDROcodone-acetaminophen (NORCO) 7.5-325 MG per tablet 1 tablet, 1 tablet, Oral, BID PRN, Maribel Botello MD, 1 tablet at 03/22/17 0820  •  HYDROmorphone (DILAUDID) injection 0.5 mg, 0.5 mg, Intravenous, Q2H PRN, 0.5 mg at 03/22/17 0820 **AND** naloxone (NARCAN) injection 0.4 mg, 0.4 mg, Intravenous, Q5 Min PRN, Maribel Botello MD  •  insulin lispro (humaLOG) injection 2-7 Units, 2-7 Units, Subcutaneous, 4x Daily AC & at Bedtime, Maribel Botello MD  •  ipratropium-albuterol (DUO-NEB) nebulizer solution 3 mL, 3 mL, Nebulization, Q4H PRN, Jamarcus Carl MD, 3 mL at 03/16/17 0751  •  linezolid (ZYVOX) tablet 600 mg, 600 mg, Oral, Q12H, David Nguyen MD, 600 mg at 03/22/17 0820  •  meropenem (MERREM) 1 g/100 mL 0.9% NS VTB (mbp), 1 g, Intravenous, Q8H, Wesly Almazan, [facility], 1 g at 03/22/17 0604  •  nystatin (MYCOSTATIN) 185028 UNIT/GM cream, , Topical, Q12H, GLORIA Bryant  •  ondansetron (ZOFRAN) tablet 4 mg, 4 mg, Oral, Q6H PRN **OR** ondansetron (ZOFRAN) injection 4 mg, 4 mg, Intravenous, Q6H PRN, ROSCOE Hatch, 4 mg at 03/18/17 0813  •  oxyCODONE-acetaminophen (PERCOCET) 7.5-325 MG per tablet 1 tablet, 1 tablet, Oral, Q6H PRN, Trung Rodriguez MD, 1 tablet at 03/21/17 0533  •  pantoprazole (PROTONIX) EC tablet 40 mg, 40 mg, Oral, Every Other Day, Maribel Botello MD,  40 mg at 03/22/17 0820  •  probiotic (CULTURELLE) capsule 1 capsule, 1 capsule, Oral, Daily, David Nguyen MD, 1 capsule at 03/22/17 0820  •  promethazine (PHENERGAN) tablet 12.5 mg, 12.5 mg, Oral, Q6H PRN, 12.5 mg at 03/20/17 0550 **OR** promethazine (PHENERGAN) injection 12.5 mg, 12.5 mg, Intramuscular, Q6H PRN **OR** promethazine (PHENERGAN) suppository 12.5 mg, 12.5 mg, Rectal, Q6H PRN, Maribel Botello MD  •  sennosides-docusate sodium (SENOKOT-S) 8.6-50 MG tablet 2 tablet, 2 tablet, Oral, BID PRN, ROSCOE Hatch  •  sodium chloride 0.9 % flush 1-10 mL, 1-10 mL, Intravenous, PRN, Maribel Botello MD  •  sodium chloride 0.9 % flush 10 mL, 10 mL, Intravenous, PRN, David Nguyen MD  •  sodium hypochlorite (DAKIN'S) 0.025 % topical solution solution, , Topical, BID, Heladio Rosa MD  •  tamsulosin (FLOMAX) 24 hr capsule 0.4 mg, 0.4 mg, Oral, Daily, Maribel Botello MD, 0.4 mg at 03/22/17 0820  •  tiZANidine (ZANAFLEX) tablet 4 mg, 4 mg, Oral, Nightly PRN, Maribel Botello MD, 4 mg at 03/16/17 2112    Meds reviewed and doses adjusted for renal function    Labs:    Results from last 7 days  Lab Units 03/22/17  1027 03/21/17  0716 03/20/17  1004 03/18/17  0521   WBC 10*3/mm3 4.15 6.53  --  4.78   HEMOGLOBIN g/dL 9.1* 9.5* 9.1* 9.1*   HEMATOCRIT % 26.8* 27.6* 26.8* 26.6*   PLATELETS 10*3/mm3 163 186  --  184       Results from last 7 days  Lab Units 03/22/17  1027 03/21/17  0716 03/20/17  1004 03/19/17  0609   SODIUM mmol/L 135 134 135 134   POTASSIUM mmol/L 3.5 3.7 3.7 4.0   CHLORIDE mmol/L 102 102 103 102   TOTAL CO2 mmol/L 29.0 22.0 26.0 25.0   BUN mg/dL 16 18 15 12   CREATININE mg/dL 1.60* 1.80* 2.00* 2.30*   GLUCOSE mg/dL 118* 99 88 85   CALCIUM mg/dL 9.4 9.1 9.0 8.7       Results from last 7 days  Lab Units 03/21/17  0716   ALK PHOS U/L 49   BILIRUBIN mg/dL 0.8   ALT (SGPT) U/L 25   AST (SGOT) U/L 44*     Invalid input(s): UCOL, UCLR, UPH, USG, UPRO, UBLD, UNITR, ELEU, URBC, UFC, UBACT    Estimated  Creatinine Clearance: 60.4 mL/min (by C-G formula based on Cr of 1.6).    Radiology:  Imaging Results (last 72 hours)     Procedure Component Value Units Date/Time    XR Chest 1 View [[id number]] Collected:  03/20/17 0929     Updated:  03/20/17 1436    Narrative:       EXAMINATION: XR CHEST 1 VW- 03/19/2017     INDICATION: S71.002A-Unspecified open wound, left hip, initial  encounter; S71.102A-Unspecified open wound, left thigh, initial  encounter; Z74.09-Other reduced mobility;  shortness of breath     COMPARISON: 03/12/2017     FINDINGS: Portable chest reveals heart to be borderline enlarged.  Ill-defined opacification left lung base with small left pleural  effusion. Degenerative changes seen within the spine. PICC line catheter  identified on the right tip in the SVC.           Impression:       Heart is enlarged with small left pleural effusion and mild  increased markings at the left lung base. PICC line catheter identified  on the right tip in the SVC.     D:  03/20/2017  E:  03/20/2017     This report was finalized on 3/20/2017 2:34 PM by Dr. Naty Panchal MD.       US Renal Limited [[id number]] Collected:  03/21/17 1103     Updated:  03/21/17 1131    Narrative:       EXAMINATION: US RENAL LIMITED- 03/20/2017     INDICATION: S71.002A-Unspecified open wound, left hip, initial  encounter; S71.102A-Unspecified open wound, left thigh, initial  encounter; Z74.09-Other reduced mobility; acute renal insufficiency     TECHNIQUE: Multiple images through the retroperitoneum were obtained  with a 4 MHz probe.     COMPARISON: NONE     FINDINGS:   Right kidney:  The right kidney measures 12.5 x 6.8 x 7.5 cm. There is no renal mass or  hydronephrosis.     Left kidney:  The left kidney measures 12.2 x 8.4 x 6.6 cm. There is no renal mass or  hydronephrosis.     The bladder is mostly collapsed.       Impression:       No evidence of obstructive uropathy or renal pathology.     D:  03/21/2017  E:  03/21/2017         This  "report was finalized on 3/21/2017 11:29 AM by Dr. Thomas Briggs MD.             Renal Imaging:  reviewed      IMPRESSION:  LANA: Possibly ATN vs AIN from abx such as vancomycin ( did nt get it for last 3 days) and zosyn  EDEMA; Not sure why he off and on get edema and needs lasix- does he have venous insufficiency vs CHF- he has no hx of liver disease nor does he have any proteinuria and baseline cr is around 0.8- we are not left with cardiac cause vs lumph edema- it is pitting though   Anemia\" ?? Chronic disease  Cellulitis;  COPD;  DM;     RECOMMENDATIONS:        Continue current management  If weight is above 3-4 lbs than take lasix 40 mg at home  Fu in 2-3 weeks in out [place] office         Isak Umaña MD  3/22/2017  1:32 PM        "

## 2017-03-22 NOTE — PROGRESS NOTES
Continued Stay Note  [facility]     Patient Name: Sai Weinberg  MRN: [id number]  Today's Date: 3/22/2017    Admit Date: 3/12/2017          Discharge Plan       03/22/17 1144    Case Management/Social Work Plan    Plan home with home health    Patient/Family In Agreement With Plan yes    Additional Comments Per Milad PT, patient has been approved for home wound vac per his insurance. He states he will switch patient to the home vac and will need dressing changed on Friday. Call to Jeffy with [facility] and updated on Merrem 1gm IV every 8 hours until 3/31. He states there is still no cost to patient. They will mix medication and have it delivered to patient's room prior to discharge today. Call to Hope at [facility] [phone] to notify of discharge and update on new wound care and antibiotics. She verbalized understanding and asks CM to fax clinical updates and new abx orders with labs and PICC care to [phone]. Efaxed clincal packet. Updated patient at bedside, he is in agreement with plan. He denies any other needs at this time. CM following.               Discharge Codes     None        Expected Discharge Date and Time     Expected Discharge Date Expected Discharge Time    Mar 22, 2017             Kathryn Pozo RN

## 2017-03-22 NOTE — PAYOR COMM NOTE
"Rogelio Hastings (56 y.o. Male) Discharge notification   1960.  Auth # [id number].  Awaiting approval of all days.    Date of Birth Social Security Number Address Home Phone MRN    1960  [address] [address] [phone] [id number]    Quaker Marital Status          None        Admission Date Admission Type Admitting Provider Attending Provider Department, Room/Bed    3/12/17 Elective Irvin Garzon MD  [facility] 6B, N635/1    Discharge Date Discharge Disposition Discharge Destination        3/22/2017 Home or Self Care             Attending Provider: (none)    Allergies:  No Known Allergies    Isolation:  None   Infection:  None   Code Status:  FULL    Ht:  65\" (165.1 cm)   Wt:  253 lb (115 kg)    Admission Cmt:  None   Principal Problem:  Open wound of left hip and thigh with complication [S71.002A,S71.102A] More...                 Active Insurance as of 3/12/2017     Primary Coverage     Payor Plan Insurance Group Employer/Plan Group    [employer] [place] [employer] [place]ICA[place]      Payor Plan Address Payor Plan Phone Number Effective From Effective To    [address] [address] [phone] 10/21/2016     [place] [address]       Subscriber Name Subscriber Birth Date Member ID       ROGELIO HASTINGS 1960 [id number]                 Emergency Contacts      (Rel.) Home Phone Work Phone Mobile Phone    Karina Brennan (Daughter) [phone] -- --               Discharge Summary      GLORIA Bryant at 3/22/2017  2:42 PM              [facility] Medicine Services  DISCHARGE SUMMARY       Date of Admission: 3/12/2017  Date of Discharge:  3/22/2017  Primary Care Physician: Dewayne Cole MD    Discharge Diagnoses:  Active Hospital Problems (** Indicates Principal Problem)    Diagnosis Date Noted   • **Open wound of left hip and thigh with complication [S71.002A, S71.102A] 2017   • LANA (acute kidney " "injury) [N17.9] 03/12/2017   • Coronary artery disease involving native heart [I25.10] 03/12/2017   • Iron deficiency anemia [D50.9] 03/12/2017   • 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       Presenting Problem/History of Present Illness  Leg wound, left [S81.802A]     Chief Complaint on Day of Discharge: follow-up Lt groin abscess/cellulitis     History of Present Illness on Day of Discharge:   Patient is seen sitting upright in chair this a.m. in no acute distress.  Wife at bedside.  Reports his bilateral lower extremity swelling has somewhat improved.  Tolerating diet.  Denies nausea, vomiting, chest pain, shortness of air at rest.  Still mild dyspnea with exertion but somewhat improved today as well.  Left groin wound VAC in place and intact currently being changed by PT to a home wound VAC for discharge likely today.  No new issues feels ready for discharge home with home health, wound VAC, IV antibiotics at home infusion as planned.    Hospital Course  Patient is a 56 y.o. male past medical history significant for black lung disease, BPH, chronic obstructive lung disease, diabetes type 2, arthritis, dyslipidemia, reflux, MI in 2006, hypertension, colon cancer, PVD, and sleep apnea with CPAP use.  Patient was [facility] on 3/11 for a left lower extremity cellulitis associated with a recent left superficial artery cutdown/angioplasty/stent performed by Dr. Rosa on 1/18/17.  Today it did not follow-up with Dr. Rosa as scheduled.  He reported approximately 3 weeks after the procedure his wound \"broke open\" and began draining pink liquid to pure blood and has " continued since.  Developed pain and warmth.  Saw his PCP on 3/6 is given antibiotics.  Follow-up with PCP 3/10 showed no improvement and patient was seen and hazard ER.  He was given IV Vanco and Zosyn had negative venous duplex for DVTs and was transferred to [facility] for higher level of care and to follow-up with Dr. Rosa his primary surgeon.    His admitted to hospital medicine service and CT surgery was consulted and followed.  Continued on IV antibiotics her infectious disease management.  PICC line was placed.  Dr. Rosa performed an I & D to his left groin site on 3/14 and wound cultures were obtained.  Due to having 3-4+ bilateral lower extremity edema.  Mild shortness of air with exertion.  His creatinine elevated from a baseline of 1.2 to a high of 2.3.  Nephrology was consulted.  [place] likely secondary to ATN/AIN from antibiotics.  Infectious disease change antibiotic therapy and his creatinine has trended down.  Today creatinine is 1.6.  His diuretics and Ace have been held during hospital stay secondary to the above.  He did have an echocardiogram completed due to his shortness of air and lower extremity edema which did reveal aortic valvular stenosis and bicuspid aortic valve.  It is chronic shortness of air and chronic edema it is possible that this is multifactorial however could be related to his COPD/obesity and/or possibly due to his valvular stenosis.  He will follow-up outpatient with his PCP who is also his cardiologist for ongoing management.  Recommendations per CT surgery during hospital stay were to consider aortic valve replacement in approximately 3-6 months once all infection issues have completely resolved.    Patient was followed by PT during hospital stay and had a wound VAC placed to his left groin site.  Insurance has now improved a home wound VAC and home health/PT/wound care, and PICC line management and care have been arranged for breath at [facility] with  "plans to discharge home today.  And cleared for discharge from nephrology standpoint off of his face and diuretics with instructions to take his home dose of Lasix for any increased weight gain of 3-4 pounds and to notify his PCP if this is required.  He is currently hemodynamically stable and afebrile off of these medications.  Per infectious disease recommendations patient will discharge home on Merrem 1 g IV every 8 via PICC line home infusion and Zyvox 600 mg by mouth every 12 as well as probiotics.  Recommended duration for antibiotic therapy at least until 3/31/17.  Plans are to discharge home today to follow up with CT surgery, nephrology, infectious disease, and PCP/cardiology as listed below.    Procedures Performed  Procedure(s):  INCISION AND DRAINAGE LEFT GROIN HEMATOMA       Consults:   Consults     Date and Time Order Name Status Description    3/21/2017 1950 Inpatient Consult to Cardiology      3/20/2017 1225 Inpatient Consult to Nephrology      3/13/2017 1145 Inpatient Consult to Infectious Diseases Completed     3/12/2017 2158 Inpatient Consult to Cardiothoracic Surgery Completed           Pertinent Test Results:   Imaging Results (last 24 hours)     ** No results found for the last 24 hours. **          Results from last 7 days  Lab Units 03/22/17  1027   WBC 10*3/mm3 4.15   HEMOGLOBIN g/dL 9.1*   HEMATOCRIT % 26.8*   PLATELETS 10*3/mm3 163       Results from last 7 days  Lab Units 03/22/17  1027   SODIUM mmol/L 135   POTASSIUM mmol/L 3.5   CHLORIDE mmol/L 102   TOTAL CO2 mmol/L 29.0   BUN mg/dL 16   CREATININE mg/dL 1.60*   GLUCOSE mg/dL 118*   CALCIUM mg/dL 9.4         Condition on Discharge:  Stable     Physical Exam on Discharge:/71  Pulse 89  Temp 98.5 °F (36.9 °C) (Oral)   Resp 16  Ht 65\" (165.1 cm)  Wt 253 lb (115 kg)  SpO2 91%  BMI 42.1 kg/m2     Physical Exam  Constitutional: Awake, He is oriented to person, place, and time. Resting upright in chair. He appears well-developed " "and well-nourished. No distress. Wife at .   HENT:   Head: Normocephalic. Atraumatic   Eyes: Pupils are equal, round, and reactive to light.   Neck: Normal range of motion. Supple. Trachea midline   Cardiovascular: Normal rate and regular rhythm. Faint Murmur heard.  Pulmonary/Chest: Effort normal and breath sounds normal. He has no wheezes. Decreased bilaterally, likely due to body habitus however noted BLE edema also. No rales appreciated currently.   Abdominal: Soft. Morbidly obese. Bowel sounds are normal. He exhibits no distension. There is no tenderness.   Musculoskeletal: Normal range of motion. BLE 3-4+ pitting edema (equal bilaterally) Unchanged over the last 2 days to my exam however pt reports \"a little better today\".  Neurological: He is alert and oriented to person, place, and time. Obeys all commands  Skin: Left groin with wound vac, dry and intact. PT at  changing out to home wound vac now.  Psychiatric: He has a normal mood and affect. His behavior is normal. Calm and cooperative. Pleasant.        Discharge Disposition  Home or Self Care    Discharge Medications   Sai Weinberg   Home Medication Instructions ELIZABETH:[id number]    Printed on:03/22/17 1442   Medication Information                      albuterol (PROVENTIL HFA;VENTOLIN HFA) 108 (90 BASE) MCG/ACT inhaler  Inhale 2 puffs Every 4 (Four) Hours As Needed for wheezing.             aspirin 81 MG tablet  Take 81 mg by mouth daily.             atorvastatin (LIPITOR) 40 MG tablet  Take 40 mg by mouth daily.             Cholecalciferol (VITAMIN D PO)  Take 2,000 Units by mouth Daily.             clopidogrel (PLAVIX) 75 MG tablet  Take 75 mg by mouth daily.             ferrous sulfate 324 (65 FE) MG tablet delayed-release EC tablet  Take 324 mg by mouth Daily With Breakfast.             fluticasone-salmeterol (ADVAIR) 100-50 MCG/DOSE DISKUS  Inhale 2 puffs 2 (Two) Times a Day.             linezolid (ZYVOX) 600 MG tablet  Take 1 tablet by mouth " Every 12 (Twelve) Hours. See handwritten rx per ID  Indications: Skin and Soft Tissue Infection             meropenem (MERREM) 1 g/100 mL 0.9% NS VTB (mbp)  Infuse 100 mL into a venous catheter Every 8 (Eight) Hours. Indications: Skin and Soft Tissue Infection             metFORMIN (GLUCOPHAGE) 500 MG tablet  Take 850 mg by mouth 2 (Two) Times a Day With Meals.             nystatin (MYCOSTATIN) 781576 UNIT/GM cream  Apply  topically Every 12 (Twelve) Hours for 6 days.             oxyCODONE-acetaminophen (PERCOCET) 7.5-325 MG per tablet  Take 1 tablet by mouth Every 6 (Six) Hours As Needed for Severe Pain (7-10) for up to 10 days.             pantoprazole (PROTONIX) 40 MG EC tablet  Take 40 mg by mouth Every Other Day.             probiotic (CULTURELLE) capsule capsule  Take 1 capsule by mouth Daily.             sodium hypochlorite (DAKIN'S) 0.025 % solution topical solution  Apply 1,000 mL topically 2 (Two) Times a Day. Wound care per HH with wound vac.  Drsg changes per PT recs             tamsulosin (FLOMAX) 0.4 MG capsule 24 hr capsule  Take 0.4 mg by mouth Daily.             tiZANidine (ZANAFLEX) 4 MG tablet  Take 4 mg by mouth At Night As Needed for muscle spasms.                 Discharge Diet:   Diet Instructions     Diet: Regular, Cardiac, Consistent Carbohydrate; Thin Liquids, No Restrictions       Discharge Diet:   Regular  Cardiac  Consistent Carbohydrate      Fluid Consistency:  Thin Liquids, No Restrictions                 Discharge Care Plan / Instructions:    Activity at Discharge:   Activity Instructions     Activity as Tolerated                     Follow-up Appointments  Future Appointments  Date Time Provider Department Center   4/5/2017 8:00 AM MD KHADAR Hernandes CTS     Additional Instructions for the Follow-ups that You Need to Schedule     Ambulatory Referral to Home Health    As directed    Face to Face Visit Date:  3/17/2017   Follow-up Provider for Plan of Care?:  I treated  the patient in an acute care facility and will not continue treatment after discharge.   Follow-up Provider:  SELINA LIPSCOMB   Reason/Clinical Findings:  wound care/dressing changes and teaching, home infusion abx   Describe mobility limitations that make leaving home difficult:  Continuous antibiotic infusions   Nursing/Therapeutic Services Requested:  Skilled Nursing   Skilled nursing orders:   Wound care dressing/changes Comment - Zosyn infusion provided by [facility] [place], operated by [facility]  Infusion therapy      Instructions:  Dressing changes to left leg wound BID wet to dry with normal saline and wound management/teaching   Frequency:  Other       Discharge Follow-Up With Specified Provider    As directed    To:  Dr Umaña per his recs in 2-3 weeks       Discharge Follow-up with PCP    As directed    Follow Up Details:  1 week (for general f/u care and for cardiac care--Pts PCP is his cardiologist- Dr Rodriguez)       Discharge Follow-up with Specialty    As directed    Specialty:  Dr Ngueyn ([facility]) per her recs       Discharge Follow-up with Specified Provider    As directed    To:  David Nguyen MD at [facility]   Follow Up:  1 Week                    GLORIA Bryant 03/22/17 2:42 PM    Time: 65 minutes    Please note that portions of this note may have been completed with a voice recognition program. Efforts were made to edit the dictations, but occasionally words are mistranscribed.         Electronically signed by GLORIA Bryant at 3/22/2017  3:02 PM        Discharge Order     Start     Ordered    03/22/17 1442  Discharge patient  Once     Comments:  With home health, home infusion with PICC for IV abx per ID recs, home wound vac.   Expected Discharge Date:  03/22/17    Discharge Disposition:  Home or Self Care        03/22/17 1442

## 2017-03-22 NOTE — PROGRESS NOTES
Acute Care - Wound/Debridement Treatment Note  [facility]     Patient Name: Sai Weinberg  : 1960  MRN: [id number]  Today's Date: 3/22/2017  Onset of Illness/Injury or Date of Surgery Date: 17   Date of Referral to PT: 17   Referring Physician: MD Ayan       Admit Date: 3/12/2017    Visit Dx:    ICD-10-CM ICD-9-CM   1. Open wound of left hip and thigh with complication, initial encounter S71.002A 890.1    S71.102A    2. Impaired mobility and ADLs Z74.09 799.89   3. Impaired functional mobility, balance, gait, and endurance Z74.09 V49.89       Patient Active Problem List   Diagnosis   • Dyslipidemia   • Arthritis   • COPD (chronic obstructive pulmonary disease)   • Diabetes mellitus   • Esophageal reflux   • Hypertension   • Malignant neoplasm of colon   • Heart attack   • Peripheral vascular disease   • Chewing tobacco use   • Peripheral arterial disease   • LANA (acute kidney injury)   • Coronary artery disease involving native heart   • Iron deficiency anemia   • Coal workers pneumoconiosis   • Open wound of left hip and thigh with complication   • Leg wound, left               LDA Wound       17 1115          Incision 17 1138 Left groin    Incision - Properties Group Placement Date: 17  -ABHIJEET Placement Time: 1138  -ABHIJEET Side: Left  - Location: groin  -ABHIJEET    Incision WDL WDL  -MF      Dressing Appearance dry;intact  -MF      Therapy Setting (Negative Pressure Wound Therapy) continuous therapy   wound vac converted to home vac unit  -MF      Pressure Setting (Negative Pressure Wound Therapy) 150 mmHg  -MF      Output (mL) 50  -MF        User Key  (r) = Recorded By, (t) = Taken By, (c) = Cosigned By    Initials Name Provider Type    SÁNCHEZ Olivo, PT Physical Therapist    ABHIJEET Kessler, RN Registered Nurse            WOUND DEBRIDEMENT                     Adult Rehabilitation Note       17 1115 17 1000 17 1605    Rehab Assessment/Intervention     Discipline physical therapist  -MF physical therapist  -MF physical therapist  -DM    Document Type therapy note (daily note)  - therapy note (daily note)  - therapy note (daily note)   mobility  -DM    Subjective Information agree to therapy;no complaints  - agree to therapy;complains of;weakness;pain  - agree to therapy;complains of;weakness;pain;swelling;fatigue  -DM    Patient Effort, Rehab Treatment   good  -DM    Symptoms Noted During/After Treatment   increased pain  -DM    Precautions/Limitations   fall precautions;other (see comments)   wound vac; decub.   -DM    Recorded by [MF] Milad Olivo, PT [MF] Milad Olivo, PT [DM] Zahra Dorantes, PT    Vital Signs    Pre Systolic BP Rehab   146  -DM    Pre Treatment Diastolic BP   68  -DM    Post Systolic BP Rehab   154  -DM    Post Treatment Diastolic BP   75  -DM    Pretreatment Heart Rate (beats/min)   77  -DM    Intratreatment Heart Rate (beats/min)   84  -DM    Posttreatment Heart Rate (beats/min)   78  -DM    O2 Delivery Pre Treatment   room air  -DM    O2 Delivery Post Treatment   room air  -DM    Pre Patient Position   Sitting  -DM    Intra Patient Position   Standing  -DM    Post Patient Position   Sitting  -DM    Recorded by   [DM] Zahra Dorantes, PT    Pain Assessment    Pain Assessment Draper-Terrell FACES  - 0-10  - 0-10  -DM    Draper-Terrell FACES Pain Rating 0  -MF      Pain Score  0  -MF 7  -DM    Post Pain Score  4  -MF 8  -DM    Pain Type   Acute pain  -DM    Pain Location   Groin  -DM    Pain Orientation   Left   & B feet  -DM    Pain Descriptors   Aching  -DM    Pain Frequency   Constant/continuous  -DM    Patient's Stated Pain Goal   No pain  -DM    Pain Intervention(s) Repositioned  - Medication (See MAR);Repositioned  - Medication (See MAR);Repositioned  -DM    Recorded by [MF] Milad Olivo, PT [MF] Milad Olivo, PT [DM] Zahra Dorantes, PT    Cognitive Assessment/Intervention    Current Cognitive/Communication  Assessment   functional  -DM    Orientation Status   oriented x 4  -DM    Follows Commands/Answers Questions   100% of the time;able to follow single-step instructions;needs cueing  -DM    Personal Safety   mild impairment;decreased awareness, need for assist;decreased awareness, need for safety;decreased insight to deficits  -DM    Personal Safety Interventions   gait belt;fall prevention program maintained;nonskid shoes/slippers when out of bed  -DM    Recorded by   [DM] Zahra Dorantes, PT    Bed Mobility, Assessment/Treatment    Bed Mobility, Comment   UIC  -DM    Recorded by   [DM] Zahra Dorantes, PT    Transfer Assessment/Treatment    Transfers, Sit-Stand [place]   independent  -DM    Transfers, Stand-Sit [place]   contact guard assist   B knees unsteady;incr.L groin pain  -DM    Transfer, Impairments   strength decreased;impaired balance;pain  -DM    Transfer, Comment   pt c/o signif. discomfort B feet d/t swelling  -DM    Recorded by   [DM] Zahra Dorantes, PT    Gait Assessment/Treatment    Gait, [place] Level   contact guard assist  -DM    Gait, Assistive Device   --   gt belt; UE supp.;decl. cane  -DM    Gait, Distance (Feet)   14   7 ft x 2; p.t. held wd.vac tubing  -DM    Gait, Gait Pattern Analysis   swing-to gait  -DM    Gait, Gait Deviations   antalgic;bilateral:;remedios decreased;decreased heel strike;knee buckling;limb motion velocity decreased;step length decreased;stride length decreased   wide KELBY  -DM    Gait, Maintain Weight Bearing Status   able to maintain weight bearing status  -DM    Gait, Safety Issues   step length decreased;weight-shifting ability decreased  -DM    Gait, Impairments   strength decreased;impaired balance;pain  -DM    Gait, Comment   chair to EOB,RESTED 5 min,then ret. to chair  -DM    Recorded by   [DM] Zahra Dorantes, PT    Therapy Exercises    Bilateral Lower Extremities   AROM:;10 reps;sitting;ankle pumps/circles;glut sets;heel slides;hip  abduction/adduction;hip flexion;LAQ;quad sets   HS sets;pillow squeeze w/R LE  -DM    Bilateral Upper Extremity   AROM:;10 reps;sitting;elbow flexion/extension;shoulder abduction/adduction;shoulder extension/flexion;shoulder horizontal abd/add  -DM    Recorded by   [DM] Zahra Dorantes, PT    Positioning and Restraints    Pre-Treatment Position sitting in chair/recliner  -MF in bed  - sitting in chair/recliner  -DM    Post Treatment Position chair  -MF bed  - chair  -DM    In Bed  supine;call light within reach  -MF     In Chair sitting;call light within reach  -MF  notified nsg;sitting;call light within reach   ALT.btwn leg rest down, & ret. to bed w/LE'S supported  -DM    Recorded by [MF] Milad Olivo, PT [MF] Milad Olivo, PT [DM] Zahra Dorantes, PT      03/20/17 1315          Rehab Assessment/Intervention    Discipline physical therapist  -      Document Type therapy note (daily note);evaluation   wound care eval  -      Subjective Information agree to therapy;complains of;weakness;fatigue;pain  -MF      Recorded by [MF] Milad Olivo, PT      Pain Assessment    Pain Assessment 0-10  -      Pain Score 9  -      Post Pain Score 8  -      Pain Type Acute pain  -      Pain Location Groin  -MF      Pain Orientation Left  -      Pain Intervention(s) Medication (See MAR);Repositioned  -MF      Recorded by [MF] Milad Olivo PT      Positioning and Restraints    Pre-Treatment Position in bed  -      Post Treatment Position bed  -MF      In Bed supine;call light within reach;with family/caregiver  -MF      Recorded by [MF] Milad Olivo PT        User Key  (r) = Recorded By, (t) = Taken By, (c) = Cosigned By    Initials Name Effective Dates     Milad Olivo, PT 06/19/15 -     DM Zahra Dorantes, PT 06/19/15 -                 IP PT Goals       03/22/17 1115 03/21/17 1000 03/20/17 1605    Bed Mobility PT LTG    Bed Mobility PT LTG, Date Established   03/14/17  -DM    Bed  Mobility PT LTG, Time to Achieve   2 wks  -DM    Bed Mobility PT LTG, Activity Type   supine to sit/sit to supine  -DM    Bed Mobility PT LTG, [place] Level   independent  -DM    Bed Mobility PT LTG, Outcome   goal ongoing  -DM    Gait Training PT LTG    Gait Training Goal PT LTG, Date Established   03/14/17  -DM    Gait Training Goal PT LTG, Time to Achieve   2 wks  -DM    Gait Training Goal PT LTG, [place] Level   conditional independence  -DM    Gait Training Goal PT LTG, Assist Device   cane, straight  -DM    Gait Training Goal PT LTG, Distance to Achieve   200  -DM    Gait Training Goal PT LTG, Outcome   goal ongoing  -DM    Stair Training PT LTG    Stair Training Goal PT LTG, Date Established   03/14/17  -DM    Stair Training Goal PT LTG, Time to Achieve   2 wks  -DM    Stair Training Goal PT LTG, Number of Steps   7  -DM    Stair Training Goal PT LTG, [place] Level   contact guard assist  -DM    Stair Training Goal PT LTG, Assist Device   1 handrail  -DM    Stair Training Goal PT LTG, Outcome   goal ongoing  -DM    Wound Care PT LTG    Wound Care PT LTG 1, Outcome goal partially met  -MF goal ongoing  -MF       03/20/17 1315 03/17/17 1332 03/16/17 1059    Bed Mobility PT LTG    Bed Mobility PT LTG, Outcome  goal ongoing  -MC goal ongoing  -MB    Transfer Training PT LTG    Transfer Training PT LTG, Outcome  goal met  -MC goal ongoing  -MB    Gait Training PT LTG    Gait Training Goal PT LTG, Outcome  goal ongoing  -MC goal ongoing  -MB    Stair Training PT LTG    Stair Training Goal PT LTG, Outcome  goal ongoing  -MC goal ongoing  -MB    Wound Care PT LTG    Wound Care PT LTG 1, Date Established 03/20/17  -MF      Wound Care PT LTG 1, Time to Achieve other (see comments)   10 days  -MF      Wound Care PT LTG 1, Location L groin  -      Wound Care PT LTG 1, No S&S of Infection yes  -      Wound Care PT LTG 1, Decrease Wound Size 5%  -MF      Wound Care PT LTG 1, Decrease Exudate scant  -MF       Wound Care PT LTG 1, No New Skin Break Down yes  -MF        03/14/17 1530          Bed Mobility PT LTG    Bed Mobility PT LTG, Date Established 03/14/17  -LS      Bed Mobility PT LTG, Time to Achieve 2 wks  -LS      Bed Mobility PT LTG, Activity Type supine to sit/sit to supine  -LS      Bed Mobility PT LTG, [place] Level independent  -LS      Transfer Training PT LTG    Transfer Training PT LTG, Date Established 03/14/17  -LS      Transfer Training PT LTG, Time to Achieve 2 wks  -LS      Transfer Training PT LTG, Activity Type sit to stand/stand to sit  -LS      Transfer Training PT LTG, [place] Level conditional independence  -LS      Transfer Training PT LTG, Assist Device cane, straight  -LS      Gait Training PT LTG    Gait Training Goal PT LTG, Date Established 03/14/17  -LS      Gait Training Goal PT LTG, Time to Achieve 2 wks  -LS      Gait Training Goal PT LTG, [place] Level conditional independence  -LS      Gait Training Goal PT LTG, Assist Device cane, straight  -LS      Gait Training Goal PT LTG, Distance to Achieve 200  -LS      Stair Training PT LTG    Stair Training Goal PT LTG, Date Established 03/14/17  -LS      Stair Training Goal PT LTG, Time to Achieve 2 wks  -LS      Stair Training Goal PT LTG, Number of Steps 7  -LS      Stair Training Goal PT LTG, [place] Level contact guard assist  -LS      Stair Training Goal PT LTG, Assist Device 1 handrail  -LS        User Key  (r) = Recorded By, (t) = Taken By, (c) = Cosigned By    Initials Name Provider Type     Milad Olivo, PT Physical Therapist    BEV Dorantes, PT Physical Therapist    LS Leeanna Conroy, PT Physical Therapist    SUE Thompson, PT Physical Therapist    BUCK Escamilla, PT Physical Therapist          Physical Therapy Education     Title: PT OT SLP Therapies (Active)     Topic: Physical Therapy (Active)     Point: Mobility training (Active)    Learning Progress Summary    Learner Readiness  Method Response Comment Documented by Status   Patient Eager E,D NR  DM 03/20/17 1645 Active    Acceptance E NR   03/17/17 1332 Active    Acceptance E NR fall precautions, PLB, gait mechanics, home safety MB 03/16/17 1059 Active    Acceptance E,D NR Edu re: benefit of further ambulation throughout day with NSG and of sets of LE HEP.  03/14/17 1529 Active               Point: Home exercise program (Active)    Learning Progress Summary    Learner Readiness Method Response Comment Documented by Status   Patient Eager E,D NR  DM 03/20/17 1645 Active    Acceptance E NR   03/17/17 1332 Active    Acceptance E NR fall precautions, PLB, gait mechanics, home safety MB 03/16/17 1059 Active    Acceptance E,D NR Edu re: benefit of further ambulation throughout day with NSG and of sets of LE HEP.  03/14/17 1529 Active               Point: Body mechanics (Active)    Learning Progress Summary    Learner Readiness Method Response Comment Documented by Status   Patient Eager E,D NR  DM 03/20/17 1645 Active    Acceptance E VU   03/19/17 0249 Done    Acceptance E NR   03/17/17 1332 Active    Acceptance E NR fall precautions, PLB, gait mechanics, home safety MB 03/16/17 1059 Active    Acceptance E,D NR Edu re: benefit of further ambulation throughout day with NSG and of sets of LE HEP.  03/14/17 1529 Active               Point: Precautions (Active)    Learning Progress Summary    Learner Readiness Method Response Comment Documented by Status   Patient Eager E,D NR  DM 03/20/17 1645 Active    Acceptance E VU   03/19/17 0249 Done    Acceptance E NR   03/17/17 1332 Active    Acceptance E NR fall precautions, PLB, gait mechanics, home safety MB 03/16/17 1059 Active    Acceptance E,D NR Edu re: benefit of further ambulation throughout day with NSG and of sets of LE HEP.  03/14/17 1529 Active                      User Key     Initials Effective Dates Name Provider Type Discipline     06/19/15 -  Zahra Dorantes, PT  Physical Therapist PT    LS 06/19/15 -  Leeanna Conroy, PT Physical Therapist PT    MB 03/14/16 -  Emmie Thompson, PT Physical Therapist PT    MC 03/14/16 -  Alissa Escamilla, PT Physical Therapist PT    LD 01/20/17 -  Daysi Rodriguez, RN Registered Nurse Nurse                   PT ASSESSMENT (last 72 hours)      PT Evaluation       03/22/17 1115 03/21/17 1000    Rehab Evaluation    Document Type therapy note (daily note)  -MF therapy note (daily note)  -MF    Subjective Information agree to therapy;no complaints  -MF agree to therapy;complains of;weakness;pain  -MF    Pain Assessment    Pain Assessment Draper-Terrell FACES  -MF 0-10  -MF    Draper-Terrell FACES Pain Rating 0  -MF     Pain Score  0  -MF    Post Pain Score  4  -MF    Pain Intervention(s) Repositioned  -MF Medication (See MAR);Repositioned  -MF    Positioning and Restraints    Pre-Treatment Position sitting in chair/recliner  -MF in bed  -MF    Post Treatment Position chair  - bed  -MF    In Bed  supine;call light within reach  -MF    In Chair sitting;call light within reach  -MF       03/20/17 1605 03/20/17 1315    Rehab Evaluation    Document Type therapy note (daily note)   mobility  -DM therapy note (daily note);evaluation   wound care eval  -MF    Subjective Information agree to therapy;complains of;weakness;pain;swelling;fatigue  -DM agree to therapy;complains of;weakness;fatigue;pain  -MF    Patient Effort, Rehab Treatment good  -DM     Symptoms Noted During/After Treatment increased pain  -DM     General Information    Precautions/Limitations fall precautions;other (see comments)   wound vac; decub.   -DM     Vital Signs    Pre Systolic BP Rehab 146  -DM     Pre Treatment Diastolic BP 68  -DM     Post Systolic BP Rehab 154  -DM     Post Treatment Diastolic BP 75  -DM     Pretreatment Heart Rate (beats/min) 77  -DM     Intratreatment Heart Rate (beats/min) 84  -DM     Posttreatment Heart Rate (beats/min) 78  -DM     O2 Delivery Pre Treatment room  air  -DM     O2 Delivery Post Treatment room air  -DM     Pre Patient Position Sitting  -DM     Intra Patient Position Standing  -DM     Post Patient Position Sitting  -DM     Pain Assessment    Pain Assessment 0-10  -DM 0-10  -MF    Pain Score 7  -DM 9  -MF    Post Pain Score 8  -DM 8  -MF    Pain Type Acute pain  -DM Acute pain  -MF    Pain Location Groin  -DM Groin  -MF    Pain Orientation Left   & B feet  -DM Left  -MF    Pain Descriptors Aching  -DM     Pain Frequency Constant/continuous  -DM     Patient's Stated Pain Goal No pain  -DM     Pain Intervention(s) Medication (See MAR);Repositioned  -DM Medication (See MAR);Repositioned  -MF    Cognitive Assessment/Intervention    Current Cognitive/Communication Assessment functional  -DM     Orientation Status oriented x 4  -DM     Follows Commands/Answers Questions 100% of the time;able to follow single-step instructions;needs cueing  -DM     Personal Safety mild impairment;decreased awareness, need for assist;decreased awareness, need for safety;decreased insight to deficits  -DM     Personal Safety Interventions gait belt;fall prevention program maintained;nonskid shoes/slippers when out of bed  -DM     Bed Mobility, Assessment/Treatment    Bed Mobility, Comment UIC  -DM     Transfer Assessment/Treatment    Transfers, Sit-Stand [place] independent  -DM     Transfers, Stand-Sit [place] contact guard assist   B knees unsteady;incr.L groin pain  -DM     Transfer, Impairments strength decreased;impaired balance;pain  -DM     Transfer, Comment pt c/o signif. discomfort B feet d/t swelling  -DM     Gait Assessment/Treatment    Gait, [place] Level contact guard assist  -DM     Gait, Assistive Device --   gt belt; UE supp.;decl. cane  -DM     Gait, Distance (Feet) 14   7 ft x 2; p.t. held wd.vac tubing  -DM     Gait, Gait Pattern Analysis swing-to gait  -DM     Gait, Gait Deviations antalgic;bilateral:;remedios decreased;decreased heel strike;knee  buckling;limb motion velocity decreased;step length decreased;stride length decreased   wide KELBY  -DM     Gait, Maintain Weight Bearing Status able to maintain weight bearing status  -DM     Gait, Safety Issues step length decreased;weight-shifting ability decreased  -DM     Gait, Impairments strength decreased;impaired balance;pain  -DM     Gait, Comment chair to EOB,RESTED 5 min,then ret. to chair  -DM     Therapy Exercises    Bilateral Lower Extremities AROM:;10 reps;sitting;ankle pumps/circles;glut sets;heel slides;hip abduction/adduction;hip flexion;LAQ;quad sets   HS sets;pillow squeeze w/R LE  -DM     Bilateral Upper Extremity AROM:;10 reps;sitting;elbow flexion/extension;shoulder abduction/adduction;shoulder extension/flexion;shoulder horizontal abd/add  -DM     Positioning and Restraints    Pre-Treatment Position sitting in chair/recliner  -DM in bed  -MF    Post Treatment Position chair  -DM bed  -MF    In Bed  supine;call light within reach;with family/caregiver  -MF    In Chair notified nsg;sitting;call light within reach   ALT.btwn leg rest down, & ret. to bed w/LE'S supported  -DM       User Key  (r) = Recorded By, (t) = Taken By, (c) = Cosigned By    Initials Name Provider Type    MF Milad Olivo, PT Physical Therapist    BEV Dorantes, PT Physical Therapist            PT Recommendation and Plan  Anticipated Discharge Disposition: home with assist  PT Frequency: daily    Plan Of Care Reviewed With: patient, spouse       Outcome Summary/Follow up Plan: wound vac converted to home vac unit to allow for transition to home.  educated pt and family on canister changes and management of wuodn vac unit.           Outcome Measures       03/20/17 1602          How much help from another person do you currently need...    Turning from your back to your side while in flat bed without using bedrails? 4  -DM      Moving from lying on back to sitting on the side of a flat bed without bedrails? 3  -DM       Moving to and from a bed to a chair (including a wheelchair)? 3  -DM      Standing up from a chair using your arms (e.g., wheelchair, bedside chair)? 3  -DM      Climbing 3-5 steps with a railing? 2  -DM      To walk in hospital room? 3  -DM      AM-PAC 6 Clicks Score 18  -DM      Functional Assessment    Outcome Measure Options AM-PAC 6 Clicks Basic Mobility (PT)  -DM        User Key  (r) = Recorded By, (t) = Taken By, (c) = Cosigned By    Initials Name Provider Type    BEV Dorantes, PT Physical Therapist              Time Calculation        PT Charges       03/22/17 1115          Time Calculation    Start Time 1115  -MF      Time Calculation- PT    Total Timed Code Minutes- PT 45 minute(s)  -        User Key  (r) = Recorded By, (t) = Taken By, (c) = Cosigned By    Initials Name Provider Type     Milad Olivo, PT Physical Therapist             Therapy Charges for Today     Code Description Service Date Service Provider Modifiers Qty    54817167196 HC PT NEG PRESS WOUND TO 50SQCM DME1 3/21/2017 Milad Olivo, PT  1    05420905778 HC PT THER SUPP EA 15 MIN 3/21/2017 Milad Olivo, PT GP 1    01840765099 HC PT NEG PRESS WOUND TO 50SQCM DME1 3/22/2017 Milad Olivo, PT  1    59100631055 HC PT THER PROC EA 15 MIN 3/22/2017 Milad Olivo, PT GP 1    64012455619 HC PT THER SUPP EA 15 MIN 3/22/2017 Milad Olivo, PT GP 1            PT G-Codes  Outcome Measure Options: AM-PAC 6 Clicks Basic Mobility (PT)        Milad Olivo, PT  3/22/2017

## 2017-03-22 NOTE — PLAN OF CARE
Problem: Patient Care Overview (Adult)  Goal: Plan of Care Review  Outcome: Outcome(s) achieved Date Met:  03/22/17 03/22/17 1115   Coping/Psychosocial Response Interventions   Plan Of Care Reviewed With patient;spouse   Outcome Evaluation   Outcome Summary/Follow up Plan wound vac converted to home vac unit to allow for transition to home. educated pt and family on canister changes and management of wuodn vac unit.          Problem: Inpatient Physical Therapy  Goal: Wound Care Goal 1 LTG- PT  Outcome: Unable to achieve outcome(s) by discharge Date Met:  03/22/17 03/20/17 1315 03/22/17 1115   Wound Care PT LTG   Wound Care PT LTG 1, Date Established 03/20/17 --    Wound Care PT LTG 1, Time to Achieve other (see comments)  (10 days) --    Wound Care PT LTG 1, Location L groin --    Wound Care PT LTG 1, No S&S of Infection yes --    Wound Care PT LTG 1, Decrease Wound Size 5% --    Wound Care PT LTG 1, Decrease Exudate scant --    Wound Care PT LTG 1, No New Skin Break Down yes --    Wound Care PT LTG 1, Outcome --  goal partially met

## 2017-03-22 NOTE — DISCHARGE INSTR - APPOINTMENTS
You have an appointment with Dewayne Cole MD on March 31, 2017 @ 09:30.   Call them if you have any questions. Phone: [phone]  [address] [place] [id number]      You have an appointment with David Nguyen MD on March 24, 2017@ 13:00.   Call them if you have any questions. Phone: [phone]  [address] [facility]  [address]  [facility] [address]      Isak Ward MD      Nephrology [phone] [address] [facility]  [id number]  [facility] [address]     Next Steps: Follow up in 3 week(s)      Instructions: in 2-3 weeks in our [place] office with renal panel and cbc        YOU HAVE AN APPOINTMENT W/ DR. WARD ON April 27th @2:45

## 2017-03-22 NOTE — PROGRESS NOTES
Continued Stay Note  [facility]     Patient Name: Sai Weinberg  MRN: [id number]  Today's Date: 3/22/2017    Admit Date: 3/12/2017          Discharge Plan       03/22/17 1521    Case Management/Social Work Plan    Plan home with home health    Patient/Family In Agreement With Plan yes    Additional Comments Spoke with Radha CASTRO, patient has Rx for linezolid 600mg PO BID x 2 weeks and asks CM to check if insurance covers med. Call to [facility] and spoke with Benita, she has run patient's insurance with Rx and there is a prior authorization required. [facility] [phone] and spoke with Naina. She has completed PA form over the phone and states they will make a determination on approval or denial within 24 hours. Per [facility], 3 days supply of Zyvox is $75. Call to Lucinda CM Director, she states ok to fill 3 days worth of Zyvox so patient is able to discharge home. Obtained Rx for 3 days Zyvox per Radha CASTRO, [facility] to fill. Family will  Rx and is able to transport patient home. No other needs identified at this time.               Discharge Codes     None        Expected Discharge Date and Time     Expected Discharge Date Expected Discharge Time    Mar 22, 2017             Kathryn Pozo RN

## 2017-08-02 ENCOUNTER — OFFICE VISIT (OUTPATIENT)
Dept: CARDIAC SURGERY | Facility: CLINIC | Age: 57
End: 2017-08-02

## 2017-08-02 VITALS
WEIGHT: 250 LBS | SYSTOLIC BLOOD PRESSURE: 164 MMHG | BODY MASS INDEX: 41.65 KG/M2 | HEIGHT: 65 IN | DIASTOLIC BLOOD PRESSURE: 86 MMHG

## 2017-08-02 DIAGNOSIS — I73.9 PERIPHERAL ARTERIAL DISEASE (HCC): Primary | ICD-10-CM

## 2017-08-02 PROCEDURE — 99212 OFFICE O/P EST SF 10 MIN: CPT | Performed by: THORACIC SURGERY (CARDIOTHORACIC VASCULAR SURGERY)

## 2017-08-02 NOTE — PROGRESS NOTES
08/02/2017  Patient Information  Sai Weinberg                                                                                          [address]  [place] [address]   1960  'PCP/Referring Physician'  Dewayne Cole MD  [phone]  No ref. provider found    Chief Complaint   Patient presents with   • Post-op     6wk hosp f/u after left groin incision debridement       History of Present Illness:  Patient is status post recent catheter-based intervention to the leg with an area of wound dehiscence.  I returned to the operating room for some debridement of this wound.  At this time he returns to office just for a wound check with no complaints and his wound has completely epithelialized.      Patient Active Problem List   Diagnosis   • Dyslipidemia   • Arthritis   • COPD (chronic obstructive pulmonary disease)   • Diabetes mellitus   • Esophageal reflux   • Hypertension   • Malignant neoplasm of colon   • Heart attack   • Peripheral vascular disease   • Chewing tobacco use   • Peripheral arterial disease   • LANA (acute kidney injury)   • Coronary artery disease involving native heart   • Iron deficiency anemia   • Coal workers pneumoconiosis   • Open wound of left hip and thigh with complication   • Leg wound, left     Past Medical History:   Diagnosis Date   • Arthritis    • Black lung disease    • BPH (benign prostatic hyperplasia)    • Cataract    • Cataract    • COLD (chronic obstructive lung disease)    • Colon polyps    • Diabetes mellitus     SINCE 2014   • Dyslipidemia    • Esophageal reflux    • Flu     HX   • Heart attack     2006   • Herniated lumbar intervertebral disc    • Hypertension    • Malignant neoplasm of colon    • Peripheral vascular disease    • Sleep apnea with use of continuous positive airway pressure (CPAP)    • Wears dentures    • Wears glasses      Past Surgical History:   Procedure Laterality Date   • ANGIOPLASTY FEMORAL ARTERY N/A 1/17/2017    Procedure: left  femoral cutdown with aortagram and left SFA stent and angioplasty;  Surgeon: Heladio Rosa MD;  Location:  [facility];  Service:    • AORTAGRAM N/A 1/17/2017    Procedure: AORTAGRAM WITH RUNOFFS and  STENT left SFA;  Surgeon: Heladio Rosa MD;  Location:  [facility];  Service:    • BYPASS GRAFT      non-vein - femoral-popliteal right   • CARDIAC CATHETERIZATION      NO INTERVENTION   • COLON SURGERY     • COLONOSCOPY     • FEMORAL FEMORAL BYPASS N/A 3/13/2017    Procedure: INCISION AND DRAINAGE LEFT GROIN HEMATOMA;  Surgeon: Heladio Rosa MD;  Location:  [facility];  Service:    • OTHER SURGICAL HISTORY      PTA ILIAC STENOSIS WITH STENT       Current Outpatient Prescriptions:   •  albuterol (PROVENTIL HFA;VENTOLIN HFA) 108 (90 BASE) MCG/ACT inhaler, Inhale 2 puffs Every 4 (Four) Hours As Needed for wheezing., Disp: , Rfl:   •  aspirin 81 MG tablet, Take 81 mg by mouth daily., Disp: , Rfl:   •  atorvastatin (LIPITOR) 40 MG tablet, Take 40 mg by mouth daily., Disp: , Rfl:   •  Cholecalciferol (VITAMIN D PO), Take 2,000 Units by mouth Daily., Disp: , Rfl:   •  clopidogrel (PLAVIX) 75 MG tablet, Take 75 mg by mouth daily., Disp: , Rfl:   •  ferrous sulfate 324 (65 FE) MG tablet delayed-release EC tablet, Take 324 mg by mouth Daily With Breakfast., Disp: , Rfl:   •  fluticasone-salmeterol (ADVAIR) 100-50 MCG/DOSE DISKUS, Inhale 2 puffs 2 (Two) Times a Day., Disp: , Rfl:   •  linezolid (ZYVOX) 600 MG tablet, Take 1 tablet by mouth Every 12 (Twelve) Hours.  Indications: Skin and Soft Tissue Infection, Disp: 6 tablet, Rfl: 0  •  meropenem (MERREM) 1 g/100 mL 0.9% NS VTB (mbp), Infuse 100 mL into a venous catheter Every 8 (Eight) Hours. Indications: Skin and Soft Tissue Infection, Disp: , Rfl:   •  metFORMIN (GLUCOPHAGE) 500 MG tablet, Take 850 mg by mouth 2 (Two) Times a Day With Meals., Disp: , Rfl:   •  pantoprazole (PROTONIX) 40 MG EC tablet, Take 40 mg by mouth Every Other Day., Disp: ,  "Rfl:   •  probiotic (CULTURELLE) capsule capsule, Take 1 capsule by mouth Daily., Disp: 30 capsule, Rfl: 1  •  sodium hypochlorite (DAKIN'S) 0.025 % solution topical solution, Apply 1,000 mL topically 2 (Two) Times a Day. Wound care per  with wound vac.  Drsg changes per PT recs, Disp: , Rfl:   •  tamsulosin (FLOMAX) 0.4 MG capsule 24 hr capsule, Take 0.4 mg by mouth Daily., Disp: , Rfl:   •  tiZANidine (ZANAFLEX) 4 MG tablet, Take 4 mg by mouth At Night As Needed for muscle spasms., Disp: , Rfl:   No Known Allergies  Social History     Social History   • Marital status:      Spouse name: N/A   • Number of children: N/A   • Years of education: N/A     Occupational History   • DISABLED       BACK AND LUNG PROBLEMS     Social History Main Topics   • Smoking status: Former Smoker     Packs/day: 2.00     Types: Cigarettes     Start date: 1980     Quit date: 2015   • Smokeless tobacco: Current User     Types: Chew      Comment: Stopped smoking 1 year ago. Smoked 35 years up to 2ppd.   • Alcohol use No   • Drug use: No   • Sexual activity: Defer     Other Topics Concern   • Not on file     Social History Narrative     Family History   Problem Relation Age of Onset   • Lung cancer Mother    • Heart attack Mother    • Hypertension Mother    • Diabetes Mother    • Diabetes Father    • Hypertension Father    • Heart attack Father      ROS  Vitals:    08/02/17 0909   BP: 164/86   BP Location: Left arm   Patient Position: Sitting   Weight: 250 lb (113 kg)   Height: 65\" (165.1 cm)      Physical Exam   VASCULAR:  The left groin incision is completely epithelialized with no evidence of infection.     Assessment/Plan:   Continue with antiplatelet therapy.  At this point I will see the patient on as-needed basis.  Patient Active Problem List   Diagnosis   • Dyslipidemia   • Arthritis   • COPD (chronic obstructive pulmonary disease)   • Diabetes mellitus   • Esophageal reflux   • Hypertension   • Malignant " neoplasm of colon   • Heart attack   • Peripheral vascular disease   • Chewing tobacco use   • Peripheral arterial disease   • LANA (acute kidney injury)   • Coronary artery disease involving native heart   • Iron deficiency anemia   • Coal workers pneumoconiosis   • Open wound of left hip and thigh with complication   • Leg wound, left     Signed by: Heladio Rosa M.D.    8/2/2017    CC:  MD Pepper Oneil, , editing for Heladio Rosa M.D.    I, Heladio Rosa MD, have read and agree with the editing done by Pepper Cameron, .

## 2018-10-02 NOTE — ANESTHESIA PREPROCEDURE EVALUATION
Anesthesia Evaluation     Patient summary reviewed and Nursing notes reviewed   NPO Status: > 8 hours   Airway   Mallampati: IV  TM distance: >3 FB  Neck ROM: full  possible difficult intubation  Dental    (+) edentulous    Pulmonary     breath sounds clear to auscultation  (+) COPD, sleep apnea,   Cardiovascular     ECG reviewed  Rhythm: regular  Rate: normal    (+) hypertension, murmur, PVD,       Neuro/Psych  GI/Hepatic/Renal/Endo    (+)  GERD, chronic renal disease CRI, diabetes mellitus,     Musculoskeletal     Abdominal    Substance History      OB/GYN          Other   (+) arthritis                                 Anesthesia Plan    ASA 3     general     intravenous induction   Anesthetic plan and risks discussed with patient.    Plan discussed with CRNA.      
(0) independent

## 2019-09-23 ENCOUNTER — OFFICE VISIT (OUTPATIENT)
Dept: CARDIAC SURGERY | Facility: CLINIC | Age: 59
End: 2019-09-23

## 2019-09-23 ENCOUNTER — HOSPITAL ENCOUNTER (OUTPATIENT)
Dept: CT IMAGING | Facility: HOSPITAL | Age: 59
Discharge: HOME | End: 2019-09-23
Admitting: PHYSICIAN ASSISTANT

## 2019-09-23 VITALS
OXYGEN SATURATION: 98 % | TEMPERATURE: 98.2 F | HEART RATE: 78 BPM | SYSTOLIC BLOOD PRESSURE: 150 MMHG | DIASTOLIC BLOOD PRESSURE: 70 MMHG | WEIGHT: 242 LBS | BODY MASS INDEX: 40.32 KG/M2 | HEIGHT: 65 IN

## 2019-09-23 DIAGNOSIS — I70.219 ATHEROSCLEROTIC PERIPHERAL VASCULAR DISEASE WITH INTERMITTENT CLAUDICATION (HCC): ICD-10-CM

## 2019-09-23 DIAGNOSIS — I70.219 ATHEROSCLEROTIC PERIPHERAL VASCULAR DISEASE WITH INTERMITTENT CLAUDICATION (HCC): Primary | ICD-10-CM

## 2019-09-23 DIAGNOSIS — I73.9 PERIPHERAL ARTERIAL DISEASE (HCC): Primary | ICD-10-CM

## 2019-09-23 PROCEDURE — 82565 ASSAY OF CREATININE: CPT

## 2019-09-23 PROCEDURE — 99214 OFFICE O/P EST MOD 30 MIN: CPT | Performed by: THORACIC SURGERY (CARDIOTHORACIC VASCULAR SURGERY)

## 2019-09-23 PROCEDURE — 0 IOPAMIDOL PER 1 ML: Performed by: PHYSICIAN ASSISTANT

## 2019-09-23 PROCEDURE — 75635 CT ANGIO ABDOMINAL ARTERIES: CPT

## 2019-09-23 RX ORDER — OXYCODONE HYDROCHLORIDE 10 MG/1
10 TABLET ORAL EVERY 8 HOURS PRN
COMMUNITY
Start: 2019-09-11

## 2019-09-23 RX ORDER — FUROSEMIDE 40 MG/1
40 TABLET ORAL DAILY
COMMUNITY
Start: 2019-09-16 | End: 2019-11-27 | Stop reason: HOSPADM

## 2019-09-23 RX ORDER — LISINOPRIL AND HYDROCHLOROTHIAZIDE 12.5; 1 MG/1; MG/1
1 TABLET ORAL DAILY
COMMUNITY
Start: 2019-09-16 | End: 2020-01-01 | Stop reason: SDDI

## 2019-09-23 RX ADMIN — IOPAMIDOL 150 ML: 755 INJECTION, SOLUTION INTRAVENOUS at 12:26

## 2019-09-23 NOTE — PROGRESS NOTES
09/23/2019  Patient Information  Sai Weinberg                                                                                          [address]  [place] [address]   1960  'PCP/Referring Physician'  Dewayne Cole MD  [phone]  No ref. provider found    Chief Complaint   Patient presents with   • Follow-up     Old Patient Ref by Shonna for PAD-Complains of Bilateral Leg Pain       History of Present Illness:   This patient is referred to me to evaluate peripheral arterial vascular disease.  He is known to me, although I have not seen him in nearly 2 years.  He has a previously placed right femoral to above-knee popliteal bypass with PTFE.  He also has a previous left femoral cutdown for antegrade cannulation with catheter-based intervention and stent placement in the left superficial femoral artery.  For the last 3 months, he states he has severe pain in the ankles and feet when he walks.  He says he cannot walk 30 feet without intense pain in the ankles and to a lesser degree the calf muscles.  He says he has to wait almost 15 minutes before the pain resolves.  He has an outside duplex scan which demonstrates bilateral superficial femoral artery disease but there is flow detected in the PTFE graft on the right.  The report indicates that it is difficult to assess the left leg secondary to the metallic stent and the report recommends, from the radiologist, that we obtain a CT angiogram.  The patient also has significant edema and swelling of his ankles.  He denies rest pain, but his claudication is significant.      Patient Active Problem List   Diagnosis   • Dyslipidemia   • Arthritis   • COPD (chronic obstructive pulmonary disease) (CMS/HCC)   • Diabetes mellitus (CMS/HCC)   • Esophageal reflux   • Hypertension   • Malignant neoplasm of colon (CMS/HCC)   • Heart attack (CMS/HCC)   • Peripheral vascular disease (CMS/HCC)   • Chewing tobacco use   • Peripheral arterial disease  (CMS/HCC)   • LANA (acute kidney injury) (CMS/[facility])   • Coronary artery disease involving native heart   • Iron deficiency anemia   • Coal workers pneumoconiosis (CMS/HCC)   • Open wound of left hip and thigh with complication   • Leg wound, left     Past Medical History:   Diagnosis Date   • Arthritis    • Black lung disease (CMS/HCC)    • BPH (benign prostatic hyperplasia)    • Cataract    • Cataract    • COLD (chronic obstructive lung disease) (CMS/[facility])    • Colon polyps    • Diabetes mellitus (CMS/[facility])     SINCE 2014   • Dyslipidemia    • Esophageal reflux    • Flu     HX   • Heart attack (CMS/HCC)     2006   • Herniated lumbar intervertebral disc    • Hypertension    • Malignant neoplasm of colon (CMS/HCC)    • Peripheral vascular disease (CMS/[facility])    • Renal failure    • Sleep apnea with use of continuous positive airway pressure (CPAP)    • Wears dentures    • Wears glasses      Past Surgical History:   Procedure Laterality Date   • ANGIOPLASTY FEMORAL ARTERY N/A 1/17/2017    Procedure: left femoral cutdown with aortagram and left SFA stent and angioplasty;  Surgeon: Heladio Rosa MD;  Location:  [facility];  Service:    • AORTAGRAM N/A 1/17/2017    Procedure: AORTAGRAM WITH RUNOFFS and  STENT left SFA;  Surgeon: Heladio Rosa MD;  Location:  [facility];  Service:    • BYPASS GRAFT      non-vein - femoral-popliteal right   • CARDIAC CATHETERIZATION      NO INTERVENTION   • COLON SURGERY      x 2   • COLONOSCOPY     • FEMORAL FEMORAL BYPASS N/A 3/13/2017    Procedure: INCISION AND DRAINAGE LEFT GROIN HEMATOMA;  Surgeon: Heladio Rosa MD;  Location:  [facility];  Service:    • FEMORAL POPLITEAL BYPASS Right 01/26/2016   • OTHER SURGICAL HISTORY      PTA ILIAC STENOSIS WITH STENT       Current Outpatient Medications:   •  albuterol (PROVENTIL HFA;VENTOLIN HFA) 108 (90 BASE) MCG/ACT inhaler, Inhale 2 puffs Every 4 (Four) Hours As Needed for wheezing., Disp: , Rfl:   •  aspirin 81 MG  tablet, Take 81 mg by mouth daily., Disp: , Rfl:   •  atorvastatin (LIPITOR) 40 MG tablet, Take 40 mg by mouth daily., Disp: , Rfl:   •  Cholecalciferol (VITAMIN D PO), Take 2,000 Units by mouth Daily., Disp: , Rfl:   •  clopidogrel (PLAVIX) 75 MG tablet, Take 75 mg by mouth daily., Disp: , Rfl:   •  ferrous sulfate 324 (65 FE) MG tablet delayed-release EC tablet, Take 324 mg by mouth Daily With Breakfast., Disp: , Rfl:   •  furosemide (LASIX) 40 MG tablet, Daily., Disp: , Rfl:   •  linezolid (ZYVOX) 600 MG tablet, Take 1 tablet by mouth Every 12 (Twelve) Hours.  Indications: Skin and Soft Tissue Infection, Disp: 6 tablet, Rfl: 0  •  lisinopril-hydrochlorothiazide (PRINZIDE,ZESTORETIC) 10-12.5 MG per tablet, Daily., Disp: , Rfl:   •  metFORMIN (GLUCOPHAGE) 500 MG tablet, Take 850 mg by mouth 2 (Two) Times a Day With Meals., Disp: , Rfl:   •  oxyCODONE (ROXICODONE) 10 MG tablet, As Needed., Disp: , Rfl:   •  pantoprazole (PROTONIX) 40 MG EC tablet, Take 40 mg by mouth Every Other Day., Disp: , Rfl:   •  tamsulosin (FLOMAX) 0.4 MG capsule 24 hr capsule, Take 0.4 mg by mouth Daily., Disp: , Rfl:   •  tiotropium (SPIRIVA) 18 MCG per inhalation capsule, Place 1 capsule into inhaler and inhale Daily., Disp: , Rfl:   •  tiZANidine (ZANAFLEX) 4 MG tablet, Take 4 mg by mouth At Night As Needed for muscle spasms., Disp: , Rfl:   •  fluticasone-salmeterol (ADVAIR) 100-50 MCG/DOSE DISKUS, Inhale 2 puffs 2 (Two) Times a Day., Disp: , Rfl:   •  meropenem (MERREM) 1 g/100 mL 0.9% NS VTB (mbp), Infuse 100 mL into a venous catheter Every 8 (Eight) Hours. Indications: Skin and Soft Tissue Infection, Disp: , Rfl:   •  probiotic (CULTURELLE) capsule capsule, Take 1 capsule by mouth Daily., Disp: 30 capsule, Rfl: 1  •  sodium hypochlorite (DAKIN'S) 0.025 % solution topical solution, Apply 1,000 mL topically 2 (Two) Times a Day. Wound care per  with wound vac.  Drsg changes per PT recs, Disp: , Rfl:   No Known Allergies  Social History      Socioeconomic History   • Marital status:      Spouse name: Not on file   • Number of children: 2   • Years of education: Not on file   • Highest education level: Not on file   Occupational History   • Occupation: DISABLED      Comment: BACK AND LUNG PROBLEMS   Tobacco Use   • Smoking status: Former Smoker     Packs/day: 2.00     Years: 30.00     Pack years: 60.00     Types: Cigarettes     Start date:      Last attempt to quit:      Years since quittin.7   • Smokeless tobacco: Current User     Types: Chew   • Tobacco comment: Stopped smoking 1 year ago. Smoked 35 years up to 2ppd.   Substance and Sexual Activity   • Alcohol use: No   • Drug use: No   • Sexual activity: Defer   Social History Narrative    Lives in [place].     Family History   Problem Relation Age of Onset   • Lung cancer Mother    • Heart attack Mother    • Hypertension Mother    • Diabetes Mother    • Diabetes Father    • Hypertension Father    • Heart attack Father      Review of Systems   Constitution: Negative for chills, fever, malaise/fatigue, night sweats and weight loss.   HENT: Positive for hearing loss. Negative for odynophagia and sore throat.    Cardiovascular: Positive for claudication, dyspnea on exertion and leg swelling. Negative for chest pain, orthopnea and palpitations.   Respiratory: Positive for cough, shortness of breath and wheezing. Negative for hemoptysis.    Endocrine: Negative for cold intolerance, heat intolerance, polydipsia, polyphagia and polyuria.   Hematologic/Lymphatic: Bruises/bleeds easily.   Skin: Positive for color change. Negative for itching and rash.   Musculoskeletal: Positive for arthritis, back pain and joint pain. Negative for joint swelling and myalgias.   Gastrointestinal: Negative for abdominal pain, constipation, diarrhea, hematemesis, hematochezia, melena, nausea and vomiting.   Genitourinary: Negative for dysuria, frequency and hematuria.   Neurological: Positive  "for dizziness and light-headedness. Negative for focal weakness, headaches, numbness and seizures.   Psychiatric/Behavioral: Negative for suicidal ideas.   All other systems reviewed and are negative.    Vitals:    09/23/19 0753   BP: 150/70   BP Location: Left arm   Patient Position: Sitting   Pulse: 78   Temp: 98.2 °F (36.8 °C)   SpO2: 98%   Weight: 110 kg (242 lb)   Height: 165.1 cm (65\")      Physical Exam   CONSTITUTIONAL: Alert and conversant, Well dressed, Well nourished, No acute distress he is in a wheelchair  EYES: Sclera clean, Anicteric, Pupils equal  ENT: No nasal deviation, Trachea midline  NECK: No neck masses, Supple  LUNGS: No wheezing, Cough, non-congested  HEART: No rubs, No murmurs  GASTROINTESTINAL: Soft, non-distended, No masses, Non tender  to palpation, normal bowel sounds  NEURO: No motor deficits, No sensory deficits, Cranial Nerves 2 through 12 grossly intact  PSYCHIATRIC: Oriented to person, place and time, No memory deficits, Mood appropriate  VASCULAR: No carotid bruits, Femoral pulses palpable and symmetric he has 2+ edema from the knees to the feet bilaterally.  I am able to detect a fairly good quality Doppler signal in the right posterior tibial and a lesser quality in the left posterior tibial  MUSKULOSKELETAL: No extremity trauma or extremity asymmetry    Labs/Imaging:   I reviewed the outside duplex scan which recommends we obtain a CT angiogram by the radiologist.    Assessment/Plan:   This patient has known vascular disease.  He has had a previous myocardial infarction, chronic obstructive pulmonary disease and diabetes.  He also is a current user of chewing tobacco.  He has severe pain in the feet and ankles after walking as little as 30 feet and to me this is more typical of severe arthritis or degenerative disease within the ankle joints.  He does have some claudication also.  There is definitely a pulse discrepancy in the posterior tibial arteries bilaterally, with the " right being stronger than the left.  Furthermore, his duplex scan is difficult to interpret secondary to a prosthetic artery in the right leg and a stent in the left superficial femoral artery.  The radiologist reading the duplex report even comments of the difficulty of the technical aspects of the study and recommends a CT angiogram and I agree with his assessment.  I also believe that his pain complaints are probably out of proportion to what we may find as a vascular etiology, as I believe this patient has severe degeneration in the ankles bilaterally causing his extreme pain in the feet and ankles at only 30 feet of ambulation when he has no rest pain at night.  I will try to obtain a CT angiogram although at this point Medicare is denying the study.  I do believe this patient needs to be seen by a foot specialist and probably obtain x-rays and/or an MRI study of the ankles bilaterally.  We will still try to obtain this CT angiogram of the vasculature and contact the patient with our findings.    Patient Active Problem List   Diagnosis   • Dyslipidemia   • Arthritis   • COPD (chronic obstructive pulmonary disease) (CMS/HCC)   • Diabetes mellitus (CMS/HCC)   • Esophageal reflux   • Hypertension   • Malignant neoplasm of colon (CMS/HCC)   • Heart attack (CMS/HCC)   • Peripheral vascular disease (CMS/HCC)   • Chewing tobacco use   • Peripheral arterial disease (CMS/HCC)   • LANA (acute kidney injury) (CMS/HCC)   • Coronary artery disease involving native heart   • Iron deficiency anemia   • Coal workers pneumoconiosis (CMS/HCC)   • Open wound of left hip and thigh with complication   • Leg wound, left     CC: MD Dewayne Vicente MD Debbie Moore, , editing for Heladio Rosa M.D.    I, Heladio Rosa MD, have read and agree with the editing done by Pepper Cameron, .

## 2019-09-24 LAB — CREAT BLDA-MCNC: 0.7 MG/DL (ref 0.6–1.3)

## 2019-10-28 DIAGNOSIS — I25.119 CORONARY ARTERY DISEASE INVOLVING NATIVE CORONARY ARTERY OF NATIVE HEART WITH ANGINA PECTORIS (HCC): Primary | ICD-10-CM

## 2019-10-28 NOTE — PROGRESS NOTES
DEVIKA spoke with Dr. Cole by phone. Needs [facility] for angina. Per DEVIKA set up for 10/30/19. K[facility]

## 2019-10-29 ENCOUNTER — PREP FOR SURGERY (OUTPATIENT)
Dept: OTHER | Facility: HOSPITAL | Age: 59
End: 2019-10-29

## 2019-10-29 RX ORDER — ACETAMINOPHEN 325 MG/1
650 TABLET ORAL EVERY 4 HOURS PRN
Status: CANCELLED | OUTPATIENT
Start: 2019-10-29

## 2019-10-29 RX ORDER — NITROGLYCERIN 0.4 MG/1
0.4 TABLET SUBLINGUAL
Status: CANCELLED | OUTPATIENT
Start: 2019-10-29

## 2019-10-29 RX ORDER — SODIUM CHLORIDE 0.9 % (FLUSH) 0.9 %
10 SYRINGE (ML) INJECTION AS NEEDED
Status: CANCELLED | OUTPATIENT
Start: 2019-10-29

## 2019-10-29 RX ORDER — SODIUM CHLORIDE 0.9 % (FLUSH) 0.9 %
3 SYRINGE (ML) INJECTION EVERY 12 HOURS SCHEDULED
Status: CANCELLED | OUTPATIENT
Start: 2019-10-29

## 2019-10-30 ENCOUNTER — APPOINTMENT (OUTPATIENT)
Dept: GENERAL RADIOLOGY | Facility: HOSPITAL | Age: 59
End: 2019-10-30

## 2019-10-30 ENCOUNTER — HOSPITAL ENCOUNTER (OUTPATIENT)
Facility: HOSPITAL | Age: 59
Discharge: HOME | End: 2019-10-31
Attending: INTERNAL MEDICINE | Admitting: INTERNAL MEDICINE

## 2019-10-30 DIAGNOSIS — I35.0 AORTIC VALVE STENOSIS, ETIOLOGY OF CARDIAC VALVE DISEASE UNSPECIFIED: ICD-10-CM

## 2019-10-30 DIAGNOSIS — I25.119 CORONARY ARTERY DISEASE INVOLVING NATIVE CORONARY ARTERY OF NATIVE HEART WITH ANGINA PECTORIS (HCC): ICD-10-CM

## 2019-10-30 DIAGNOSIS — I20.0 UNSTABLE ANGINA (HCC): Primary | ICD-10-CM

## 2019-10-30 LAB
ALBUMIN SERPL-MCNC: 3.8 G/DL (ref 3.5–5.2)
ALBUMIN/GLOB SERPL: 0.9 G/DL
ALP SERPL-CCNC: 56 U/L (ref 39–117)
ALT SERPL W P-5'-P-CCNC: 61 U/L (ref 1–41)
ANION GAP SERPL CALCULATED.3IONS-SCNC: 12 MMOL/L (ref 5–15)
AST SERPL-CCNC: 69 U/L (ref 1–40)
BILIRUB SERPL-MCNC: 0.7 MG/DL (ref 0.2–1.2)
BUN BLD-MCNC: 11 MG/DL (ref 6–20)
BUN/CREAT SERPL: 13.9 (ref 7–25)
CALCIUM SPEC-SCNC: 9.6 MG/DL (ref 8.6–10.5)
CHLORIDE SERPL-SCNC: 87 MMOL/L (ref 98–107)
CHOLEST SERPL-MCNC: 112 MG/DL (ref 0–200)
CO2 SERPL-SCNC: 27 MMOL/L (ref 22–29)
CREAT BLD-MCNC: 0.79 MG/DL (ref 0.76–1.27)
DEPRECATED RDW RBC AUTO: 48.4 FL (ref 37–54)
ERYTHROCYTE [DISTWIDTH] IN BLOOD BY AUTOMATED COUNT: 18.7 % (ref 12.3–15.4)
GFR SERPL CREATININE-BSD FRML MDRD: 100 ML/MIN/1.73
GLOBULIN UR ELPH-MCNC: 4.3 GM/DL
GLUCOSE BLD-MCNC: 122 MG/DL (ref 65–99)
GLUCOSE BLDC GLUCOMTR-MCNC: 106 MG/DL (ref 70–130)
GLUCOSE BLDC GLUCOMTR-MCNC: 115 MG/DL (ref 70–130)
HBA1C MFR BLD: 5.8 % (ref 4.8–5.6)
HCT VFR BLD AUTO: 33.2 % (ref 37.5–51)
HDLC SERPL-MCNC: 49 MG/DL (ref 40–60)
HGB BLD-MCNC: 10.2 G/DL (ref 13–17.7)
LDLC SERPL CALC-MCNC: 52 MG/DL (ref 0–100)
LDLC/HDLC SERPL: 1.06 {RATIO}
MCH RBC QN AUTO: 22.4 PG (ref 26.6–33)
MCHC RBC AUTO-ENTMCNC: 30.7 G/DL (ref 31.5–35.7)
MCV RBC AUTO: 72.8 FL (ref 79–97)
PLATELET # BLD AUTO: 167 10*3/MM3 (ref 140–450)
PMV BLD AUTO: 9.9 FL (ref 6–12)
POTASSIUM BLD-SCNC: 3.9 MMOL/L (ref 3.5–5.2)
PROT SERPL-MCNC: 8.1 G/DL (ref 6–8.5)
RBC # BLD AUTO: 4.56 10*6/MM3 (ref 4.14–5.8)
SODIUM BLD-SCNC: 126 MMOL/L (ref 136–145)
TRIGL SERPL-MCNC: 56 MG/DL (ref 0–150)
TROPONIN T SERPL-MCNC: 0.04 NG/ML (ref 0–0.03)
VLDLC SERPL-MCNC: 11.2 MG/DL
WBC NRBC COR # BLD: 3.31 10*3/MM3 (ref 3.4–10.8)

## 2019-10-30 PROCEDURE — C1887 CATHETER, GUIDING: HCPCS | Performed by: INTERNAL MEDICINE

## 2019-10-30 PROCEDURE — 80053 COMPREHEN METABOLIC PANEL: CPT | Performed by: PHYSICIAN ASSISTANT

## 2019-10-30 PROCEDURE — C1769 GUIDE WIRE: HCPCS | Performed by: INTERNAL MEDICINE

## 2019-10-30 PROCEDURE — C1760 CLOSURE DEV, VASC: HCPCS | Performed by: INTERNAL MEDICINE

## 2019-10-30 PROCEDURE — C1874 STENT, COATED/COV W/DEL SYS: HCPCS | Performed by: INTERNAL MEDICINE

## 2019-10-30 PROCEDURE — 36415 COLL VENOUS BLD VENIPUNCTURE: CPT

## 2019-10-30 PROCEDURE — 82962 GLUCOSE BLOOD TEST: CPT

## 2019-10-30 PROCEDURE — 85027 COMPLETE CBC AUTOMATED: CPT | Performed by: PHYSICIAN ASSISTANT

## 2019-10-30 PROCEDURE — 83036 HEMOGLOBIN GLYCOSYLATED A1C: CPT | Performed by: PHYSICIAN ASSISTANT

## 2019-10-30 PROCEDURE — C1725 CATH, TRANSLUMIN NON-LASER: HCPCS | Performed by: INTERNAL MEDICINE

## 2019-10-30 PROCEDURE — 93460 R&L HRT ART/VENTRICLE ANGIO: CPT | Performed by: INTERNAL MEDICINE

## 2019-10-30 PROCEDURE — 92978 ENDOLUMINL IVUS OCT C 1ST: CPT | Performed by: INTERNAL MEDICINE

## 2019-10-30 PROCEDURE — C1894 INTRO/SHEATH, NON-LASER: HCPCS | Performed by: INTERNAL MEDICINE

## 2019-10-30 PROCEDURE — 80061 LIPID PANEL: CPT | Performed by: PHYSICIAN ASSISTANT

## 2019-10-30 PROCEDURE — 25010000002 HEPARIN (PORCINE) PER 1000 UNITS: Performed by: INTERNAL MEDICINE

## 2019-10-30 PROCEDURE — 63710000001 INSULIN LISPRO (HUMAN) PER 5 UNITS: Performed by: PHYSICIAN ASSISTANT

## 2019-10-30 PROCEDURE — 71046 X-RAY EXAM CHEST 2 VIEWS: CPT

## 2019-10-30 PROCEDURE — 93005 ELECTROCARDIOGRAM TRACING: CPT | Performed by: PHYSICIAN ASSISTANT

## 2019-10-30 PROCEDURE — 92928 PRQ TCAT PLMT NTRAC ST 1 LES: CPT | Performed by: INTERNAL MEDICINE

## 2019-10-30 PROCEDURE — C1753 CATH, INTRAVAS ULTRASOUND: HCPCS | Performed by: INTERNAL MEDICINE

## 2019-10-30 PROCEDURE — C1751 CATH, INF, PER/CENT/MIDLINE: HCPCS | Performed by: INTERNAL MEDICINE

## 2019-10-30 PROCEDURE — 84484 ASSAY OF TROPONIN QUANT: CPT | Performed by: PHYSICIAN ASSISTANT

## 2019-10-30 PROCEDURE — 0 IOPAMIDOL PER 1 ML: Performed by: INTERNAL MEDICINE

## 2019-10-30 PROCEDURE — 25010000002 FENTANYL CITRATE (PF) 100 MCG/2ML SOLUTION: Performed by: INTERNAL MEDICINE

## 2019-10-30 PROCEDURE — C9600 PERC DRUG-EL COR STENT SING: HCPCS | Performed by: INTERNAL MEDICINE

## 2019-10-30 PROCEDURE — 25010000002 MIDAZOLAM PER 1 MG: Performed by: INTERNAL MEDICINE

## 2019-10-30 PROCEDURE — 25010000003 LIDOCAINE 1 % SOLUTION: Performed by: INTERNAL MEDICINE

## 2019-10-30 PROCEDURE — 85347 COAGULATION TIME ACTIVATED: CPT

## 2019-10-30 DEVICE — XIENCE SIERRA™ EVEROLIMUS ELUTING CORONARY STENT SYSTEM 4.00 MM X 18 MM / RAPID-EXCHANGE
Type: IMPLANTABLE DEVICE | Status: FUNCTIONAL
Brand: XIENCE SIERRA™

## 2019-10-30 DEVICE — XIENCE SIERRA™ EVEROLIMUS ELUTING CORONARY STENT SYSTEM 4.00 MM X 23 MM / RAPID-EXCHANGE
Type: IMPLANTABLE DEVICE | Status: FUNCTIONAL
Brand: XIENCE SIERRA™

## 2019-10-30 RX ORDER — CLOPIDOGREL BISULFATE 75 MG/1
225 TABLET ORAL ONCE
Status: COMPLETED | OUTPATIENT
Start: 2019-10-30 | End: 2019-10-30

## 2019-10-30 RX ORDER — ATORVASTATIN CALCIUM 40 MG/1
40 TABLET, FILM COATED ORAL NIGHTLY
Status: DISCONTINUED | OUTPATIENT
Start: 2019-10-30 | End: 2019-10-31 | Stop reason: HOSPADM

## 2019-10-30 RX ORDER — OXYCODONE HYDROCHLORIDE 5 MG/1
10 TABLET ORAL EVERY 6 HOURS PRN
Status: DISCONTINUED | OUTPATIENT
Start: 2019-10-30 | End: 2019-10-31 | Stop reason: HOSPADM

## 2019-10-30 RX ORDER — ASPIRIN 325 MG
325 TABLET, DELAYED RELEASE (ENTERIC COATED) ORAL ONCE
Status: COMPLETED | OUTPATIENT
Start: 2019-10-30 | End: 2019-10-30

## 2019-10-30 RX ORDER — ACETAMINOPHEN 325 MG/1
650 TABLET ORAL EVERY 4 HOURS PRN
Status: DISCONTINUED | OUTPATIENT
Start: 2019-10-30 | End: 2019-10-30

## 2019-10-30 RX ORDER — SODIUM CHLORIDE 0.9 % (FLUSH) 0.9 %
3 SYRINGE (ML) INJECTION EVERY 12 HOURS SCHEDULED
Status: DISCONTINUED | OUTPATIENT
Start: 2019-10-30 | End: 2019-10-30 | Stop reason: HOSPADM

## 2019-10-30 RX ORDER — TAMSULOSIN HYDROCHLORIDE 0.4 MG/1
0.4 CAPSULE ORAL DAILY
Status: DISCONTINUED | OUTPATIENT
Start: 2019-10-30 | End: 2019-10-31 | Stop reason: HOSPADM

## 2019-10-30 RX ORDER — DEXTROSE MONOHYDRATE 25 G/50ML
25 INJECTION, SOLUTION INTRAVENOUS
Status: DISCONTINUED | OUTPATIENT
Start: 2019-10-30 | End: 2019-10-31 | Stop reason: HOSPADM

## 2019-10-30 RX ORDER — NITROGLYCERIN 0.4 MG/1
0.4 TABLET SUBLINGUAL
COMMUNITY

## 2019-10-30 RX ORDER — HEPARIN SODIUM 1000 [USP'U]/ML
INJECTION, SOLUTION INTRAVENOUS; SUBCUTANEOUS AS NEEDED
Status: DISCONTINUED | OUTPATIENT
Start: 2019-10-30 | End: 2019-10-30 | Stop reason: HOSPADM

## 2019-10-30 RX ORDER — FENTANYL CITRATE 50 UG/ML
INJECTION, SOLUTION INTRAMUSCULAR; INTRAVENOUS AS NEEDED
Status: DISCONTINUED | OUTPATIENT
Start: 2019-10-30 | End: 2019-10-30 | Stop reason: HOSPADM

## 2019-10-30 RX ORDER — SODIUM CHLORIDE 9 MG/ML
3 INJECTION, SOLUTION INTRAVENOUS CONTINUOUS
Status: ACTIVE | OUTPATIENT
Start: 2019-10-30 | End: 2019-10-30

## 2019-10-30 RX ORDER — MIDAZOLAM HYDROCHLORIDE 1 MG/ML
INJECTION INTRAMUSCULAR; INTRAVENOUS AS NEEDED
Status: DISCONTINUED | OUTPATIENT
Start: 2019-10-30 | End: 2019-10-30 | Stop reason: HOSPADM

## 2019-10-30 RX ORDER — POTASSIUM CHLORIDE 750 MG/1
10 TABLET, FILM COATED, EXTENDED RELEASE ORAL 2 TIMES DAILY
COMMUNITY
End: 2020-01-01

## 2019-10-30 RX ORDER — NICOTINE 21 MG/24HR
1 PATCH, TRANSDERMAL 24 HOURS TRANSDERMAL
Status: DISCONTINUED | OUTPATIENT
Start: 2019-10-30 | End: 2019-10-31 | Stop reason: HOSPADM

## 2019-10-30 RX ORDER — FUROSEMIDE 40 MG/1
40 TABLET ORAL DAILY
Status: DISCONTINUED | OUTPATIENT
Start: 2019-10-31 | End: 2019-10-31 | Stop reason: HOSPADM

## 2019-10-30 RX ORDER — POTASSIUM CHLORIDE 750 MG/1
20 CAPSULE, EXTENDED RELEASE ORAL DAILY
Status: DISCONTINUED | OUTPATIENT
Start: 2019-10-31 | End: 2019-10-31 | Stop reason: HOSPADM

## 2019-10-30 RX ORDER — NITROGLYCERIN 0.4 MG/1
0.4 TABLET SUBLINGUAL
Status: DISCONTINUED | OUTPATIENT
Start: 2019-10-30 | End: 2019-10-30 | Stop reason: HOSPADM

## 2019-10-30 RX ORDER — ASPIRIN 81 MG/1
81 TABLET ORAL DAILY
Status: DISCONTINUED | OUTPATIENT
Start: 2019-10-31 | End: 2019-10-31 | Stop reason: HOSPADM

## 2019-10-30 RX ORDER — FERROUS SULFATE 325(65) MG
325 TABLET ORAL
Status: DISCONTINUED | OUTPATIENT
Start: 2019-10-31 | End: 2019-10-31 | Stop reason: HOSPADM

## 2019-10-30 RX ORDER — SODIUM CHLORIDE 9 MG/ML
INJECTION, SOLUTION INTRAVENOUS CONTINUOUS PRN
Status: COMPLETED | OUTPATIENT
Start: 2019-10-30 | End: 2019-10-30

## 2019-10-30 RX ORDER — ALBUTEROL SULFATE 2.5 MG/3ML
2.5 SOLUTION RESPIRATORY (INHALATION) EVERY 6 HOURS PRN
Status: DISCONTINUED | OUTPATIENT
Start: 2019-10-30 | End: 2019-10-31 | Stop reason: HOSPADM

## 2019-10-30 RX ORDER — PANTOPRAZOLE SODIUM 40 MG/1
40 TABLET, DELAYED RELEASE ORAL EVERY OTHER DAY
Status: DISCONTINUED | OUTPATIENT
Start: 2019-11-01 | End: 2019-10-31 | Stop reason: HOSPADM

## 2019-10-30 RX ORDER — CLOPIDOGREL BISULFATE 75 MG/1
75 TABLET ORAL DAILY
Status: DISCONTINUED | OUTPATIENT
Start: 2019-10-31 | End: 2019-10-31 | Stop reason: HOSPADM

## 2019-10-30 RX ORDER — NICOTINE POLACRILEX 4 MG
15 LOZENGE BUCCAL
Status: DISCONTINUED | OUTPATIENT
Start: 2019-10-30 | End: 2019-10-31 | Stop reason: HOSPADM

## 2019-10-30 RX ORDER — SODIUM CHLORIDE 0.9 % (FLUSH) 0.9 %
10 SYRINGE (ML) INJECTION AS NEEDED
Status: DISCONTINUED | OUTPATIENT
Start: 2019-10-30 | End: 2019-10-30 | Stop reason: HOSPADM

## 2019-10-30 RX ORDER — LIDOCAINE HYDROCHLORIDE 10 MG/ML
INJECTION, SOLUTION INFILTRATION; PERINEURAL AS NEEDED
Status: DISCONTINUED | OUTPATIENT
Start: 2019-10-30 | End: 2019-10-30 | Stop reason: HOSPADM

## 2019-10-30 RX ADMIN — ASPIRIN 325 MG: 325 TABLET, COATED ORAL at 12:16

## 2019-10-30 RX ADMIN — CLOPIDOGREL BISULFATE 225 MG: 75 TABLET ORAL at 17:35

## 2019-10-30 RX ADMIN — OXYCODONE HYDROCHLORIDE 10 MG: 5 TABLET ORAL at 17:35

## 2019-10-30 RX ADMIN — ATORVASTATIN CALCIUM 40 MG: 40 TABLET, FILM COATED ORAL at 21:23

## 2019-10-30 RX ADMIN — TAMSULOSIN HYDROCHLORIDE 0.4 MG: 0.4 CAPSULE ORAL at 18:07

## 2019-10-30 RX ADMIN — NICOTINE 1 PATCH: 14 PATCH, EXTENDED RELEASE TRANSDERMAL at 18:07

## 2019-10-31 ENCOUNTER — DOCUMENTATION (OUTPATIENT)
Dept: CARDIAC REHAB | Facility: HOSPITAL | Age: 59
End: 2019-10-31

## 2019-10-31 VITALS
OXYGEN SATURATION: 93 % | DIASTOLIC BLOOD PRESSURE: 66 MMHG | BODY MASS INDEX: 40.05 KG/M2 | WEIGHT: 240.4 LBS | TEMPERATURE: 98 F | HEIGHT: 65 IN | RESPIRATION RATE: 16 BRPM | SYSTOLIC BLOOD PRESSURE: 124 MMHG | HEART RATE: 86 BPM

## 2019-10-31 DIAGNOSIS — I73.9 PERIPHERAL VASCULAR DISEASE (HCC): Primary | ICD-10-CM

## 2019-10-31 DIAGNOSIS — I25.119 CORONARY ARTERY DISEASE INVOLVING NATIVE CORONARY ARTERY OF NATIVE HEART WITH ANGINA PECTORIS (HCC): ICD-10-CM

## 2019-10-31 DIAGNOSIS — J43.2 CENTRILOBULAR EMPHYSEMA (HCC): ICD-10-CM

## 2019-10-31 DIAGNOSIS — R42 DIZZINESS: ICD-10-CM

## 2019-10-31 DIAGNOSIS — E11.59 TYPE 2 DIABETES MELLITUS WITH OTHER CIRCULATORY COMPLICATION, WITHOUT LONG-TERM CURRENT USE OF INSULIN (HCC): ICD-10-CM

## 2019-10-31 DIAGNOSIS — I35.0 AORTIC STENOSIS, SEVERE: ICD-10-CM

## 2019-10-31 PROBLEM — I20.0 UNSTABLE ANGINA (HCC): Status: ACTIVE | Noted: 2019-10-31

## 2019-10-31 LAB — GLUCOSE BLDC GLUCOMTR-MCNC: 132 MG/DL (ref 70–130)

## 2019-10-31 PROCEDURE — 82962 GLUCOSE BLOOD TEST: CPT

## 2019-10-31 PROCEDURE — 99214 OFFICE O/P EST MOD 30 MIN: CPT | Performed by: INTERNAL MEDICINE

## 2019-10-31 RX ORDER — CLOPIDOGREL BISULFATE 75 MG/1
75 TABLET ORAL DAILY
Qty: 30 TABLET | Refills: 11 | Status: SHIPPED | OUTPATIENT
Start: 2019-10-31

## 2019-10-31 RX ORDER — ASPIRIN 81 MG/1
81 TABLET ORAL DAILY
Qty: 100 TABLET | Refills: 4 | Status: SHIPPED | OUTPATIENT
Start: 2019-11-01 | End: 2020-01-01

## 2019-10-31 RX ORDER — NICOTINE 21 MG/24HR
1 PATCH, TRANSDERMAL 24 HOURS TRANSDERMAL
Qty: 28 PATCH | Refills: 1 | Status: SHIPPED | OUTPATIENT
Start: 2019-10-31 | End: 2020-01-01 | Stop reason: SDDI

## 2019-10-31 RX ADMIN — TAMSULOSIN HYDROCHLORIDE 0.4 MG: 0.4 CAPSULE ORAL at 08:23

## 2019-10-31 RX ADMIN — NICOTINE 1 PATCH: 14 PATCH, EXTENDED RELEASE TRANSDERMAL at 08:27

## 2019-10-31 RX ADMIN — POTASSIUM CHLORIDE 20 MEQ: 750 CAPSULE, EXTENDED RELEASE ORAL at 08:23

## 2019-10-31 RX ADMIN — FUROSEMIDE 40 MG: 40 TABLET ORAL at 08:23

## 2019-10-31 RX ADMIN — OXYCODONE HYDROCHLORIDE 10 MG: 5 TABLET ORAL at 05:05

## 2019-10-31 RX ADMIN — ASPIRIN 81 MG: 81 TABLET, COATED ORAL at 08:23

## 2019-10-31 RX ADMIN — FERROUS SULFATE TAB 325 MG (65 MG ELEMENTAL FE) 325 MG: 325 (65 FE) TAB at 08:23

## 2019-10-31 RX ADMIN — LISINOPRIL: 10 TABLET ORAL at 10:04

## 2019-10-31 RX ADMIN — CLOPIDOGREL BISULFATE 75 MG: 75 TABLET ORAL at 08:23

## 2019-10-31 NOTE — PROGRESS NOTES
Pt. Identified as qualifier for Phase II Cardiac Rehab. Staff discussed benefits of exercise, program protocol, and educational material provided. Teach back verified.  Patient stated that he cannot exercise due to his low back pain, leg pain, feet pain, and knee pain. Staff available for further consultation if needed.

## 2019-11-05 ENCOUNTER — HOSPITAL ENCOUNTER (OUTPATIENT)
Facility: HOSPITAL | Age: 59
Setting detail: OBSERVATION
End: 2019-11-05
Attending: INTERNAL MEDICINE | Admitting: INTERNAL MEDICINE

## 2019-11-07 ENCOUNTER — TELEPHONE (OUTPATIENT)
Dept: CARDIOLOGY | Facility: HOSPITAL | Age: 59
End: 2019-11-07

## 2019-11-07 LAB
ACT BLD: 268 SECONDS (ref 82–152)
ACT BLD: 323 SECONDS (ref 82–152)

## 2019-11-07 NOTE — TELEPHONE ENCOUNTER
Spoke with patient regarding instructions and appointments for TAVR work up.  He tells me he is currently inpatient @ [facility] for afib.  Currently back in regular rhythm and hoping to go home today.      He asked that I call his daughter, [place], with the arrangements.  I did so and went over dates, times, and locations with her.  I also sent her an email with the details and prep instructions for each day.      Riri CASTRO

## 2019-11-12 ENCOUNTER — APPOINTMENT (OUTPATIENT)
Dept: CARDIOLOGY | Facility: HOSPITAL | Age: 59
End: 2019-11-12

## 2019-11-12 ENCOUNTER — HOSPITAL ENCOUNTER (INPATIENT)
Facility: HOSPITAL | Age: 59
LOS: 15 days | Discharge: HOME | End: 2019-11-27
Attending: INTERNAL MEDICINE | Admitting: THORACIC SURGERY (CARDIOTHORACIC VASCULAR SURGERY)

## 2019-11-12 ENCOUNTER — APPOINTMENT (OUTPATIENT)
Dept: GENERAL RADIOLOGY | Facility: HOSPITAL | Age: 59
End: 2019-11-12

## 2019-11-12 DIAGNOSIS — J44.9 CHRONIC OBSTRUCTIVE PULMONARY DISEASE, UNSPECIFIED COPD TYPE (HCC): ICD-10-CM

## 2019-11-12 DIAGNOSIS — I35.0 NONRHEUMATIC AORTIC VALVE STENOSIS: Primary | ICD-10-CM

## 2019-11-12 DIAGNOSIS — I44.2 CHB (COMPLETE HEART BLOCK) (HCC): ICD-10-CM

## 2019-11-12 PROBLEM — G47.33 OSA ON CPAP: Status: ACTIVE | Noted: 2019-11-12

## 2019-11-12 PROBLEM — Z99.89 OSA ON CPAP: Chronic | Status: ACTIVE | Noted: 2019-11-12

## 2019-11-12 PROBLEM — Z99.89 OSA ON CPAP: Status: ACTIVE | Noted: 2019-11-12

## 2019-11-12 PROBLEM — I50.20 SYSTOLIC HEART FAILURE (HCC): Status: ACTIVE | Noted: 2019-11-12

## 2019-11-12 PROBLEM — I50.23 ACUTE ON CHRONIC SYSTOLIC HEART FAILURE (HCC): Chronic | Status: ACTIVE | Noted: 2019-11-12

## 2019-11-12 PROBLEM — G47.33 OSA ON CPAP: Chronic | Status: ACTIVE | Noted: 2019-11-12

## 2019-11-12 PROBLEM — I25.119 CORONARY ARTERY DISEASE INVOLVING NATIVE CORONARY ARTERY OF NATIVE HEART WITH ANGINA PECTORIS (HCC): Chronic | Status: ACTIVE | Noted: 2019-10-28

## 2019-11-12 PROBLEM — I50.23 ACUTE ON CHRONIC SYSTOLIC HEART FAILURE (HCC): Status: ACTIVE | Noted: 2019-11-12

## 2019-11-12 LAB
ALBUMIN SERPL-MCNC: 3.3 G/DL (ref 3.5–5.2)
ALBUMIN/GLOB SERPL: 0.7 G/DL
ALP SERPL-CCNC: 72 U/L (ref 39–117)
ALT SERPL W P-5'-P-CCNC: 29 U/L (ref 1–41)
ANION GAP SERPL CALCULATED.3IONS-SCNC: 12 MMOL/L (ref 5–15)
APTT PPP: 42.3 SECONDS (ref 24–37)
AST SERPL-CCNC: 44 U/L (ref 1–40)
BACTERIA UR QL AUTO: ABNORMAL /HPF
BASOPHILS # BLD AUTO: 0.03 10*3/MM3 (ref 0–0.2)
BASOPHILS NFR BLD AUTO: 0.7 % (ref 0–1.5)
BILIRUB SERPL-MCNC: 0.9 MG/DL (ref 0.2–1.2)
BILIRUB UR QL STRIP: NEGATIVE
BUN BLD-MCNC: 60 MG/DL (ref 6–20)
BUN/CREAT SERPL: 33.3 (ref 7–25)
CA-I SERPL ISE-MCNC: 1.22 MMOL/L (ref 1.12–1.32)
CALCIUM SPEC-SCNC: 8.9 MG/DL (ref 8.6–10.5)
CHLORIDE SERPL-SCNC: 90 MMOL/L (ref 98–107)
CK SERPL-CCNC: 109 U/L (ref 20–200)
CLARITY UR: CLEAR
CO2 SERPL-SCNC: 26 MMOL/L (ref 22–29)
COLOR UR: YELLOW
CREAT BLD-MCNC: 1.8 MG/DL (ref 0.76–1.27)
D-LACTATE SERPL-SCNC: 1.4 MMOL/L (ref 0.5–2)
DEPRECATED RDW RBC AUTO: 57.3 FL (ref 37–54)
EOSINOPHIL # BLD AUTO: 0.11 10*3/MM3 (ref 0–0.4)
EOSINOPHIL NFR BLD AUTO: 2.4 % (ref 0.3–6.2)
ERYTHROCYTE [DISTWIDTH] IN BLOOD BY AUTOMATED COUNT: 21.2 % (ref 12.3–15.4)
GFR SERPL CREATININE-BSD FRML MDRD: 39 ML/MIN/1.73
GLOBULIN UR ELPH-MCNC: 4.6 GM/DL
GLUCOSE BLD-MCNC: 103 MG/DL (ref 65–99)
GLUCOSE BLDC GLUCOMTR-MCNC: 133 MG/DL (ref 70–130)
GLUCOSE UR STRIP-MCNC: NEGATIVE MG/DL
HCT VFR BLD AUTO: 26.4 % (ref 37.5–51)
HGB BLD-MCNC: 8.1 G/DL (ref 13–17.7)
HGB UR QL STRIP.AUTO: NEGATIVE
HYALINE CASTS UR QL AUTO: ABNORMAL /LPF
IMM GRANULOCYTES # BLD AUTO: 0.03 10*3/MM3 (ref 0–0.05)
IMM GRANULOCYTES NFR BLD AUTO: 0.7 % (ref 0–0.5)
INR PPP: 1.31 (ref 0.85–1.16)
KETONES UR QL STRIP: NEGATIVE
LEUKOCYTE ESTERASE UR QL STRIP.AUTO: ABNORMAL
LYMPHOCYTES # BLD AUTO: 0.76 10*3/MM3 (ref 0.7–3.1)
LYMPHOCYTES NFR BLD AUTO: 16.5 % (ref 19.6–45.3)
MAGNESIUM SERPL-MCNC: 2.9 MG/DL (ref 1.6–2.6)
MCH RBC QN AUTO: 23.3 PG (ref 26.6–33)
MCHC RBC AUTO-ENTMCNC: 30.7 G/DL (ref 31.5–35.7)
MCV RBC AUTO: 76.1 FL (ref 79–97)
MONOCYTES # BLD AUTO: 0.56 10*3/MM3 (ref 0.1–0.9)
MONOCYTES NFR BLD AUTO: 12.2 % (ref 5–12)
NEUTROPHILS # BLD AUTO: 3.11 10*3/MM3 (ref 1.7–7)
NEUTROPHILS NFR BLD AUTO: 67.5 % (ref 42.7–76)
NITRITE UR QL STRIP: NEGATIVE
NRBC BLD AUTO-RTO: 0 /100 WBC (ref 0–0.2)
NT-PROBNP SERPL-MCNC: 871.5 PG/ML (ref 5–900)
PH UR STRIP.AUTO: 6 [PH] (ref 5–8)
PHOSPHATE SERPL-MCNC: 4.2 MG/DL (ref 2.5–4.5)
PLATELET # BLD AUTO: 205 10*3/MM3 (ref 140–450)
PMV BLD AUTO: 10.4 FL (ref 6–12)
POTASSIUM BLD-SCNC: 4.6 MMOL/L (ref 3.5–5.2)
PROCALCITONIN SERPL-MCNC: 0.17 NG/ML (ref 0.1–0.25)
PROT SERPL-MCNC: 7.9 G/DL (ref 6–8.5)
PROT UR QL STRIP: NEGATIVE
PROTHROMBIN TIME: 15.7 SECONDS (ref 11.2–14.3)
RBC # BLD AUTO: 3.47 10*6/MM3 (ref 4.14–5.8)
RBC # UR: ABNORMAL /HPF
REF LAB TEST METHOD: ABNORMAL
SODIUM BLD-SCNC: 128 MMOL/L (ref 136–145)
SODIUM UR-SCNC: <20 MMOL/L
SP GR UR STRIP: 1.01 (ref 1–1.03)
SQUAMOUS #/AREA URNS HPF: ABNORMAL /HPF
TSH SERPL DL<=0.05 MIU/L-ACNC: 2.82 UIU/ML (ref 0.27–4.2)
UROBILINOGEN UR QL STRIP: ABNORMAL
WBC NRBC COR # BLD: 4.6 10*3/MM3 (ref 3.4–10.8)
WBC UR QL AUTO: ABNORMAL /HPF

## 2019-11-12 PROCEDURE — 94640 AIRWAY INHALATION TREATMENT: CPT

## 2019-11-12 PROCEDURE — 80050 GENERAL HEALTH PANEL: CPT | Performed by: INTERNAL MEDICINE

## 2019-11-12 PROCEDURE — 82962 GLUCOSE BLOOD TEST: CPT

## 2019-11-12 PROCEDURE — 81001 URINALYSIS AUTO W/SCOPE: CPT | Performed by: INTERNAL MEDICINE

## 2019-11-12 PROCEDURE — 93005 ELECTROCARDIOGRAM TRACING: CPT | Performed by: INTERNAL MEDICINE

## 2019-11-12 PROCEDURE — 82330 ASSAY OF CALCIUM: CPT | Performed by: INTERNAL MEDICINE

## 2019-11-12 PROCEDURE — 93306 TTE W/DOPPLER COMPLETE: CPT | Performed by: INTERNAL MEDICINE

## 2019-11-12 PROCEDURE — 93306 TTE W/DOPPLER COMPLETE: CPT

## 2019-11-12 PROCEDURE — 84100 ASSAY OF PHOSPHORUS: CPT | Performed by: INTERNAL MEDICINE

## 2019-11-12 PROCEDURE — 83605 ASSAY OF LACTIC ACID: CPT | Performed by: INTERNAL MEDICINE

## 2019-11-12 PROCEDURE — 25010000002 ENOXAPARIN PER 10 MG: Performed by: INTERNAL MEDICINE

## 2019-11-12 PROCEDURE — 94799 UNLISTED PULMONARY SVC/PX: CPT

## 2019-11-12 PROCEDURE — 84145 PROCALCITONIN (PCT): CPT | Performed by: INTERNAL MEDICINE

## 2019-11-12 PROCEDURE — 82550 ASSAY OF CK (CPK): CPT | Performed by: INTERNAL MEDICINE

## 2019-11-12 PROCEDURE — 84540 ASSAY OF URINE/UREA-N: CPT | Performed by: INTERNAL MEDICINE

## 2019-11-12 PROCEDURE — 99291 CRITICAL CARE FIRST HOUR: CPT | Performed by: INTERNAL MEDICINE

## 2019-11-12 PROCEDURE — 71045 X-RAY EXAM CHEST 1 VIEW: CPT

## 2019-11-12 PROCEDURE — 83880 ASSAY OF NATRIURETIC PEPTIDE: CPT | Performed by: INTERNAL MEDICINE

## 2019-11-12 PROCEDURE — 63710000001 INSULIN LISPRO (HUMAN) PER 5 UNITS: Performed by: INTERNAL MEDICINE

## 2019-11-12 PROCEDURE — 85610 PROTHROMBIN TIME: CPT | Performed by: INTERNAL MEDICINE

## 2019-11-12 PROCEDURE — 82570 ASSAY OF URINE CREATININE: CPT | Performed by: INTERNAL MEDICINE

## 2019-11-12 PROCEDURE — 93010 ELECTROCARDIOGRAM REPORT: CPT | Performed by: INTERNAL MEDICINE

## 2019-11-12 PROCEDURE — 25010000002 FUROSEMIDE PER 20 MG: Performed by: NURSE PRACTITIONER

## 2019-11-12 PROCEDURE — 85730 THROMBOPLASTIN TIME PARTIAL: CPT | Performed by: INTERNAL MEDICINE

## 2019-11-12 PROCEDURE — 84300 ASSAY OF URINE SODIUM: CPT | Performed by: INTERNAL MEDICINE

## 2019-11-12 PROCEDURE — 83735 ASSAY OF MAGNESIUM: CPT | Performed by: INTERNAL MEDICINE

## 2019-11-12 PROCEDURE — 99252 IP/OBS CONSLTJ NEW/EST SF 35: CPT | Performed by: INTERNAL MEDICINE

## 2019-11-12 RX ORDER — FUROSEMIDE 10 MG/ML
40 INJECTION INTRAMUSCULAR; INTRAVENOUS ONCE
Status: COMPLETED | OUTPATIENT
Start: 2019-11-12 | End: 2019-11-12

## 2019-11-12 RX ORDER — CLOPIDOGREL BISULFATE 75 MG/1
300 TABLET ORAL ONCE
Status: COMPLETED | OUTPATIENT
Start: 2019-11-12 | End: 2019-11-12

## 2019-11-12 RX ORDER — SODIUM CHLORIDE 0.9 % (FLUSH) 0.9 %
10 SYRINGE (ML) INJECTION EVERY 12 HOURS SCHEDULED
Status: DISCONTINUED | OUTPATIENT
Start: 2019-11-12 | End: 2019-11-12

## 2019-11-12 RX ORDER — NICOTINE POLACRILEX 4 MG
15 LOZENGE BUCCAL
Status: DISCONTINUED | OUTPATIENT
Start: 2019-11-12 | End: 2019-11-22

## 2019-11-12 RX ORDER — CLOPIDOGREL BISULFATE 75 MG/1
300 TABLET ORAL DAILY
Status: DISCONTINUED | OUTPATIENT
Start: 2019-11-12 | End: 2019-11-12

## 2019-11-12 RX ORDER — FERROUS SULFATE 325(65) MG
325 TABLET ORAL
Status: DISCONTINUED | OUTPATIENT
Start: 2019-11-13 | End: 2019-11-27 | Stop reason: HOSPADM

## 2019-11-12 RX ORDER — IPRATROPIUM BROMIDE AND ALBUTEROL SULFATE 2.5; .5 MG/3ML; MG/3ML
3 SOLUTION RESPIRATORY (INHALATION)
Status: DISCONTINUED | OUTPATIENT
Start: 2019-11-12 | End: 2019-11-26

## 2019-11-12 RX ORDER — DEXTROSE MONOHYDRATE 25 G/50ML
25 INJECTION, SOLUTION INTRAVENOUS
Status: DISCONTINUED | OUTPATIENT
Start: 2019-11-12 | End: 2019-11-22

## 2019-11-12 RX ORDER — NICOTINE 21 MG/24HR
1 PATCH, TRANSDERMAL 24 HOURS TRANSDERMAL
Status: DISCONTINUED | OUTPATIENT
Start: 2019-11-12 | End: 2019-11-27 | Stop reason: HOSPADM

## 2019-11-12 RX ORDER — CLOPIDOGREL BISULFATE 75 MG/1
75 TABLET ORAL DAILY
Status: DISCONTINUED | OUTPATIENT
Start: 2019-11-13 | End: 2019-11-27 | Stop reason: HOSPADM

## 2019-11-12 RX ORDER — NITROGLYCERIN 0.4 MG/1
0.4 TABLET SUBLINGUAL
Status: DISCONTINUED | OUTPATIENT
Start: 2019-11-12 | End: 2019-11-23

## 2019-11-12 RX ORDER — OXYCODONE HYDROCHLORIDE 5 MG/1
10 TABLET ORAL EVERY 6 HOURS PRN
Status: DISCONTINUED | OUTPATIENT
Start: 2019-11-12 | End: 2019-11-24

## 2019-11-12 RX ORDER — ATORVASTATIN CALCIUM 40 MG/1
40 TABLET, FILM COATED ORAL DAILY
Status: DISCONTINUED | OUTPATIENT
Start: 2019-11-12 | End: 2019-11-16

## 2019-11-12 RX ORDER — AMOXICILLIN 250 MG
2 CAPSULE ORAL 2 TIMES DAILY
Status: DISCONTINUED | OUTPATIENT
Start: 2019-11-12 | End: 2019-11-27 | Stop reason: HOSPADM

## 2019-11-12 RX ORDER — SODIUM CHLORIDE 0.9 % (FLUSH) 0.9 %
10 SYRINGE (ML) INJECTION AS NEEDED
Status: DISCONTINUED | OUTPATIENT
Start: 2019-11-12 | End: 2019-11-22

## 2019-11-12 RX ORDER — ASPIRIN 81 MG/1
81 TABLET ORAL DAILY
Status: DISCONTINUED | OUTPATIENT
Start: 2019-11-12 | End: 2019-11-21

## 2019-11-12 RX ORDER — PANTOPRAZOLE SODIUM 40 MG/1
40 TABLET, DELAYED RELEASE ORAL EVERY OTHER DAY
Status: DISCONTINUED | OUTPATIENT
Start: 2019-11-12 | End: 2019-11-18

## 2019-11-12 RX ORDER — CLOPIDOGREL BISULFATE 75 MG/1
75 TABLET ORAL DAILY
Status: DISCONTINUED | OUTPATIENT
Start: 2019-11-12 | End: 2019-11-12 | Stop reason: SDUPTHER

## 2019-11-12 RX ADMIN — NICOTINE 1 PATCH: 14 PATCH, EXTENDED RELEASE TRANSDERMAL at 21:33

## 2019-11-12 RX ADMIN — FUROSEMIDE 40 MG: 10 INJECTION, SOLUTION INTRAMUSCULAR; INTRAVENOUS at 21:34

## 2019-11-12 RX ADMIN — CLOPIDOGREL BISULFATE 300 MG: 75 TABLET ORAL at 21:34

## 2019-11-12 RX ADMIN — SENNOSIDES AND DOCUSATE SODIUM 2 TABLET: 8.6; 5 TABLET ORAL at 21:43

## 2019-11-12 RX ADMIN — OXYCODONE HYDROCHLORIDE 10 MG: 5 TABLET ORAL at 17:36

## 2019-11-12 RX ADMIN — ATORVASTATIN CALCIUM 40 MG: 40 TABLET, FILM COATED ORAL at 21:34

## 2019-11-12 RX ADMIN — ASPIRIN 81 MG: 81 TABLET, COATED ORAL at 21:34

## 2019-11-12 RX ADMIN — PANTOPRAZOLE SODIUM 40 MG: 40 TABLET, DELAYED RELEASE ORAL at 21:33

## 2019-11-12 RX ADMIN — ENOXAPARIN SODIUM 110 MG: 120 INJECTION SUBCUTANEOUS at 21:43

## 2019-11-12 RX ADMIN — IPRATROPIUM BROMIDE AND ALBUTEROL SULFATE 3 ML: 2.5; .5 SOLUTION RESPIRATORY (INHALATION) at 18:29

## 2019-11-13 ENCOUNTER — APPOINTMENT (OUTPATIENT)
Dept: CARDIOLOGY | Facility: HOSPITAL | Age: 59
End: 2019-11-13

## 2019-11-13 LAB
ABO GROUP BLD: NORMAL
ANION GAP SERPL CALCULATED.3IONS-SCNC: 11 MMOL/L (ref 5–15)
BH CV ECHO MEAS - AI DEC SLOPE: 353.5 CM/SEC^2
BH CV ECHO MEAS - AI MAX PG: 61.4 MMHG
BH CV ECHO MEAS - AI MAX VEL: 391.2 CM/SEC
BH CV ECHO MEAS - AI P1/2T: 324.1 MSEC
BH CV ECHO MEAS - AO MAX PG (FULL): 133.8 MMHG
BH CV ECHO MEAS - AO MAX PG: 137.8 MMHG
BH CV ECHO MEAS - AO MEAN PG (FULL): 70 MMHG
BH CV ECHO MEAS - AO MEAN PG: 72 MMHG
BH CV ECHO MEAS - AO ROOT AREA (BSA CORRECTED): 1.5
BH CV ECHO MEAS - AO ROOT AREA: 8.6 CM^2
BH CV ECHO MEAS - AO ROOT DIAM: 3.3 CM
BH CV ECHO MEAS - AO V2 MAX: 587 CM/SEC
BH CV ECHO MEAS - AO V2 MEAN: 383 CM/SEC
BH CV ECHO MEAS - AO V2 VTI: 140 CM
BH CV ECHO MEAS - AVA(I,A): 0.44 CM^2
BH CV ECHO MEAS - AVA(I,D): 0.44 CM^2
BH CV ECHO MEAS - AVA(V,A): 0.49 CM^2
BH CV ECHO MEAS - AVA(V,D): 0.49 CM^2
BH CV ECHO MEAS - BSA(HAYCOCK): 2.3 M^2
BH CV ECHO MEAS - BSA(HAYCOCK): 2.5 M^2
BH CV ECHO MEAS - BSA: 2.2 M^2
BH CV ECHO MEAS - BSA: 2.3 M^2
BH CV ECHO MEAS - BZI_BMI: 41.6 KILOGRAMS/M^2
BH CV ECHO MEAS - BZI_BMI: 46.6 KILOGRAMS/M^2
BH CV ECHO MEAS - BZI_METRIC_HEIGHT: 165.1 CM
BH CV ECHO MEAS - BZI_METRIC_HEIGHT: 165.1 CM
BH CV ECHO MEAS - BZI_METRIC_WEIGHT: 113.4 KG
BH CV ECHO MEAS - BZI_METRIC_WEIGHT: 127 KG
BH CV ECHO MEAS - EDV(CUBED): 138.9 ML
BH CV ECHO MEAS - EDV(MOD-SP2): 148 ML
BH CV ECHO MEAS - EDV(MOD-SP4): 124 ML
BH CV ECHO MEAS - EDV(TEICH): 128.3 ML
BH CV ECHO MEAS - EF(CUBED): 72.8 %
BH CV ECHO MEAS - EF(MOD-SP2): 64.9 %
BH CV ECHO MEAS - EF(MOD-SP4): 64.5 %
BH CV ECHO MEAS - EF(TEICH): 64.2 %
BH CV ECHO MEAS - ESV(CUBED): 37.7 ML
BH CV ECHO MEAS - ESV(MOD-SP2): 52 ML
BH CV ECHO MEAS - ESV(MOD-SP4): 44 ML
BH CV ECHO MEAS - ESV(TEICH): 45.9 ML
BH CV ECHO MEAS - FS: 35.2 %
BH CV ECHO MEAS - IVS/LVPW: 1.1
BH CV ECHO MEAS - IVSD: 1.4 CM
BH CV ECHO MEAS - LA DIMENSION: 3.5 CM
BH CV ECHO MEAS - LA/AO: 1.1
BH CV ECHO MEAS - LAD MAJOR: 5.7 CM
BH CV ECHO MEAS - LAT PEAK E' VEL: 6.8 CM/SEC
BH CV ECHO MEAS - LATERAL E/E' RATIO: 19
BH CV ECHO MEAS - LV DIASTOLIC VOL/BSA (35-75): 54.3 ML/M^2
BH CV ECHO MEAS - LV MASS(C)D: 287.2 GRAMS
BH CV ECHO MEAS - LV MASS(C)DI: 125.8 GRAMS/M^2
BH CV ECHO MEAS - LV MAX PG: 4.1 MMHG
BH CV ECHO MEAS - LV MEAN PG: 2 MMHG
BH CV ECHO MEAS - LV SYSTOLIC VOL/BSA (12-30): 19.3 ML/M^2
BH CV ECHO MEAS - LV V1 MAX: 100.7 CM/SEC
BH CV ECHO MEAS - LV V1 MEAN: 63.6 CM/SEC
BH CV ECHO MEAS - LV V1 VTI: 21.6 CM
BH CV ECHO MEAS - LVIDD: 5.2 CM
BH CV ECHO MEAS - LVIDS: 3.4 CM
BH CV ECHO MEAS - LVLD AP2: 10.5 CM
BH CV ECHO MEAS - LVLD AP4: 9.1 CM
BH CV ECHO MEAS - LVLS AP2: 9.1 CM
BH CV ECHO MEAS - LVLS AP4: 7 CM
BH CV ECHO MEAS - LVOT AREA (M): 2.8 CM^2
BH CV ECHO MEAS - LVOT AREA: 2.9 CM^2
BH CV ECHO MEAS - LVOT DIAM: 1.9 CM
BH CV ECHO MEAS - LVPWD: 1.3 CM
BH CV ECHO MEAS - MED PEAK E' VEL: 9.2 CM/SEC
BH CV ECHO MEAS - MEDIAL E/E' RATIO: 14.1
BH CV ECHO MEAS - MV A MAX VEL: 59.2 CM/SEC
BH CV ECHO MEAS - MV DEC TIME: 0.19 SEC
BH CV ECHO MEAS - MV E MAX VEL: 131.6 CM/SEC
BH CV ECHO MEAS - MV E/A: 2.2
BH CV ECHO MEAS - MV MAX PG: 15.7 MMHG
BH CV ECHO MEAS - MV MEAN PG: 3.4 MMHG
BH CV ECHO MEAS - MV V2 MAX: 197.9 CM/SEC
BH CV ECHO MEAS - MV V2 MEAN: 76 CM/SEC
BH CV ECHO MEAS - MV V2 VTI: 44.4 CM
BH CV ECHO MEAS - MVA(VTI): 1.4 CM^2
BH CV ECHO MEAS - PA ACC SLOPE: 1112 CM/SEC^2
BH CV ECHO MEAS - PA ACC TIME: 0.1 SEC
BH CV ECHO MEAS - PA MAX PG: 7.5 MMHG
BH CV ECHO MEAS - PA PR(ACCEL): 33.1 MMHG
BH CV ECHO MEAS - PA V2 MAX: 137.1 CM/SEC
BH CV ECHO MEAS - RVSP: 43 MMHG
BH CV ECHO MEAS - SI(AO): 530 ML/M^2
BH CV ECHO MEAS - SI(CUBED): 44.3 ML/M^2
BH CV ECHO MEAS - SI(LVOT): 27 ML/M^2
BH CV ECHO MEAS - SI(MOD-SP2): 42.1 ML/M^2
BH CV ECHO MEAS - SI(MOD-SP4): 35.1 ML/M^2
BH CV ECHO MEAS - SI(TEICH): 36.1 ML/M^2
BH CV ECHO MEAS - SV(AO): 1210 ML
BH CV ECHO MEAS - SV(CUBED): 101.1 ML
BH CV ECHO MEAS - SV(LVOT): 61.6 ML
BH CV ECHO MEAS - SV(MOD-SP2): 96 ML
BH CV ECHO MEAS - SV(MOD-SP4): 80 ML
BH CV ECHO MEAS - SV(TEICH): 82.4 ML
BH CV ECHO MEAS - TAPSE (>1.6): 2.2 CM2
BH CV ECHO MEAS - TR MAX PG: 40 MMHG
BH CV ECHO MEAS - TR MAX VEL: 263.8 CM/SEC
BH CV ECHO MEASUREMENTS AVERAGE E/E' RATIO: 16.45
BH CV VAS BP RIGHT ARM: NORMAL MMHG
BH CV XLRA - RV BASE: 4.2 CM
BH CV XLRA - RV LENGTH: 7.6 CM
BH CV XLRA - RV MID: 2.7 CM
BH CV XLRA - TDI S': 12 CM/SEC
BH CV XLRA MEAS LEFT DIST CCA EDV: 15 CM/SEC
BH CV XLRA MEAS LEFT DIST CCA PSV: 128 CM/SEC
BH CV XLRA MEAS LEFT DIST ICA EDV: 21 CM/SEC
BH CV XLRA MEAS LEFT DIST ICA PSV: 140 CM/SEC
BH CV XLRA MEAS LEFT ICA/CCA RATIO: 0.7
BH CV XLRA MEAS LEFT MID CCA EDV: 15 CM/SEC
BH CV XLRA MEAS LEFT MID CCA PSV: 110 CM/SEC
BH CV XLRA MEAS LEFT MID ICA EDV: 14 CM/SEC
BH CV XLRA MEAS LEFT MID ICA PSV: 68 CM/SEC
BH CV XLRA MEAS LEFT PROX CCA EDV: 13 CM/SEC
BH CV XLRA MEAS LEFT PROX CCA PSV: 137 CM/SEC
BH CV XLRA MEAS LEFT PROX ECA EDV: 20 CM/SEC
BH CV XLRA MEAS LEFT PROX ECA PSV: 152 CM/SEC
BH CV XLRA MEAS LEFT PROX ICA EDV: 12 CM/SEC
BH CV XLRA MEAS LEFT PROX ICA PSV: 62 CM/SEC
BH CV XLRA MEAS LEFT PROX SCLA PSV: 184 CM/SEC
BH CV XLRA MEAS LEFT VERTEBRAL A EDV: 18 CM/SEC
BH CV XLRA MEAS LEFT VERTEBRAL A PSV: 69 CM/SEC
BH CV XLRA MEAS RIGHT DIST CCA EDV: 17 CM/SEC
BH CV XLRA MEAS RIGHT DIST CCA PSV: 101 CM/SEC
BH CV XLRA MEAS RIGHT DIST ICA EDV: 17 CM/SEC
BH CV XLRA MEAS RIGHT DIST ICA PSV: 107 CM/SEC
BH CV XLRA MEAS RIGHT ICA/CCA RATIO: 0.8
BH CV XLRA MEAS RIGHT MID CCA EDV: 17 CM/SEC
BH CV XLRA MEAS RIGHT MID CCA PSV: 103 CM/SEC
BH CV XLRA MEAS RIGHT MID ICA EDV: 20 CM/SEC
BH CV XLRA MEAS RIGHT MID ICA PSV: 84 CM/SEC
BH CV XLRA MEAS RIGHT PROX CCA EDV: 16 CM/SEC
BH CV XLRA MEAS RIGHT PROX CCA PSV: 113 CM/SEC
BH CV XLRA MEAS RIGHT PROX ECA EDV: 20 CM/SEC
BH CV XLRA MEAS RIGHT PROX ECA PSV: 141 CM/SEC
BH CV XLRA MEAS RIGHT PROX ICA EDV: 12 CM/SEC
BH CV XLRA MEAS RIGHT PROX ICA PSV: 52 CM/SEC
BH CV XLRA MEAS RIGHT PROX SCLA PSV: 92 CM/SEC
BH CV XLRA MEAS RIGHT VERTEBRAL A EDV: 11 CM/SEC
BH CV XLRA MEAS RIGHT VERTEBRAL A PSV: 55 CM/SEC
BLD GP AB SCN SERPL QL: NEGATIVE
BUN BLD-MCNC: 58 MG/DL (ref 6–20)
BUN/CREAT SERPL: 40.6 (ref 7–25)
CA-I SERPL ISE-MCNC: 1.22 MMOL/L (ref 1.12–1.32)
CALCIUM SPEC-SCNC: 9.2 MG/DL (ref 8.6–10.5)
CHLORIDE SERPL-SCNC: 91 MMOL/L (ref 98–107)
CO2 SERPL-SCNC: 27 MMOL/L (ref 22–29)
CREAT BLD-MCNC: 1.43 MG/DL (ref 0.76–1.27)
CREAT UR-MCNC: 119.8 MG/DL
DEPRECATED RDW RBC AUTO: 57.9 FL (ref 37–54)
ERYTHROCYTE [DISTWIDTH] IN BLOOD BY AUTOMATED COUNT: 21.2 % (ref 12.3–15.4)
GFR SERPL CREATININE-BSD FRML MDRD: 51 ML/MIN/1.73
GLUCOSE BLD-MCNC: 104 MG/DL (ref 65–99)
GLUCOSE BLDC GLUCOMTR-MCNC: 109 MG/DL (ref 70–130)
GLUCOSE BLDC GLUCOMTR-MCNC: 110 MG/DL (ref 70–130)
GLUCOSE BLDC GLUCOMTR-MCNC: 123 MG/DL (ref 70–130)
GLUCOSE BLDC GLUCOMTR-MCNC: 136 MG/DL (ref 70–130)
GLUCOSE BLDC GLUCOMTR-MCNC: 137 MG/DL (ref 70–130)
HCT VFR BLD AUTO: 26.4 % (ref 37.5–51)
HCT VFR BLD AUTO: 27 % (ref 37.5–51)
HCT VFR BLD AUTO: 27.1 % (ref 37.5–51)
HCT VFR BLD AUTO: 27.6 % (ref 37.5–51)
HGB BLD-MCNC: 7.9 G/DL (ref 13–17.7)
HGB BLD-MCNC: 8.1 G/DL (ref 13–17.7)
HGB BLD-MCNC: 8.2 G/DL (ref 13–17.7)
HGB BLD-MCNC: 8.3 G/DL (ref 13–17.7)
IRON 24H UR-MRATE: 24 MCG/DL (ref 59–158)
IRON SATN MFR SERPL: 5 % (ref 20–50)
LEFT ATRIUM VOLUME INDEX: 29.4 ML/M^2
LEFT ATRIUM VOLUME: 67 ML
MAGNESIUM SERPL-MCNC: 2.7 MG/DL (ref 1.6–2.6)
MAXIMAL PREDICTED HEART RATE: 161 BPM
MCH RBC QN AUTO: 23.2 PG (ref 26.6–33)
MCHC RBC AUTO-ENTMCNC: 29.9 G/DL (ref 31.5–35.7)
MCV RBC AUTO: 77.7 FL (ref 79–97)
PHOSPHATE SERPL-MCNC: 4.6 MG/DL (ref 2.5–4.5)
PLATELET # BLD AUTO: 218 10*3/MM3 (ref 140–450)
PMV BLD AUTO: 10.4 FL (ref 6–12)
POTASSIUM BLD-SCNC: 4.6 MMOL/L (ref 3.5–5.2)
RBC # BLD AUTO: 3.49 10*6/MM3 (ref 4.14–5.8)
RH BLD: POSITIVE
SODIUM BLD-SCNC: 129 MMOL/L (ref 136–145)
STRESS TARGET HR: 137 BPM
T&S EXPIRATION DATE: NORMAL
TIBC SERPL-MCNC: 478 MCG/DL (ref 298–536)
TRANSFERRIN SERPL-MCNC: 321 MG/DL (ref 200–360)
UUN 24H UR-MCNC: 806 MG/DL
WBC NRBC COR # BLD: 4.31 10*3/MM3 (ref 3.4–10.8)

## 2019-11-13 PROCEDURE — 86850 RBC ANTIBODY SCREEN: CPT | Performed by: NURSE PRACTITIONER

## 2019-11-13 PROCEDURE — 94799 UNLISTED PULMONARY SVC/PX: CPT

## 2019-11-13 PROCEDURE — 84100 ASSAY OF PHOSPHORUS: CPT | Performed by: INTERNAL MEDICINE

## 2019-11-13 PROCEDURE — 80048 BASIC METABOLIC PNL TOTAL CA: CPT | Performed by: INTERNAL MEDICINE

## 2019-11-13 PROCEDURE — 93880 EXTRACRANIAL BILAT STUDY: CPT | Performed by: INTERNAL MEDICINE

## 2019-11-13 PROCEDURE — 83735 ASSAY OF MAGNESIUM: CPT | Performed by: INTERNAL MEDICINE

## 2019-11-13 PROCEDURE — 86900 BLOOD TYPING SEROLOGIC ABO: CPT | Performed by: NURSE PRACTITIONER

## 2019-11-13 PROCEDURE — 85018 HEMOGLOBIN: CPT | Performed by: NURSE PRACTITIONER

## 2019-11-13 PROCEDURE — 82330 ASSAY OF CALCIUM: CPT | Performed by: INTERNAL MEDICINE

## 2019-11-13 PROCEDURE — 85014 HEMATOCRIT: CPT | Performed by: NURSE PRACTITIONER

## 2019-11-13 PROCEDURE — 99232 SBSQ HOSP IP/OBS MODERATE 35: CPT | Performed by: INTERNAL MEDICINE

## 2019-11-13 PROCEDURE — 85027 COMPLETE CBC AUTOMATED: CPT | Performed by: INTERNAL MEDICINE

## 2019-11-13 PROCEDURE — 93880 EXTRACRANIAL BILAT STUDY: CPT

## 2019-11-13 PROCEDURE — 25010000002 ENOXAPARIN PER 10 MG: Performed by: INTERNAL MEDICINE

## 2019-11-13 PROCEDURE — 86901 BLOOD TYPING SEROLOGIC RH(D): CPT | Performed by: NURSE PRACTITIONER

## 2019-11-13 PROCEDURE — 84466 ASSAY OF TRANSFERRIN: CPT | Performed by: NURSE PRACTITIONER

## 2019-11-13 PROCEDURE — 83540 ASSAY OF IRON: CPT | Performed by: NURSE PRACTITIONER

## 2019-11-13 PROCEDURE — 82962 GLUCOSE BLOOD TEST: CPT

## 2019-11-13 RX ORDER — FUROSEMIDE 40 MG/1
40 TABLET ORAL DAILY
Status: DISCONTINUED | OUTPATIENT
Start: 2019-11-13 | End: 2019-11-22

## 2019-11-13 RX ORDER — ROPINIROLE 2 MG/1
2 TABLET, FILM COATED ORAL NIGHTLY
COMMUNITY
End: 2020-01-01 | Stop reason: SDDI

## 2019-11-13 RX ORDER — BUMETANIDE 2 MG/1
2 TABLET ORAL DAILY
COMMUNITY
End: 2020-01-01 | Stop reason: SDDI

## 2019-11-13 RX ORDER — TESTOSTERONE CYPIONATE 100 MG/ML
2 VIAL (ML) INTRAMUSCULAR
COMMUNITY

## 2019-11-13 RX ADMIN — IPRATROPIUM BROMIDE AND ALBUTEROL SULFATE 3 ML: 2.5; .5 SOLUTION RESPIRATORY (INHALATION) at 19:28

## 2019-11-13 RX ADMIN — ENOXAPARIN SODIUM 110 MG: 120 INJECTION SUBCUTANEOUS at 10:56

## 2019-11-13 RX ADMIN — IPRATROPIUM BROMIDE AND ALBUTEROL SULFATE 3 ML: 2.5; .5 SOLUTION RESPIRATORY (INHALATION) at 13:58

## 2019-11-13 RX ADMIN — ASPIRIN 81 MG: 81 TABLET, COATED ORAL at 09:33

## 2019-11-13 RX ADMIN — IPRATROPIUM BROMIDE AND ALBUTEROL SULFATE 3 ML: 2.5; .5 SOLUTION RESPIRATORY (INHALATION) at 07:21

## 2019-11-13 RX ADMIN — NICOTINE 1 PATCH: 14 PATCH, EXTENDED RELEASE TRANSDERMAL at 09:34

## 2019-11-13 RX ADMIN — OXYCODONE HYDROCHLORIDE 10 MG: 5 TABLET ORAL at 04:09

## 2019-11-13 RX ADMIN — ENOXAPARIN SODIUM 110 MG: 120 INJECTION SUBCUTANEOUS at 20:16

## 2019-11-13 RX ADMIN — FERROUS SULFATE TAB 325 MG (65 MG ELEMENTAL FE) 325 MG: 325 (65 FE) TAB at 09:33

## 2019-11-13 RX ADMIN — CLOPIDOGREL BISULFATE 75 MG: 75 TABLET ORAL at 09:33

## 2019-11-13 RX ADMIN — IPRATROPIUM BROMIDE AND ALBUTEROL SULFATE 3 ML: 2.5; .5 SOLUTION RESPIRATORY (INHALATION) at 00:52

## 2019-11-13 RX ADMIN — FUROSEMIDE 40 MG: 40 TABLET ORAL at 16:59

## 2019-11-13 RX ADMIN — ATORVASTATIN CALCIUM 40 MG: 40 TABLET, FILM COATED ORAL at 09:41

## 2019-11-13 RX ADMIN — OXYCODONE HYDROCHLORIDE 10 MG: 5 TABLET ORAL at 16:56

## 2019-11-13 RX ADMIN — OXYCODONE HYDROCHLORIDE 10 MG: 5 TABLET ORAL at 10:56

## 2019-11-14 ENCOUNTER — APPOINTMENT (OUTPATIENT)
Dept: CT IMAGING | Facility: HOSPITAL | Age: 59
End: 2019-11-14

## 2019-11-14 LAB
ANION GAP SERPL CALCULATED.3IONS-SCNC: 13 MMOL/L (ref 5–15)
BUN BLD-MCNC: 38 MG/DL (ref 6–20)
BUN/CREAT SERPL: 36.2 (ref 7–25)
CALCIUM SPEC-SCNC: 8.9 MG/DL (ref 8.6–10.5)
CHLORIDE SERPL-SCNC: 95 MMOL/L (ref 98–107)
CO2 SERPL-SCNC: 26 MMOL/L (ref 22–29)
CREAT BLD-MCNC: 1.05 MG/DL (ref 0.76–1.27)
GFR SERPL CREATININE-BSD FRML MDRD: 72 ML/MIN/1.73
GLUCOSE BLD-MCNC: 98 MG/DL (ref 65–99)
GLUCOSE BLDC GLUCOMTR-MCNC: 108 MG/DL (ref 70–130)
GLUCOSE BLDC GLUCOMTR-MCNC: 119 MG/DL (ref 70–130)
GLUCOSE BLDC GLUCOMTR-MCNC: 122 MG/DL (ref 70–130)
GLUCOSE BLDC GLUCOMTR-MCNC: 123 MG/DL (ref 70–130)
HCT VFR BLD AUTO: 26.6 % (ref 37.5–51)
HCT VFR BLD AUTO: 27 % (ref 37.5–51)
HCT VFR BLD AUTO: 27.2 % (ref 37.5–51)
HCT VFR BLD AUTO: 28 % (ref 37.5–51)
HEMOCCULT STL QL: POSITIVE
HGB BLD-MCNC: 7.9 G/DL (ref 13–17.7)
HGB BLD-MCNC: 8.2 G/DL (ref 13–17.7)
POTASSIUM BLD-SCNC: 4.8 MMOL/L (ref 3.5–5.2)
SODIUM BLD-SCNC: 134 MMOL/L (ref 136–145)

## 2019-11-14 PROCEDURE — 99232 SBSQ HOSP IP/OBS MODERATE 35: CPT | Performed by: INTERNAL MEDICINE

## 2019-11-14 PROCEDURE — 85018 HEMOGLOBIN: CPT | Performed by: NURSE PRACTITIONER

## 2019-11-14 PROCEDURE — 99255 IP/OBS CONSLTJ NEW/EST HI 80: CPT | Performed by: THORACIC SURGERY (CARDIOTHORACIC VASCULAR SURGERY)

## 2019-11-14 PROCEDURE — 85014 HEMATOCRIT: CPT | Performed by: NURSE PRACTITIONER

## 2019-11-14 PROCEDURE — 94799 UNLISTED PULMONARY SVC/PX: CPT

## 2019-11-14 PROCEDURE — 82272 OCCULT BLD FECES 1-3 TESTS: CPT | Performed by: INTERNAL MEDICINE

## 2019-11-14 PROCEDURE — 74174 CTA ABD&PLVS W/CONTRAST: CPT

## 2019-11-14 PROCEDURE — 80048 BASIC METABOLIC PNL TOTAL CA: CPT | Performed by: INTERNAL MEDICINE

## 2019-11-14 PROCEDURE — 71275 CT ANGIOGRAPHY CHEST: CPT

## 2019-11-14 PROCEDURE — 0 IOPAMIDOL PER 1 ML: Performed by: INTERNAL MEDICINE

## 2019-11-14 PROCEDURE — 25010000002 ENOXAPARIN PER 10 MG: Performed by: INTERNAL MEDICINE

## 2019-11-14 PROCEDURE — 82962 GLUCOSE BLOOD TEST: CPT

## 2019-11-14 RX ADMIN — FUROSEMIDE 40 MG: 40 TABLET ORAL at 08:21

## 2019-11-14 RX ADMIN — ENOXAPARIN SODIUM 110 MG: 120 INJECTION SUBCUTANEOUS at 21:44

## 2019-11-14 RX ADMIN — SENNOSIDES AND DOCUSATE SODIUM 2 TABLET: 8.6; 5 TABLET ORAL at 08:09

## 2019-11-14 RX ADMIN — POLYETHYLENE GLYCOL 3350 17 G: 17 POWDER, FOR SOLUTION ORAL at 12:12

## 2019-11-14 RX ADMIN — METOPROLOL TARTRATE 25 MG: 25 TABLET ORAL at 08:21

## 2019-11-14 RX ADMIN — IOPAMIDOL 95 ML: 755 INJECTION, SOLUTION INTRAVENOUS at 12:00

## 2019-11-14 RX ADMIN — ATORVASTATIN CALCIUM 40 MG: 40 TABLET, FILM COATED ORAL at 08:09

## 2019-11-14 RX ADMIN — NICOTINE 1 PATCH: 14 PATCH, EXTENDED RELEASE TRANSDERMAL at 08:11

## 2019-11-14 RX ADMIN — OXYCODONE HYDROCHLORIDE 10 MG: 5 TABLET ORAL at 13:34

## 2019-11-14 RX ADMIN — IPRATROPIUM BROMIDE AND ALBUTEROL SULFATE 3 ML: 2.5; .5 SOLUTION RESPIRATORY (INHALATION) at 13:50

## 2019-11-14 RX ADMIN — ASPIRIN 81 MG: 81 TABLET, COATED ORAL at 08:09

## 2019-11-14 RX ADMIN — IPRATROPIUM BROMIDE AND ALBUTEROL SULFATE 3 ML: 2.5; .5 SOLUTION RESPIRATORY (INHALATION) at 18:59

## 2019-11-14 RX ADMIN — OXYCODONE HYDROCHLORIDE 10 MG: 5 TABLET ORAL at 18:34

## 2019-11-14 RX ADMIN — PANTOPRAZOLE SODIUM 40 MG: 40 TABLET, DELAYED RELEASE ORAL at 08:09

## 2019-11-14 RX ADMIN — CLOPIDOGREL BISULFATE 75 MG: 75 TABLET ORAL at 08:09

## 2019-11-14 RX ADMIN — OXYCODONE HYDROCHLORIDE 10 MG: 5 TABLET ORAL at 00:31

## 2019-11-14 RX ADMIN — FERROUS SULFATE TAB 325 MG (65 MG ELEMENTAL FE) 325 MG: 325 (65 FE) TAB at 08:09

## 2019-11-14 RX ADMIN — ENOXAPARIN SODIUM 110 MG: 120 INJECTION SUBCUTANEOUS at 08:21

## 2019-11-14 RX ADMIN — OXYCODONE HYDROCHLORIDE 10 MG: 5 TABLET ORAL at 08:09

## 2019-11-14 RX ADMIN — IPRATROPIUM BROMIDE AND ALBUTEROL SULFATE 3 ML: 2.5; .5 SOLUTION RESPIRATORY (INHALATION) at 00:14

## 2019-11-14 RX ADMIN — IPRATROPIUM BROMIDE AND ALBUTEROL SULFATE 3 ML: 2.5; .5 SOLUTION RESPIRATORY (INHALATION) at 07:16

## 2019-11-15 ENCOUNTER — APPOINTMENT (OUTPATIENT)
Dept: CARDIOLOGY | Facility: HOSPITAL | Age: 59
End: 2019-11-15

## 2019-11-15 LAB
ANION GAP SERPL CALCULATED.3IONS-SCNC: 11 MMOL/L (ref 5–15)
BH CV ECHO MEAS - AO MAX PG (FULL): 80.6 MMHG
BH CV ECHO MEAS - AO MAX PG: 88.2 MMHG
BH CV ECHO MEAS - AO MEAN PG (FULL): 46.2 MMHG
BH CV ECHO MEAS - AO MEAN PG: 50.1 MMHG
BH CV ECHO MEAS - AO V2 MAX: 469.7 CM/SEC
BH CV ECHO MEAS - AO V2 MEAN: 326.2 CM/SEC
BH CV ECHO MEAS - AO V2 VTI: 98.8 CM
BH CV ECHO MEAS - AORTIC HR: 86 BPM
BH CV ECHO MEAS - AORTIC R-R: 0.7 SEC
BH CV ECHO MEAS - AVA(I,A): 0.89 CM^2
BH CV ECHO MEAS - AVA(I,D): 0.89 CM^2
BH CV ECHO MEAS - AVA(V,A): 0.93 CM^2
BH CV ECHO MEAS - AVA(V,D): 0.93 CM^2
BH CV ECHO MEAS - BSA(HAYCOCK): 2.3 M^2
BH CV ECHO MEAS - BSA: 2.2 M^2
BH CV ECHO MEAS - BZI_BMI: 41.6 KILOGRAMS/M^2
BH CV ECHO MEAS - BZI_METRIC_HEIGHT: 165.1 CM
BH CV ECHO MEAS - BZI_METRIC_WEIGHT: 113.4 KG
BH CV ECHO MEAS - CI(LVOT): 3.5 L/MIN/M^2
BH CV ECHO MEAS - CO(LVOT): 7.5 L/MIN
BH CV ECHO MEAS - LV MAX PG: 7.7 MMHG
BH CV ECHO MEAS - LV MEAN PG: 3.9 MMHG
BH CV ECHO MEAS - LV V1 MAX: 138.6 CM/SEC
BH CV ECHO MEAS - LV V1 MEAN: 90.6 CM/SEC
BH CV ECHO MEAS - LV V1 VTI: 27.8 CM
BH CV ECHO MEAS - LVOT AREA (M): 3.1 CM^2
BH CV ECHO MEAS - LVOT AREA: 3.2 CM^2
BH CV ECHO MEAS - LVOT DIAM: 2 CM
BH CV ECHO MEAS - SI(LVOT): 40.2 ML/M^2
BH CV ECHO MEAS - SV(LVOT): 87.5 ML
BH CV ECHO MEAS - TV MAX PG: 29.1 MMHG
BH CV ECHO MEAS - TV V2 MAX: 269.5 CM/SEC
BUN BLD-MCNC: 20 MG/DL (ref 6–20)
BUN/CREAT SERPL: 25 (ref 7–25)
CALCIUM SPEC-SCNC: 8.7 MG/DL (ref 8.6–10.5)
CHLORIDE SERPL-SCNC: 97 MMOL/L (ref 98–107)
CO2 SERPL-SCNC: 25 MMOL/L (ref 22–29)
CREAT BLD-MCNC: 0.8 MG/DL (ref 0.76–1.27)
GFR SERPL CREATININE-BSD FRML MDRD: 99 ML/MIN/1.73
GLUCOSE BLD-MCNC: 100 MG/DL (ref 65–99)
GLUCOSE BLDC GLUCOMTR-MCNC: 103 MG/DL (ref 70–130)
GLUCOSE BLDC GLUCOMTR-MCNC: 110 MG/DL (ref 70–130)
GLUCOSE BLDC GLUCOMTR-MCNC: 112 MG/DL (ref 70–130)
GLUCOSE BLDC GLUCOMTR-MCNC: 126 MG/DL (ref 70–130)
HCT VFR BLD AUTO: 26.7 % (ref 37.5–51)
HCT VFR BLD AUTO: 28 % (ref 37.5–51)
HCT VFR BLD AUTO: 28.6 % (ref 37.5–51)
HCT VFR BLD AUTO: 29.8 % (ref 37.5–51)
HGB BLD-MCNC: 8 G/DL (ref 13–17.7)
HGB BLD-MCNC: 8.3 G/DL (ref 13–17.7)
HGB BLD-MCNC: 8.4 G/DL (ref 13–17.7)
HGB BLD-MCNC: 8.7 G/DL (ref 13–17.7)
LV EF 2D ECHO EST: 60 %
POTASSIUM BLD-SCNC: 4.4 MMOL/L (ref 3.5–5.2)
SODIUM BLD-SCNC: 133 MMOL/L (ref 136–145)

## 2019-11-15 PROCEDURE — 99153 MOD SED SAME PHYS/QHP EA: CPT

## 2019-11-15 PROCEDURE — 93321 DOPPLER ECHO F-UP/LMTD STD: CPT | Performed by: INTERNAL MEDICINE

## 2019-11-15 PROCEDURE — 93321 DOPPLER ECHO F-UP/LMTD STD: CPT

## 2019-11-15 PROCEDURE — 85018 HEMOGLOBIN: CPT | Performed by: NURSE PRACTITIONER

## 2019-11-15 PROCEDURE — 25010000002 FENTANYL CITRATE (PF) 100 MCG/2ML SOLUTION: Performed by: INTERNAL MEDICINE

## 2019-11-15 PROCEDURE — 85014 HEMATOCRIT: CPT | Performed by: NURSE PRACTITIONER

## 2019-11-15 PROCEDURE — 93312 ECHO TRANSESOPHAGEAL: CPT | Performed by: INTERNAL MEDICINE

## 2019-11-15 PROCEDURE — 94799 UNLISTED PULMONARY SVC/PX: CPT

## 2019-11-15 PROCEDURE — 93312 ECHO TRANSESOPHAGEAL: CPT

## 2019-11-15 PROCEDURE — 99232 SBSQ HOSP IP/OBS MODERATE 35: CPT | Performed by: INTERNAL MEDICINE

## 2019-11-15 PROCEDURE — 93325 DOPPLER ECHO COLOR FLOW MAPG: CPT

## 2019-11-15 PROCEDURE — 99152 MOD SED SAME PHYS/QHP 5/>YRS: CPT | Performed by: INTERNAL MEDICINE

## 2019-11-15 PROCEDURE — 93325 DOPPLER ECHO COLOR FLOW MAPG: CPT | Performed by: INTERNAL MEDICINE

## 2019-11-15 PROCEDURE — 99152 MOD SED SAME PHYS/QHP 5/>YRS: CPT

## 2019-11-15 PROCEDURE — 80048 BASIC METABOLIC PNL TOTAL CA: CPT | Performed by: THORACIC SURGERY (CARDIOTHORACIC VASCULAR SURGERY)

## 2019-11-15 PROCEDURE — 25010000002 ENOXAPARIN PER 10 MG: Performed by: INTERNAL MEDICINE

## 2019-11-15 PROCEDURE — 82962 GLUCOSE BLOOD TEST: CPT

## 2019-11-15 PROCEDURE — 25010000002 MIDAZOLAM PER 1 MG: Performed by: INTERNAL MEDICINE

## 2019-11-15 PROCEDURE — 99231 SBSQ HOSP IP/OBS SF/LOW 25: CPT | Performed by: THORACIC SURGERY (CARDIOTHORACIC VASCULAR SURGERY)

## 2019-11-15 RX ORDER — MIDAZOLAM HYDROCHLORIDE 1 MG/ML
2 INJECTION INTRAMUSCULAR; INTRAVENOUS ONCE
Status: DISCONTINUED | OUTPATIENT
Start: 2019-11-15 | End: 2019-11-17

## 2019-11-15 RX ORDER — TAMSULOSIN HYDROCHLORIDE 0.4 MG/1
0.4 CAPSULE ORAL DAILY
Status: DISCONTINUED | OUTPATIENT
Start: 2019-11-15 | End: 2019-11-27 | Stop reason: HOSPADM

## 2019-11-15 RX ORDER — MIDAZOLAM HYDROCHLORIDE 1 MG/ML
2 INJECTION INTRAMUSCULAR; INTRAVENOUS ONCE
Status: COMPLETED | OUTPATIENT
Start: 2019-11-15 | End: 2019-11-15

## 2019-11-15 RX ORDER — FENTANYL CITRATE 50 UG/ML
100 INJECTION, SOLUTION INTRAMUSCULAR; INTRAVENOUS
Status: DISCONTINUED | OUTPATIENT
Start: 2019-11-15 | End: 2019-11-19

## 2019-11-15 RX ORDER — FENTANYL CITRATE 50 UG/ML
50 INJECTION, SOLUTION INTRAMUSCULAR; INTRAVENOUS ONCE
Status: COMPLETED | OUTPATIENT
Start: 2019-11-15 | End: 2019-11-15

## 2019-11-15 RX ADMIN — IPRATROPIUM BROMIDE AND ALBUTEROL SULFATE 3 ML: 2.5; .5 SOLUTION RESPIRATORY (INHALATION) at 12:19

## 2019-11-15 RX ADMIN — FENTANYL CITRATE 50 MCG: 50 INJECTION, SOLUTION INTRAMUSCULAR; INTRAVENOUS at 14:15

## 2019-11-15 RX ADMIN — ASPIRIN 81 MG: 81 TABLET, COATED ORAL at 08:14

## 2019-11-15 RX ADMIN — METHOHEXITAL SODIUM 70 MG: 500 INJECTION, POWDER, LYOPHILIZED, FOR SOLUTION INTRAMUSCULAR; INTRAVENOUS; RECTAL at 14:30

## 2019-11-15 RX ADMIN — IPRATROPIUM BROMIDE AND ALBUTEROL SULFATE 3 ML: 2.5; .5 SOLUTION RESPIRATORY (INHALATION) at 00:01

## 2019-11-15 RX ADMIN — FERROUS SULFATE TAB 325 MG (65 MG ELEMENTAL FE) 325 MG: 325 (65 FE) TAB at 08:14

## 2019-11-15 RX ADMIN — MIDAZOLAM 4 MG: 1 INJECTION INTRAMUSCULAR; INTRAVENOUS at 14:53

## 2019-11-15 RX ADMIN — NICOTINE 1 PATCH: 14 PATCH, EXTENDED RELEASE TRANSDERMAL at 08:17

## 2019-11-15 RX ADMIN — TAMSULOSIN HYDROCHLORIDE 0.4 MG: 0.4 CAPSULE ORAL at 10:13

## 2019-11-15 RX ADMIN — OXYCODONE HYDROCHLORIDE 10 MG: 5 TABLET ORAL at 12:19

## 2019-11-15 RX ADMIN — SENNOSIDES AND DOCUSATE SODIUM 2 TABLET: 8.6; 5 TABLET ORAL at 21:59

## 2019-11-15 RX ADMIN — FUROSEMIDE 40 MG: 40 TABLET ORAL at 08:14

## 2019-11-15 RX ADMIN — OXYCODONE HYDROCHLORIDE 10 MG: 5 TABLET ORAL at 18:34

## 2019-11-15 RX ADMIN — FENTANYL CITRATE 100 MCG: 50 INJECTION, SOLUTION INTRAMUSCULAR; INTRAVENOUS at 14:15

## 2019-11-15 RX ADMIN — ENOXAPARIN SODIUM 110 MG: 120 INJECTION SUBCUTANEOUS at 21:53

## 2019-11-15 RX ADMIN — OXYCODONE HYDROCHLORIDE 10 MG: 5 TABLET ORAL at 00:17

## 2019-11-15 RX ADMIN — SENNOSIDES AND DOCUSATE SODIUM 2 TABLET: 8.6; 5 TABLET ORAL at 08:14

## 2019-11-15 RX ADMIN — ENOXAPARIN SODIUM 110 MG: 120 INJECTION SUBCUTANEOUS at 09:16

## 2019-11-15 RX ADMIN — IPRATROPIUM BROMIDE AND ALBUTEROL SULFATE 3 ML: 2.5; .5 SOLUTION RESPIRATORY (INHALATION) at 19:26

## 2019-11-15 RX ADMIN — ATORVASTATIN CALCIUM 40 MG: 40 TABLET, FILM COATED ORAL at 08:14

## 2019-11-15 RX ADMIN — MIDAZOLAM 2 MG: 1 INJECTION INTRAMUSCULAR; INTRAVENOUS at 14:30

## 2019-11-15 RX ADMIN — IPRATROPIUM BROMIDE AND ALBUTEROL SULFATE 3 ML: 2.5; .5 SOLUTION RESPIRATORY (INHALATION) at 07:47

## 2019-11-15 RX ADMIN — CLOPIDOGREL BISULFATE 75 MG: 75 TABLET ORAL at 08:14

## 2019-11-15 RX ADMIN — OXYCODONE HYDROCHLORIDE 10 MG: 5 TABLET ORAL at 06:48

## 2019-11-16 LAB
BASOPHILS # BLD AUTO: 0.04 10*3/MM3 (ref 0–0.2)
BASOPHILS NFR BLD AUTO: 1.1 % (ref 0–1.5)
DEPRECATED RDW RBC AUTO: 60.6 FL (ref 37–54)
EOSINOPHIL # BLD AUTO: 0.15 10*3/MM3 (ref 0–0.4)
EOSINOPHIL NFR BLD AUTO: 4.1 % (ref 0.3–6.2)
ERYTHROCYTE [DISTWIDTH] IN BLOOD BY AUTOMATED COUNT: 21.7 % (ref 12.3–15.4)
GLUCOSE BLDC GLUCOMTR-MCNC: 108 MG/DL (ref 70–130)
GLUCOSE BLDC GLUCOMTR-MCNC: 109 MG/DL (ref 70–130)
GLUCOSE BLDC GLUCOMTR-MCNC: 110 MG/DL (ref 70–130)
GLUCOSE BLDC GLUCOMTR-MCNC: 97 MG/DL (ref 70–130)
HCT VFR BLD AUTO: 27.5 % (ref 37.5–51)
HCT VFR BLD AUTO: 27.8 % (ref 37.5–51)
HGB BLD-MCNC: 8 G/DL (ref 13–17.7)
HGB BLD-MCNC: 8.1 G/DL (ref 13–17.7)
IMM GRANULOCYTES # BLD AUTO: 0.01 10*3/MM3 (ref 0–0.05)
IMM GRANULOCYTES NFR BLD AUTO: 0.3 % (ref 0–0.5)
LYMPHOCYTES # BLD AUTO: 0.81 10*3/MM3 (ref 0.7–3.1)
LYMPHOCYTES NFR BLD AUTO: 22 % (ref 19.6–45.3)
MCH RBC QN AUTO: 23 PG (ref 26.6–33)
MCHC RBC AUTO-ENTMCNC: 29.5 G/DL (ref 31.5–35.7)
MCV RBC AUTO: 78.1 FL (ref 79–97)
MONOCYTES # BLD AUTO: 0.5 10*3/MM3 (ref 0.1–0.9)
MONOCYTES NFR BLD AUTO: 13.6 % (ref 5–12)
NEUTROPHILS # BLD AUTO: 2.18 10*3/MM3 (ref 1.7–7)
NEUTROPHILS NFR BLD AUTO: 58.9 % (ref 42.7–76)
NRBC BLD AUTO-RTO: 0 /100 WBC (ref 0–0.2)
OVALOCYTES BLD QL SMEAR: NORMAL
PLAT MORPH BLD: NORMAL
PLATELET # BLD AUTO: 215 10*3/MM3 (ref 140–450)
PMV BLD AUTO: 10.5 FL (ref 6–12)
RBC # BLD AUTO: 3.52 10*6/MM3 (ref 4.14–5.8)
WBC MORPH BLD: NORMAL
WBC NRBC COR # BLD: 3.69 10*3/MM3 (ref 3.4–10.8)

## 2019-11-16 PROCEDURE — 99232 SBSQ HOSP IP/OBS MODERATE 35: CPT | Performed by: INTERNAL MEDICINE

## 2019-11-16 PROCEDURE — 99233 SBSQ HOSP IP/OBS HIGH 50: CPT | Performed by: INTERNAL MEDICINE

## 2019-11-16 PROCEDURE — 99231 SBSQ HOSP IP/OBS SF/LOW 25: CPT | Performed by: THORACIC SURGERY (CARDIOTHORACIC VASCULAR SURGERY)

## 2019-11-16 PROCEDURE — 94799 UNLISTED PULMONARY SVC/PX: CPT

## 2019-11-16 PROCEDURE — 85007 BL SMEAR W/DIFF WBC COUNT: CPT | Performed by: PHYSICIAN ASSISTANT

## 2019-11-16 PROCEDURE — 25010000002 ENOXAPARIN PER 10 MG: Performed by: INTERNAL MEDICINE

## 2019-11-16 PROCEDURE — 82962 GLUCOSE BLOOD TEST: CPT

## 2019-11-16 PROCEDURE — 85025 COMPLETE CBC W/AUTO DIFF WBC: CPT | Performed by: PHYSICIAN ASSISTANT

## 2019-11-16 RX ORDER — MAGNESIUM SULFATE HEPTAHYDRATE 40 MG/ML
2 INJECTION, SOLUTION INTRAVENOUS AS NEEDED
Status: DISCONTINUED | OUTPATIENT
Start: 2019-11-16 | End: 2019-11-22

## 2019-11-16 RX ORDER — SACCHAROMYCES BOULARDII 250 MG
250 CAPSULE ORAL DAILY
Status: DISCONTINUED | OUTPATIENT
Start: 2019-11-16 | End: 2019-11-16

## 2019-11-16 RX ORDER — MAGNESIUM SULFATE HEPTAHYDRATE 40 MG/ML
4 INJECTION, SOLUTION INTRAVENOUS AS NEEDED
Status: DISCONTINUED | OUTPATIENT
Start: 2019-11-16 | End: 2019-11-22

## 2019-11-16 RX ORDER — MAGNESIUM SULFATE 1 G/100ML
1 INJECTION INTRAVENOUS AS NEEDED
Status: DISCONTINUED | OUTPATIENT
Start: 2019-11-16 | End: 2019-11-22

## 2019-11-16 RX ORDER — ALPRAZOLAM 0.25 MG/1
0.25 TABLET ORAL 2 TIMES DAILY PRN
Status: DISCONTINUED | OUTPATIENT
Start: 2019-11-16 | End: 2019-11-16

## 2019-11-16 RX ORDER — ATORVASTATIN CALCIUM 40 MG/1
40 TABLET, FILM COATED ORAL NIGHTLY
Status: DISCONTINUED | OUTPATIENT
Start: 2019-11-16 | End: 2019-11-27 | Stop reason: HOSPADM

## 2019-11-16 RX ADMIN — FUROSEMIDE 40 MG: 40 TABLET ORAL at 08:25

## 2019-11-16 RX ADMIN — IPRATROPIUM BROMIDE AND ALBUTEROL SULFATE 3 ML: 2.5; .5 SOLUTION RESPIRATORY (INHALATION) at 00:40

## 2019-11-16 RX ADMIN — NICOTINE 1 PATCH: 14 PATCH, EXTENDED RELEASE TRANSDERMAL at 08:25

## 2019-11-16 RX ADMIN — IPRATROPIUM BROMIDE AND ALBUTEROL SULFATE 3 ML: 2.5; .5 SOLUTION RESPIRATORY (INHALATION) at 13:15

## 2019-11-16 RX ADMIN — ASPIRIN 81 MG: 81 TABLET, COATED ORAL at 08:25

## 2019-11-16 RX ADMIN — OXYCODONE HYDROCHLORIDE 10 MG: 5 TABLET ORAL at 13:15

## 2019-11-16 RX ADMIN — ATORVASTATIN CALCIUM 40 MG: 40 TABLET, FILM COATED ORAL at 22:01

## 2019-11-16 RX ADMIN — OXYCODONE HYDROCHLORIDE 10 MG: 5 TABLET ORAL at 06:57

## 2019-11-16 RX ADMIN — PANTOPRAZOLE SODIUM 40 MG: 40 TABLET, DELAYED RELEASE ORAL at 08:25

## 2019-11-16 RX ADMIN — ENOXAPARIN SODIUM 110 MG: 120 INJECTION SUBCUTANEOUS at 08:25

## 2019-11-16 RX ADMIN — ENOXAPARIN SODIUM 110 MG: 120 INJECTION SUBCUTANEOUS at 22:01

## 2019-11-16 RX ADMIN — FERROUS SULFATE TAB 325 MG (65 MG ELEMENTAL FE) 325 MG: 325 (65 FE) TAB at 08:25

## 2019-11-16 RX ADMIN — CLOPIDOGREL BISULFATE 75 MG: 75 TABLET ORAL at 08:25

## 2019-11-16 RX ADMIN — OXYCODONE HYDROCHLORIDE 10 MG: 5 TABLET ORAL at 18:43

## 2019-11-16 RX ADMIN — TAMSULOSIN HYDROCHLORIDE 0.4 MG: 0.4 CAPSULE ORAL at 08:25

## 2019-11-16 RX ADMIN — IPRATROPIUM BROMIDE AND ALBUTEROL SULFATE 3 ML: 2.5; .5 SOLUTION RESPIRATORY (INHALATION) at 19:11

## 2019-11-16 RX ADMIN — OXYCODONE HYDROCHLORIDE 10 MG: 5 TABLET ORAL at 00:36

## 2019-11-17 LAB
ANION GAP SERPL CALCULATED.3IONS-SCNC: 11 MMOL/L (ref 5–15)
BUN BLD-MCNC: 10 MG/DL (ref 6–20)
BUN/CREAT SERPL: 13.2 (ref 7–25)
CALCIUM SPEC-SCNC: 8.9 MG/DL (ref 8.6–10.5)
CHLORIDE SERPL-SCNC: 98 MMOL/L (ref 98–107)
CO2 SERPL-SCNC: 25 MMOL/L (ref 22–29)
CREAT BLD-MCNC: 0.76 MG/DL (ref 0.76–1.27)
GFR SERPL CREATININE-BSD FRML MDRD: 105 ML/MIN/1.73
GLUCOSE BLD-MCNC: 141 MG/DL (ref 65–99)
GLUCOSE BLDC GLUCOMTR-MCNC: 112 MG/DL (ref 70–130)
GLUCOSE BLDC GLUCOMTR-MCNC: 140 MG/DL (ref 70–130)
GLUCOSE BLDC GLUCOMTR-MCNC: 142 MG/DL (ref 70–130)
GLUCOSE BLDC GLUCOMTR-MCNC: 144 MG/DL (ref 70–130)
HCT VFR BLD AUTO: 28 % (ref 37.5–51)
HGB BLD-MCNC: 8.2 G/DL (ref 13–17.7)
IRON 24H UR-MRATE: 33 MCG/DL (ref 59–158)
IRON SATN MFR SERPL: 7 % (ref 20–50)
MAGNESIUM SERPL-MCNC: 1.8 MG/DL (ref 1.6–2.6)
POTASSIUM BLD-SCNC: 4 MMOL/L (ref 3.5–5.2)
SODIUM BLD-SCNC: 134 MMOL/L (ref 136–145)
TIBC SERPL-MCNC: 480 MCG/DL (ref 298–536)
TRANSFERRIN SERPL-MCNC: 322 MG/DL (ref 200–360)

## 2019-11-17 PROCEDURE — 85014 HEMATOCRIT: CPT | Performed by: INTERNAL MEDICINE

## 2019-11-17 PROCEDURE — 80048 BASIC METABOLIC PNL TOTAL CA: CPT | Performed by: INTERNAL MEDICINE

## 2019-11-17 PROCEDURE — 85018 HEMOGLOBIN: CPT | Performed by: INTERNAL MEDICINE

## 2019-11-17 PROCEDURE — 94799 UNLISTED PULMONARY SVC/PX: CPT

## 2019-11-17 PROCEDURE — 25010000002 MAGNESIUM SULFATE 2 GM/50ML SOLUTION: Performed by: INTERNAL MEDICINE

## 2019-11-17 PROCEDURE — 83735 ASSAY OF MAGNESIUM: CPT | Performed by: INTERNAL MEDICINE

## 2019-11-17 PROCEDURE — 83540 ASSAY OF IRON: CPT | Performed by: INTERNAL MEDICINE

## 2019-11-17 PROCEDURE — 99232 SBSQ HOSP IP/OBS MODERATE 35: CPT | Performed by: INTERNAL MEDICINE

## 2019-11-17 PROCEDURE — 99231 SBSQ HOSP IP/OBS SF/LOW 25: CPT | Performed by: THORACIC SURGERY (CARDIOTHORACIC VASCULAR SURGERY)

## 2019-11-17 PROCEDURE — 25010000002 NA FERRIC GLUC CPLX PER 12.5 MG: Performed by: INTERNAL MEDICINE

## 2019-11-17 PROCEDURE — 84466 ASSAY OF TRANSFERRIN: CPT | Performed by: INTERNAL MEDICINE

## 2019-11-17 PROCEDURE — 25010000002 ENOXAPARIN PER 10 MG: Performed by: INTERNAL MEDICINE

## 2019-11-17 PROCEDURE — 82962 GLUCOSE BLOOD TEST: CPT

## 2019-11-17 RX ADMIN — OXYCODONE HYDROCHLORIDE 10 MG: 5 TABLET ORAL at 18:36

## 2019-11-17 RX ADMIN — OXYCODONE HYDROCHLORIDE 10 MG: 5 TABLET ORAL at 13:17

## 2019-11-17 RX ADMIN — ENOXAPARIN SODIUM 110 MG: 120 INJECTION SUBCUTANEOUS at 09:15

## 2019-11-17 RX ADMIN — IPRATROPIUM BROMIDE AND ALBUTEROL SULFATE 3 ML: 2.5; .5 SOLUTION RESPIRATORY (INHALATION) at 13:10

## 2019-11-17 RX ADMIN — IPRATROPIUM BROMIDE AND ALBUTEROL SULFATE 3 ML: 2.5; .5 SOLUTION RESPIRATORY (INHALATION) at 07:35

## 2019-11-17 RX ADMIN — ASPIRIN 81 MG: 81 TABLET, COATED ORAL at 09:15

## 2019-11-17 RX ADMIN — FUROSEMIDE 40 MG: 40 TABLET ORAL at 09:15

## 2019-11-17 RX ADMIN — SENNOSIDES AND DOCUSATE SODIUM 2 TABLET: 8.6; 5 TABLET ORAL at 22:33

## 2019-11-17 RX ADMIN — ATORVASTATIN CALCIUM 40 MG: 40 TABLET, FILM COATED ORAL at 22:33

## 2019-11-17 RX ADMIN — IPRATROPIUM BROMIDE AND ALBUTEROL SULFATE 3 ML: 2.5; .5 SOLUTION RESPIRATORY (INHALATION) at 18:50

## 2019-11-17 RX ADMIN — OXYCODONE HYDROCHLORIDE 10 MG: 5 TABLET ORAL at 06:24

## 2019-11-17 RX ADMIN — IPRATROPIUM BROMIDE AND ALBUTEROL SULFATE 3 ML: 2.5; .5 SOLUTION RESPIRATORY (INHALATION) at 00:43

## 2019-11-17 RX ADMIN — CLOPIDOGREL BISULFATE 75 MG: 75 TABLET ORAL at 09:15

## 2019-11-17 RX ADMIN — ENOXAPARIN SODIUM 110 MG: 120 INJECTION SUBCUTANEOUS at 22:33

## 2019-11-17 RX ADMIN — MAGNESIUM SULFATE 2 G: 2 INJECTION INTRAVENOUS at 09:16

## 2019-11-17 RX ADMIN — NICOTINE 1 PATCH: 14 PATCH, EXTENDED RELEASE TRANSDERMAL at 09:15

## 2019-11-17 RX ADMIN — TAMSULOSIN HYDROCHLORIDE 0.4 MG: 0.4 CAPSULE ORAL at 09:15

## 2019-11-17 RX ADMIN — SODIUM CHLORIDE 125 MG: 9 INJECTION, SOLUTION INTRAVENOUS at 15:34

## 2019-11-17 RX ADMIN — FERROUS SULFATE TAB 325 MG (65 MG ELEMENTAL FE) 325 MG: 325 (65 FE) TAB at 09:15

## 2019-11-17 RX ADMIN — OXYCODONE HYDROCHLORIDE 10 MG: 5 TABLET ORAL at 00:25

## 2019-11-18 ENCOUNTER — APPOINTMENT (OUTPATIENT)
Dept: PULMONOLOGY | Facility: HOSPITAL | Age: 59
End: 2019-11-18

## 2019-11-18 LAB
ANION GAP SERPL CALCULATED.3IONS-SCNC: 10 MMOL/L (ref 5–15)
BUN BLD-MCNC: 8 MG/DL (ref 6–20)
BUN/CREAT SERPL: 11 (ref 7–25)
CALCIUM SPEC-SCNC: 9.1 MG/DL (ref 8.6–10.5)
CHLORIDE SERPL-SCNC: 98 MMOL/L (ref 98–107)
CO2 SERPL-SCNC: 25 MMOL/L (ref 22–29)
CREAT BLD-MCNC: 0.73 MG/DL (ref 0.76–1.27)
GFR SERPL CREATININE-BSD FRML MDRD: 110 ML/MIN/1.73
GLUCOSE BLD-MCNC: 139 MG/DL (ref 65–99)
GLUCOSE BLDC GLUCOMTR-MCNC: 102 MG/DL (ref 70–130)
GLUCOSE BLDC GLUCOMTR-MCNC: 106 MG/DL (ref 70–130)
GLUCOSE BLDC GLUCOMTR-MCNC: 125 MG/DL (ref 70–130)
HCT VFR BLD AUTO: 29.5 % (ref 37.5–51)
HGB BLD-MCNC: 8.6 G/DL (ref 13–17.7)
MAGNESIUM SERPL-MCNC: 1.8 MG/DL (ref 1.6–2.6)
POTASSIUM BLD-SCNC: 4 MMOL/L (ref 3.5–5.2)
SODIUM BLD-SCNC: 133 MMOL/L (ref 136–145)

## 2019-11-18 PROCEDURE — 85018 HEMOGLOBIN: CPT | Performed by: INTERNAL MEDICINE

## 2019-11-18 PROCEDURE — 94799 UNLISTED PULMONARY SVC/PX: CPT

## 2019-11-18 PROCEDURE — 82962 GLUCOSE BLOOD TEST: CPT

## 2019-11-18 PROCEDURE — 99232 SBSQ HOSP IP/OBS MODERATE 35: CPT | Performed by: PHYSICIAN ASSISTANT

## 2019-11-18 PROCEDURE — 80048 BASIC METABOLIC PNL TOTAL CA: CPT | Performed by: INTERNAL MEDICINE

## 2019-11-18 PROCEDURE — 25010000002 NA FERRIC GLUC CPLX PER 12.5 MG: Performed by: INTERNAL MEDICINE

## 2019-11-18 PROCEDURE — 83735 ASSAY OF MAGNESIUM: CPT | Performed by: INTERNAL MEDICINE

## 2019-11-18 PROCEDURE — 85014 HEMATOCRIT: CPT | Performed by: INTERNAL MEDICINE

## 2019-11-18 PROCEDURE — 99231 SBSQ HOSP IP/OBS SF/LOW 25: CPT | Performed by: THORACIC SURGERY (CARDIOTHORACIC VASCULAR SURGERY)

## 2019-11-18 PROCEDURE — 94010 BREATHING CAPACITY TEST: CPT | Performed by: INTERNAL MEDICINE

## 2019-11-18 PROCEDURE — 94010 BREATHING CAPACITY TEST: CPT

## 2019-11-18 PROCEDURE — 25010000002 ENOXAPARIN PER 10 MG: Performed by: INTERNAL MEDICINE

## 2019-11-18 PROCEDURE — 99232 SBSQ HOSP IP/OBS MODERATE 35: CPT | Performed by: INTERNAL MEDICINE

## 2019-11-18 RX ORDER — HYDROCORTISONE ACETATE 25 MG/1
25 SUPPOSITORY RECTAL 2 TIMES DAILY
Status: DISPENSED | OUTPATIENT
Start: 2019-11-18 | End: 2019-11-23

## 2019-11-18 RX ORDER — PANTOPRAZOLE SODIUM 40 MG/1
40 TABLET, DELAYED RELEASE ORAL
Status: DISCONTINUED | OUTPATIENT
Start: 2019-11-19 | End: 2019-11-22

## 2019-11-18 RX ORDER — IPRATROPIUM BROMIDE AND ALBUTEROL SULFATE 2.5; .5 MG/3ML; MG/3ML
3 SOLUTION RESPIRATORY (INHALATION) EVERY 6 HOURS PRN
Status: DISCONTINUED | OUTPATIENT
Start: 2019-11-18 | End: 2019-11-21 | Stop reason: SDUPTHER

## 2019-11-18 RX ADMIN — NICOTINE 1 PATCH: 14 PATCH, EXTENDED RELEASE TRANSDERMAL at 08:50

## 2019-11-18 RX ADMIN — IPRATROPIUM BROMIDE AND ALBUTEROL SULFATE 3 ML: 2.5; .5 SOLUTION RESPIRATORY (INHALATION) at 12:34

## 2019-11-18 RX ADMIN — OXYCODONE HYDROCHLORIDE 10 MG: 5 TABLET ORAL at 06:33

## 2019-11-18 RX ADMIN — ENOXAPARIN SODIUM 110 MG: 120 INJECTION SUBCUTANEOUS at 08:51

## 2019-11-18 RX ADMIN — ENOXAPARIN SODIUM 110 MG: 120 INJECTION SUBCUTANEOUS at 21:17

## 2019-11-18 RX ADMIN — SODIUM CHLORIDE, PRESERVATIVE FREE 10 ML: 5 INJECTION INTRAVENOUS at 21:11

## 2019-11-18 RX ADMIN — FUROSEMIDE 40 MG: 40 TABLET ORAL at 08:50

## 2019-11-18 RX ADMIN — OXYCODONE HYDROCHLORIDE 10 MG: 5 TABLET ORAL at 12:53

## 2019-11-18 RX ADMIN — POLYETHYLENE GLYCOL 3350 17 G: 17 POWDER, FOR SOLUTION ORAL at 08:50

## 2019-11-18 RX ADMIN — IPRATROPIUM BROMIDE AND ALBUTEROL SULFATE 3 ML: 2.5; .5 SOLUTION RESPIRATORY (INHALATION) at 19:06

## 2019-11-18 RX ADMIN — SENNOSIDES AND DOCUSATE SODIUM 2 TABLET: 8.6; 5 TABLET ORAL at 08:50

## 2019-11-18 RX ADMIN — PANTOPRAZOLE SODIUM 40 MG: 40 TABLET, DELAYED RELEASE ORAL at 08:50

## 2019-11-18 RX ADMIN — CLOPIDOGREL BISULFATE 75 MG: 75 TABLET ORAL at 08:50

## 2019-11-18 RX ADMIN — ATORVASTATIN CALCIUM 40 MG: 40 TABLET, FILM COATED ORAL at 21:10

## 2019-11-18 RX ADMIN — OXYCODONE HYDROCHLORIDE 10 MG: 5 TABLET ORAL at 18:33

## 2019-11-18 RX ADMIN — IPRATROPIUM BROMIDE AND ALBUTEROL SULFATE 3 ML: 2.5; .5 SOLUTION RESPIRATORY (INHALATION) at 03:48

## 2019-11-18 RX ADMIN — ASPIRIN 81 MG: 81 TABLET, COATED ORAL at 08:50

## 2019-11-18 RX ADMIN — TAMSULOSIN HYDROCHLORIDE 0.4 MG: 0.4 CAPSULE ORAL at 08:50

## 2019-11-18 RX ADMIN — SENNOSIDES AND DOCUSATE SODIUM 2 TABLET: 8.6; 5 TABLET ORAL at 21:10

## 2019-11-18 RX ADMIN — IPRATROPIUM BROMIDE AND ALBUTEROL SULFATE 3 ML: 2.5; .5 SOLUTION RESPIRATORY (INHALATION) at 07:09

## 2019-11-18 RX ADMIN — FERROUS SULFATE TAB 325 MG (65 MG ELEMENTAL FE) 325 MG: 325 (65 FE) TAB at 08:50

## 2019-11-18 RX ADMIN — OXYCODONE HYDROCHLORIDE 10 MG: 5 TABLET ORAL at 00:19

## 2019-11-18 RX ADMIN — SODIUM CHLORIDE 125 MG: 9 INJECTION, SOLUTION INTRAVENOUS at 09:05

## 2019-11-19 LAB
GLUCOSE BLDC GLUCOMTR-MCNC: 107 MG/DL (ref 70–130)
GLUCOSE BLDC GLUCOMTR-MCNC: 137 MG/DL (ref 70–130)
GLUCOSE BLDC GLUCOMTR-MCNC: 138 MG/DL (ref 70–130)
GLUCOSE BLDC GLUCOMTR-MCNC: 143 MG/DL (ref 70–130)

## 2019-11-19 PROCEDURE — 99231 SBSQ HOSP IP/OBS SF/LOW 25: CPT | Performed by: THORACIC SURGERY (CARDIOTHORACIC VASCULAR SURGERY)

## 2019-11-19 PROCEDURE — 25010000002 ENOXAPARIN PER 10 MG: Performed by: INTERNAL MEDICINE

## 2019-11-19 PROCEDURE — 82962 GLUCOSE BLOOD TEST: CPT

## 2019-11-19 PROCEDURE — 99232 SBSQ HOSP IP/OBS MODERATE 35: CPT | Performed by: HOSPITALIST

## 2019-11-19 PROCEDURE — 99232 SBSQ HOSP IP/OBS MODERATE 35: CPT | Performed by: PHYSICIAN ASSISTANT

## 2019-11-19 PROCEDURE — 25010000002 NA FERRIC GLUC CPLX PER 12.5 MG: Performed by: INTERNAL MEDICINE

## 2019-11-19 PROCEDURE — 94799 UNLISTED PULMONARY SVC/PX: CPT

## 2019-11-19 RX ADMIN — NICOTINE 1 PATCH: 14 PATCH, EXTENDED RELEASE TRANSDERMAL at 08:49

## 2019-11-19 RX ADMIN — FUROSEMIDE 40 MG: 40 TABLET ORAL at 08:50

## 2019-11-19 RX ADMIN — SENNOSIDES AND DOCUSATE SODIUM 2 TABLET: 8.6; 5 TABLET ORAL at 20:27

## 2019-11-19 RX ADMIN — SENNOSIDES AND DOCUSATE SODIUM 2 TABLET: 8.6; 5 TABLET ORAL at 08:50

## 2019-11-19 RX ADMIN — OXYCODONE HYDROCHLORIDE 10 MG: 5 TABLET ORAL at 20:27

## 2019-11-19 RX ADMIN — ENOXAPARIN SODIUM 110 MG: 120 INJECTION SUBCUTANEOUS at 08:50

## 2019-11-19 RX ADMIN — IPRATROPIUM BROMIDE AND ALBUTEROL SULFATE 3 ML: 2.5; .5 SOLUTION RESPIRATORY (INHALATION) at 20:20

## 2019-11-19 RX ADMIN — CLOPIDOGREL BISULFATE 75 MG: 75 TABLET ORAL at 08:50

## 2019-11-19 RX ADMIN — IPRATROPIUM BROMIDE AND ALBUTEROL SULFATE 3 ML: 2.5; .5 SOLUTION RESPIRATORY (INHALATION) at 00:34

## 2019-11-19 RX ADMIN — FERROUS SULFATE TAB 325 MG (65 MG ELEMENTAL FE) 325 MG: 325 (65 FE) TAB at 08:50

## 2019-11-19 RX ADMIN — ENOXAPARIN SODIUM 110 MG: 120 INJECTION SUBCUTANEOUS at 20:27

## 2019-11-19 RX ADMIN — IPRATROPIUM BROMIDE AND ALBUTEROL SULFATE 3 ML: 2.5; .5 SOLUTION RESPIRATORY (INHALATION) at 12:46

## 2019-11-19 RX ADMIN — OXYCODONE HYDROCHLORIDE 10 MG: 5 TABLET ORAL at 13:45

## 2019-11-19 RX ADMIN — SODIUM CHLORIDE 125 MG: 9 INJECTION, SOLUTION INTRAVENOUS at 08:50

## 2019-11-19 RX ADMIN — SODIUM CHLORIDE, PRESERVATIVE FREE 10 ML: 5 INJECTION INTRAVENOUS at 20:27

## 2019-11-19 RX ADMIN — OXYCODONE HYDROCHLORIDE 10 MG: 5 TABLET ORAL at 00:51

## 2019-11-19 RX ADMIN — IPRATROPIUM BROMIDE AND ALBUTEROL SULFATE 3 ML: 2.5; .5 SOLUTION RESPIRATORY (INHALATION) at 07:03

## 2019-11-19 RX ADMIN — ASPIRIN 81 MG: 81 TABLET, COATED ORAL at 08:50

## 2019-11-19 RX ADMIN — ATORVASTATIN CALCIUM 40 MG: 40 TABLET, FILM COATED ORAL at 20:27

## 2019-11-19 RX ADMIN — POLYETHYLENE GLYCOL 3350 17 G: 17 POWDER, FOR SOLUTION ORAL at 08:49

## 2019-11-19 RX ADMIN — PANTOPRAZOLE SODIUM 40 MG: 40 TABLET, DELAYED RELEASE ORAL at 06:18

## 2019-11-19 RX ADMIN — TAMSULOSIN HYDROCHLORIDE 0.4 MG: 0.4 CAPSULE ORAL at 08:50

## 2019-11-19 RX ADMIN — OXYCODONE HYDROCHLORIDE 10 MG: 5 TABLET ORAL at 06:49

## 2019-11-20 LAB
GLUCOSE BLDC GLUCOMTR-MCNC: 108 MG/DL (ref 70–130)
GLUCOSE BLDC GLUCOMTR-MCNC: 119 MG/DL (ref 70–130)
GLUCOSE BLDC GLUCOMTR-MCNC: 141 MG/DL (ref 70–130)
GLUCOSE BLDC GLUCOMTR-MCNC: 165 MG/DL (ref 70–130)

## 2019-11-20 PROCEDURE — 99232 SBSQ HOSP IP/OBS MODERATE 35: CPT | Performed by: INTERNAL MEDICINE

## 2019-11-20 PROCEDURE — 25010000002 ENOXAPARIN PER 10 MG: Performed by: INTERNAL MEDICINE

## 2019-11-20 PROCEDURE — 82962 GLUCOSE BLOOD TEST: CPT

## 2019-11-20 PROCEDURE — 99231 SBSQ HOSP IP/OBS SF/LOW 25: CPT | Performed by: THORACIC SURGERY (CARDIOTHORACIC VASCULAR SURGERY)

## 2019-11-20 PROCEDURE — 94799 UNLISTED PULMONARY SVC/PX: CPT

## 2019-11-20 PROCEDURE — 99231 SBSQ HOSP IP/OBS SF/LOW 25: CPT | Performed by: HOSPITALIST

## 2019-11-20 RX ADMIN — NICOTINE 1 PATCH: 14 PATCH, EXTENDED RELEASE TRANSDERMAL at 08:48

## 2019-11-20 RX ADMIN — OXYCODONE HYDROCHLORIDE 10 MG: 5 TABLET ORAL at 11:00

## 2019-11-20 RX ADMIN — OXYCODONE HYDROCHLORIDE 10 MG: 5 TABLET ORAL at 16:56

## 2019-11-20 RX ADMIN — ASPIRIN 81 MG: 81 TABLET, COATED ORAL at 08:49

## 2019-11-20 RX ADMIN — IPRATROPIUM BROMIDE AND ALBUTEROL SULFATE 3 ML: 2.5; .5 SOLUTION RESPIRATORY (INHALATION) at 19:27

## 2019-11-20 RX ADMIN — OXYCODONE HYDROCHLORIDE 10 MG: 5 TABLET ORAL at 03:53

## 2019-11-20 RX ADMIN — SENNOSIDES AND DOCUSATE SODIUM 2 TABLET: 8.6; 5 TABLET ORAL at 20:00

## 2019-11-20 RX ADMIN — POLYETHYLENE GLYCOL 3350 17 G: 17 POWDER, FOR SOLUTION ORAL at 08:48

## 2019-11-20 RX ADMIN — TAMSULOSIN HYDROCHLORIDE 0.4 MG: 0.4 CAPSULE ORAL at 08:49

## 2019-11-20 RX ADMIN — FERROUS SULFATE TAB 325 MG (65 MG ELEMENTAL FE) 325 MG: 325 (65 FE) TAB at 08:49

## 2019-11-20 RX ADMIN — ENOXAPARIN SODIUM 110 MG: 120 INJECTION SUBCUTANEOUS at 20:00

## 2019-11-20 RX ADMIN — SENNOSIDES AND DOCUSATE SODIUM 2 TABLET: 8.6; 5 TABLET ORAL at 08:49

## 2019-11-20 RX ADMIN — SODIUM CHLORIDE, PRESERVATIVE FREE 10 ML: 5 INJECTION INTRAVENOUS at 08:51

## 2019-11-20 RX ADMIN — CLOPIDOGREL BISULFATE 75 MG: 75 TABLET ORAL at 08:49

## 2019-11-20 RX ADMIN — PANTOPRAZOLE SODIUM 40 MG: 40 TABLET, DELAYED RELEASE ORAL at 06:38

## 2019-11-20 RX ADMIN — IPRATROPIUM BROMIDE AND ALBUTEROL SULFATE 3 ML: 2.5; .5 SOLUTION RESPIRATORY (INHALATION) at 14:35

## 2019-11-20 RX ADMIN — FUROSEMIDE 40 MG: 40 TABLET ORAL at 08:49

## 2019-11-20 RX ADMIN — ATORVASTATIN CALCIUM 40 MG: 40 TABLET, FILM COATED ORAL at 20:00

## 2019-11-20 RX ADMIN — IPRATROPIUM BROMIDE AND ALBUTEROL SULFATE 3 ML: 2.5; .5 SOLUTION RESPIRATORY (INHALATION) at 02:00

## 2019-11-20 RX ADMIN — OXYCODONE HYDROCHLORIDE 10 MG: 5 TABLET ORAL at 23:02

## 2019-11-20 RX ADMIN — IPRATROPIUM BROMIDE AND ALBUTEROL SULFATE 3 ML: 2.5; .5 SOLUTION RESPIRATORY (INHALATION) at 07:35

## 2019-11-21 ENCOUNTER — ANESTHESIA EVENT (OUTPATIENT)
Dept: PERIOP | Facility: HOSPITAL | Age: 59
End: 2019-11-21

## 2019-11-21 LAB
ABO GROUP BLD: NORMAL
ANION GAP SERPL CALCULATED.3IONS-SCNC: 9 MMOL/L (ref 5–15)
APTT PPP: 43 SECONDS (ref 24–37)
BLD GP AB SCN SERPL QL: NEGATIVE
BUN BLD-MCNC: 8 MG/DL (ref 6–20)
BUN/CREAT SERPL: 12.3 (ref 7–25)
CALCIUM SPEC-SCNC: 9.1 MG/DL (ref 8.6–10.5)
CHLORIDE SERPL-SCNC: 103 MMOL/L (ref 98–107)
CHOLEST SERPL-MCNC: 88 MG/DL (ref 0–200)
CO2 SERPL-SCNC: 26 MMOL/L (ref 22–29)
CREAT BLD-MCNC: 0.65 MG/DL (ref 0.76–1.27)
DEPRECATED RDW RBC AUTO: 67.4 FL (ref 37–54)
ERYTHROCYTE [DISTWIDTH] IN BLOOD BY AUTOMATED COUNT: 25.7 % (ref 12.3–15.4)
GFR SERPL CREATININE-BSD FRML MDRD: 126 ML/MIN/1.73
GLUCOSE BLD-MCNC: 121 MG/DL (ref 65–99)
GLUCOSE BLDC GLUCOMTR-MCNC: 110 MG/DL (ref 70–130)
GLUCOSE BLDC GLUCOMTR-MCNC: 112 MG/DL (ref 70–130)
GLUCOSE BLDC GLUCOMTR-MCNC: 134 MG/DL (ref 70–130)
GLUCOSE BLDC GLUCOMTR-MCNC: 135 MG/DL (ref 70–130)
HBA1C MFR BLD: 5.1 % (ref 4.8–5.6)
HCT VFR BLD AUTO: 28.7 % (ref 37.5–51)
HDLC SERPL-MCNC: 32 MG/DL (ref 40–60)
HGB BLD-MCNC: 8.5 G/DL (ref 13–17.7)
INR PPP: 1.14 (ref 0.85–1.16)
LDLC SERPL CALC-MCNC: 39 MG/DL (ref 0–100)
LDLC/HDLC SERPL: 1.21 {RATIO}
MCH RBC QN AUTO: 24.8 PG (ref 26.6–33)
MCHC RBC AUTO-ENTMCNC: 29.6 G/DL (ref 31.5–35.7)
MCV RBC AUTO: 83.7 FL (ref 79–97)
PA ADP PRP-ACNC: 241 PRU
PLATELET # BLD AUTO: 186 10*3/MM3 (ref 140–450)
PMV BLD AUTO: 10.2 FL (ref 6–12)
POTASSIUM BLD-SCNC: 4.2 MMOL/L (ref 3.5–5.2)
PROTHROMBIN TIME: 14.1 SECONDS (ref 11.2–14.3)
RBC # BLD AUTO: 3.43 10*6/MM3 (ref 4.14–5.8)
RH BLD: POSITIVE
SODIUM BLD-SCNC: 138 MMOL/L (ref 136–145)
T&S EXPIRATION DATE: NORMAL
TRIGL SERPL-MCNC: 87 MG/DL (ref 0–150)
VLDLC SERPL-MCNC: 17.4 MG/DL
WBC NRBC COR # BLD: 3.28 10*3/MM3 (ref 3.4–10.8)

## 2019-11-21 PROCEDURE — 85730 THROMBOPLASTIN TIME PARTIAL: CPT | Performed by: PHYSICIAN ASSISTANT

## 2019-11-21 PROCEDURE — 85610 PROTHROMBIN TIME: CPT | Performed by: PHYSICIAN ASSISTANT

## 2019-11-21 PROCEDURE — 84132 ASSAY OF SERUM POTASSIUM: CPT | Performed by: ANESTHESIOLOGY

## 2019-11-21 PROCEDURE — 99232 SBSQ HOSP IP/OBS MODERATE 35: CPT | Performed by: HOSPITALIST

## 2019-11-21 PROCEDURE — 94799 UNLISTED PULMONARY SVC/PX: CPT

## 2019-11-21 PROCEDURE — 86900 BLOOD TYPING SEROLOGIC ABO: CPT | Performed by: PHYSICIAN ASSISTANT

## 2019-11-21 PROCEDURE — 86850 RBC ANTIBODY SCREEN: CPT | Performed by: PHYSICIAN ASSISTANT

## 2019-11-21 PROCEDURE — 99231 SBSQ HOSP IP/OBS SF/LOW 25: CPT | Performed by: THORACIC SURGERY (CARDIOTHORACIC VASCULAR SURGERY)

## 2019-11-21 PROCEDURE — 80048 BASIC METABOLIC PNL TOTAL CA: CPT | Performed by: PHYSICIAN ASSISTANT

## 2019-11-21 PROCEDURE — 82962 GLUCOSE BLOOD TEST: CPT

## 2019-11-21 PROCEDURE — 80061 LIPID PANEL: CPT | Performed by: PHYSICIAN ASSISTANT

## 2019-11-21 PROCEDURE — 86923 COMPATIBILITY TEST ELECTRIC: CPT

## 2019-11-21 PROCEDURE — 85576 BLOOD PLATELET AGGREGATION: CPT | Performed by: PHYSICIAN ASSISTANT

## 2019-11-21 PROCEDURE — 99232 SBSQ HOSP IP/OBS MODERATE 35: CPT | Performed by: INTERNAL MEDICINE

## 2019-11-21 PROCEDURE — 83036 HEMOGLOBIN GLYCOSYLATED A1C: CPT | Performed by: PHYSICIAN ASSISTANT

## 2019-11-21 PROCEDURE — 85027 COMPLETE CBC AUTOMATED: CPT | Performed by: PHYSICIAN ASSISTANT

## 2019-11-21 PROCEDURE — 86901 BLOOD TYPING SEROLOGIC RH(D): CPT | Performed by: PHYSICIAN ASSISTANT

## 2019-11-21 RX ORDER — SODIUM CHLORIDE 0.9 % (FLUSH) 0.9 %
10 SYRINGE (ML) INJECTION AS NEEDED
Status: DISCONTINUED | OUTPATIENT
Start: 2019-11-21 | End: 2019-11-27 | Stop reason: HOSPADM

## 2019-11-21 RX ORDER — ASPIRIN 81 MG/1
81 TABLET ORAL ONCE
Status: DISCONTINUED | OUTPATIENT
Start: 2019-11-21 | End: 2019-11-22

## 2019-11-21 RX ORDER — CHLORHEXIDINE GLUCONATE 0.12 MG/ML
15 RINSE ORAL EVERY 12 HOURS
Status: COMPLETED | OUTPATIENT
Start: 2019-11-21 | End: 2019-11-22

## 2019-11-21 RX ORDER — SODIUM CHLORIDE 0.9 % (FLUSH) 0.9 %
3-10 SYRINGE (ML) INJECTION AS NEEDED
Status: CANCELLED | OUTPATIENT
Start: 2019-11-21

## 2019-11-21 RX ORDER — CHLORHEXIDINE GLUCONATE 500 MG/1
1 CLOTH TOPICAL EVERY 12 HOURS PRN
Status: DISCONTINUED | OUTPATIENT
Start: 2019-11-21 | End: 2019-11-22

## 2019-11-21 RX ORDER — IPRATROPIUM BROMIDE AND ALBUTEROL SULFATE 2.5; .5 MG/3ML; MG/3ML
3 SOLUTION RESPIRATORY (INHALATION) EVERY 4 HOURS PRN
Status: DISCONTINUED | OUTPATIENT
Start: 2019-11-21 | End: 2019-11-26

## 2019-11-21 RX ORDER — SODIUM CHLORIDE 0.9 % (FLUSH) 0.9 %
3 SYRINGE (ML) INJECTION EVERY 12 HOURS SCHEDULED
Status: DISCONTINUED | OUTPATIENT
Start: 2019-11-21 | End: 2019-11-27 | Stop reason: HOSPADM

## 2019-11-21 RX ORDER — SODIUM CHLORIDE 0.9 % (FLUSH) 0.9 %
3 SYRINGE (ML) INJECTION EVERY 12 HOURS SCHEDULED
Status: CANCELLED | OUTPATIENT
Start: 2019-11-21

## 2019-11-21 RX ORDER — OXYMETAZOLINE HYDROCHLORIDE 0.05 G/100ML
2 SPRAY NASAL ONCE
Status: DISCONTINUED | OUTPATIENT
Start: 2019-11-21 | End: 2019-11-22

## 2019-11-21 RX ADMIN — ASPIRIN 81 MG: 81 TABLET, COATED ORAL at 09:40

## 2019-11-21 RX ADMIN — SENNOSIDES AND DOCUSATE SODIUM 2 TABLET: 8.6; 5 TABLET ORAL at 09:42

## 2019-11-21 RX ADMIN — FERROUS SULFATE TAB 325 MG (65 MG ELEMENTAL FE) 325 MG: 325 (65 FE) TAB at 09:42

## 2019-11-21 RX ADMIN — SODIUM CHLORIDE, PRESERVATIVE FREE 3 ML: 5 INJECTION INTRAVENOUS at 13:11

## 2019-11-21 RX ADMIN — SENNOSIDES AND DOCUSATE SODIUM 2 TABLET: 8.6; 5 TABLET ORAL at 21:02

## 2019-11-21 RX ADMIN — PANTOPRAZOLE SODIUM 40 MG: 40 TABLET, DELAYED RELEASE ORAL at 06:48

## 2019-11-21 RX ADMIN — IPRATROPIUM BROMIDE AND ALBUTEROL SULFATE 3 ML: 2.5; .5 SOLUTION RESPIRATORY (INHALATION) at 19:38

## 2019-11-21 RX ADMIN — NICOTINE 1 PATCH: 14 PATCH, EXTENDED RELEASE TRANSDERMAL at 09:41

## 2019-11-21 RX ADMIN — POLYETHYLENE GLYCOL 3350 17 G: 17 POWDER, FOR SOLUTION ORAL at 09:40

## 2019-11-21 RX ADMIN — FUROSEMIDE 40 MG: 40 TABLET ORAL at 09:43

## 2019-11-21 RX ADMIN — IPRATROPIUM BROMIDE AND ALBUTEROL SULFATE 3 ML: 2.5; .5 SOLUTION RESPIRATORY (INHALATION) at 08:38

## 2019-11-21 RX ADMIN — CLOPIDOGREL BISULFATE 75 MG: 75 TABLET ORAL at 09:42

## 2019-11-21 RX ADMIN — SODIUM CHLORIDE, PRESERVATIVE FREE 3 ML: 5 INJECTION INTRAVENOUS at 21:03

## 2019-11-21 RX ADMIN — CHLORHEXIDINE GLUCONATE 0.12% ORAL RINSE 15 ML: 1.2 LIQUID ORAL at 23:06

## 2019-11-21 RX ADMIN — OXYCODONE HYDROCHLORIDE 10 MG: 5 TABLET ORAL at 05:03

## 2019-11-21 RX ADMIN — OXYCODONE HYDROCHLORIDE 10 MG: 5 TABLET ORAL at 19:08

## 2019-11-21 RX ADMIN — ATORVASTATIN CALCIUM 40 MG: 40 TABLET, FILM COATED ORAL at 21:02

## 2019-11-21 RX ADMIN — MUPIROCIN 1 APPLICATION: 20 OINTMENT TOPICAL at 23:06

## 2019-11-21 RX ADMIN — TAMSULOSIN HYDROCHLORIDE 0.4 MG: 0.4 CAPSULE ORAL at 09:42

## 2019-11-21 RX ADMIN — OXYCODONE HYDROCHLORIDE 10 MG: 5 TABLET ORAL at 13:10

## 2019-11-22 ENCOUNTER — ANESTHESIA (OUTPATIENT)
Dept: PERIOP | Facility: HOSPITAL | Age: 59
End: 2019-11-22

## 2019-11-22 ENCOUNTER — APPOINTMENT (OUTPATIENT)
Dept: GENERAL RADIOLOGY | Facility: HOSPITAL | Age: 59
End: 2019-11-22

## 2019-11-22 ENCOUNTER — ANCILLARY PROCEDURE (OUTPATIENT)
Dept: PERIOP | Facility: HOSPITAL | Age: 59
End: 2019-11-22

## 2019-11-22 PROBLEM — I20.0 UNSTABLE ANGINA (HCC): Status: RESOLVED | Noted: 2019-10-31 | Resolved: 2019-11-22

## 2019-11-22 PROBLEM — S81.802A LEG WOUND, LEFT: Status: RESOLVED | Noted: 2017-03-12 | Resolved: 2019-11-22

## 2019-11-22 PROBLEM — S71.002A OPEN WOUND OF LEFT HIP AND THIGH WITH COMPLICATION: Status: RESOLVED | Noted: 2017-03-12 | Resolved: 2019-11-22

## 2019-11-22 PROBLEM — Z87.891 FORMER SMOKER: Status: ACTIVE | Noted: 2019-11-22

## 2019-11-22 PROBLEM — E66.01 CLASS 3 SEVERE OBESITY IN ADULT (HCC): Status: ACTIVE | Noted: 2019-11-22

## 2019-11-22 PROBLEM — S71.102A OPEN WOUND OF LEFT HIP AND THIGH WITH COMPLICATION: Status: RESOLVED | Noted: 2017-03-12 | Resolved: 2019-11-22

## 2019-11-22 PROBLEM — K92.2 GIB (GASTROINTESTINAL BLEEDING): Status: ACTIVE | Noted: 2019-11-22

## 2019-11-22 PROBLEM — I48.0 PAROXYSMAL ATRIAL FIBRILLATION (HCC): Status: ACTIVE | Noted: 2019-11-22

## 2019-11-22 LAB
ACT BLD: 142 SECONDS (ref 82–152)
ACT BLD: 175 SECONDS (ref 82–152)
ACT BLD: 445 SECONDS (ref 82–152)
ALBUMIN SERPL-MCNC: 3.1 G/DL (ref 3.5–5.2)
ALBUMIN/GLOB SERPL: 0.7 G/DL
ALP SERPL-CCNC: 80 U/L (ref 39–117)
ALT SERPL W P-5'-P-CCNC: 36 U/L (ref 1–41)
ANION GAP SERPL CALCULATED.3IONS-SCNC: 10 MMOL/L (ref 5–15)
ANION GAP SERPL CALCULATED.3IONS-SCNC: 8 MMOL/L (ref 5–15)
APTT PPP: 41.2 SECONDS (ref 24–37)
ARTERIAL PATENCY WRIST A: ABNORMAL
ARTERIAL PATENCY WRIST A: ABNORMAL
AST SERPL-CCNC: 48 U/L (ref 1–40)
ATMOSPHERIC PRESS: ABNORMAL MM[HG]
ATMOSPHERIC PRESS: ABNORMAL MM[HG]
BASE EXCESS BLDA CALC-SCNC: -1 MMOL/L (ref -5–5)
BASE EXCESS BLDA CALC-SCNC: -1 MMOL/L (ref -5–5)
BASE EXCESS BLDA CALC-SCNC: -1.4 MMOL/L (ref 0–2)
BASE EXCESS BLDA CALC-SCNC: -2.2 MMOL/L (ref 0–2)
BASE EXCESS BLDA CALC-SCNC: 4 MMOL/L (ref -5–5)
BASOPHILS # BLD AUTO: 0.03 10*3/MM3 (ref 0–0.2)
BASOPHILS NFR BLD AUTO: 1 % (ref 0–1.5)
BDY SITE: ABNORMAL
BDY SITE: ABNORMAL
BILIRUB SERPL-MCNC: 1 MG/DL (ref 0.2–1.2)
BODY TEMPERATURE: 37 C
BODY TEMPERATURE: 37 C
BUN BLD-MCNC: 8 MG/DL (ref 6–20)
BUN BLD-MCNC: 9 MG/DL (ref 6–20)
BUN/CREAT SERPL: 10.5 (ref 7–25)
BUN/CREAT SERPL: 11.8 (ref 7–25)
CA-I BLDA-SCNC: 1.19 MMOL/L (ref 1.2–1.32)
CA-I BLDA-SCNC: 1.2 MMOL/L (ref 1.2–1.32)
CA-I BLDA-SCNC: 1.26 MMOL/L (ref 1.2–1.32)
CALCIUM SPEC-SCNC: 8.6 MG/DL (ref 8.6–10.5)
CALCIUM SPEC-SCNC: 8.9 MG/DL (ref 8.6–10.5)
CHLORIDE SERPL-SCNC: 101 MMOL/L (ref 98–107)
CHLORIDE SERPL-SCNC: 101 MMOL/L (ref 98–107)
CO2 BLDA-SCNC: 25 MMOL/L (ref 24–29)
CO2 BLDA-SCNC: 26 MMOL/L (ref 24–29)
CO2 BLDA-SCNC: 27.1 MMOL/L (ref 22–33)
CO2 BLDA-SCNC: 27.4 MMOL/L (ref 22–33)
CO2 BLDA-SCNC: 29 MMOL/L (ref 24–29)
CO2 SERPL-SCNC: 25 MMOL/L (ref 22–29)
CO2 SERPL-SCNC: 25 MMOL/L (ref 22–29)
COHGB MFR BLD: 1.6 % (ref 0–2)
COHGB MFR BLD: 1.7 % (ref 0–2)
CREAT BLD-MCNC: 0.68 MG/DL (ref 0.76–1.27)
CREAT BLD-MCNC: 0.86 MG/DL (ref 0.76–1.27)
DEPRECATED RDW RBC AUTO: 67.7 FL (ref 37–54)
DEPRECATED RDW RBC AUTO: 74 FL (ref 37–54)
ELLIPTOCYTES BLD QL SMEAR: NORMAL
EOSINOPHIL # BLD AUTO: 0.14 10*3/MM3 (ref 0–0.4)
EOSINOPHIL NFR BLD AUTO: 4.6 % (ref 0.3–6.2)
EPAP: 0
EPAP: 0
ERYTHROCYTE [DISTWIDTH] IN BLOOD BY AUTOMATED COUNT: 26.1 % (ref 12.3–15.4)
ERYTHROCYTE [DISTWIDTH] IN BLOOD BY AUTOMATED COUNT: 26.6 % (ref 12.3–15.4)
GFR SERPL CREATININE-BSD FRML MDRD: 119 ML/MIN/1.73
GFR SERPL CREATININE-BSD FRML MDRD: 91 ML/MIN/1.73
GLOBULIN UR ELPH-MCNC: 4.4 GM/DL
GLUCOSE BLD-MCNC: 110 MG/DL (ref 65–99)
GLUCOSE BLD-MCNC: 180 MG/DL (ref 65–99)
GLUCOSE BLDC GLUCOMTR-MCNC: 104 MG/DL (ref 70–130)
GLUCOSE BLDC GLUCOMTR-MCNC: 136 MG/DL (ref 70–130)
GLUCOSE BLDC GLUCOMTR-MCNC: 154 MG/DL (ref 70–130)
GLUCOSE BLDC GLUCOMTR-MCNC: 165 MG/DL (ref 70–130)
HCO3 BLDA-SCNC: 24 MMOL/L (ref 22–26)
HCO3 BLDA-SCNC: 25 MMOL/L (ref 22–26)
HCO3 BLDA-SCNC: 25.5 MMOL/L (ref 20–26)
HCO3 BLDA-SCNC: 25.6 MMOL/L (ref 20–26)
HCO3 BLDA-SCNC: 27.9 MMOL/L (ref 22–26)
HCT VFR BLD AUTO: 29.9 % (ref 37.5–51)
HCT VFR BLD AUTO: 30.1 % (ref 37.5–51)
HCT VFR BLD CALC: 28.3 %
HCT VFR BLD CALC: 29.4 %
HCT VFR BLDA CALC: 25 % (ref 38–51)
HCT VFR BLDA CALC: 27 % (ref 38–51)
HCT VFR BLDA CALC: 30 % (ref 38–51)
HGB BLD-MCNC: 8.8 G/DL (ref 13–17.7)
HGB BLD-MCNC: 9 G/DL (ref 13–17.7)
HGB BLDA-MCNC: 10.2 G/DL (ref 12–17)
HGB BLDA-MCNC: 8.5 G/DL (ref 12–17)
HGB BLDA-MCNC: 9.2 G/DL (ref 12–17)
HGB BLDA-MCNC: 9.2 G/DL (ref 13.5–17.5)
HGB BLDA-MCNC: 9.6 G/DL (ref 13.5–17.5)
HOROWITZ INDEX BLD+IHG-RTO: 100 %
HOROWITZ INDEX BLD+IHG-RTO: 100 %
IMM GRANULOCYTES # BLD AUTO: 0.01 10*3/MM3 (ref 0–0.05)
IMM GRANULOCYTES NFR BLD AUTO: 0.3 % (ref 0–0.5)
INR PPP: 1.06 (ref 0.85–1.16)
INR PPP: 1.22 (ref 0.85–1.16)
IPAP: 0
IPAP: 0
LYMPHOCYTES # BLD AUTO: 0.71 10*3/MM3 (ref 0.7–3.1)
LYMPHOCYTES NFR BLD AUTO: 23.3 % (ref 19.6–45.3)
MAGNESIUM SERPL-MCNC: 1.6 MG/DL (ref 1.6–2.6)
MCH RBC QN AUTO: 24.8 PG (ref 26.6–33)
MCH RBC QN AUTO: 25.1 PG (ref 26.6–33)
MCHC RBC AUTO-ENTMCNC: 29.2 G/DL (ref 31.5–35.7)
MCHC RBC AUTO-ENTMCNC: 30.1 G/DL (ref 31.5–35.7)
MCV RBC AUTO: 83.5 FL (ref 79–97)
MCV RBC AUTO: 84.8 FL (ref 79–97)
METHGB BLD QL: 1 % (ref 0–1.5)
METHGB BLD QL: 1 % (ref 0–1.5)
MODALITY: ABNORMAL
MODALITY: ABNORMAL
MONOCYTES # BLD AUTO: 0.33 10*3/MM3 (ref 0.1–0.9)
MONOCYTES NFR BLD AUTO: 10.8 % (ref 5–12)
NEUTROPHILS # BLD AUTO: 1.83 10*3/MM3 (ref 1.7–7)
NEUTROPHILS NFR BLD AUTO: 60 % (ref 42.7–76)
NOTE: ABNORMAL
NOTE: ABNORMAL
NRBC BLD AUTO-RTO: 0 /100 WBC (ref 0–0.2)
NT-PROBNP SERPL-MCNC: 657 PG/ML (ref 5–900)
OXYHGB MFR BLDV: 89.6 % (ref 94–99)
OXYHGB MFR BLDV: 94.5 % (ref 94–99)
PAW @ PEAK INSP FLOW SETTING VENT: 0 CMH2O
PAW @ PEAK INSP FLOW SETTING VENT: 0 CMH2O
PCO2 BLDA: 37.1 MM HG (ref 35–45)
PCO2 BLDA: 40.5 MM HG (ref 35–45)
PCO2 BLDA: 45 MM HG (ref 35–45)
PCO2 BLDA: 53.2 MM HG
PCO2 BLDA: 59.3 MM HG
PCO2 TEMP ADJ BLD: 53.2 MM HG (ref 35–48)
PCO2 TEMP ADJ BLD: 59.3 MM HG (ref 35–48)
PH BLDA: 7.24 PH UNITS (ref 7.35–7.45)
PH BLDA: 7.29 PH UNITS (ref 7.35–7.45)
PH BLDA: 7.35 PH UNITS (ref 7.35–7.6)
PH BLDA: 7.42 PH UNITS (ref 7.35–7.6)
PH BLDA: 7.45 PH UNITS (ref 7.35–7.6)
PH, TEMP CORRECTED: 7.24 PH UNITS
PH, TEMP CORRECTED: 7.29 PH UNITS
PLAT MORPH BLD: NORMAL
PLATELET # BLD AUTO: 178 10*3/MM3 (ref 140–450)
PLATELET # BLD AUTO: 186 10*3/MM3 (ref 140–450)
PMV BLD AUTO: 9.7 FL (ref 6–12)
PMV BLD AUTO: 9.9 FL (ref 6–12)
PO2 BLDA: 104 MM HG (ref 83–108)
PO2 BLDA: 323 MMHG (ref 80–105)
PO2 BLDA: 80.3 MM HG (ref 83–108)
PO2 BLDA: 90 MMHG (ref 80–105)
PO2 BLDA: 94 MMHG (ref 80–105)
PO2 TEMP ADJ BLD: 104 MM HG (ref 83–108)
PO2 TEMP ADJ BLD: 80.3 MM HG (ref 83–108)
POTASSIUM BLD-SCNC: 4 MMOL/L (ref 3.5–5.2)
POTASSIUM BLD-SCNC: 4.1 MMOL/L (ref 3.5–5.2)
POTASSIUM BLD-SCNC: 5 MMOL/L (ref 3.5–5.2)
POTASSIUM BLDA-SCNC: 3.8 MMOL/L (ref 3.5–4.9)
POTASSIUM BLDA-SCNC: 4 MMOL/L (ref 3.5–4.9)
POTASSIUM BLDA-SCNC: 4.2 MMOL/L (ref 3.5–4.9)
PROT SERPL-MCNC: 7.5 G/DL (ref 6–8.5)
PROTHROMBIN TIME: 13.3 SECONDS (ref 11.2–14.3)
PROTHROMBIN TIME: 14.8 SECONDS (ref 11.2–14.3)
RBC # BLD AUTO: 3.55 10*6/MM3 (ref 4.14–5.8)
RBC # BLD AUTO: 3.58 10*6/MM3 (ref 4.14–5.8)
SAO2 % BLDA: 100 % (ref 95–98)
SAO2 % BLDA: 96 % (ref 95–98)
SAO2 % BLDA: 98 % (ref 95–98)
SODIUM BLD-SCNC: 134 MMOL/L (ref 136–145)
SODIUM BLD-SCNC: 136 MMOL/L (ref 136–145)
SODIUM BLDA-SCNC: 137 MMOL/L (ref 138–146)
SODIUM BLDA-SCNC: 138 MMOL/L (ref 138–146)
SODIUM BLDA-SCNC: 139 MMOL/L (ref 138–146)
SPHEROCYTES BLD QL SMEAR: NORMAL
TOTAL RATE: 0 BREATHS/MINUTE
TOTAL RATE: 0 BREATHS/MINUTE
WBC MORPH BLD: NORMAL
WBC NRBC COR # BLD: 3.05 10*3/MM3 (ref 3.4–10.8)
WBC NRBC COR # BLD: 5.29 10*3/MM3 (ref 3.4–10.8)

## 2019-11-22 PROCEDURE — 85025 COMPLETE CBC W/AUTO DIFF WBC: CPT | Performed by: NURSE PRACTITIONER

## 2019-11-22 PROCEDURE — 25010000002 FENTANYL CITRATE (PF) 100 MCG/2ML SOLUTION: Performed by: NURSE ANESTHETIST, CERTIFIED REGISTERED

## 2019-11-22 PROCEDURE — 80053 COMPREHEN METABOLIC PANEL: CPT | Performed by: NURSE PRACTITIONER

## 2019-11-22 PROCEDURE — C1887 CATHETER, GUIDING: HCPCS | Performed by: THORACIC SURGERY (CARDIOTHORACIC VASCULAR SURGERY)

## 2019-11-22 PROCEDURE — 25010000002 PROPOFOL 10 MG/ML EMULSION: Performed by: INTERNAL MEDICINE

## 2019-11-22 PROCEDURE — 25010000002 CEFUROXIME: Performed by: PHYSICIAN ASSISTANT

## 2019-11-22 PROCEDURE — 25010000002 PHENYLEPHRINE PER 1 ML: Performed by: NURSE ANESTHETIST, CERTIFIED REGISTERED

## 2019-11-22 PROCEDURE — 25010000003 CEFUROXIME SODIUM 1.5 G RECONSTITUTED SOLUTION: Performed by: INTERNAL MEDICINE

## 2019-11-22 PROCEDURE — 84132 ASSAY OF SERUM POTASSIUM: CPT

## 2019-11-22 PROCEDURE — 94799 UNLISTED PULMONARY SVC/PX: CPT

## 2019-11-22 PROCEDURE — 25010000002 PROTAMINE SULFATE PER 10 MG: Performed by: NURSE ANESTHETIST, CERTIFIED REGISTERED

## 2019-11-22 PROCEDURE — 25010000002 HEPARIN (PORCINE) PER 1000 UNITS: Performed by: THORACIC SURGERY (CARDIOTHORACIC VASCULAR SURGERY)

## 2019-11-22 PROCEDURE — 82330 ASSAY OF CALCIUM: CPT

## 2019-11-22 PROCEDURE — 02RF38H REPLACEMENT OF AORTIC VALVE WITH ZOOPLASTIC TISSUE, TRANSAPICAL, PERCUTANEOUS APPROACH: ICD-10-PCS | Performed by: THORACIC SURGERY (CARDIOTHORACIC VASCULAR SURGERY)

## 2019-11-22 PROCEDURE — 93355 ECHO TRANSESOPHAGEAL (TEE): CPT

## 2019-11-22 PROCEDURE — C1769 GUIDE WIRE: HCPCS | Performed by: THORACIC SURGERY (CARDIOTHORACIC VASCULAR SURGERY)

## 2019-11-22 PROCEDURE — 63710000001 INSULIN REGULAR HUMAN PER 5 UNITS: Performed by: INTERNAL MEDICINE

## 2019-11-22 PROCEDURE — 25010000002 DEXAMETHASONE PER 1 MG: Performed by: NURSE ANESTHETIST, CERTIFIED REGISTERED

## 2019-11-22 PROCEDURE — 84295 ASSAY OF SERUM SODIUM: CPT

## 2019-11-22 PROCEDURE — 82947 ASSAY GLUCOSE BLOOD QUANT: CPT

## 2019-11-22 PROCEDURE — C1894 INTRO/SHEATH, NON-LASER: HCPCS | Performed by: THORACIC SURGERY (CARDIOTHORACIC VASCULAR SURGERY)

## 2019-11-22 PROCEDURE — 85007 BL SMEAR W/DIFF WBC COUNT: CPT | Performed by: NURSE PRACTITIONER

## 2019-11-22 PROCEDURE — 85347 COAGULATION TIME ACTIVATED: CPT

## 2019-11-22 PROCEDURE — 85014 HEMATOCRIT: CPT

## 2019-11-22 PROCEDURE — C1751 CATH, INF, PER/CENT/MIDLINE: HCPCS | Performed by: THORACIC SURGERY (CARDIOTHORACIC VASCULAR SURGERY)

## 2019-11-22 PROCEDURE — 85610 PROTHROMBIN TIME: CPT | Performed by: NURSE PRACTITIONER

## 2019-11-22 PROCEDURE — 25010000002 NEOSTIGMINE 10 MG/10ML SOLUTION: Performed by: NURSE ANESTHETIST, CERTIFIED REGISTERED

## 2019-11-22 PROCEDURE — 82803 BLOOD GASES ANY COMBINATION: CPT

## 2019-11-22 PROCEDURE — 71045 X-RAY EXAM CHEST 1 VIEW: CPT

## 2019-11-22 PROCEDURE — 83880 ASSAY OF NATRIURETIC PEPTIDE: CPT | Performed by: NURSE PRACTITIONER

## 2019-11-22 PROCEDURE — 82805 BLOOD GASES W/O2 SATURATION: CPT

## 2019-11-22 PROCEDURE — 99291 CRITICAL CARE FIRST HOUR: CPT | Performed by: INTERNAL MEDICINE

## 2019-11-22 PROCEDURE — 25010000002 SUCCINYLCHOLINE PER 20 MG: Performed by: NURSE ANESTHETIST, CERTIFIED REGISTERED

## 2019-11-22 PROCEDURE — 85730 THROMBOPLASTIN TIME PARTIAL: CPT | Performed by: PHYSICIAN ASSISTANT

## 2019-11-22 PROCEDURE — 85027 COMPLETE CBC AUTOMATED: CPT | Performed by: PHYSICIAN ASSISTANT

## 2019-11-22 PROCEDURE — 93005 ELECTROCARDIOGRAM TRACING: CPT | Performed by: PHYSICIAN ASSISTANT

## 2019-11-22 PROCEDURE — 25010000002 IOPAMIDOL 61 % SOLUTION: Performed by: THORACIC SURGERY (CARDIOTHORACIC VASCULAR SURGERY)

## 2019-11-22 PROCEDURE — 85610 PROTHROMBIN TIME: CPT | Performed by: PHYSICIAN ASSISTANT

## 2019-11-22 PROCEDURE — 5A1223Z PERFORMANCE OF CARDIAC PACING, CONTINUOUS: ICD-10-PCS | Performed by: THORACIC SURGERY (CARDIOTHORACIC VASCULAR SURGERY)

## 2019-11-22 PROCEDURE — 93355 ECHO TRANSESOPHAGEAL (TEE): CPT | Performed by: INTERNAL MEDICINE

## 2019-11-22 PROCEDURE — 33366 TRCATH REPLACE AORTIC VALVE: CPT | Performed by: THORACIC SURGERY (CARDIOTHORACIC VASCULAR SURGERY)

## 2019-11-22 PROCEDURE — 25010000002 ONDANSETRON PER 1 MG: Performed by: NURSE ANESTHETIST, CERTIFIED REGISTERED

## 2019-11-22 PROCEDURE — 83735 ASSAY OF MAGNESIUM: CPT | Performed by: PHYSICIAN ASSISTANT

## 2019-11-22 PROCEDURE — 94770: CPT

## 2019-11-22 PROCEDURE — 25010000002 HEPARIN (PORCINE) PER 1000 UNITS: Performed by: NURSE ANESTHETIST, CERTIFIED REGISTERED

## 2019-11-22 DEVICE — VLV HEART TRNSCATH SAPIEN3 26MM: Type: IMPLANTABLE DEVICE | Site: AORTA | Status: FUNCTIONAL

## 2019-11-22 RX ORDER — SODIUM CHLORIDE 9 MG/ML
INJECTION, SOLUTION INTRAVENOUS AS NEEDED
Status: DISCONTINUED | OUTPATIENT
Start: 2019-11-22 | End: 2019-11-22 | Stop reason: HOSPADM

## 2019-11-22 RX ORDER — GLYCOPYRROLATE 0.2 MG/ML
INJECTION INTRAMUSCULAR; INTRAVENOUS AS NEEDED
Status: DISCONTINUED | OUTPATIENT
Start: 2019-11-22 | End: 2019-11-22 | Stop reason: SURG

## 2019-11-22 RX ORDER — BISACODYL 5 MG/1
10 TABLET, DELAYED RELEASE ORAL DAILY PRN
Status: DISCONTINUED | OUTPATIENT
Start: 2019-11-22 | End: 2019-11-27 | Stop reason: HOSPADM

## 2019-11-22 RX ORDER — PROMETHAZINE HYDROCHLORIDE 25 MG/1
25 SUPPOSITORY RECTAL ONCE AS NEEDED
Status: DISCONTINUED | OUTPATIENT
Start: 2019-11-22 | End: 2019-11-25 | Stop reason: HOSPADM

## 2019-11-22 RX ORDER — SODIUM CHLORIDE 0.9 % (FLUSH) 0.9 %
3-10 SYRINGE (ML) INJECTION AS NEEDED
Status: DISCONTINUED | OUTPATIENT
Start: 2019-11-22 | End: 2019-11-22

## 2019-11-22 RX ORDER — MINERAL OIL AND WHITE PETROLATUM 150; 830 MG/G; MG/G
OINTMENT OPHTHALMIC
Status: DISCONTINUED | OUTPATIENT
Start: 2019-11-22 | End: 2019-11-27 | Stop reason: HOSPADM

## 2019-11-22 RX ORDER — PROTAMINE SULFATE 10 MG/ML
INJECTION, SOLUTION INTRAVENOUS AS NEEDED
Status: DISCONTINUED | OUTPATIENT
Start: 2019-11-22 | End: 2019-11-22 | Stop reason: SURG

## 2019-11-22 RX ORDER — PROMETHAZINE HYDROCHLORIDE 25 MG/1
25 TABLET ORAL ONCE AS NEEDED
Status: DISCONTINUED | OUTPATIENT
Start: 2019-11-22 | End: 2019-11-25 | Stop reason: HOSPADM

## 2019-11-22 RX ORDER — NITROGLYCERIN 20 MG/100ML
INJECTION INTRAVENOUS AS NEEDED
Status: DISCONTINUED | OUTPATIENT
Start: 2019-11-22 | End: 2019-11-22 | Stop reason: SURG

## 2019-11-22 RX ORDER — ONDANSETRON 2 MG/ML
4 INJECTION INTRAMUSCULAR; INTRAVENOUS ONCE AS NEEDED
Status: DISCONTINUED | OUTPATIENT
Start: 2019-11-22 | End: 2019-11-23

## 2019-11-22 RX ORDER — HEPARIN SODIUM 1000 [USP'U]/ML
INJECTION, SOLUTION INTRAVENOUS; SUBCUTANEOUS AS NEEDED
Status: DISCONTINUED | OUTPATIENT
Start: 2019-11-22 | End: 2019-11-22 | Stop reason: SURG

## 2019-11-22 RX ORDER — IPRATROPIUM BROMIDE AND ALBUTEROL SULFATE 2.5; .5 MG/3ML; MG/3ML
3 SOLUTION RESPIRATORY (INHALATION) ONCE AS NEEDED
Status: DISCONTINUED | OUTPATIENT
Start: 2019-11-22 | End: 2019-11-22

## 2019-11-22 RX ORDER — LIDOCAINE HYDROCHLORIDE 10 MG/ML
INJECTION, SOLUTION EPIDURAL; INFILTRATION; INTRACAUDAL; PERINEURAL AS NEEDED
Status: DISCONTINUED | OUTPATIENT
Start: 2019-11-22 | End: 2019-11-22 | Stop reason: SURG

## 2019-11-22 RX ORDER — SUCCINYLCHOLINE CHLORIDE 20 MG/ML
INJECTION INTRAMUSCULAR; INTRAVENOUS AS NEEDED
Status: DISCONTINUED | OUTPATIENT
Start: 2019-11-22 | End: 2019-11-22 | Stop reason: SURG

## 2019-11-22 RX ORDER — SENNA AND DOCUSATE SODIUM 50; 8.6 MG/1; MG/1
2 TABLET, FILM COATED ORAL NIGHTLY
Status: DISCONTINUED | OUTPATIENT
Start: 2019-11-22 | End: 2019-11-22 | Stop reason: SDUPTHER

## 2019-11-22 RX ORDER — MEPERIDINE HYDROCHLORIDE 25 MG/ML
12.5 INJECTION INTRAMUSCULAR; INTRAVENOUS; SUBCUTANEOUS
Status: ACTIVE | OUTPATIENT
Start: 2019-11-22 | End: 2019-11-23

## 2019-11-22 RX ORDER — LABETALOL HYDROCHLORIDE 5 MG/ML
5 INJECTION, SOLUTION INTRAVENOUS
Status: DISCONTINUED | OUTPATIENT
Start: 2019-11-22 | End: 2019-11-23

## 2019-11-22 RX ORDER — FENTANYL CITRATE 50 UG/ML
50 INJECTION, SOLUTION INTRAMUSCULAR; INTRAVENOUS
Status: DISCONTINUED | OUTPATIENT
Start: 2019-11-22 | End: 2019-11-23

## 2019-11-22 RX ORDER — ETOMIDATE 2 MG/ML
INJECTION INTRAVENOUS AS NEEDED
Status: DISCONTINUED | OUTPATIENT
Start: 2019-11-22 | End: 2019-11-22 | Stop reason: SURG

## 2019-11-22 RX ORDER — HYDROCODONE BITARTRATE AND ACETAMINOPHEN 5; 325 MG/1; MG/1
1 TABLET ORAL ONCE AS NEEDED
Status: DISCONTINUED | OUTPATIENT
Start: 2019-11-22 | End: 2019-11-24

## 2019-11-22 RX ORDER — ALBUTEROL SULFATE 2.5 MG/3ML
2.5 SOLUTION RESPIRATORY (INHALATION) EVERY 4 HOURS PRN
Status: ACTIVE | OUTPATIENT
Start: 2019-11-22 | End: 2019-11-23

## 2019-11-22 RX ORDER — DOCUSATE SODIUM 100 MG/1
100 CAPSULE, LIQUID FILLED ORAL 2 TIMES DAILY PRN
Status: DISCONTINUED | OUTPATIENT
Start: 2019-11-22 | End: 2019-11-27 | Stop reason: HOSPADM

## 2019-11-22 RX ORDER — PANTOPRAZOLE SODIUM 40 MG/10ML
40 INJECTION, POWDER, LYOPHILIZED, FOR SOLUTION INTRAVENOUS
Status: DISCONTINUED | OUTPATIENT
Start: 2019-11-22 | End: 2019-11-26

## 2019-11-22 RX ORDER — ONDANSETRON 2 MG/ML
INJECTION INTRAMUSCULAR; INTRAVENOUS AS NEEDED
Status: DISCONTINUED | OUTPATIENT
Start: 2019-11-22 | End: 2019-11-22 | Stop reason: SURG

## 2019-11-22 RX ORDER — SODIUM CHLORIDE 9 MG/ML
1 INJECTION, SOLUTION INTRAVENOUS CONTINUOUS
Status: ACTIVE | OUTPATIENT
Start: 2019-11-22 | End: 2019-11-22

## 2019-11-22 RX ORDER — ONDANSETRON 2 MG/ML
4 INJECTION INTRAMUSCULAR; INTRAVENOUS EVERY 6 HOURS PRN
Status: DISCONTINUED | OUTPATIENT
Start: 2019-11-22 | End: 2019-11-26

## 2019-11-22 RX ORDER — PROMETHAZINE HYDROCHLORIDE 25 MG/ML
6.25 INJECTION, SOLUTION INTRAMUSCULAR; INTRAVENOUS ONCE AS NEEDED
Status: DISCONTINUED | OUTPATIENT
Start: 2019-11-22 | End: 2019-11-25 | Stop reason: HOSPADM

## 2019-11-22 RX ORDER — NALOXONE HCL 0.4 MG/ML
0.4 VIAL (ML) INJECTION AS NEEDED
Status: DISCONTINUED | OUTPATIENT
Start: 2019-11-22 | End: 2019-11-25 | Stop reason: HOSPADM

## 2019-11-22 RX ORDER — NEOSTIGMINE METHYLSULFATE 1 MG/ML
INJECTION, SOLUTION INTRAVENOUS AS NEEDED
Status: DISCONTINUED | OUTPATIENT
Start: 2019-11-22 | End: 2019-11-22 | Stop reason: SURG

## 2019-11-22 RX ORDER — MORPHINE SULFATE 2 MG/ML
2 INJECTION, SOLUTION INTRAMUSCULAR; INTRAVENOUS EVERY 4 HOURS PRN
Status: DISCONTINUED | OUTPATIENT
Start: 2019-11-22 | End: 2019-11-26

## 2019-11-22 RX ORDER — HYDRALAZINE HYDROCHLORIDE 20 MG/ML
5 INJECTION INTRAMUSCULAR; INTRAVENOUS
Status: DISCONTINUED | OUTPATIENT
Start: 2019-11-22 | End: 2019-11-23

## 2019-11-22 RX ORDER — DEXAMETHASONE SODIUM PHOSPHATE 4 MG/ML
INJECTION, SOLUTION INTRA-ARTICULAR; INTRALESIONAL; INTRAMUSCULAR; INTRAVENOUS; SOFT TISSUE AS NEEDED
Status: DISCONTINUED | OUTPATIENT
Start: 2019-11-22 | End: 2019-11-22 | Stop reason: SURG

## 2019-11-22 RX ORDER — VECURONIUM BROMIDE 1 MG/ML
INJECTION, POWDER, LYOPHILIZED, FOR SOLUTION INTRAVENOUS AS NEEDED
Status: DISCONTINUED | OUTPATIENT
Start: 2019-11-22 | End: 2019-11-22 | Stop reason: SURG

## 2019-11-22 RX ORDER — SODIUM CHLORIDE 0.9 % (FLUSH) 0.9 %
3 SYRINGE (ML) INJECTION EVERY 12 HOURS SCHEDULED
Status: DISCONTINUED | OUTPATIENT
Start: 2019-11-22 | End: 2019-11-23

## 2019-11-22 RX ORDER — THROMBIN HUMAN AND FIBRINOGEN 2; 5.5 [USP'U]/1; MG/1
PATCH TOPICAL AS NEEDED
Status: DISCONTINUED | OUTPATIENT
Start: 2019-11-22 | End: 2019-11-22 | Stop reason: HOSPADM

## 2019-11-22 RX ORDER — SODIUM CHLORIDE, SODIUM LACTATE, POTASSIUM CHLORIDE, CALCIUM CHLORIDE 600; 310; 30; 20 MG/100ML; MG/100ML; MG/100ML; MG/100ML
9 INJECTION, SOLUTION INTRAVENOUS CONTINUOUS
Status: DISCONTINUED | OUTPATIENT
Start: 2019-11-22 | End: 2019-11-22

## 2019-11-22 RX ORDER — ASPIRIN 81 MG/1
81 TABLET, CHEWABLE ORAL DAILY
Status: DISCONTINUED | OUTPATIENT
Start: 2019-11-23 | End: 2019-11-27 | Stop reason: HOSPADM

## 2019-11-22 RX ORDER — BUMETANIDE 0.25 MG/ML
2 INJECTION INTRAMUSCULAR; INTRAVENOUS ONCE
Status: COMPLETED | OUTPATIENT
Start: 2019-11-22 | End: 2019-11-22

## 2019-11-22 RX ORDER — NOREPINEPHRINE BIT/0.9 % NACL 8 MG/250ML
.02-.3 INFUSION BOTTLE (ML) INTRAVENOUS
Status: DISCONTINUED | OUTPATIENT
Start: 2019-11-22 | End: 2019-11-25

## 2019-11-22 RX ORDER — PHENYLEPHRINE HCL IN 0.9% NACL 0.5 MG/5ML
.5-3 SYRINGE (ML) INTRAVENOUS ONCE
Status: DISCONTINUED | OUTPATIENT
Start: 2019-11-22 | End: 2019-11-22

## 2019-11-22 RX ORDER — FENTANYL CITRATE 50 UG/ML
INJECTION, SOLUTION INTRAMUSCULAR; INTRAVENOUS AS NEEDED
Status: DISCONTINUED | OUTPATIENT
Start: 2019-11-22 | End: 2019-11-22 | Stop reason: SURG

## 2019-11-22 RX ORDER — LIDOCAINE HYDROCHLORIDE 10 MG/ML
0.5 INJECTION, SOLUTION EPIDURAL; INFILTRATION; INTRACAUDAL; PERINEURAL ONCE AS NEEDED
Status: DISCONTINUED | OUTPATIENT
Start: 2019-11-22 | End: 2019-11-22 | Stop reason: HOSPADM

## 2019-11-22 RX ORDER — ROCURONIUM BROMIDE 10 MG/ML
INJECTION, SOLUTION INTRAVENOUS AS NEEDED
Status: DISCONTINUED | OUTPATIENT
Start: 2019-11-22 | End: 2019-11-22 | Stop reason: SURG

## 2019-11-22 RX ORDER — HYDROMORPHONE HYDROCHLORIDE 1 MG/ML
0.5 INJECTION, SOLUTION INTRAMUSCULAR; INTRAVENOUS; SUBCUTANEOUS
Status: DISCONTINUED | OUTPATIENT
Start: 2019-11-22 | End: 2019-11-23

## 2019-11-22 RX ORDER — CHLORHEXIDINE GLUCONATE 0.12 MG/ML
15 RINSE ORAL EVERY 12 HOURS SCHEDULED
Status: DISCONTINUED | OUTPATIENT
Start: 2019-11-22 | End: 2019-11-25

## 2019-11-22 RX ADMIN — PROPOFOL 80 MCG/KG/MIN: 10 INJECTION, EMULSION INTRAVENOUS at 10:59

## 2019-11-22 RX ADMIN — INSULIN HUMAN 2 UNITS: 100 INJECTION, SOLUTION PARENTERAL at 12:01

## 2019-11-22 RX ADMIN — Medication 0.02 MCG/KG/MIN: at 11:56

## 2019-11-22 RX ADMIN — CHLORHEXIDINE GLUCONATE 0.12% ORAL RINSE 15 ML: 1.2 LIQUID ORAL at 20:17

## 2019-11-22 RX ADMIN — NEOSTIGMINE METHYLSULFATE 5 MG: 1 INJECTION, SOLUTION INTRAVENOUS at 09:08

## 2019-11-22 RX ADMIN — SODIUM CHLORIDE 5 MG/HR: 9 INJECTION, SOLUTION INTRAVENOUS at 08:30

## 2019-11-22 RX ADMIN — ONDANSETRON 4 MG: 2 INJECTION INTRAMUSCULAR; INTRAVENOUS at 07:29

## 2019-11-22 RX ADMIN — PHENYLEPHRINE HYDROCHLORIDE 100 MCG: 10 INJECTION INTRAVENOUS at 08:24

## 2019-11-22 RX ADMIN — PROPOFOL 60 MCG/KG/MIN: 10 INJECTION, EMULSION INTRAVENOUS at 20:17

## 2019-11-22 RX ADMIN — NITROGLYCERIN 50 MCG: 20 INJECTION INTRAVENOUS at 08:31

## 2019-11-22 RX ADMIN — ATORVASTATIN CALCIUM 40 MG: 40 TABLET, FILM COATED ORAL at 20:17

## 2019-11-22 RX ADMIN — NITROGLYCERIN 50 MCG: 20 INJECTION INTRAVENOUS at 08:28

## 2019-11-22 RX ADMIN — MUPIROCIN 1 APPLICATION: 20 OINTMENT TOPICAL at 04:11

## 2019-11-22 RX ADMIN — IPRATROPIUM BROMIDE AND ALBUTEROL SULFATE 3 ML: 2.5; .5 SOLUTION RESPIRATORY (INHALATION) at 18:30

## 2019-11-22 RX ADMIN — HEPARIN SODIUM 20000 UNITS: 1000 INJECTION, SOLUTION INTRAVENOUS; SUBCUTANEOUS at 07:30

## 2019-11-22 RX ADMIN — CEFUROXIME 1.5 G: 1.5 INJECTION, POWDER, FOR SOLUTION INTRAVENOUS at 07:04

## 2019-11-22 RX ADMIN — METOPROLOL TARTRATE 12.5 MG: 25 TABLET, FILM COATED ORAL at 06:16

## 2019-11-22 RX ADMIN — IPRATROPIUM BROMIDE AND ALBUTEROL SULFATE 3 ML: 2.5; .5 SOLUTION RESPIRATORY (INHALATION) at 11:58

## 2019-11-22 RX ADMIN — SODIUM CHLORIDE, POTASSIUM CHLORIDE, SODIUM LACTATE AND CALCIUM CHLORIDE 9 ML/HR: 600; 310; 30; 20 INJECTION, SOLUTION INTRAVENOUS at 06:17

## 2019-11-22 RX ADMIN — SODIUM CHLORIDE 112 ML/HR: 9 INJECTION, SOLUTION INTRAVENOUS at 10:58

## 2019-11-22 RX ADMIN — NITROGLYCERIN 50 MCG: 20 INJECTION INTRAVENOUS at 08:26

## 2019-11-22 RX ADMIN — EPHEDRINE SULFATE 5 MG: 50 INJECTION INTRAMUSCULAR; INTRAVENOUS; SUBCUTANEOUS at 09:08

## 2019-11-22 RX ADMIN — ETOMIDATE 16 MG: 2 INJECTION, SOLUTION INTRAVENOUS at 07:08

## 2019-11-22 RX ADMIN — PROPOFOL 60 MCG/KG/MIN: 10 INJECTION, EMULSION INTRAVENOUS at 23:05

## 2019-11-22 RX ADMIN — EPHEDRINE SULFATE 5 MG: 50 INJECTION INTRAMUSCULAR; INTRAVENOUS; SUBCUTANEOUS at 07:48

## 2019-11-22 RX ADMIN — PHENYLEPHRINE HYDROCHLORIDE 100 MCG: 10 INJECTION INTRAVENOUS at 08:27

## 2019-11-22 RX ADMIN — CHLORHEXIDINE GLUCONATE 0.12% ORAL RINSE 15 ML: 1.2 LIQUID ORAL at 04:11

## 2019-11-22 RX ADMIN — ROCURONIUM BROMIDE 50 MG: 10 INJECTION INTRAVENOUS at 07:08

## 2019-11-22 RX ADMIN — SODIUM CHLORIDE, PRESERVATIVE FREE 3 ML: 5 INJECTION INTRAVENOUS at 20:18

## 2019-11-22 RX ADMIN — GLYCOPYRROLATE 0.8 MG: 0.2 INJECTION, SOLUTION INTRAMUSCULAR; INTRAVENOUS at 09:08

## 2019-11-22 RX ADMIN — PHENYLEPHRINE HYDROCHLORIDE 100 MCG: 10 INJECTION INTRAVENOUS at 08:21

## 2019-11-22 RX ADMIN — EPHEDRINE SULFATE 5 MG: 50 INJECTION INTRAMUSCULAR; INTRAVENOUS; SUBCUTANEOUS at 07:41

## 2019-11-22 RX ADMIN — SUCCINYLCHOLINE CHLORIDE 160 MG: 20 INJECTION, SOLUTION INTRAMUSCULAR; INTRAVENOUS; PARENTERAL at 09:15

## 2019-11-22 RX ADMIN — FENTANYL CITRATE 50 MCG: 50 INJECTION, SOLUTION INTRAMUSCULAR; INTRAVENOUS at 09:22

## 2019-11-22 RX ADMIN — FENTANYL CITRATE 50 MCG: 50 INJECTION, SOLUTION INTRAMUSCULAR; INTRAVENOUS at 07:08

## 2019-11-22 RX ADMIN — SODIUM CHLORIDE, PRESERVATIVE FREE 3 ML: 5 INJECTION INTRAVENOUS at 20:19

## 2019-11-22 RX ADMIN — PHENYLEPHRINE HYDROCHLORIDE 100 MCG: 10 INJECTION INTRAVENOUS at 08:29

## 2019-11-22 RX ADMIN — PANTOPRAZOLE SODIUM 40 MG: 40 TABLET, DELAYED RELEASE ORAL at 05:07

## 2019-11-22 RX ADMIN — PROPOFOL 80 MCG/KG/MIN: 10 INJECTION, EMULSION INTRAVENOUS at 13:02

## 2019-11-22 RX ADMIN — FENTANYL CITRATE 50 MCG: 50 INJECTION, SOLUTION INTRAMUSCULAR; INTRAVENOUS at 09:15

## 2019-11-22 RX ADMIN — VECURONIUM BROMIDE 4 MG: 1 INJECTION, POWDER, LYOPHILIZED, FOR SOLUTION INTRAVENOUS at 08:00

## 2019-11-22 RX ADMIN — LIDOCAINE HYDROCHLORIDE 50 MG: 10 INJECTION, SOLUTION EPIDURAL; INFILTRATION; INTRACAUDAL; PERINEURAL at 07:08

## 2019-11-22 RX ADMIN — PANTOPRAZOLE SODIUM 40 MG: 40 INJECTION, POWDER, FOR SOLUTION INTRAVENOUS at 11:57

## 2019-11-22 RX ADMIN — OXYCODONE HYDROCHLORIDE 10 MG: 5 TABLET ORAL at 01:52

## 2019-11-22 RX ADMIN — PROPOFOL 60 MCG/KG/MIN: 10 INJECTION, EMULSION INTRAVENOUS at 15:02

## 2019-11-22 RX ADMIN — PHENYLEPHRINE HYDROCHLORIDE 100 MCG: 10 INJECTION INTRAVENOUS at 08:26

## 2019-11-22 RX ADMIN — ETOMIDATE 16 MG: 2 INJECTION, SOLUTION INTRAVENOUS at 09:15

## 2019-11-22 RX ADMIN — PHENYLEPHRINE HYDROCHLORIDE 100 MCG: 10 INJECTION INTRAVENOUS at 08:28

## 2019-11-22 RX ADMIN — CEFUROXIME SODIUM 1.5 G: 1.5 INJECTION, POWDER, FOR SOLUTION INTRAVENOUS at 15:06

## 2019-11-22 RX ADMIN — FENTANYL CITRATE 50 MCG: 50 INJECTION, SOLUTION INTRAMUSCULAR; INTRAVENOUS at 08:09

## 2019-11-22 RX ADMIN — PROTAMINE SULFATE 100 MG: 10 INJECTION, SOLUTION INTRAVENOUS at 08:40

## 2019-11-22 RX ADMIN — EPHEDRINE SULFATE 5 MG: 50 INJECTION INTRAMUSCULAR; INTRAVENOUS; SUBCUTANEOUS at 07:30

## 2019-11-22 RX ADMIN — BUMETANIDE 2 MG: 0.25 INJECTION INTRAMUSCULAR; INTRAVENOUS at 12:22

## 2019-11-22 RX ADMIN — DEXAMETHASONE SODIUM PHOSPHATE 8 MG: 4 INJECTION, SOLUTION INTRAMUSCULAR; INTRAVENOUS at 07:29

## 2019-11-22 RX ADMIN — NICARDIPINE HYDROCHLORIDE 5 MG/HR: 0.2 INJECTION, SOLUTION INTRAVENOUS at 10:59

## 2019-11-22 RX ADMIN — NITROGLYCERIN 50 MCG: 20 INJECTION INTRAVENOUS at 08:33

## 2019-11-22 RX ADMIN — EPHEDRINE SULFATE 5 MG: 50 INJECTION INTRAMUSCULAR; INTRAVENOUS; SUBCUTANEOUS at 08:15

## 2019-11-22 RX ADMIN — PROPOFOL 60 MCG/KG/MIN: 10 INJECTION, EMULSION INTRAVENOUS at 17:49

## 2019-11-22 RX ADMIN — PHENYLEPHRINE HYDROCHLORIDE 100 MCG: 10 INJECTION INTRAVENOUS at 08:25

## 2019-11-22 NOTE — ANESTHESIA PROCEDURE NOTES
Procedure Performed: TAVR ZORAIDA     Start Time:        End Time:      Preanesthesia Checklist:  Patient identified, IV assessed, risks and benefits discussed, monitors and equipment assessed, procedure being performed at surgeon's request and anesthesia consent obtained.    General Procedure Information  Diagnostic Indications for Echo:  assessment of ascending aorta, assessment of surgical repair, defect repair evaluation and hemodynamic monitoring  Physician Requesting Echo: Danish St MD  Location performed:  OR  Intubated  Bite block not placed  Heart visualized  Probe Insertion:  Easy  Probe Type:  Multiplane  Modalities:  2D only, color flow mapping, continuous wave Doppler and pulse wave Doppler    Echocardiographic and Doppler Measurements    Ventricles    Left Ventricle:  Cavity size normal.  Hypertrophy present.  Thrombus not present.  Global Function normal.  Ejection Fraction 55%.      Ventricular Regional Function:  1- Basal Anteroseptal:  normal  2- Basal Anterior:  normal  3- Basal Anterolateral:  normal  4- Basal Inferolateral:  normal  5- Basal Inferior:  normal  6- Basal Inferoseptal:  normal  7- Mid Anteroseptal:  normal  8- Mid Anterior:  normal  9- Mid Anterolateral:  normal  10- Mid Inferolateral:  normal  11- Mid Inferior:  normal  12- Mid Inferoseptal:  normal  13- Apical Anterior:  normal  14- Apical Lateral:  normal  15- Apical Inferior:  normal  16- Apical Septal:  normal  17- Calvin:  normal    Wall Motion Comments:       Concentric LVH    Valves    Aortic Valve:  Annulus calcified.  Stenosis severe.  Area: 0.8 cm².  Leaflets calcified and bicuspid.  Leaflet motions restricted.      Mitral Valve:  Annulus calcified.  Stenosis not present.  Regurgitation moderate.      Other Valve Findings:       HEAVILY CALCIFIED AV WITH SEVERE AS. MILD AR.  PEAK GRAD 73, MEAN 36, IAN 0.8 BY VTI, FUNCTIONALLY BICUSPID    Aorta    Ascending Aorta:  Size normal.  Diameter 2.94 cm.  Dissection not  present.  Plaque thickness less than 3 mm.  Mobile plaque not present.    Aortic Arch:  Size normal.  Diameter 2.81 cm.  Dissection not present.  Plaque thickness less than 3 mm.  Mobile plaque not present.    Descending Aorta:  Size normal.  Diameter 2.71 cm.  Dissection not present.  Plaque thickness less than 3 mm.  Mobile plaque not present.              Septa    Atrial Septum:  Intra-atrial septal morphology normal.      Ventricular Septum:  Intra-ventricular septum morphology normal.          Other Findings  Pleural Effusion:  bilateral      Anesthesia Information  Performed Personally  Anesthesiologist:  Florian Townsend MD      Echocardiogram Comments:       POST DEPLOYMENT:  GOOD POSITION OF PROSTHETIC VALVE WITH ALL LEAFLETS VISUALIZED AND MOVING, NO PARAVALVULAR LEAKS, PEAK GRAD 20, MEAN 11, IAN 1.89 BY VTI, EXAM O/W UNCHANGED

## 2019-11-22 NOTE — ANESTHESIA POSTPROCEDURE EVALUATION
Patient: Sai Weinberg    Procedure Summary     Date:  11/22/19 Room / Location:   [facility] /  [facility]    Anesthesia Start:  0703 Anesthesia Stop:  0951    Procedures:       TRANSAPICAL TRANSCATHETER AORTIC VALVE REPLACEMENT WITH TRANSESOPHAGEAL ECHOCARDIOLGRAM (N/A Chest)      TRANSESOPHAGEAL ECHOCARDIOGRAM WITH ANESTHESIA (N/A Chest)      Transapical Transcatheter Aortic Valve Replacement (N/A ) Diagnosis:       Nonrheumatic aortic valve stenosis      (Nonrheumatic aortic valve stenosis [I35.0])    Surgeon:  Danish St MD; Lory Shepherd MD Provider:  Florian Townsend MD    Anesthesia Type:  general ASA Status:  4          Anesthesia Type: general  Last vitals  BP   151/74 (11/22/19 0633)   Temp   98 °F (36.7 °C) (11/22/19 0633)   Pulse   60 (11/22/19 0951)   Resp   22 (11/22/19 0633)     SpO2   90 % (11/22/19 0951)     Post Anesthesia Care and Evaluation    Patient location during evaluation: ICU  Patient participation: complete - patient cannot participate  Level of consciousness: obtunded/minimal responses  Pain score: 0  Pain management: adequate  Airway patency: patent  Anesthetic complications: No anesthetic complications  PONV Status: none  Cardiovascular status: hemodynamically stable and acceptable  Respiratory status: airway suctioned, ETT and intubated  Hydration status: acceptable

## 2019-11-22 NOTE — ANESTHESIA PROCEDURE NOTES
Airway  Urgency: elective    Date/Time: 11/22/2019 7:11 AM  Airway not difficult    General Information and Staff    Patient location during procedure: OR  CRNA: Juan Piña III, CRNA    Indications and Patient Condition  Indications for airway management: airway protection    Preoxygenated: yes  MILS not maintained throughout  Mask difficulty assessment: 2 - vent by mask + OA or adjuvant +/- NMBA    Final Airway Details  Final airway type: endotracheal airway      Successful airway: ETT  Cuffed: yes   Successful intubation technique: direct laryngoscopy  Endotracheal tube insertion site: oral  Blade: Edna  Blade size: 3  ETT size (mm): 7.5  Cormack-Lehane Classification: grade I - full view of glottis  Placement verified by: chest auscultation and capnometry   Measured from: lips  ETT/EBT  to lips (cm): 22  Number of attempts at approach: 1  Assessment: lips, teeth, and gum same as pre-op and atraumatic intubation    Additional Comments  Negative epigastric sounds, Breath sound equal bilaterally with symmetric chest rise and fall

## 2019-11-22 NOTE — ANESTHESIA PREPROCEDURE EVALUATION
Anesthesia Evaluation                  Airway   Mallampati: I  TM distance: >3 FB  Neck ROM: full  No difficulty expected  Dental - normal exam     Pulmonary - normal exam   (+) a smoker Former, COPD, sleep apnea on CPAP,     ROS comment: HOME O2  Cardiovascular - normal exam    (+) hypertension, valvular problems/murmurs AS, past MI , CAD, cardiac stents angina, PVD,       Neuro/Psych  GI/Hepatic/Renal/Endo    (+)  GERD,  renal disease ARF, diabetes mellitus,     Musculoskeletal     Abdominal  - normal exam    Bowel sounds: normal.   Substance History      OB/GYN          Other   arthritis,    history of cancer (COLON)                  Anesthesia Plan    ASA 4     general   (LUIZA, CORDIS, ZORAIDA)  intravenous induction     Anesthetic plan, all risks, benefits, and alternatives have been provided, discussed and informed consent has been obtained with: patient.    Plan discussed with CRNA.

## 2019-11-22 NOTE — ANESTHESIA PROCEDURE NOTES
Arterial Line      Patient reassessed immediately prior to procedure    Patient location during procedure: pre-op   Line placed for hemodynamic monitoring.  Performed By   Anesthesiologist: Florian Townsend MD  Preanesthetic Checklist  Completed: patient identified, site marked, surgical consent, pre-op evaluation, timeout performed, IV checked, risks and benefits discussed and monitors and equipment checked  Arterial Line Prep   Sterile Tech: cap, gloves and sterile barriers  Prep: ChloraPrep  Patient monitoring: blood pressure monitoring, continuous pulse oximetry and EKG  Arterial Line Procedure   Laterality:right  Location:  radial artery  Catheter size: 20 G   Guidance: palpation technique  Number of attempts: 1  Successful placement: yes  Post Assessment   Dressing Type: line sutured, occlusive dressing applied, secured with tape and wrist guard applied.   Complications no  Circ/Move/Sens Assessment: normal and unchanged.   Patient Tolerance: patient tolerated the procedure well with no apparent complications

## 2019-11-23 ENCOUNTER — APPOINTMENT (OUTPATIENT)
Dept: GENERAL RADIOLOGY | Facility: HOSPITAL | Age: 59
End: 2019-11-23

## 2019-11-23 LAB
ANION GAP SERPL CALCULATED.3IONS-SCNC: 11 MMOL/L (ref 5–15)
ARTERIAL PATENCY WRIST A: ABNORMAL
ATMOSPHERIC PRESS: ABNORMAL MM[HG]
BASE EXCESS BLDA CALC-SCNC: -0.3 MMOL/L (ref 0–2)
BASE EXCESS BLDA CALC-SCNC: 1 MMOL/L (ref 0–2)
BASE EXCESS BLDA CALC-SCNC: 2 MMOL/L (ref 0–2)
BDY SITE: ABNORMAL
BODY TEMPERATURE: 37 C
BUN BLD-MCNC: 11 MG/DL (ref 6–20)
BUN/CREAT SERPL: 16.9 (ref 7–25)
CALCIUM SPEC-SCNC: 8.5 MG/DL (ref 8.6–10.5)
CHLORIDE SERPL-SCNC: 102 MMOL/L (ref 98–107)
CO2 BLDA-SCNC: 26.2 MMOL/L (ref 22–33)
CO2 BLDA-SCNC: 27.6 MMOL/L (ref 22–33)
CO2 BLDA-SCNC: 28.1 MMOL/L (ref 22–33)
CO2 SERPL-SCNC: 23 MMOL/L (ref 22–29)
COHGB MFR BLD: 1.6 % (ref 0–2)
CREAT BLD-MCNC: 0.65 MG/DL (ref 0.76–1.27)
DEPRECATED RDW RBC AUTO: 75.4 FL (ref 37–54)
ERYTHROCYTE [DISTWIDTH] IN BLOOD BY AUTOMATED COUNT: 26.8 % (ref 12.3–15.4)
GFR SERPL CREATININE-BSD FRML MDRD: 126 ML/MIN/1.73
GLUCOSE BLD-MCNC: 128 MG/DL (ref 65–99)
GLUCOSE BLDC GLUCOMTR-MCNC: 119 MG/DL (ref 70–130)
GLUCOSE BLDC GLUCOMTR-MCNC: 127 MG/DL (ref 70–130)
GLUCOSE BLDC GLUCOMTR-MCNC: 128 MG/DL (ref 70–130)
GLUCOSE BLDC GLUCOMTR-MCNC: 132 MG/DL (ref 70–130)
GLUCOSE BLDC GLUCOMTR-MCNC: 134 MG/DL (ref 70–130)
HCO3 BLDA-SCNC: 25.1 MMOL/L (ref 20–26)
HCO3 BLDA-SCNC: 26 MMOL/L (ref 20–26)
HCO3 BLDA-SCNC: 26.8 MMOL/L (ref 20–26)
HCT VFR BLD AUTO: 27.3 % (ref 37.5–51)
HCT VFR BLD CALC: 26.9 %
HCT VFR BLD CALC: 28.3 %
HCT VFR BLD CALC: 29.5 %
HGB BLD-MCNC: 8.2 G/DL (ref 13–17.7)
HGB BLDA-MCNC: 8.8 G/DL (ref 13.5–17.5)
HGB BLDA-MCNC: 9.2 G/DL (ref 13.5–17.5)
HGB BLDA-MCNC: 9.6 G/DL (ref 13.5–17.5)
HOROWITZ INDEX BLD+IHG-RTO: 30 %
HOROWITZ INDEX BLD+IHG-RTO: 40 %
HOROWITZ INDEX BLD+IHG-RTO: 40 %
MCH RBC QN AUTO: 25.5 PG (ref 26.6–33)
MCHC RBC AUTO-ENTMCNC: 30 G/DL (ref 31.5–35.7)
MCV RBC AUTO: 84.8 FL (ref 79–97)
METHGB BLD QL: 0.8 % (ref 0–1.5)
METHGB BLD QL: 0.9 % (ref 0–1.5)
METHGB BLD QL: 1 % (ref 0–1.5)
MODALITY: ABNORMAL
NOTE: ABNORMAL
OXYHGB MFR BLDV: 85.7 % (ref 94–99)
OXYHGB MFR BLDV: 89.9 % (ref 94–99)
OXYHGB MFR BLDV: 95.7 % (ref 94–99)
PCO2 BLDA: 36.6 MM HG
PCO2 BLDA: 41.9 MM HG
PCO2 BLDA: 50.2 MM HG
PCO2 TEMP ADJ BLD: 36.6 MM HG (ref 35–48)
PCO2 TEMP ADJ BLD: 41.9 MM HG (ref 35–48)
PCO2 TEMP ADJ BLD: 50.2 MM HG (ref 35–48)
PEEP RESPIRATORY: 5 CM[H2O]
PH BLDA: 7.32 PH UNITS (ref 7.35–7.45)
PH BLDA: 7.41 PH UNITS (ref 7.35–7.45)
PH BLDA: 7.44 PH UNITS (ref 7.35–7.45)
PH, TEMP CORRECTED: 7.32 PH UNITS
PH, TEMP CORRECTED: 7.41 PH UNITS
PH, TEMP CORRECTED: 7.44 PH UNITS
PLATELET # BLD AUTO: 158 10*3/MM3 (ref 140–450)
PMV BLD AUTO: 10 FL (ref 6–12)
PO2 BLDA: 56.4 MM HG (ref 83–108)
PO2 BLDA: 72.7 MM HG (ref 83–108)
PO2 BLDA: 98.3 MM HG (ref 83–108)
PO2 TEMP ADJ BLD: 56.4 MM HG (ref 83–108)
PO2 TEMP ADJ BLD: 72.7 MM HG (ref 83–108)
PO2 TEMP ADJ BLD: 98.3 MM HG (ref 83–108)
POTASSIUM BLD-SCNC: 4.2 MMOL/L (ref 3.5–5.2)
PSV: 10 CMH2O
PSV: 10 CMH2O
RBC # BLD AUTO: 3.22 10*6/MM3 (ref 4.14–5.8)
SET MECH RESP RATE: 10
SET MECH RESP RATE: 20
SODIUM BLD-SCNC: 136 MMOL/L (ref 136–145)
TOTAL RATE: 26 BREATHS/MINUTE
TOTAL RATE: 38 BREATHS/MINUTE
VENTILATOR MODE: ABNORMAL
VT ON VENT VENT: 0.55 ML
VT ON VENT VENT: 0.55 ML
WBC NRBC COR # BLD: 5.57 10*3/MM3 (ref 3.4–10.8)

## 2019-11-23 PROCEDURE — 25010000003 CEFUROXIME SODIUM 1.5 G RECONSTITUTED SOLUTION: Performed by: INTERNAL MEDICINE

## 2019-11-23 PROCEDURE — 94799 UNLISTED PULMONARY SVC/PX: CPT

## 2019-11-23 PROCEDURE — 82962 GLUCOSE BLOOD TEST: CPT

## 2019-11-23 PROCEDURE — 80048 BASIC METABOLIC PNL TOTAL CA: CPT | Performed by: PHYSICIAN ASSISTANT

## 2019-11-23 PROCEDURE — 25010000002 PROPOFOL 10 MG/ML EMULSION: Performed by: INTERNAL MEDICINE

## 2019-11-23 PROCEDURE — 82805 BLOOD GASES W/O2 SATURATION: CPT

## 2019-11-23 PROCEDURE — 25010000002 MORPHINE PER 10 MG: Performed by: THORACIC SURGERY (CARDIOTHORACIC VASCULAR SURGERY)

## 2019-11-23 PROCEDURE — 94002 VENT MGMT INPAT INIT DAY: CPT

## 2019-11-23 PROCEDURE — 93005 ELECTROCARDIOGRAM TRACING: CPT | Performed by: PHYSICIAN ASSISTANT

## 2019-11-23 PROCEDURE — 25010000002 FENTANYL CITRATE (PF) 100 MCG/2ML SOLUTION: Performed by: NURSE ANESTHETIST, CERTIFIED REGISTERED

## 2019-11-23 PROCEDURE — 93010 ELECTROCARDIOGRAM REPORT: CPT | Performed by: INTERNAL MEDICINE

## 2019-11-23 PROCEDURE — 71045 X-RAY EXAM CHEST 1 VIEW: CPT

## 2019-11-23 PROCEDURE — 99231 SBSQ HOSP IP/OBS SF/LOW 25: CPT | Performed by: THORACIC SURGERY (CARDIOTHORACIC VASCULAR SURGERY)

## 2019-11-23 PROCEDURE — 99233 SBSQ HOSP IP/OBS HIGH 50: CPT | Performed by: INTERNAL MEDICINE

## 2019-11-23 PROCEDURE — 94770: CPT

## 2019-11-23 PROCEDURE — 85027 COMPLETE CBC AUTOMATED: CPT | Performed by: PHYSICIAN ASSISTANT

## 2019-11-23 PROCEDURE — 99291 CRITICAL CARE FIRST HOUR: CPT | Performed by: INTERNAL MEDICINE

## 2019-11-23 RX ADMIN — NICOTINE 1 PATCH: 14 PATCH, EXTENDED RELEASE TRANSDERMAL at 09:36

## 2019-11-23 RX ADMIN — PROPOFOL 40 MCG/KG/MIN: 10 INJECTION, EMULSION INTRAVENOUS at 23:22

## 2019-11-23 RX ADMIN — OXYCODONE HYDROCHLORIDE 10 MG: 5 TABLET ORAL at 10:50

## 2019-11-23 RX ADMIN — SODIUM CHLORIDE, PRESERVATIVE FREE 3 ML: 5 INJECTION INTRAVENOUS at 20:00

## 2019-11-23 RX ADMIN — PANTOPRAZOLE SODIUM 40 MG: 40 INJECTION, POWDER, FOR SOLUTION INTRAVENOUS at 06:01

## 2019-11-23 RX ADMIN — PROPOFOL 60 MCG/KG/MIN: 10 INJECTION, EMULSION INTRAVENOUS at 04:11

## 2019-11-23 RX ADMIN — FENTANYL CITRATE 50 MCG: 0.05 INJECTION, SOLUTION INTRAMUSCULAR; INTRAVENOUS at 01:00

## 2019-11-23 RX ADMIN — DEXMEDETOMIDINE HYDROCHLORIDE 0.4 MCG/KG/HR: 100 INJECTION, SOLUTION INTRAVENOUS at 11:25

## 2019-11-23 RX ADMIN — MORPHINE SULFATE 2 MG: 2 INJECTION, SOLUTION INTRAMUSCULAR; INTRAVENOUS at 10:49

## 2019-11-23 RX ADMIN — MORPHINE SULFATE 2 MG: 2 INJECTION, SOLUTION INTRAMUSCULAR; INTRAVENOUS at 10:22

## 2019-11-23 RX ADMIN — IPRATROPIUM BROMIDE AND ALBUTEROL SULFATE 3 ML: 2.5; .5 SOLUTION RESPIRATORY (INHALATION) at 12:52

## 2019-11-23 RX ADMIN — CLOPIDOGREL BISULFATE 75 MG: 75 TABLET ORAL at 09:36

## 2019-11-23 RX ADMIN — CHLORHEXIDINE GLUCONATE 0.12% ORAL RINSE 15 ML: 1.2 LIQUID ORAL at 09:36

## 2019-11-23 RX ADMIN — IPRATROPIUM BROMIDE AND ALBUTEROL SULFATE 3 ML: 2.5; .5 SOLUTION RESPIRATORY (INHALATION) at 06:29

## 2019-11-23 RX ADMIN — IPRATROPIUM BROMIDE AND ALBUTEROL SULFATE 3 ML: 2.5; .5 SOLUTION RESPIRATORY (INHALATION) at 00:27

## 2019-11-23 RX ADMIN — ATORVASTATIN CALCIUM 40 MG: 40 TABLET, FILM COATED ORAL at 23:25

## 2019-11-23 RX ADMIN — IPRATROPIUM BROMIDE AND ALBUTEROL SULFATE 3 ML: 2.5; .5 SOLUTION RESPIRATORY (INHALATION) at 19:45

## 2019-11-23 RX ADMIN — TAMSULOSIN HYDROCHLORIDE 0.4 MG: 0.4 CAPSULE ORAL at 09:36

## 2019-11-23 RX ADMIN — PROPOFOL 60 MCG/KG/MIN: 10 INJECTION, EMULSION INTRAVENOUS at 02:31

## 2019-11-23 RX ADMIN — CHLORHEXIDINE GLUCONATE 0.12% ORAL RINSE 15 ML: 1.2 LIQUID ORAL at 23:25

## 2019-11-23 RX ADMIN — PROPOFOL 60 MCG/KG/MIN: 10 INJECTION, EMULSION INTRAVENOUS at 06:45

## 2019-11-23 RX ADMIN — ASPIRIN 81 MG 81 MG: 81 TABLET ORAL at 09:36

## 2019-11-23 RX ADMIN — CEFUROXIME SODIUM 1.5 G: 1.5 INJECTION, POWDER, FOR SOLUTION INTRAVENOUS at 00:32

## 2019-11-23 RX ADMIN — PROPOFOL 40 MCG/KG/MIN: 10 INJECTION, EMULSION INTRAVENOUS at 16:01

## 2019-11-23 RX ADMIN — PROPOFOL 40 MCG/KG/MIN: 10 INJECTION, EMULSION INTRAVENOUS at 18:53

## 2019-11-24 ENCOUNTER — APPOINTMENT (OUTPATIENT)
Dept: GENERAL RADIOLOGY | Facility: HOSPITAL | Age: 59
End: 2019-11-24

## 2019-11-24 LAB
ANION GAP SERPL CALCULATED.3IONS-SCNC: 10 MMOL/L (ref 5–15)
ARTERIAL PATENCY WRIST A: ABNORMAL
ATMOSPHERIC PRESS: ABNORMAL MM[HG]
BASE EXCESS BLDA CALC-SCNC: 2.5 MMOL/L (ref 0–2)
BDY SITE: ABNORMAL
BODY TEMPERATURE: 37 C
BUN BLD-MCNC: 13 MG/DL (ref 6–20)
BUN/CREAT SERPL: 22.4 (ref 7–25)
CALCIUM SPEC-SCNC: 8.4 MG/DL (ref 8.6–10.5)
CHLORIDE SERPL-SCNC: 104 MMOL/L (ref 98–107)
CO2 BLDA-SCNC: 27.7 MMOL/L (ref 22–33)
CO2 SERPL-SCNC: 24 MMOL/L (ref 22–29)
COHGB MFR BLD: 1.6 % (ref 0–2)
CPAP: 5 CMH2O
CREAT BLD-MCNC: 0.58 MG/DL (ref 0.76–1.27)
DEPRECATED RDW RBC AUTO: 79.3 FL (ref 37–54)
ERYTHROCYTE [DISTWIDTH] IN BLOOD BY AUTOMATED COUNT: 27.6 % (ref 12.3–15.4)
GFR SERPL CREATININE-BSD FRML MDRD: 143 ML/MIN/1.73
GLUCOSE BLD-MCNC: 117 MG/DL (ref 65–99)
GLUCOSE BLDC GLUCOMTR-MCNC: 113 MG/DL (ref 70–130)
GLUCOSE BLDC GLUCOMTR-MCNC: 116 MG/DL (ref 70–130)
GLUCOSE BLDC GLUCOMTR-MCNC: 122 MG/DL (ref 70–130)
GLUCOSE BLDC GLUCOMTR-MCNC: 133 MG/DL (ref 70–130)
HCO3 BLDA-SCNC: 26.5 MMOL/L (ref 20–26)
HCT VFR BLD AUTO: 28.6 % (ref 37.5–51)
HCT VFR BLD CALC: 28.3 %
HGB BLD-MCNC: 8.5 G/DL (ref 13–17.7)
HGB BLDA-MCNC: 9.2 G/DL (ref 13.5–17.5)
HOROWITZ INDEX BLD+IHG-RTO: 40 %
MAGNESIUM SERPL-MCNC: 2 MG/DL (ref 1.6–2.6)
MCH RBC QN AUTO: 25.3 PG (ref 26.6–33)
MCHC RBC AUTO-ENTMCNC: 29.7 G/DL (ref 31.5–35.7)
MCV RBC AUTO: 85.1 FL (ref 79–97)
METHGB BLD QL: 0.9 % (ref 0–1.5)
MODALITY: ABNORMAL
NOTE: ABNORMAL
OXYHGB MFR BLDV: 95.9 % (ref 94–99)
PCO2 BLDA: 37.7 MM HG
PCO2 TEMP ADJ BLD: 37.7 MM HG (ref 35–48)
PH BLDA: 7.46 PH UNITS (ref 7.35–7.45)
PH, TEMP CORRECTED: 7.46 PH UNITS
PHOSPHATE SERPL-MCNC: 2.9 MG/DL (ref 2.5–4.5)
PLATELET # BLD AUTO: 146 10*3/MM3 (ref 140–450)
PMV BLD AUTO: 10.4 FL (ref 6–12)
PO2 BLDA: 101 MM HG (ref 83–108)
PO2 TEMP ADJ BLD: 101 MM HG (ref 83–108)
POTASSIUM BLD-SCNC: 3.9 MMOL/L (ref 3.5–5.2)
PSV: 10 CMH2O
RBC # BLD AUTO: 3.36 10*6/MM3 (ref 4.14–5.8)
SODIUM BLD-SCNC: 138 MMOL/L (ref 136–145)
TOTAL RATE: 21 BREATHS/MINUTE
VENTILATOR MODE: ABNORMAL
WBC NRBC COR # BLD: 5.11 10*3/MM3 (ref 3.4–10.8)

## 2019-11-24 PROCEDURE — 82962 GLUCOSE BLOOD TEST: CPT

## 2019-11-24 PROCEDURE — 94799 UNLISTED PULMONARY SVC/PX: CPT

## 2019-11-24 PROCEDURE — 80048 BASIC METABOLIC PNL TOTAL CA: CPT | Performed by: PHYSICIAN ASSISTANT

## 2019-11-24 PROCEDURE — 82805 BLOOD GASES W/O2 SATURATION: CPT

## 2019-11-24 PROCEDURE — 25010000002 MORPHINE PER 10 MG: Performed by: THORACIC SURGERY (CARDIOTHORACIC VASCULAR SURGERY)

## 2019-11-24 PROCEDURE — 71045 X-RAY EXAM CHEST 1 VIEW: CPT

## 2019-11-24 PROCEDURE — 99291 CRITICAL CARE FIRST HOUR: CPT | Performed by: INTERNAL MEDICINE

## 2019-11-24 PROCEDURE — 94003 VENT MGMT INPAT SUBQ DAY: CPT

## 2019-11-24 PROCEDURE — 94660 CPAP INITIATION&MGMT: CPT

## 2019-11-24 PROCEDURE — 99231 SBSQ HOSP IP/OBS SF/LOW 25: CPT | Performed by: THORACIC SURGERY (CARDIOTHORACIC VASCULAR SURGERY)

## 2019-11-24 PROCEDURE — 25010000002 PROPOFOL 10 MG/ML EMULSION: Performed by: INTERNAL MEDICINE

## 2019-11-24 PROCEDURE — 84100 ASSAY OF PHOSPHORUS: CPT

## 2019-11-24 PROCEDURE — 85027 COMPLETE CBC AUTOMATED: CPT | Performed by: PHYSICIAN ASSISTANT

## 2019-11-24 PROCEDURE — 83735 ASSAY OF MAGNESIUM: CPT

## 2019-11-24 RX ORDER — SIMETHICONE 80 MG
80 TABLET,CHEWABLE ORAL 4 TIMES DAILY PRN
Status: DISCONTINUED | OUTPATIENT
Start: 2019-11-24 | End: 2019-11-27 | Stop reason: HOSPADM

## 2019-11-24 RX ORDER — OXYCODONE HYDROCHLORIDE 5 MG/1
10 TABLET ORAL EVERY 4 HOURS PRN
Status: DISCONTINUED | OUTPATIENT
Start: 2019-11-24 | End: 2019-11-27 | Stop reason: HOSPADM

## 2019-11-24 RX ADMIN — PROPOFOL 50 MCG/KG/MIN: 10 INJECTION, EMULSION INTRAVENOUS at 02:00

## 2019-11-24 RX ADMIN — IPRATROPIUM BROMIDE AND ALBUTEROL SULFATE 3 ML: 2.5; .5 SOLUTION RESPIRATORY (INHALATION) at 18:54

## 2019-11-24 RX ADMIN — Medication 80 MG: at 23:48

## 2019-11-24 RX ADMIN — OXYCODONE HYDROCHLORIDE 10 MG: 5 TABLET ORAL at 22:50

## 2019-11-24 RX ADMIN — IPRATROPIUM BROMIDE AND ALBUTEROL SULFATE 3 ML: 2.5; .5 SOLUTION RESPIRATORY (INHALATION) at 00:32

## 2019-11-24 RX ADMIN — OXYCODONE HYDROCHLORIDE 10 MG: 5 TABLET ORAL at 08:37

## 2019-11-24 RX ADMIN — PROPOFOL 40 MCG/KG/MIN: 10 INJECTION, EMULSION INTRAVENOUS at 04:03

## 2019-11-24 RX ADMIN — PANTOPRAZOLE SODIUM 40 MG: 40 INJECTION, POWDER, FOR SOLUTION INTRAVENOUS at 06:22

## 2019-11-24 RX ADMIN — MORPHINE SULFATE 2 MG: 2 INJECTION, SOLUTION INTRAMUSCULAR; INTRAVENOUS at 19:40

## 2019-11-24 RX ADMIN — MORPHINE SULFATE 2 MG: 2 INJECTION, SOLUTION INTRAMUSCULAR; INTRAVENOUS at 08:36

## 2019-11-24 RX ADMIN — SENNOSIDES AND DOCUSATE SODIUM 2 TABLET: 8.6; 5 TABLET ORAL at 08:37

## 2019-11-24 RX ADMIN — ATORVASTATIN CALCIUM 40 MG: 40 TABLET, FILM COATED ORAL at 20:55

## 2019-11-24 RX ADMIN — MORPHINE SULFATE 2 MG: 2 INJECTION, SOLUTION INTRAMUSCULAR; INTRAVENOUS at 15:36

## 2019-11-24 RX ADMIN — CLOPIDOGREL BISULFATE 75 MG: 75 TABLET ORAL at 08:36

## 2019-11-24 RX ADMIN — MORPHINE SULFATE 2 MG: 2 INJECTION, SOLUTION INTRAMUSCULAR; INTRAVENOUS at 10:05

## 2019-11-24 RX ADMIN — OXYCODONE HYDROCHLORIDE 10 MG: 5 TABLET ORAL at 18:46

## 2019-11-24 RX ADMIN — NICOTINE 1 PATCH: 14 PATCH, EXTENDED RELEASE TRANSDERMAL at 08:37

## 2019-11-24 RX ADMIN — IPRATROPIUM BROMIDE AND ALBUTEROL SULFATE 3 ML: 2.5; .5 SOLUTION RESPIRATORY (INHALATION) at 13:37

## 2019-11-24 RX ADMIN — MORPHINE SULFATE 2 MG: 2 INJECTION, SOLUTION INTRAMUSCULAR; INTRAVENOUS at 02:10

## 2019-11-24 RX ADMIN — TAMSULOSIN HYDROCHLORIDE 0.4 MG: 0.4 CAPSULE ORAL at 08:36

## 2019-11-24 RX ADMIN — IPRATROPIUM BROMIDE AND ALBUTEROL SULFATE 3 ML: 2.5; .5 SOLUTION RESPIRATORY (INHALATION) at 07:05

## 2019-11-24 RX ADMIN — CHLORHEXIDINE GLUCONATE 0.12% ORAL RINSE 15 ML: 1.2 LIQUID ORAL at 20:56

## 2019-11-24 RX ADMIN — SODIUM CHLORIDE, PRESERVATIVE FREE 3 ML: 5 INJECTION INTRAVENOUS at 20:56

## 2019-11-24 RX ADMIN — CHLORHEXIDINE GLUCONATE 0.12% ORAL RINSE 15 ML: 1.2 LIQUID ORAL at 08:37

## 2019-11-24 RX ADMIN — MORPHINE SULFATE 2 MG: 2 INJECTION, SOLUTION INTRAMUSCULAR; INTRAVENOUS at 12:14

## 2019-11-24 RX ADMIN — ASPIRIN 81 MG 81 MG: 81 TABLET ORAL at 08:36

## 2019-11-25 ENCOUNTER — APPOINTMENT (OUTPATIENT)
Dept: GENERAL RADIOLOGY | Facility: HOSPITAL | Age: 59
End: 2019-11-25

## 2019-11-25 PROBLEM — I44.2 CHB (COMPLETE HEART BLOCK) (HCC): Status: ACTIVE | Noted: 2019-11-12

## 2019-11-25 PROBLEM — Z95.2 S/P TAVR (TRANSCATHETER AORTIC VALVE REPLACEMENT): Status: ACTIVE | Noted: 2019-11-25

## 2019-11-25 LAB
ABO + RH BLD: NORMAL
ABO + RH BLD: NORMAL
ACT BLD: 147 SECONDS (ref 82–152)
ALBUMIN SERPL-MCNC: 2.9 G/DL (ref 3.5–5.2)
ALBUMIN/GLOB SERPL: 0.7 G/DL
ALP SERPL-CCNC: 68 U/L (ref 39–117)
ALT SERPL W P-5'-P-CCNC: 26 U/L (ref 1–41)
AMMONIA BLD-SCNC: 45 UMOL/L (ref 16–60)
ANION GAP SERPL CALCULATED.3IONS-SCNC: 10 MMOL/L (ref 5–15)
ANION GAP SERPL CALCULATED.3IONS-SCNC: 11 MMOL/L (ref 5–15)
AST SERPL-CCNC: 50 U/L (ref 1–40)
BH BB BLOOD EXPIRATION DATE: NORMAL
BH BB BLOOD EXPIRATION DATE: NORMAL
BH BB BLOOD TYPE BARCODE: 6200
BH BB BLOOD TYPE BARCODE: 6200
BH BB DISPENSE STATUS: NORMAL
BH BB DISPENSE STATUS: NORMAL
BH BB PRODUCT CODE: NORMAL
BH BB PRODUCT CODE: NORMAL
BH BB UNIT NUMBER: NORMAL
BH BB UNIT NUMBER: NORMAL
BILIRUB SERPL-MCNC: 1.1 MG/DL (ref 0.2–1.2)
BUN BLD-MCNC: 16 MG/DL (ref 6–20)
BUN BLD-MCNC: 19 MG/DL (ref 6–20)
BUN/CREAT SERPL: 26.2 (ref 7–25)
BUN/CREAT SERPL: 29.2 (ref 7–25)
CA-I SERPL ISE-MCNC: 1.28 MMOL/L (ref 1.12–1.32)
CALCIUM SPEC-SCNC: 8.4 MG/DL (ref 8.6–10.5)
CALCIUM SPEC-SCNC: 8.7 MG/DL (ref 8.6–10.5)
CHLORIDE SERPL-SCNC: 102 MMOL/L (ref 98–107)
CHLORIDE SERPL-SCNC: 103 MMOL/L (ref 98–107)
CO2 SERPL-SCNC: 24 MMOL/L (ref 22–29)
CO2 SERPL-SCNC: 24 MMOL/L (ref 22–29)
CREAT BLD-MCNC: 0.61 MG/DL (ref 0.76–1.27)
CREAT BLD-MCNC: 0.65 MG/DL (ref 0.76–1.27)
DEPRECATED RDW RBC AUTO: 82.2 FL (ref 37–54)
ERYTHROCYTE [DISTWIDTH] IN BLOOD BY AUTOMATED COUNT: 27.4 % (ref 12.3–15.4)
GFR SERPL CREATININE-BSD FRML MDRD: 126 ML/MIN/1.73
GFR SERPL CREATININE-BSD FRML MDRD: 135 ML/MIN/1.73
GLOBULIN UR ELPH-MCNC: 4.2 GM/DL
GLUCOSE BLD-MCNC: 117 MG/DL (ref 65–99)
GLUCOSE BLD-MCNC: 118 MG/DL (ref 65–99)
GLUCOSE BLDC GLUCOMTR-MCNC: 102 MG/DL (ref 70–130)
GLUCOSE BLDC GLUCOMTR-MCNC: 108 MG/DL (ref 70–130)
GLUCOSE BLDC GLUCOMTR-MCNC: 115 MG/DL (ref 70–130)
GLUCOSE BLDC GLUCOMTR-MCNC: 119 MG/DL (ref 70–130)
HCT VFR BLD AUTO: 28.4 % (ref 37.5–51)
HGB BLD-MCNC: 8.3 G/DL (ref 13–17.7)
MAGNESIUM SERPL-MCNC: 2.1 MG/DL (ref 1.6–2.6)
MCH RBC QN AUTO: 24.9 PG (ref 26.6–33)
MCHC RBC AUTO-ENTMCNC: 29.2 G/DL (ref 31.5–35.7)
MCV RBC AUTO: 85 FL (ref 79–97)
PHOSPHATE SERPL-MCNC: 3.8 MG/DL (ref 2.5–4.5)
PLATELET # BLD AUTO: 124 10*3/MM3 (ref 140–450)
PMV BLD AUTO: 10.8 FL (ref 6–12)
POTASSIUM BLD-SCNC: 4 MMOL/L (ref 3.5–5.2)
POTASSIUM BLD-SCNC: 4.5 MMOL/L (ref 3.5–5.2)
PROT SERPL-MCNC: 7.1 G/DL (ref 6–8.5)
RBC # BLD AUTO: 3.34 10*6/MM3 (ref 4.14–5.8)
SODIUM BLD-SCNC: 137 MMOL/L (ref 136–145)
SODIUM BLD-SCNC: 137 MMOL/L (ref 136–145)
UNIT  ABO: NORMAL
UNIT  ABO: NORMAL
UNIT  RH: NORMAL
UNIT  RH: NORMAL
WBC NRBC COR # BLD: 4.45 10*3/MM3 (ref 3.4–10.8)

## 2019-11-25 PROCEDURE — 25010000002 VANCOMYCIN: Performed by: INTERNAL MEDICINE

## 2019-11-25 PROCEDURE — 99152 MOD SED SAME PHYS/QHP 5/>YRS: CPT | Performed by: INTERNAL MEDICINE

## 2019-11-25 PROCEDURE — 99233 SBSQ HOSP IP/OBS HIGH 50: CPT | Performed by: INTERNAL MEDICINE

## 2019-11-25 PROCEDURE — 25010000002 MIDAZOLAM PER 1 MG: Performed by: INTERNAL MEDICINE

## 2019-11-25 PROCEDURE — C1894 INTRO/SHEATH, NON-LASER: HCPCS | Performed by: INTERNAL MEDICINE

## 2019-11-25 PROCEDURE — C1898 LEAD, PMKR, OTHER THAN TRANS: HCPCS | Performed by: INTERNAL MEDICINE

## 2019-11-25 PROCEDURE — 0 IOPAMIDOL PER 1 ML: Performed by: INTERNAL MEDICINE

## 2019-11-25 PROCEDURE — 71045 X-RAY EXAM CHEST 1 VIEW: CPT

## 2019-11-25 PROCEDURE — 25010000002 MORPHINE PER 10 MG: Performed by: INTERNAL MEDICINE

## 2019-11-25 PROCEDURE — 82330 ASSAY OF CALCIUM: CPT | Performed by: INTERNAL MEDICINE

## 2019-11-25 PROCEDURE — 25010000002 MORPHINE PER 10 MG: Performed by: THORACIC SURGERY (CARDIOTHORACIC VASCULAR SURGERY)

## 2019-11-25 PROCEDURE — 99153 MOD SED SAME PHYS/QHP EA: CPT | Performed by: INTERNAL MEDICINE

## 2019-11-25 PROCEDURE — 85027 COMPLETE CBC AUTOMATED: CPT | Performed by: PHYSICIAN ASSISTANT

## 2019-11-25 PROCEDURE — 84100 ASSAY OF PHOSPHORUS: CPT | Performed by: INTERNAL MEDICINE

## 2019-11-25 PROCEDURE — 82140 ASSAY OF AMMONIA: CPT | Performed by: INTERNAL MEDICINE

## 2019-11-25 PROCEDURE — 83735 ASSAY OF MAGNESIUM: CPT | Performed by: INTERNAL MEDICINE

## 2019-11-25 PROCEDURE — 02H63JZ INSERTION OF PACEMAKER LEAD INTO RIGHT ATRIUM, PERCUTANEOUS APPROACH: ICD-10-PCS | Performed by: INTERNAL MEDICINE

## 2019-11-25 PROCEDURE — 94799 UNLISTED PULMONARY SVC/PX: CPT

## 2019-11-25 PROCEDURE — 82962 GLUCOSE BLOOD TEST: CPT

## 2019-11-25 PROCEDURE — 0JH606Z INSERTION OF PACEMAKER, DUAL CHAMBER INTO CHEST SUBCUTANEOUS TISSUE AND FASCIA, OPEN APPROACH: ICD-10-PCS | Performed by: INTERNAL MEDICINE

## 2019-11-25 PROCEDURE — 85610 PROTHROMBIN TIME: CPT | Performed by: INTERNAL MEDICINE

## 2019-11-25 PROCEDURE — 33208 INSRT HEART PM ATRIAL & VENT: CPT | Performed by: INTERNAL MEDICINE

## 2019-11-25 PROCEDURE — C1892 INTRO/SHEATH,FIXED,PEEL-AWAY: HCPCS | Performed by: INTERNAL MEDICINE

## 2019-11-25 PROCEDURE — C1785 PMKR, DUAL, RATE-RESP: HCPCS | Performed by: INTERNAL MEDICINE

## 2019-11-25 PROCEDURE — 85347 COAGULATION TIME ACTIVATED: CPT

## 2019-11-25 PROCEDURE — 80053 COMPREHEN METABOLIC PANEL: CPT | Performed by: INTERNAL MEDICINE

## 2019-11-25 PROCEDURE — 99231 SBSQ HOSP IP/OBS SF/LOW 25: CPT | Performed by: THORACIC SURGERY (CARDIOTHORACIC VASCULAR SURGERY)

## 2019-11-25 PROCEDURE — 02HK3JZ INSERTION OF PACEMAKER LEAD INTO RIGHT VENTRICLE, PERCUTANEOUS APPROACH: ICD-10-PCS | Performed by: INTERNAL MEDICINE

## 2019-11-25 PROCEDURE — 99024 POSTOP FOLLOW-UP VISIT: CPT | Performed by: INTERNAL MEDICINE

## 2019-11-25 PROCEDURE — 25010000002 FENTANYL CITRATE (PF) 100 MCG/2ML SOLUTION: Performed by: INTERNAL MEDICINE

## 2019-11-25 DEVICE — LD PM TENDRIL STS 6F58CM 2088TC58: Type: IMPLANTABLE DEVICE | Status: FUNCTIONAL

## 2019-11-25 DEVICE — GEN PM ASSURITY MRI DR RF PM2272: Type: IMPLANTABLE DEVICE | Status: FUNCTIONAL

## 2019-11-25 DEVICE — LD PM TENDRIL STS 6F52CM 2088TC52: Type: IMPLANTABLE DEVICE | Status: FUNCTIONAL

## 2019-11-25 RX ORDER — SODIUM CHLORIDE 0.9 % (FLUSH) 0.9 %
10 SYRINGE (ML) INJECTION AS NEEDED
Status: DISCONTINUED | OUTPATIENT
Start: 2019-11-25 | End: 2019-11-25

## 2019-11-25 RX ORDER — LIDOCAINE HYDROCHLORIDE 10 MG/ML
INJECTION, SOLUTION EPIDURAL; INFILTRATION; INTRACAUDAL; PERINEURAL AS NEEDED
Status: DISCONTINUED | OUTPATIENT
Start: 2019-11-25 | End: 2019-11-25 | Stop reason: HOSPADM

## 2019-11-25 RX ORDER — ONDANSETRON 2 MG/ML
4 INJECTION INTRAMUSCULAR; INTRAVENOUS EVERY 6 HOURS PRN
Status: DISCONTINUED | OUTPATIENT
Start: 2019-11-25 | End: 2019-11-27 | Stop reason: HOSPADM

## 2019-11-25 RX ORDER — NALOXONE HCL 0.4 MG/ML
0.4 VIAL (ML) INJECTION
Status: DISCONTINUED | OUTPATIENT
Start: 2019-11-25 | End: 2019-11-27 | Stop reason: HOSPADM

## 2019-11-25 RX ORDER — ZOLPIDEM TARTRATE 5 MG/1
5 TABLET ORAL NIGHTLY PRN
Status: DISCONTINUED | OUTPATIENT
Start: 2019-11-25 | End: 2019-11-27 | Stop reason: HOSPADM

## 2019-11-25 RX ORDER — SODIUM CHLORIDE 0.9 % (FLUSH) 0.9 %
3 SYRINGE (ML) INJECTION EVERY 12 HOURS SCHEDULED
Status: DISCONTINUED | OUTPATIENT
Start: 2019-11-25 | End: 2019-11-25

## 2019-11-25 RX ORDER — IBUPROFEN 400 MG/1
400 TABLET ORAL EVERY 6 HOURS PRN
Status: DISCONTINUED | OUTPATIENT
Start: 2019-11-25 | End: 2019-11-27 | Stop reason: HOSPADM

## 2019-11-25 RX ORDER — MIDAZOLAM HYDROCHLORIDE 1 MG/ML
INJECTION INTRAMUSCULAR; INTRAVENOUS AS NEEDED
Status: DISCONTINUED | OUTPATIENT
Start: 2019-11-25 | End: 2019-11-25 | Stop reason: HOSPADM

## 2019-11-25 RX ORDER — FENTANYL CITRATE 50 UG/ML
INJECTION, SOLUTION INTRAMUSCULAR; INTRAVENOUS AS NEEDED
Status: DISCONTINUED | OUTPATIENT
Start: 2019-11-25 | End: 2019-11-25 | Stop reason: HOSPADM

## 2019-11-25 RX ORDER — MORPHINE SULFATE 2 MG/ML
2 INJECTION, SOLUTION INTRAMUSCULAR; INTRAVENOUS EVERY 4 HOURS PRN
Status: DISCONTINUED | OUTPATIENT
Start: 2019-11-25 | End: 2019-11-27 | Stop reason: HOSPADM

## 2019-11-25 RX ADMIN — TAMSULOSIN HYDROCHLORIDE 0.4 MG: 0.4 CAPSULE ORAL at 08:44

## 2019-11-25 RX ADMIN — MORPHINE SULFATE 2 MG: 2 INJECTION, SOLUTION INTRAMUSCULAR; INTRAVENOUS at 18:27

## 2019-11-25 RX ADMIN — IPRATROPIUM BROMIDE AND ALBUTEROL SULFATE 3 ML: 2.5; .5 SOLUTION RESPIRATORY (INHALATION) at 12:08

## 2019-11-25 RX ADMIN — OXYCODONE HYDROCHLORIDE 10 MG: 5 TABLET ORAL at 03:35

## 2019-11-25 RX ADMIN — ASPIRIN 81 MG 81 MG: 81 TABLET ORAL at 08:44

## 2019-11-25 RX ADMIN — OXYCODONE HYDROCHLORIDE 10 MG: 5 TABLET ORAL at 08:44

## 2019-11-25 RX ADMIN — Medication 80 MG: at 03:46

## 2019-11-25 RX ADMIN — IPRATROPIUM BROMIDE AND ALBUTEROL SULFATE 3 ML: 2.5; .5 SOLUTION RESPIRATORY (INHALATION) at 07:01

## 2019-11-25 RX ADMIN — MORPHINE SULFATE 2 MG: 2 INJECTION, SOLUTION INTRAMUSCULAR; INTRAVENOUS at 01:34

## 2019-11-25 RX ADMIN — CLOPIDOGREL BISULFATE 75 MG: 75 TABLET ORAL at 18:27

## 2019-11-25 RX ADMIN — MORPHINE SULFATE 2 MG: 2 INJECTION, SOLUTION INTRAMUSCULAR; INTRAVENOUS at 06:11

## 2019-11-25 RX ADMIN — NICOTINE 1 PATCH: 14 PATCH, EXTENDED RELEASE TRANSDERMAL at 08:47

## 2019-11-25 RX ADMIN — ATORVASTATIN CALCIUM 40 MG: 40 TABLET, FILM COATED ORAL at 20:21

## 2019-11-25 RX ADMIN — PANTOPRAZOLE SODIUM 40 MG: 40 INJECTION, POWDER, FOR SOLUTION INTRAVENOUS at 06:06

## 2019-11-25 RX ADMIN — VANCOMYCIN HYDROCHLORIDE 2250 MG: 1 INJECTION, POWDER, LYOPHILIZED, FOR SOLUTION INTRAVENOUS at 15:55

## 2019-11-25 RX ADMIN — SENNOSIDES AND DOCUSATE SODIUM 2 TABLET: 8.6; 5 TABLET ORAL at 20:21

## 2019-11-25 RX ADMIN — IPRATROPIUM BROMIDE AND ALBUTEROL SULFATE 3 ML: 2.5; .5 SOLUTION RESPIRATORY (INHALATION) at 00:53

## 2019-11-25 RX ADMIN — MORPHINE SULFATE 2 MG: 2 INJECTION, SOLUTION INTRAMUSCULAR; INTRAVENOUS at 14:25

## 2019-11-25 RX ADMIN — OXYCODONE HYDROCHLORIDE 10 MG: 5 TABLET ORAL at 12:58

## 2019-11-25 RX ADMIN — OXYCODONE HYDROCHLORIDE 10 MG: 5 TABLET ORAL at 20:21

## 2019-11-26 ENCOUNTER — APPOINTMENT (OUTPATIENT)
Dept: GENERAL RADIOLOGY | Facility: HOSPITAL | Age: 59
End: 2019-11-26

## 2019-11-26 LAB
ALBUMIN SERPL-MCNC: 2.8 G/DL (ref 3.5–5.2)
ANION GAP SERPL CALCULATED.3IONS-SCNC: 10 MMOL/L (ref 5–15)
BASOPHILS # BLD AUTO: 0.03 10*3/MM3 (ref 0–0.2)
BASOPHILS NFR BLD AUTO: 0.8 % (ref 0–1.5)
BUN BLD-MCNC: 17 MG/DL (ref 6–20)
BUN/CREAT SERPL: 27 (ref 7–25)
CALCIUM SPEC-SCNC: 8.9 MG/DL (ref 8.6–10.5)
CHLORIDE SERPL-SCNC: 101 MMOL/L (ref 98–107)
CO2 SERPL-SCNC: 25 MMOL/L (ref 22–29)
CREAT BLD-MCNC: 0.63 MG/DL (ref 0.76–1.27)
DEPRECATED RDW RBC AUTO: 81 FL (ref 37–54)
EOSINOPHIL # BLD AUTO: 0.15 10*3/MM3 (ref 0–0.4)
EOSINOPHIL NFR BLD AUTO: 3.9 % (ref 0.3–6.2)
ERYTHROCYTE [DISTWIDTH] IN BLOOD BY AUTOMATED COUNT: 26.8 % (ref 12.3–15.4)
GFR SERPL CREATININE-BSD FRML MDRD: 130 ML/MIN/1.73
GLUCOSE BLD-MCNC: 103 MG/DL (ref 65–99)
GLUCOSE BLDC GLUCOMTR-MCNC: 104 MG/DL (ref 70–130)
GLUCOSE BLDC GLUCOMTR-MCNC: 122 MG/DL (ref 70–130)
GLUCOSE BLDC GLUCOMTR-MCNC: 132 MG/DL (ref 70–130)
GLUCOSE BLDC GLUCOMTR-MCNC: 142 MG/DL (ref 70–130)
HCT VFR BLD AUTO: 27.6 % (ref 37.5–51)
HGB BLD-MCNC: 8 G/DL (ref 13–17.7)
IMM GRANULOCYTES # BLD AUTO: 0.01 10*3/MM3 (ref 0–0.05)
IMM GRANULOCYTES NFR BLD AUTO: 0.3 % (ref 0–0.5)
INR PPP: 1.19 (ref 0.85–1.16)
LYMPHOCYTES # BLD AUTO: 0.59 10*3/MM3 (ref 0.7–3.1)
LYMPHOCYTES NFR BLD AUTO: 15.3 % (ref 19.6–45.3)
MAGNESIUM SERPL-MCNC: 2.2 MG/DL (ref 1.6–2.6)
MCH RBC QN AUTO: 25 PG (ref 26.6–33)
MCHC RBC AUTO-ENTMCNC: 29 G/DL (ref 31.5–35.7)
MCV RBC AUTO: 86.3 FL (ref 79–97)
MONOCYTES # BLD AUTO: 0.46 10*3/MM3 (ref 0.1–0.9)
MONOCYTES NFR BLD AUTO: 11.9 % (ref 5–12)
NEUTROPHILS # BLD AUTO: 2.61 10*3/MM3 (ref 1.7–7)
NEUTROPHILS NFR BLD AUTO: 67.8 % (ref 42.7–76)
NRBC BLD AUTO-RTO: 0 /100 WBC (ref 0–0.2)
PHOSPHATE SERPL-MCNC: 3 MG/DL (ref 2.5–4.5)
PLATELET # BLD AUTO: 116 10*3/MM3 (ref 140–450)
PMV BLD AUTO: 10.4 FL (ref 6–12)
POTASSIUM BLD-SCNC: 4.5 MMOL/L (ref 3.5–5.2)
PROTHROMBIN TIME: 14.6 SECONDS (ref 11.2–14.3)
RBC # BLD AUTO: 3.2 10*6/MM3 (ref 4.14–5.8)
SODIUM BLD-SCNC: 136 MMOL/L (ref 136–145)
WBC NRBC COR # BLD: 3.85 10*3/MM3 (ref 3.4–10.8)

## 2019-11-26 PROCEDURE — 82962 GLUCOSE BLOOD TEST: CPT

## 2019-11-26 PROCEDURE — 25010000002 VANCOMYCIN 10 G RECONSTITUTED SOLUTION: Performed by: INTERNAL MEDICINE

## 2019-11-26 PROCEDURE — 99233 SBSQ HOSP IP/OBS HIGH 50: CPT | Performed by: INTERNAL MEDICINE

## 2019-11-26 PROCEDURE — 94799 UNLISTED PULMONARY SVC/PX: CPT

## 2019-11-26 PROCEDURE — 99231 SBSQ HOSP IP/OBS SF/LOW 25: CPT | Performed by: THORACIC SURGERY (CARDIOTHORACIC VASCULAR SURGERY)

## 2019-11-26 PROCEDURE — 99024 POSTOP FOLLOW-UP VISIT: CPT | Performed by: INTERNAL MEDICINE

## 2019-11-26 PROCEDURE — 71046 X-RAY EXAM CHEST 2 VIEWS: CPT

## 2019-11-26 PROCEDURE — 85025 COMPLETE CBC W/AUTO DIFF WBC: CPT | Performed by: INTERNAL MEDICINE

## 2019-11-26 PROCEDURE — 25010000002 MORPHINE PER 10 MG: Performed by: INTERNAL MEDICINE

## 2019-11-26 PROCEDURE — 80069 RENAL FUNCTION PANEL: CPT | Performed by: INTERNAL MEDICINE

## 2019-11-26 PROCEDURE — 83735 ASSAY OF MAGNESIUM: CPT | Performed by: INTERNAL MEDICINE

## 2019-11-26 PROCEDURE — 63710000001 INSULIN LISPRO (HUMAN) PER 5 UNITS: Performed by: NURSE PRACTITIONER

## 2019-11-26 PROCEDURE — 71045 X-RAY EXAM CHEST 1 VIEW: CPT

## 2019-11-26 RX ORDER — DEXTROSE MONOHYDRATE 25 G/50ML
25 INJECTION, SOLUTION INTRAVENOUS
Status: DISCONTINUED | OUTPATIENT
Start: 2019-11-26 | End: 2019-11-27 | Stop reason: HOSPADM

## 2019-11-26 RX ORDER — IPRATROPIUM BROMIDE AND ALBUTEROL SULFATE 2.5; .5 MG/3ML; MG/3ML
3 SOLUTION RESPIRATORY (INHALATION) EVERY 6 HOURS PRN
Status: DISCONTINUED | OUTPATIENT
Start: 2019-11-26 | End: 2019-11-26 | Stop reason: SDUPTHER

## 2019-11-26 RX ORDER — NICOTINE POLACRILEX 4 MG
15 LOZENGE BUCCAL
Status: DISCONTINUED | OUTPATIENT
Start: 2019-11-26 | End: 2019-11-27 | Stop reason: HOSPADM

## 2019-11-26 RX ORDER — PANTOPRAZOLE SODIUM 40 MG/1
40 TABLET, DELAYED RELEASE ORAL
Status: DISCONTINUED | OUTPATIENT
Start: 2019-11-27 | End: 2019-11-27 | Stop reason: HOSPADM

## 2019-11-26 RX ORDER — IPRATROPIUM BROMIDE AND ALBUTEROL SULFATE 2.5; .5 MG/3ML; MG/3ML
3 SOLUTION RESPIRATORY (INHALATION)
Status: DISCONTINUED | OUTPATIENT
Start: 2019-11-26 | End: 2019-11-27 | Stop reason: HOSPADM

## 2019-11-26 RX ORDER — BUMETANIDE 1 MG/1
1 TABLET ORAL 2 TIMES DAILY
Status: DISCONTINUED | OUTPATIENT
Start: 2019-11-26 | End: 2019-11-27 | Stop reason: HOSPADM

## 2019-11-26 RX ADMIN — SODIUM CHLORIDE, PRESERVATIVE FREE 3 ML: 5 INJECTION INTRAVENOUS at 09:07

## 2019-11-26 RX ADMIN — LISINOPRIL: 10 TABLET ORAL at 11:43

## 2019-11-26 RX ADMIN — IPRATROPIUM BROMIDE AND ALBUTEROL SULFATE 3 ML: 2.5; .5 SOLUTION RESPIRATORY (INHALATION) at 19:05

## 2019-11-26 RX ADMIN — FERROUS SULFATE TAB 325 MG (65 MG ELEMENTAL FE) 325 MG: 325 (65 FE) TAB at 09:05

## 2019-11-26 RX ADMIN — TAMSULOSIN HYDROCHLORIDE 0.4 MG: 0.4 CAPSULE ORAL at 09:05

## 2019-11-26 RX ADMIN — MORPHINE SULFATE 2 MG: 2 INJECTION, SOLUTION INTRAMUSCULAR; INTRAVENOUS at 07:22

## 2019-11-26 RX ADMIN — OXYCODONE HYDROCHLORIDE 10 MG: 5 TABLET ORAL at 20:43

## 2019-11-26 RX ADMIN — ASPIRIN 81 MG 81 MG: 81 TABLET ORAL at 09:05

## 2019-11-26 RX ADMIN — SENNOSIDES AND DOCUSATE SODIUM 2 TABLET: 8.6; 5 TABLET ORAL at 20:38

## 2019-11-26 RX ADMIN — IPRATROPIUM BROMIDE AND ALBUTEROL SULFATE 3 ML: 2.5; .5 SOLUTION RESPIRATORY (INHALATION) at 03:10

## 2019-11-26 RX ADMIN — VANCOMYCIN HYDROCHLORIDE 1750 MG: 10 INJECTION, POWDER, LYOPHILIZED, FOR SOLUTION INTRAVENOUS at 04:55

## 2019-11-26 RX ADMIN — NICOTINE 1 PATCH: 14 PATCH, EXTENDED RELEASE TRANSDERMAL at 09:06

## 2019-11-26 RX ADMIN — BUMETANIDE 1 MG: 1 TABLET ORAL at 09:06

## 2019-11-26 RX ADMIN — OXYCODONE HYDROCHLORIDE 10 MG: 5 TABLET ORAL at 09:06

## 2019-11-26 RX ADMIN — MORPHINE SULFATE 2 MG: 2 INJECTION, SOLUTION INTRAMUSCULAR; INTRAVENOUS at 02:49

## 2019-11-26 RX ADMIN — OXYCODONE HYDROCHLORIDE 5 MG: 5 TABLET ORAL at 04:37

## 2019-11-26 RX ADMIN — ATORVASTATIN CALCIUM 40 MG: 40 TABLET, FILM COATED ORAL at 20:38

## 2019-11-26 RX ADMIN — OXYCODONE HYDROCHLORIDE 10 MG: 5 TABLET ORAL at 13:31

## 2019-11-26 RX ADMIN — SENNOSIDES AND DOCUSATE SODIUM 2 TABLET: 8.6; 5 TABLET ORAL at 09:05

## 2019-11-26 RX ADMIN — CLOPIDOGREL BISULFATE 75 MG: 75 TABLET ORAL at 09:05

## 2019-11-26 RX ADMIN — IPRATROPIUM BROMIDE AND ALBUTEROL SULFATE 3 ML: 2.5; .5 SOLUTION RESPIRATORY (INHALATION) at 13:33

## 2019-11-26 RX ADMIN — BUMETANIDE 1 MG: 1 TABLET ORAL at 17:17

## 2019-11-27 ENCOUNTER — APPOINTMENT (OUTPATIENT)
Dept: GENERAL RADIOLOGY | Facility: HOSPITAL | Age: 59
End: 2019-11-27

## 2019-11-27 VITALS
HEIGHT: 65 IN | TEMPERATURE: 98.5 F | HEART RATE: 93 BPM | DIASTOLIC BLOOD PRESSURE: 73 MMHG | OXYGEN SATURATION: 95 % | RESPIRATION RATE: 20 BRPM | SYSTOLIC BLOOD PRESSURE: 160 MMHG | BODY MASS INDEX: 40.98 KG/M2 | WEIGHT: 246 LBS

## 2019-11-27 LAB
ANION GAP SERPL CALCULATED.3IONS-SCNC: 11 MMOL/L (ref 5–15)
BASOPHILS # BLD AUTO: 0.02 10*3/MM3 (ref 0–0.2)
BASOPHILS NFR BLD AUTO: 0.6 % (ref 0–1.5)
BUN BLD-MCNC: 13 MG/DL (ref 6–20)
BUN/CREAT SERPL: 20.6 (ref 7–25)
CALCIUM SPEC-SCNC: 8.5 MG/DL (ref 8.6–10.5)
CHLORIDE SERPL-SCNC: 93 MMOL/L (ref 98–107)
CO2 SERPL-SCNC: 26 MMOL/L (ref 22–29)
CREAT BLD-MCNC: 0.63 MG/DL (ref 0.76–1.27)
DEPRECATED RDW RBC AUTO: 77.9 FL (ref 37–54)
EOSINOPHIL # BLD AUTO: 0.21 10*3/MM3 (ref 0–0.4)
EOSINOPHIL NFR BLD AUTO: 6 % (ref 0.3–6.2)
ERYTHROCYTE [DISTWIDTH] IN BLOOD BY AUTOMATED COUNT: 26.2 % (ref 12.3–15.4)
GFR SERPL CREATININE-BSD FRML MDRD: 130 ML/MIN/1.73
GLUCOSE BLD-MCNC: 134 MG/DL (ref 65–99)
GLUCOSE BLDC GLUCOMTR-MCNC: 114 MG/DL (ref 70–130)
GLUCOSE BLDC GLUCOMTR-MCNC: 149 MG/DL (ref 70–130)
HCT VFR BLD AUTO: 25.8 % (ref 37.5–51)
HGB BLD-MCNC: 7.8 G/DL (ref 13–17.7)
IMM GRANULOCYTES # BLD AUTO: 0.02 10*3/MM3 (ref 0–0.05)
IMM GRANULOCYTES NFR BLD AUTO: 0.6 % (ref 0–0.5)
LYMPHOCYTES # BLD AUTO: 0.71 10*3/MM3 (ref 0.7–3.1)
LYMPHOCYTES NFR BLD AUTO: 20.3 % (ref 19.6–45.3)
MAGNESIUM SERPL-MCNC: 1.8 MG/DL (ref 1.6–2.6)
MCH RBC QN AUTO: 25.3 PG (ref 26.6–33)
MCHC RBC AUTO-ENTMCNC: 30.2 G/DL (ref 31.5–35.7)
MCV RBC AUTO: 83.8 FL (ref 79–97)
MONOCYTES # BLD AUTO: 0.4 10*3/MM3 (ref 0.1–0.9)
MONOCYTES NFR BLD AUTO: 11.5 % (ref 5–12)
NEUTROPHILS # BLD AUTO: 2.13 10*3/MM3 (ref 1.7–7)
NEUTROPHILS NFR BLD AUTO: 61 % (ref 42.7–76)
NRBC BLD AUTO-RTO: 0 /100 WBC (ref 0–0.2)
PLATELET # BLD AUTO: 116 10*3/MM3 (ref 140–450)
PMV BLD AUTO: 10.7 FL (ref 6–12)
POTASSIUM BLD-SCNC: 3.5 MMOL/L (ref 3.5–5.2)
RBC # BLD AUTO: 3.08 10*6/MM3 (ref 4.14–5.8)
SODIUM BLD-SCNC: 130 MMOL/L (ref 136–145)
WBC NRBC COR # BLD: 3.49 10*3/MM3 (ref 3.4–10.8)

## 2019-11-27 PROCEDURE — 99231 SBSQ HOSP IP/OBS SF/LOW 25: CPT | Performed by: THORACIC SURGERY (CARDIOTHORACIC VASCULAR SURGERY)

## 2019-11-27 PROCEDURE — 99232 SBSQ HOSP IP/OBS MODERATE 35: CPT | Performed by: INTERNAL MEDICINE

## 2019-11-27 PROCEDURE — 85025 COMPLETE CBC W/AUTO DIFF WBC: CPT | Performed by: INTERNAL MEDICINE

## 2019-11-27 PROCEDURE — 25010000002 PNEUMOCOCCAL VAC POLYVALENT PER 0.5 ML: Performed by: INTERNAL MEDICINE

## 2019-11-27 PROCEDURE — 90732 PPSV23 VACC 2 YRS+ SUBQ/IM: CPT | Performed by: INTERNAL MEDICINE

## 2019-11-27 PROCEDURE — G0009 ADMIN PNEUMOCOCCAL VACCINE: HCPCS | Performed by: INTERNAL MEDICINE

## 2019-11-27 PROCEDURE — 99024 POSTOP FOLLOW-UP VISIT: CPT | Performed by: INTERNAL MEDICINE

## 2019-11-27 PROCEDURE — 85007 BL SMEAR W/DIFF WBC COUNT: CPT | Performed by: INTERNAL MEDICINE

## 2019-11-27 PROCEDURE — 97162 PT EVAL MOD COMPLEX 30 MIN: CPT

## 2019-11-27 PROCEDURE — 71045 X-RAY EXAM CHEST 1 VIEW: CPT

## 2019-11-27 PROCEDURE — 80048 BASIC METABOLIC PNL TOTAL CA: CPT | Performed by: INTERNAL MEDICINE

## 2019-11-27 PROCEDURE — 83735 ASSAY OF MAGNESIUM: CPT | Performed by: INTERNAL MEDICINE

## 2019-11-27 PROCEDURE — 94799 UNLISTED PULMONARY SVC/PX: CPT

## 2019-11-27 PROCEDURE — 82962 GLUCOSE BLOOD TEST: CPT

## 2019-11-27 RX ORDER — MAGNESIUM SULFATE HEPTAHYDRATE 40 MG/ML
2 INJECTION, SOLUTION INTRAVENOUS AS NEEDED
Status: DISCONTINUED | OUTPATIENT
Start: 2019-11-27 | End: 2019-11-27 | Stop reason: HOSPADM

## 2019-11-27 RX ORDER — MAGNESIUM SULFATE HEPTAHYDRATE 40 MG/ML
4 INJECTION, SOLUTION INTRAVENOUS AS NEEDED
Status: DISCONTINUED | OUTPATIENT
Start: 2019-11-27 | End: 2019-11-27 | Stop reason: HOSPADM

## 2019-11-27 RX ORDER — POTASSIUM CHLORIDE 750 MG/1
40 CAPSULE, EXTENDED RELEASE ORAL AS NEEDED
Status: DISCONTINUED | OUTPATIENT
Start: 2019-11-27 | End: 2019-11-27 | Stop reason: HOSPADM

## 2019-11-27 RX ORDER — POTASSIUM CHLORIDE 1.5 G/1.77G
40 POWDER, FOR SOLUTION ORAL AS NEEDED
Status: DISCONTINUED | OUTPATIENT
Start: 2019-11-27 | End: 2019-11-27 | Stop reason: HOSPADM

## 2019-11-27 RX ORDER — POTASSIUM CHLORIDE 7.45 MG/ML
10 INJECTION INTRAVENOUS
Status: DISCONTINUED | OUTPATIENT
Start: 2019-11-27 | End: 2019-11-27 | Stop reason: HOSPADM

## 2019-11-27 RX ADMIN — MAGNESIUM SULFATE HEPTAHYDRATE 4 G: 40 INJECTION, SOLUTION INTRAVENOUS at 10:41

## 2019-11-27 RX ADMIN — NICOTINE 1 PATCH: 14 PATCH, EXTENDED RELEASE TRANSDERMAL at 09:23

## 2019-11-27 RX ADMIN — PANTOPRAZOLE SODIUM 40 MG: 40 TABLET, DELAYED RELEASE ORAL at 06:02

## 2019-11-27 RX ADMIN — OXYCODONE HYDROCHLORIDE 10 MG: 5 TABLET ORAL at 02:21

## 2019-11-27 RX ADMIN — IPRATROPIUM BROMIDE AND ALBUTEROL SULFATE 3 ML: 2.5; .5 SOLUTION RESPIRATORY (INHALATION) at 06:54

## 2019-11-27 RX ADMIN — TAMSULOSIN HYDROCHLORIDE 0.4 MG: 0.4 CAPSULE ORAL at 09:21

## 2019-11-27 RX ADMIN — PNEUMOCOCCAL VACCINE POLYVALENT 0.5 ML
25; 25; 25; 25; 25; 25; 25; 25; 25; 25; 25; 25; 25; 25; 25; 25; 25; 25; 25; 25; 25; 25; 25 INJECTION, SOLUTION INTRAMUSCULAR; SUBCUTANEOUS at 12:06

## 2019-11-27 RX ADMIN — OXYCODONE HYDROCHLORIDE 10 MG: 5 TABLET ORAL at 07:45

## 2019-11-27 RX ADMIN — CLOPIDOGREL BISULFATE 75 MG: 75 TABLET ORAL at 09:21

## 2019-11-27 RX ADMIN — BUMETANIDE 1 MG: 1 TABLET ORAL at 09:21

## 2019-11-27 RX ADMIN — ASPIRIN 81 MG 81 MG: 81 TABLET ORAL at 09:20

## 2019-11-27 RX ADMIN — LISINOPRIL: 10 TABLET ORAL at 09:20

## 2019-11-27 RX ADMIN — POTASSIUM CHLORIDE 40 MEQ: 750 CAPSULE, EXTENDED RELEASE ORAL at 10:41

## 2019-11-27 RX ADMIN — OXYCODONE HYDROCHLORIDE 10 MG: 5 TABLET ORAL at 12:06

## 2019-11-27 RX ADMIN — IPRATROPIUM BROMIDE AND ALBUTEROL SULFATE 3 ML: 2.5; .5 SOLUTION RESPIRATORY (INHALATION) at 00:25

## 2019-11-27 RX ADMIN — FERROUS SULFATE TAB 325 MG (65 MG ELEMENTAL FE) 325 MG: 325 (65 FE) TAB at 09:20

## 2019-11-28 ENCOUNTER — READMISSION MANAGEMENT (OUTPATIENT)
Dept: CALL CENTER | Facility: HOSPITAL | Age: 59
End: 2019-11-28

## 2019-11-28 ENCOUNTER — CLINICAL SUPPORT NO REQUIREMENTS (OUTPATIENT)
Dept: CARDIOLOGY | Facility: CLINIC | Age: 59
End: 2019-11-28

## 2019-11-28 DIAGNOSIS — I44.2 COMPLETE HEART BLOCK (HCC): Primary | ICD-10-CM

## 2019-11-28 NOTE — OUTREACH NOTE
Prep Survey      Responses   Facility patient discharged from?  [facility]   Is patient eligible?  Yes   Discharge diagnosis  Nonrheumatic aortic valve stenosis   Does the patient have one of the following disease processes/diagnoses(primary or secondary)?  Other   Does the patient have Home health ordered?  Yes   What is the Home health agency?   [facility]   Is there a DME ordered?  No   Prep survey completed?  Yes          Alaina Hanks RN

## 2019-11-29 ENCOUNTER — READMISSION MANAGEMENT (OUTPATIENT)
Dept: CALL CENTER | Facility: HOSPITAL | Age: 59
End: 2019-11-29

## 2019-11-29 NOTE — OUTREACH NOTE
Medical Week 1 Survey      Responses   Facility patient discharged from?  [facility]   Does the patient have one of the following disease processes/diagnoses(primary or secondary)?  Other   Is there a successful TCM telephone encounter documented?  No   Week 1 attempt successful?  No   Unsuccessful attempts  Attempt 1          Benjie oLpez RN

## 2019-12-02 ENCOUNTER — APPOINTMENT (OUTPATIENT)
Dept: CARDIOLOGY | Facility: HOSPITAL | Age: 59
End: 2019-12-02

## 2019-12-02 ENCOUNTER — READMISSION MANAGEMENT (OUTPATIENT)
Dept: CALL CENTER | Facility: HOSPITAL | Age: 59
End: 2019-12-02

## 2019-12-02 ENCOUNTER — APPOINTMENT (OUTPATIENT)
Dept: CT IMAGING | Facility: HOSPITAL | Age: 59
End: 2019-12-02

## 2019-12-02 NOTE — OUTREACH NOTE
Medical Week 1 Survey      Responses   Facility patient discharged from?  [facility]   Does the patient have one of the following disease processes/diagnoses(primary or secondary)?  Other   Is there a successful TCM telephone encounter documented?  No   Week 1 attempt successful?  Yes   Call start time  1037   Call end time  1043   Discharge diagnosis  Nonrheumatic aortic valve stenosis    TAVR   PPM implantation for complete heart block    Is patient permission given to speak with other caregiver?  Yes   List who call center can speak with  Bonny Paredes or Karina Soto reviewed with patient/caregiver?  Yes   Is the patient having any side effects they believe may be caused by any medication additions or changes?  No   Does the patient have all medications ordered at discharge?  Yes   Is the patient taking all medications as directed (includes completed medication regime)?  Yes   Does the patient have a primary care provider?   Yes   Does the patient have an appointment with their PCP within 7 days of discharge?  Yes   Has the patient kept scheduled appointments due by today?  N/A   Comments  PCP appt today 12/02/2019   Has home health visited the patient within 72 hours of discharge?  N/A   Home health comments  No home health, his family takes care of him.   Psychosocial issues?  No   Did the patient receive a copy of their discharge instructions?  Yes   Nursing interventions  Reviewed instructions with patient   What is the patient's perception of their health status since discharge?  Improving   Is the patient/caregiver able to teach back the hierarchy of who to call/visit for symptoms/problems? PCP, Specialist, Home health nurse, Urgent Care, ED, 911  Yes   Additional teach back comments  Groin site looks good. Pacemaker site, soreness bandage intact.   Week 1 call completed?  Yes          Eula Cross RN

## 2019-12-03 DIAGNOSIS — I35.0 NONRHEUMATIC AORTIC VALVE STENOSIS: ICD-10-CM

## 2019-12-03 DIAGNOSIS — I50.23 ACUTE ON CHRONIC SYSTOLIC HEART FAILURE (HCC): Primary | Chronic | ICD-10-CM

## 2019-12-03 LAB — LV EF 2D ECHO EST: 55 %

## 2019-12-03 NOTE — PAYOR COMM NOTE
"Moriah Pérez RN  Utilization Review  P: [phone]  F: [phone]    Ref # [id number]  DC date = 12/3/19  DC summary attached    Rogelio Hastings (59 y.o. Male)     Date of Birth Social Security Number Address Home Phone MRN    1960  [address] [facility] [address] [phone] [id number]    Confucianism Marital Status          None        Admission Date Admission Type Admitting Provider Attending Provider Department, Room/Bed    11/12/19 Elective Danish St MD  [facility] 2HSIC, S254/1    Discharge Date Discharge Disposition Discharge Destination        11/27/2019 Home or Self Care              Attending Provider:  (none)   Allergies:  Tylenol [Acetaminophen]    Isolation:  None   Infection:  None   Code Status:  Prior    Ht:  165.1 cm (65\")   Wt:  112 kg (246 lb)    Admission Cmt:  None   Principal Problem:  Nonrheumatic aortic valve stenosis [I35.0] More...                 Active Insurance as of 11/12/2019     Primary Coverage     Payor Plan Insurance Group Employer/Plan Group    [employer] [place] [employer] [place]ICA[place]      Payor Plan Address Payor Plan Phone Number Payor Plan Fax Number Effective Dates    [address] [address] [phone]  10/21/2016 - None Entered    [facility] [id number]       Subscriber Name Subscriber Birth Date Member ID       ROGELIO HASTINGS 1960 [id number]                 Emergency Contacts      (Rel.) Home Phone Work Phone Mobile Phone    DARLINGNANCY (Spouse) [phone] -- --    Karina Brennan (Daughter) [phone] -- [phone]    JOSUÉ HORTON (Daughter) [phone] -- --               Discharge Summary      Riri Talamantes, APRN at 11/27/19 1323          Discharge Summary TAVR    Date of Admission: 11/12/2019  Date of Discharge:  11/27/2019    PCP: Dewayne Cole MD  ATTENDING: Irvin London DO  Primary Cardiologist: Milad Cordon MD    Consults:   Consulting Physician(s)     Provider Relationship " Specialty    Maranda Cerda MD Consulting Physician Cardiology    Bravo, Milad ALCANTARA MD Consulting Physician Cardiology        TAVR Team  1.  Danish St MD  2.  Reyes Cadet MD  3.  Lory Shepherd MD  4.  Maranda Cerda MD    Presenting Problem/ HPI: Patient is a 59 y.o.  gentleman from [place], [place].  He was referred for TAVR consideration by Dr. Cordon following cardiac catheterization and percutaneous intervention on 10/30/19.  Plans were made for completion of his CT Surgery consult, ZORAIDA, and CTA as an outpatient.  However, he developed new onset atrial fibrillation and acute diastolic heart failure necessitating hospitalization @  11/3/19.  Once discharged, PCP noted abnormal lab values consistent with acute renal failure and he was then directly admitted here to [facility] on 11/12/19.    Following stabilization of renal function and acute HF symptoms, CT Surgery recommended he remain inpatient to undergo transapical approach TAVR due to severe PVD.      Discharge Diagnosis:     1.  Severe symptomatic aortic valve stenosis s/ptrans-apical TAVR on 11/22/19    2.  COPD / coal workers pmeumoconiosis    3.  T2DM     4.  Hypertension    5.  PAD s/p L SFA stent and R fem-pop     6.  LANA- resolved     7.  Iron deficiency anemia    8.  CAD s/p recent mid-RCA RIKY placement     9.  Acute on chronic diastolic heart failure     10.  YOUSUF on CPAP    11.  GIB (recent + FOBT without overt bleeding): Hx of colon cancer    12.  Paroxysmal atrial fibrillation.  CHADS VASC = 4    13.  Class 3 severe obesity in adult     14.  CHB (complete heart block) (CMS/HCC) s/p St Luis PPM        Procedures Performed:   1.  Transcatheter transapical aortic valve replacement with 26 mm Marie Shalonda 3 tissue prosthesis on 11/22/19  2.  PPM implantation for complete heart block (St. Luis serial #[id number]) on 11/25/19    Hospital Course:The patient is an 59 y.o. male from [address]  [facility] [place] [address] with symptoms of severe aortic stenosis including ACUNA, chest pain, orthopnea, and peripheral edema which began to worsen since August 2019.  An echocardiogram was done 11/12/19 which revealed the aortic valve indices to meet TAVR criteria, which included the aortic valve area of 0.4 sq cm, mean gradient 72 mmHg and aortic valve V-max 5.8 m/sec. He was evaluated by Dr. St and Zaida and deemed to be high risk of mortality with traditional open AVR primarily based upon STS score 13.3%.     Hospital day 1:  IV diuresis several liters.  Cardiology and CT Surgery consults.  Lovenox SQ for anticoagulation due to recent afib.  Patient maintaining sinus rhythm.  Pre-op carotid doppler study completed.      Hospital day 2:  CT Surgeon's consult per Dr. St.  Labs indicate chronic anemia and normalizing creatinine.   CTA chest, abdomen, pelvis TAVR protocol completed.      Hospital day 3:  ZORAIDA completed per Dr. Cerda.  Dyspnea and hypoxemia improved and patient stable on room air.  Orders to transfer to telemetry.  No recurrence of atrial fibrillation.      Hospital day 5:  IV iron infusion to supplement PO iron due to chronic anemia.  LANA resolved.  Remained on SQ lovenox for anticoagulation.     Hospital day 6:  Multidisciplinary TAVR team reviewed cardiac cath, ZORAIDA, and CTA.  Recommended patient remain inpatient due to recent decompensation of DHF and undergo transapical TAVR on Friday 11/22/19.  Dr. St discussed with patient and he was agreeable.    Hospital day 10:  Patient had remained clinically stable during the week and was taken to the hybrid OR suite in fasting condition and placed under general anesthesia.  Transapical TAVR was successfully performed via minithoracotomy incision.  26 mm Marie Shalonda 3 prosthesis was successfully deployed under rapid pacing protocol.  Post implant ZORAIDA confirmed position and no aortic insufficiency.  A 28 channel drain  was placed in the pericardium and another in the pleural cavity then sutured in place.  Temporary transvenous PM wire was left in place via right Fr venous sheath.  Patient initially tolerated extubation in the OR but decompensated shortly after.  This necessitated re-intubation.  He was transferred to CT ICU on ventilation and IV sedation.     POD 0:  Upon transfer to CT ICU, patient was evaluated by Intensivist, Dr. Latrice Quintanilla.  ET Tube was repositioned and ventilator settings adjusted.  IV Cardene drip for SBP control.  Ventricular pacing.   Nunn catheter placed.      POD #1:  Ventilator weaning/ spontaneous breathing trial.  Once sedation withdrawn, patient appears alert and following commands.  However, he eventually failed extubation.    POD #2:  Patient successfully extubated to Bipap.  Chest tubes discontinued.      POD #3:  St Luis PPM placed per Dr. Maranda Cerda.  Supplemental oxygen weaned to 6 liters.  Maintaining sinus rhythm per cardiac monitor.  Right femoral sheaths pulled per ICU RN with good hemostasis at 1800.    POD #4:  Patient remaining hemodynamically stable.  Continued supplemental oxygen at 6 liters.  Ambulating with PT and ICU staff.  Supplemental oxygen weaned down to 4 liters NC.  IV diuresis 1045 ml.  Orders placed to transfer to telemetry.    POD #5:  Patient remained hemodynamically stable overnight.  However, still requiring supplemental oxygen @ 2liters.  Deemed stable for DC home with family.  AMbulated 120 feet per PT staff.         Physical Exam on date of discharge:   Vital Sign Min/Max for last 24 hours  Temp  Min: 98.6 °F (37 °C)  Max: 99.1 °F (37.3 °C)   BP  Min: 104/57  Max: 142/78   Pulse  Min: 73  Max: 89   Resp  Min: 18  Max: 24   SpO2  Min: 90 %  Max: 99 %   Flow (L/min)  Min:  2  Max:  6       Physical Exam:   General: Alert and oriented.  Up in chair.  Family at bedside  Cardiovascular: Heart has a nondisplaced focal PMI. Regular rate and rhythm with  1-2/6 SE murmur, no gallop or rub.  The pacemaker site shows no hematoma.  There is old dried blood beneath the bandage.  Lungs: Clear without rales or wheezes. Equal expansion is noted. Left chest tube dressing removed.  Puncture site without induration or exudate.  Cleaned and covered with small band-aid   Abdomen: Firm, nontender. + BS  Extremities: Show 1-2+ edema.           Skin: warm and dry.    Pertinent Test Results:     Basic Metabolic Panel   Component  Ref Range & Units 03:44  (11/27/19) 1d ago  (11/26/19) 1d ago  (11/26/19) 1d ago  (11/26/19) 1d ago  (11/26/19) 1d ago  (11/26/19) 2d ago  (11/25/19)   Glucose  65 - 99 mg/dL 134 Abnormally high   132 Abnormally high  R 142 Abnormally high  R 122 R 104 R 103 Abnormally high   102 R   BUN  6 - 20 mg/dL 13      17     Creatinine  0.76 - 1.27 mg/dL 0.63 Abnormally low       0.63 Abnormally low      Sodium  136 - 145 mmol/L 130 Abnormally low       136     Potassium  3.5 - 5.2 mmol/L 3.5      4.5     Chloride  98 - 107 mmol/L 93 Abnormally low       101     CO2  22.0 - 29.0 mmol/L 26.0      25.0     Calcium  8.6 - 10.5 mg/dL 8.5 Abnormally low       8.9     eGFR Non African Amer  >60 mL/min/1.73 130      130             CBC Auto Differential   Component  Ref Range & Units 03:44 1d ago 2d ago 3d ago   WBC  3.40 - 10.80 10*3/mm3 3.49  3.85  4.45  5.11    RBC  4.14 - 5.80 10*6/mm3 3.08 Abnormally low   3.20 Abnormally low   3.34 Abnormally low   3.36 Abnormally low     Hemoglobin  13.0 - 17.7 g/dL 7.8 Abnormally low   8.0 Abnormally low   8.3 Abnormally low   8.5 Abnormally low     Hematocrit  37.5 - 51.0 % 25.8 Abnormally low   27.6 Abnormally low   28.4 Abnormally low   28.6 Abnormally low     MCV  79.0 - 97.0 fL 83.8  86.3  85.0  85.1    MCH  26.6 - 33.0 pg 25.3 Abnormally low   25.0 Abnormally low   24.9 Abnormally low   25.3 Abnormally low     MCHC  31.5 - 35.7 g/dL 30.2 Abnormally low   29.0 Abnormally low   29.2 Abnormally low   29.7 Abnormally low      RDW  12.3 - 15.4 % 26.2 Abnormally high   26.8 Abnormally high   27.4 Abnormally high   27.6 Abnormally high     RDW-SD  37.0 - 54.0 fl 77.9 Abnormally high   81.0 Abnormally high   82.2 Abnormally high   79.3 Abnormally high     MPV  6.0 - 12.0 fL 10.7  10.4  10.8  10.4    Platelets  140 - 450 10*3/mm3 116 Abnormally low   116 Abnormally low   124 Abnormally low   146            EXAMINATION: XR CHEST 1 VW-11/27/2019:      COMPARISON: 11/26/2019.     FINDINGS: The heart is large. There is central vascular congestion.  There is bibasilar airspace disease and there are small effusions.  Pacemaker is again noted.         IMPRESSION:  Features as described above consistent with congestive heart  failure.     D:  11/27/2019      Discharge Disposition  Home or Self Care    Discharge Medications      Changes to Medications      Instructions Start Date   nicotine 14 MG/24HR patch  Commonly known as:  NICODERM AD  What changed:  additional instructions   1 patch, Transdermal, Every 24 Hours Scheduled         Continue These Medications      Instructions Start Date   albuterol sulfate  (90 Base) MCG/ACT inhaler  Commonly known as:  PROVENTIL HFA;VENTOLIN HFA;PROAIR HFA   2 puffs, Inhalation, Every 4 Hours PRN      apixaban 5 MG tablet tablet  Commonly known as:  ELIQUIS  Notes to patient:  Start Thursday, November 28, 2019.   5 mg, Oral, 2 Times Daily      aspirin 81 MG EC tablet   81 mg, Oral, Daily      atorvastatin 40 MG tablet  Commonly known as:  LIPITOR   40 mg, Oral, Daily      bumetanide 2 MG tablet  Commonly known as:  BUMEX   2 mg, Oral, Daily      clopidogrel 75 MG tablet  Commonly known as:  PLAVIX   75 mg, Oral, Daily      ferrous sulfate 324 (65 Fe) MG tablet delayed-release EC tablet   324 mg, Oral, Daily With Breakfast      lisinopril-hydrochlorothiazide 10-12.5 MG per tablet  Commonly known as:  PRINZIDE,ZESTORETIC   1 tablet, Oral, Daily      metFORMIN 500 MG tablet  Commonly known as:  GLUCOPHAGE    850 mg, Oral, 2 Times Daily With Meals      nitroglycerin 0.4 MG SL tablet  Commonly known as:  NITROSTAT   0.4 mg, Sublingual, Every 5 Minutes PRN, Take no more than 3 doses in 15 minutes.       oxyCODONE 10 MG tablet  Commonly known as:  ROXICODONE   10 mg, Oral, Every 8 Hours PRN      pantoprazole 40 MG EC tablet  Commonly known as:  PROTONIX   40 mg, Oral, Every Other Day      potassium chloride 10 MEQ CR tablet  Commonly known as:  K-DUR   10 mEq, Oral, 2 Times Daily      probiotic capsule capsule   1 capsule, Oral, Daily      rOPINIRole 2 MG tablet  Commonly known as:  REQUIP   2 mg, Oral, Nightly      tamsulosin 0.4 MG capsule 24 hr capsule  Commonly known as:  FLOMAX   0.4 mg, Oral, Daily      Testosterone Cypionate 100 MG/ML solution   2 mL, Injection, Every 14 Days      tiotropium 18 MCG per inhalation capsule  Commonly known as:  SPIRIVA   1 capsule, Inhalation, Daily - RT      VITAMIN D PO   2,000 Units, Oral, Daily         Stop These Medications    furosemide 40 MG tablet  Commonly known as:  LASIX            Discharge Diet:   Diet Instructions     Diet: Cardiac; Thin      Discharge Diet:  Cardiac    Fluid Consistency:  Thin          Activity at Discharge:   Activity Instructions     Gradually Increase Activity Until at Pre-Hospitalization Level      Lifting Restrictions      Type of Restriction:  Lifting    Lifting Restrictions:  Lifting Restriction (Indicate Limit)    Weight Limit (Pounds):  10    Length of Lifting Restriction:  3 weeks          Special Instructions:   1.  Incision care: Check incision site daily. Clean daily with a clean washcloth, soap and water. Keep dry.  Report erythema, drainage, swelling or significant tenderness to [facility] CT Surgery or to Riri CASTRO at [facility].   2.  Patient may shower, but no tub baths until CT Surgery followup.   3.  Walk daily starting with 5 minutes four times daily.  Increase walking by  one minute per walk as tolerated.    4.  No lifting over 10 lbs until CT Surgery follow up.  5.  No driving until released to do so by the surgeon.   6.  Weigh daily and report weight gain of 3 pounds overnight or 5 pounds in a week to [facility].   7.  Report symptoms of increased shortness of breath, chest pain, or temperature greater than 101degrees to [facility] at [phone] or [facility] CT Surgery [phone].    Follow-up Appointments  Future Appointments   Date Time Provider Department Center   1/2/2020 11:15 AM Milad Cordon MD [facility] None     One week follow up with Dr. Cerda will be scheduled for PM dressing change.  One month follow up with Dr. St and echocardiogram will be scheduled.  Riri CASTRO will contact you with these arrangements.      Thank you for allowing the [facility] Multidisciplinary TAVR team to care for Mr. Sai Weinberg.  If you have any questions or concerns please call Riri CASTRO at [facility] [phone].  Dr. St may be reached at [phone] and Vikas Cerda/Bravo may be reached at [phone].       GLORIA Ballard  11/27/19  3:00 PM                  Electronically signed by Danish St MD at 11/28/19 0797

## 2019-12-11 ENCOUNTER — READMISSION MANAGEMENT (OUTPATIENT)
Dept: CALL CENTER | Facility: HOSPITAL | Age: 59
End: 2019-12-11

## 2019-12-11 NOTE — OUTREACH NOTE
Medical Week 2 Survey      Responses   Facility patient discharged from?  [facility]   Does the patient have one of the following disease processes/diagnoses(primary or secondary)?  Other   Week 2 attempt successful?  Yes   Call start time  0902   Call end time  0913   Meds reviewed with patient/caregiver?  Yes   Is the patient having any side effects they believe may be caused by any medication additions or changes?  No   Does the patient have all medications ordered at discharge?  Yes   Is the patient taking all medications as directed (includes completed medication regime)?  Yes   Medication comments  States insurance will not pay for Eliquis refill-states will call PCP office today.   Does the patient have a primary care provider?   Yes   Does the patient have an appointment with their PCP within 7 days of discharge?  Yes   Has the patient kept scheduled appointments due by today?  Yes   Comments  Echo on 01/02/20.   Has home health visited the patient within 72 hours of discharge?  N/A   Psychosocial issues?  No   Did the patient receive a copy of their discharge instructions?  Yes   Nursing interventions  Reviewed instructions with patient [no internet]   What is the patient's perception of their health status since discharge?  Improving   Is the patient/caregiver able to teach back signs and symptoms related to disease process for when to call PCP?  Yes   Is the patient/caregiver able to teach back signs and symptoms related to disease process for when to call 911?  Yes   Is the patient/caregiver able to teach back the hierarchy of who to call/visit for symptoms/problems? PCP, Specialist, Home health nurse, Urgent Care, ED, 911  Yes   If the patient is a current smoker, are they able to teach back resources for cessation?  -- [Does not smoke, but dips tobacco.]   Additional teach back comments  Reviewed s/s of infection. Reviewed cardiac s/s.   Week 2 Call Completed?  Yes   Graduated  Yes   Graduated/Revoked  comments  States is feeling much better-states no edema of feet/legs. States has lost 35# of fluid. States weighs daily and knows weight parameters. Denies any cardiac s/s. States pacemaker and groin sites healing without s/s of infection. States BS running at 110. Declined any further calls. States will call PCP office immediately to notify of no insurance coverage for Eliquis.          Vernell Small RN

## 2020-01-01 ENCOUNTER — TELEPHONE (OUTPATIENT)
Dept: CARDIOLOGY | Facility: HOSPITAL | Age: 60
End: 2020-01-01

## 2020-01-01 ENCOUNTER — HOSPITAL ENCOUNTER (OUTPATIENT)
Dept: CARDIOLOGY | Facility: HOSPITAL | Age: 60
Discharge: HOME | End: 2020-12-13
Admitting: NURSE PRACTITIONER

## 2020-01-01 ENCOUNTER — TELEPHONE (OUTPATIENT)
Dept: CARDIAC SURGERY | Facility: CLINIC | Age: 60
End: 2020-01-01

## 2020-01-01 ENCOUNTER — CLINICAL SUPPORT NO REQUIREMENTS (OUTPATIENT)
Dept: CARDIOLOGY | Facility: CLINIC | Age: 60
End: 2020-01-01

## 2020-01-01 ENCOUNTER — OFFICE VISIT (OUTPATIENT)
Dept: CARDIAC SURGERY | Facility: CLINIC | Age: 60
End: 2020-01-01

## 2020-01-01 ENCOUNTER — HOSPITAL ENCOUNTER (OUTPATIENT)
Dept: CARDIOLOGY | Facility: HOSPITAL | Age: 60
Discharge: HOME | End: 2020-01-21
Admitting: INTERNAL MEDICINE

## 2020-01-01 ENCOUNTER — OFFICE VISIT (OUTPATIENT)
Dept: CARDIOLOGY | Facility: CLINIC | Age: 60
End: 2020-01-01

## 2020-01-01 VITALS
BODY MASS INDEX: 36.82 KG/M2 | SYSTOLIC BLOOD PRESSURE: 124 MMHG | DIASTOLIC BLOOD PRESSURE: 63 MMHG | HEART RATE: 71 BPM | OXYGEN SATURATION: 94 % | HEIGHT: 65 IN | TEMPERATURE: 98.4 F | WEIGHT: 221 LBS

## 2020-01-01 VITALS
HEIGHT: 65 IN | HEART RATE: 73 BPM | DIASTOLIC BLOOD PRESSURE: 56 MMHG | SYSTOLIC BLOOD PRESSURE: 137 MMHG | WEIGHT: 239.8 LBS | OXYGEN SATURATION: 94 % | BODY MASS INDEX: 39.95 KG/M2

## 2020-01-01 VITALS
BODY MASS INDEX: 36.49 KG/M2 | HEIGHT: 65 IN | SYSTOLIC BLOOD PRESSURE: 108 MMHG | HEART RATE: 112 BPM | DIASTOLIC BLOOD PRESSURE: 62 MMHG | WEIGHT: 219 LBS

## 2020-01-01 VITALS — BODY MASS INDEX: 39.16 KG/M2 | WEIGHT: 221 LBS | HEIGHT: 63 IN

## 2020-01-01 VITALS — BODY MASS INDEX: 40.98 KG/M2 | HEIGHT: 65 IN | WEIGHT: 246 LBS

## 2020-01-01 DIAGNOSIS — I10 ESSENTIAL HYPERTENSION: Chronic | ICD-10-CM

## 2020-01-01 DIAGNOSIS — I50.23 ACUTE ON CHRONIC SYSTOLIC HEART FAILURE (HCC): ICD-10-CM

## 2020-01-01 DIAGNOSIS — Z95.2 S/P TAVR (TRANSCATHETER AORTIC VALVE REPLACEMENT): ICD-10-CM

## 2020-01-01 DIAGNOSIS — I44.2 CHB (COMPLETE HEART BLOCK) (HCC): ICD-10-CM

## 2020-01-01 DIAGNOSIS — I73.9 PERIPHERAL ARTERIAL DISEASE (HCC): Chronic | ICD-10-CM

## 2020-01-01 DIAGNOSIS — I25.119 CORONARY ARTERY DISEASE INVOLVING NATIVE CORONARY ARTERY OF NATIVE HEART WITH ANGINA PECTORIS (HCC): Chronic | ICD-10-CM

## 2020-01-01 DIAGNOSIS — Z95.2 S/P TAVR (TRANSCATHETER AORTIC VALVE REPLACEMENT): Primary | ICD-10-CM

## 2020-01-01 DIAGNOSIS — I35.0 NONRHEUMATIC AORTIC VALVE STENOSIS: ICD-10-CM

## 2020-01-01 DIAGNOSIS — I48.0 PAROXYSMAL ATRIAL FIBRILLATION (HCC): ICD-10-CM

## 2020-01-01 DIAGNOSIS — I25.10 CORONARY ARTERY DISEASE INVOLVING NATIVE CORONARY ARTERY OF NATIVE HEART WITHOUT ANGINA PECTORIS: ICD-10-CM

## 2020-01-01 DIAGNOSIS — I35.0 NONRHEUMATIC AORTIC VALVE STENOSIS: Primary | ICD-10-CM

## 2020-01-01 LAB
ASCENDING AORTA: 3.7 CM
BH CV ECHO MEAS - AI DEC SLOPE: 128 CM/SEC^2
BH CV ECHO MEAS - AI MAX PG: 43.2 MMHG
BH CV ECHO MEAS - AI MAX VEL: 328.7 CM/SEC
BH CV ECHO MEAS - AI P1/2T: 752.2 MSEC
BH CV ECHO MEAS - AO MAX PG (FULL): 11.5 MMHG
BH CV ECHO MEAS - AO MAX PG (FULL): 13.8 MMHG
BH CV ECHO MEAS - AO MAX PG: 21.7 MMHG
BH CV ECHO MEAS - AO MAX PG: 25.2 MMHG
BH CV ECHO MEAS - AO MEAN PG (FULL): 4.2 MMHG
BH CV ECHO MEAS - AO MEAN PG (FULL): 5.9 MMHG
BH CV ECHO MEAS - AO MEAN PG: 10 MMHG
BH CV ECHO MEAS - AO MEAN PG: 12.7 MMHG
BH CV ECHO MEAS - AO ROOT AREA (BSA CORRECTED): 1.6
BH CV ECHO MEAS - AO ROOT AREA (BSA CORRECTED): 1.6
BH CV ECHO MEAS - AO ROOT AREA: 8.5 CM^2
BH CV ECHO MEAS - AO ROOT AREA: 8.8 CM^2
BH CV ECHO MEAS - AO ROOT DIAM: 3.3 CM
BH CV ECHO MEAS - AO ROOT DIAM: 3.4 CM
BH CV ECHO MEAS - AO V2 MAX: 232.9 CM/SEC
BH CV ECHO MEAS - AO V2 MAX: 250.8 CM/SEC
BH CV ECHO MEAS - AO V2 MEAN: 147.9 CM/SEC
BH CV ECHO MEAS - AO V2 MEAN: 164.1 CM/SEC
BH CV ECHO MEAS - AO V2 VTI: 40.5 CM
BH CV ECHO MEAS - AO V2 VTI: 52.1 CM
BH CV ECHO MEAS - ASC AORTA: 3.7 CM
BH CV ECHO MEAS - AVA(I,A): 1.8 CM^2
BH CV ECHO MEAS - AVA(I,A): 2.8 CM^2
BH CV ECHO MEAS - AVA(I,D): 1.8 CM^2
BH CV ECHO MEAS - AVA(I,D): 2.8 CM^2
BH CV ECHO MEAS - AVA(V,A): 2 CM^2
BH CV ECHO MEAS - AVA(V,A): 2.4 CM^2
BH CV ECHO MEAS - AVA(V,D): 2 CM^2
BH CV ECHO MEAS - AVA(V,D): 2.4 CM^2
BH CV ECHO MEAS - BSA(HAYCOCK): 2.2 M^2
BH CV ECHO MEAS - BSA(HAYCOCK): 2.3 M^2
BH CV ECHO MEAS - BSA: 2.1 M^2
BH CV ECHO MEAS - BSA: 2.2 M^2
BH CV ECHO MEAS - BZI_BMI: 36.8 KILOGRAMS/M^2
BH CV ECHO MEAS - BZI_BMI: 40.9 KILOGRAMS/M^2
BH CV ECHO MEAS - BZI_METRIC_HEIGHT: 165.1 CM
BH CV ECHO MEAS - BZI_METRIC_HEIGHT: 165.1 CM
BH CV ECHO MEAS - BZI_METRIC_WEIGHT: 100.2 KG
BH CV ECHO MEAS - BZI_METRIC_WEIGHT: 111.6 KG
BH CV ECHO MEAS - EDV(CUBED): 153 ML
BH CV ECHO MEAS - EDV(CUBED): 195.1 ML
BH CV ECHO MEAS - EDV(MOD-SP2): 115 ML
BH CV ECHO MEAS - EDV(MOD-SP2): 86 ML
BH CV ECHO MEAS - EDV(MOD-SP4): 92 ML
BH CV ECHO MEAS - EDV(MOD-SP4): 99 ML
BH CV ECHO MEAS - EDV(TEICH): 138.2 ML
BH CV ECHO MEAS - EDV(TEICH): 166.6 ML
BH CV ECHO MEAS - EF(CUBED): 59.7 %
BH CV ECHO MEAS - EF(CUBED): 60.1 %
BH CV ECHO MEAS - EF(MOD-BP): 56 %
BH CV ECHO MEAS - EF(MOD-BP): 71 %
BH CV ECHO MEAS - EF(MOD-SP2): 56.5 %
BH CV ECHO MEAS - EF(MOD-SP2): 76.7 %
BH CV ECHO MEAS - EF(MOD-SP4): 53.5 %
BH CV ECHO MEAS - EF(MOD-SP4): 59.8 %
BH CV ECHO MEAS - EF(TEICH): 50.5 %
BH CV ECHO MEAS - EF(TEICH): 51.2 %
BH CV ECHO MEAS - ESV(CUBED): 61 ML
BH CV ECHO MEAS - ESV(CUBED): 78.7 ML
BH CV ECHO MEAS - ESV(MOD-SP2): 20 ML
BH CV ECHO MEAS - ESV(MOD-SP2): 50 ML
BH CV ECHO MEAS - ESV(MOD-SP4): 37 ML
BH CV ECHO MEAS - ESV(MOD-SP4): 46 ML
BH CV ECHO MEAS - ESV(TEICH): 67.4 ML
BH CV ECHO MEAS - ESV(TEICH): 82.4 ML
BH CV ECHO MEAS - FS: 26.1 %
BH CV ECHO MEAS - FS: 26.4 %
BH CV ECHO MEAS - IVS/LVPW: 1.1
BH CV ECHO MEAS - IVS/LVPW: 1.1
BH CV ECHO MEAS - IVSD: 1.1 CM
BH CV ECHO MEAS - IVSD: 1.2 CM
BH CV ECHO MEAS - LA DIMENSION: 4.2 CM
BH CV ECHO MEAS - LA DIMENSION: 4.3 CM
BH CV ECHO MEAS - LA/AO: 1.2
BH CV ECHO MEAS - LA/AO: 1.3
BH CV ECHO MEAS - LAD MAJOR: 5.1 CM
BH CV ECHO MEAS - LAD MAJOR: 5.5 CM
BH CV ECHO MEAS - LAT PEAK E' VEL: 12.5 CM/SEC
BH CV ECHO MEAS - LAT PEAK E' VEL: 6.8 CM/SEC
BH CV ECHO MEAS - LATERAL E/E' RATIO: 11.6
BH CV ECHO MEAS - LATERAL E/E' RATIO: 22.3
BH CV ECHO MEAS - LV DIASTOLIC VOL/BSA (35-75): 42.6 ML/M^2
BH CV ECHO MEAS - LV DIASTOLIC VOL/BSA (35-75): 48 ML/M^2
BH CV ECHO MEAS - LV MASS(C)D: 245.5 GRAMS
BH CV ECHO MEAS - LV MASS(C)D: 262.5 GRAMS
BH CV ECHO MEAS - LV MASS(C)DI: 113.6 GRAMS/M^2
BH CV ECHO MEAS - LV MASS(C)DI: 127.2 GRAMS/M^2
BH CV ECHO MEAS - LV MAX PG: 13.7 MMHG
BH CV ECHO MEAS - LV MAX PG: 7.8 MMHG
BH CV ECHO MEAS - LV MEAN PG: 4.1 MMHG
BH CV ECHO MEAS - LV MEAN PG: 8.5 MMHG
BH CV ECHO MEAS - LV SYSTOLIC VOL/BSA (12-30): 17.1 ML/M^2
BH CV ECHO MEAS - LV SYSTOLIC VOL/BSA (12-30): 22.3 ML/M^2
BH CV ECHO MEAS - LV V1 MAX: 140.1 CM/SEC
BH CV ECHO MEAS - LV V1 MAX: 184.8 CM/SEC
BH CV ECHO MEAS - LV V1 MEAN: 138.3 CM/SEC
BH CV ECHO MEAS - LV V1 MEAN: 94.7 CM/SEC
BH CV ECHO MEAS - LV V1 VTI: 29 CM
BH CV ECHO MEAS - LV V1 VTI: 34.6 CM
BH CV ECHO MEAS - LVIDD: 5.3 CM
BH CV ECHO MEAS - LVIDD: 5.8 CM
BH CV ECHO MEAS - LVIDS: 3.9 CM
BH CV ECHO MEAS - LVIDS: 4.3 CM
BH CV ECHO MEAS - LVLD AP2: 7 CM
BH CV ECHO MEAS - LVLD AP2: 8.7 CM
BH CV ECHO MEAS - LVLD AP4: 7.6 CM
BH CV ECHO MEAS - LVLD AP4: 8.4 CM
BH CV ECHO MEAS - LVLS AP2: 6.3 CM
BH CV ECHO MEAS - LVLS AP2: 7.6 CM
BH CV ECHO MEAS - LVLS AP4: 6.3 CM
BH CV ECHO MEAS - LVLS AP4: 7.4 CM
BH CV ECHO MEAS - LVOT AREA (M): 3.1 CM^2
BH CV ECHO MEAS - LVOT AREA (M): 3.5 CM^2
BH CV ECHO MEAS - LVOT AREA: 3.3 CM^2
BH CV ECHO MEAS - LVOT AREA: 3.3 CM^2
BH CV ECHO MEAS - LVOT DIAM: 2 CM
BH CV ECHO MEAS - LVOT DIAM: 2.1 CM
BH CV ECHO MEAS - LVPWD: 1.1 CM
BH CV ECHO MEAS - LVPWD: 1.1 CM
BH CV ECHO MEAS - MED PEAK E' VEL: 3.9 CM/SEC
BH CV ECHO MEAS - MED PEAK E' VEL: 8.7 CM/SEC
BH CV ECHO MEAS - MEDIAL E/E' RATIO: 16.6
BH CV ECHO MEAS - MEDIAL E/E' RATIO: 38.6
BH CV ECHO MEAS - MR MAX PG: 62 MMHG
BH CV ECHO MEAS - MR MAX VEL: 392.5 CM/SEC
BH CV ECHO MEAS - MR MEAN PG: 44.5 MMHG
BH CV ECHO MEAS - MR MEAN VEL: 317.7 CM/SEC
BH CV ECHO MEAS - MR VTI: 126.4 CM
BH CV ECHO MEAS - MV A MAX VEL: 48.1 CM/SEC
BH CV ECHO MEAS - MV DEC SLOPE: 418.3 CM/SEC^2
BH CV ECHO MEAS - MV DEC TIME: 0.16 SEC
BH CV ECHO MEAS - MV DEC TIME: 0.2 SEC
BH CV ECHO MEAS - MV E MAX VEL: 146.8 CM/SEC
BH CV ECHO MEAS - MV E MAX VEL: 154.9 CM/SEC
BH CV ECHO MEAS - MV E/A: 3.2
BH CV ECHO MEAS - MV MAX PG: 7.2 MMHG
BH CV ECHO MEAS - MV MAX PG: 8.7 MMHG
BH CV ECHO MEAS - MV MEAN PG: 3 MMHG
BH CV ECHO MEAS - MV MEAN PG: 3 MMHG
BH CV ECHO MEAS - MV P1/2T MAX VEL: 154.4 CM/SEC
BH CV ECHO MEAS - MV P1/2T: 108.1 MSEC
BH CV ECHO MEAS - MV V2 MAX: 133.8 CM/SEC
BH CV ECHO MEAS - MV V2 MAX: 147.4 CM/SEC
BH CV ECHO MEAS - MV V2 MEAN: 76.3 CM/SEC
BH CV ECHO MEAS - MV V2 MEAN: 79.5 CM/SEC
BH CV ECHO MEAS - MV V2 VTI: 19.3 CM
BH CV ECHO MEAS - MV V2 VTI: 41.4 CM
BH CV ECHO MEAS - MVA P1/2T LCG: 1.4 CM^2
BH CV ECHO MEAS - MVA(P1/2T): 2 CM^2
BH CV ECHO MEAS - MVA(VTI): 2.3 CM^2
BH CV ECHO MEAS - MVA(VTI): 5.9 CM^2
BH CV ECHO MEAS - PA ACC SLOPE: 372.2 CM/SEC^2
BH CV ECHO MEAS - PA ACC SLOPE: 799.1 CM/SEC^2
BH CV ECHO MEAS - PA ACC TIME: 0.09 SEC
BH CV ECHO MEAS - PA ACC TIME: 0.12 SEC
BH CV ECHO MEAS - PA MAX PG: 2.6 MMHG
BH CV ECHO MEAS - PA MAX PG: 4.1 MMHG
BH CV ECHO MEAS - PA PR(ACCEL): 25.1 MMHG
BH CV ECHO MEAS - PA PR(ACCEL): 39.4 MMHG
BH CV ECHO MEAS - PA V2 MAX: 100.4 CM/SEC
BH CV ECHO MEAS - PA V2 MAX: 81.1 CM/SEC
BH CV ECHO MEAS - RAP SYSTOLE: 3 MMHG
BH CV ECHO MEAS - RVSP: 32 MMHG
BH CV ECHO MEAS - SI(AO): 165.6 ML/M^2
BH CV ECHO MEAS - SI(AO): 215.4 ML/M^2
BH CV ECHO MEAS - SI(CUBED): 42.5 ML/M^2
BH CV ECHO MEAS - SI(CUBED): 56.4 ML/M^2
BH CV ECHO MEAS - SI(LVOT): 46.6 ML/M^2
BH CV ECHO MEAS - SI(LVOT): 52.7 ML/M^2
BH CV ECHO MEAS - SI(MOD-SP2): 30.6 ML/M^2
BH CV ECHO MEAS - SI(MOD-SP2): 31.5 ML/M^2
BH CV ECHO MEAS - SI(MOD-SP4): 25.5 ML/M^2
BH CV ECHO MEAS - SI(MOD-SP4): 25.7 ML/M^2
BH CV ECHO MEAS - SI(TEICH): 32.8 ML/M^2
BH CV ECHO MEAS - SI(TEICH): 40.8 ML/M^2
BH CV ECHO MEAS - SV(AO): 357.8 ML
BH CV ECHO MEAS - SV(AO): 444.5 ML
BH CV ECHO MEAS - SV(CUBED): 116.4 ML
BH CV ECHO MEAS - SV(CUBED): 91.9 ML
BH CV ECHO MEAS - SV(LVOT): 113.9 ML
BH CV ECHO MEAS - SV(LVOT): 96.2 ML
BH CV ECHO MEAS - SV(MOD-SP2): 65 ML
BH CV ECHO MEAS - SV(MOD-SP2): 66 ML
BH CV ECHO MEAS - SV(MOD-SP4): 53 ML
BH CV ECHO MEAS - SV(MOD-SP4): 55 ML
BH CV ECHO MEAS - SV(TEICH): 70.8 ML
BH CV ECHO MEAS - SV(TEICH): 84.2 ML
BH CV ECHO MEAS - TAPSE (>1.6): 1.6 CM
BH CV ECHO MEAS - TAPSE (>1.6): 2 CM2
BH CV ECHO MEAS - TR MAX PG: 29 MMHG
BH CV ECHO MEAS - TR MAX VEL: 268.6 CM/SEC
BH CV ECHO MEASUREMENTS AVERAGE E/E' RATIO: 13.85
BH CV ECHO MEASUREMENTS AVERAGE E/E' RATIO: 28.95
BH CV VAS BP LEFT ARM: NORMAL MMHG
BH CV XLRA - RV BASE: 3.7 CM
BH CV XLRA - RV BASE: 4.2 CM
BH CV XLRA - RV LENGTH: 8.8 CM
BH CV XLRA - RV LENGTH: 9.4 CM
BH CV XLRA - RV MID: 2.6 CM
BH CV XLRA - RV MID: 3.9 CM
BH CV XLRA - TDI S': 13.3 CM/SEC
BH CV XLRA - TDI S': 8.9 CM/SEC
LEFT ATRIUM VOLUME INDEX: 25.2 ML/M^2
LEFT ATRIUM VOLUME INDEX: 31 ML/M^2
LEFT ATRIUM VOLUME: 52 ML
LEFT ATRIUM VOLUME: 67 ML
MAXIMAL PREDICTED HEART RATE: 160 BPM
MAXIMAL PREDICTED HEART RATE: 161 BPM
STRESS TARGET HR: 136 BPM
STRESS TARGET HR: 137 BPM

## 2020-01-01 PROCEDURE — 93294 REM INTERROG EVL PM/LDLS PM: CPT | Performed by: PHYSICIAN ASSISTANT

## 2020-01-01 PROCEDURE — 93306 TTE W/DOPPLER COMPLETE: CPT | Performed by: INTERNAL MEDICINE

## 2020-01-01 PROCEDURE — 99213 OFFICE O/P EST LOW 20 MIN: CPT | Performed by: INTERNAL MEDICINE

## 2020-01-01 PROCEDURE — 93306 TTE W/DOPPLER COMPLETE: CPT

## 2020-01-01 PROCEDURE — 99024 POSTOP FOLLOW-UP VISIT: CPT | Performed by: NURSE PRACTITIONER

## 2020-01-01 PROCEDURE — 93280 PM DEVICE PROGR EVAL DUAL: CPT | Performed by: INTERNAL MEDICINE

## 2020-01-01 PROCEDURE — 71046 X-RAY EXAM CHEST 2 VIEWS: CPT | Performed by: NURSE PRACTITIONER

## 2020-01-01 PROCEDURE — 93296 REM INTERROG EVL PM/IDS: CPT | Performed by: PHYSICIAN ASSISTANT

## 2020-01-01 PROCEDURE — 99214 OFFICE O/P EST MOD 30 MIN: CPT | Performed by: INTERNAL MEDICINE

## 2020-01-01 PROCEDURE — 25010000002 SULFUR HEXAFLUORIDE MICROSPH 60.7-25 MG RECONSTITUTED SUSPENSION: Performed by: INTERNAL MEDICINE

## 2020-01-01 RX ORDER — HYDROXYZINE PAMOATE 50 MG/1
1 CAPSULE ORAL DAILY
COMMUNITY
Start: 2020-01-01

## 2020-01-01 RX ORDER — BUDESONIDE AND FORMOTEROL FUMARATE DIHYDRATE 80; 4.5 UG/1; UG/1
2 AEROSOL RESPIRATORY (INHALATION)
COMMUNITY

## 2020-01-01 RX ORDER — CARVEDILOL 12.5 MG/1
12.5 TABLET ORAL 2 TIMES DAILY WITH MEALS
Qty: 60 TABLET | Refills: 11 | Status: SHIPPED | OUTPATIENT
Start: 2020-01-01

## 2020-01-01 RX ORDER — MULTIVIT-MIN/IRON/FOLIC ACID/K 18-600-40
2000 CAPSULE ORAL DAILY
COMMUNITY

## 2020-01-01 RX ORDER — ROPINIROLE 2 MG/1
1 TABLET, FILM COATED ORAL EVERY OTHER DAY
COMMUNITY
Start: 2020-01-01

## 2020-01-01 RX ORDER — CARVEDILOL 6.25 MG/1
6.25 TABLET ORAL 2 TIMES DAILY WITH MEALS
COMMUNITY
End: 2020-01-01 | Stop reason: SDUPTHER

## 2020-01-01 RX ORDER — WARFARIN SODIUM 4 MG/1
4 TABLET ORAL DAILY
COMMUNITY
Start: 2020-01-01

## 2020-01-01 RX ORDER — LOSARTAN POTASSIUM 50 MG/1
50 TABLET ORAL DAILY
COMMUNITY

## 2020-01-01 RX ORDER — BUMETANIDE 2 MG/1
2 TABLET ORAL 2 TIMES DAILY PRN
COMMUNITY

## 2020-01-01 RX ORDER — FUROSEMIDE 40 MG/1
40 TABLET ORAL DAILY
COMMUNITY
End: 2020-01-01

## 2020-01-01 RX ADMIN — SULFUR HEXAFLUORIDE 2 ML: KIT at 13:30

## 2020-01-02 ENCOUNTER — APPOINTMENT (OUTPATIENT)
Dept: CARDIOLOGY | Facility: HOSPITAL | Age: 60
End: 2020-01-02

## 2020-01-03 DIAGNOSIS — I35.0 NONRHEUMATIC AORTIC VALVE STENOSIS: Primary | ICD-10-CM

## 2020-01-21 NOTE — PROGRESS NOTES
OFFICE FOLLOW UP     Date of Encounter:2020     Name: Sai Weinberg  : 1960  Address: [address] [place] [id number]    PCP: Dewayne Cole MD  [address] [place] [id number]    Sai Weinberg is a 59 y.o. male resident of [place], [place]     Chief Complaint: Follow up of VHD, post TAVR, PAF, CAD    Problem List:   1. Coronary artery disease   A. History of remote MI, with normal heart cath 15 years ago in [place], [facility]  B. Symptoms of worsening angina decubitus x 3 weeks, 2019   C. Stress MPS 10/9/19: LV mild-mod dilated, large area of moderate-severe ischemia in the inferior wall  D. RIKY to mid RCA, 10/30/19  2. Aortic stenosis, severe              A. 70 mmHg AVG; 0.83 cm² by cath, 10/30/2019              B. Cath EF equals 65%, 10/30/2019  C. ZORAIDA 11/15/19: EF 60%, severe AS with mean gradient 50mmHg, moderate MR  D. TAVR, 19  E. Echo, 20: normal LV function, bioprosthetic AV with normal function, mild MR   3. Peripheral arterial disease  A. S/p remote R fem-pop bypass  B. Left femoral cutdown with angioplasty and stent placement to left SFA by Dr. Rosa                i. Complicated by infection of left groin incision  C. CTA Abdominal aorta with runoff 19: no high grade stenosis of bilateral common femoral arteries; patent fem-popliteal graft on the right and patent trifurcation vessels; multifocal severe stenosis of the SFA on the left but without total occlusion, single vessel runoff via posterior tibial artery  4. Hypertension   5. Hyperlipidemia  6. DM2  7. Coal worker's pneumoconiosis   8. Tobacco abuse and severe COPD  A. Stopped smoking circa , continues to chew tobacco  9. History of back injury with lumbar disc disease and chronic back pain   10. History of colon cancer, remote, s/p partial colectomy              A. Noncompliant with subsequent colonoscopies  12. History of acute renal failure, resolved per patient  13. YOUSUF on  CPAP  14. Morbid obesity  15. Surgical history:  A. Partial colectomy  B. Right fem-pop bypass  C. Abdominal hernia repair  16. Paroxysmal AF, 11/4/19               A. Hospitalized [facility] for 5 days               D. Dismissed with HF circa 11/8/19                    1. LE edema and creatinine=2.77, resolved   17. Complete heart block after TAVR, 11/25/19 s/p [facility] PPM  18. Chronic hypochromic, microcytic anemia.    Allergies:  Allergies   Allergen Reactions   • Tylenol [Acetaminophen] Other (See Comments)     Liver disease     Current Medications:  •  albuterol (PROVENTIL HFA;VENTOLIN HFA) 108 (90 BASE) MCG/ACT inhaler, Inhale 2 puffs Every 4 (Four) Hours As Needed for wheezing.  •  aspirin 81 MG EC tablet, Take 1 tablet by mouth Daily.  •  atorvastatin (LIPITOR) 40 MG tablet, Take 40 mg by mouth daily  •  carvedilol (COREG) 6.25 MG tablet, Take 6.25 mg by mouth 2 (Two) Times a Day With Meals.   •  Cholecalciferol (VITAMIN D PO), Take 2,000 Units by mouth Daily.  •  clopidogrel (PLAVIX) 75 MG tablet, Take 1 tablet by mouth Daily  •  ferrous sulfate 324 (65 FE) MG tablet delayed-release EC tablet, Take 324 mg by mouth Daily With Breakfast.   •  furosemide (LASIX) 40 MG tablet, Take 40 mg by mouth Daily  •  metFORMIN (GLUCOPHAGE) 850 MG tablet, Take 850 mg by mouth 2 (Two) Times a Day With Meals   •  nitroglycerin (NITROSTAT) 0.4 MG SL tablet, Place 0.4 mg under the tongue Every 5 (Five) Minutes As Needed for Chest Pain. Take no more than 3 doses in 15 minutes  •  oxyCODONE (ROXICODONE) 10 MG tablet, Take 10 mg by mouth Every 8 (Eight) Hours As Needed.  •  pantoprazole (PROTONIX) 40 MG EC tablet, Take 40 mg by mouth Every Other Day  •  potassium chloride (K-DUR) 10 MEQ CR tablet, Take 10 mEq by mouth 2 (Two) Times a Day.  •  probiotic (CULTURELLE) capsule capsule, Take 1 capsule by mouth Daily.  •  tamsulosin (FLOMAX) 0.4 MG capsule 24 hr capsule, Take 0.4 mg by mouth Daily.  •  Testosterone Cypionate 100 MG/ML  "solution, Inject 2 mL as directed Every 14 (Fourteen) Days.  •  tiotropium (SPIRIVA) 18 MCG per inhalation capsule, Place 1 capsule into inhaler and inhale Daily.     History of Present Illness:            Mr. Weinberg returns for follow-up post TAVR at the end of November. He is feeling much better, and reports his exertional dyspnea has improved greatly, and he is now able to do his pre-procedure activities without any symptoms. He also denies angina, palpitations, orthopnea or PND. He has followed up with Dr. Cole several times since discharge, and has had labs and EKG through his office. He was recently started on Coreg for his tachycardia about 10 days ago, and is scheduled to see Dr. Cole again on Thursday. He is currently not anticoagulated for his Afib and states that his insurance would not cover Eliquis. He denies any signs of bleeding since discharge from hospital.     The following portions of the patient's history were reviewed and updated as appropriate: allergies, current medications and problem list.    ROS: Pertinent positives as listed in the HPI.  All other systems reviewed and negative.    Objective:  Vitals:    01/21/20 1420 01/21/20 1421   BP: 142/71 108/62   BP Location: Right arm Right arm   Patient Position: Sitting Standing   Pulse: 108 112   Weight: 99.3 kg (219 lb)    Height: 165.1 cm (65\")      Physical Exam:  GENERAL: Alert, cooperative, in no acute distress.   HEENT: Normocephalic, no adenopathy, no jugular venous distention  HEART: No discrete PMI is noted. Regular rhythm, fast rate, and 1/6 systolic murmur, no gallops, or rubs.   LUNGS: Clear to auscultation bilaterally. No wheezing, rales or ronchi.  ABDOMEN: Soft, bowel sounds present, non-tender   NEUROLOGIC: No focal abnormalities involving strength or sensation are noted.   EXTREMITIES: No clubbing, cyanosis, chronic BLE edema noted.     Diagnostic Data:    Labs 1/14/2020:   BMP: Glucose 151, BUN 5, Cr 0.77, eGFR 99, Na 136, K " 3.8, Cl 92, CO2 29  TSH: 1.860  CBC: WBC 3.5, RBC 4.35, Hgb 11.1, Hct 36.8, Plt 152    Procedures     Echo 1/21/2020:  Interpretation Summary     · Left atrial size is mildly increased.  · Other chamber sizes are normal.  · There is borderline concentric LVH.  · Global and segmental LV wall motion is normal. The calculated LV ejection fraction is 71%.  · There is a bioprosthetic aortic valve (TAVR) that exhibits normal function.  · Mild mitral regurgitation is present.  · There are no other important findings on this study,     [facility] PPM interrogation: RV pacing >99%, 3.3% mode switch, longest 1 day 17 hr in November; sinus tachycardia is current rhythm     Assessment and Plan:   1. VHD: echo today is summarized above. His valve looks good and his LV function looks good.   2. CAD: asymptomatic without angina. Continue ASA and Plavix for recent RIKY.  3. PAF: PPM interrogation shows 3.3% mode switch, longest in November. He has had brief intermittent runs of atrial fib. He is currently in sinus tachycardia, and we will therefore increase Coreg to 12.5mg BID. He is not on anticoagulation right now, and this was discussed by telephone with Dr. Cole today. We recommend initiation of Warfarin (insurance will not pay for Eliquis), and Dr. Cole will do so on Thursday at the time of his follow up.  His PPM should continue to be followed closely for AF burden.   4. Follow up with us in 6 months, and maintain close follow up with Dr. Cole in the meantime.    Scribed for Milad Cordon MD by Khushboo Cabral PA-C. 1/21/2020  5:11 PM

## 2020-02-03 NOTE — PROGRESS NOTES
[facility] Cardiothoracic Surgery Follow-Up Note    Name:  Sai Weinberg  MRN Number:  [id number]  Date of Encounter:  02/03/2020    Referred By:  No ref. provider found  PCP:  Dewayne Cole MD    Chief Complaint:    Chief Complaint   Patient presents with   • Post-op     Hospital follow up s/p TAVR 11/22/19.   • Aortic Stenosis       History of Present Illness:    Mr. Sai Weinberg is a pleasant 59 y.o. male with a history of hypertension, diabetes mellitus, peripheral vascular disease, and severe aortic stenosis status post TAVR 11/22/2019 per Dr. St who returns the office today for follow-up exam.  The patient denies pain in his groin, worsening shortness of breath or difficulty with ambulation.  The patient is being followed by his cardiologist, Dr. Cole and had a post-operative echocardiogram performed on 1/3/20.     Review of Systems:  Review of Systems   Constitution: Positive for malaise/fatigue. Negative for chills, fever, night sweats and weight loss.   HENT: Positive for hoarse voice. Negative for hearing loss and odynophagia.    Eyes: Negative for visual disturbance (cataract right eye).   Cardiovascular: Positive for claudication, dyspnea on exertion and leg swelling. Negative for chest pain, orthopnea and palpitations.   Respiratory: Positive for shortness of breath. Negative for cough and hemoptysis.    Endocrine: Negative.  Negative for cold intolerance, heat intolerance, polydipsia, polyphagia and polyuria.   Hematologic/Lymphatic: Bruises/bleeds easily.   Skin: Negative.  Negative for itching and rash.   Musculoskeletal: Positive for back pain, joint pain and muscle cramps. Negative for myalgias.   Gastrointestinal: Negative.  Negative for abdominal pain, constipation, diarrhea, hematemesis, hematochezia, melena, nausea and vomiting.   Genitourinary: Negative.  Negative for dysuria, frequency and hematuria.   Neurological: Positive for loss of balance. Negative for focal  weakness, headaches, numbness and seizures.   Psychiatric/Behavioral: Negative.  Negative for suicidal ideas.   Allergic/Immunologic: Positive for environmental allergies.   All other systems reviewed and are negative.      Past Medical History:    Past Medical History:   Diagnosis Date   • Arthritis    • Black lung disease (CMS/HCC)    • BPH (benign prostatic hyperplasia)    • Cataract    • COLD (chronic obstructive lung disease) (CMS/HCC)    • Colon polyps    • Diabetes mellitus (CMS/HCC)     SINCE 2014   • Dyslipidemia    • Esophageal reflux    • Flu     HX   • Heart attack (CMS/HCC)     2006   • Herniated lumbar intervertebral disc    • History of colon resection    • Hypertension    • Malignant neoplasm of colon (CMS/HCC)    • On home oxygen therapy     prn   • Open wound of left hip and thigh with complication 3/12/2017    Open draining left leg wound from femoral cutdown and angioplasty/stenting of superficial artery 1/18/2017   • Peripheral vascular disease (CMS/HCC)    • Renal failure    • Sleep apnea with use of continuous positive airway pressure (CPAP)    • Wears dentures    • Wears glasses        Past Surgical History:    Past Surgical History:   Procedure Laterality Date   • ANGIOPLASTY FEMORAL ARTERY N/A 1/17/2017    Procedure: left femoral cutdown with aortagram and left SFA stent and angioplasty;  Surgeon: Heladio Rosa MD;  Location:  [facility];  Service:    • AORTAGRAM N/A 1/17/2017    Procedure: AORTAGRAM WITH RUNOFFS and  STENT left SFA;  Surgeon: Heladio Rosa MD;  Location:  [facility] OR 15;  Service:    • AORTIC VALVE REPAIR/REPLACEMENT N/A 11/22/2019    Procedure: TRANSAPICAL TRANSCATHETER AORTIC VALVE REPLACEMENT WITH TRANSESOPHAGEAL ECHOCARDIOLGRAM;  Surgeon: Danish St MD;  Location:  [facility];  Service: Cardiothoracic   • AORTIC VALVE REPAIR/REPLACEMENT N/A 11/22/2019    Procedure: Transapical Transcatheter Aortic Valve Replacement;  Surgeon:  Lory Shepherd MD;  Location:  [facility];  Service: Cardiovascular   • CARDIAC CATHETERIZATION      NO INTERVENTION   • CARDIAC CATHETERIZATION Left 10/30/2019    Procedure: Left Heart Cath;  Surgeon: Milad Cordon MD;  Location:  [facility];  Service: Cardiology   • CARDIAC ELECTROPHYSIOLOGY PROCEDURE Left 2019    Procedure: Pacemaker DC new;  Surgeon: Maranda Cerda MD;  Location:  [facility];  Service: Cardiology   • COLON SURGERY      x 2   • COLONOSCOPY     • FEMORAL FEMORAL BYPASS N/A 3/13/2017    Procedure: INCISION AND DRAINAGE LEFT GROIN HEMATOMA;  Surgeon: Heladio Rosa MD;  Location:  [facility];  Service:    • FEMORAL POPLITEAL BYPASS Right 2016   • OTHER SURGICAL HISTORY      PTA ILIAC STENOSIS WITH STENT   • TRANSESOPHAGEAL ECHOCARDIOGRAM (ZORAIDA) N/A 2019    Procedure: TRANSESOPHAGEAL ECHOCARDIOGRAM WITH ANESTHESIA;  Surgeon: Danish St MD;  Location:  [facility];  Service: Cardiothoracic       Patient Active Problem List   Diagnosis   • Dyslipidemia on statins    • Arthritis   • COPD    • T2DM on metformin    • Esophageal reflux   • Hypertension   • Chewing tobacco use   • PAD s/p L SFA stent and R fem-pop    • LANA- resolved    • Iron deficiency anemia   • Coal workers pneumoconiosis    • CAD s/p recent mid-RCA RIKY placement    • Nonrheumatic aortic valve stenosis   • Acute on chronic systolic heart failure    • YOUSUF on CPAP   • GIB (recent + FOBT without overt bleeding)    • Paroxysmal atrial fibrillation   • Former smoker   • Class 3 severe obesity in adult    • CHB (complete heart block) (CMS/HCC)   • S/P TAVR (transcatheter aortic valve replacement) 19     Social History     Tobacco Use   • Smoking status: Former Smoker     Packs/day: 2.00     Years: 30.00     Pack years: 60.00     Types: Cigarettes     Start date:      Last attempt to quit:      Years since quittin.0   • Smokeless tobacco:  Current User     Types: Chew   • Tobacco comment: Stopped smoking 1 year ago. Smoked 35 years up to 2ppd.   Substance Use Topics   • Alcohol use: No   • Drug use: No     Family History   Problem Relation Age of Onset   • Lung cancer Mother    • Heart attack Mother    • Hypertension Mother    • Diabetes Mother    • Diabetes Father    • Hypertension Father    • Heart attack Father        Medications:      Current Outpatient Medications:   •  albuterol (PROVENTIL HFA;VENTOLIN HFA) 108 (90 BASE) MCG/ACT inhaler, Inhale 2 puffs Every 4 (Four) Hours As Needed for wheezing., Disp: , Rfl:   •  atorvastatin (LIPITOR) 40 MG tablet, Take 40 mg by mouth daily., Disp: , Rfl:   •  carvedilol (COREG) 12.5 MG tablet, Take 1 tablet by mouth 2 (Two) Times a Day With Meals. (Patient taking differently: Take 6.25 mg by mouth 2 (Two) Times a Day With Meals.), Disp: 60 tablet, Rfl: 11  •  Cholecalciferol (VITAMIN D PO), Take 2,000 Units by mouth Daily., Disp: , Rfl:   •  clopidogrel (PLAVIX) 75 MG tablet, Take 1 tablet by mouth Daily., Disp: 30 tablet, Rfl: 11  •  ferrous sulfate 324 (65 FE) MG tablet delayed-release EC tablet, Take 324 mg by mouth Daily With Breakfast., Disp: , Rfl:   •  furosemide (LASIX) 40 MG tablet, Take 40 mg by mouth Daily., Disp: , Rfl:   •  metFORMIN (GLUCOPHAGE) 850 MG tablet, Take 850 mg by mouth 2 (Two) Times a Day With Meals., Disp: , Rfl:   •  nitroglycerin (NITROSTAT) 0.4 MG SL tablet, Place 0.4 mg under the tongue Every 5 (Five) Minutes As Needed for Chest Pain. Take no more than 3 doses in 15 minutes., Disp: , Rfl:   •  oxyCODONE (ROXICODONE) 10 MG tablet, Take 10 mg by mouth Every 8 (Eight) Hours As Needed., Disp: , Rfl:   •  pantoprazole (PROTONIX) 40 MG EC tablet, Take 40 mg by mouth Every Other Day., Disp: , Rfl:   •  potassium chloride (K-DUR) 10 MEQ CR tablet, Take 10 mEq by mouth 2 (Two) Times a Day., Disp: , Rfl:   •  probiotic (CULTURELLE) capsule capsule, Take 1 capsule by mouth Daily., Disp:  "30 capsule, Rfl: 1  •  tamsulosin (FLOMAX) 0.4 MG capsule 24 hr capsule, Take 0.4 mg by mouth Daily., Disp: , Rfl:   •  tiotropium (SPIRIVA) 18 MCG per inhalation capsule, Place 1 capsule into inhaler and inhale Daily., Disp: , Rfl:   •  warfarin (COUMADIN) 5 MG tablet, Take 5 mg by mouth Daily., Disp: , Rfl:   •  aspirin 81 MG EC tablet, Take 1 tablet by mouth Daily., Disp: 100 tablet, Rfl: 4  •  Testosterone Cypionate 100 MG/ML solution, Inject 2 mL as directed Every 14 (Fourteen) Days., Disp: , Rfl:     Allergies:  Allergies   Allergen Reactions   • Tylenol [Acetaminophen] Other (See Comments)     Liver disease       Physical Exam:  Vital Signs:    Vitals:    02/03/20 1210   BP: 124/63   BP Location: Right arm   Patient Position: Sitting   Pulse: 71   Temp: 98.4 °F (36.9 °C)   SpO2: 94%   Weight: 100 kg (221 lb)   Height: 165.1 cm (65\")       Physical Exam   Gen- NAD, pleasant, cooperative  CV- Regular rate and rhythm, no murmur, gallop or rub  Pulm- Clear to auscultation bilateral without wheeze or rhonchi  GI- Soft, normoactive bowel sounds, non-tender  Ext- Without edema,   Incision- Well approximated groin site with no erythema, drainage or pseudoaneurysm noted.  Neuro- CN II- XII grossly intact, tongue midline, voice normal.    Labs/Imaging:  Chest x-Ray, [facility], 2/3/20  Impression:  Improvement seen in the pulmonary vascularity. The patient  is status post valvular replacement with pacer leads in satisfactory  position and no evidence of acute parenchymal disease.  I personally reviewed these images in the office today.     Transthoracic Echocardiogram, [facility], 1/3/20  Interpretation Summary:  · Left atrial size is mildly increased.  · Other chamber sizes are normal.  · There is borderline concentric LVH.  · Global and segmental LV wall motion is normal. The calculated LV ejection fraction is 71%.  · There is a bioprosthetic aortic valve (TAVR) that exhibits normal function.  · Mild mitral " regurgitation is present.  · There are no other important findings on this study    Assessment / Plan:  Mr. Sai Weinberg is a pleasant 59 y.o. male with a history of hypertension, diabetes mellitus, peripheral vascular disease, and severe aortic stenosis status post TAVR 11/22/2019 per Dr. St who returns the office today for follow-up exam.  The patient's groin site has healed well.  I have discussed with the patient in the office today the need for lifelong SBE prophylaxis prior to dental procedures.  At this point, medical management will be resumed by her cardiologist, Dr. Cole and we will plan to see the patient on an as-needed basis.  Should she have any questions or concerns, she may contact the office.    Follow Up:  As needed.    Please note, this document was produced using voice recognition software.    GLORIA Johnson  [facility]othoracic [facility]

## 2020-02-14 NOTE — TELEPHONE ENCOUNTER
TAVR APRN    Follow up with Mr. Weinberg upon completion of his CT Surgery OV.  Completed KCCQ12 questionnaire score 54/70 (was 19/70 pre-op).   NYHA class I-II.  Ambulation remains quite limited by PVD/ claudication.  Therefore walk test not performed.        Riri CASTRO

## 2020-11-17 NOTE — TELEPHONE ENCOUNTER
FIDENCIO CASTRO    One year follow up call.  Patient relates he is doing well.  No hospitalizations over the past year.  No chest pain.  His ambulation is limited by PVD claudication and breathing is limited by COPD, but from a cardiac standpoint, he has no complaints.  KCCQ12 questionnaire score 45/70.  NYHA class II.  He was No Show for Cardiology clinic in July.  However, he is agreeable to re-schedule to see Dr. Cordon for a check up in the next 6 weeks.  Will request echo same day.  Dr. Cordon to read.        Riri CASTRO

## 2020-12-29 PROBLEM — I25.10 CORONARY ARTERY DISEASE INVOLVING NATIVE HEART: Status: ACTIVE | Noted: 2019-10-28

## 2020-12-29 NOTE — PROGRESS NOTES
OFFICE FOLLOW UP     Date of Encounter:2020     Name: Sai Weinberg  : 1960  Address: [address] [place] [id number]    PCP: Dewayne Cole MD  [address] [place] [id number]    Sai Weinberg is a 60 y.o. male.      Chief Complaint: Follow up of CAD, VHD, PAD, HTN, HLD    Problem List:   1. Coronary artery disease   A. History of remote MI, with normal heart cath 15 years ago in [place], ID[facility]  B. Stress MPS 10/9/19: LV mild-mod dilated, large area of moderate-severe ischemia in the inferior wall  D. RIKY to mid RCA, 10/30/19  2. Aortic stenosis, severe              A. 70 mmHg AVG; 0.83 cm² by cath, 10/30/2019              B. Cath EF equals 65%, 10/30/2019  C. ZORAIDA 11/15/19: EF 60%, severe AS with mean gradient 50mmHg, moderate MR  D. TAVR, 19  E. Echo, 20: normal LV function, bioprosthetic AV with normal function, mild MR   F. Echo, 2020: Normal functioning bioprosthetic AV, mild-moderate MR  3. Peripheral arterial disease  A. S/p remote R fem-pop bypass  B. Left femoral cutdown with angioplasty and stent placement to left SFA by Dr. Rosa                i. Complicated by infection of left groin incision  C. CTA Abdominal aorta with runoff 19: no high grade stenosis of bilateral common femoral arteries; patent fem-popliteal graft on the right and patent trifurcation vessels; multifocal severe stenosis of the SFA on the left but without total occlusion, single vessel runoff via posterior tibial artery  4. Hypertension   5. Hyperlipidemia  6. DM2  7. Coal worker's pneumoconiosis   8. Tobacco abuse and severe COPD  A. Stopped smoking circa , continues to chew tobacco  9. History of back injury with lumbar disc disease and chronic back pain   10. History of colon cancer, remote, s/p partial colectomy              A. Noncompliant with subsequent colonoscopies  12. History of acute renal failure, resolved per patient  13. YOUSUF on CPAP  14. Morbid  obesity  15. Surgical history:  A. Partial colectomy  B. Right fem-pop bypass  C. Abdominal hernia repair  16. Paroxysmal AF, 11/4/19               A. Hospitalized [facility] for 5 days               D. Dismissed with HF circa 11/8/19                    1. LE edema and creatinine=2.77, resolved   17. Complete heart block after TAVR, 11/25/19 s/p [facility] dual chamber PPM  18. Chronic hypochromic, microcytic anemia.    Allergies:  Allergies   Allergen Reactions   • Tylenol [Acetaminophen] Other (See Comments)     Liver disease     Current Medications:  •  albuterol (PROVENTIL HFA;VENTOLIN HFA) 108 (90 BASE) MCG/ACT inhaler, Inhale 2 puffs Every 4 (Four) Hours As Needed for wheezing.  •  atorvastatin (LIPITOR) 40 MG tablet, Take 40 mg by mouth daily.  •  budesonide-formoterol (SYMBICORT) 80-4.5 MCG/ACT inhaler, Inhale 2 puffs 2 (Two) Times a Day.  •  bumetanide (BUMEX) 2 MG tablet, Take 2 mg by mouth 2 (Two) Times a Day As Needed.  •  carvedilol (COREG) 12.5 MG tablet, Take 1 tablet by mouth 2 (Two) Times a Day With Meals. (Patient taking differently: Take 12.5 mg by mouth Daily.)  •  Cholecalciferol (Vitamin D) 50 MCG (2000 UT) capsule, Take 2,000 Units by mouth Daily.   •  clopidogrel (PLAVIX) 75 MG tablet, Take 1 tablet by mouth Daily.  •  ferrous sulfate 324 (65 FE) MG tablet delayed-release EC tablet, Take 324 mg by mouth Daily With Breakfast  •  hydrOXYzine pamoate (VISTARIL) 50 MG capsule, Take 1 capsule by mouth Daily., Disp: , Rfl:   •  losartan (COZAAR) 50 MG tablet, Take 50 mg by mouth Daily  •  metFORMIN (GLUCOPHAGE) 850 MG tablet, Take 850 mg by mouth 2 (Two) Times a Day With Meals  •  nitroglycerin (NITROSTAT) 0.4 MG SL tablet, Place 0.4 mg under the tongue Every 5 (Five) Minutes As Needed for Chest Pain. Take no more than 3 doses in 15 minutes  •  oxyCODONE (ROXICODONE) 10 MG tablet, Take 10 mg by mouth Every 8 (Eight) Hours As Needed   •  pantoprazole (PROTONIX) 40 MG EC tablet, Take 40 mg by mouth Every  "Other Day  •  probiotic (CULTURELLE) capsule capsule, Take 1 capsule by mouth Daily  •  rOPINIRole (REQUIP) 2 MG tablet, Take 1 tablet by mouth Every Other Day  •  tamsulosin (FLOMAX) 0.4 MG capsule 24 hr capsule, Take 0.4 mg by mouth Daily.  •  Testosterone Cypionate 100 MG/ML solution, Inject 2 mL as directed Every 14 (Fourteen) Days.,   •  tiotropium (SPIRIVA) 18 MCG per inhalation capsule, Place 1 capsule into inhaler and inhale Daily.  •  warfarin (COUMADIN) 4 MG tablet, Take 4 mg by mouth Daily.,    History of Present Illness:       Sai Weinberg returns for follow up today. He is mostly sedentary. He is more active in the summer riding his  but does not walk or stand for long periods. He denies chest pain, orthopnea and PND. Leg pain limits his activities significantly. INR/warfarin followed by Dr. Cole. He is compliant with CPAP use and reports recent A1c value around 6. He has seen CHERELLE Rosa MD in the past for PAD and has undergone surgical revascularization(s).    The following portions of the patient's history were reviewed and updated as appropriate: allergies, current medications and problem list.    ROS: Pertinent positives as listed in the HPI.  All other systems reviewed and negative.    Objective:    Vitals:    12/29/20 1308 12/29/20 1314   BP: 132/62 137/56   BP Location: Right arm Right arm   Patient Position: Sitting Standing   Pulse: 67 73   SpO2: 94%    Weight: 109 kg (239 lb 12.8 oz)    Height: 165.1 cm (65\")      Body mass index is 39.9 kg/m².    Physical Exam:  GENERAL: Alert, cooperative, in no acute distress.   HEENT: Normocephalic, no adenopathy, no jugular venous distention  HEART: No discrete PMI is noted. Regular rhythm, normal rate, and no murmurs, gallops, or rubs.   LUNGS: Clear to auscultation bilaterally. No wheezing, rales or ronchi.  ABDOMEN: Soft, bowel sounds present, non-tender   NEUROLOGIC: No focal abnormalities involving strength or sensation are noted. "   EXTREMITIES: No clubbing, cyanosis noted.  3-4+ LE edema. No palpable pedal pulses although presence of edema. No evidence of threatened tissue loss.     Diagnostic Data:  No new labs available to review.     Procedures  SJ dual chamber PPM: 16 beat NSVT, brief mode switches.     Assessment and Plan:   1.  CAD/PAD:  No rest pain. Leg pain is likely multifactorial (PAD and orthopedic) but we will refer back to Dr. Rosa for reevaluation. Continue BMT. For now, leg elevation when possible and low-level compression stockings may be helpful. Discussed with the patient and his family. He would likely benefit from reevaluation by Dr. Rosa.  2.  HTN:  Well controlled, we will no make changes today.   3.  VHD: He has done well since TAVR one year ago. Recent echocardiogram reviewed.   4.  CHB after TAVR/PAF/PPM: PPM check today without significant findings. Continue warfarin (PAF and PAD) and close follow up with Dr. Cole for INR monitoring.     I will see Sai Weinberg back in 6 months or sooner on an as needed basis.    Scribed for Milad Cordon MD by Genevieve Alberto RN. 12/29/2020 14:23 EST.     EMR Dragon/Transcription Disclaimer:  Much of this encounter note is an electronic transcription/translation of spoken language to printed text.  The electronic translation of spoken language may permit erroneous, or at times, nonsensical words or phrases to be inadvertently transcribed.  Although I have reviewed the note for such errors, some may still exist.

## 2021-01-01 ENCOUNTER — APPOINTMENT (OUTPATIENT)
Dept: ULTRASOUND IMAGING | Facility: HOSPITAL | Age: 61
End: 2021-01-01

## 2021-01-01 ENCOUNTER — APPOINTMENT (OUTPATIENT)
Dept: GENERAL RADIOLOGY | Facility: HOSPITAL | Age: 61
End: 2021-01-01

## 2021-01-01 ENCOUNTER — APPOINTMENT (OUTPATIENT)
Dept: CT IMAGING | Facility: HOSPITAL | Age: 61
End: 2021-01-01

## 2021-01-01 ENCOUNTER — HOSPITAL ENCOUNTER (INPATIENT)
Facility: HOSPITAL | Age: 61
LOS: 9 days | End: 2021-01-13
Attending: EMERGENCY MEDICINE | Admitting: INTERNAL MEDICINE

## 2021-01-01 ENCOUNTER — APPOINTMENT (OUTPATIENT)
Dept: NEUROLOGY | Facility: HOSPITAL | Age: 61
End: 2021-01-01

## 2021-01-01 ENCOUNTER — APPOINTMENT (OUTPATIENT)
Dept: CARDIOLOGY | Facility: HOSPITAL | Age: 61
End: 2021-01-01

## 2021-01-01 VITALS
RESPIRATION RATE: 14 BRPM | TEMPERATURE: 98.6 F | OXYGEN SATURATION: 90 % | HEART RATE: 60 BPM | WEIGHT: 222.8 LBS | DIASTOLIC BLOOD PRESSURE: 34 MMHG | BODY MASS INDEX: 37.12 KG/M2 | HEIGHT: 65 IN | SYSTOLIC BLOOD PRESSURE: 62 MMHG

## 2021-01-01 DIAGNOSIS — I50.23 ACUTE ON CHRONIC SYSTOLIC HEART FAILURE (HCC): Chronic | ICD-10-CM

## 2021-01-01 DIAGNOSIS — E87.6 HYPOKALEMIA: Primary | ICD-10-CM

## 2021-01-01 DIAGNOSIS — E87.1 HYPONATREMIA: ICD-10-CM

## 2021-01-01 DIAGNOSIS — R40.0 SOMNOLENCE: ICD-10-CM

## 2021-01-01 LAB
ABO GROUP BLD: NORMAL
ABO GROUP BLD: NORMAL
ACTIN IGG SERPL-ACNC: 10 UNITS (ref 0–19)
ALBUMIN SERPL-MCNC: 1.6 G/DL (ref 3.5–5.2)
ALBUMIN SERPL-MCNC: 1.7 G/DL (ref 3.5–5.2)
ALBUMIN SERPL-MCNC: 1.7 G/DL (ref 3.5–5.2)
ALBUMIN SERPL-MCNC: 1.8 G/DL (ref 3.5–5.2)
ALBUMIN SERPL-MCNC: 2 G/DL (ref 3.5–5.2)
ALBUMIN SERPL-MCNC: 2 G/DL (ref 3.5–5.2)
ALBUMIN SERPL-MCNC: 2.1 G/DL (ref 3.5–5.2)
ALBUMIN SERPL-MCNC: 2.6 G/DL (ref 3.5–5.2)
ALBUMIN/GLOB SERPL: 0.3 G/DL
ALBUMIN/GLOB SERPL: 0.3 G/DL
ALBUMIN/GLOB SERPL: 0.4 G/DL
ALBUMIN/GLOB SERPL: 0.5 G/DL
ALBUMIN/GLOB SERPL: 0.5 G/DL
ALP SERPL-CCNC: 141 U/L (ref 39–117)
ALP SERPL-CCNC: 150 U/L (ref 39–117)
ALP SERPL-CCNC: 155 U/L (ref 39–117)
ALP SERPL-CCNC: 156 U/L (ref 39–117)
ALP SERPL-CCNC: 157 U/L (ref 39–117)
ALP SERPL-CCNC: 161 U/L (ref 39–117)
ALP SERPL-CCNC: 168 U/L (ref 39–117)
ALP SERPL-CCNC: 172 U/L (ref 39–117)
ALP SERPL-CCNC: 193 U/L (ref 39–117)
ALP SERPL-CCNC: 196 U/L (ref 39–117)
ALPHA-FETOPROTEIN: 1.81 NG/ML (ref 0–8.3)
ALPHA1 GLOB MFR UR ELPH: 173 MG/DL (ref 90–200)
ALT SERPL W P-5'-P-CCNC: 30 U/L (ref 1–41)
ALT SERPL W P-5'-P-CCNC: 38 U/L (ref 1–41)
ALT SERPL W P-5'-P-CCNC: 47 U/L (ref 1–41)
ALT SERPL W P-5'-P-CCNC: 49 U/L (ref 1–41)
ALT SERPL W P-5'-P-CCNC: 50 U/L (ref 1–41)
ALT SERPL W P-5'-P-CCNC: 55 U/L (ref 1–41)
ALT SERPL W P-5'-P-CCNC: 56 U/L (ref 1–41)
ALT SERPL W P-5'-P-CCNC: 59 U/L (ref 1–41)
AMMONIA BLD-SCNC: 51 UMOL/L (ref 16–60)
AMMONIA BLD-SCNC: 52 UMOL/L (ref 16–60)
AMMONIA BLD-SCNC: 58 UMOL/L (ref 16–60)
AMMONIA BLD-SCNC: 83 UMOL/L (ref 16–60)
ANA SER QL: NEGATIVE
ANION GAP SERPL CALCULATED.3IONS-SCNC: 10 MMOL/L (ref 5–15)
ANION GAP SERPL CALCULATED.3IONS-SCNC: 10 MMOL/L (ref 5–15)
ANION GAP SERPL CALCULATED.3IONS-SCNC: 12 MMOL/L (ref 5–15)
ANION GAP SERPL CALCULATED.3IONS-SCNC: 13 MMOL/L (ref 5–15)
ANION GAP SERPL CALCULATED.3IONS-SCNC: 13 MMOL/L (ref 5–15)
ANION GAP SERPL CALCULATED.3IONS-SCNC: 14 MMOL/L (ref 5–15)
ANION GAP SERPL CALCULATED.3IONS-SCNC: 16 MMOL/L (ref 5–15)
ANION GAP SERPL CALCULATED.3IONS-SCNC: 5 MMOL/L (ref 5–15)
ANION GAP SERPL CALCULATED.3IONS-SCNC: 7 MMOL/L (ref 5–15)
ANION GAP SERPL CALCULATED.3IONS-SCNC: 7 MMOL/L (ref 5–15)
ANION GAP SERPL CALCULATED.3IONS-SCNC: 8 MMOL/L (ref 5–15)
ANION GAP SERPL CALCULATED.3IONS-SCNC: 9 MMOL/L (ref 5–15)
APTT PPP: 47.4 SECONDS (ref 24–37)
APTT PPP: 48.1 SECONDS (ref 24–37)
APTT PPP: 49.9 SECONDS (ref 24–37)
APTT PPP: 49.9 SECONDS (ref 24–37)
APTT PPP: 51.8 SECONDS (ref 24–37)
APTT PPP: 55.5 SECONDS (ref 24–37)
APTT PPP: 58.9 SECONDS (ref 24–37)
APTT PPP: 61.4 SECONDS (ref 24–37)
ARTERIAL PATENCY WRIST A: ABNORMAL
AST SERPL-CCNC: 101 U/L (ref 1–40)
AST SERPL-CCNC: 102 U/L (ref 1–40)
AST SERPL-CCNC: 102 U/L (ref 1–40)
AST SERPL-CCNC: 118 U/L (ref 1–40)
AST SERPL-CCNC: 129 U/L (ref 1–40)
AST SERPL-CCNC: 134 U/L (ref 1–40)
AST SERPL-CCNC: 54 U/L (ref 1–40)
AST SERPL-CCNC: 58 U/L (ref 1–40)
AST SERPL-CCNC: 88 U/L (ref 1–40)
AST SERPL-CCNC: 98 U/L (ref 1–40)
ATMOSPHERIC PRESS: ABNORMAL MM[HG]
BACTERIA SPEC RESP CULT: NORMAL
BACTERIA UR QL AUTO: ABNORMAL /HPF
BASE EXCESS BLDA CALC-SCNC: 0.2 MMOL/L (ref 0–2)
BASE EXCESS BLDA CALC-SCNC: 7.3 MMOL/L (ref 0–2)
BASE EXCESS BLDA CALC-SCNC: 9.9 MMOL/L (ref 0–2)
BASE EXCESS BLDV CALC-SCNC: 17.4 MMOL/L (ref -2–2)
BASOPHILS # BLD AUTO: 0.02 10*3/MM3 (ref 0–0.2)
BASOPHILS # BLD AUTO: 0.02 10*3/MM3 (ref 0–0.2)
BASOPHILS # BLD AUTO: 0.03 10*3/MM3 (ref 0–0.2)
BASOPHILS # BLD AUTO: 0.03 10*3/MM3 (ref 0–0.2)
BASOPHILS # BLD AUTO: 0.04 10*3/MM3 (ref 0–0.2)
BASOPHILS # BLD AUTO: 0.05 10*3/MM3 (ref 0–0.2)
BASOPHILS # BLD AUTO: 0.05 10*3/MM3 (ref 0–0.2)
BASOPHILS # BLD AUTO: 0.08 10*3/MM3 (ref 0–0.2)
BASOPHILS # BLD AUTO: 0.1 10*3/MM3 (ref 0–0.2)
BASOPHILS # BLD AUTO: 0.11 10*3/MM3 (ref 0–0.2)
BASOPHILS # BLD AUTO: 0.12 10*3/MM3 (ref 0–0.2)
BASOPHILS # BLD AUTO: 0.16 10*3/MM3 (ref 0–0.2)
BASOPHILS # BLD MANUAL: 0 10*3/MM3 (ref 0–0.2)
BASOPHILS NFR BLD AUTO: 0 % (ref 0–1.5)
BASOPHILS NFR BLD AUTO: 0.3 % (ref 0–1.5)
BASOPHILS NFR BLD AUTO: 0.4 % (ref 0–1.5)
BASOPHILS NFR BLD AUTO: 0.4 % (ref 0–1.5)
BASOPHILS NFR BLD AUTO: 0.5 % (ref 0–1.5)
BASOPHILS NFR BLD AUTO: 0.6 % (ref 0–1.5)
BASOPHILS NFR BLD AUTO: 0.8 % (ref 0–1.5)
BASOPHILS NFR BLD AUTO: 0.8 % (ref 0–1.5)
BASOPHILS NFR BLD AUTO: 0.9 % (ref 0–1.5)
BASOPHILS NFR BLD AUTO: 1 % (ref 0–1.5)
BASOPHILS NFR BLD AUTO: 1.1 % (ref 0–1.5)
BASOPHILS NFR BLD AUTO: 1.1 % (ref 0–1.5)
BASOPHILS NFR BLD AUTO: 1.4 % (ref 0–1.5)
BDY SITE: ABNORMAL
BH BB BLOOD EXPIRATION DATE: NORMAL
BH BB BLOOD EXPIRATION DATE: NORMAL
BH BB BLOOD TYPE BARCODE: 6200
BH BB BLOOD TYPE BARCODE: 6200
BH BB DISPENSE STATUS: NORMAL
BH BB DISPENSE STATUS: NORMAL
BH BB PRODUCT CODE: NORMAL
BH BB PRODUCT CODE: NORMAL
BH BB UNIT NUMBER: NORMAL
BH BB UNIT NUMBER: NORMAL
BH CV ECHO MEAS - AO MAX PG (FULL): 23.7 MMHG
BH CV ECHO MEAS - AO MAX PG: 29 MMHG
BH CV ECHO MEAS - AO MEAN PG (FULL): 11 MMHG
BH CV ECHO MEAS - AO MEAN PG: 14 MMHG
BH CV ECHO MEAS - AO ROOT AREA (BSA CORRECTED): 1.3
BH CV ECHO MEAS - AO ROOT AREA: 5.7 CM^2
BH CV ECHO MEAS - AO ROOT DIAM: 2.7 CM
BH CV ECHO MEAS - AO V2 MAX: 270 CM/SEC
BH CV ECHO MEAS - AO V2 MEAN: 174 CM/SEC
BH CV ECHO MEAS - AO V2 VTI: 60.2 CM
BH CV ECHO MEAS - AVA(I,A): 1.4 CM^2
BH CV ECHO MEAS - AVA(I,D): 1.5 CM^2
BH CV ECHO MEAS - AVA(V,A): 1.4 CM^2
BH CV ECHO MEAS - AVA(V,D): 1.4 CM^2
BH CV ECHO MEAS - BSA(HAYCOCK): 2.2 M^2
BH CV ECHO MEAS - BSA: 2.1 M^2
BH CV ECHO MEAS - BZI_BMI: 38.6 KILOGRAMS/M^2
BH CV ECHO MEAS - BZI_METRIC_HEIGHT: 165.1 CM
BH CV ECHO MEAS - BZI_METRIC_WEIGHT: 105.2 KG
BH CV ECHO MEAS - EDV(CUBED): 139.8 ML
BH CV ECHO MEAS - EDV(MOD-SP2): 235 ML
BH CV ECHO MEAS - EDV(MOD-SP4): 143 ML
BH CV ECHO MEAS - EDV(TEICH): 128.9 ML
BH CV ECHO MEAS - EF(CUBED): 64.9 %
BH CV ECHO MEAS - EF(MOD-BP): 56 %
BH CV ECHO MEAS - EF(MOD-SP2): 51.9 %
BH CV ECHO MEAS - EF(MOD-SP4): 55.9 %
BH CV ECHO MEAS - EF(TEICH): 56.1 %
BH CV ECHO MEAS - ESV(CUBED): 49 ML
BH CV ECHO MEAS - ESV(MOD-SP2): 113 ML
BH CV ECHO MEAS - ESV(MOD-SP4): 63 ML
BH CV ECHO MEAS - ESV(TEICH): 56.6 ML
BH CV ECHO MEAS - FS: 29.5 %
BH CV ECHO MEAS - IVS/LVPW: 0.92
BH CV ECHO MEAS - IVSD: 1.2 CM
BH CV ECHO MEAS - LA DIMENSION: 4.7 CM
BH CV ECHO MEAS - LA/AO: 1.7
BH CV ECHO MEAS - LAD MAJOR: 6.1 CM
BH CV ECHO MEAS - LAT PEAK E' VEL: 8.3 CM/SEC
BH CV ECHO MEAS - LATERAL E/E' RATIO: 18.6
BH CV ECHO MEAS - LV DIASTOLIC VOL/BSA (35-75): 67.9 ML/M^2
BH CV ECHO MEAS - LV IVRT: 0.08 SEC
BH CV ECHO MEAS - LV MASS(C)D: 265.6 GRAMS
BH CV ECHO MEAS - LV MASS(C)DI: 126.1 GRAMS/M^2
BH CV ECHO MEAS - LV MAX PG: 5.5 MMHG
BH CV ECHO MEAS - LV MEAN PG: 3 MMHG
BH CV ECHO MEAS - LV SYSTOLIC VOL/BSA (12-30): 29.9 ML/M^2
BH CV ECHO MEAS - LV V1 MAX: 117 CM/SEC
BH CV ECHO MEAS - LV V1 MEAN: 84.9 CM/SEC
BH CV ECHO MEAS - LV V1 VTI: 26.3 CM
BH CV ECHO MEAS - LVIDD: 5.2 CM
BH CV ECHO MEAS - LVIDS: 3.7 CM
BH CV ECHO MEAS - LVLD AP2: 9.5 CM
BH CV ECHO MEAS - LVLD AP4: 9.1 CM
BH CV ECHO MEAS - LVLS AP2: 8.4 CM
BH CV ECHO MEAS - LVLS AP4: 7.9 CM
BH CV ECHO MEAS - LVOT AREA (M): 3.1 CM^2
BH CV ECHO MEAS - LVOT AREA: 3.1 CM^2
BH CV ECHO MEAS - LVOT DIAM: 2 CM
BH CV ECHO MEAS - LVPWD: 1.3 CM
BH CV ECHO MEAS - MED PEAK E' VEL: 7.5 CM/SEC
BH CV ECHO MEAS - MEDIAL E/E' RATIO: 20.8
BH CV ECHO MEAS - MV A MAX VEL: 71 CM/SEC
BH CV ECHO MEAS - MV DEC SLOPE: 628 CM/SEC^2
BH CV ECHO MEAS - MV DEC TIME: 0.2 SEC
BH CV ECHO MEAS - MV E MAX VEL: 155 CM/SEC
BH CV ECHO MEAS - MV E/A: 2.2
BH CV ECHO MEAS - MV MAX PG: 10.1 MMHG
BH CV ECHO MEAS - MV MEAN PG: 3 MMHG
BH CV ECHO MEAS - MV P1/2T MAX VEL: 166 CM/SEC
BH CV ECHO MEAS - MV P1/2T: 77.4 MSEC
BH CV ECHO MEAS - MV V2 MAX: 159 CM/SEC
BH CV ECHO MEAS - MV V2 MEAN: 68 CM/SEC
BH CV ECHO MEAS - MV V2 VTI: 36.8 CM
BH CV ECHO MEAS - MVA P1/2T LCG: 1.3 CM^2
BH CV ECHO MEAS - MVA(P1/2T): 2.8 CM^2
BH CV ECHO MEAS - MVA(VTI): 2.2 CM^2
BH CV ECHO MEAS - PA MAX PG: 8.1 MMHG
BH CV ECHO MEAS - PA V2 MAX: 142 CM/SEC
BH CV ECHO MEAS - RAP SYSTOLE: 15 MMHG
BH CV ECHO MEAS - RVSP: 49 MMHG
BH CV ECHO MEAS - SI(AO): 163.6 ML/M^2
BH CV ECHO MEAS - SI(CUBED): 43.1 ML/M^2
BH CV ECHO MEAS - SI(LVOT): 39.2 ML/M^2
BH CV ECHO MEAS - SI(MOD-SP2): 57.9 ML/M^2
BH CV ECHO MEAS - SI(MOD-SP4): 38 ML/M^2
BH CV ECHO MEAS - SI(TEICH): 34.3 ML/M^2
BH CV ECHO MEAS - SV(AO): 344.7 ML
BH CV ECHO MEAS - SV(CUBED): 90.8 ML
BH CV ECHO MEAS - SV(LVOT): 82.6 ML
BH CV ECHO MEAS - SV(MOD-SP2): 122 ML
BH CV ECHO MEAS - SV(MOD-SP4): 80 ML
BH CV ECHO MEAS - SV(TEICH): 72.3 ML
BH CV ECHO MEAS - TAPSE (>1.6): 2.1 CM
BH CV ECHO MEAS - TR MAX PG: 34 MMHG
BH CV ECHO MEAS - TR MAX VEL: 291 CM/SEC
BH CV ECHO MEASUREMENTS AVERAGE E/E' RATIO: 19.62
BH CV VAS BP LEFT ARM: NORMAL MMHG
BH CV XLRA - RV BASE: 5 CM
BH CV XLRA - RV LENGTH: 8.3 CM
BH CV XLRA - RV MID: 3.2 CM
BH CV XLRA - TDI S': 12.1 CM/SEC
BILIRUB CONJ SERPL-MCNC: 1.5 MG/DL (ref 0–0.3)
BILIRUB CONJ SERPL-MCNC: 1.6 MG/DL (ref 0–0.3)
BILIRUB INDIRECT SERPL-MCNC: 2.2 MG/DL
BILIRUB SERPL-MCNC: 2.8 MG/DL (ref 0–1.2)
BILIRUB SERPL-MCNC: 3 MG/DL (ref 0–1.2)
BILIRUB SERPL-MCNC: 3.1 MG/DL (ref 0–1.2)
BILIRUB SERPL-MCNC: 3.4 MG/DL (ref 0–1.2)
BILIRUB SERPL-MCNC: 3.8 MG/DL (ref 0–1.2)
BILIRUB SERPL-MCNC: 3.9 MG/DL (ref 0–1.2)
BILIRUB SERPL-MCNC: 3.9 MG/DL (ref 0–1.2)
BILIRUB SERPL-MCNC: 4.7 MG/DL (ref 0–1.2)
BILIRUB SERPL-MCNC: 4.9 MG/DL (ref 0–1.2)
BILIRUB SERPL-MCNC: 5.3 MG/DL (ref 0–1.2)
BILIRUB UR QL STRIP: NEGATIVE
BLD GP AB SCN SERPL QL: POSITIVE
BODY TEMPERATURE: 37 C
BUN SERPL-MCNC: 29 MG/DL (ref 8–23)
BUN SERPL-MCNC: 32 MG/DL (ref 8–23)
BUN SERPL-MCNC: 33 MG/DL (ref 8–23)
BUN SERPL-MCNC: 34 MG/DL (ref 8–23)
BUN SERPL-MCNC: 35 MG/DL (ref 8–23)
BUN SERPL-MCNC: 35 MG/DL (ref 8–23)
BUN SERPL-MCNC: 36 MG/DL (ref 8–23)
BUN SERPL-MCNC: 42 MG/DL (ref 8–23)
BUN SERPL-MCNC: 47 MG/DL (ref 8–23)
BUN SERPL-MCNC: 48 MG/DL (ref 8–23)
BUN SERPL-MCNC: 49 MG/DL (ref 8–23)
BUN SERPL-MCNC: 50 MG/DL (ref 8–23)
BUN SERPL-MCNC: 56 MG/DL (ref 8–23)
BUN SERPL-MCNC: 58 MG/DL (ref 8–23)
BUN SERPL-MCNC: 65 MG/DL (ref 8–23)
BUN SERPL-MCNC: 68 MG/DL (ref 8–23)
BUN/CREAT SERPL: 20.8 (ref 7–25)
BUN/CREAT SERPL: 20.9 (ref 7–25)
BUN/CREAT SERPL: 22 (ref 7–25)
BUN/CREAT SERPL: 22.9 (ref 7–25)
BUN/CREAT SERPL: 23 (ref 7–25)
BUN/CREAT SERPL: 27 (ref 7–25)
BUN/CREAT SERPL: 32.7 (ref 7–25)
BUN/CREAT SERPL: 32.8 (ref 7–25)
BUN/CREAT SERPL: 36.1 (ref 7–25)
BUN/CREAT SERPL: 39.1 (ref 7–25)
BUN/CREAT SERPL: 42.5 (ref 7–25)
BUN/CREAT SERPL: 42.9 (ref 7–25)
BUN/CREAT SERPL: 43 (ref 7–25)
BUN/CREAT SERPL: 43.3 (ref 7–25)
BUN/CREAT SERPL: 43.4 (ref 7–25)
BUN/CREAT SERPL: 46.1 (ref 7–25)
BUN/CREAT SERPL: 51.4 (ref 7–25)
BUN/CREAT SERPL: 53.3 (ref 7–25)
BUN/CREAT SERPL: 55.8 (ref 7–25)
BUN/CREAT SERPL: 56.8 (ref 7–25)
CA-I SERPL ISE-MCNC: 1.03 MMOL/L (ref 1.12–1.32)
CA-I SERPL ISE-MCNC: 1.1 MMOL/L (ref 1.12–1.32)
CA-I SERPL ISE-MCNC: 1.11 MMOL/L (ref 1.12–1.32)
CA-I SERPL ISE-MCNC: 1.11 MMOL/L (ref 1.12–1.32)
CA-I SERPL ISE-MCNC: 1.16 MMOL/L (ref 1.12–1.32)
CA-I SERPL ISE-MCNC: 1.22 MMOL/L (ref 1.12–1.32)
CA-I SERPL ISE-MCNC: 1.22 MMOL/L (ref 1.12–1.32)
CALCIUM SPEC-SCNC: 7.7 MG/DL (ref 8.6–10.5)
CALCIUM SPEC-SCNC: 7.7 MG/DL (ref 8.6–10.5)
CALCIUM SPEC-SCNC: 7.8 MG/DL (ref 8.6–10.5)
CALCIUM SPEC-SCNC: 8 MG/DL (ref 8.6–10.5)
CALCIUM SPEC-SCNC: 8.1 MG/DL (ref 8.6–10.5)
CALCIUM SPEC-SCNC: 8.1 MG/DL (ref 8.6–10.5)
CALCIUM SPEC-SCNC: 8.4 MG/DL (ref 8.6–10.5)
CALCIUM SPEC-SCNC: 8.5 MG/DL (ref 8.6–10.5)
CALCIUM SPEC-SCNC: 8.6 MG/DL (ref 8.6–10.5)
CALCIUM SPEC-SCNC: 8.7 MG/DL (ref 8.6–10.5)
CALCIUM SPEC-SCNC: 8.8 MG/DL (ref 8.6–10.5)
CALCIUM SPEC-SCNC: 8.8 MG/DL (ref 8.6–10.5)
CALCIUM SPEC-SCNC: 8.9 MG/DL (ref 8.6–10.5)
CALCIUM SPEC-SCNC: 8.9 MG/DL (ref 8.6–10.5)
CALCIUM SPEC-SCNC: 9.1 MG/DL (ref 8.6–10.5)
CALCIUM SPEC-SCNC: 9.3 MG/DL (ref 8.6–10.5)
CALCIUM SPEC-SCNC: 9.4 MG/DL (ref 8.6–10.5)
CERULOPLASMIN SERPL-MCNC: 31 MG/DL (ref 16–31)
CHLORIDE SERPL-SCNC: 100 MMOL/L (ref 98–107)
CHLORIDE SERPL-SCNC: 100 MMOL/L (ref 98–107)
CHLORIDE SERPL-SCNC: 76 MMOL/L (ref 98–107)
CHLORIDE SERPL-SCNC: 78 MMOL/L (ref 98–107)
CHLORIDE SERPL-SCNC: 81 MMOL/L (ref 98–107)
CHLORIDE SERPL-SCNC: 82 MMOL/L (ref 98–107)
CHLORIDE SERPL-SCNC: 83 MMOL/L (ref 98–107)
CHLORIDE SERPL-SCNC: 84 MMOL/L (ref 98–107)
CHLORIDE SERPL-SCNC: 85 MMOL/L (ref 98–107)
CHLORIDE SERPL-SCNC: 87 MMOL/L (ref 98–107)
CHLORIDE SERPL-SCNC: 90 MMOL/L (ref 98–107)
CHLORIDE SERPL-SCNC: 94 MMOL/L (ref 98–107)
CHLORIDE SERPL-SCNC: 94 MMOL/L (ref 98–107)
CHLORIDE SERPL-SCNC: 97 MMOL/L (ref 98–107)
CHLORIDE SERPL-SCNC: 98 MMOL/L (ref 98–107)
CHLORIDE SERPL-SCNC: 98 MMOL/L (ref 98–107)
CHLORIDE SERPL-SCNC: 99 MMOL/L (ref 98–107)
CHLORIDE SERPL-SCNC: 99 MMOL/L (ref 98–107)
CK MB SERPL-CCNC: 4.84 NG/ML
CK SERPL-CCNC: 99 U/L (ref 20–200)
CLARITY UR: CLEAR
CO2 BLDA-SCNC: 28.9 MMOL/L (ref 22–33)
CO2 BLDA-SCNC: 33.2 MMOL/L (ref 22–33)
CO2 BLDA-SCNC: 36.4 MMOL/L (ref 22–33)
CO2 BLDA-SCNC: 44.1 MMOL/L (ref 22–33)
CO2 SERPL-SCNC: 26 MMOL/L (ref 22–29)
CO2 SERPL-SCNC: 27 MMOL/L (ref 22–29)
CO2 SERPL-SCNC: 27 MMOL/L (ref 22–29)
CO2 SERPL-SCNC: 28 MMOL/L (ref 22–29)
CO2 SERPL-SCNC: 29 MMOL/L (ref 22–29)
CO2 SERPL-SCNC: 31 MMOL/L (ref 22–29)
CO2 SERPL-SCNC: 32 MMOL/L (ref 22–29)
CO2 SERPL-SCNC: 33 MMOL/L (ref 22–29)
CO2 SERPL-SCNC: 34 MMOL/L (ref 22–29)
CO2 SERPL-SCNC: 35 MMOL/L (ref 22–29)
CO2 SERPL-SCNC: 36 MMOL/L (ref 22–29)
COHGB MFR BLD: 1.8 % (ref 0–2)
COHGB MFR BLD: 2 % (ref 0–2)
COHGB MFR BLD: 2.1 % (ref 0–2)
COHGB MFR BLD: 2.2 %
COLD SCREEN: POSITIVE
COLOR UR: YELLOW
CORTIS AM PEAK SERPL-MCNC: 25.96 MCG/DL
CREAT SERPL-MCNC: 0.6 MG/DL (ref 0.76–1.27)
CREAT SERPL-MCNC: 0.67 MG/DL (ref 0.76–1.27)
CREAT SERPL-MCNC: 0.7 MG/DL (ref 0.76–1.27)
CREAT SERPL-MCNC: 0.76 MG/DL (ref 0.76–1.27)
CREAT SERPL-MCNC: 0.76 MG/DL (ref 0.76–1.27)
CREAT SERPL-MCNC: 0.77 MG/DL (ref 0.76–1.27)
CREAT SERPL-MCNC: 0.79 MG/DL (ref 0.76–1.27)
CREAT SERPL-MCNC: 0.8 MG/DL (ref 0.76–1.27)
CREAT SERPL-MCNC: 0.86 MG/DL (ref 0.76–1.27)
CREAT SERPL-MCNC: 0.87 MG/DL (ref 0.76–1.27)
CREAT SERPL-MCNC: 0.88 MG/DL (ref 0.76–1.27)
CREAT SERPL-MCNC: 0.97 MG/DL (ref 0.76–1.27)
CREAT SERPL-MCNC: 1.01 MG/DL (ref 0.76–1.27)
CREAT SERPL-MCNC: 1.28 MG/DL (ref 0.76–1.27)
CREAT SERPL-MCNC: 1.74 MG/DL (ref 0.76–1.27)
CREAT SERPL-MCNC: 2.13 MG/DL (ref 0.76–1.27)
CREAT SERPL-MCNC: 2.45 MG/DL (ref 0.76–1.27)
CREAT SERPL-MCNC: 2.79 MG/DL (ref 0.76–1.27)
CREAT SERPL-MCNC: 2.96 MG/DL (ref 0.76–1.27)
CREAT SERPL-MCNC: 3.26 MG/DL (ref 0.76–1.27)
CYTOLOGIST CVX/VAG CYTO: NORMAL
D DIMER PPP FEU-MCNC: 3.85 MCGFEU/ML (ref 0–0.56)
D-LACTATE SERPL-SCNC: 2.3 MMOL/L (ref 0.5–2)
D-LACTATE SERPL-SCNC: 2.6 MMOL/L (ref 0.5–2)
D-LACTATE SERPL-SCNC: 2.7 MMOL/L (ref 0.5–2)
D-LACTATE SERPL-SCNC: 2.7 MMOL/L (ref 0.5–2)
D-LACTATE SERPL-SCNC: 2.8 MMOL/L (ref 0.5–2)
D-LACTATE SERPL-SCNC: 2.9 MMOL/L (ref 0.5–2)
D-LACTATE SERPL-SCNC: 3.2 MMOL/L (ref 0.5–2)
D-LACTATE SERPL-SCNC: 3.6 MMOL/L (ref 0.5–2)
D-LACTATE SERPL-SCNC: 9.1 MMOL/L (ref 0.5–2)
DACRYOCYTES BLD QL SMEAR: NORMAL
DAT IGG GEL: POSITIVE
DEPRECATED RDW RBC AUTO: 45.4 FL (ref 37–54)
DEPRECATED RDW RBC AUTO: 45.7 FL (ref 37–54)
DEPRECATED RDW RBC AUTO: 46.8 FL (ref 37–54)
DEPRECATED RDW RBC AUTO: 49.5 FL (ref 37–54)
DEPRECATED RDW RBC AUTO: 50.3 FL (ref 37–54)
DEPRECATED RDW RBC AUTO: 50.9 FL (ref 37–54)
DEPRECATED RDW RBC AUTO: 53.1 FL (ref 37–54)
DEPRECATED RDW RBC AUTO: 55.6 FL (ref 37–54)
DEPRECATED RDW RBC AUTO: 55.6 FL (ref 37–54)
DEPRECATED RDW RBC AUTO: 57.5 FL (ref 37–54)
DEPRECATED RDW RBC AUTO: 59.6 FL (ref 37–54)
DEPRECATED RDW RBC AUTO: 61.8 FL (ref 37–54)
DEPRECATED RDW RBC AUTO: 63 FL (ref 37–54)
DEPRECATED RDW RBC AUTO: 63.2 FL (ref 37–54)
DEPRECATED RDW RBC AUTO: 63.5 FL (ref 37–54)
DEPRECATED RDW RBC AUTO: 63.7 FL (ref 37–54)
EOSINOPHIL # BLD AUTO: 0.02 10*3/MM3 (ref 0–0.4)
EOSINOPHIL # BLD AUTO: 0.03 10*3/MM3 (ref 0–0.4)
EOSINOPHIL # BLD AUTO: 0.04 10*3/MM3 (ref 0–0.4)
EOSINOPHIL # BLD AUTO: 0.05 10*3/MM3 (ref 0–0.4)
EOSINOPHIL # BLD AUTO: 0.05 10*3/MM3 (ref 0–0.4)
EOSINOPHIL # BLD AUTO: 0.16 10*3/MM3 (ref 0–0.4)
EOSINOPHIL # BLD AUTO: 0.27 10*3/MM3 (ref 0–0.4)
EOSINOPHIL # BLD AUTO: 0.28 10*3/MM3 (ref 0–0.4)
EOSINOPHIL # BLD AUTO: 0.36 10*3/MM3 (ref 0–0.4)
EOSINOPHIL # BLD AUTO: 0.38 10*3/MM3 (ref 0–0.4)
EOSINOPHIL # BLD MANUAL: 0.1 10*3/MM3 (ref 0–0.4)
EOSINOPHIL NFR BLD AUTO: 0.2 % (ref 0.3–6.2)
EOSINOPHIL NFR BLD AUTO: 0.3 % (ref 0.3–6.2)
EOSINOPHIL NFR BLD AUTO: 0.3 % (ref 0.3–6.2)
EOSINOPHIL NFR BLD AUTO: 0.5 % (ref 0.3–6.2)
EOSINOPHIL NFR BLD AUTO: 0.6 % (ref 0.3–6.2)
EOSINOPHIL NFR BLD AUTO: 0.7 % (ref 0.3–6.2)
EOSINOPHIL NFR BLD AUTO: 0.8 % (ref 0.3–6.2)
EOSINOPHIL NFR BLD AUTO: 1.7 % (ref 0.3–6.2)
EOSINOPHIL NFR BLD AUTO: 2.7 % (ref 0.3–6.2)
EOSINOPHIL NFR BLD AUTO: 2.8 % (ref 0.3–6.2)
EOSINOPHIL NFR BLD AUTO: 3.1 % (ref 0.3–6.2)
EOSINOPHIL NFR BLD AUTO: 3.4 % (ref 0.3–6.2)
EOSINOPHIL NFR BLD MANUAL: 2 % (ref 0.3–6.2)
EPAP: 0
ERYTHROCYTE [DISTWIDTH] IN BLOOD BY AUTOMATED COUNT: 13.6 % (ref 12.3–15.4)
ERYTHROCYTE [DISTWIDTH] IN BLOOD BY AUTOMATED COUNT: 13.9 % (ref 12.3–15.4)
ERYTHROCYTE [DISTWIDTH] IN BLOOD BY AUTOMATED COUNT: 14.1 % (ref 12.3–15.4)
ERYTHROCYTE [DISTWIDTH] IN BLOOD BY AUTOMATED COUNT: 14.7 % (ref 12.3–15.4)
ERYTHROCYTE [DISTWIDTH] IN BLOOD BY AUTOMATED COUNT: 14.9 % (ref 12.3–15.4)
ERYTHROCYTE [DISTWIDTH] IN BLOOD BY AUTOMATED COUNT: 15.3 % (ref 12.3–15.4)
ERYTHROCYTE [DISTWIDTH] IN BLOOD BY AUTOMATED COUNT: 15.4 % (ref 12.3–15.4)
ERYTHROCYTE [DISTWIDTH] IN BLOOD BY AUTOMATED COUNT: 15.7 % (ref 12.3–15.4)
ERYTHROCYTE [DISTWIDTH] IN BLOOD BY AUTOMATED COUNT: 16.5 % (ref 12.3–15.4)
ERYTHROCYTE [DISTWIDTH] IN BLOOD BY AUTOMATED COUNT: 16.7 % (ref 12.3–15.4)
ERYTHROCYTE [DISTWIDTH] IN BLOOD BY AUTOMATED COUNT: 17.1 % (ref 12.3–15.4)
ERYTHROCYTE [DISTWIDTH] IN BLOOD BY AUTOMATED COUNT: 17.4 % (ref 12.3–15.4)
ERYTHROCYTE [DISTWIDTH] IN BLOOD BY AUTOMATED COUNT: 17.4 % (ref 12.3–15.4)
ERYTHROCYTE [DISTWIDTH] IN BLOOD BY AUTOMATED COUNT: 17.6 % (ref 12.3–15.4)
ERYTHROCYTE [DISTWIDTH] IN BLOOD BY AUTOMATED COUNT: 17.6 % (ref 12.3–15.4)
ERYTHROCYTE [DISTWIDTH] IN BLOOD BY AUTOMATED COUNT: 17.7 % (ref 12.3–15.4)
FERRITIN SERPL-MCNC: 1006 NG/ML (ref 30–400)
FIBRINOGEN PPP-MCNC: 411 MG/DL (ref 215–430)
FLUAV RNA RESP QL NAA+PROBE: NOT DETECTED
FLUBV RNA RESP QL NAA+PROBE: NOT DETECTED
FSP PPP LA-ACNC: NORMAL
GFR SERPL CREATININE-BSD FRML MDRD: 100 ML/MIN/1.73
GFR SERPL CREATININE-BSD FRML MDRD: 103 ML/MIN/1.73
GFR SERPL CREATININE-BSD FRML MDRD: 105 ML/MIN/1.73
GFR SERPL CREATININE-BSD FRML MDRD: 105 ML/MIN/1.73
GFR SERPL CREATININE-BSD FRML MDRD: 115 ML/MIN/1.73
GFR SERPL CREATININE-BSD FRML MDRD: 121 ML/MIN/1.73
GFR SERPL CREATININE-BSD FRML MDRD: 137 ML/MIN/1.73
GFR SERPL CREATININE-BSD FRML MDRD: 19 ML/MIN/1.73
GFR SERPL CREATININE-BSD FRML MDRD: 22 ML/MIN/1.73
GFR SERPL CREATININE-BSD FRML MDRD: 23 ML/MIN/1.73
GFR SERPL CREATININE-BSD FRML MDRD: 27 ML/MIN/1.73
GFR SERPL CREATININE-BSD FRML MDRD: 32 ML/MIN/1.73
GFR SERPL CREATININE-BSD FRML MDRD: 40 ML/MIN/1.73
GFR SERPL CREATININE-BSD FRML MDRD: 57 ML/MIN/1.73
GFR SERPL CREATININE-BSD FRML MDRD: 75 ML/MIN/1.73
GFR SERPL CREATININE-BSD FRML MDRD: 79 ML/MIN/1.73
GFR SERPL CREATININE-BSD FRML MDRD: 88 ML/MIN/1.73
GFR SERPL CREATININE-BSD FRML MDRD: 90 ML/MIN/1.73
GFR SERPL CREATININE-BSD FRML MDRD: 91 ML/MIN/1.73
GFR SERPL CREATININE-BSD FRML MDRD: 99 ML/MIN/1.73
GLOBULIN UR ELPH-MCNC: 4.2 GM/DL
GLOBULIN UR ELPH-MCNC: 4.4 GM/DL
GLOBULIN UR ELPH-MCNC: 4.5 GM/DL
GLOBULIN UR ELPH-MCNC: 4.7 GM/DL
GLOBULIN UR ELPH-MCNC: 4.7 GM/DL
GLOBULIN UR ELPH-MCNC: 4.9 GM/DL
GLOBULIN UR ELPH-MCNC: 5.1 GM/DL
GLOBULIN UR ELPH-MCNC: 5.1 GM/DL
GLOBULIN UR ELPH-MCNC: 5.2 GM/DL
GLUCOSE BLDC GLUCOMTR-MCNC: 112 MG/DL (ref 70–130)
GLUCOSE BLDC GLUCOMTR-MCNC: 115 MG/DL (ref 70–130)
GLUCOSE BLDC GLUCOMTR-MCNC: 115 MG/DL (ref 70–130)
GLUCOSE BLDC GLUCOMTR-MCNC: 117 MG/DL (ref 70–130)
GLUCOSE BLDC GLUCOMTR-MCNC: 118 MG/DL (ref 70–130)
GLUCOSE BLDC GLUCOMTR-MCNC: 120 MG/DL (ref 70–130)
GLUCOSE BLDC GLUCOMTR-MCNC: 122 MG/DL (ref 70–130)
GLUCOSE BLDC GLUCOMTR-MCNC: 122 MG/DL (ref 70–130)
GLUCOSE BLDC GLUCOMTR-MCNC: 124 MG/DL (ref 70–130)
GLUCOSE BLDC GLUCOMTR-MCNC: 125 MG/DL (ref 70–130)
GLUCOSE BLDC GLUCOMTR-MCNC: 127 MG/DL (ref 70–130)
GLUCOSE BLDC GLUCOMTR-MCNC: 128 MG/DL (ref 70–130)
GLUCOSE BLDC GLUCOMTR-MCNC: 128 MG/DL (ref 70–130)
GLUCOSE BLDC GLUCOMTR-MCNC: 129 MG/DL (ref 70–130)
GLUCOSE BLDC GLUCOMTR-MCNC: 129 MG/DL (ref 70–130)
GLUCOSE BLDC GLUCOMTR-MCNC: 131 MG/DL (ref 70–130)
GLUCOSE BLDC GLUCOMTR-MCNC: 131 MG/DL (ref 70–130)
GLUCOSE BLDC GLUCOMTR-MCNC: 132 MG/DL (ref 70–130)
GLUCOSE BLDC GLUCOMTR-MCNC: 133 MG/DL (ref 70–130)
GLUCOSE BLDC GLUCOMTR-MCNC: 134 MG/DL (ref 70–130)
GLUCOSE BLDC GLUCOMTR-MCNC: 135 MG/DL (ref 70–130)
GLUCOSE BLDC GLUCOMTR-MCNC: 138 MG/DL (ref 70–130)
GLUCOSE BLDC GLUCOMTR-MCNC: 138 MG/DL (ref 70–130)
GLUCOSE BLDC GLUCOMTR-MCNC: 142 MG/DL (ref 70–130)
GLUCOSE BLDC GLUCOMTR-MCNC: 142 MG/DL (ref 70–130)
GLUCOSE BLDC GLUCOMTR-MCNC: 143 MG/DL (ref 70–130)
GLUCOSE BLDC GLUCOMTR-MCNC: 143 MG/DL (ref 70–130)
GLUCOSE BLDC GLUCOMTR-MCNC: 145 MG/DL (ref 70–130)
GLUCOSE BLDC GLUCOMTR-MCNC: 151 MG/DL (ref 70–130)
GLUCOSE BLDC GLUCOMTR-MCNC: 153 MG/DL (ref 70–130)
GLUCOSE BLDC GLUCOMTR-MCNC: 163 MG/DL (ref 70–130)
GLUCOSE BLDC GLUCOMTR-MCNC: 163 MG/DL (ref 70–130)
GLUCOSE BLDC GLUCOMTR-MCNC: 167 MG/DL (ref 70–130)
GLUCOSE BLDC GLUCOMTR-MCNC: 168 MG/DL (ref 70–130)
GLUCOSE BLDC GLUCOMTR-MCNC: 169 MG/DL (ref 70–130)
GLUCOSE BLDC GLUCOMTR-MCNC: 177 MG/DL (ref 70–130)
GLUCOSE SERPL-MCNC: 111 MG/DL (ref 65–99)
GLUCOSE SERPL-MCNC: 113 MG/DL (ref 65–99)
GLUCOSE SERPL-MCNC: 116 MG/DL (ref 65–99)
GLUCOSE SERPL-MCNC: 117 MG/DL (ref 65–99)
GLUCOSE SERPL-MCNC: 123 MG/DL (ref 65–99)
GLUCOSE SERPL-MCNC: 125 MG/DL (ref 65–99)
GLUCOSE SERPL-MCNC: 128 MG/DL (ref 65–99)
GLUCOSE SERPL-MCNC: 129 MG/DL (ref 65–99)
GLUCOSE SERPL-MCNC: 130 MG/DL (ref 65–99)
GLUCOSE SERPL-MCNC: 131 MG/DL (ref 65–99)
GLUCOSE SERPL-MCNC: 132 MG/DL (ref 65–99)
GLUCOSE SERPL-MCNC: 134 MG/DL (ref 65–99)
GLUCOSE SERPL-MCNC: 135 MG/DL (ref 65–99)
GLUCOSE SERPL-MCNC: 136 MG/DL (ref 65–99)
GLUCOSE SERPL-MCNC: 137 MG/DL (ref 65–99)
GLUCOSE SERPL-MCNC: 140 MG/DL (ref 65–99)
GLUCOSE SERPL-MCNC: 148 MG/DL (ref 65–99)
GLUCOSE SERPL-MCNC: 154 MG/DL (ref 65–99)
GLUCOSE SERPL-MCNC: 157 MG/DL (ref 65–99)
GLUCOSE SERPL-MCNC: 164 MG/DL (ref 65–99)
GLUCOSE UR STRIP-MCNC: NEGATIVE MG/DL
GRAM STN SPEC: NORMAL
HAV AB SER QL IA: POSITIVE
HAV IGM SERPL QL IA: NORMAL
HBA1C MFR BLD: 6.2 % (ref 4.8–5.6)
HBV CORE IGM SERPL QL IA: NORMAL
HBV SURFACE AB SER RIA-ACNC: NORMAL
HBV SURFACE AG SERPL QL IA: NORMAL
HCO3 BLDA-SCNC: 27.2 MMOL/L (ref 20–26)
HCO3 BLDA-SCNC: 31.8 MMOL/L (ref 20–26)
HCO3 BLDA-SCNC: 34.9 MMOL/L (ref 20–26)
HCO3 BLDV-SCNC: 42.6 MMOL/L (ref 22–28)
HCT VFR BLD AUTO: 26.5 % (ref 37.5–51)
HCT VFR BLD AUTO: 26.6 % (ref 37.5–51)
HCT VFR BLD AUTO: 26.6 % (ref 37.5–51)
HCT VFR BLD AUTO: 26.8 % (ref 37.5–51)
HCT VFR BLD AUTO: 26.8 % (ref 37.5–51)
HCT VFR BLD AUTO: 27.1 % (ref 37.5–51)
HCT VFR BLD AUTO: 27.5 % (ref 37.5–51)
HCT VFR BLD AUTO: 29.5 % (ref 37.5–51)
HCT VFR BLD AUTO: 30.9 % (ref 37.5–51)
HCT VFR BLD AUTO: 31 % (ref 37.5–51)
HCT VFR BLD AUTO: 33.4 % (ref 37.5–51)
HCT VFR BLD AUTO: 34.1 % (ref 37.5–51)
HCT VFR BLD AUTO: 34.2 % (ref 37.5–51)
HCT VFR BLD AUTO: 36.2 % (ref 37.5–51)
HCT VFR BLD CALC: 28.3 %
HCT VFR BLD CALC: 30.1 %
HCT VFR BLD CALC: 32.4 %
HCV AB SER DONR QL: NORMAL
HGB BLD-MCNC: 10.7 G/DL (ref 13–17.7)
HGB BLD-MCNC: 11 G/DL (ref 13–17.7)
HGB BLD-MCNC: 11.8 G/DL (ref 13–17.7)
HGB BLD-MCNC: 11.9 G/DL (ref 13–17.7)
HGB BLD-MCNC: 12.2 G/DL (ref 13–17.7)
HGB BLD-MCNC: 12.4 G/DL (ref 13–17.7)
HGB BLD-MCNC: 12.5 G/DL (ref 13–17.7)
HGB BLD-MCNC: 12.6 G/DL (ref 13–17.7)
HGB BLD-MCNC: 8.7 G/DL (ref 13–17.7)
HGB BLD-MCNC: 8.8 G/DL (ref 13–17.7)
HGB BLD-MCNC: 9 G/DL (ref 13–17.7)
HGB BLD-MCNC: 9 G/DL (ref 13–17.7)
HGB BLD-MCNC: 9.1 G/DL (ref 13–17.7)
HGB BLD-MCNC: 9.1 G/DL (ref 13–17.7)
HGB BLD-MCNC: 9.2 G/DL (ref 13–17.7)
HGB BLD-MCNC: 9.9 G/DL (ref 13–17.7)
HGB BLDA-MCNC: 10.6 G/DL (ref 13.5–17.5)
HGB BLDA-MCNC: 13.5 G/DL (ref 13.5–17.5)
HGB BLDA-MCNC: 9.2 G/DL (ref 13.5–17.5)
HGB BLDA-MCNC: 9.8 G/DL (ref 13.5–17.5)
HGB UR QL STRIP.AUTO: NEGATIVE
HOLD SPECIMEN: NORMAL
IMM GRANULOCYTES # BLD AUTO: 0.04 10*3/MM3 (ref 0–0.05)
IMM GRANULOCYTES # BLD AUTO: 0.05 10*3/MM3 (ref 0–0.05)
IMM GRANULOCYTES # BLD AUTO: 0.06 10*3/MM3 (ref 0–0.05)
IMM GRANULOCYTES # BLD AUTO: 0.06 10*3/MM3 (ref 0–0.05)
IMM GRANULOCYTES # BLD AUTO: 0.09 10*3/MM3 (ref 0–0.05)
IMM GRANULOCYTES # BLD AUTO: 0.11 10*3/MM3 (ref 0–0.05)
IMM GRANULOCYTES # BLD AUTO: 0.12 10*3/MM3 (ref 0–0.05)
IMM GRANULOCYTES NFR BLD AUTO: 0.6 % (ref 0–0.5)
IMM GRANULOCYTES NFR BLD AUTO: 0.8 % (ref 0–0.5)
IMM GRANULOCYTES NFR BLD AUTO: 0.9 % (ref 0–0.5)
IMM GRANULOCYTES NFR BLD AUTO: 1 % (ref 0–0.5)
IMM GRANULOCYTES NFR BLD AUTO: 1.1 % (ref 0–0.5)
INHALED O2 CONCENTRATION: 100 %
INHALED O2 CONCENTRATION: 28 %
INHALED O2 CONCENTRATION: 50 %
INHALED O2 CONCENTRATION: 55 %
INR PPP: 1.74 (ref 0.85–1.16)
INR PPP: 1.84 (ref 0.85–1.16)
INR PPP: 1.97 (ref 0.85–1.16)
INR PPP: 2.29 (ref 0.85–1.16)
INR PPP: 3.2 (ref 0.85–1.16)
INR PPP: 3.31 (ref 0.85–1.16)
INR PPP: 3.35 (ref 0.85–1.16)
INR PPP: 3.58 (ref 0.85–1.16)
INR PPP: 4.32 (ref 0.85–1.16)
INR PPP: 4.66 (ref 0.85–1.16)
INR PPP: 5.41 (ref 0.85–1.16)
IPAP: 0
KETONES UR QL STRIP: NEGATIVE
L PNEUMO1 AG UR QL IA: NEGATIVE
LAB AP CASE REPORT: NORMAL
LACTATE HOLD SPECIMEN: NORMAL
LARGE PLATELETS: NORMAL
LDH SERPL-CCNC: 296 U/L (ref 135–225)
LEFT ATRIUM VOLUME INDEX: 43.7 ML/M^2
LEFT ATRIUM VOLUME: 92 ML
LEUKOCYTE ESTERASE UR QL STRIP.AUTO: ABNORMAL
LYMPHOCYTES # BLD AUTO: 0.42 10*3/MM3 (ref 0.7–3.1)
LYMPHOCYTES # BLD AUTO: 0.49 10*3/MM3 (ref 0.7–3.1)
LYMPHOCYTES # BLD AUTO: 0.54 10*3/MM3 (ref 0.7–3.1)
LYMPHOCYTES # BLD AUTO: 0.59 10*3/MM3 (ref 0.7–3.1)
LYMPHOCYTES # BLD AUTO: 0.61 10*3/MM3 (ref 0.7–3.1)
LYMPHOCYTES # BLD AUTO: 0.61 10*3/MM3 (ref 0.7–3.1)
LYMPHOCYTES # BLD AUTO: 0.67 10*3/MM3 (ref 0.7–3.1)
LYMPHOCYTES # BLD AUTO: 1.11 10*3/MM3 (ref 0.7–3.1)
LYMPHOCYTES # BLD AUTO: 1.4 10*3/MM3 (ref 0.7–3.1)
LYMPHOCYTES # BLD AUTO: 1.65 10*3/MM3 (ref 0.7–3.1)
LYMPHOCYTES # BLD AUTO: 1.67 10*3/MM3 (ref 0.7–3.1)
LYMPHOCYTES # BLD AUTO: 1.76 10*3/MM3 (ref 0.7–3.1)
LYMPHOCYTES # BLD AUTO: 1.8 10*3/MM3 (ref 0.7–3.1)
LYMPHOCYTES # BLD AUTO: 2.09 10*3/MM3 (ref 0.7–3.1)
LYMPHOCYTES # BLD MANUAL: 1.62 10*3/MM3 (ref 0.7–3.1)
LYMPHOCYTES NFR BLD AUTO: 10.3 % (ref 19.6–45.3)
LYMPHOCYTES NFR BLD AUTO: 11 % (ref 19.6–45.3)
LYMPHOCYTES NFR BLD AUTO: 11.4 % (ref 19.6–45.3)
LYMPHOCYTES NFR BLD AUTO: 11.7 % (ref 19.6–45.3)
LYMPHOCYTES NFR BLD AUTO: 14.3 % (ref 19.6–45.3)
LYMPHOCYTES NFR BLD AUTO: 14.7 % (ref 19.6–45.3)
LYMPHOCYTES NFR BLD AUTO: 15.5 % (ref 19.6–45.3)
LYMPHOCYTES NFR BLD AUTO: 16.8 % (ref 19.6–45.3)
LYMPHOCYTES NFR BLD AUTO: 18.3 % (ref 19.6–45.3)
LYMPHOCYTES NFR BLD AUTO: 21.7 % (ref 19.6–45.3)
LYMPHOCYTES NFR BLD AUTO: 7 % (ref 19.6–45.3)
LYMPHOCYTES NFR BLD AUTO: 7.9 % (ref 19.6–45.3)
LYMPHOCYTES NFR BLD AUTO: 9.7 % (ref 19.6–45.3)
LYMPHOCYTES NFR BLD AUTO: 9.7 % (ref 19.6–45.3)
LYMPHOCYTES NFR BLD MANUAL: 11 % (ref 5–12)
LYMPHOCYTES NFR BLD MANUAL: 32 % (ref 19.6–45.3)
Lab: ABNORMAL
MAGNESIUM SERPL-MCNC: 2.3 MG/DL (ref 1.6–2.4)
MAGNESIUM SERPL-MCNC: 2.3 MG/DL (ref 1.6–2.4)
MAGNESIUM SERPL-MCNC: 2.7 MG/DL (ref 1.6–2.4)
MAGNESIUM SERPL-MCNC: 2.8 MG/DL (ref 1.6–2.4)
MAGNESIUM SERPL-MCNC: 2.9 MG/DL (ref 1.6–2.4)
MCH RBC QN AUTO: 32.9 PG (ref 26.6–33)
MCH RBC QN AUTO: 32.9 PG (ref 26.6–33)
MCH RBC QN AUTO: 33.2 PG (ref 26.6–33)
MCH RBC QN AUTO: 33.2 PG (ref 26.6–33)
MCH RBC QN AUTO: 33.3 PG (ref 26.6–33)
MCH RBC QN AUTO: 33.3 PG (ref 26.6–33)
MCH RBC QN AUTO: 33.4 PG (ref 26.6–33)
MCH RBC QN AUTO: 33.5 PG (ref 26.6–33)
MCH RBC QN AUTO: 33.7 PG (ref 26.6–33)
MCH RBC QN AUTO: 33.9 PG (ref 26.6–33)
MCH RBC QN AUTO: 34 PG (ref 26.6–33)
MCH RBC QN AUTO: 34.1 PG (ref 26.6–33)
MCH RBC QN AUTO: 34.4 PG (ref 26.6–33)
MCH RBC QN AUTO: 34.6 PG (ref 26.6–33)
MCHC RBC AUTO-ENTMCNC: 32.8 G/DL (ref 31.5–35.7)
MCHC RBC AUTO-ENTMCNC: 33.1 G/DL (ref 31.5–35.7)
MCHC RBC AUTO-ENTMCNC: 33.1 G/DL (ref 31.5–35.7)
MCHC RBC AUTO-ENTMCNC: 33.2 G/DL (ref 31.5–35.7)
MCHC RBC AUTO-ENTMCNC: 33.6 G/DL (ref 31.5–35.7)
MCHC RBC AUTO-ENTMCNC: 33.6 G/DL (ref 31.5–35.7)
MCHC RBC AUTO-ENTMCNC: 34 G/DL (ref 31.5–35.7)
MCHC RBC AUTO-ENTMCNC: 34.5 G/DL (ref 31.5–35.7)
MCHC RBC AUTO-ENTMCNC: 34.6 G/DL (ref 31.5–35.7)
MCHC RBC AUTO-ENTMCNC: 34.6 G/DL (ref 31.5–35.7)
MCHC RBC AUTO-ENTMCNC: 35.3 G/DL (ref 31.5–35.7)
MCHC RBC AUTO-ENTMCNC: 35.5 G/DL (ref 31.5–35.7)
MCHC RBC AUTO-ENTMCNC: 35.6 G/DL (ref 31.5–35.7)
MCHC RBC AUTO-ENTMCNC: 36.4 G/DL (ref 31.5–35.7)
MCHC RBC AUTO-ENTMCNC: 36.5 G/DL (ref 31.5–35.7)
MCHC RBC AUTO-ENTMCNC: 36.8 G/DL (ref 31.5–35.7)
MCV RBC AUTO: 100.7 FL (ref 79–97)
MCV RBC AUTO: 101.5 FL (ref 79–97)
MCV RBC AUTO: 101.9 FL (ref 79–97)
MCV RBC AUTO: 101.9 FL (ref 79–97)
MCV RBC AUTO: 103 FL (ref 79–97)
MCV RBC AUTO: 91.8 FL (ref 79–97)
MCV RBC AUTO: 91.9 FL (ref 79–97)
MCV RBC AUTO: 92.2 FL (ref 79–97)
MCV RBC AUTO: 94.1 FL (ref 79–97)
MCV RBC AUTO: 94.2 FL (ref 79–97)
MCV RBC AUTO: 95 FL (ref 79–97)
MCV RBC AUTO: 95.1 FL (ref 79–97)
MCV RBC AUTO: 95.4 FL (ref 79–97)
MCV RBC AUTO: 99 FL (ref 79–97)
MCV RBC AUTO: 99.3 FL (ref 79–97)
MCV RBC AUTO: 99.7 FL (ref 79–97)
METHGB BLD QL: 0.2 %
METHGB BLD QL: 0.5 % (ref 0–1.5)
METHGB BLD QL: 0.6 % (ref 0–1.5)
METHGB BLD QL: 0.8 % (ref 0–1.5)
MITOCHONDRIA M2 IGG SER-ACNC: 112.3 UNITS (ref 0–20)
MODALITY: ABNORMAL
MONOCYTES # BLD AUTO: 0.26 10*3/MM3 (ref 0.1–0.9)
MONOCYTES # BLD AUTO: 0.27 10*3/MM3 (ref 0.1–0.9)
MONOCYTES # BLD AUTO: 0.29 10*3/MM3 (ref 0.1–0.9)
MONOCYTES # BLD AUTO: 0.44 10*3/MM3 (ref 0.1–0.9)
MONOCYTES # BLD AUTO: 0.46 10*3/MM3 (ref 0.1–0.9)
MONOCYTES # BLD AUTO: 0.56 10*3/MM3 (ref 0.1–0.9)
MONOCYTES # BLD AUTO: 0.57 10*3/MM3 (ref 0.1–0.9)
MONOCYTES # BLD AUTO: 0.63 10*3/MM3 (ref 0.1–0.9)
MONOCYTES # BLD AUTO: 0.63 10*3/MM3 (ref 0.1–0.9)
MONOCYTES # BLD AUTO: 0.65 10*3/MM3 (ref 0.1–0.9)
MONOCYTES # BLD AUTO: 0.65 10*3/MM3 (ref 0.1–0.9)
MONOCYTES # BLD AUTO: 0.76 10*3/MM3 (ref 0.1–0.9)
MONOCYTES # BLD AUTO: 0.79 10*3/MM3 (ref 0.1–0.9)
MONOCYTES # BLD AUTO: 0.8 10*3/MM3 (ref 0.1–0.9)
MONOCYTES # BLD AUTO: 0.81 10*3/MM3 (ref 0.1–0.9)
MONOCYTES NFR BLD AUTO: 10 % (ref 5–12)
MONOCYTES NFR BLD AUTO: 4.3 % (ref 5–12)
MONOCYTES NFR BLD AUTO: 4.4 % (ref 5–12)
MONOCYTES NFR BLD AUTO: 5.1 % (ref 5–12)
MONOCYTES NFR BLD AUTO: 6.4 % (ref 5–12)
MONOCYTES NFR BLD AUTO: 6.4 % (ref 5–12)
MONOCYTES NFR BLD AUTO: 6.8 % (ref 5–12)
MONOCYTES NFR BLD AUTO: 7 % (ref 5–12)
MONOCYTES NFR BLD AUTO: 7 % (ref 5–12)
MONOCYTES NFR BLD AUTO: 7.3 % (ref 5–12)
MONOCYTES NFR BLD AUTO: 7.7 % (ref 5–12)
MONOCYTES NFR BLD AUTO: 8.4 % (ref 5–12)
MONOCYTES NFR BLD AUTO: 9.3 % (ref 5–12)
MONOCYTES NFR BLD AUTO: 9.9 % (ref 5–12)
MRSA DNA SPEC QL NAA+PROBE: POSITIVE
NEUTROPHILS # BLD AUTO: 2.79 10*3/MM3 (ref 1.7–7)
NEUTROPHILS NFR BLD AUTO: 3.62 10*3/MM3 (ref 1.7–7)
NEUTROPHILS NFR BLD AUTO: 4.43 10*3/MM3 (ref 1.7–7)
NEUTROPHILS NFR BLD AUTO: 4.65 10*3/MM3 (ref 1.7–7)
NEUTROPHILS NFR BLD AUTO: 4.95 10*3/MM3 (ref 1.7–7)
NEUTROPHILS NFR BLD AUTO: 5.09 10*3/MM3 (ref 1.7–7)
NEUTROPHILS NFR BLD AUTO: 5.18 10*3/MM3 (ref 1.7–7)
NEUTROPHILS NFR BLD AUTO: 5.32 10*3/MM3 (ref 1.7–7)
NEUTROPHILS NFR BLD AUTO: 6.53 10*3/MM3 (ref 1.7–7)
NEUTROPHILS NFR BLD AUTO: 6.95 10*3/MM3 (ref 1.7–7)
NEUTROPHILS NFR BLD AUTO: 67.9 % (ref 42.7–76)
NEUTROPHILS NFR BLD AUTO: 7.01 10*3/MM3 (ref 1.7–7)
NEUTROPHILS NFR BLD AUTO: 7.11 10*3/MM3 (ref 1.7–7)
NEUTROPHILS NFR BLD AUTO: 70.7 % (ref 42.7–76)
NEUTROPHILS NFR BLD AUTO: 70.7 % (ref 42.7–76)
NEUTROPHILS NFR BLD AUTO: 71.9 % (ref 42.7–76)
NEUTROPHILS NFR BLD AUTO: 73.2 % (ref 42.7–76)
NEUTROPHILS NFR BLD AUTO: 75 % (ref 42.7–76)
NEUTROPHILS NFR BLD AUTO: 76.8 % (ref 42.7–76)
NEUTROPHILS NFR BLD AUTO: 77.3 % (ref 42.7–76)
NEUTROPHILS NFR BLD AUTO: 78.6 % (ref 42.7–76)
NEUTROPHILS NFR BLD AUTO: 8.16 10*3/MM3 (ref 1.7–7)
NEUTROPHILS NFR BLD AUTO: 8.17 10*3/MM3 (ref 1.7–7)
NEUTROPHILS NFR BLD AUTO: 8.44 10*3/MM3 (ref 1.7–7)
NEUTROPHILS NFR BLD AUTO: 80.9 % (ref 42.7–76)
NEUTROPHILS NFR BLD AUTO: 81.2 % (ref 42.7–76)
NEUTROPHILS NFR BLD AUTO: 81.4 % (ref 42.7–76)
NEUTROPHILS NFR BLD AUTO: 86 % (ref 42.7–76)
NEUTROPHILS NFR BLD AUTO: 86.7 % (ref 42.7–76)
NEUTROPHILS NFR BLD MANUAL: 55 % (ref 42.7–76)
NEUTS VAC BLD QL SMEAR: NORMAL
NITRITE UR QL STRIP: NEGATIVE
NONSPECIFIC COLD ANTIBODY: NORMAL
NOTE: ABNORMAL
NOTIFIED BY: ABNORMAL
NOTIFIED WHO: ABNORMAL
NRBC BLD AUTO-RTO: 0 /100 WBC (ref 0–0.2)
NRBC BLD AUTO-RTO: 0.3 /100 WBC (ref 0–0.2)
NT-PROBNP SERPL-MCNC: 3595 PG/ML (ref 0–900)
NT-PROBNP SERPL-MCNC: ABNORMAL PG/ML (ref 0–900)
OSMOLALITY SERPL: 266 MOSM/KG (ref 275–295)
OSMOLALITY UR: 333 MOSM/KG (ref 300–1100)
OXYHGB MFR BLDV: 77.8 %
OXYHGB MFR BLDV: 95.3 % (ref 94–99)
OXYHGB MFR BLDV: 95.6 % (ref 94–99)
OXYHGB MFR BLDV: 97.9 % (ref 94–99)
PATH INTERP BLD-IMP: NORMAL
PATH REPORT.FINAL DX SPEC: NORMAL
PAW @ PEAK INSP FLOW SETTING VENT: 0 CMH2O
PCO2 BLDA: 44.1 MM HG (ref 35–45)
PCO2 BLDA: 48.9 MM HG (ref 35–45)
PCO2 BLDA: 55.5 MM HG (ref 35–45)
PCO2 BLDV: 50.3 MM HG (ref 41–51)
PCO2 TEMP ADJ BLD: 44.1 MM HG (ref 35–48)
PCO2 TEMP ADJ BLD: 48.9 MM HG (ref 35–48)
PCO2 TEMP ADJ BLD: 55.5 MM HG (ref 35–48)
PEEP RESPIRATORY: 12 CM[H2O]
PEEP RESPIRATORY: 16 CM[H2O]
PH BLDA: 7.3 PH UNITS (ref 7.35–7.45)
PH BLDA: 7.46 PH UNITS (ref 7.35–7.45)
PH BLDA: 7.47 PH UNITS (ref 7.35–7.45)
PH BLDV: 7.54 PH UNITS
PH UR STRIP.AUTO: 5.5 [PH] (ref 5–8)
PH, TEMP CORRECTED: 7.3 PH UNITS
PH, TEMP CORRECTED: 7.46 PH UNITS
PH, TEMP CORRECTED: 7.47 PH UNITS
PHOSPHATE SERPL-MCNC: 2.5 MG/DL (ref 2.5–4.5)
PHOSPHATE SERPL-MCNC: 5.5 MG/DL (ref 2.5–4.5)
PHOSPHATE SERPL-MCNC: 7.5 MG/DL (ref 2.5–4.5)
PHOSPHATE SERPL-MCNC: 7.8 MG/DL (ref 2.5–4.5)
PHOSPHATE SERPL-MCNC: 8.3 MG/DL (ref 2.5–4.5)
PHOSPHATE SERPL-MCNC: 8.7 MG/DL (ref 2.5–4.5)
PHOSPHATE SERPL-MCNC: 8.9 MG/DL (ref 2.5–4.5)
PHOSPHATE SERPL-MCNC: 9.2 MG/DL (ref 2.5–4.5)
PHOSPHATE SERPL-MCNC: 9.5 MG/DL (ref 2.5–4.5)
PLAT MORPH BLD: NORMAL
PLATELET # BLD AUTO: 102 10*3/MM3 (ref 140–450)
PLATELET # BLD AUTO: 104 10*3/MM3 (ref 140–450)
PLATELET # BLD AUTO: 109 10*3/MM3 (ref 140–450)
PLATELET # BLD AUTO: 120 10*3/MM3 (ref 140–450)
PLATELET # BLD AUTO: 60 10*3/MM3 (ref 140–450)
PLATELET # BLD AUTO: 61 10*3/MM3 (ref 140–450)
PLATELET # BLD AUTO: 64 10*3/MM3 (ref 140–450)
PLATELET # BLD AUTO: 71 10*3/MM3 (ref 140–450)
PLATELET # BLD AUTO: 73 10*3/MM3 (ref 140–450)
PLATELET # BLD AUTO: 77 10*3/MM3 (ref 140–450)
PLATELET # BLD AUTO: 78 10*3/MM3 (ref 140–450)
PLATELET # BLD AUTO: 83 10*3/MM3 (ref 140–450)
PLATELET # BLD AUTO: 84 10*3/MM3 (ref 140–450)
PLATELET # BLD AUTO: 98 10*3/MM3 (ref 140–450)
PLATELET # BLD AUTO: 99 10*3/MM3 (ref 140–450)
PLATELET # BLD AUTO: 99 10*3/MM3 (ref 140–450)
PMV BLD AUTO: 11.1 FL (ref 6–12)
PMV BLD AUTO: 11.4 FL (ref 6–12)
PMV BLD AUTO: 11.6 FL (ref 6–12)
PMV BLD AUTO: 11.7 FL (ref 6–12)
PMV BLD AUTO: 11.7 FL (ref 6–12)
PMV BLD AUTO: 11.8 FL (ref 6–12)
PMV BLD AUTO: 11.9 FL (ref 6–12)
PMV BLD AUTO: 11.9 FL (ref 6–12)
PMV BLD AUTO: 12 FL (ref 6–12)
PMV BLD AUTO: 12.1 FL (ref 6–12)
PMV BLD AUTO: 12.2 FL (ref 6–12)
PMV BLD AUTO: 12.3 FL (ref 6–12)
PMV BLD AUTO: 12.8 FL (ref 6–12)
PO2 BLDA: 106 MM HG (ref 83–108)
PO2 BLDA: 111 MM HG (ref 83–108)
PO2 BLDA: 319 MM HG (ref 83–108)
PO2 BLDV: 43.7 MM HG (ref 27–53)
PO2 TEMP ADJ BLD: 106 MM HG (ref 83–108)
PO2 TEMP ADJ BLD: 111 MM HG (ref 83–108)
PO2 TEMP ADJ BLD: 319 MM HG (ref 83–108)
POLYCHROMASIA BLD QL SMEAR: NORMAL
POTASSIUM SERPL-SCNC: 2.6 MMOL/L (ref 3.5–5.2)
POTASSIUM SERPL-SCNC: 2.9 MMOL/L (ref 3.5–5.2)
POTASSIUM SERPL-SCNC: 3 MMOL/L (ref 3.5–5.2)
POTASSIUM SERPL-SCNC: 3.3 MMOL/L (ref 3.5–5.2)
POTASSIUM SERPL-SCNC: 3.5 MMOL/L (ref 3.5–5.2)
POTASSIUM SERPL-SCNC: 3.6 MMOL/L (ref 3.5–5.2)
POTASSIUM SERPL-SCNC: 3.7 MMOL/L (ref 3.5–5.2)
POTASSIUM SERPL-SCNC: 3.8 MMOL/L (ref 3.5–5.2)
POTASSIUM SERPL-SCNC: 3.8 MMOL/L (ref 3.5–5.2)
POTASSIUM SERPL-SCNC: 3.9 MMOL/L (ref 3.5–5.2)
POTASSIUM SERPL-SCNC: 3.9 MMOL/L (ref 3.5–5.2)
POTASSIUM SERPL-SCNC: 4 MMOL/L (ref 3.5–5.2)
POTASSIUM SERPL-SCNC: 4.1 MMOL/L (ref 3.5–5.2)
POTASSIUM SERPL-SCNC: 4.1 MMOL/L (ref 3.5–5.2)
POTASSIUM SERPL-SCNC: 4.3 MMOL/L (ref 3.5–5.2)
POTASSIUM SERPL-SCNC: 4.4 MMOL/L (ref 3.5–5.2)
POTASSIUM SERPL-SCNC: 4.6 MMOL/L (ref 3.5–5.2)
POTASSIUM SERPL-SCNC: 4.9 MMOL/L (ref 3.5–5.2)
POTASSIUM SERPL-SCNC: 4.9 MMOL/L (ref 3.5–5.2)
POTASSIUM SERPL-SCNC: 5.1 MMOL/L (ref 3.5–5.2)
POTASSIUM SERPL-SCNC: 5.2 MMOL/L (ref 3.5–5.2)
PROCALCITONIN SERPL-MCNC: 1.07 NG/ML (ref 0–0.25)
PROCALCITONIN SERPL-MCNC: 2.98 NG/ML (ref 0–0.25)
PROT SERPL-MCNC: 6 G/DL (ref 6–8.5)
PROT SERPL-MCNC: 6.3 G/DL (ref 6–8.5)
PROT SERPL-MCNC: 6.4 G/DL (ref 6–8.5)
PROT SERPL-MCNC: 6.4 G/DL (ref 6–8.5)
PROT SERPL-MCNC: 6.5 G/DL (ref 6–8.5)
PROT SERPL-MCNC: 6.5 G/DL (ref 6–8.5)
PROT SERPL-MCNC: 6.6 G/DL (ref 6–8.5)
PROT SERPL-MCNC: 6.7 G/DL (ref 6–8.5)
PROT SERPL-MCNC: 7.2 G/DL (ref 6–8.5)
PROT SERPL-MCNC: 7.8 G/DL (ref 6–8.5)
PROT UR QL STRIP: NEGATIVE
PROTHROMBIN TIME: 20 SECONDS (ref 11.5–14)
PROTHROMBIN TIME: 20.9 SECONDS (ref 11.5–14)
PROTHROMBIN TIME: 22.1 SECONDS (ref 11.5–14)
PROTHROMBIN TIME: 24.9 SECONDS (ref 11.5–14)
PROTHROMBIN TIME: 32.5 SECONDS (ref 11.5–14)
PROTHROMBIN TIME: 33.4 SECONDS (ref 11.5–14)
PROTHROMBIN TIME: 33.6 SECONDS (ref 11.5–14)
PROTHROMBIN TIME: 35.5 SECONDS (ref 11.5–14)
PROTHROMBIN TIME: 41.2 SECONDS (ref 11.5–14)
PROTHROMBIN TIME: 43.8 SECONDS (ref 11.5–14)
PROTHROMBIN TIME: 49.2 SECONDS (ref 11.5–14)
QT INTERVAL: 488 MS
QT INTERVAL: 510 MS
QT INTERVAL: 534 MS
QT INTERVAL: 566 MS
QTC INTERVAL: 534 MS
QTC INTERVAL: 546 MS
QTC INTERVAL: 556 MS
QTC INTERVAL: 606 MS
RBC # BLD AUTO: 2.61 10*6/MM3 (ref 4.14–5.8)
RBC # BLD AUTO: 2.61 10*6/MM3 (ref 4.14–5.8)
RBC # BLD AUTO: 2.63 10*6/MM3 (ref 4.14–5.8)
RBC # BLD AUTO: 2.67 10*6/MM3 (ref 4.14–5.8)
RBC # BLD AUTO: 2.69 10*6/MM3 (ref 4.14–5.8)
RBC # BLD AUTO: 2.7 10*6/MM3 (ref 4.14–5.8)
RBC # BLD AUTO: 2.8 10*6/MM3 (ref 4.14–5.8)
RBC # BLD AUTO: 2.98 10*6/MM3 (ref 4.14–5.8)
RBC # BLD AUTO: 3.25 10*6/MM3 (ref 4.14–5.8)
RBC # BLD AUTO: 3.29 10*6/MM3 (ref 4.14–5.8)
RBC # BLD AUTO: 3.5 10*6/MM3 (ref 4.14–5.8)
RBC # BLD AUTO: 3.55 10*6/MM3 (ref 4.14–5.8)
RBC # BLD AUTO: 3.63 10*6/MM3 (ref 4.14–5.8)
RBC # BLD AUTO: 3.64 10*6/MM3 (ref 4.14–5.8)
RBC # BLD AUTO: 3.7 10*6/MM3 (ref 4.14–5.8)
RBC # BLD AUTO: 3.72 10*6/MM3 (ref 4.14–5.8)
RBC # UR: ABNORMAL /HPF
RBC MORPH BLD: NORMAL
REF LAB TEST METHOD: ABNORMAL
RH BLD: POSITIVE
RH BLD: POSITIVE
S PNEUM AG SPEC QL LA: NEGATIVE
SARS-COV-2 RNA RESP QL NAA+PROBE: NOT DETECTED
SCAN SLIDE: NORMAL
SODIUM SERPL-SCNC: 121 MMOL/L (ref 136–145)
SODIUM SERPL-SCNC: 123 MMOL/L (ref 136–145)
SODIUM SERPL-SCNC: 124 MMOL/L (ref 136–145)
SODIUM SERPL-SCNC: 124 MMOL/L (ref 136–145)
SODIUM SERPL-SCNC: 126 MMOL/L (ref 136–145)
SODIUM SERPL-SCNC: 127 MMOL/L (ref 136–145)
SODIUM SERPL-SCNC: 127 MMOL/L (ref 136–145)
SODIUM SERPL-SCNC: 128 MMOL/L (ref 136–145)
SODIUM SERPL-SCNC: 129 MMOL/L (ref 136–145)
SODIUM SERPL-SCNC: 129 MMOL/L (ref 136–145)
SODIUM SERPL-SCNC: 132 MMOL/L (ref 136–145)
SODIUM SERPL-SCNC: 133 MMOL/L (ref 136–145)
SODIUM SERPL-SCNC: 134 MMOL/L (ref 136–145)
SODIUM SERPL-SCNC: 138 MMOL/L (ref 136–145)
SODIUM SERPL-SCNC: 139 MMOL/L (ref 136–145)
SODIUM SERPL-SCNC: 140 MMOL/L (ref 136–145)
SODIUM SERPL-SCNC: 140 MMOL/L (ref 136–145)
SODIUM UR-SCNC: <20 MMOL/L
SP GR UR STRIP: >=1.03 (ref 1–1.03)
SQUAMOUS #/AREA URNS HPF: ABNORMAL /HPF
T&S EXPIRATION DATE: NORMAL
TARGETS BLD QL SMEAR: NORMAL
TOTAL RATE: 0 BREATHS/MINUTE
TROPONIN T SERPL-MCNC: 0.05 NG/ML (ref 0–0.03)
TROPONIN T SERPL-MCNC: 0.05 NG/ML (ref 0–0.03)
TROPONIN T SERPL-MCNC: 0.06 NG/ML (ref 0–0.03)
TROPONIN T SERPL-MCNC: 0.06 NG/ML (ref 0–0.03)
TROPONIN T SERPL-MCNC: 0.14 NG/ML (ref 0–0.03)
TSH SERPL DL<=0.05 MIU/L-ACNC: 4.25 UIU/ML (ref 0.27–4.2)
UNIT  ABO: NORMAL
UNIT  ABO: NORMAL
UNIT  RH: NORMAL
UNIT  RH: NORMAL
UROBILINOGEN UR QL STRIP: ABNORMAL
VALPROATE SERPL-MCNC: 58.3 MCG/ML (ref 50–125)
VANCOMYCIN SERPL-MCNC: 18.5 MCG/ML (ref 5–40)
VENTILATOR MODE: ABNORMAL
VENTILATOR MODE: ABNORMAL
WBC # BLD AUTO: 10.09 10*3/MM3 (ref 3.4–10.8)
WBC # BLD AUTO: 11.34 10*3/MM3 (ref 3.4–10.8)
WBC # BLD AUTO: 11.53 10*3/MM3 (ref 3.4–10.8)
WBC # BLD AUTO: 4.72 10*3/MM3 (ref 3.4–10.8)
WBC # BLD AUTO: 5.07 10*3/MM3 (ref 3.4–10.8)
WBC # BLD AUTO: 5.71 10*3/MM3 (ref 3.4–10.8)
WBC # BLD AUTO: 5.73 10*3/MM3 (ref 3.4–10.8)
WBC # BLD AUTO: 5.98 10*3/MM3 (ref 3.4–10.8)
WBC # BLD AUTO: 6.19 10*3/MM3 (ref 3.4–10.8)
WBC # BLD AUTO: 6.27 10*3/MM3 (ref 3.4–10.8)
WBC # BLD AUTO: 6.3 10*3/MM3 (ref 3.4–10.8)
WBC # BLD AUTO: 8.51 10*3/MM3 (ref 3.4–10.8)
WBC # BLD AUTO: 9.5 10*3/MM3 (ref 3.4–10.8)
WBC # BLD AUTO: 9.62 10*3/MM3 (ref 3.4–10.8)
WBC # BLD AUTO: 9.84 10*3/MM3 (ref 3.4–10.8)
WBC # BLD AUTO: 9.92 10*3/MM3 (ref 3.4–10.8)
WBC MORPH BLD: NORMAL
WBC MORPH BLD: NORMAL
WBC UR QL AUTO: ABNORMAL /HPF
WHOLE BLOOD HOLD SPECIMEN: NORMAL

## 2021-01-01 PROCEDURE — 82330 ASSAY OF CALCIUM: CPT | Performed by: NURSE PRACTITIONER

## 2021-01-01 PROCEDURE — 82962 GLUCOSE BLOOD TEST: CPT

## 2021-01-01 PROCEDURE — 94660 CPAP INITIATION&MGMT: CPT

## 2021-01-01 PROCEDURE — 25010000002 EPINEPHRINE 30 MG/30ML SOLUTION 30 ML VIAL: Performed by: NURSE PRACTITIONER

## 2021-01-01 PROCEDURE — 82140 ASSAY OF AMMONIA: CPT | Performed by: INTERNAL MEDICINE

## 2021-01-01 PROCEDURE — 86038 ANTINUCLEAR ANTIBODIES: CPT | Performed by: PHYSICIAN ASSISTANT

## 2021-01-01 PROCEDURE — 82805 BLOOD GASES W/O2 SATURATION: CPT

## 2021-01-01 PROCEDURE — 71250 CT THORAX DX C-: CPT

## 2021-01-01 PROCEDURE — 90740 HEPB VACC 3 DOSE IMMUNSUP IM: CPT | Performed by: PHYSICIAN ASSISTANT

## 2021-01-01 PROCEDURE — 83605 ASSAY OF LACTIC ACID: CPT | Performed by: NURSE PRACTITIONER

## 2021-01-01 PROCEDURE — 94799 UNLISTED PULMONARY SVC/PX: CPT

## 2021-01-01 PROCEDURE — 25010000002 PROPOFOL 10 MG/ML EMULSION: Performed by: NURSE PRACTITIONER

## 2021-01-01 PROCEDURE — 80048 BASIC METABOLIC PNL TOTAL CA: CPT | Performed by: INTERNAL MEDICINE

## 2021-01-01 PROCEDURE — 84484 ASSAY OF TROPONIN QUANT: CPT | Performed by: INTERNAL MEDICINE

## 2021-01-01 PROCEDURE — 84300 ASSAY OF URINE SODIUM: CPT | Performed by: INTERNAL MEDICINE

## 2021-01-01 PROCEDURE — 86870 RBC ANTIBODY IDENTIFICATION: CPT | Performed by: NURSE PRACTITIONER

## 2021-01-01 PROCEDURE — 25010000002 CEFTRIAXONE PER 250 MG: Performed by: INTERNAL MEDICINE

## 2021-01-01 PROCEDURE — 82105 ALPHA-FETOPROTEIN SERUM: CPT | Performed by: PHYSICIAN ASSISTANT

## 2021-01-01 PROCEDURE — 84145 PROCALCITONIN (PCT): CPT | Performed by: INTERNAL MEDICINE

## 2021-01-01 PROCEDURE — 83516 IMMUNOASSAY NONANTIBODY: CPT | Performed by: PHYSICIAN ASSISTANT

## 2021-01-01 PROCEDURE — 97530 THERAPEUTIC ACTIVITIES: CPT

## 2021-01-01 PROCEDURE — 83735 ASSAY OF MAGNESIUM: CPT | Performed by: NURSE PRACTITIONER

## 2021-01-01 PROCEDURE — 25010000002 FUROSEMIDE PER 20 MG: Performed by: INTERNAL MEDICINE

## 2021-01-01 PROCEDURE — 80202 ASSAY OF VANCOMYCIN: CPT

## 2021-01-01 PROCEDURE — 85610 PROTHROMBIN TIME: CPT | Performed by: INTERNAL MEDICINE

## 2021-01-01 PROCEDURE — 87899 AGENT NOS ASSAY W/OPTIC: CPT | Performed by: INTERNAL MEDICINE

## 2021-01-01 PROCEDURE — 82375 ASSAY CARBOXYHB QUANT: CPT

## 2021-01-01 PROCEDURE — 82248 BILIRUBIN DIRECT: CPT | Performed by: INTERNAL MEDICINE

## 2021-01-01 PROCEDURE — 99239 HOSP IP/OBS DSCHRG MGMT >30: CPT | Performed by: NURSE PRACTITIONER

## 2021-01-01 PROCEDURE — 85025 COMPLETE CBC W/AUTO DIFF WBC: CPT | Performed by: INTERNAL MEDICINE

## 2021-01-01 PROCEDURE — 25010000002 EPINEPHRINE 30 MG/30ML SOLUTION: Performed by: NURSE PRACTITIONER

## 2021-01-01 PROCEDURE — 84100 ASSAY OF PHOSPHORUS: CPT | Performed by: NURSE PRACTITIONER

## 2021-01-01 PROCEDURE — 85025 COMPLETE CBC W/AUTO DIFF WBC: CPT | Performed by: NURSE PRACTITIONER

## 2021-01-01 PROCEDURE — G0010 ADMIN HEPATITIS B VACCINE: HCPCS | Performed by: PHYSICIAN ASSISTANT

## 2021-01-01 PROCEDURE — 74018 RADEX ABDOMEN 1 VIEW: CPT

## 2021-01-01 PROCEDURE — 85362 FIBRIN DEGRADATION PRODUCTS: CPT | Performed by: INTERNAL MEDICINE

## 2021-01-01 PROCEDURE — 99253 IP/OBS CNSLTJ NEW/EST LOW 45: CPT | Performed by: PHYSICIAN ASSISTANT

## 2021-01-01 PROCEDURE — 93010 ELECTROCARDIOGRAM REPORT: CPT | Performed by: INTERNAL MEDICINE

## 2021-01-01 PROCEDURE — 99223 1ST HOSP IP/OBS HIGH 75: CPT | Performed by: INTERNAL MEDICINE

## 2021-01-01 PROCEDURE — 99232 SBSQ HOSP IP/OBS MODERATE 35: CPT | Performed by: INTERNAL MEDICINE

## 2021-01-01 PROCEDURE — 93005 ELECTROCARDIOGRAM TRACING: CPT | Performed by: INTERNAL MEDICINE

## 2021-01-01 PROCEDURE — 85610 PROTHROMBIN TIME: CPT | Performed by: EMERGENCY MEDICINE

## 2021-01-01 PROCEDURE — 93306 TTE W/DOPPLER COMPLETE: CPT

## 2021-01-01 PROCEDURE — 0BH17EZ INSERTION OF ENDOTRACHEAL AIRWAY INTO TRACHEA, VIA NATURAL OR ARTIFICIAL OPENING: ICD-10-PCS | Performed by: INTERNAL MEDICINE

## 2021-01-01 PROCEDURE — 74177 CT ABD & PELVIS W/CONTRAST: CPT

## 2021-01-01 PROCEDURE — 84295 ASSAY OF SERUM SODIUM: CPT | Performed by: INTERNAL MEDICINE

## 2021-01-01 PROCEDURE — 25010000002 PROPOFOL 1000 MG/ML EMULSION: Performed by: NURSE PRACTITIONER

## 2021-01-01 PROCEDURE — 25010000002 LORAZEPAM PER 2 MG: Performed by: NURSE PRACTITIONER

## 2021-01-01 PROCEDURE — 94002 VENT MGMT INPAT INIT DAY: CPT

## 2021-01-01 PROCEDURE — 71045 X-RAY EXAM CHEST 1 VIEW: CPT

## 2021-01-01 PROCEDURE — 25010000003 LEVETIRACETAM IN NACL 0.75% 1000 MG/100ML SOLUTION: Performed by: INTERNAL MEDICINE

## 2021-01-01 PROCEDURE — P9017 PLASMA 1 DONOR FRZ W/IN 8 HR: HCPCS

## 2021-01-01 PROCEDURE — 93306 TTE W/DOPPLER COMPLETE: CPT | Performed by: INTERNAL MEDICINE

## 2021-01-01 PROCEDURE — 80048 BASIC METABOLIC PNL TOTAL CA: CPT | Performed by: NURSE PRACTITIONER

## 2021-01-01 PROCEDURE — 36556 INSERT NON-TUNNEL CV CATH: CPT | Performed by: NURSE PRACTITIONER

## 2021-01-01 PROCEDURE — 25010000002 HEPATITIS B VACCINE (RECOMBINANT) 20 MCG/ML SUSPENSION: Performed by: PHYSICIAN ASSISTANT

## 2021-01-01 PROCEDURE — 25010000002 VANCOMYCIN 10 G RECONSTITUTED SOLUTION

## 2021-01-01 PROCEDURE — 86900 BLOOD TYPING SEROLOGIC ABO: CPT | Performed by: NURSE PRACTITIONER

## 2021-01-01 PROCEDURE — 99254 IP/OBS CNSLTJ NEW/EST MOD 60: CPT | Performed by: INTERNAL MEDICINE

## 2021-01-01 PROCEDURE — 99231 SBSQ HOSP IP/OBS SF/LOW 25: CPT | Performed by: PHYSICIAN ASSISTANT

## 2021-01-01 PROCEDURE — 86901 BLOOD TYPING SEROLOGIC RH(D): CPT | Performed by: NURSE PRACTITIONER

## 2021-01-01 PROCEDURE — 85060 BLOOD SMEAR INTERPRETATION: CPT | Performed by: INTERNAL MEDICINE

## 2021-01-01 PROCEDURE — 25010000002 ACETAZOLAMIDE PER 500 MG: Performed by: INTERNAL MEDICINE

## 2021-01-01 PROCEDURE — 97161 PT EVAL LOW COMPLEX 20 MIN: CPT

## 2021-01-01 PROCEDURE — 25010000003 POTASSIUM CHLORIDE 10 MEQ/100ML SOLUTION: Performed by: EMERGENCY MEDICINE

## 2021-01-01 PROCEDURE — 85730 THROMBOPLASTIN TIME PARTIAL: CPT | Performed by: NURSE PRACTITIONER

## 2021-01-01 PROCEDURE — 85610 PROTHROMBIN TIME: CPT | Performed by: NURSE PRACTITIONER

## 2021-01-01 PROCEDURE — 93005 ELECTROCARDIOGRAM TRACING: CPT | Performed by: EMERGENCY MEDICINE

## 2021-01-01 PROCEDURE — 86927 PLASMA FRESH FROZEN: CPT

## 2021-01-01 PROCEDURE — 25010000003 MAGNESIUM SULFATE 4 GM/100ML SOLUTION: Performed by: NURSE PRACTITIONER

## 2021-01-01 PROCEDURE — 63710000001 INSULIN LISPRO (HUMAN) PER 5 UNITS: Performed by: INTERNAL MEDICINE

## 2021-01-01 PROCEDURE — 95711 VEEG 2-12 HR UNMONITORED: CPT

## 2021-01-01 PROCEDURE — 87641 MR-STAPH DNA AMP PROBE: CPT | Performed by: INTERNAL MEDICINE

## 2021-01-01 PROCEDURE — 25010000003 LEVETIRACETAM IN NACL 0.75% 1000 MG/100ML SOLUTION: Performed by: NURSE PRACTITIONER

## 2021-01-01 PROCEDURE — 85027 COMPLETE CBC AUTOMATED: CPT | Performed by: INTERNAL MEDICINE

## 2021-01-01 PROCEDURE — 99231 SBSQ HOSP IP/OBS SF/LOW 25: CPT | Performed by: NURSE PRACTITIONER

## 2021-01-01 PROCEDURE — 80074 ACUTE HEPATITIS PANEL: CPT | Performed by: PHYSICIAN ASSISTANT

## 2021-01-01 PROCEDURE — 74176 CT ABD & PELVIS W/O CONTRAST: CPT

## 2021-01-01 PROCEDURE — 25010000002 PIPERACILLIN SOD-TAZOBACTAM PER 1 G

## 2021-01-01 PROCEDURE — 25010000002 CHLOROTHIAZIDE PER 500 MG: Performed by: INTERNAL MEDICINE

## 2021-01-01 PROCEDURE — 93005 ELECTROCARDIOGRAM TRACING: CPT

## 2021-01-01 PROCEDURE — 88112 CYTOPATH CELL ENHANCE TECH: CPT | Performed by: INTERNAL MEDICINE

## 2021-01-01 PROCEDURE — 85007 BL SMEAR W/DIFF WBC COUNT: CPT | Performed by: NURSE PRACTITIONER

## 2021-01-01 PROCEDURE — 85007 BL SMEAR W/DIFF WBC COUNT: CPT | Performed by: INTERNAL MEDICINE

## 2021-01-01 PROCEDURE — 83735 ASSAY OF MAGNESIUM: CPT | Performed by: EMERGENCY MEDICINE

## 2021-01-01 PROCEDURE — 70450 CT HEAD/BRAIN W/O DYE: CPT

## 2021-01-01 PROCEDURE — 87040 BLOOD CULTURE FOR BACTERIA: CPT | Performed by: INTERNAL MEDICINE

## 2021-01-01 PROCEDURE — 99285 EMERGENCY DEPT VISIT HI MDM: CPT

## 2021-01-01 PROCEDURE — 80164 ASSAY DIPROPYLACETIC ACD TOT: CPT | Performed by: PSYCHIATRY & NEUROLOGY

## 2021-01-01 PROCEDURE — 25010000002 PHENYLEPHRINE 10 MG/ML SOLUTION: Performed by: INTERNAL MEDICINE

## 2021-01-01 PROCEDURE — 0 IOPAMIDOL PER 1 ML: Performed by: INTERNAL MEDICINE

## 2021-01-01 PROCEDURE — 25010000002 VITAMIN K1 PER 1 MG: Performed by: NURSE PRACTITIONER

## 2021-01-01 PROCEDURE — 87636 SARSCOV2 & INF A&B AMP PRB: CPT | Performed by: INTERNAL MEDICINE

## 2021-01-01 PROCEDURE — 99291 CRITICAL CARE FIRST HOUR: CPT | Performed by: INTERNAL MEDICINE

## 2021-01-01 PROCEDURE — 83880 ASSAY OF NATRIURETIC PEPTIDE: CPT | Performed by: EMERGENCY MEDICINE

## 2021-01-01 PROCEDURE — 25010000002 ALBUMIN HUMAN 25% PER 50 ML: Performed by: INTERNAL MEDICINE

## 2021-01-01 PROCEDURE — 99233 SBSQ HOSP IP/OBS HIGH 50: CPT | Performed by: INTERNAL MEDICINE

## 2021-01-01 PROCEDURE — 84484 ASSAY OF TROPONIN QUANT: CPT | Performed by: NURSE PRACTITIONER

## 2021-01-01 PROCEDURE — 0W9G3ZZ DRAINAGE OF PERITONEAL CAVITY, PERCUTANEOUS APPROACH: ICD-10-PCS | Performed by: RADIOLOGY

## 2021-01-01 PROCEDURE — 83735 ASSAY OF MAGNESIUM: CPT | Performed by: INTERNAL MEDICINE

## 2021-01-01 PROCEDURE — 75989 ABSCESS DRAINAGE UNDER X-RAY: CPT

## 2021-01-01 PROCEDURE — 80050 GENERAL HEALTH PANEL: CPT | Performed by: EMERGENCY MEDICINE

## 2021-01-01 PROCEDURE — 36620 INSERTION CATHETER ARTERY: CPT | Performed by: NURSE PRACTITIONER

## 2021-01-01 PROCEDURE — 85379 FIBRIN DEGRADATION QUANT: CPT | Performed by: INTERNAL MEDICINE

## 2021-01-01 PROCEDURE — 80053 COMPREHEN METABOLIC PANEL: CPT | Performed by: NURSE PRACTITIONER

## 2021-01-01 PROCEDURE — 76705 ECHO EXAM OF ABDOMEN: CPT

## 2021-01-01 PROCEDURE — P9047 ALBUMIN (HUMAN), 25%, 50ML: HCPCS | Performed by: INTERNAL MEDICINE

## 2021-01-01 PROCEDURE — 83615 LACTATE (LD) (LDH) ENZYME: CPT | Performed by: INTERNAL MEDICINE

## 2021-01-01 PROCEDURE — 82550 ASSAY OF CK (CPK): CPT | Performed by: NURSE PRACTITIONER

## 2021-01-01 PROCEDURE — 95714 VEEG EA 12-26 HR UNMNTR: CPT

## 2021-01-01 PROCEDURE — 83930 ASSAY OF BLOOD OSMOLALITY: CPT | Performed by: INTERNAL MEDICINE

## 2021-01-01 PROCEDURE — 86708 HEPATITIS A ANTIBODY: CPT | Performed by: PHYSICIAN ASSISTANT

## 2021-01-01 PROCEDURE — 82533 TOTAL CORTISOL: CPT | Performed by: INTERNAL MEDICINE

## 2021-01-01 PROCEDURE — 82140 ASSAY OF AMMONIA: CPT | Performed by: NURSE PRACTITIONER

## 2021-01-01 PROCEDURE — 97165 OT EVAL LOW COMPLEX 30 MIN: CPT

## 2021-01-01 PROCEDURE — 94003 VENT MGMT INPAT SUBQ DAY: CPT

## 2021-01-01 PROCEDURE — 03HY32Z INSERTION OF MONITORING DEVICE INTO UPPER ARTERY, PERCUTANEOUS APPROACH: ICD-10-PCS | Performed by: INTERNAL MEDICINE

## 2021-01-01 PROCEDURE — 31500 INSERT EMERGENCY AIRWAY: CPT

## 2021-01-01 PROCEDURE — 86706 HEP B SURFACE ANTIBODY: CPT | Performed by: PHYSICIAN ASSISTANT

## 2021-01-01 PROCEDURE — 83050 HGB METHEMOGLOBIN QUAN: CPT

## 2021-01-01 PROCEDURE — 85384 FIBRINOGEN ACTIVITY: CPT | Performed by: INTERNAL MEDICINE

## 2021-01-01 PROCEDURE — 99221 1ST HOSP IP/OBS SF/LOW 40: CPT | Performed by: PSYCHIATRY & NEUROLOGY

## 2021-01-01 PROCEDURE — 83935 ASSAY OF URINE OSMOLALITY: CPT | Performed by: INTERNAL MEDICINE

## 2021-01-01 PROCEDURE — 5A1945Z RESPIRATORY VENTILATION, 24-96 CONSECUTIVE HOURS: ICD-10-PCS | Performed by: INTERNAL MEDICINE

## 2021-01-01 PROCEDURE — 82820 HEMOGLOBIN-OXYGEN AFFINITY: CPT

## 2021-01-01 PROCEDURE — 97535 SELF CARE MNGMENT TRAINING: CPT

## 2021-01-01 PROCEDURE — 82390 ASSAY OF CERULOPLASMIN: CPT | Performed by: PHYSICIAN ASSISTANT

## 2021-01-01 PROCEDURE — 80076 HEPATIC FUNCTION PANEL: CPT | Performed by: NURSE PRACTITIONER

## 2021-01-01 PROCEDURE — 83880 ASSAY OF NATRIURETIC PEPTIDE: CPT | Performed by: INTERNAL MEDICINE

## 2021-01-01 PROCEDURE — 84132 ASSAY OF SERUM POTASSIUM: CPT | Performed by: INTERNAL MEDICINE

## 2021-01-01 PROCEDURE — 94640 AIRWAY INHALATION TREATMENT: CPT

## 2021-01-01 PROCEDURE — 84100 ASSAY OF PHOSPHORUS: CPT | Performed by: INTERNAL MEDICINE

## 2021-01-01 PROCEDURE — 99232 SBSQ HOSP IP/OBS MODERATE 35: CPT | Performed by: PSYCHIATRY & NEUROLOGY

## 2021-01-01 PROCEDURE — 85027 COMPLETE CBC AUTOMATED: CPT | Performed by: NURSE PRACTITIONER

## 2021-01-01 PROCEDURE — 06HM33Z INSERTION OF INFUSION DEVICE INTO RIGHT FEMORAL VEIN, PERCUTANEOUS APPROACH: ICD-10-PCS | Performed by: INTERNAL MEDICINE

## 2021-01-01 PROCEDURE — 25010000002 PIPERACILLIN SOD-TAZOBACTAM PER 1 G: Performed by: INTERNAL MEDICINE

## 2021-01-01 PROCEDURE — 86880 COOMBS TEST DIRECT: CPT | Performed by: NURSE PRACTITIONER

## 2021-01-01 PROCEDURE — B54BZZA ULTRASONOGRAPHY OF RIGHT LOWER EXTREMITY VEINS, GUIDANCE: ICD-10-PCS | Performed by: INTERNAL MEDICINE

## 2021-01-01 PROCEDURE — 99232 SBSQ HOSP IP/OBS MODERATE 35: CPT | Performed by: PHYSICIAN ASSISTANT

## 2021-01-01 PROCEDURE — 87070 CULTURE OTHR SPECIMN AEROBIC: CPT | Performed by: NURSE PRACTITIONER

## 2021-01-01 PROCEDURE — 82103 ALPHA-1-ANTITRYPSIN TOTAL: CPT | Performed by: PHYSICIAN ASSISTANT

## 2021-01-01 PROCEDURE — 82553 CREATINE MB FRACTION: CPT | Performed by: NURSE PRACTITIONER

## 2021-01-01 PROCEDURE — 81001 URINALYSIS AUTO W/SCOPE: CPT | Performed by: INTERNAL MEDICINE

## 2021-01-01 PROCEDURE — 80053 COMPREHEN METABOLIC PANEL: CPT | Performed by: INTERNAL MEDICINE

## 2021-01-01 PROCEDURE — 87205 SMEAR GRAM STAIN: CPT | Performed by: NURSE PRACTITIONER

## 2021-01-01 PROCEDURE — 76937 US GUIDE VASCULAR ACCESS: CPT | Performed by: NURSE PRACTITIONER

## 2021-01-01 PROCEDURE — 86850 RBC ANTIBODY SCREEN: CPT | Performed by: NURSE PRACTITIONER

## 2021-01-01 PROCEDURE — 36430 TRANSFUSION BLD/BLD COMPNT: CPT

## 2021-01-01 PROCEDURE — 92610 EVALUATE SWALLOWING FUNCTION: CPT | Performed by: SPEECH-LANGUAGE PATHOLOGIST

## 2021-01-01 PROCEDURE — 83036 HEMOGLOBIN GLYCOSYLATED A1C: CPT | Performed by: INTERNAL MEDICINE

## 2021-01-01 PROCEDURE — 84484 ASSAY OF TROPONIN QUANT: CPT | Performed by: EMERGENCY MEDICINE

## 2021-01-01 PROCEDURE — 92950 HEART/LUNG RESUSCITATION CPR: CPT

## 2021-01-01 PROCEDURE — 25010000002 EPINEPHRINE 1 MG/10ML SOLUTION PREFILLED SYRINGE: Performed by: NURSE PRACTITIONER

## 2021-01-01 PROCEDURE — 82728 ASSAY OF FERRITIN: CPT | Performed by: PHYSICIAN ASSISTANT

## 2021-01-01 PROCEDURE — 25010000002 PHENYLEPHRINE 10 MG/ML SOLUTION 5 ML VIAL: Performed by: INTERNAL MEDICINE

## 2021-01-01 PROCEDURE — 92523 SPEECH SOUND LANG COMPREHEN: CPT | Performed by: SPEECH-LANGUAGE PATHOLOGIST

## 2021-01-01 PROCEDURE — 71275 CT ANGIOGRAPHY CHEST: CPT

## 2021-01-01 RX ORDER — OXYCODONE HYDROCHLORIDE 5 MG/1
5 TABLET ORAL EVERY 8 HOURS PRN
Status: DISCONTINUED | OUTPATIENT
Start: 2021-01-01 | End: 2021-01-01

## 2021-01-01 RX ORDER — LACTULOSE 10 G/15ML
10 SOLUTION ORAL 2 TIMES DAILY
Status: DISCONTINUED | OUTPATIENT
Start: 2021-01-01 | End: 2021-01-01

## 2021-01-01 RX ORDER — BUMETANIDE 0.25 MG/ML
1 INJECTION INTRAMUSCULAR; INTRAVENOUS 2 TIMES DAILY PRN
Status: DISCONTINUED | OUTPATIENT
Start: 2021-01-01 | End: 2021-01-01

## 2021-01-01 RX ORDER — POTASSIUM CHLORIDE 750 MG/1
40 CAPSULE, EXTENDED RELEASE ORAL ONCE
Status: COMPLETED | OUTPATIENT
Start: 2021-01-01 | End: 2021-01-01

## 2021-01-01 RX ORDER — DEXTROSE MONOHYDRATE 25 G/50ML
25 INJECTION, SOLUTION INTRAVENOUS
Status: DISCONTINUED | OUTPATIENT
Start: 2021-01-01 | End: 2021-01-01

## 2021-01-01 RX ORDER — SODIUM CHLORIDE, SODIUM LACTATE, POTASSIUM CHLORIDE, CALCIUM CHLORIDE 600; 310; 30; 20 MG/100ML; MG/100ML; MG/100ML; MG/100ML
75 INJECTION, SOLUTION INTRAVENOUS CONTINUOUS
Status: DISCONTINUED | OUTPATIENT
Start: 2021-01-01 | End: 2021-01-01

## 2021-01-01 RX ORDER — MAGNESIUM SULFATE HEPTAHYDRATE 40 MG/ML
4 INJECTION, SOLUTION INTRAVENOUS EVERY 6 HOURS PRN
Status: ACTIVE | OUTPATIENT
Start: 2021-01-01 | End: 2021-01-01

## 2021-01-01 RX ORDER — POTASSIUM CHLORIDE 750 MG/1
10 CAPSULE, EXTENDED RELEASE ORAL 2 TIMES DAILY
COMMUNITY

## 2021-01-01 RX ORDER — BUMETANIDE 0.25 MG/ML
2 INJECTION INTRAMUSCULAR; INTRAVENOUS EVERY 12 HOURS
Status: DISCONTINUED | OUTPATIENT
Start: 2021-01-01 | End: 2021-01-01

## 2021-01-01 RX ORDER — OXYCODONE HYDROCHLORIDE 5 MG/1
10 TABLET ORAL EVERY 8 HOURS PRN
Status: DISCONTINUED | OUTPATIENT
Start: 2021-01-01 | End: 2021-01-01

## 2021-01-01 RX ORDER — BUDESONIDE AND FORMOTEROL FUMARATE DIHYDRATE 80; 4.5 UG/1; UG/1
2 AEROSOL RESPIRATORY (INHALATION)
Status: DISCONTINUED | OUTPATIENT
Start: 2021-01-01 | End: 2021-01-01

## 2021-01-01 RX ORDER — LACTULOSE 10 G/15ML
20 SOLUTION ORAL 3 TIMES DAILY
Status: DISCONTINUED | OUTPATIENT
Start: 2021-01-01 | End: 2021-01-01

## 2021-01-01 RX ORDER — ALBUTEROL SULFATE 2.5 MG/3ML
2.5 SOLUTION RESPIRATORY (INHALATION) EVERY 4 HOURS PRN
Status: DISCONTINUED | OUTPATIENT
Start: 2021-01-01 | End: 2021-01-01

## 2021-01-01 RX ORDER — ASPIRIN 81 MG/1
81 TABLET ORAL DAILY
Status: DISCONTINUED | OUTPATIENT
Start: 2021-01-01 | End: 2021-01-01

## 2021-01-01 RX ORDER — ACETAZOLAMIDE SODIUM 500 MG/5ML
500 INJECTION, POWDER, LYOPHILIZED, FOR SOLUTION INTRAVENOUS ONCE
Status: COMPLETED | OUTPATIENT
Start: 2021-01-01 | End: 2021-01-01

## 2021-01-01 RX ORDER — MAGNESIUM SULFATE HEPTAHYDRATE 40 MG/ML
4 INJECTION, SOLUTION INTRAVENOUS ONCE
Status: COMPLETED | OUTPATIENT
Start: 2021-01-01 | End: 2021-01-01

## 2021-01-01 RX ORDER — ATORVASTATIN CALCIUM 40 MG/1
40 TABLET, FILM COATED ORAL DAILY
Status: DISCONTINUED | OUTPATIENT
Start: 2021-01-01 | End: 2021-01-01

## 2021-01-01 RX ORDER — NOREPINEPHRINE BIT/0.9 % NACL 8 MG/250ML
.02-.3 INFUSION BOTTLE (ML) INTRAVENOUS
Status: DISCONTINUED | OUTPATIENT
Start: 2021-01-01 | End: 2021-01-01

## 2021-01-01 RX ORDER — DULOXETIN HYDROCHLORIDE 30 MG/1
30 CAPSULE, DELAYED RELEASE ORAL DAILY
COMMUNITY

## 2021-01-01 RX ORDER — FUROSEMIDE 10 MG/ML
80 INJECTION INTRAMUSCULAR; INTRAVENOUS ONCE
Status: COMPLETED | OUTPATIENT
Start: 2021-01-01 | End: 2021-01-01

## 2021-01-01 RX ORDER — BUPIVACAINE HCL/0.9 % NACL/PF 0.25 %
.02-.3 PLASTIC BAG, INJECTION (ML) EPIDURAL CONTINUOUS PRN
Status: DISCONTINUED | OUTPATIENT
Start: 2021-01-01 | End: 2021-01-01

## 2021-01-01 RX ORDER — POTASSIUM CHLORIDE 29.8 MG/ML
20 INJECTION INTRAVENOUS
Status: ACTIVE | OUTPATIENT
Start: 2021-01-01 | End: 2021-01-01

## 2021-01-01 RX ORDER — MIDAZOLAM IN NACL,ISO-OSMOT/PF 50 MG/50ML
1-10 INFUSION BOTTLE (ML) INTRAVENOUS
Status: DISCONTINUED | OUTPATIENT
Start: 2021-01-01 | End: 2021-01-01 | Stop reason: HOSPADM

## 2021-01-01 RX ORDER — SERTRALINE HYDROCHLORIDE 100 MG/1
100 TABLET, FILM COATED ORAL DAILY
Status: DISCONTINUED | OUTPATIENT
Start: 2021-01-01 | End: 2021-01-01

## 2021-01-01 RX ORDER — ACETAZOLAMIDE SODIUM 500 MG/5ML
500 INJECTION, POWDER, LYOPHILIZED, FOR SOLUTION INTRAVENOUS ONCE
Status: DISCONTINUED | OUTPATIENT
Start: 2021-01-01 | End: 2021-01-01

## 2021-01-01 RX ORDER — NICOTINE POLACRILEX 4 MG
15 LOZENGE BUCCAL
Status: DISCONTINUED | OUTPATIENT
Start: 2021-01-01 | End: 2021-01-01

## 2021-01-01 RX ORDER — POTASSIUM CHLORIDE 750 MG/1
40 CAPSULE, EXTENDED RELEASE ORAL AS NEEDED
Status: DISCONTINUED | OUTPATIENT
Start: 2021-01-01 | End: 2021-01-01

## 2021-01-01 RX ORDER — CHLORHEXIDINE GLUCONATE 0.12 MG/ML
15 RINSE ORAL EVERY 12 HOURS SCHEDULED
Status: DISCONTINUED | OUTPATIENT
Start: 2021-01-01 | End: 2021-01-01 | Stop reason: HOSPADM

## 2021-01-01 RX ORDER — POTASSIUM CHLORIDE 7.45 MG/ML
10 INJECTION INTRAVENOUS
Status: DISCONTINUED | OUTPATIENT
Start: 2021-01-01 | End: 2021-01-01

## 2021-01-01 RX ORDER — LEVETIRACETAM 10 MG/ML
1000 INJECTION INTRAVASCULAR EVERY 12 HOURS SCHEDULED
Status: DISCONTINUED | OUTPATIENT
Start: 2021-01-01 | End: 2021-01-01 | Stop reason: HOSPADM

## 2021-01-01 RX ORDER — VECURONIUM BROMIDE FOR INJECTION 1 MG/ML
10 INJECTION, POWDER, LYOPHILIZED, FOR SOLUTION INTRAVENOUS
Status: ACTIVE | OUTPATIENT
Start: 2021-01-01 | End: 2021-01-01

## 2021-01-01 RX ORDER — IPRATROPIUM BROMIDE AND ALBUTEROL SULFATE 2.5; .5 MG/3ML; MG/3ML
3 SOLUTION RESPIRATORY (INHALATION)
Status: DISCONTINUED | OUTPATIENT
Start: 2021-01-01 | End: 2021-01-01 | Stop reason: HOSPADM

## 2021-01-01 RX ORDER — ACETAZOLAMIDE 500 MG/5ML
INJECTION, POWDER, LYOPHILIZED, FOR SOLUTION INTRAVENOUS
Status: DISCONTINUED
Start: 2021-01-01 | End: 2021-01-01

## 2021-01-01 RX ORDER — LEVETIRACETAM 10 MG/ML
1000 INJECTION INTRAVASCULAR ONCE
Status: COMPLETED | OUTPATIENT
Start: 2021-01-01 | End: 2021-01-01

## 2021-01-01 RX ORDER — MORPHINE SULFATE 4 MG/ML
4 INJECTION, SOLUTION INTRAMUSCULAR; INTRAVENOUS
Status: DISCONTINUED | OUTPATIENT
Start: 2021-01-01 | End: 2021-01-01 | Stop reason: HOSPADM

## 2021-01-01 RX ORDER — LORAZEPAM 2 MG/ML
4 INJECTION INTRAMUSCULAR ONCE
Status: COMPLETED | OUTPATIENT
Start: 2021-01-01 | End: 2021-01-01

## 2021-01-01 RX ORDER — CALCIUM CHLORIDE 100 MG/ML
INJECTION INTRAVENOUS; INTRAVENTRICULAR
Status: COMPLETED | OUTPATIENT
Start: 2021-01-01 | End: 2021-01-01

## 2021-01-01 RX ORDER — CLOPIDOGREL BISULFATE 75 MG/1
75 TABLET ORAL DAILY
Status: DISCONTINUED | OUTPATIENT
Start: 2021-01-01 | End: 2021-01-01

## 2021-01-01 RX ORDER — POTASSIUM CHLORIDE 750 MG/1
10 CAPSULE, EXTENDED RELEASE ORAL 2 TIMES DAILY WITH MEALS
Status: DISCONTINUED | OUTPATIENT
Start: 2021-01-01 | End: 2021-01-01

## 2021-01-01 RX ORDER — SODIUM CHLORIDE 9 MG/ML
50 INJECTION, SOLUTION INTRAVENOUS CONTINUOUS
Status: DISCONTINUED | OUTPATIENT
Start: 2021-01-01 | End: 2021-01-01

## 2021-01-01 RX ORDER — POTASSIUM CHLORIDE 1.5 G/1.77G
40 POWDER, FOR SOLUTION ORAL AS NEEDED
Status: DISCONTINUED | OUTPATIENT
Start: 2021-01-01 | End: 2021-01-01

## 2021-01-01 RX ORDER — DULOXETIN HYDROCHLORIDE 30 MG/1
30 CAPSULE, DELAYED RELEASE ORAL DAILY
Status: DISCONTINUED | OUTPATIENT
Start: 2021-01-01 | End: 2021-01-01

## 2021-01-01 RX ORDER — LEVETIRACETAM 5 MG/ML
500 INJECTION INTRAVASCULAR EVERY 12 HOURS SCHEDULED
Status: DISCONTINUED | OUTPATIENT
Start: 2021-01-01 | End: 2021-01-01

## 2021-01-01 RX ORDER — CARVEDILOL 12.5 MG/1
12.5 TABLET ORAL 2 TIMES DAILY WITH MEALS
Status: DISCONTINUED | OUTPATIENT
Start: 2021-01-01 | End: 2021-01-01

## 2021-01-01 RX ORDER — WARFARIN SODIUM 4 MG/1
4 TABLET ORAL
Status: DISCONTINUED | OUTPATIENT
Start: 2021-01-01 | End: 2021-01-01

## 2021-01-01 RX ORDER — ALBUMIN (HUMAN) 12.5 G/50ML
12.5 SOLUTION INTRAVENOUS ONCE
Status: COMPLETED | OUTPATIENT
Start: 2021-01-01 | End: 2021-01-01

## 2021-01-01 RX ORDER — PANTOPRAZOLE SODIUM 40 MG/1
40 TABLET, DELAYED RELEASE ORAL EVERY OTHER DAY
Status: DISCONTINUED | OUTPATIENT
Start: 2021-01-01 | End: 2021-01-01

## 2021-01-01 RX ORDER — ROPINIROLE 0.5 MG/1
2 TABLET, FILM COATED ORAL EVERY OTHER DAY
Status: DISCONTINUED | OUTPATIENT
Start: 2021-01-01 | End: 2021-01-01

## 2021-01-01 RX ORDER — SODIUM CHLORIDE 0.9 % (FLUSH) 0.9 %
10 SYRINGE (ML) INJECTION EVERY 12 HOURS SCHEDULED
Status: DISCONTINUED | OUTPATIENT
Start: 2021-01-01 | End: 2021-01-01

## 2021-01-01 RX ORDER — LACTULOSE 10 G/15ML
10 SOLUTION ORAL 3 TIMES DAILY
Status: DISCONTINUED | OUTPATIENT
Start: 2021-01-01 | End: 2021-01-01

## 2021-01-01 RX ORDER — PHENYLEPHRINE HCL IN 0.9% NACL 0.5 MG/5ML
.5-3 SYRINGE (ML) INTRAVENOUS
Status: DISCONTINUED | OUTPATIENT
Start: 2021-01-01 | End: 2021-01-01

## 2021-01-01 RX ORDER — HYDROXYZINE HYDROCHLORIDE 25 MG/1
50 TABLET, FILM COATED ORAL DAILY
Status: DISCONTINUED | OUTPATIENT
Start: 2021-01-01 | End: 2021-01-01

## 2021-01-01 RX ORDER — EPINEPHRINE 0.1 MG/ML
SYRINGE (ML) INJECTION
Status: COMPLETED | OUTPATIENT
Start: 2021-01-01 | End: 2021-01-01

## 2021-01-01 RX ORDER — LIDOCAINE 50 MG/G
1 PATCH TOPICAL
Status: DISCONTINUED | OUTPATIENT
Start: 2021-01-01 | End: 2021-01-01

## 2021-01-01 RX ORDER — TAMSULOSIN HYDROCHLORIDE 0.4 MG/1
0.4 CAPSULE ORAL DAILY
Status: DISCONTINUED | OUTPATIENT
Start: 2021-01-01 | End: 2021-01-01

## 2021-01-01 RX ORDER — FERROUS SULFATE 325(65) MG
325 TABLET ORAL
Status: DISCONTINUED | OUTPATIENT
Start: 2021-01-01 | End: 2021-01-01

## 2021-01-01 RX ORDER — WARFARIN SODIUM 5 MG/1
5 TABLET ORAL
Status: DISCONTINUED | OUTPATIENT
Start: 2021-01-01 | End: 2021-01-01

## 2021-01-01 RX ORDER — SODIUM CHLORIDE, SODIUM LACTATE, POTASSIUM CHLORIDE, CALCIUM CHLORIDE 600; 310; 30; 20 MG/100ML; MG/100ML; MG/100ML; MG/100ML
50 INJECTION, SOLUTION INTRAVENOUS CONTINUOUS
Status: DISCONTINUED | OUTPATIENT
Start: 2021-01-01 | End: 2021-01-01

## 2021-01-01 RX ORDER — SODIUM CHLORIDE 0.9 % (FLUSH) 0.9 %
10 SYRINGE (ML) INJECTION AS NEEDED
Status: DISCONTINUED | OUTPATIENT
Start: 2021-01-01 | End: 2021-01-01 | Stop reason: HOSPADM

## 2021-01-01 RX ORDER — SODIUM CHLORIDE 9 MG/ML
10 INJECTION INTRAVENOUS AS NEEDED
Status: DISCONTINUED | OUTPATIENT
Start: 2021-01-01 | End: 2021-01-01

## 2021-01-01 RX ORDER — PANTOPRAZOLE SODIUM 40 MG/10ML
40 INJECTION, POWDER, LYOPHILIZED, FOR SOLUTION INTRAVENOUS
Status: DISCONTINUED | OUTPATIENT
Start: 2021-01-01 | End: 2021-01-01 | Stop reason: HOSPADM

## 2021-01-01 RX ADMIN — IPRATROPIUM BROMIDE 0.5 MG: 0.5 SOLUTION RESPIRATORY (INHALATION) at 12:13

## 2021-01-01 RX ADMIN — IPRATROPIUM BROMIDE 0.5 MG: 0.5 SOLUTION RESPIRATORY (INHALATION) at 08:02

## 2021-01-01 RX ADMIN — HYDROXYZINE HYDROCHLORIDE 50 MG: 25 TABLET, FILM COATED ORAL at 08:34

## 2021-01-01 RX ADMIN — TAZOBACTAM SODIUM AND PIPERACILLIN SODIUM 3.38 G: 375; 3 INJECTION, SOLUTION INTRAVENOUS at 06:02

## 2021-01-01 RX ADMIN — BUDESONIDE AND FORMOTEROL FUMARATE DIHYDRATE 2 PUFF: 80; 4.5 AEROSOL RESPIRATORY (INHALATION) at 21:06

## 2021-01-01 RX ADMIN — OXYCODONE HYDROCHLORIDE 5 MG: 5 TABLET ORAL at 14:11

## 2021-01-01 RX ADMIN — HYDROXYZINE HYDROCHLORIDE 50 MG: 25 TABLET, FILM COATED ORAL at 09:19

## 2021-01-01 RX ADMIN — ATORVASTATIN CALCIUM 40 MG: 40 TABLET, FILM COATED ORAL at 09:19

## 2021-01-01 RX ADMIN — MAGNESIUM OXIDE TAB 400 MG (241.3 MG ELEMENTAL MG) 400 MG: 400 (241.3 MG) TAB at 08:34

## 2021-01-01 RX ADMIN — IPRATROPIUM BROMIDE 0.5 MG: 0.5 SOLUTION RESPIRATORY (INHALATION) at 16:27

## 2021-01-01 RX ADMIN — BUDESONIDE AND FORMOTEROL FUMARATE DIHYDRATE 2 PUFF: 80; 4.5 AEROSOL RESPIRATORY (INHALATION) at 19:47

## 2021-01-01 RX ADMIN — Medication 0.3 MCG/KG/MIN: at 21:20

## 2021-01-01 RX ADMIN — PROPOFOL 40 MCG/KG/MIN: 10 INJECTION, EMULSION INTRAVENOUS at 10:19

## 2021-01-01 RX ADMIN — PHENYLEPHRINE HYDROCHLORIDE 0.5 MCG/KG/MIN: 10 INJECTION INTRAVENOUS at 10:37

## 2021-01-01 RX ADMIN — Medication 3 MG/HR: at 01:40

## 2021-01-01 RX ADMIN — MINERAL OIL AND PETROLATUM 1 APPLICATION: 150; 830 OINTMENT OPHTHALMIC at 10:38

## 2021-01-01 RX ADMIN — RIFAXIMIN 550 MG: 550 TABLET ORAL at 09:35

## 2021-01-01 RX ADMIN — OXYCODONE HYDROCHLORIDE 5 MG: 5 TABLET ORAL at 09:25

## 2021-01-01 RX ADMIN — EPINEPHRINE 0.3 MCG/KG/MIN: 1 INJECTION INTRAMUSCULAR; INTRAVENOUS; SUBCUTANEOUS at 05:12

## 2021-01-01 RX ADMIN — FUROSEMIDE 80 MG: 10 INJECTION, SOLUTION INTRAVENOUS at 00:17

## 2021-01-01 RX ADMIN — ATORVASTATIN CALCIUM 40 MG: 40 TABLET, FILM COATED ORAL at 09:14

## 2021-01-01 RX ADMIN — WARFARIN SODIUM 5 MG: 5 TABLET ORAL at 00:52

## 2021-01-01 RX ADMIN — Medication 6 MG/HR: at 08:55

## 2021-01-01 RX ADMIN — MAGNESIUM OXIDE TAB 400 MG (241.3 MG ELEMENTAL MG) 400 MG: 400 (241.3 MG) TAB at 09:20

## 2021-01-01 RX ADMIN — MINERAL OIL AND PETROLATUM 1 APPLICATION: 150; 830 OINTMENT OPHTHALMIC at 12:18

## 2021-01-01 RX ADMIN — Medication 10 MG/HR: at 07:49

## 2021-01-01 RX ADMIN — LACTULOSE 20 G: 20 SOLUTION ORAL at 09:16

## 2021-01-01 RX ADMIN — BUDESONIDE AND FORMOTEROL FUMARATE DIHYDRATE 2 PUFF: 80; 4.5 AEROSOL RESPIRATORY (INHALATION) at 08:02

## 2021-01-01 RX ADMIN — LIDOCAINE 1 PATCH: 50 PATCH CUTANEOUS at 17:09

## 2021-01-01 RX ADMIN — INSULIN LISPRO 2 UNITS: 100 INJECTION, SOLUTION INTRAVENOUS; SUBCUTANEOUS at 09:21

## 2021-01-01 RX ADMIN — IPRATROPIUM BROMIDE 0.5 MG: 0.5 SOLUTION RESPIRATORY (INHALATION) at 15:47

## 2021-01-01 RX ADMIN — ROPINIROLE HYDROCHLORIDE 2 MG: 1 TABLET, FILM COATED ORAL at 08:35

## 2021-01-01 RX ADMIN — DOXYCYCLINE 100 MG: 100 INJECTION, POWDER, LYOPHILIZED, FOR SOLUTION INTRAVENOUS at 23:46

## 2021-01-01 RX ADMIN — PROPOFOL 5 MCG/KG/MIN: 10 INJECTION, EMULSION INTRAVENOUS at 23:49

## 2021-01-01 RX ADMIN — OXYCODONE HYDROCHLORIDE 5 MG: 5 TABLET ORAL at 20:50

## 2021-01-01 RX ADMIN — PANTOPRAZOLE SODIUM 40 MG: 40 TABLET, DELAYED RELEASE ORAL at 06:18

## 2021-01-01 RX ADMIN — OXYCODONE HYDROCHLORIDE 5 MG: 5 TABLET ORAL at 09:19

## 2021-01-01 RX ADMIN — RIFAXIMIN 550 MG: 550 TABLET ORAL at 09:50

## 2021-01-01 RX ADMIN — SODIUM CHLORIDE, PRESERVATIVE FREE 10 ML: 5 INJECTION INTRAVENOUS at 09:15

## 2021-01-01 RX ADMIN — IPRATROPIUM BROMIDE 0.5 MG: 0.5 SOLUTION RESPIRATORY (INHALATION) at 08:06

## 2021-01-01 RX ADMIN — SODIUM CHLORIDE, PRESERVATIVE FREE 10 ML: 5 INJECTION INTRAVENOUS at 00:34

## 2021-01-01 RX ADMIN — RIFAXIMIN 550 MG: 550 TABLET ORAL at 23:51

## 2021-01-01 RX ADMIN — MINERAL OIL AND PETROLATUM: 150; 830 OINTMENT OPHTHALMIC at 22:18

## 2021-01-01 RX ADMIN — Medication 1 EACH: at 08:56

## 2021-01-01 RX ADMIN — Medication 1 EACH: at 08:44

## 2021-01-01 RX ADMIN — FERROUS SULFATE TAB 325 MG (65 MG ELEMENTAL FE) 325 MG: 325 (65 FE) TAB at 09:14

## 2021-01-01 RX ADMIN — IPRATROPIUM BROMIDE AND ALBUTEROL SULFATE 3 ML: 2.5; .5 SOLUTION RESPIRATORY (INHALATION) at 01:27

## 2021-01-01 RX ADMIN — MAGNESIUM OXIDE TAB 400 MG (241.3 MG ELEMENTAL MG) 400 MG: 400 (241.3 MG) TAB at 09:19

## 2021-01-01 RX ADMIN — PROPOFOL 40 MCG/KG/MIN: 10 INJECTION, EMULSION INTRAVENOUS at 06:48

## 2021-01-01 RX ADMIN — SODIUM CHLORIDE 500 MG: 9 INJECTION, SOLUTION INTRAVENOUS at 21:10

## 2021-01-01 RX ADMIN — OXYCODONE HYDROCHLORIDE 5 MG: 5 TABLET ORAL at 17:29

## 2021-01-01 RX ADMIN — HYDROXYZINE HYDROCHLORIDE 50 MG: 25 TABLET, FILM COATED ORAL at 09:14

## 2021-01-01 RX ADMIN — CARVEDILOL 12.5 MG: 12.5 TABLET, FILM COATED ORAL at 09:49

## 2021-01-01 RX ADMIN — HEPATITIS B VACCINE (RECOMBINANT) 20 MCG: 20 INJECTION, SUSPENSION INTRAMUSCULAR at 17:42

## 2021-01-01 RX ADMIN — TAMSULOSIN HYDROCHLORIDE 0.4 MG: 0.4 CAPSULE ORAL at 09:20

## 2021-01-01 RX ADMIN — SODIUM CHLORIDE 1 G: 900 INJECTION INTRAVENOUS at 09:48

## 2021-01-01 RX ADMIN — IPRATROPIUM BROMIDE AND ALBUTEROL SULFATE 3 ML: 2.5; .5 SOLUTION RESPIRATORY (INHALATION) at 01:46

## 2021-01-01 RX ADMIN — CHLORHEXIDINE GLUCONATE 0.12% ORAL RINSE 15 ML: 1.2 LIQUID ORAL at 08:56

## 2021-01-01 RX ADMIN — SODIUM CHLORIDE 3000 MG: 9 INJECTION, SOLUTION INTRAVENOUS at 10:13

## 2021-01-01 RX ADMIN — LORAZEPAM 4 MG: 2 INJECTION, SOLUTION INTRAMUSCULAR; INTRAVENOUS at 01:57

## 2021-01-01 RX ADMIN — POTASSIUM CHLORIDE 10 MEQ: 7.46 INJECTION, SOLUTION INTRAVENOUS at 02:55

## 2021-01-01 RX ADMIN — CARVEDILOL 12.5 MG: 12.5 TABLET, FILM COATED ORAL at 09:35

## 2021-01-01 RX ADMIN — ATORVASTATIN CALCIUM 40 MG: 40 TABLET, FILM COATED ORAL at 08:34

## 2021-01-01 RX ADMIN — ASPIRIN 81 MG: 81 TABLET, FILM COATED ORAL at 09:19

## 2021-01-01 RX ADMIN — EPINEPHRINE 2 MG: 0.1 INJECTION, SOLUTION ENDOTRACHEAL; INTRACARDIAC; INTRAVENOUS at 20:54

## 2021-01-01 RX ADMIN — NOREPINEPHRINE BITARTRATE 0.1 MCG/KG/MIN: 1 INJECTION, SOLUTION, CONCENTRATE INTRAVENOUS at 05:52

## 2021-01-01 RX ADMIN — BUDESONIDE AND FORMOTEROL FUMARATE DIHYDRATE 2 PUFF: 80; 4.5 AEROSOL RESPIRATORY (INHALATION) at 06:57

## 2021-01-01 RX ADMIN — EPINEPHRINE 0.2 MCG/KG/MIN: 1 INJECTION INTRAMUSCULAR; INTRAVENOUS; SUBCUTANEOUS at 23:47

## 2021-01-01 RX ADMIN — MINERAL OIL AND PETROLATUM 1 APPLICATION: 150; 830 OINTMENT OPHTHALMIC at 11:54

## 2021-01-01 RX ADMIN — FERROUS SULFATE TAB 325 MG (65 MG ELEMENTAL FE) 325 MG: 325 (65 FE) TAB at 09:49

## 2021-01-01 RX ADMIN — LACTULOSE 20 G: 20 SOLUTION ORAL at 16:07

## 2021-01-01 RX ADMIN — SODIUM CHLORIDE 1 G: 900 INJECTION INTRAVENOUS at 10:33

## 2021-01-01 RX ADMIN — FERROUS SULFATE TAB 325 MG (65 MG ELEMENTAL FE) 325 MG: 325 (65 FE) TAB at 09:19

## 2021-01-01 RX ADMIN — OXYCODONE 10 MG: 5 TABLET ORAL at 00:51

## 2021-01-01 RX ADMIN — BUDESONIDE AND FORMOTEROL FUMARATE DIHYDRATE 2 PUFF: 80; 4.5 AEROSOL RESPIRATORY (INHALATION) at 09:30

## 2021-01-01 RX ADMIN — IPRATROPIUM BROMIDE 0.5 MG: 0.5 SOLUTION RESPIRATORY (INHALATION) at 20:51

## 2021-01-01 RX ADMIN — PROPOFOL 50 MCG/KG/MIN: 10 INJECTION, EMULSION INTRAVENOUS at 22:07

## 2021-01-01 RX ADMIN — MINERAL OIL AND PETROLATUM 1 APPLICATION: 150; 830 OINTMENT OPHTHALMIC at 08:43

## 2021-01-01 RX ADMIN — DOXYCYCLINE 100 MG: 100 INJECTION, POWDER, LYOPHILIZED, FOR SOLUTION INTRAVENOUS at 09:33

## 2021-01-01 RX ADMIN — INSULIN LISPRO 2 UNITS: 100 INJECTION, SOLUTION INTRAVENOUS; SUBCUTANEOUS at 12:05

## 2021-01-01 RX ADMIN — Medication 10 MG/HR: at 18:35

## 2021-01-01 RX ADMIN — PROPOFOL 40 MCG/KG/MIN: 10 INJECTION, EMULSION INTRAVENOUS at 20:10

## 2021-01-01 RX ADMIN — POTASSIUM CHLORIDE 10 MEQ: 10 CAPSULE, COATED, EXTENDED RELEASE ORAL at 08:34

## 2021-01-01 RX ADMIN — IPRATROPIUM BROMIDE AND ALBUTEROL SULFATE 3 ML: 2.5; .5 SOLUTION RESPIRATORY (INHALATION) at 12:57

## 2021-01-01 RX ADMIN — IPRATROPIUM BROMIDE 0.5 MG: 0.5 SOLUTION RESPIRATORY (INHALATION) at 08:47

## 2021-01-01 RX ADMIN — IPRATROPIUM BROMIDE 0.5 MG: 0.5 SOLUTION RESPIRATORY (INHALATION) at 09:30

## 2021-01-01 RX ADMIN — IPRATROPIUM BROMIDE 0.5 MG: 0.5 SOLUTION RESPIRATORY (INHALATION) at 16:38

## 2021-01-01 RX ADMIN — OXYCODONE HYDROCHLORIDE 5 MG: 5 TABLET ORAL at 09:49

## 2021-01-01 RX ADMIN — DOXYCYCLINE 100 MG: 100 INJECTION, POWDER, LYOPHILIZED, FOR SOLUTION INTRAVENOUS at 10:17

## 2021-01-01 RX ADMIN — CARVEDILOL 12.5 MG: 12.5 TABLET, FILM COATED ORAL at 09:15

## 2021-01-01 RX ADMIN — IPRATROPIUM BROMIDE 0.5 MG: 0.5 SOLUTION RESPIRATORY (INHALATION) at 12:04

## 2021-01-01 RX ADMIN — MINERAL OIL AND PETROLATUM: 150; 830 OINTMENT OPHTHALMIC at 03:34

## 2021-01-01 RX ADMIN — SODIUM CHLORIDE, PRESERVATIVE FREE 10 ML: 5 INJECTION INTRAVENOUS at 21:34

## 2021-01-01 RX ADMIN — OXYCODONE 10 MG: 5 TABLET ORAL at 11:46

## 2021-01-01 RX ADMIN — PHENYLEPHRINE HYDROCHLORIDE 3 MCG/KG/MIN: 10 INJECTION INTRAVENOUS at 18:39

## 2021-01-01 RX ADMIN — MINERAL OIL AND PETROLATUM 1 APPLICATION: 150; 830 OINTMENT OPHTHALMIC at 18:35

## 2021-01-01 RX ADMIN — CHLORHEXIDINE GLUCONATE 0.12% ORAL RINSE 15 ML: 1.2 LIQUID ORAL at 20:09

## 2021-01-01 RX ADMIN — LEVETIRACETAM INJECTION 1000 MG: 10 INJECTION INTRAVENOUS at 22:14

## 2021-01-01 RX ADMIN — EPINEPHRINE 1 MG: 0.1 INJECTION, SOLUTION ENDOTRACHEAL; INTRACARDIAC; INTRAVENOUS at 20:52

## 2021-01-01 RX ADMIN — WATER 300 ML: 1 IRRIGANT IRRIGATION at 15:08

## 2021-01-01 RX ADMIN — Medication 0.3 MCG/KG/MIN: at 21:06

## 2021-01-01 RX ADMIN — WARFARIN SODIUM 4 MG: 4 TABLET ORAL at 18:03

## 2021-01-01 RX ADMIN — SERTRALINE HYDROCHLORIDE 100 MG: 100 TABLET ORAL at 09:35

## 2021-01-01 RX ADMIN — EPINEPHRINE 0.25 MCG/KG/MIN: 1 INJECTION INTRAMUSCULAR; INTRAVENOUS; SUBCUTANEOUS at 03:35

## 2021-01-01 RX ADMIN — PROPOFOL 5 MCG/KG/MIN: 10 INJECTION, EMULSION INTRAVENOUS at 01:57

## 2021-01-01 RX ADMIN — LACTULOSE 20 G: 20 SOLUTION ORAL at 09:28

## 2021-01-01 RX ADMIN — BUDESONIDE AND FORMOTEROL FUMARATE DIHYDRATE 2 PUFF: 80; 4.5 AEROSOL RESPIRATORY (INHALATION) at 08:07

## 2021-01-01 RX ADMIN — POTASSIUM CHLORIDE 10 MEQ: 7.46 INJECTION, SOLUTION INTRAVENOUS at 01:53

## 2021-01-01 RX ADMIN — POTASSIUM CHLORIDE 10 MEQ: 7.46 INJECTION, SOLUTION INTRAVENOUS at 21:57

## 2021-01-01 RX ADMIN — IPRATROPIUM BROMIDE AND ALBUTEROL SULFATE 3 ML: 2.5; .5 SOLUTION RESPIRATORY (INHALATION) at 06:58

## 2021-01-01 RX ADMIN — TAZOBACTAM SODIUM AND PIPERACILLIN SODIUM 4.5 G: 500; 4 INJECTION, SOLUTION INTRAVENOUS at 00:17

## 2021-01-01 RX ADMIN — MINERAL OIL AND PETROLATUM: 150; 830 OINTMENT OPHTHALMIC at 06:01

## 2021-01-01 RX ADMIN — SODIUM CHLORIDE 75 ML/HR: 9 INJECTION, SOLUTION INTRAVENOUS at 09:33

## 2021-01-01 RX ADMIN — EPINEPHRINE 0.3 MCG/KG/MIN: 1 INJECTION INTRAMUSCULAR; INTRAVENOUS; SUBCUTANEOUS at 23:01

## 2021-01-01 RX ADMIN — MINERAL OIL AND PETROLATUM: 150; 830 OINTMENT OPHTHALMIC at 00:06

## 2021-01-01 RX ADMIN — PHENYLEPHRINE HYDROCHLORIDE 2.5 MCG/KG/MIN: 10 INJECTION INTRAVENOUS at 16:05

## 2021-01-01 RX ADMIN — CARVEDILOL 12.5 MG: 12.5 TABLET, FILM COATED ORAL at 17:12

## 2021-01-01 RX ADMIN — POTASSIUM CHLORIDE 40 MEQ: 10 CAPSULE, COATED, EXTENDED RELEASE ORAL at 11:46

## 2021-01-01 RX ADMIN — EPINEPHRINE 0.3 MCG/KG/MIN: 1 INJECTION INTRAMUSCULAR; INTRAVENOUS; SUBCUTANEOUS at 11:52

## 2021-01-01 RX ADMIN — SODIUM CHLORIDE, POTASSIUM CHLORIDE, SODIUM LACTATE AND CALCIUM CHLORIDE 50 ML/HR: 600; 310; 30; 20 INJECTION, SOLUTION INTRAVENOUS at 00:25

## 2021-01-01 RX ADMIN — IPRATROPIUM BROMIDE 0.5 MG: 0.5 SOLUTION RESPIRATORY (INHALATION) at 21:06

## 2021-01-01 RX ADMIN — SODIUM CHLORIDE 75 ML/HR: 9 INJECTION, SOLUTION INTRAVENOUS at 16:50

## 2021-01-01 RX ADMIN — NOREPINEPHRINE BITARTRATE 0.3 MCG/KG/MIN: 1 INJECTION, SOLUTION, CONCENTRATE INTRAVENOUS at 11:54

## 2021-01-01 RX ADMIN — CHLORHEXIDINE GLUCONATE 0.12% ORAL RINSE 15 ML: 1.2 LIQUID ORAL at 10:13

## 2021-01-01 RX ADMIN — FERROUS SULFATE TAB 325 MG (65 MG ELEMENTAL FE) 325 MG: 325 (65 FE) TAB at 08:34

## 2021-01-01 RX ADMIN — SODIUM CHLORIDE 50 ML/HR: 9 INJECTION, SOLUTION INTRAVENOUS at 06:13

## 2021-01-01 RX ADMIN — PHENYLEPHRINE HYDROCHLORIDE 3 MCG/KG/MIN: 10 INJECTION INTRAVENOUS at 22:19

## 2021-01-01 RX ADMIN — IPRATROPIUM BROMIDE 0.5 MG: 0.5 SOLUTION RESPIRATORY (INHALATION) at 16:11

## 2021-01-01 RX ADMIN — CHLORHEXIDINE GLUCONATE 0.12% ORAL RINSE 15 ML: 1.2 LIQUID ORAL at 23:43

## 2021-01-01 RX ADMIN — SODIUM BICARBONATE 50 MEQ: 84 INJECTION, SOLUTION INTRAVENOUS at 21:15

## 2021-01-01 RX ADMIN — PROPOFOL 30 MCG/KG/MIN: 10 INJECTION, EMULSION INTRAVENOUS at 05:03

## 2021-01-01 RX ADMIN — FERROUS SULFATE TAB 325 MG (65 MG ELEMENTAL FE) 325 MG: 325 (65 FE) TAB at 09:20

## 2021-01-01 RX ADMIN — IPRATROPIUM BROMIDE AND ALBUTEROL SULFATE 3 ML: 2.5; .5 SOLUTION RESPIRATORY (INHALATION) at 07:01

## 2021-01-01 RX ADMIN — PROPOFOL 30 MCG/KG/MIN: 10 INJECTION, EMULSION INTRAVENOUS at 12:21

## 2021-01-01 RX ADMIN — ASPIRIN 81 MG: 81 TABLET, FILM COATED ORAL at 09:20

## 2021-01-01 RX ADMIN — TAZOBACTAM SODIUM AND PIPERACILLIN SODIUM 3.38 G: 375; 3 INJECTION, SOLUTION INTRAVENOUS at 15:35

## 2021-01-01 RX ADMIN — TAZOBACTAM SODIUM AND PIPERACILLIN SODIUM 3.38 G: 375; 3 INJECTION, SOLUTION INTRAVENOUS at 00:06

## 2021-01-01 RX ADMIN — RIFAXIMIN 550 MG: 550 TABLET ORAL at 09:20

## 2021-01-01 RX ADMIN — Medication 0.3 MCG/KG/MIN: at 16:27

## 2021-01-01 RX ADMIN — IPRATROPIUM BROMIDE 0.5 MG: 0.5 SOLUTION RESPIRATORY (INHALATION) at 07:50

## 2021-01-01 RX ADMIN — TAZOBACTAM SODIUM AND PIPERACILLIN SODIUM 3.38 G: 375; 3 INJECTION, SOLUTION INTRAVENOUS at 10:39

## 2021-01-01 RX ADMIN — TAMSULOSIN HYDROCHLORIDE 0.4 MG: 0.4 CAPSULE ORAL at 09:49

## 2021-01-01 RX ADMIN — DOXYCYCLINE 100 MG: 100 INJECTION, POWDER, LYOPHILIZED, FOR SOLUTION INTRAVENOUS at 21:59

## 2021-01-01 RX ADMIN — CHLOROTHIAZIDE SODIUM 500 MG: 500 INJECTION, POWDER, LYOPHILIZED, FOR SOLUTION INTRAVENOUS at 12:16

## 2021-01-01 RX ADMIN — ATORVASTATIN CALCIUM 40 MG: 40 TABLET, FILM COATED ORAL at 09:20

## 2021-01-01 RX ADMIN — IOPAMIDOL 100 ML: 755 INJECTION, SOLUTION INTRAVENOUS at 22:38

## 2021-01-01 RX ADMIN — CALCIUM CHLORIDE 1 G: 100 INJECTION INTRAVENOUS; INTRAVENTRICULAR at 21:09

## 2021-01-01 RX ADMIN — SODIUM CHLORIDE 500 MG: 9 INJECTION, SOLUTION INTRAVENOUS at 05:09

## 2021-01-01 RX ADMIN — POTASSIUM CHLORIDE 10 MEQ: 7.46 INJECTION, SOLUTION INTRAVENOUS at 00:51

## 2021-01-01 RX ADMIN — POTASSIUM CHLORIDE 40 MEQ: 10 CAPSULE, COATED, EXTENDED RELEASE ORAL at 16:07

## 2021-01-01 RX ADMIN — Medication 1 MG/HR: at 23:44

## 2021-01-01 RX ADMIN — ASPIRIN 81 MG: 81 TABLET, FILM COATED ORAL at 09:35

## 2021-01-01 RX ADMIN — PHENYLEPHRINE HYDROCHLORIDE 3 MCG/KG/MIN: 10 INJECTION INTRAVENOUS at 02:05

## 2021-01-01 RX ADMIN — PROPOFOL 40 MCG/KG/MIN: 10 INJECTION, EMULSION INTRAVENOUS at 16:29

## 2021-01-01 RX ADMIN — TAMSULOSIN HYDROCHLORIDE 0.4 MG: 0.4 CAPSULE ORAL at 08:34

## 2021-01-01 RX ADMIN — BUDESONIDE AND FORMOTEROL FUMARATE DIHYDRATE 2 PUFF: 80; 4.5 AEROSOL RESPIRATORY (INHALATION) at 20:34

## 2021-01-01 RX ADMIN — SERTRALINE HYDROCHLORIDE 100 MG: 100 TABLET ORAL at 09:19

## 2021-01-01 RX ADMIN — LEVETIRACETAM INJECTION 1000 MG: 10 INJECTION INTRAVENOUS at 09:01

## 2021-01-01 RX ADMIN — EPINEPHRINE 1 MG: 0.1 INJECTION, SOLUTION ENDOTRACHEAL; INTRACARDIAC; INTRAVENOUS at 20:43

## 2021-01-01 RX ADMIN — BUMETANIDE 1 MG: 0.25 INJECTION INTRAMUSCULAR; INTRAVENOUS at 10:01

## 2021-01-01 RX ADMIN — SODIUM BICARBONATE 50 MEQ: 84 INJECTION, SOLUTION INTRAVENOUS at 21:06

## 2021-01-01 RX ADMIN — SODIUM CHLORIDE, PRESERVATIVE FREE 10 ML: 5 INJECTION INTRAVENOUS at 20:31

## 2021-01-01 RX ADMIN — PHENYLEPHRINE HYDROCHLORIDE 3 MCG/KG/MIN: 10 INJECTION INTRAVENOUS at 15:01

## 2021-01-01 RX ADMIN — PROPOFOL 50 MCG/KG/MIN: 10 INJECTION, EMULSION INTRAVENOUS at 01:14

## 2021-01-01 RX ADMIN — WATER 500 MG: 1 INJECTION INTRAMUSCULAR; INTRAVENOUS; SUBCUTANEOUS at 04:02

## 2021-01-01 RX ADMIN — PANTOPRAZOLE SODIUM 40 MG: 40 TABLET, DELAYED RELEASE ORAL at 06:02

## 2021-01-01 RX ADMIN — BUDESONIDE AND FORMOTEROL FUMARATE DIHYDRATE 2 PUFF: 80; 4.5 AEROSOL RESPIRATORY (INHALATION) at 08:47

## 2021-01-01 RX ADMIN — CLOPIDOGREL BISULFATE 75 MG: 75 TABLET ORAL at 08:34

## 2021-01-01 RX ADMIN — NOREPINEPHRINE BITARTRATE 0.3 MCG/KG/MIN: 1 INJECTION, SOLUTION, CONCENTRATE INTRAVENOUS at 02:05

## 2021-01-01 RX ADMIN — CARVEDILOL 12.5 MG: 12.5 TABLET, FILM COATED ORAL at 08:35

## 2021-01-01 RX ADMIN — MINERAL OIL AND PETROLATUM: 150; 830 OINTMENT OPHTHALMIC at 05:20

## 2021-01-01 RX ADMIN — TAZOBACTAM SODIUM AND PIPERACILLIN SODIUM 3.38 G: 375; 3 INJECTION, SOLUTION INTRAVENOUS at 12:20

## 2021-01-01 RX ADMIN — LACTULOSE 20 G: 20 SOLUTION ORAL at 09:49

## 2021-01-01 RX ADMIN — MINERAL OIL AND PETROLATUM: 150; 830 OINTMENT OPHTHALMIC at 04:26

## 2021-01-01 RX ADMIN — CARVEDILOL 12.5 MG: 12.5 TABLET, FILM COATED ORAL at 17:09

## 2021-01-01 RX ADMIN — MAGNESIUM OXIDE TAB 400 MG (241.3 MG ELEMENTAL MG) 400 MG: 400 (241.3 MG) TAB at 09:49

## 2021-01-01 RX ADMIN — POTASSIUM CHLORIDE 10 MEQ: 7.46 INJECTION, SOLUTION INTRAVENOUS at 05:01

## 2021-01-01 RX ADMIN — SODIUM CHLORIDE, PRESERVATIVE FREE 10 ML: 5 INJECTION INTRAVENOUS at 09:20

## 2021-01-01 RX ADMIN — IPRATROPIUM BROMIDE 0.5 MG: 0.5 SOLUTION RESPIRATORY (INHALATION) at 12:31

## 2021-01-01 RX ADMIN — SERTRALINE HYDROCHLORIDE 100 MG: 100 TABLET ORAL at 09:50

## 2021-01-01 RX ADMIN — PHYTONADIONE 10 MG: 10 INJECTION, EMULSION INTRAMUSCULAR; INTRAVENOUS; SUBCUTANEOUS at 00:16

## 2021-01-01 RX ADMIN — LEVETIRACETAM 1000 MG: 10 INJECTION INTRAVASCULAR at 09:04

## 2021-01-01 RX ADMIN — DULOXETINE HYDROCHLORIDE 30 MG: 30 CAPSULE, DELAYED RELEASE ORAL at 08:34

## 2021-01-01 RX ADMIN — FERROUS SULFATE TAB 325 MG (65 MG ELEMENTAL FE) 325 MG: 325 (65 FE) TAB at 09:35

## 2021-01-01 RX ADMIN — SODIUM CHLORIDE 500 MG: 9 INJECTION, SOLUTION INTRAVENOUS at 14:49

## 2021-01-01 RX ADMIN — MINERAL OIL AND PETROLATUM 1 APPLICATION: 150; 830 OINTMENT OPHTHALMIC at 18:03

## 2021-01-01 RX ADMIN — TAMSULOSIN HYDROCHLORIDE 0.4 MG: 0.4 CAPSULE ORAL at 09:14

## 2021-01-01 RX ADMIN — CARVEDILOL 12.5 MG: 12.5 TABLET, FILM COATED ORAL at 18:03

## 2021-01-01 RX ADMIN — IPRATROPIUM BROMIDE 0.5 MG: 0.5 SOLUTION RESPIRATORY (INHALATION) at 16:00

## 2021-01-01 RX ADMIN — BUMETANIDE 2 MG: 0.25 INJECTION, SOLUTION INTRAMUSCULAR; INTRAVENOUS at 13:02

## 2021-01-01 RX ADMIN — EPINEPHRINE 0.3 MCG/KG/MIN: 1 INJECTION INTRAMUSCULAR; INTRAVENOUS; SUBCUTANEOUS at 05:53

## 2021-01-01 RX ADMIN — ROPINIROLE HYDROCHLORIDE 2 MG: 1 TABLET, FILM COATED ORAL at 09:20

## 2021-01-01 RX ADMIN — SERTRALINE HYDROCHLORIDE 100 MG: 100 TABLET ORAL at 09:20

## 2021-01-01 RX ADMIN — ASPIRIN 81 MG: 81 TABLET, FILM COATED ORAL at 09:14

## 2021-01-01 RX ADMIN — SODIUM CHLORIDE, PRESERVATIVE FREE 10 ML: 5 INJECTION INTRAVENOUS at 08:37

## 2021-01-01 RX ADMIN — IPRATROPIUM BROMIDE 0.5 MG: 0.5 SOLUTION RESPIRATORY (INHALATION) at 20:32

## 2021-01-01 RX ADMIN — PROPOFOL 30 MCG/KG/MIN: 10 INJECTION, EMULSION INTRAVENOUS at 18:02

## 2021-01-01 RX ADMIN — EPINEPHRINE 2 MG: 0.1 INJECTION, SOLUTION ENDOTRACHEAL; INTRACARDIAC; INTRAVENOUS at 21:09

## 2021-01-01 RX ADMIN — MINERAL OIL AND PETROLATUM: 150; 830 OINTMENT OPHTHALMIC at 20:09

## 2021-01-01 RX ADMIN — PROPOFOL 30 MCG/KG/MIN: 10 INJECTION, EMULSION INTRAVENOUS at 09:00

## 2021-01-01 RX ADMIN — BUMETANIDE 1 MG: 0.25 INJECTION INTRAMUSCULAR; INTRAVENOUS at 14:23

## 2021-01-01 RX ADMIN — LEVETIRACETAM 1000 MG: 10 INJECTION INTRAVASCULAR at 18:36

## 2021-01-01 RX ADMIN — CARVEDILOL 12.5 MG: 12.5 TABLET, FILM COATED ORAL at 09:19

## 2021-01-01 RX ADMIN — LIDOCAINE 1 PATCH: 50 PATCH CUTANEOUS at 10:21

## 2021-01-01 RX ADMIN — IPRATROPIUM BROMIDE AND ALBUTEROL SULFATE 3 ML: 2.5; .5 SOLUTION RESPIRATORY (INHALATION) at 20:01

## 2021-01-01 RX ADMIN — MAGNESIUM OXIDE TAB 400 MG (241.3 MG ELEMENTAL MG) 400 MG: 400 (241.3 MG) TAB at 09:14

## 2021-01-01 RX ADMIN — EPINEPHRINE 0.3 MCG/KG/MIN: 1 INJECTION INTRAMUSCULAR; INTRAVENOUS; SUBCUTANEOUS at 10:35

## 2021-01-01 RX ADMIN — EPINEPHRINE 1 MG: 0.1 INJECTION, SOLUTION ENDOTRACHEAL; INTRACARDIAC; INTRAVENOUS at 20:47

## 2021-01-01 RX ADMIN — MINERAL OIL AND PETROLATUM 1 APPLICATION: 150; 830 OINTMENT OPHTHALMIC at 08:55

## 2021-01-01 RX ADMIN — NOREPINEPHRINE BITARTRATE 0.02 MCG/KG/MIN: 1 INJECTION, SOLUTION, CONCENTRATE INTRAVENOUS at 00:51

## 2021-01-01 RX ADMIN — EPINEPHRINE 0.3 MCG/KG/MIN: 1 INJECTION INTRAMUSCULAR; INTRAVENOUS; SUBCUTANEOUS at 17:12

## 2021-01-01 RX ADMIN — ALBUMIN HUMAN 12.5 G: 0.25 SOLUTION INTRAVENOUS at 00:24

## 2021-01-01 RX ADMIN — PHENYLEPHRINE HYDROCHLORIDE 3 MCG/KG/MIN: 10 INJECTION INTRAVENOUS at 10:14

## 2021-01-01 RX ADMIN — PROPOFOL 40 MCG/KG/MIN: 10 INJECTION, EMULSION INTRAVENOUS at 13:50

## 2021-01-01 RX ADMIN — BUDESONIDE AND FORMOTEROL FUMARATE DIHYDRATE 2 PUFF: 80; 4.5 AEROSOL RESPIRATORY (INHALATION) at 07:51

## 2021-01-01 RX ADMIN — MAGNESIUM SULFATE HEPTAHYDRATE 4 G: 40 INJECTION, SOLUTION INTRAVENOUS at 23:43

## 2021-01-01 RX ADMIN — POTASSIUM CHLORIDE 40 MEQ: 10 CAPSULE, COATED, EXTENDED RELEASE ORAL at 21:58

## 2021-01-01 RX ADMIN — IPRATROPIUM BROMIDE AND ALBUTEROL SULFATE 3 ML: 2.5; .5 SOLUTION RESPIRATORY (INHALATION) at 12:53

## 2021-01-01 RX ADMIN — BUDESONIDE AND FORMOTEROL FUMARATE DIHYDRATE 2 PUFF: 80; 4.5 AEROSOL RESPIRATORY (INHALATION) at 20:01

## 2021-01-01 RX ADMIN — ATORVASTATIN CALCIUM 40 MG: 40 TABLET, FILM COATED ORAL at 09:49

## 2021-01-01 RX ADMIN — RIFAXIMIN 550 MG: 550 TABLET ORAL at 20:50

## 2021-01-01 RX ADMIN — IPRATROPIUM BROMIDE 0.5 MG: 0.5 SOLUTION RESPIRATORY (INHALATION) at 19:46

## 2021-01-01 RX ADMIN — MINERAL OIL AND PETROLATUM: 150; 830 OINTMENT OPHTHALMIC at 23:43

## 2021-01-01 RX ADMIN — LACTULOSE 10 G: 20 SOLUTION ORAL at 09:36

## 2021-01-01 RX ADMIN — RIFAXIMIN 550 MG: 550 TABLET ORAL at 20:31

## 2021-01-01 RX ADMIN — MINERAL OIL AND PETROLATUM: 150; 830 OINTMENT OPHTHALMIC at 02:04

## 2021-01-01 RX ADMIN — LACTULOSE 20 G: 20 SOLUTION ORAL at 20:31

## 2021-01-01 RX ADMIN — NOREPINEPHRINE BITARTRATE 0.3 MCG/KG/MIN: 1 INJECTION, SOLUTION, CONCENTRATE INTRAVENOUS at 11:15

## 2021-01-01 RX ADMIN — SERTRALINE HYDROCHLORIDE 100 MG: 100 TABLET ORAL at 14:11

## 2021-01-01 RX ADMIN — PANTOPRAZOLE SODIUM 40 MG: 40 INJECTION, POWDER, FOR SOLUTION INTRAVENOUS at 09:44

## 2021-01-01 RX ADMIN — EPINEPHRINE 0.5 MG: 0.1 INJECTION, SOLUTION ENDOTRACHEAL; INTRACARDIAC; INTRAVENOUS at 20:38

## 2021-01-01 RX ADMIN — MAGNESIUM OXIDE TAB 400 MG (241.3 MG ELEMENTAL MG) 400 MG: 400 (241.3 MG) TAB at 09:35

## 2021-01-01 RX ADMIN — VANCOMYCIN HYDROCHLORIDE 2500 MG: 100 INJECTION, POWDER, LYOPHILIZED, FOR SOLUTION INTRAVENOUS at 23:50

## 2021-01-01 RX ADMIN — IPRATROPIUM BROMIDE 0.5 MG: 0.5 SOLUTION RESPIRATORY (INHALATION) at 20:21

## 2021-01-01 RX ADMIN — RIFAXIMIN 550 MG: 550 TABLET ORAL at 21:34

## 2021-01-01 RX ADMIN — ATORVASTATIN CALCIUM 40 MG: 40 TABLET, FILM COATED ORAL at 09:35

## 2021-01-01 RX ADMIN — PANTOPRAZOLE SODIUM 40 MG: 40 INJECTION, POWDER, FOR SOLUTION INTRAVENOUS at 09:09

## 2021-01-01 RX ADMIN — RIFAXIMIN 550 MG: 550 TABLET ORAL at 09:19

## 2021-01-01 RX ADMIN — MINERAL OIL AND PETROLATUM 1 APPLICATION: 150; 830 OINTMENT OPHTHALMIC at 10:15

## 2021-01-01 RX ADMIN — ASPIRIN 81 MG: 81 TABLET, FILM COATED ORAL at 09:49

## 2021-01-01 RX ADMIN — PROPOFOL 40 MCG/KG/MIN: 10 INJECTION, EMULSION INTRAVENOUS at 02:04

## 2021-01-01 RX ADMIN — OXYCODONE HYDROCHLORIDE 5 MG: 5 TABLET ORAL at 23:13

## 2021-01-01 RX ADMIN — EPINEPHRINE 0.3 MCG/KG/MIN: 1 INJECTION INTRAMUSCULAR; INTRAVENOUS; SUBCUTANEOUS at 15:28

## 2021-01-01 RX ADMIN — LACTULOSE 20 G: 20 SOLUTION ORAL at 17:08

## 2021-01-01 RX ADMIN — OXYCODONE HYDROCHLORIDE 5 MG: 5 TABLET ORAL at 09:35

## 2021-01-01 RX ADMIN — CARVEDILOL 12.5 MG: 12.5 TABLET, FILM COATED ORAL at 09:20

## 2021-01-01 RX ADMIN — LIDOCAINE 1 PATCH: 50 PATCH CUTANEOUS at 09:19

## 2021-01-01 RX ADMIN — ROPINIROLE HYDROCHLORIDE 2 MG: 1 TABLET, FILM COATED ORAL at 09:19

## 2021-01-01 RX ADMIN — POTASSIUM CHLORIDE 40 MEQ: 10 CAPSULE, COATED, EXTENDED RELEASE ORAL at 16:55

## 2021-01-01 RX ADMIN — IPRATROPIUM BROMIDE 0.5 MG: 0.5 SOLUTION RESPIRATORY (INHALATION) at 19:59

## 2021-01-01 RX ADMIN — TAMSULOSIN HYDROCHLORIDE 0.4 MG: 0.4 CAPSULE ORAL at 09:35

## 2021-01-01 RX ADMIN — MINERAL OIL AND PETROLATUM 1 APPLICATION: 150; 830 OINTMENT OPHTHALMIC at 15:36

## 2021-01-01 RX ADMIN — CARVEDILOL 12.5 MG: 12.5 TABLET, FILM COATED ORAL at 17:29

## 2021-01-01 RX ADMIN — POTASSIUM CHLORIDE 10 MEQ: 7.46 INJECTION, SOLUTION INTRAVENOUS at 04:04

## 2021-01-01 RX ADMIN — MINERAL OIL AND PETROLATUM 1 APPLICATION: 150; 830 OINTMENT OPHTHALMIC at 14:47

## 2021-01-01 RX ADMIN — CARVEDILOL 12.5 MG: 12.5 TABLET, FILM COATED ORAL at 17:15

## 2021-01-01 RX ADMIN — IPRATROPIUM BROMIDE 0.5 MG: 0.5 SOLUTION RESPIRATORY (INHALATION) at 06:57

## 2021-01-01 RX ADMIN — LACTULOSE 20 G: 20 SOLUTION ORAL at 20:50

## 2021-01-04 PROBLEM — I50.32 DIASTOLIC CHF, CHRONIC (HCC): Status: ACTIVE | Noted: 2021-01-01

## 2021-01-04 PROBLEM — E87.1 HYPONATREMIA: Status: ACTIVE | Noted: 2021-01-01

## 2021-01-04 PROBLEM — E87.6 HYPOKALEMIA: Status: ACTIVE | Noted: 2021-01-01

## 2021-01-04 PROBLEM — E80.6 HYPERBILIRUBINEMIA: Status: ACTIVE | Noted: 2021-01-01

## 2021-01-04 PROBLEM — K74.69 OTHER CIRRHOSIS OF LIVER (HCC): Status: ACTIVE | Noted: 2021-01-01

## 2021-01-04 NOTE — ED PROVIDER NOTES
Subjective   Mr. Weinberg is a 61 yo male sent in by his PCP for low potassium and sodium. He is not able to give much history and keeps falling asleep. More history comes from his daughter. He has been failing for the last couple weeks, confusion, forgetfulness, sleeping more and more. He has been to his doctor. His daughter doesn't know if he has had changes in medications but labs that have been done recently have shown a drop in sodium and potassium. She says that he doesn't really complain of anything but just isn't right. All he can tell me is that he wakes up in the morning feeling OK then everything is downhill from there. He has now been sent here for further treatment and evaluation.      History provided by:  Medical records, patient and relative  History limited by:  Mental status change      Review of Systems   Unable to perform ROS: Mental status change   Constitutional:        Feels poorly, worse as day goes on   HENT: Negative.    Respiratory: Negative for shortness of breath.         Normally uses oxygen at night with ?PAP device. Now wearing O2 all day long.   Cardiovascular: Positive for leg swelling (increased over usual).   Gastrointestinal: Negative.    Neurological:        Progressive somnolence   Psychiatric/Behavioral: Positive for confusion.       Past Medical History:   Diagnosis Date   • Arthritis    • Black lung disease (CMS/HCC)    • BPH (benign prostatic hyperplasia)    • Cataract    • COLD (chronic obstructive lung disease) (CMS/HCC)    • Colon polyps    • Diabetes mellitus (CMS/HCC)     SINCE 2014   • Dyslipidemia    • Esophageal reflux    • Flu     HX   • Heart attack (CMS/HCC)     2006   • Herniated lumbar intervertebral disc    • History of colon resection    • Hyperlipidemia    • Hypertension    • Malignant neoplasm of colon (CMS/HCC)    • On home oxygen therapy     prn   • Open wound of left hip and thigh with complication 3/12/2017    Open draining left leg wound from femoral  cutdown and angioplasty/stenting of superficial artery 1/18/2017   • Peripheral vascular disease (CMS/HCC)    • Renal failure    • Sleep apnea with use of continuous positive airway pressure (CPAP)    • Wears dentures    • Wears glasses        Allergies   Allergen Reactions   • Tylenol [Acetaminophen] Other (See Comments)     Liver disease       Past Surgical History:   Procedure Laterality Date   • ANGIOPLASTY FEMORAL ARTERY N/A 1/17/2017    Procedure: left femoral cutdown with aortagram and left SFA stent and angioplasty;  Surgeon: Heladio Rosa MD;  Location:  [facility];  Service:    • AORTAGRAM N/A 1/17/2017    Procedure: AORTAGRAM WITH RUNOFFS and  STENT left SFA;  Surgeon: Heladio Rosa MD;  Location:  [facility];  Service:    • AORTIC VALVE REPAIR/REPLACEMENT N/A 11/22/2019    Procedure: TRANSAPICAL TRANSCATHETER AORTIC VALVE REPLACEMENT WITH TRANSESOPHAGEAL ECHOCARDIOLGRAM;  Surgeon: Danish St MD;  Location:  [facility];  Service: Cardiothoracic   • AORTIC VALVE REPAIR/REPLACEMENT N/A 11/22/2019    Procedure: Transapical Transcatheter Aortic Valve Replacement;  Surgeon: Lory Shepherd MD;  Location:  [facility];  Service: Cardiovascular   • CARDIAC CATHETERIZATION      NO INTERVENTION   • CARDIAC CATHETERIZATION Left 10/30/2019    Procedure: Left Heart Cath;  Surgeon: Milad Cordon MD;  Location:  [facility];  Service: Cardiology   • CARDIAC ELECTROPHYSIOLOGY PROCEDURE Left 11/25/2019    Procedure: Pacemaker DC new;  Surgeon: Maranda Cerda MD;  Location:  [facility];  Service: Cardiology   • COLON SURGERY      x 2   • COLONOSCOPY     • FEMORAL FEMORAL BYPASS N/A 3/13/2017    Procedure: INCISION AND DRAINAGE LEFT GROIN HEMATOMA;  Surgeon: Heladio Rosa MD;  Location:  [facility];  Service:    • FEMORAL POPLITEAL BYPASS Right 01/26/2016   • OTHER SURGICAL HISTORY      PTA ILIAC STENOSIS WITH STENT   •  TRANSESOPHAGEAL ECHOCARDIOGRAM (ZORAIDA) N/A 2019    Procedure: TRANSESOPHAGEAL ECHOCARDIOGRAM WITH ANESTHESIA;  Surgeon: Danish St MD;  Location: [place] [id number];  Service: Cardiothoracic       Family History   Problem Relation Age of Onset   • Lung cancer Mother    • Heart attack Mother    • Hypertension Mother    • Diabetes Mother    • Diabetes Father    • Hypertension Father    • Heart attack Father        Social History     Socioeconomic History   • Marital status:      Spouse name: Not on file   • Number of children: 2   • Years of education: Not on file   • Highest education level: Not on file   Occupational History   • Occupation: DISABLED      Comment: BACK AND LUNG PROBLEMS   Tobacco Use   • Smoking status: Former Smoker     Packs/day: 2.00     Years: 30.00     Pack years: 60.00     Types: Cigarettes     Start date:      Quit date:      Years since quittin.0   • Smokeless tobacco: Current User     Types: Chew   • Tobacco comment: Stopped smoking 1 year ago. Smoked 35 years up to 2ppd.   Substance and Sexual Activity   • Alcohol use: No   • Drug use: No   • Sexual activity: Defer   Social History Narrative    Lives in [place].           Objective   Physical Exam  Vitals signs and nursing note reviewed.   Constitutional:       General: He is not in acute distress.     Appearance: He is obese.      Comments: Patient very somnolent, will give brief, usually unhelpful answers to questions, then fall asleep before next question is asked.   HENT:      Head: Atraumatic.      Mouth/Throat:      Comments: Airway patent  Eyes:      Conjunctiva/sclera: Conjunctivae normal.   Neck:      Musculoskeletal: Neck supple.      Trachea: Phonation normal.   Cardiovascular:      Rate and Rhythm: Normal rate and regular rhythm.      Heart sounds: Normal heart sounds.   Pulmonary:      Effort: Pulmonary effort is normal. No respiratory distress.      Breath sounds: Rales (both bases)  present.   Abdominal:      Palpations: Abdomen is soft.      Tenderness: There is abdominal tenderness (?upper abdomen, I can't get patient to help locate pain). There is no guarding.   Musculoskeletal:      Comments: Massively edematous legs with venous insufficiency changes.   Skin:     General: Skin is warm and dry.   Neurological:      Comments: Stuporous     Psychiatric:      Comments: Depressed, somnolent affect         Procedures           ED Course  ED Course as of Jan 04 1953   Mon Jan 04, 2021 1900 I have interpreted his CXR as showing pulmonary vascular congestion with cardiomegaly consistent with CHF.    [LI]   1917 He didn't tolerate the ABG, will try VBG.    [LI]   1952 Started potassium replacement.    [LI]      ED Course User Index  [LI] Caio Diop MD      Recent Results (from the past 24 hour(s))   Comprehensive Metabolic Panel    Collection Time: 01/04/21  5:30 PM    Specimen: Blood   Result Value Ref Range    Glucose 131 (H) 65 - 99 mg/dL    BUN 48 (H) 8 - 23 mg/dL    Creatinine 0.86 0.76 - 1.27 mg/dL    Sodium 121 (L) 136 - 145 mmol/L    Potassium 2.6 (C) 3.5 - 5.2 mmol/L    Chloride 76 (L) 98 - 107 mmol/L    CO2 35.0 (H) 22.0 - 29.0 mmol/L    Calcium 9.3 8.6 - 10.5 mg/dL    Total Protein 7.8 6.0 - 8.5 g/dL    Albumin 2.60 (L) 3.50 - 5.20 g/dL    ALT (SGPT) 38 1 - 41 U/L    AST (SGOT) 58 (H) 1 - 40 U/L    Alkaline Phosphatase 193 (H) 39 - 117 U/L    Total Bilirubin 3.9 (H) 0.0 - 1.2 mg/dL    eGFR Non African Amer 91 >60 mL/min/1.73    Globulin 5.2 gm/dL    A/G Ratio 0.5 g/dL    BUN/Creatinine Ratio 55.8 (H) 7.0 - 25.0    Anion Gap 10.0 5.0 - 15.0 mmol/L   Protime-INR    Collection Time: 01/04/21  5:30 PM    Specimen: Blood   Result Value Ref Range    Protime 20.9 (H) 11.5 - 14.0 Seconds    INR 1.84 (H) 0.85 - 1.16   Light Blue Top    Collection Time: 01/04/21  5:30 PM   Result Value Ref Range    Extra Tube hold for add-on    Green Top (Gel)    Collection Time: 01/04/21  5:30 PM   Result  Value Ref Range    Extra Tube Hold for add-ons.    Lavender Top    Collection Time: 01/04/21  5:30 PM   Result Value Ref Range    Extra Tube hold for add-on    Gold Top - SST    Collection Time: 01/04/21  5:30 PM   Result Value Ref Range    Extra Tube Hold for add-ons.    CBC Auto Differential    Collection Time: 01/04/21  5:30 PM    Specimen: Blood   Result Value Ref Range    WBC 4.72 3.40 - 10.80 10*3/mm3    RBC 3.72 (L) 4.14 - 5.80 10*6/mm3    Hemoglobin 12.6 (L) 13.0 - 17.7 g/dL    Hematocrit 34.2 (L) 37.5 - 51.0 %    MCV 91.9 79.0 - 97.0 fL    MCH 33.9 (H) 26.6 - 33.0 pg    MCHC 36.8 (H) 31.5 - 35.7 g/dL    RDW 13.6 12.3 - 15.4 %    RDW-SD 45.4 37.0 - 54.0 fl    MPV 12.8 (H) 6.0 - 12.0 fL    Platelets 64 (L) 140 - 450 10*3/mm3    Neutrophil % 76.8 (H) 42.7 - 76.0 %    Lymphocyte % 11.4 (L) 19.6 - 45.3 %    Monocyte % 9.3 5.0 - 12.0 %    Eosinophil % 0.6 0.3 - 6.2 %    Basophil % 0.8 0.0 - 1.5 %    Immature Grans % 1.1 (H) 0.0 - 0.5 %    Neutrophils, Absolute 3.62 1.70 - 7.00 10*3/mm3    Lymphocytes, Absolute 0.54 (L) 0.70 - 3.10 10*3/mm3    Monocytes, Absolute 0.44 0.10 - 0.90 10*3/mm3    Eosinophils, Absolute 0.03 0.00 - 0.40 10*3/mm3    Basophils, Absolute 0.04 0.00 - 0.20 10*3/mm3    Immature Grans, Absolute 0.05 0.00 - 0.05 10*3/mm3    nRBC 0.0 0.0 - 0.2 /100 WBC   BNP    Collection Time: 01/04/21  5:30 PM    Specimen: Blood   Result Value Ref Range    proBNP 3,595.0 (H) 0.0 - 900.0 pg/mL   Troponin    Collection Time: 01/04/21  5:30 PM    Specimen: Blood   Result Value Ref Range    Troponin T 0.054 (C) 0.000 - 0.030 ng/mL     Note: In addition to lab results from this visit, the labs listed above may include labs taken at another facility or during a different encounter within the last 24 hours. Please correlate lab times with ED admission and discharge times for further clarification of the services performed during this visit.    XR Chest 1 View   Preliminary Result   Increased pulmonary vascularity  "bilaterally with enlargement   of the heart. Pacer leads in satisfactory position.                Vitals:    01/04/21 1707 01/04/21 1711 01/04/21 1830   BP: 140/96  133/57   BP Location: Left arm     Patient Position: Sitting     Pulse: 65  64   Resp: 20     Temp: 98 °F (36.7 °C)     TempSrc: Infrared     SpO2: (!) 88% 92% 92%   Weight: 104 kg (230 lb)     Height: 165.1 cm (65\")       Medications   Sodium Chloride (PF) 0.9 % 10 mL (has no administration in time range)   potassium chloride 10 mEq in 100 mL IVPB (has no administration in time range)     ECG/EMG Results (last 24 hours)     Procedure Component Value Units Date/Time    ECG 12 Lead [[id number]] Collected: 01/04/21 1726     Updated: 01/04/21 1727        ECG 12 Lead                                                    [facility]    Final diagnoses:   Hypokalemia   Hyponatremia   Somnolence            Caio Diop MD  01/04/21 1953    "
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
## 2021-01-05 NOTE — THERAPY EVALUATION
Patient Name: Sai Weinberg  : 1960    MRN: [id number]                              Today's Date: 2021       Admit Date: 2021    Visit Dx:     ICD-10-CM ICD-9-CM   1. Hypokalemia  E87.6 276.8   2. Hyponatremia  E87.1 276.1   3. Somnolence  R40.0 780.09     Patient Active Problem List   Diagnosis   • Dyslipidemia on statins    • Arthritis   • COPD    • T2DM on metformin    • Esophageal reflux   • Hypertension   • Chewing tobacco use   • PAD s/p L SFA stent and R fem-pop    • LANA- resolved    • Iron deficiency anemia   • Coal workers pneumoconiosis    • Coronary artery disease involving native heart   • Nonrheumatic aortic valve stenosis   • Acute on chronic systolic heart failure    • YOUSUF on CPAP   • GIB (recent + FOBT without overt bleeding)    • Paroxysmal atrial fibrillation   • Former smoker   • Class 3 severe obesity in adult    • CHB (complete heart block) (CMS/HCC)   • S/P TAVR (transcatheter aortic valve replacement) 19   • Hyponatremia   • Diastolic CHF, chronic (CMS/HCC)   • Hypokalemia   • Other cirrhosis of liver (CMS/HCC)   • Hyperbilirubinemia     Past Medical History:   Diagnosis Date   • Arthritis    • Black lung disease (CMS/HCC)    • BPH (benign prostatic hyperplasia)    • Cataract    • COLD (chronic obstructive lung disease) (CMS/HCC)    • Colon polyps    • Diabetes mellitus (CMS/HCC)     SINCE    • Dyslipidemia    • Esophageal reflux    • Flu     HX   • Heart attack (CMS/HCC)        • Herniated lumbar intervertebral disc    • History of colon resection    • Hyperlipidemia    • Hypertension    • Malignant neoplasm of colon (CMS/HCC)    • On home oxygen therapy     prn   • Open wound of left hip and thigh with complication 3/12/2017    Open draining left leg wound from femoral cutdown and angioplasty/stenting of superficial artery 2017   • Peripheral vascular disease (CMS/HCC)    • Renal failure    • Sleep apnea with use of continuous positive airway pressure  (CPAP)    • Wears dentures    • Wears glasses      Past Surgical History:   Procedure Laterality Date   • ANGIOPLASTY FEMORAL ARTERY N/A 1/17/2017    Procedure: left femoral cutdown with aortagram and left SFA stent and angioplasty;  Surgeon: Heladio Rosa MD;  Location:  [facility];  Service:    • AORTAGRAM N/A 1/17/2017    Procedure: AORTAGRAM WITH RUNOFFS and  STENT left SFA;  Surgeon: Heladio Rosa MD;  Location:  [facility];  Service:    • AORTIC VALVE REPAIR/REPLACEMENT N/A 11/22/2019    Procedure: TRANSAPICAL TRANSCATHETER AORTIC VALVE REPLACEMENT WITH TRANSESOPHAGEAL ECHOCARDIOLGRAM;  Surgeon: Danish St MD;  Location:  [facility];  Service: Cardiothoracic   • AORTIC VALVE REPAIR/REPLACEMENT N/A 11/22/2019    Procedure: Transapical Transcatheter Aortic Valve Replacement;  Surgeon: Lory Shepherd MD;  Location:  [facility];  Service: Cardiovascular   • CARDIAC CATHETERIZATION      NO INTERVENTION   • CARDIAC CATHETERIZATION Left 10/30/2019    Procedure: Left Heart Cath;  Surgeon: Milad Cordon MD;  Location:  [facility] CATH INVASIVE LOCATION;  Service: Cardiology   • CARDIAC ELECTROPHYSIOLOGY PROCEDURE Left 11/25/2019    Procedure: Pacemaker DC new;  Surgeon: Maranda Cerda MD;  Location:  [facility] CATH INVASIVE LOCATION;  Service: Cardiology   • COLON SURGERY      x 2   • COLONOSCOPY     • FEMORAL FEMORAL BYPASS N/A 3/13/2017    Procedure: INCISION AND DRAINAGE LEFT GROIN HEMATOMA;  Surgeon: Heladio Rosa MD;  Location:  [facility];  Service:    • FEMORAL POPLITEAL BYPASS Right 01/26/2016   • OTHER SURGICAL HISTORY      PTA ILIAC STENOSIS WITH STENT   • TRANSESOPHAGEAL ECHOCARDIOGRAM (ZORAIDA) N/A 11/22/2019    Procedure: TRANSESOPHAGEAL ECHOCARDIOGRAM WITH ANESTHESIA;  Surgeon: Danish St MD;  Location: [email or web address];  Service: Cardiothoracic     General Information     Row Name 01/05/21 0925          Physical Therapy Time and Intention     Document Type  evaluation  -LR     Mode of Treatment  physical therapy  -LR     Row Name 01/05/21 0925          General Information    Patient Profile Reviewed  yes  -LR     Prior Level of Function  -- patient poor historian, unable to obtain accurate info regarding PLOF, cane in room  -LR     Existing Precautions/Restrictions  oxygen therapy device and L/min;fall;other (see comments) confusion, difficulty following commands  -LR     Barriers to Rehab  medically complex;previous functional deficit;cognitive status  -LR     Row Name 01/05/21 0925          Living Environment    Lives With  spouse info obtained from chart  -LR     Row Name 01/05/21 0925          Home Main Entrance    Number of Stairs, Main Entrance  four patient provided this info, unsure of accuracy  -LR     Row Name 01/05/21 0925          Stairs Within Home, Primary    Number of Stairs, Within Home, Primary  none info provided by patient, unsure of accuracy  -LR     Row Name 01/05/21 0925          Cognition    Orientation Status (Cognition)  oriented to;person;place;verbal cues/prompts needed for orientation;time;disoriented to;situation patient falling asleep during orientation questions, required increased time to respond, required choices for year, did not state month  -LR     Row Name 01/05/21 0925          Safety Issues, Functional Mobility    Safety Issues Affecting Function (Mobility)  ability to follow commands;at risk behavior observed;awareness of need for assistance;impulsivity;insight into deficits/self-awareness;judgment;positioning of assistive device;problem-solving;safety precaution awareness;sequencing abilities;safety precautions follow-through/compliance  -LR     Impairments Affecting Function (Mobility)  balance;cognition;endurance/activity tolerance;postural/trunk control;pain;strength;shortness of breath  -LR     Cognitive Impairments, Mobility Safety/Performance  attention;awareness, need for assistance;impulsivity;insight into  deficits/self-awareness;judgment;problem-solving/reasoning;safety precaution awareness;safety precaution follow-through;sequencing abilities  -       User Key  (r) = Recorded By, (t) = Taken By, (c) = Cosigned By    Initials Name Provider Type    LR Zahra Seo, PT Physical Therapist        Mobility     Row Name 01/05/21 0925          Bed Mobility    Bed Mobility  supine-sit  -LR     Supine-Sit Lee[place] (Bed Mobility)  verbal cues;moderate assist (50% patient effort);2 person assist  -LR     Assistive Device (Bed Mobility)  head of bed elevated;bed rails;draw sheet  -LR     Comment (Bed Mobility)  Verbal cues to move LEs towards EOB and to push up from bed to raise trunk into sitting and to scoot hips out to get feet on floor. Required assist via draw sheet. Patient moaned out in pain at hips during transition. Denied dizziness upon sitting up.  -     Row Name 01/05/21 0925          Transfers    Comment (Transfers)  Verbal cues to push up from bed to stand and to reach back for chair to lower into sitting. Patient held on to RW to stand despite these cues. Repeated verbal cues to for sequencing and weight shifting to take steps. Patient able to take a few small steps but then refused further steps and adamantly requested to sit EOB d/t pain. With encouragement, patient agreeable to stand again and turn to sit in chair. Patient had difficulty following commands for sequencing to pivot to chair.  -     Row Name 01/05/21 0925          Bed-Chair Transfer    Bed-Chair Leeper (Transfers)  verbal cues;minimum assist (75% patient effort);2 person assist  -LR     Assistive Device (Bed-Chair Transfers)  other (see comments) B UE support, support at gait belt  -     Row Name 01/05/21 0925          Sit-Stand Transfer    Sit-Stand Leeper (Transfers)  verbal cues;minimum assist (75% patient effort);2 person assist  -LR     Assistive Device (Sit-Stand Transfers)  walker, front-wheeled  -      Row Name 01/05/21 0925          Gait/Stairs (Locomotion)    [place] Level (Gait)  verbal cues;minimum assist (75% patient effort);2 person assist  -LR     Assistive Device (Gait)  walker, front-wheeled  -LR     Distance in Feet (Gait)  2  -LR     Deviations/Abnormal Patterns (Gait)  bilateral deviations;remedios decreased;gait speed decreased;stride length decreased  -LR     Bilateral Gait Deviations  forward flexed posture;heel strike decreased  -LR     Comment (Gait/Stairs)  Patient ambulated with step to gait pattern. Required repeated cues for weight shifting and sequencing, able to take a few steps. Patient adamantly requesting to return to sitting EOB d/t pain in hips. Refused further ambulation d/t hip pain. Agreeable to t/f to chair with encouragement.  -LR       User Key  (r) = Recorded By, (t) = Taken By, (c) = Cosigned By    Initials Name Provider Type    LR Zahra Seo, PT Physical Therapist        Obj/Interventions     Row Name 01/05/21 0925          Range of Motion Comprehensive    General Range of Motion  lower extremity range of motion deficits identified  -LR     Comment, General Range of Motion  B hip and knee AROM impaired 25% d/t pain  -LR     Row Name 01/05/21 0925          Strength Comprehensive (MMT)    General Manual Muscle Testing (MMT) Assessment  lower extremity strength deficits identified  -LR     Comment, General Manual Muscle Testing (MMT) Assessment  patient had significant difficulty following cues for MMT, B hip flexors functionally 3-/5, B knee flexors: 3-/5, B knee extensors: 3+/5, B ankle DFs: 4-/5  -LR     Row Name 01/05/21 0925          Balance    Balance Assessment  sitting static balance;sitting dynamic balance;standing static balance;standing dynamic balance  -LR     Static Sitting Balance  mild impairment;unsupported;sitting, edge of bed  -LR     Dynamic Sitting Balance  mild impairment;unsupported;sitting, edge of bed  -LR     Static Standing Balance  mild  impairment;unsupported;standing  -LR     Dynamic Standing Balance  mild impairment;unsupported;standing  -LR     Row Name 01/05/21 0925          Sensory Assessment (Somatosensory)    Sensory Assessment (Somatosensory)  other (see comments) denies numbness/tingling;light touch intact and equal per patient report upon assessment  -LR       User Key  (r) = Recorded By, (t) = Taken By, (c) = Cosigned By    Initials Name Provider Type    Zahra Hilton, PT Physical Therapist        Goals/Plan     Row Name 01/05/21 0925          Bed Mobility Goal 1 (PT)    Activity/Assistive Device (Bed Mobility Goal 1, PT)  sit to supine/supine to sit  -LR     [place] Level/Cues Needed (Bed Mobility Goal 1, PT)  minimum assist (75% or more patient effort)  -LR     Time Frame (Bed Mobility Goal 1, PT)  long term goal (LTG);5 days  -LR     Progress/Outcomes (Bed Mobility Goal 1, PT)  goal ongoing  -LR     Row Name 01/05/21 0925          Transfer Goal 1 (PT)    Activity/Assistive Device (Transfer Goal 1, PT)  sit-to-stand/stand-to-sit;walker, rolling  -LR     [place] Level/Cues Needed (Transfer Goal 1, PT)  contact guard assist  -LR     Time Frame (Transfer Goal 1, PT)  long term goal (LTG);5 days  -LR     Progress/Outcome (Transfer Goal 1, PT)  goal ongoing  -LR     Row Name 01/05/21 0925          Gait Training Goal 1 (PT)    Activity/Assistive Device (Gait Training Goal 1, PT)  gait (walking locomotion);walker, rolling  -LR     [place] Level (Gait Training Goal 1, PT)  contact guard assist  -LR     Distance (Gait Training Goal 1, PT)  150 feet  -LR     Time Frame (Gait Training Goal 1, PT)  long term goal (LTG);5 days  -LR     Progress/Outcome (Gait Training Goal 1, PT)  goal ongoing  -LR       User Key  (r) = Recorded By, (t) = Taken By, (c) = Cosigned By    Initials Name Provider Type    Zahra Hilton, PT Physical Therapist        Clinical Impression     Row Name 01/05/21 0925          Pain     Additional Documentation  Pain Scale: FACES Pre/Post-Treatment (Group)  -LR     Row Name 01/05/21 0925          Pain Scale: Numbers Pre/Post-Treatment    Pain Intervention(s)  Ambulation/increased activity;Repositioned  -LR     Row Name 01/05/21 0925          Pain Scale: FACES Pre/Post-Treatment    Pain: FACES Scale, Pretreatment  4-->hurts little more  -LR     Posttreatment Pain Rating  4-->hurts little more  -LR     Pain Location - Side  Bilateral  -LR     Pain Location  hip  -LR     Row Name 01/05/21 0925          Plan of Care Review    Plan of Care Reviewed With  patient  -LR     Progress  no change  -LR     Outcome Summary  Patient able to take a few steps from bed with RW, requiring min assist x2, refused further ambulation d/t B hip pain. Min assist x2 to t/f to chair. All mobility limited by pain, lethargy, and confusion. Recommend SNF at d/c. Will continue to progress as able.  -LR     Row Name 01/05/21 0925          Therapy Assessment/Plan (PT)    Patient/Family Therapy Goals Statement (PT)  none stated  -LR     Rehab Potential (PT)  fair, will monitor progress closely  -LR     Criteria for Skilled Interventions Met (PT)  yes;meets criteria;skilled treatment is necessary  -LR     Row Name 01/05/21 0925          Vital Signs    Pre Systolic BP Rehab  136  -LR     Pre Treatment Diastolic BP  65  -LR     Pretreatment Heart Rate (beats/min)  64  -LR     Pre SpO2 (%)  92  -LR     O2 Delivery Pre Treatment  supplemental O2  -LR     Intra SpO2 (%)  85  -LR     O2 Delivery Intra Treatment  supplemental O2  -LR     Post SpO2 (%)  92  -LR     O2 Delivery Post Treatment  supplemental O2  -LR     Pre Patient Position  Supine  -LR     Intra Patient Position  Sitting  -LR     Post Patient Position  Sitting  -LR     Row Name 01/05/21 0925          Positioning and Restraints    Pre-Treatment Position  in bed  -LR     Post Treatment Position  chair  -LR     In Chair  notified nsg;reclined;sitting;call light within  reach;encouraged to call for assist;exit alarm on;legs elevated;waffle cushion;on mechanical lift sling  -LR       User Key  (r) = Recorded By, (t) = Taken By, (c) = Cosigned By    Initials Name Provider Type    Zahra Hilton, PT Physical Therapist        Outcome Measures     Row Name 01/05/21 0925          How much help from another person do you currently need...    Turning from your back to your side while in flat bed without using bedrails?  2  -LR     Moving from lying on back to sitting on the side of a flat bed without bedrails?  2  -LR     Moving to and from a bed to a chair (including a wheelchair)?  2  -LR     Standing up from a chair using your arms (e.g., wheelchair, bedside chair)?  2  -LR     Climbing 3-5 steps with a railing?  1  -LR     To walk in hospital room?  2  -LR     AM-PAC 6 Clicks Score (PT)  11  -LR     Row Name 01/05/21 0925          Functional Assessment    Outcome Measure Options  AM-PAC 6 Clicks Basic Mobility (PT)  -LR       User Key  (r) = Recorded By, (t) = Taken By, (c) = Cosigned By    Initials Name Provider Type    Zahra Hilton, PT Physical Therapist        Physical Therapy Education                 Title: PT OT SLP Therapies (Not Started)     Topic: Physical Therapy (In Progress)     Point: Mobility training (In Progress)     Learning Progress Summary           Patient Acceptance, E,D, NR by LR at 1/5/2021 0925    Comment: Educated on benefits of mobility and being OOB. Educated on safety with mobility, correct supine to sit t/f technique, correct sit<->stand t/f technqiue, correct gait mechanics, and progression of POC.                   Point: Home exercise program (In Progress)     Learning Progress Summary           Patient Acceptance, E,D, NR by LR at 1/5/2021 0925    Comment: Educated on benefits of mobility and being OOB. Educated on safety with mobility, correct supine to sit t/f technique, correct sit<->stand t/f technqiue, correct gait  mechanics, and progression of POC.                   Point: Body mechanics (In Progress)     Learning Progress Summary           Patient Acceptance, E,D, NR by LR at 1/5/2021 0925    Comment: Educated on benefits of mobility and being OOB. Educated on safety with mobility, correct supine to sit t/f technique, correct sit<->stand t/f technqiue, correct gait mechanics, and progression of POC.                   Point: Precautions (In Progress)     Learning Progress Summary           Patient Acceptance, E,D, NR by LR at 1/5/2021 0925    Comment: Educated on benefits of mobility and being OOB. Educated on safety with mobility, correct supine to sit t/f technique, correct sit<->stand t/f technqiue, correct gait mechanics, and progression of POC.                               User Key     Initials Effective Dates Name Provider Type Discipline    LR 06/19/15 -  Zahra Seo, PT Physical Therapist PT              PT Recommendation and Plan  Planned Therapy Interventions (PT): balance training, bed mobility training, gait training, home exercise program, patient/family education, ROM (range of motion), stair training, strengthening, transfer training  Plan of Care Reviewed With: patient  Progress: no change  Outcome Summary: Patient able to take a few steps from bed with RW, requiring min assist x2, refused further ambulation d/t B hip pain. Min assist x2 to t/f to chair. All mobility limited by pain, lethargy, and confusion. Recommend SNF at d/c. Will continue to progress as able.     Time Calculation:   PT Charges     Row Name 01/05/21 0925             Time Calculation    Start Time  0925  -      PT Received On  01/05/21  -      PT Goal Re-Cert Due Date  01/15/21  -         Time Calculation- PT    Total Timed Code Minutes- PT  0 minute(s)  -LR        User Key  (r) = Recorded By, (t) = Taken By, (c) = Cosigned By    Initials Name Provider Type    LR Zahra Seo, PT Physical Therapist        Therapy  Charges for Today     Code Description Service Date Service Provider Modifiers Qty    [id number] HC PT EVAL LOW COMPLEXITY 4 1/5/2021 Zahra Seo, PT GP 1          PT G-Codes  Outcome Measure Options: AM-PAC 6 Clicks Basic Mobility (PT)  AM-PAC 6 Clicks Score (PT): 11    Zahra Seo, PT  1/5/2021

## 2021-01-05 NOTE — THERAPY EVALUATION
Patient Name: Sai Weinberg  : 1960    MRN: [id number]                              Today's Date: 2021       Admit Date: 2021    Visit Dx:     ICD-10-CM ICD-9-CM   1. Hypokalemia  E87.6 276.8   2. Hyponatremia  E87.1 276.1   3. Somnolence  R40.0 780.09     Patient Active Problem List   Diagnosis   • Dyslipidemia on statins    • Arthritis   • COPD    • T2DM on metformin    • Esophageal reflux   • Hypertension   • Chewing tobacco use   • PAD s/p L SFA stent and R fem-pop    • LANA- resolved    • Iron deficiency anemia   • Coal workers pneumoconiosis    • Coronary artery disease involving native heart   • Nonrheumatic aortic valve stenosis   • Acute on chronic systolic heart failure    • YOUSUF on CPAP   • GIB (recent + FOBT without overt bleeding)    • Paroxysmal atrial fibrillation   • Former smoker   • Class 3 severe obesity in adult    • CHB (complete heart block) (CMS/HCC)   • S/P TAVR (transcatheter aortic valve replacement) 19   • Hyponatremia   • Diastolic CHF, chronic (CMS/HCC)   • Hypokalemia   • Other cirrhosis of liver (CMS/HCC)   • Hyperbilirubinemia     Past Medical History:   Diagnosis Date   • Arthritis    • Black lung disease (CMS/HCC)    • BPH (benign prostatic hyperplasia)    • Cataract    • COLD (chronic obstructive lung disease) (CMS/HCC)    • Colon polyps    • Diabetes mellitus (CMS/HCC)     SINCE    • Dyslipidemia    • Esophageal reflux    • Flu     HX   • Heart attack (CMS/HCC)        • Herniated lumbar intervertebral disc    • History of colon resection    • Hyperlipidemia    • Hypertension    • Malignant neoplasm of colon (CMS/HCC)    • On home oxygen therapy     prn   • Open wound of left hip and thigh with complication 3/12/2017    Open draining left leg wound from femoral cutdown and angioplasty/stenting of superficial artery 2017   • Peripheral vascular disease (CMS/HCC)    • Renal failure    • Sleep apnea with use of continuous positive airway pressure  (CPAP)    • Wears dentures    • Wears glasses      Past Surgical History:   Procedure Laterality Date   • ANGIOPLASTY FEMORAL ARTERY N/A 1/17/2017    Procedure: left femoral cutdown with aortagram and left SFA stent and angioplasty;  Surgeon: Heladio Rosa MD;  Location:  [facility];  Service:    • AORTAGRAM N/A 1/17/2017    Procedure: AORTAGRAM WITH RUNOFFS and  STENT left SFA;  Surgeon: Heladio Rosa MD;  Location:  [facility];  Service:    • AORTIC VALVE REPAIR/REPLACEMENT N/A 11/22/2019    Procedure: TRANSAPICAL TRANSCATHETER AORTIC VALVE REPLACEMENT WITH TRANSESOPHAGEAL ECHOCARDIOLGRAM;  Surgeon: Danish St MD;  Location:  [facility];  Service: Cardiothoracic   • AORTIC VALVE REPAIR/REPLACEMENT N/A 11/22/2019    Procedure: Transapical Transcatheter Aortic Valve Replacement;  Surgeon: Lory Shepherd MD;  Location:  [facility];  Service: Cardiovascular   • CARDIAC CATHETERIZATION      NO INTERVENTION   • CARDIAC CATHETERIZATION Left 10/30/2019    Procedure: Left Heart Cath;  Surgeon: Milad Cordon MD;  Location:  [facility];  Service: Cardiology   • CARDIAC ELECTROPHYSIOLOGY PROCEDURE Left 11/25/2019    Procedure: Pacemaker DC new;  Surgeon: Maranda Cerda MD;  Location:  [facility]AS[facility] LOCATION;  Service: Cardiology   • COLON SURGERY      x 2   • COLONOSCOPY     • FEMORAL FEMORAL BYPASS N/A 3/13/2017    Procedure: INCISION AND DRAINAGE LEFT GROIN HEMATOMA;  Surgeon: Heladio Rosa MD;  Location:  [facility];  Service:    • FEMORAL POPLITEAL BYPASS Right 01/26/2016   • OTHER SURGICAL HISTORY      PTA ILIAC STENOSIS WITH STENT   • TRANSESOPHAGEAL ECHOCARDIOGRAM (ZORAIDA) N/A 11/22/2019    Procedure: TRANSESOPHAGEAL ECHOCARDIOGRAM WITH ANESTHESIA;  Surgeon: Danish St MD;  Location: [facility];  Service: Cardiothoracic     General Information     Row Name 01/05/21 1124          OT Time and Intention    Document Type   evaluation  -     Mode of Treatment  occupational therapy  -     Row Name 01/05/21 1124          General Information    Patient Profile Reviewed  yes  -     Prior Level of Function  -- Pt. lethargic throughout session, Poor historian.  -     Existing Precautions/Restrictions  fall;oxygen therapy device and L/min;pacemaker  -     Barriers to Rehab  cognitive status;previous functional deficit;medically complex  -     Row Name 01/05/21 1124          Living Environment    Lives With  spouse  -     Row Name 01/05/21 1124          Home Main Entrance    Number of Stairs, Main Entrance  four  -     Row Name 01/05/21 1124          Stairs Within Home, Primary    Stairs, Within Home, Primary  pt. stated no steps in the home, however due to lethargy unsure if information is accurate.  -     Row Name 01/05/21 1124          Cognition    Orientation Status (Cognition)  oriented to;person;place;verbal cues/prompts needed for orientation;time;disoriented to;situation  -     Row Name 01/05/21 1124          Safety Issues, Functional Mobility    Safety Issues Affecting Function (Mobility)  ability to follow commands;at risk behavior observed;awareness of need for assistance;impulsivity;insight into deficits/self-awareness;judgment;positioning of assistive device;problem-solving;safety precaution awareness;safety precautions follow-through/compliance;sequencing abilities  -     Impairments Affecting Function (Mobility)  balance;cognition;endurance/activity tolerance;postural/trunk control;pain;strength;shortness of breath  -     Cognitive Impairments, Mobility Safety/Performance  attention;awareness, need for assistance;impulsivity;insight into deficits/self-awareness;judgment;problem-solving/reasoning;safety precaution awareness;safety precaution follow-through;sequencing abilities  -     Comment, Safety Issues/Impairments (Mobility)  Consistent cueing to redirect to task secondary to lethargy.  -        User Key  (r) = Recorded By, (t) = Taken By, (c) = Cosigned By    Initials Name Provider Type     Zahra Townsend OT Occupational Therapist          Mobility/ADL's     Row Name 01/05/21 1127          Bed Mobility    Bed Mobility  scooting/bridging;supine-sit  -     Scooting/Bridging [place] (Bed Mobility)  moderate assist (50% patient effort);2 person assist;verbal cues  -     Supine-Sit [place] (Bed Mobility)  moderate assist (50% patient effort);2 person assist;verbal cues  -     Assistive Device (Bed Mobility)  head of bed elevated;bed rails;draw sheet  -     Comment (Bed Mobility)  VC's to sequence transitioning from supine to sit. Assist to bring BLE to EOB and push trunk into upright position. c/o hip pain while transitioning.  -     Row Name 01/05/21 1127          Transfers    Transfers  sit-stand transfer;bed-chair transfer  -     Comment (Transfers)  VC's for hand placement and sequencing. Assist to manage RW during STS.  -     Bed-Chair [place] (Transfers)  minimum assist (75% patient effort);2 person assist;verbal cues  -     Assistive Device (Bed-Chair Transfers)  other (see comments) BUE support, Gait Belt  -     Sit-Stand [place] (Transfers)  minimum assist (75% patient effort);verbal cues;2 person assist  -     Row Name 01/05/21 1127          Sit-Stand Transfer    Assistive Device (Sit-Stand Transfers)  walker, front-wheeled  -     Row Name 01/05/21 112          Activities of Daily Living    BADL Assessment/Intervention  lower body dressing;grooming;upper body dressing  -     Row Name 01/05/21 1127          Lower Body Dressing Assessment/Training    [place] Level (Lower Body Dressing)  don;socks;dependent (less than 25% patient effort)  -     Position (Lower Body Dressing)  supine  -     Row Name 01/05/21 1127          Grooming Assessment/Training    [place] Level (Grooming)  wash face, hands;set up  -     Comment (Grooming)  Expected  SUA  -     Row Name 01/05/21 1127          Upper Body Dressing Assessment/Training    [place] Level (Upper Body Dressing)  moderate assist (50% patient effort)  -     Comment (Upper Body Dressing)  Expected Mod A  -       User Key  (r) = Recorded By, (t) = Taken By, (c) = Cosigned By    Initials Name Provider Type     Zahra Townsend OT Occupational Therapist        Obj/Interventions     Row Name 01/05/21 1140          Sensory Assessment (Somatosensory)    Sensory Assessment (Somatosensory)  UE sensation intact  -     Row Name 01/05/21 1140          Vision Assessment/Intervention    Visual Impairment/Limitations  corrective lenses full-time  -     Row Name 01/05/21 1140          Range of Motion Comprehensive    General Range of Motion  bilateral upper extremity ROM WFL  -     Row Name 01/05/21 1140          Strength Comprehensive (MMT)    General Manual Muscle Testing (MMT) Assessment  upper extremity strength deficits identified  -     Comment, General Manual Muscle Testing (MMT) Assessment  BUE grossly 3+/5  -     Row Name 01/05/21 1140          Balance    Balance Assessment  sitting static balance;sitting dynamic balance;standing static balance;standing dynamic balance  -     Static Sitting Balance  mild impairment;unsupported;sitting, edge of bed  -     Dynamic Sitting Balance  mild impairment;unsupported;sitting, edge of bed  -     Static Standing Balance  mild impairment;unsupported;standing  -     Dynamic Standing Balance  mild impairment;unsupported;standing  -     Comment, Balance  Pt lethargic requiring steadying assist.  -       User Key  (r) = Recorded By, (t) = Taken By, (c) = Cosigned By    Initials Name Provider Type     Zahra Townsend OT Occupational Therapist        Goals/Plan     Row Name 01/05/21 1146          Transfer Goal 1 (OT)    Activity/Assistive Device (Transfer Goal 1, OT)  sit-to-stand/stand-to-sit;walker, rolling  -     [place] Level/Cues  Needed (Transfer Goal 1, OT)  contact guard assist;verbal cues required  -     Time Frame (Transfer Goal 1, OT)  10 days;by discharge  -     Progress/Outcome (Transfer Goal 1, OT)  goal ongoing  -     Row Name 01/05/21 1146          Dressing Goal 1 (OT)    Activity/Device (Dressing Goal 1, OT)  lower body dressing;reacher;sock-aid  -     [place]/Cues Needed (Dressing Goal 1, OT)  moderate assist (50-74% patient effort)  -     Time Frame (Dressing Goal 1, OT)  10 days;by discharge  -     Progress/Outcome (Dressing Goal 1, OT)  goal ongoing  -     Row Name 01/05/21 1146          Toileting Goal 1 (OT)    Activity/Device (Toileting Goal 1, OT)  perform perineal hygiene;adjust/manage clothing  -     [place] Level/Cues Needed (Toileting Goal 1, OT)  moderate assist (50-74% patient effort)  -     Time Frame (Toileting Goal 1, OT)  10 days;by discharge  -     Progress/Outcome (Toileting Goal 1, OT)  goal ongoing  [facility]       User Key  (r) = Recorded By, (t) = Taken By, (c) = Cosigned By    Initials Name Provider Type     Zahra Townsend, OT Occupational Therapist        Clinical Impression     Row Name 01/05/21 1142          Pain Assessment    Additional Documentation  Pain Scale: FACES Pre/Post-Treatment (Group)  -     Row Name 01/05/21 1142          Pain Scale: FACES Pre/Post-Treatment    Pain: FACES Scale, Pretreatment  4-->hurts little more  -LC     Posttreatment Pain Rating  4-->hurts little more  -LC     Pain Location - Side  Bilateral  -     Pain Location  hip  -     Row Name 01/05/21 1142          Plan of Care Review    Plan of Care Reviewed With  patient  -     Progress  -- OT eval  -LC     Outcome Summary  OT eval completed. Pt. limited by lethargy, requiring consistent cueing to attend to task. Pt. transitioned from supine to sit with Mod A of 2. Required Min A of 2 for T/Fs. DEP for LBD. Skilled OT services warranted to promote return to PLOF. Recommend SNF at [facility].  -LC      Row Name 01/05/21 1142          Therapy Assessment/Plan (OT)    Patient/Family Therapy Goal Statement (OT)  To return to PLOF.  -     Therapy Frequency (OT)  daily  -     Row Name 01/05/21 1142          Therapy Plan Review/Discharge Plan (OT)    Anticipated Discharge Disposition (OT)  skilled nursing facility  -     Row Name 01/05/21 1142          Vital Signs    Pre Systolic BP Rehab  136  -LC     Pre Treatment Diastolic BP  65  -LC     Pre SpO2 (%)  92  -LC     O2 Delivery Pre Treatment  supplemental O2  -LC     Intra SpO2 (%)  85  -LC     O2 Delivery Intra Treatment  supplemental O2  -LC     Post SpO2 (%)  93  -LC     O2 Delivery Post Treatment  supplemental O2  -LC     Pre Patient Position  Supine  -LC     Intra Patient Position  Standing  -LC     Post Patient Position  Sitting  -     Row Name 01/05/21 1142          Positioning and Restraints    Pre-Treatment Position  in bed  -LC     Post Treatment Position  chair  -LC     In Chair  notified nsg;reclined;call light within reach;encouraged to call for assist;exit alarm on;with family/caregiver;waffle cushion;on mechanical lift sling;legs elevated  -       User Key  (r) = Recorded By, (t) = Taken By, (c) = Cosigned By    Initials Name Provider Type     Zahra Townsend, OT Occupational Therapist        Outcome Measures     Row Name 01/05/21 1148          How much help from another is currently needed...    Putting on and taking off regular lower body clothing?  1  -LC     Bathing (including washing, rinsing, and drying)  1  -LC     Toileting (which includes using toilet bed pan or urinal)  1  -LC     Putting on and taking off regular upper body clothing  2  -LC     Taking care of personal grooming (such as brushing teeth)  3  -LC     Eating meals  3  -LC     AM-PAC 6 Clicks Score (OT)  11  -     Row Name 01/05/21 1148          Functional Assessment    Outcome Measure Options  AM-PAC 6 Clicks Daily Activity (OT)  -       User Key  (r) = Recorded  By, (t) = Taken By, (c) = Cosigned By    Initials Name Provider Type     Zahra Townsend OT Occupational Therapist        Occupational Therapy Education                 Title: PT OT SLP Therapies (Not Started)     Topic: Occupational Therapy (In Progress)     Point: ADL training (In Progress)     Description:   Instruct learner(s) on proper safety adaptation and remediation techniques during self care or transfers.   Instruct in proper use of assistive devices.              Learning Progress Summary           Patient Acceptance, E, NR,NL by  at 1/5/2021 0936                   Point: Home exercise program (Not Started)     Description:   Instruct learner(s) on appropriate technique for monitoring, assisting and/or progressing therapeutic exercises/activities.              Learner Progress:  Not documented in this visit.          Point: Precautions (In Progress)     Description:   Instruct learner(s) on prescribed precautions during self-care and functional transfers.              Learning Progress Summary           Patient Acceptance, E, NR,NL by  at 1/5/2021 0936                   Point: Body mechanics (In Progress)     Description:   Instruct learner(s) on proper positioning and spine alignment during self-care, functional mobility activities and/or exercises.              Learning Progress Summary           Patient Acceptance, E, NR,NL by  at 1/5/2021 0936                               User Key     Initials Effective Dates Name Provider Type Discipline     07/18/19 -  Zahra Townsend OT Occupational Therapist OT              OT Recommendation and Plan  Therapy Frequency (OT): daily  Plan of Care Review  Plan of Care Reviewed With: patient  Progress: (OT eval)  Outcome Summary: OT eval completed. Pt. limited by lethargy, requiring consistent cueing to attend to task. Pt. transitioned from supine to sit with Mod A of 2. Required Min A of 2 for T/Fs. DEP for LBD. Skilled OT services warranted to promote  return to OF. Recommend SNF at [facility].     Time Calculation:   Time Calculation- OT     Row Name 01/05/21 1149             Time Calculation- OT    OT Start Time  0936  -      OT Received On  01/05/21  -      OT Goal Re-Cert Due Date  01/15/21  -        User Key  (r) = Recorded By, (t) = Taken By, (c) = Cosigned By    Initials Name Provider Type     Zahra Townsend OT Occupational Therapist        Therapy Charges for Today     Code Description Service Date Service Provider Modifiers Qty    [id number] HC OT EVAL LOW COMPLEXITY 4 1/5/2021 Zahra Townsend OT GO 1               Zahra Townsend OT  1/5/2021

## 2021-01-05 NOTE — PLAN OF CARE
Goal Outcome Evaluation:  Plan of Care Reviewed With: patient  Progress: (OT eval)  Outcome Summary: OT eval completed. Pt. limited by lethargy, requiring consistent cueing to attend to task. Pt. transitioned from supine to sit with Mod A of 2. Required Min A of 2 for T/Fs. DEP for LBD. Skilled OT services warranted to promote return to PLOF. Recommend SNF at [facility].

## 2021-01-05 NOTE — THERAPY EVALUATION
Acute Care - Speech Language Pathology Initial Evaluation   [place]   Clinical Swallow Evaluation  Cognitive-Communication Evaluation       Patient Name: Sai Weinberg  : 1960  MRN: [id number]  Today's Date: 2021               Admit Date: 2021     Visit Dx:    ICD-10-CM ICD-9-CM   1. Hypokalemia  E87.6 276.8   2. Hyponatremia  E87.1 276.1   3. Somnolence  R40.0 780.09     Patient Active Problem List   Diagnosis   • Dyslipidemia on statins    • Arthritis   • COPD    • T2DM on metformin    • Esophageal reflux   • Hypertension   • Chewing tobacco use   • PAD s/p L SFA stent and R fem-pop    • LANA- resolved    • Iron deficiency anemia   • Coal workers pneumoconiosis    • Coronary artery disease involving native heart   • Nonrheumatic aortic valve stenosis   • Acute on chronic systolic heart failure    • YOUSUF on CPAP   • GIB (recent + FOBT without overt bleeding)    • Paroxysmal atrial fibrillation   • Former smoker   • Class 3 severe obesity in adult    • CHB (complete heart block) (CMS/HCC)   • S/P TAVR (transcatheter aortic valve replacement) 19   • Hyponatremia   • Diastolic CHF, chronic (CMS/HCC)   • Hypokalemia   • Other cirrhosis of liver (CMS/HCC)   • Hyperbilirubinemia     Past Medical History:   Diagnosis Date   • Arthritis    • Black lung disease (CMS/HCC)    • BPH (benign prostatic hyperplasia)    • Cataract    • COLD (chronic obstructive lung disease) (CMS/HCC)    • Colon polyps    • Diabetes mellitus (CMS/HCC)     SINCE    • Dyslipidemia    • Esophageal reflux    • Flu     HX   • Heart attack (CMS/HCC)        • Herniated lumbar intervertebral disc    • History of colon resection    • Hyperlipidemia    • Hypertension    • Malignant neoplasm of colon (CMS/HCC)    • On home oxygen therapy     prn   • Open wound of left hip and thigh with complication 3/12/2017    Open draining left leg wound from femoral cutdown and angioplasty/stenting of superficial artery 2017   •  Peripheral vascular disease (CMS/HCC)    • Renal failure    • Sleep apnea with use of continuous positive airway pressure (CPAP)    • Wears dentures    • Wears glasses      Past Surgical History:   Procedure Laterality Date   • ANGIOPLASTY FEMORAL ARTERY N/A 1/17/2017    Procedure: left femoral cutdown with aortagram and left SFA stent and angioplasty;  Surgeon: Heladio Rosa MD;  Location:  [facility];  Service:    • AORTAGRAM N/A 1/17/2017    Procedure: AORTAGRAM WITH RUNOFFS and  STENT left SFA;  Surgeon: Heladio Rosa MD;  Location:  [facility];  Service:    • AORTIC VALVE REPAIR/REPLACEMENT N/A 11/22/2019    Procedure: TRANSAPICAL TRANSCATHETER AORTIC VALVE REPLACEMENT WITH TRANSESOPHAGEAL ECHOCARDIOLGRAM;  Surgeon: Danish St MD;  Location:  [facility];  Service: Cardiothoracic   • AORTIC VALVE REPAIR/REPLACEMENT N/A 11/22/2019    Procedure: Transapical Transcatheter Aortic Valve Replacement;  Surgeon: Lory Shepherd MD;  Location:  [facility];  Service: Cardiovascular   • CARDIAC CATHETERIZATION      NO INTERVENTION   • CARDIAC CATHETERIZATION Left 10/30/2019    Procedure: Left Heart Cath;  Surgeon: Milad Cordon MD;  Location:  [facility];  Service: Cardiology   • CARDIAC ELECTROPHYSIOLOGY PROCEDURE Left 11/25/2019    Procedure: Pacemaker DC new;  Surgeon: Maranda Cerda MD;  Location:  [facility];  Service: Cardiology   • COLON SURGERY      x 2   • COLONOSCOPY     • FEMORAL FEMORAL BYPASS N/A 3/13/2017    Procedure: INCISION AND DRAINAGE LEFT GROIN HEMATOMA;  Surgeon: Heladio Rosa MD;  Location:  [facility];  Service:    • FEMORAL POPLITEAL BYPASS Right 01/26/2016   • OTHER SURGICAL HISTORY      PTA ILIAC STENOSIS WITH STENT   • TRANSESOPHAGEAL ECHOCARDIOGRAM (ZORAIDA) N/A 11/22/2019    Procedure: TRANSESOPHAGEAL ECHOCARDIOGRAM WITH ANESTHESIA;  Surgeon: Danish St MD;  Location:  [facility];   Service: Cardiothoracic        SLP EVALUATION (last 72 hours)      SLP SLC Evaluation     Row Name 01/05/21 1783       Communication Assessment/Intervention    Document Type  evaluation  -CJ    Subjective Information  no complaints  -CJ    Patient Observations  alert;cooperative  -CJ    Care Plan Review  evaluation/treatment results reviewed;patient/other agree to care plan  -CJ    Patient Effort  good  -CJ    Symptoms Noted During/After Treatment  none  -CJ       General Information    Patient Profile Reviewed  yes  -CJ    Pertinent History Of Current Problem  Pt adm w/ hyponatremia; Has sig h/o COPD, reflux, HTN, CAD, coal workers pneumonitis, YOUSUF, paroxysmal a-fib.   -CJ    Precautions/Limitations, Vision  WFL with corrective lenses;for purposes of eval  -CJ    Precautions/Limitations, Hearing  WFL;for purposes of eval  -CJ    Patient Level of Education  unknown  -CJ    Prior Level of Function-Communication  unknown  -CJ    Plans/Goals Discussed with  patient;agreed upon  -    Barriers to Rehab  none identified  -    Patient's Goals for Discharge  patient did not state  -CJ       Pain    Additional Documentation  Pain Scale: FACES Pre/Post-Treatment (Group)  -       Pain Scale: FACES Pre/Post-Treatment    Pain: FACES Scale, Pretreatment  0-->no hurt  -CJ    Posttreatment Pain Rating  0-->no hurt  -CJ       Comprehension Assessment/Intervention    Comprehension Assessment/Intervention  Auditory Comprehension;Reading Comprehension  -       Auditory Comprehension Assessment/Intervention    Auditory Comprehension (Communication)  WFL  -       Reading Comprehension Assessment/Intervention    Reading Comprehension (Communication)  WFL  -       Expression Assessment/Intervention    Expression Assessment/Intervention  verbal expression;graphic expression  -       Verbal Expression Assessment/Intervention    Verbal Expression  WFL  -       Graphic Expression Assessment/Intervention    Graphic Expression   WFL  -CJ       Oral Motor Structure and Function    Oral Motor Structure and Function  WFL  -CJ    Dentition Assessment  natural, present and adequate  -CJ    Mucosal Quality  moist, healthy  -CJ       Oral Musculature and Cranial Nerve Assessment    Oral Motor General Assessment  WFL  -CJ       Motor Speech Assessment/Intervention    Motor Speech Function  WFL  -CJ       Cognitive Assessment Intervention- SLP    Cognitive Function (Cognition)  WFL  -CJ    Orientation Status (Cognition)  WFL  -CJ    Memory (Cognitive)  WF  -CJ       SLP Clinical Impressions    SLP Diagnosis  Per this evaluation Mr. Weinberg presents his baseline wfl s/l/cognitive skills. NO observed deficits at this time requiring intervention. No new neurological impairment.  -CJ    SLC Criteria for Skilled Therapy Interventions Met  no problems identified which require skilled intervention;baseline status  -CJ    Functional Impact  no impact on function  -CJ    Plan for Continued Treatment (SLP)  No further SLP f/u warranted at this time  -CJ       Recommendations    Therapy Frequency (SLP SLC)  evaluation only  -CJ    Anticipated Discharge Disposition (SLP)  unknown  -CJ      User Key  (r) = Recorded By, (t) = Taken By, (c) = Cosigned By    Initials Name Effective Dates    Niharika Javier, MS CCC-SLP 02/11/20 -              EDUCATION  The patient has been educated in the following areas:     Cognitive Impairment Communication Impairment.    SLP Recommendation and Plan  SLP Diagnosis: Per this evaluation Mr. Weinberg presents his baseline wfl s/l/cognitive skills. NO observed deficits at this time requiring intervention. No new neurological impairment.        Swallow Criteria for Skilled Therapeutic Interventions Met: no problems identified which require skilled intervention, baseline status  SLC Criteria for Skilled Therapy Interventions Met: no problems identified which require skilled intervention, baseline status  Anticipated Discharge  Disposition (SLP): unknown     Therapy Frequency (Swallow): evaluation only        Plan for Continued Treatment (SLP): No further SLP f/u warranted at this time             Plan of Care Reviewed With: patient  Progress: improving                  Time Calculation:     Time Calculation- SLP     Row Name 21 1117             Time Calculation- SLP    SLP Start Time  0830  -      SLP Received On  21  -        User Key  (r) = Recorded By, (t) = Taken By, (c) = Cosigned By    Initials Name Provider Type    Niharika Javier, MS CCC-SLP Speech and Language Pathologist          Therapy Charges for Today     Code Description Service Date Service Provider Modifiers Qty    87133538551 HC ST EVAL ORAL PHARYNG SWALLOW 2 2021 Niharika Cordon, MS CCC-SLP GN 1    46267014943 HC ST EVAL SPEECH AND PROD W LANG  2 2021 Niharika Cordon, MS CCC-SLP GN 1                     Niharika Cordon MS CCC-SLP  2021 and Acute Care - Speech Language Pathology   Swallow Initial Evaluation [facility]     Patient Name: Sai Weinberg  : 1960  MRN: [id number]  Today's Date: 2021               Admit Date: 2021    Visit Dx:     ICD-10-CM ICD-9-CM   1. Hypokalemia  E87.6 276.8   2. Hyponatremia  E87.1 276.1   3. Somnolence  R40.0 780.09     Patient Active Problem List   Diagnosis   • Dyslipidemia on statins    • Arthritis   • COPD    • T2DM on metformin    • Esophageal reflux   • Hypertension   • Chewing tobacco use   • PAD s/p L SFA stent and R fem-pop    • LANA- resolved    • Iron deficiency anemia   • Coal workers pneumoconiosis    • Coronary artery disease involving native heart   • Nonrheumatic aortic valve stenosis   • Acute on chronic systolic heart failure    • YOUSUF on CPAP   • GIB (recent + FOBT without overt bleeding)    • Paroxysmal atrial fibrillation   • Former smoker   • Class 3 severe obesity in adult    • CHB (complete heart block) (CMS/HCC)   • S/P TAVR (transcatheter aortic valve  replacement) 11/22/19   • Hyponatremia   • Diastolic CHF, chronic (CMS/HCC)   • Hypokalemia   • Other cirrhosis of liver (CMS/HCC)   • Hyperbilirubinemia     Past Medical History:   Diagnosis Date   • Arthritis    • Black lung disease (CMS/HCC)    • BPH (benign prostatic hyperplasia)    • Cataract    • COLD (chronic obstructive lung disease) (CMS/HCC)    • Colon polyps    • Diabetes mellitus (CMS/HCC)     SINCE 2014   • Dyslipidemia    • Esophageal reflux    • Flu     HX   • Heart attack (CMS/HCC)     2006   • Herniated lumbar intervertebral disc    • History of colon resection    • Hyperlipidemia    • Hypertension    • Malignant neoplasm of colon (CMS/HCC)    • On home oxygen therapy     prn   • Open wound of left hip and thigh with complication 3/12/2017    Open draining left leg wound from femoral cutdown and angioplasty/stenting of superficial artery 1/18/2017   • Peripheral vascular disease (CMS/HCC)    • Renal failure    • Sleep apnea with use of continuous positive airway pressure (CPAP)    • Wears dentures    • Wears glasses      Past Surgical History:   Procedure Laterality Date   • ANGIOPLASTY FEMORAL ARTERY N/A 1/17/2017    Procedure: left femoral cutdown with aortagram and left SFA stent and angioplasty;  Surgeon: Heladio Rosa MD;  Location:  [facility];  Service:    • AORTAGRAM N/A 1/17/2017    Procedure: AORTAGRAM WITH RUNOFFS and  STENT left SFA;  Surgeon: Heladio Rosa MD;  Location:  [facility];  Service:    • AORTIC VALVE REPAIR/REPLACEMENT N/A 11/22/2019    Procedure: TRANSAPICAL TRANSCATHETER AORTIC VALVE REPLACEMENT WITH TRANSESOPHAGEAL ECHOCARDIOLGRAM;  Surgeon: Danish St MD;  Location:  [facility];  Service: Cardiothoracic   • AORTIC VALVE REPAIR/REPLACEMENT N/A 11/22/2019    Procedure: Transapical Transcatheter Aortic Valve Replacement;  Surgeon: Lory Shepherd MD;  Location:  [facility];  Service: Cardiovascular   • CARDIAC CATHETERIZATION       NO INTERVENTION   • CARDIAC CATHETERIZATION Left 10/30/2019    Procedure: Left Heart Cath;  Surgeon: Milad Cordon MD;  Location:  [facility];  Service: Cardiology   • CARDIAC ELECTROPHYSIOLOGY PROCEDURE Left 11/25/2019    Procedure: Pacemaker DC new;  Surgeon: Maranda Cerda MD;  Location:  [facility];  Service: Cardiology   • COLON SURGERY      x 2   • COLONOSCOPY     • FEMORAL FEMORAL BYPASS N/A 3/13/2017    Procedure: INCISION AND DRAINAGE LEFT GROIN HEMATOMA;  Surgeon: Heladio Rosa MD;  Location:  [facility];  Service:    • FEMORAL POPLITEAL BYPASS Right 01/26/2016   • OTHER SURGICAL HISTORY      PTA ILIAC STENOSIS WITH STENT   • TRANSESOPHAGEAL ECHOCARDIOGRAM (ZORAIDA) N/A 11/22/2019    Procedure: TRANSESOPHAGEAL ECHOCARDIOGRAM WITH ANESTHESIA;  Surgeon: Danish St MD;  Location:  [facility];  Service: Cardiothoracic        SWALLOW EVALUATION (last 72 hours)      SLP Adult Swallow Evaluation     Row Name 01/05/21 0876       General Information    Current Method of Nutrition  regular textures;thin liquids  -CJ    Prior Level of Function-Communication  unknown  -CJ    Prior Level of Function-Swallowing  no diet consistency restrictions;safe, efficient swallowing in all situations  -CJ       Oral Motor Structure and Function    Secretion Management  WNL/WFL  -CJ    Volitional Swallow  WFL  -CJ    Volitional Cough  WFL  -CJ       General Eating/Swallowing Observations    Respiratory Support Currently in Use  nasal cannula  -    Eating/Swallowing Skills  self-fed  -CJ    Positioning During Eating  upright 90 degree;upright in bed  -    Utensils Used  spoon;cup;straw  -CJ    Consistencies Trialed  regular textures;ice chips;thin liquids;pureed  -CJ       Clinical Swallow Eval    Oral Prep Phase  WFL  -CJ    Oral Transit  WFL  -CJ    Oral Residue  WFL  -CJ    Pharyngeal Phase  no overt signs/symptoms of pharyngeal impairment  -CJ    Clinical  Swallow Evaluation Summary  CSE completed this am. Mr. Weinberg is positioned upright and centered in bed to accept po presentations from breakfast tray as well as ice chips and thin liquids via spoon, cup and straw. Oral phase appears to be seemingly wfl. No overt s/s of aspiration. No clinical s/s concerning for silent aspiration as pt is w/o changes in vocal quality, respirations or secretions post po presentations. Per this evaluation Mr. Weinberg presents w/ wfl oropharyngeal swallowing fnx. He is felt to most benefit from continued regular diet consistency w/ thin liquids. Meds whole in puree/thins. Upright and centered for all po intake. General aspiration precautions.  -       Clinical Impression    SLP Swallowing Diagnosis  functional oral phase;functional pharyngeal phase  -    Functional Impact  no impact on function  -    Rehab Potential/Prognosis, Swallowing  good, to achieve stated therapy goals  -    Swallow Criteria for Skilled Therapeutic Interventions Met  no problems identified which require skilled intervention;baseline status  -       Recommendations    Therapy Frequency (Swallow)  evaluation only  -    SLP Diet Recommendation  regular textures;thin liquids  -    Recommended Diagnostics  No further SLP services recommended  -    Recommended Precautions and Strategies  upright posture during/after eating;general aspiration precautions  -    Oral Care Recommendations  Oral Care BID/PRN  -    SLP Rec. for Method of Medication Administration  meds whole;with thin liquids;with pudding or applesauce;as tolerated  -    Monitor for Signs of Aspiration  yes;notify SLP if any concerns  -      User Key  (r) = Recorded By, (t) = Taken By, (c) = Cosigned By    Initials Name Effective Dates    Niharika Javier, MS CCC-SLP 02/11/20 -           EDUCATION  The patient has been educated in the following areas:   Dysphagia (Swallowing Impairment) Oral Care/Hydration.    SLP Recommendation and  Plan  SLP Swallowing Diagnosis: functional oral phase, functional pharyngeal phase  SLP Diet Recommendation: regular textures, thin liquids  Recommended Precautions and Strategies: upright posture during/after eating, general aspiration precautions  SLP Rec. for Method of Medication Administration: meds whole, with thin liquids, with pudding or applesauce, as tolerated     Monitor for Signs of Aspiration: yes, notify SLP if any concerns  Recommended Diagnostics: No further SLP services recommended  Swallow Criteria for Skilled Therapeutic Interventions Met: no problems identified which require skilled intervention, baseline status  Anticipated Discharge Disposition (SLP): unknown  Rehab Potential/Prognosis, Swallowing: good, to achieve stated therapy goals  Therapy Frequency (Swallow): evaluation only             Plan for Continued Treatment (SLP): No further SLP f/u warranted at this time              Plan of Care Reviewed With: patient  Progress: improving           Time Calculation:   Time Calculation- SLP     Row Name 01/05/21 1117             Time Calculation- SLP    SLP Start Time  0830  -      SLP Received On  01/05/21  -        User Key  (r) = Recorded By, (t) = Taken By, (c) = Cosigned By    Initials Name Provider Type     Niharika Cordon MS CCC-SLP Speech and Language Pathologist          Therapy Charges for Today     Code Description Service Date Service Provider Modifiers Qty    95940464948 HC ST EVAL ORAL PHARYNG SWALLOW 2 1/5/2021 Niharika Cordon MS CCC-SLP GN 1    63650782822 HC ST EVAL SPEECH AND PROD W LANG  2 1/5/2021 Niharika Cordon MS CCC-SLP GN 1        Patient was not wearing a face mask and did not exhibit coughing during this therapy encounter.  Procedure performed was aerosolizing, involved close contact (within 6 feet for at least 15 minutes or longer), and did not involve contact with infectious secretions or specimens.  Therapist used appropriate personal protective equipment  including gloves, standard procedure mask and eye protection.  Appropriate PPE was worn during the entire therapy session.  Hand hygiene was completed before and after therapy session.          Niharika Cordon MS CCC-SLP  1/5/2021

## 2021-01-05 NOTE — PROGRESS NOTES
"Pharmacy Consult  -  Warfarin  Sai Weinberg is a  60 y.o. male   Height - 165.1 cm (65\")  Weight - 106 kg (232 lb 14.4 oz)    Consulting Service: Hospitalist  Indication: A fib  Goal INR: 2-3  Home Regimen: Warfarin 4 mg daily (pharmacist confirmed with patient on admission)    Bridge Therapy: No     Drug-Drug Interactions with current regimen:     Aspirin - may increase risk of bleeding     Warfarin Dosing During Admission:    Date  1/4 1/5          INR  1.84 2.29          Dose  5 mg (4 mg)             Education Provided: Patient on warfarin prior to admission, though patient is currently confused and not appropriate for education. Pharmacist available for education throughout admission if needed.    Discharge Follow up:     Following Provider - Dr. Cole     Follow up time range or appointment - Recommend repeat INR within 2-3 days of discharge    Labs:  Results from last 7 days   Lab Units 01/05/21  0859 01/05/21  0016 01/05/21  0012 01/04/21  1730   INR  2.29* 1.97*  --  1.84*   HEMOGLOBIN g/dL 12.2*  --  12.4* 12.6*   HEMATOCRIT % 33.4*  --  34.1* 34.2*   PLATELETS 10*3/mm3 61*  --  60* 64*     Results from last 7 days   Lab Units 01/05/21  0859 01/05/21  0203 01/05/21  0011 01/04/21  1730   SODIUM mmol/L 124* 123* 123* 121*   POTASSIUM mmol/L 3.3* 3.0* 2.9* 2.6*   CHLORIDE mmol/L 82* 81* 78* 76*   CO2 mmol/L 33.0* 34.0* 36.0* 35.0*   BUN mg/dL 34* 35* 50* 48*   CREATININE mg/dL 0.87 0.97 0.88 0.86   CALCIUM mg/dL 8.4* 8.1* 8.9 9.3   BILIRUBIN mg/dL  --   --   --  3.9*   ALK PHOS U/L  --   --   --  193*   ALT (SGPT) U/L  --   --   --  38   AST (SGOT) U/L  --   --   --  58*   GLUCOSE mg/dL 113* 116* 117* 131*     Current dietary intake: Patient just admitted, 25% of breakfast documented 1/5.    Diet Order   Procedures    Diet Regular; Cardiac     Assessment/Plan:     Patient's INR is 2.29 today.  Will resume home dose of warfarin 4 mg today.  Daily PT/INR ordered.  Monitor signs/symptoms of bleeding, dietary " intake, and drug-drug interactions. Make dose adjustments as necessary.  Pharmacy will continue to follow.    Kaela Webb PharmD, BCPS  1/5/2021  11:46 EST

## 2021-01-05 NOTE — PAYOR COMM NOTE
"Rogelio Hastings (60 y.o. Male)     NORM Jay , [phone]        Date of Birth Social Security Number Address Home Phone MRN    1960  [address] [facility] [address]  [id number]    Protestant Marital Status          None        Admission Date Admission Type Admitting Provider Attending Provider Department, Room/Bed    21 Emergency Melly Robertson DO Barbato, Hayley R, DO [facility] 3E, S334/1    Discharge Date Discharge Disposition Discharge Destination                       Attending Provider: Melly Robertson DO    Allergies: Tylenol [Acetaminophen]    Isolation: None   Infection: None   Code Status: CPR    Ht: 165.1 cm (65\")   Wt: 106 kg (232 lb 14.4 oz)    Admission Cmt: None   Principal Problem: Hyponatremia [E87.1]                 Active Insurance as of 2021     Primary Coverage     Payor Plan Insurance Group Employer/Plan Group    [facility] MEDICAID      Payor Plan Address Payor Plan Phone Number Payor Plan Fax Number Effective Dates    [address] [address] [phone]  2019 - None Entered    [facility] [id number]       Subscriber Name Subscriber Birth Date Member ID       ROGELIO HASTINGS 1960 [id number]                 Emergency Contacts      (Rel.) Home Phone Work Phone Mobile Phone    DARLINGNANCY (Spouse) [phone] -- --    Karina Brennan (Daughter) [phone] -- [phone]    CLIFFORD JOSUÉ (Daughter) [phone] -- --            Insurance Information                [facility] MEDICAID Phone: [phone]    Subscriber: Rogelio Hastings Subscriber#: [id number]    Group#:  Precert#:              History & Physical      Mina Rios DO at 21 [address] [place] [address] Medicine Services  HISTORY AND PHYSICAL    Patient Name: Rogelio Hastings  : 1960  MRN: [id number]  Primary Care Physician: Dewayne Cole MD  Date of admission: " "1/4/2021      Subjective   Subjective     Chief Complaint:  weakness    HPI:  Sai Weinberg is a 60 y.o. male with past medical history of COPD, type 2 diabetes, hypertension, hyperlipidemia, iron deficiency anemia, cold miners pneumoconiosis,?  diastolic CHF, YOUSUF on CPAP at night, coronary artery disease, history of aortic valve stenosis with TAVR, paroxysmal atrial fibrillation, history of complete heart block status post pacemaker placement, history of GI bleed who presents to the ER at request of his PCP for abnormal routine labs indicating hyponatremia and hypokalemia.    Patient is stoic and not very forthcoming with history at the moment. He does report increased weakness recently and some brain fog. Patient reports that he has been doing very well since his TAVR. He reports that his lower extremity edema is chronic and not increased from baseline. He reports compliance with all of his medications including his diuretic, however seems to be confused as to the names of his medications. He reports chronic shortness of air and orthopnea which is unchanged from his baseline. Denies any recent cough, fever, chills, or exposure to COVID-19.    Patient reports increased fatigue and sleeping. His only request at the moment is to rest. ABG was attempted in the ER, however patient is refusing further attempts.     Patient states that he was recently told by his PCP that he has liver cirrhosis and \"something else\". He is unable to recall what else was recently discovered.        Current COVID Risks are:  [] Fever []  Cough [] Shortness of breath [x] Fatigue [] Change in taste or smell    [] Exposure to COVID positive patient  [] High risk facility   []  NONE    Review of Systems   Gen- No fevers, chills  CV- No chest pain, palpitations  Resp- No cough, dyspnea  GI- No N/V/D, abd pain      All other systems reviewed and are negative.     Personal History     Past Medical History:   Diagnosis Date   • Arthritis    • Black " lung disease (CMS/HCC)    • BPH (benign prostatic hyperplasia)    • Cataract    • COLD (chronic obstructive lung disease) (CMS/HCC)    • Colon polyps    • Diabetes mellitus (CMS/HCC)     SINCE 2014   • Dyslipidemia    • Esophageal reflux    • Flu     HX   • Heart attack (CMS/HCC)     2006   • Herniated lumbar intervertebral disc    • History of colon resection    • Hyperlipidemia    • Hypertension    • Malignant neoplasm of colon (CMS/HCC)    • On home oxygen therapy     prn   • Open wound of left hip and thigh with complication 3/12/2017    Open draining left leg wound from femoral cutdown and angioplasty/stenting of superficial artery 1/18/2017   • Peripheral vascular disease (CMS/HCC)    • Renal failure    • Sleep apnea with use of continuous positive airway pressure (CPAP)    • Wears dentures    • Wears glasses        Past Surgical History:   Procedure Laterality Date   • ANGIOPLASTY FEMORAL ARTERY N/A 1/17/2017    Procedure: left femoral cutdown with aortagram and left SFA stent and angioplasty;  Surgeon: Heladio Rosa MD;  Location:  [facility];  Service:    • AORTAGRAM N/A 1/17/2017    Procedure: AORTAGRAM WITH RUNOFFS and  STENT left SFA;  Surgeon: Heladio Rosa MD;  Location:  [facility];  Service:    • AORTIC VALVE REPAIR/REPLACEMENT N/A 11/22/2019    Procedure: TRANSAPICAL TRANSCATHETER AORTIC VALVE REPLACEMENT WITH TRANSESOPHAGEAL ECHOCARDIOLGRAM;  Surgeon: Danish St MD;  Location:  [facility];  Service: Cardiothoracic   • AORTIC VALVE REPAIR/REPLACEMENT N/A 11/22/2019    Procedure: Transapical Transcatheter Aortic Valve Replacement;  Surgeon: Lory Shepherd MD;  Location:  [facility];  Service: Cardiovascular   • CARDIAC CATHETERIZATION      NO INTERVENTION   • CARDIAC CATHETERIZATION Left 10/30/2019    Procedure: Left Heart Cath;  Surgeon: Milad Cordon MD;  Location:  [facility] CATH INVASIVE LOCATION;  Service: Cardiology   • CARDIAC ELECTROPHYSIOLOGY  PROCEDURE Left 11/25/2019    Procedure: Pacemaker DC new;  Surgeon: Maranda Cerda MD;  Location:  [facility] CATH INVASIVE LOCATION;  Service: Cardiology   • COLON SURGERY      x 2   • COLONOSCOPY     • FEMORAL FEMORAL BYPASS N/A 3/13/2017    Procedure: INCISION AND DRAINAGE LEFT GROIN HEMATOMA;  Surgeon: Heladio Rosa MD;  Location:  [facility] OR;  Service:    • FEMORAL POPLITEAL BYPASS Right 01/26/2016   • OTHER SURGICAL HISTORY      PTA ILIAC STENOSIS WITH STENT   • TRANSESOPHAGEAL ECHOCARDIOGRAM (ZORAIDA) N/A 11/22/2019    Procedure: TRANSESOPHAGEAL ECHOCARDIOGRAM WITH ANESTHESIA;  Surgeon: Danish St MD;  Location:  [facility]BRID OR [facility];  Service: Cardiothoracic       Family History: family history includes Diabetes in his father and mother; Heart attack in his father and mother; Hypertension in his father and mother; Lung cancer in his mother. Otherwise pertinent FHx was reviewed and unremarkable.     Social History:  reports that he quit smoking about 6 years ago. His smoking use included cigarettes. He started smoking about 41 years ago. He has a 60.00 pack-year smoking history. His smokeless tobacco use includes chew. He reports that he does not drink alcohol or use drugs.  Social History     Social History Narrative    Lives in [place].       Medications:  Available home medication information reviewed.  Medications Prior to Admission   Medication Sig Dispense Refill Last Dose   • atorvastatin (LIPITOR) 40 MG tablet Take 40 mg by mouth daily.      • bumetanide (BUMEX) 2 MG tablet Take 2 mg by mouth 2 (Two) Times a Day As Needed.      • carvedilol (COREG) 12.5 MG tablet Take 1 tablet by mouth 2 (Two) Times a Day With Meals. (Patient taking differently: Take 12.5 mg by mouth Daily.) 60 tablet 11    • clopidogrel (PLAVIX) 75 MG tablet Take 1 tablet by mouth Daily. 30 tablet 11    • DULoxetine (CYMBALTA) 30 MG capsule Take 30 mg by mouth Daily.      • magnesium oxide (MAGOX) 400 (241.3 Mg) MG tablet  tablet Take 400 mg by mouth Daily.      • metFORMIN (GLUCOPHAGE) 850 MG tablet Take 850 mg by mouth 2 (Two) Times a Day With Meals.      • pantoprazole (PROTONIX) 40 MG EC tablet Take 40 mg by mouth Every Other Day.      • potassium chloride (MICRO-K) 10 MEQ CR capsule Take 10 mEq by mouth 2 (Two) Times a Day.      • tamsulosin (FLOMAX) 0.4 MG capsule 24 hr capsule Take 0.4 mg by mouth Daily.      • warfarin (COUMADIN) 4 MG tablet Take 4 mg by mouth Daily.      • albuterol (PROVENTIL HFA;VENTOLIN HFA) 108 (90 BASE) MCG/ACT inhaler Inhale 2 puffs Every 4 (Four) Hours As Needed for wheezing.      • budesonide-formoterol (SYMBICORT) 80-4.5 MCG/ACT inhaler Inhale 2 puffs 2 (Two) Times a Day.      • Cholecalciferol (Vitamin D) 50 MCG (2000 UT) capsule Take 2,000 Units by mouth Daily.      • ferrous sulfate 324 (65 FE) MG tablet delayed-release EC tablet Take 324 mg by mouth Daily With Breakfast.      • hydrOXYzine pamoate (VISTARIL) 50 MG capsule Take 1 capsule by mouth Daily.      • losartan (COZAAR) 50 MG tablet Take 50 mg by mouth Daily.      • nitroglycerin (NITROSTAT) 0.4 MG SL tablet Place 0.4 mg under the tongue Every 5 (Five) Minutes As Needed for Chest Pain. Take no more than 3 doses in 15 minutes.      • oxyCODONE (ROXICODONE) 10 MG tablet Take 10 mg by mouth Every 8 (Eight) Hours As Needed.      • probiotic (CULTURELLE) capsule capsule Take 1 capsule by mouth Daily. 30 capsule 1    • rOPINIRole (REQUIP) 2 MG tablet Take 1 tablet by mouth Every Other Day.      • Testosterone Cypionate 100 MG/ML solution Inject 2 mL as directed Every 14 (Fourteen) Days.      • tiotropium (SPIRIVA) 18 MCG per inhalation capsule Place 1 capsule into inhaler and inhale Daily.          Allergies   Allergen Reactions   • Tylenol [Acetaminophen] Other (See Comments)     Liver disease       Objective   Objective     Vital Signs:   Temp:  [98 °F (36.7 °C)] 98 °F (36.7 °C)  Heart Rate:  [58-65] 60  Resp:  [20] 20  BP: (109-140)/(54-96)  130/64  Flow (L/min):  [2] 2       Physical Exam     Constitutional: Awake, alert, appears nontoxic, appears confused  Eyes: PERRLA, sclerae anicteric, no conjunctival injection  HENT: NCAT, mucous membranes moist  Neck: Supple, no thyromegaly, no lymphadenopathy, trachea midline  Respiratory: Possible scant crackles in the bases, mild increased respirations   Cardiovascular: RRR, no murmurs, rubs, or gallops, palpable pedal pulses bilaterally  Gastrointestinal: Positive bowel sounds, soft, nontender, nondistended  Musculoskeletal: No bilateral ankle edema, no clubbing or cyanosis to extremities  Psychiatric: Appropriate affect, cooperative  Neurologic: Oriented to person and place, seems confused, delayed thought process, strength symmetric in all extremities, Cranial Nerves grossly intact to confrontation, speech clear  Skin: No rashes    Results Reviewed:  I have personally reviewed most recent indicated data and agree with findings including:  [x]  Laboratory  [x]  Radiology  [x]  EKG/Telemetry  []  Pathology  []  Cardiac/Vascular Studies  [x]  Old records  []  Other:  Most pertinent findings include: Hypokalemia at 2.6, sodium of 121 with baseline near one 34-1 35, CO2 of 35, BUN elevated at 48, platelets low from baseline at 64. Patient with mildly elevated troponin and elevated BNP, pro time of 1.84 which is up from baseline, chest x-ray with questionable volume overload not visualized on CT of the chest.      LAB RESULTS:      Lab 01/04/21  1730   WBC 4.72   HEMOGLOBIN 12.6*   HEMATOCRIT 34.2*   PLATELETS 64*   NEUTROS ABS 3.62   IMMATURE GRANS (ABS) 0.05   LYMPHS ABS 0.54*   MONOS ABS 0.44   EOS ABS 0.03   MCV 91.9   PROTIME 20.9*         Lab 01/04/21  1730   SODIUM 121*   POTASSIUM 2.6*   CHLORIDE 76*   CO2 35.0*   ANION GAP 10.0   BUN 48*   CREATININE 0.86   GLUCOSE 131*   CALCIUM 9.3   MAGNESIUM 2.7*         Lab 01/04/21  1730   TOTAL PROTEIN 7.8   ALBUMIN 2.60*   GLOBULIN 5.2   ALT (SGPT) 38   AST  (SGOT) 58*   BILIRUBIN 3.9*   BILIRUBIN DIRECT 1.5*   ALK PHOS 193*         Lab 01/04/21  1730   PROBNP 3,595.0*   TROPONIN T 0.054*   PROTIME 20.9*   INR 1.84*                 Lab 01/04/21  2040   FIO2 28   CARBOXYHEMOGLOBIN (VENOUS) 2.2     Brief Urine Lab Results     None        Microbiology Results (last 10 days)     ** No results found for the last 240 hours. **        Imaging Results (Last 24 Hours)     Procedure Component Value Units Date/Time    CT Abdomen Pelvis With Contrast [[id number]] Collected: 01/04/21 2322     Updated: 01/04/21 2324    Narrative:      CT Abdomen Pelvis W    INDICATION:   Abdominal pain.    TECHNIQUE:   CT of the abdomen and pelvis without IV contrast. Coronal and sagittal reconstructions were obtained.  Radiation dose reduction techniques included automated exposure control or exposure modulation based on body size. Count of known CT and cardiac nuc  med studies performed in previous 12 months: 0.     COMPARISON:   CT of the abdomen and pelvis from 11/14/2019    FINDINGS:  Abdomen: The liver appears cirrhotic and there is ascites. The gallbladder is prominent in size and gallstones are noted. There is a small amount of nonspecific mesenteric stranding that does not appear significantly changed from prior. Kidneys appear  unremarkable.    Pelvis: Normal appendix. Visualized bowel appears within normal limits. No acute osseous abnormality.      Impression:      Exam is limited by patient motion. The liver is cirrhotic and there is ascites. The gallbladder appears somewhat distended and there are gallstones. This findings do not appear significantly changed from the prior CT.          Signer Name: Leeanna Townsend MD   Signed: 1/4/2021 11:22 PM   Workstation Name: [id number]    [facility]    CT Angiogram Chest With & Without Contrast [[id number]] Collected: 01/04/21 2313     Updated: 01/04/21 2315    Narrative:      CT ANGIOGRAM CHEST W WO CONTRAST    INDICATION:    60-year-old male with dyspnea today. Recent TAVR.    TECHNIQUE:   CT angiogram of the chest with IV contrast. 3-D reconstructions were obtained and reviewed.   Radiation dose reduction techniques included automated exposure control or exposure modulation based on body size. Count of known CT and cardiac nuc med studies  performed in previous 12 months: 0.     COMPARISON:   CT angiogram TAVR chest/abdomen/pelvis 11/14/2019    FINDINGS:   Adequate opacification of the pulmonary arteries with no filling defects. Thoracic aorta normal in course and caliber without dissection. Heart size is normal. No pericardial effusion. Multiple prominent mediastinal and bilateral hilar lymph nodes are  unchanged from the prior exam. Aortic valve replacement.    No pleural effusion. No pneumothorax. No focal pulmonary infiltrates. Mild dependent bibasilar atelectasis.    Please refer to CT abdomen pelvis performed the same date and dictated separately for detailed findings below the diaphragm.    No acute osseous abnormality.      Impression:      1. Negative for pulmonary embolus.  2. Negative for thoracic aortic aneurysm/dissection.  3. No acute pulmonary process.  4. Multiple prominent mediastinal and bilateral hilar lymph nodes, nonspecific and unchanged from prior examination 11/14/2019.    Signer Name: Wesly Greer MD   Signed: 1/4/2021 11:13 PM   Workstation Name: [id number]    [facility]    XR Chest 1 View [[id number]] Collected: 01/04/21 1854     Updated: 01/04/21 1858    Narrative:         EXAMINATION: XR CHEST 1 VW-      INDICATION: dyspnea      COMPARISON: 02/03/2020     FINDINGS: Portable chest reveals cardiac pacer leads to be in  satisfactory position. Heart is upper limits of normal. The lung fields  are clear. Prominence of the pulmonary vascularity. Degenerative changes  seen within the spine. Increased pulmonary vascularity bilaterally. No  definite pleural effusion or pneumothorax.            Impression:      Increased pulmonary vascularity bilaterally with enlargement  of the heart. Pacer leads in satisfactory position.              Results for orders placed during the hospital encounter of 12/13/20   Adult Transthoracic Echo Complete W/ Cont if Necessary Per Protocol    Narrative · Cardiac chamber sizes are normal.  · There is mild concentric left ventricular hypertrophy.  · Global and segmental LV wall motion is normal. The calculated LV   ejection is 56%.  · There is mild-moderate mitral regurgitation.  · There is a normally functioning (TAVR) bioprosthetic aortic valve.  · The estimated RV systolic pressure is normal.  · There are no other relevant findings on this study.          Assessment/Plan   Assessment & Plan     Active Hospital Problems    Diagnosis POA   • **Hyponatremia [E87.1] Yes   • Diastolic CHF, chronic (CMS/HCC) [I50.32] Unknown   • Hypokalemia [E87.6] Unknown   • Other cirrhosis of liver (CMS/HCC) [K74.69] Unknown   • Hyperbilirubinemia [E80.6] Unknown   • S/P TAVR (transcatheter aortic valve replacement) 11/22/19 [Z95.2] Not Applicable   • Paroxysmal atrial fibrillation [I48.0] Yes   • Class 3 severe obesity in adult  [E66.01] Yes   • YOUSUF on CPAP [G47.33, Z99.89] Not Applicable   • Nonrheumatic aortic valve stenosis [I35.0] Yes     ZORAIDA 11/15 Aortic Valve Measurements:   Stenosis   LVOT diam 2 cm      LV V1 max 138.6 cm/sec      LV V1 max PG 7.7 mmHg      LV V1 mean PG 3.9 mmHg      LV V1 VTI 27.8 cm      Ao pk eddy 469.7 cm/sec       Stenosis   Ao max PG 88.2 mmHg      Ao mean PG 50.1 mmHg      Ao V2 VTI 98.8 cm      IAN(I,D) 0.89 cm^2                • Coronary artery disease involving native heart [I25.10] Yes   • Iron deficiency anemia [D50.9] Yes   • 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   • PAD s/p L SFA stent and R fem-pop  [I73.9] Yes   • Dyslipidemia on statins  [E78.5] Yes   • COPD  [J44.9] Yes   • T2DM on metformin  [E11.9] Yes   • Hypertension [I10] Yes   •  Esophageal reflux [K21.9] Yes     Patient is a 60-year-old with past medical history of COPD, type 2 diabetes, hypertension, hyperlipidemia, iron deficiency anemia, cold miners pneumoconiosis,?  diastolic CHF, YOUSUF on CPAP at night, coronary artery disease, history of aortic valve stenosis with TAVR, paroxysmal atrial fibrillation, history of complete heart block status post pacemaker placement, history of GI bleed who presents to the ER at request of his PCP for abnormal routine labs indicating hyponatremia and hypokalemia.    Hyponatremia  --Likely etiology of his weakness and confusion  --Difficult situation as patient has both peripherally volume overloaded, but laboratory data indicative of intravascular volume depletion with elevated BUN, CO2, contraction alkalosis, low sodium and potassium.  --Hold diuretic, patient currently stable on 2 L with no evidence of volume overload/effusions on CT. very gentle IV fluids overnight and every 4 hours BMP  --Urine studies, osm    Diffuse anasarca, diastolic CHF, history of AF status post TAVR  --Cardiology consult for recommendations on diuretic  --Patient noted to be significantly hyponatremic, likely secondary to recent diagnosis of cirrhosis  --Check UA for proteinuria  --Albumin  --Restart diuretic when sodium improved, will be difficult to manage overall  --BiPAP    Hypokalemia  --Replace  --Recheck    Elevated troponin, likely type II NSTEMI, history of complete heart block status post pacer  --Denies chest pain or pressure  --Trend  --cards    Hyperbilirubinemia, decompensated liver cirrhosis  --CT showing some evidence of gallstones with mild distention  --Ultrasound right upper quadrant  --GI consult    YOUSUF  --BiPAP for tonight instead of CPAP  --Refuses ABG    COPD, coalminer's pneumoconiosis    Type 2 diabetes  --Sliding scale insulin    Hypertension    peripheral artery disease    Afib  --subtherapuetic INR, pharm to dose  --may need to change anticoagulant  with new dx of cirrhosis, defer to GI and cardiology.    DVT prophylaxis:  warfarin      CODE STATUS: full  There are no questions and answers to display.       Admission Status:  I believe this patient meets INPATIENT status due to compensated cirrhosis, hyponatremia, hypokalemia.  I feel patient’s risk for adverse outcomes and need for care warrant INPATIENT evaluation and I predict the patient’s care encounter to likely last beyond 2 midnights.      Mina Rios DO  01/04/21      Electronically signed by Mina Rios DO at 01/05/21 0120         Lab Results (last 24 hours)     Procedure Component Value Units Date/Time    Protime-INR [[id number]]  (Abnormal) Collected: 01/05/21 0859    Specimen: Blood Updated: 01/05/21 0949     Protime 24.9 Seconds      INR 2.29    Troponin [[id number]]  (Abnormal) Collected: 01/05/21 0859    Specimen: Blood Updated: 01/05/21 0946     Troponin T 0.049 ng/mL     Narrative:      Troponin T Reference Range:  <= 0.03 ng/mL-   Negative for AMI  >0.03 ng/mL-     Abnormal for myocardial necrosis.  Clinicians would have to utilize clinical acumen, EKG, Troponin and serial changes to determine if it is an Acute Myocardial Infarction or myocardial injury due to an underlying chronic condition.       Results may be falsely decreased if patient taking Biotin.      CBC & Differential [[id number]]  (Abnormal) Collected: 01/05/21 0859    Specimen: Blood Updated: 01/05/21 0924    Narrative:      The following orders were created for panel order CBC & Differential.  Procedure                               Abnormality         Status                     ---------                               -----------         ------                     Scan Slide[[id number]]                                                                  CBC Auto Differential[[id number]]        Abnormal            Final result                 Please view results for these tests on the individual orders.    CBC Auto Differential  [[id number]]  (Abnormal) Collected: 01/05/21 0859    Specimen: Blood Updated: 01/05/21 0924     WBC 5.73 10*3/mm3      RBC 3.64 10*6/mm3      Hemoglobin 12.2 g/dL      Hematocrit 33.4 %      MCV 91.8 fL      MCH 33.5 pg      MCHC 36.5 g/dL      RDW 13.9 %      RDW-SD 45.7 fl      MPV 11.8 fL      Platelets 61 10*3/mm3      Neutrophil % 77.3 %      Lymphocyte % 10.3 %      Monocyte % 9.9 %      Eosinophil % 0.7 %      Basophil % 0.9 %      Immature Grans % 0.9 %      Neutrophils, Absolute 4.43 10*3/mm3      Lymphocytes, Absolute 0.59 10*3/mm3      Monocytes, Absolute 0.57 10*3/mm3      Eosinophils, Absolute 0.04 10*3/mm3      Basophils, Absolute 0.05 10*3/mm3      Immature Grans, Absolute 0.05 10*3/mm3      nRBC 0.0 /100 WBC     Basic Metabolic Panel [[id number]] Collected: 01/05/21 0859    Specimen: Blood Updated: 01/05/21 0916    POC Glucose Once [[id number]]  (Normal) Collected: 01/05/21 0811    Specimen: Blood Updated: 01/05/21 0813     Glucose 122 mg/dL     Osmolality, Urine - Urine, Clean Catch [[id number]]  (Normal) Collected: 01/05/21 0030    Specimen: Urine, Clean Catch Updated: 01/05/21 0514     Osmolality, Urine 333 mOsm/kg     Basic Metabolic Panel [[id number]]  (Abnormal) Collected: 01/05/21 0203    Specimen: Blood Updated: 01/05/21 0242     Glucose 116 mg/dL      BUN 35 mg/dL      Creatinine 0.97 mg/dL      Sodium 123 mmol/L      Potassium 3.0 mmol/L      Chloride 81 mmol/L      CO2 34.0 mmol/L      Calcium 8.1 mg/dL      eGFR Non African Amer 79 mL/min/1.73      BUN/Creatinine Ratio 36.1     Anion Gap 8.0 mmol/L     Narrative:      GFR Normal >60  Chronic Kidney Disease <60  Kidney Failure <15      Hemoglobin A1c [[id number]]  (Abnormal) Collected: 01/04/21 1730    Specimen: Blood Updated: 01/05/21 0206     Hemoglobin A1C 6.20 %     Narrative:      Hemoglobin A1C Ranges:    Increased Risk for Diabetes  5.7% to 6.4%  Diabetes                     >= 6.5%  Diabetic Goal                < 7.0%    Urinalysis,  Microscopic Only - Urine, Clean Catch [[id number]]  (Abnormal) Collected: 01/05/21 0030    Specimen: Urine, Clean Catch Updated: 01/05/21 0159     RBC, UA 0-2 /HPF      WBC, UA 3-5 /HPF      Bacteria, UA None Seen /HPF      Squamous Epithelial Cells, UA None Seen /HPF      Methodology Manual Light Microscopy    Urinalysis With Microscopic If Indicated (No Culture) - Urine, Clean Catch [[id number]]  (Abnormal) Collected: 01/05/21 0030    Specimen: Urine, Clean Catch Updated: 01/05/21 0153     Color, UA Yellow     Appearance, UA Clear     pH, UA 5.5     Specific Gravity, UA >=1.030     Glucose, UA Negative     Ketones, UA Negative     Bilirubin, UA Negative     Blood, UA Negative     Protein, UA Negative     Leuk Esterase, UA Trace     Nitrite, UA Negative     Urobilinogen, UA 1.0 E.U./dL    CBC & Differential [[id number]]  (Abnormal) Collected: 01/05/21 0012    Specimen: Blood Updated: 01/05/21 0141    Narrative:      The following orders were created for panel order CBC & Differential.  Procedure                               Abnormality         Status                     ---------                               -----------         ------                     Manual Differential[[id number]]          Normal              Final result               Scan Slide[[id number]]                                       Final result               CBC Auto Differential[[id number]]        Abnormal            Final result                 Please view results for these tests on the individual orders.    Manual Differential [[id number]]  (Normal) Collected: 01/05/21 0012    Specimen: Blood Updated: 01/05/21 0141     Neutrophil % 55.0 %      Lymphocyte % 32.0 %      Monocyte % 11.0 %      Eosinophil % 2.0 %      Basophil % 0.0 %      Neutrophils Absolute 2.79 10*3/mm3      Lymphocytes Absolute 1.62 10*3/mm3      Monocytes Absolute 0.56 10*3/mm3      Eosinophils Absolute 0.10 10*3/mm3      Basophils Absolute 0.00 10*3/mm3      RBC Morphology  Normal     WBC Morphology Normal     Platelet Morphology Normal    CBC Auto Differential [[id number]]  (Abnormal) Collected: 01/05/21 0012    Specimen: Blood Updated: 01/05/21 0141     WBC 5.07 10*3/mm3      Comment: Modified report. Previous result was 5.28 10*3/mm3 on 1/5/2021 at 0036 EST.        RBC 3.70 10*6/mm3      Comment: Modified report. Previous result was 3.64 10*6/mm3 on 1/5/2021 at 0036 EST.        Hemoglobin 12.4 g/dL      Comment: Modified report. Previous result was 12.5 g/dL on 1/5/2021 at 0036 EST.        Hematocrit 34.1 %      MCV 92.2 fL      Comment: Modified report. Previous result was 93.7 fL on 1/5/2021 at 0036 EST.        MCH 33.5 pg      Comment: Modified report. Previous result was 34.3 pg on 1/5/2021 at 0036 EST.        MCHC 36.4 g/dL      Comment: Modified report. Previous result was 36.7 g/dL on 1/5/2021 at 0036 EST.        RDW 14.1 %      Comment: Modified report. Previous result was 13.8 % on 1/5/2021 at 0036 EST.        RDW-SD 46.8 fl      Comment: Modified report. Previous result was 46.5 fl on 1/5/2021 at 0036 EST.        MPV 12.3 fL      Comment: Modified report. Previous result was 12.0 fL on 1/5/2021 at 0036 EST.        Platelets 60 10*3/mm3      Comment: Modified report. Previous result was 64 10*3/mm3 on 1/5/2021 at 0036 EST.       Scan Slide [[id number]] Collected: 01/05/21 0012    Specimen: Blood Updated: 01/05/21 0141     Vacuolated Neutrophils Slight/1+     Scan Slide --     Comment: See Manual Differential Results       Fibrin Split Products [[id number]]  (Normal) Collected: 01/05/21 0012    Specimen: Blood Updated: 01/05/21 0128     Fibrin Split Products Less than 10 mcg/mL    Protime-INR [[id number]]  (Abnormal) Collected: 01/05/21 0016    Specimen: Blood Updated: 01/05/21 0128     Protime 22.1 Seconds      INR 1.97    Sodium, Urine, Random - Urine, Clean Catch [[id number]] Collected: 01/05/21 0030    Specimen: Urine, Clean Catch Updated: 01/05/21 0124     Sodium, Urine <20  mmol/L     Narrative:      Reference intervals for random urine have not been established.  Clinical usage is dependent upon physician's interpretation in combination with other laboratory tests.       COVID PRE-OP / PRE-PROCEDURE SCREENING ORDER (NO ISOLATION) - Swab, Nasopharynx [[id number]]  (Normal) Collected: 01/04/21 2158    Specimen: Swab from Nasopharynx Updated: 01/05/21 0121    Narrative:      The following orders were created for panel order COVID PRE-OP / PRE-PROCEDURE SCREENING ORDER (NO ISOLATION) - Swab, Nasopharynx.  Procedure                               Abnormality         Status                     ---------                               -----------         ------                     COVID-19 and FLU A/B PCR...[[id number]]  Normal              Final result                 Please view results for these tests on the individual orders.    COVID-19 and FLU A/B PCR - Swab, Nasopharynx [[id number]]  (Normal) Collected: 01/04/21 2158    Specimen: Swab from Nasopharynx Updated: 01/05/21 0121     COVID19 Not Detected     Influenza A PCR Not Detected     Influenza B PCR Not Detected    Narrative:      Fact sheet for providers: https://www.fda.gov/media/444842/download    Fact sheet for patients: https://www.fda.gov/media/064802/download    Test performed by PCR.    D-dimer, Quantitative [[id number]]  (Abnormal) Collected: 01/05/21 0011    Specimen: Blood Updated: 01/05/21 0114     D-Dimer, Quantitative 3.85 MCGFEU/mL     Narrative:      The D-Dimer assay test is to be used in conjunction with a clinical pretest probability (PTP) assessment model, and has been approved by the FDA to exclude pulmonary embolism (PE) and deep venous thrombosis (DVT) in outpatients suspected of PE or DVT, with a cutoff of 0.5 MCGFEU/mL.    Fibrinogen [[id number]]  (Normal) Collected: 01/05/21 0011    Specimen: Blood Updated: 01/05/21 0114     Fibrinogen 411 mg/dL     Troponin [[id number]]  (Abnormal) Collected: 01/05/21 0011     Specimen: Blood Updated: 01/05/21 0111     Troponin T 0.060 ng/mL     Narrative:      Troponin T Reference Range:  <= 0.03 ng/mL-   Negative for AMI  >0.03 ng/mL-     Abnormal for myocardial necrosis.  Clinicians would have to utilize clinical acumen, EKG, Troponin and serial changes to determine if it is an Acute Myocardial Infarction or myocardial injury due to an underlying chronic condition.       Results may be falsely decreased if patient taking Biotin.      Lactate Dehydrogenase [[id number]]  (Abnormal) Collected: 01/05/21 0011    Specimen: Blood Updated: 01/05/21 0101      U/L     TSH [[id number]]  (Abnormal) Collected: 01/04/21 1730    Specimen: Blood Updated: 01/05/21 0054     TSH 4.250 uIU/mL     Narrative:      Due to abnormal TSH results, suggest ordering Free T4.    Ammonia [[id number]]  (Abnormal) Collected: 01/05/21 0012    Specimen: Blood Updated: 01/05/21 0049     Ammonia 83 umol/L     Basic Metabolic Panel [[id number]]  (Abnormal) Collected: 01/05/21 0011    Specimen: Blood Updated: 01/05/21 0048     Glucose 117 mg/dL      BUN 50 mg/dL      Creatinine 0.88 mg/dL      Sodium 123 mmol/L      Potassium 2.9 mmol/L      Comment: Slight hemolysis detected by analyzer. Results may be affected.        Chloride 78 mmol/L      CO2 36.0 mmol/L      Calcium 8.9 mg/dL      eGFR Non African Amer 88 mL/min/1.73      BUN/Creatinine Ratio 56.8     Anion Gap 9.0 mmol/L     Narrative:      GFR Normal >60  Chronic Kidney Disease <60  Kidney Failure <15      Peripheral Blood Smear [[id number]] Collected: 01/05/21 0012    Specimen: Blood Updated: 01/05/21 0032    Bilirubin, Direct [[id number]]  (Abnormal) Collected: 01/04/21 1730    Specimen: Blood Updated: 01/04/21 2156     Bilirubin, Direct 1.5 mg/dL      Comment: Specimen hemolyzed. Results may be affected.       Osmolality, Serum [[id number]]  (Abnormal) Collected: 01/04/21 1730    Specimen: Blood Updated: 01/04/21 2151     Osmolality 266 mOsm/kg     Blood Gas,  Venous With Co-Ox [[id number]]  (Abnormal) Collected: 01/04/21 2040    Specimen: Venous Blood Updated: 01/04/21 2040     Site OTHER     pH, Venous 7.536 pH Units      Comment: 86 Value above critical limit        pCO2, Venous 50.3 mm Hg      pO2, Venous 43.7 mm Hg      HCO3, Venous 42.6 mmol/L      Base Excess, Venous 17.4 mmol/L      Hemoglobin, Blood Gas 13.5 g/dL      Oxyhemoglobin Venous 77.8 %      Comment: 83 Value above reference range        Methemoglobin Venous 0.2 %      Carboxyhemoglobin Venous 2.2 %      Comment: 83 Value above reference range        CO2 Content 44.1 mmol/L      Temperature 37.0 C      Barometric Pressure for Blood Gas --     Comment: N/A        Modality Nasal Cannula     FIO2 28 %      Rate 0 Breaths/minute      PIP 0 cmH2O      Comment: Meter: [id number]     :  [id number]        IPAP 0     EPAP 0     Note --     Notified [facility] CAMILLE GRIFFITHS RN     Notified By [id number]     Notified Time 01/04/2021 20:40    Magnesium [[id number]]  (Abnormal) Collected: 01/04/21 1730    Specimen: Blood Updated: 01/04/21 1957     Magnesium 2.7 mg/dL     Troponin [[id number]]  (Abnormal) Collected: 01/04/21 1730    Specimen: Blood Updated: 01/04/21 1914     Troponin T 0.054 ng/mL     Narrative:      Troponin T Reference Range:  <= 0.03 ng/mL-   Negative for AMI  >0.03 ng/mL-     Abnormal for myocardial necrosis.  Clinicians would have to utilize clinical acumen, EKG, Troponin and serial changes to determine if it is an Acute Myocardial Infarction or myocardial injury due to an underlying chronic condition.       Results may be falsely decreased if patient taking Biotin.      Comprehensive Metabolic Panel [[id number]]  (Abnormal) Collected: 01/04/21 1730    Specimen: Blood Updated: 01/04/21 1914     Glucose 131 mg/dL      BUN 48 mg/dL      Creatinine 0.86 mg/dL      Sodium 121 mmol/L      Potassium 2.6 mmol/L      Comment: Slight hemolysis detected by analyzer. Results may be affected.        Chloride 76  mmol/L      CO2 35.0 mmol/L      Calcium 9.3 mg/dL      Total Protein 7.8 g/dL      Albumin 2.60 g/dL      ALT (SGPT) 38 U/L      AST (SGOT) 58 U/L      Alkaline Phosphatase 193 U/L      Total Bilirubin 3.9 mg/dL      eGFR Non African Amer 91 mL/min/1.73      Globulin 5.2 gm/dL      A/G Ratio 0.5 g/dL      BUN/Creatinine Ratio 55.8     Anion Gap 10.0 mmol/L     Narrative:      GFR Normal >60  Chronic Kidney Disease <60  Kidney Failure <15      Protime-INR [[id number]]  (Abnormal) Collected: 01/04/21 1730    Specimen: Blood Updated: 01/04/21 1855     Protime 20.9 Seconds      INR 1.84    BNP [[id number]]  (Abnormal) Collected: 01/04/21 1730    Specimen: Blood Updated: 01/04/21 1855     proBNP 3,595.0 pg/mL     Narrative:      Among patients with dyspnea, NT-proBNP is highly sensitive for the detection of acute congestive heart failure. In addition NT-proBNP of <300 pg/ml effectively rules out acute congestive heart failure with 99% negative predictive value.    Results may be falsely decreased if patient taking Biotin.      CBC & Differential [[id number]]  (Abnormal) Collected: 01/04/21 1730    Specimen: Blood Updated: 01/04/21 1852    Narrative:      The following orders were created for panel order CBC & Differential.  Procedure                               Abnormality         Status                     ---------                               -----------         ------                     Scan Slide[[id number]]                                                                  CBC Auto Differential[[id number]]        Abnormal            Final result                 Please view results for these tests on the individual orders.    CBC Auto Differential [[id number]]  (Abnormal) Collected: 01/04/21 1730    Specimen: Blood Updated: 01/04/21 1852     WBC 4.72 10*3/mm3      RBC 3.72 10*6/mm3      Hemoglobin 12.6 g/dL      Hematocrit 34.2 %      MCV 91.9 fL      MCH 33.9 pg      MCHC 36.8 g/dL      RDW 13.6 %      RDW-SD 45.4 fl       MPV 12.8 fL      Platelets 64 10*3/mm3      Neutrophil % 76.8 %      Lymphocyte % 11.4 %      Monocyte % 9.3 %      Eosinophil % 0.6 %      Basophil % 0.8 %      Immature Grans % 1.1 %      Neutrophils, Absolute 3.62 10*3/mm3      Lymphocytes, Absolute 0.54 10*3/mm3      Monocytes, Absolute 0.44 10*3/mm3      Eosinophils, Absolute 0.03 10*3/mm3      Basophils, Absolute 0.04 10*3/mm3      Immature Grans, Absolute 0.05 10*3/mm3      nRBC 0.0 /100 WBC     Hilliards Draw [[id number]] Collected: 01/04/21 1730    Specimen: Blood Updated: 01/04/21 1845    Narrative:      The following orders were created for panel order Hilliards Draw.  Procedure                               Abnormality         Status                     ---------                               -----------         ------                     Light Blue Top[[id number]]                                   Final result               Green Top (Gel)[[id number]]                                  Final result               Lavender Top[[id number]]                                     Final result               Gold Top - SST[[id number]]                                   Final result                 Please view results for these tests on the individual orders.    Light Blue Top [[id number]] Collected: 01/04/21 1730    Specimen: Blood Updated: 01/04/21 1845     Extra Tube hold for add-on     Comment: Auto resulted       Green Top (Gel) [[id number]] Collected: 01/04/21 1730    Specimen: Blood Updated: 01/04/21 1845     Extra Tube Hold for add-ons.     Comment: Auto resulted.       Lavender Top [[id number]] Collected: 01/04/21 1730    Specimen: Blood Updated: 01/04/21 1845     Extra Tube hold for add-on     Comment: Auto resulted       Gold Top - SST [[id number]] Collected: 01/04/21 1730    Specimen: Blood Updated: 01/04/21 1845     Extra Tube Hold for add-ons.     Comment: Auto resulted.           Imaging Results (Last 24 Hours)     Procedure Component Value Units Date/Time     US Gallbladder [[id number]] Collected: 01/05/21 0819     Updated: 01/05/21 0841    Narrative:      EXAMINATION: US GALLBLADDER-01/05/2021:     INDICATION: Gallstones, distended,; E87.6-Hypokalemia;  E87.6-Gmxc-cxcbhlklre and hyponatremia; R40.0-Somnolence.      TECHNIQUE: Ultrasound right upper quadrant abdomen.     COMPARISON: CT 01/04/2021.     FINDINGS: The visualized portions of the pancreas are within normal  limits on limited evaluation.     The liver demonstrates increased echogenicity with coarsened echotexture  demonstrating nodular contour of a cirrhotic hepatic morphology without  focal liver lesion. Trace perihepatic ascites.     The gallbladder with sludge in the dependent portion and mobile  shadowing echogenic foci of cholelithiasis, however, no wall thickening  or pericholecystic fluid clearly evident.     The common bile duct measures 5 mm in diameter at the level of the ehsan  hepatis within normal limits.     The right kidney measures 12.6 cm in length without evidence of  hydronephrosis, contour deforming mass or obvious calculi.       Impression:      1. Cirrhotic hepatic morphology with perihepatic ascites, however, no  focal liver lesion.     2. Cholelithiasis without inflammatory wall thickening of the  gallbladder.     D:  01/05/2021  E:  01/05/2021          XR Chest 1 View [[id number]] Collected: 01/04/21 1854     Updated: 01/05/21 0828    Narrative:      EXAMINATION: XR CHEST 1 VW-01/04/2021:      INDICATION: Dyspnea.      COMPARISON: 02/03/2020.     FINDINGS: Portable chest reveals cardiac pacer leads to be in  satisfactory position. The heart is upper limits of normal. The lung  fields are clear. Prominence of the pulmonary vascularity. Degenerative  changes seen within the spine. Increased pulmonary vascularity  bilaterally. No definite pleural effusion or pneumothorax.       Impression:      Increased pulmonary vascularity bilaterally with enlargement  of the heart. Pacer leads in  satisfactory position.     D:  01/04/2021  E:  01/05/2021             CT Abdomen Pelvis With Contrast [[id number]] Collected: 01/04/21 2322     Updated: 01/04/21 2324    Narrative:      CT Abdomen Pelvis W    INDICATION:   Abdominal pain.    TECHNIQUE:   CT of the abdomen and pelvis without IV contrast. Coronal and sagittal reconstructions were obtained.  Radiation dose reduction techniques included automated exposure control or exposure modulation based on body size. Count of known CT and cardiac nuc  med studies performed in previous 12 months: 0.     COMPARISON:   CT of the abdomen and pelvis from 11/14/2019    FINDINGS:  Abdomen: The liver appears cirrhotic and there is ascites. The gallbladder is prominent in size and gallstones are noted. There is a small amount of nonspecific mesenteric stranding that does not appear significantly changed from prior. Kidneys appear  unremarkable.    Pelvis: Normal appendix. Visualized bowel appears within normal limits. No acute osseous abnormality.      Impression:      Exam is limited by patient motion. The liver is cirrhotic and there is ascites. The gallbladder appears somewhat distended and there are gallstones. This findings do not appear significantly changed from the prior CT.          Signer Name: Leeanna Townsend MD   Signed: 1/4/2021 11:22 PM   Workstation Name: [id number]    [facility]    CT Angiogram Chest With & Without Contrast [[id number]] Collected: 01/04/21 2313     Updated: 01/04/21 2315    Narrative:      CT ANGIOGRAM CHEST W WO CONTRAST    INDICATION:   60-year-old male with dyspnea today. Recent TAVR.    TECHNIQUE:   CT angiogram of the chest with IV contrast. 3-D reconstructions were obtained and reviewed.   Radiation dose reduction techniques included automated exposure control or exposure modulation based on body size. Count of known CT and cardiac nuc med studies  performed in previous 12 months: 0.     COMPARISON:   CT angiogram  TAVR chest/abdomen/pelvis 11/14/2019    FINDINGS:   Adequate opacification of the pulmonary arteries with no filling defects. Thoracic aorta normal in course and caliber without dissection. Heart size is normal. No pericardial effusion. Multiple prominent mediastinal and bilateral hilar lymph nodes are  unchanged from the prior exam. Aortic valve replacement.    No pleural effusion. No pneumothorax. No focal pulmonary infiltrates. Mild dependent bibasilar atelectasis.    Please refer to CT abdomen pelvis performed the same date and dictated separately for detailed findings below the diaphragm.    No acute osseous abnormality.      Impression:      1. Negative for pulmonary embolus.  2. Negative for thoracic aortic aneurysm/dissection.  3. No acute pulmonary process.  4. Multiple prominent mediastinal and bilateral hilar lymph nodes, nonspecific and unchanged from prior examination 11/14/2019.    Signer Name: Wesly Greer MD   Signed: 1/4/2021 11:13 PM   Workstation Name: [email or web address]    [facility]        Physician Progress Notes (last 24 hours) (Notes from 01/04/21 0950 through 01/05/21 0950)    No notes of this type exist for this encounter.         Consult Notes (last 24 hours) (Notes from 01/04/21 0950 through 01/05/21 0950)    No notes of this type exist for this encounter.

## 2021-01-05 NOTE — H&P
"    [facility] Medicine Services  HISTORY AND PHYSICAL    Patient Name: Sai Weinberg  : 1960  MRN: [id number]  Primary Care Physician: Dewayne Cole MD  Date of admission: 2021      Subjective   Subjective     Chief Complaint:  weakness    HPI:  Sai Weinberg is a 60 y.o. male with past medical history of COPD, type 2 diabetes, hypertension, hyperlipidemia, iron deficiency anemia, cold miners pneumoconiosis,?  diastolic CHF, YOUSUF on CPAP at night, coronary artery disease, history of aortic valve stenosis with TAVR, paroxysmal atrial fibrillation, history of complete heart block status post pacemaker placement, history of GI bleed who presents to the ER at request of his PCP for abnormal routine labs indicating hyponatremia and hypokalemia.    Patient is stoic and not very forthcoming with history at the moment. He does report increased weakness recently and some brain fog. Patient reports that he has been doing very well since his TAVR. He reports that his lower extremity edema is chronic and not increased from baseline. He reports compliance with all of his medications including his diuretic, however seems to be confused as to the names of his medications. He reports chronic shortness of air and orthopnea which is unchanged from his baseline. Denies any recent cough, fever, chills, or exposure to COVID-19.    Patient reports increased fatigue and sleeping. His only request at the moment is to rest. ABG was attempted in the ER, however patient is refusing further attempts.     Patient states that he was recently told by his PCP that he has liver cirrhosis and \"something else\". He is unable to recall what else was recently discovered.        Current COVID Risks are:  [] Fever []  Cough [] Shortness of breath [x] Fatigue [] Change in taste or smell    [] Exposure to COVID positive patient  [] High risk facility   []  NONE    Review of Systems   Gen- No fevers, chills  CV- No " chest pain, palpitations  Resp- No cough, dyspnea  GI- No N/V/D, abd pain      All other systems reviewed and are negative.     Personal History     Past Medical History:   Diagnosis Date   • Arthritis    • Black lung disease (CMS/HCC)    • BPH (benign prostatic hyperplasia)    • Cataract    • COLD (chronic obstructive lung disease) (CMS/HCC)    • Colon polyps    • Diabetes mellitus (CMS/HCC)     SINCE 2014   • Dyslipidemia    • Esophageal reflux    • Flu     HX   • Heart attack (CMS/HCC)     2006   • Herniated lumbar intervertebral disc    • History of colon resection    • Hyperlipidemia    • Hypertension    • Malignant neoplasm of colon (CMS/HCC)    • On home oxygen therapy     prn   • Open wound of left hip and thigh with complication 3/12/2017    Open draining left leg wound from femoral cutdown and angioplasty/stenting of superficial artery 1/18/2017   • Peripheral vascular disease (CMS/HCC)    • Renal failure    • Sleep apnea with use of continuous positive airway pressure (CPAP)    • Wears dentures    • Wears glasses        Past Surgical History:   Procedure Laterality Date   • ANGIOPLASTY FEMORAL ARTERY N/A 1/17/2017    Procedure: left femoral cutdown with aortagram and left SFA stent and angioplasty;  Surgeon: Heladio Rosa MD;  Location:  [facility];  Service:    • AORTAGRAM N/A 1/17/2017    Procedure: AORTAGRAM WITH RUNOFFS and  STENT left SFA;  Surgeon: Heladio Rosa MD;  Location:  [facility];  Service:    • AORTIC VALVE REPAIR/REPLACEMENT N/A 11/22/2019    Procedure: TRANSAPICAL TRANSCATHETER AORTIC VALVE REPLACEMENT WITH TRANSESOPHAGEAL ECHOCARDIOLGRAM;  Surgeon: Danish St MD;  Location:  [facility];  Service: Cardiothoracic   • AORTIC VALVE REPAIR/REPLACEMENT N/A 11/22/2019    Procedure: Transapical Transcatheter Aortic Valve Replacement;  Surgeon: Lory Shepherd MD;  Location:  [facility];  Service: Cardiovascular   • CARDIAC CATHETERIZATION      NO  INTERVENTION   • CARDIAC CATHETERIZATION Left 10/30/2019    Procedure: Left Heart Cath;  Surgeon: Milad Cordon MD;  Location:  [facility];  Service: Cardiology   • CARDIAC ELECTROPHYSIOLOGY PROCEDURE Left 11/25/2019    Procedure: Pacemaker DC new;  Surgeon: Maranda Cerda MD;  Location:  [facility];  Service: Cardiology   • COLON SURGERY      x 2   • COLONOSCOPY     • FEMORAL FEMORAL BYPASS N/A 3/13/2017    Procedure: INCISION AND DRAINAGE LEFT GROIN HEMATOMA;  Surgeon: Heladio Rosa MD;  Location:  [facility] OR;  Service:    • FEMORAL POPLITEAL BYPASS Right 01/26/2016   • OTHER SURGICAL HISTORY      PTA ILIAC STENOSIS WITH STENT   • TRANSESOPHAGEAL ECHOCARDIOGRAM (ZORAIDA) N/A 11/22/2019    Procedure: TRANSESOPHAGEAL ECHOCARDIOGRAM WITH ANESTHESIA;  Surgeon: Danish St MD;  Location:  [facility];  Service: Cardiothoracic       Family History: family history includes Diabetes in his father and mother; Heart attack in his father and mother; Hypertension in his father and mother; Lung cancer in his mother. Otherwise pertinent FHx was reviewed and unremarkable.     Social History:  reports that he quit smoking about 6 years ago. His smoking use included cigarettes. He started smoking about 41 years ago. He has a 60.00 pack-year smoking history. His smokeless tobacco use includes chew. He reports that he does not drink alcohol or use drugs.  Social History     Social History Narrative    Lives in [place].       Medications:  Available home medication information reviewed.  Medications Prior to Admission   Medication Sig Dispense Refill Last Dose   • atorvastatin (LIPITOR) 40 MG tablet Take 40 mg by mouth daily.      • bumetanide (BUMEX) 2 MG tablet Take 2 mg by mouth 2 (Two) Times a Day As Needed.      • carvedilol (COREG) 12.5 MG tablet Take 1 tablet by mouth 2 (Two) Times a Day With Meals. (Patient taking differently: Take 12.5 mg by mouth Daily.) 60  tablet 11    • clopidogrel (PLAVIX) 75 MG tablet Take 1 tablet by mouth Daily. 30 tablet 11    • DULoxetine (CYMBALTA) 30 MG capsule Take 30 mg by mouth Daily.      • magnesium oxide (MAGOX) 400 (241.3 Mg) MG tablet tablet Take 400 mg by mouth Daily.      • metFORMIN (GLUCOPHAGE) 850 MG tablet Take 850 mg by mouth 2 (Two) Times a Day With Meals.      • pantoprazole (PROTONIX) 40 MG EC tablet Take 40 mg by mouth Every Other Day.      • potassium chloride (MICRO-K) 10 MEQ CR capsule Take 10 mEq by mouth 2 (Two) Times a Day.      • tamsulosin (FLOMAX) 0.4 MG capsule 24 hr capsule Take 0.4 mg by mouth Daily.      • warfarin (COUMADIN) 4 MG tablet Take 4 mg by mouth Daily.      • albuterol (PROVENTIL HFA;VENTOLIN HFA) 108 (90 BASE) MCG/ACT inhaler Inhale 2 puffs Every 4 (Four) Hours As Needed for wheezing.      • budesonide-formoterol (SYMBICORT) 80-4.5 MCG/ACT inhaler Inhale 2 puffs 2 (Two) Times a Day.      • Cholecalciferol (Vitamin D) 50 MCG (2000 UT) capsule Take 2,000 Units by mouth Daily.      • ferrous sulfate 324 (65 FE) MG tablet delayed-release EC tablet Take 324 mg by mouth Daily With Breakfast.      • hydrOXYzine pamoate (VISTARIL) 50 MG capsule Take 1 capsule by mouth Daily.      • losartan (COZAAR) 50 MG tablet Take 50 mg by mouth Daily.      • nitroglycerin (NITROSTAT) 0.4 MG SL tablet Place 0.4 mg under the tongue Every 5 (Five) Minutes As Needed for Chest Pain. Take no more than 3 doses in 15 minutes.      • oxyCODONE (ROXICODONE) 10 MG tablet Take 10 mg by mouth Every 8 (Eight) Hours As Needed.      • probiotic (CULTURELLE) capsule capsule Take 1 capsule by mouth Daily. 30 capsule 1    • rOPINIRole (REQUIP) 2 MG tablet Take 1 tablet by mouth Every Other Day.      • Testosterone Cypionate 100 MG/ML solution Inject 2 mL as directed Every 14 (Fourteen) Days.      • tiotropium (SPIRIVA) 18 MCG per inhalation capsule Place 1 capsule into inhaler and inhale Daily.          Allergies   Allergen Reactions   •  Tylenol [Acetaminophen] Other (See Comments)     Liver disease       Objective   Objective     Vital Signs:   Temp:  [98 °F (36.7 °C)] 98 °F (36.7 °C)  Heart Rate:  [58-65] 60  Resp:  [20] 20  BP: (109-140)/(54-96) 130/64  Flow (L/min):  [2] 2       Physical Exam     Constitutional: Awake, alert, appears nontoxic, appears confused  Eyes: PERRLA, sclerae anicteric, no conjunctival injection  HENT: NCAT, mucous membranes moist  Neck: Supple, no thyromegaly, no lymphadenopathy, trachea midline  Respiratory: Possible scant crackles in the bases, mild increased respirations   Cardiovascular: RRR, no murmurs, rubs, or gallops, palpable pedal pulses bilaterally  Gastrointestinal: Positive bowel sounds, soft, nontender, nondistended  Musculoskeletal: No bilateral ankle edema, no clubbing or cyanosis to extremities  Psychiatric: Appropriate affect, cooperative  Neurologic: Oriented to person and place, seems confused, delayed thought process, strength symmetric in all extremities, Cranial Nerves grossly intact to confrontation, speech clear  Skin: No rashes    Results Reviewed:  I have personally reviewed most recent indicated data and agree with findings including:  [x]  Laboratory  [x]  Radiology  [x]  EKG/Telemetry  []  Pathology  []  Cardiac/Vascular Studies  [x]  Old records  []  Other:  Most pertinent findings include: Hypokalemia at 2.6, sodium of 121 with baseline near one 34-1 35, CO2 of 35, BUN elevated at 48, platelets low from baseline at 64. Patient with mildly elevated troponin and elevated BNP, pro time of 1.84 which is up from baseline, chest x-ray with questionable volume overload not visualized on CT of the chest.      LAB RESULTS:      Lab 01/04/21  1730   WBC 4.72   HEMOGLOBIN 12.6*   HEMATOCRIT 34.2*   PLATELETS 64*   NEUTROS ABS 3.62   IMMATURE GRANS (ABS) 0.05   LYMPHS ABS 0.54*   MONOS ABS 0.44   EOS ABS 0.03   MCV 91.9   PROTIME 20.9*         Lab 01/04/21  1730   SODIUM 121*   POTASSIUM 2.6*    CHLORIDE 76*   CO2 35.0*   ANION GAP 10.0   BUN 48*   CREATININE 0.86   GLUCOSE 131*   CALCIUM 9.3   MAGNESIUM 2.7*         Lab 01/04/21  1730   TOTAL PROTEIN 7.8   ALBUMIN 2.60*   GLOBULIN 5.2   ALT (SGPT) 38   AST (SGOT) 58*   BILIRUBIN 3.9*   BILIRUBIN DIRECT 1.5*   ALK PHOS 193*         Lab 01/04/21  1730   PROBNP 3,595.0*   TROPONIN T 0.054*   PROTIME 20.9*   INR 1.84*                 Lab 01/04/21  2040   FIO2 28   CARBOXYHEMOGLOBIN (VENOUS) 2.2     Brief Urine Lab Results     None        Microbiology Results (last 10 days)     ** No results found for the last 240 hours. **        Imaging Results (Last 24 Hours)     Procedure Component Value Units Date/Time    CT Abdomen Pelvis With Contrast [[id number]] Collected: 01/04/21 2322     Updated: 01/04/21 2324    Narrative:      CT Abdomen Pelvis W    INDICATION:   Abdominal pain.    TECHNIQUE:   CT of the abdomen and pelvis without IV contrast. Coronal and sagittal reconstructions were obtained.  Radiation dose reduction techniques included automated exposure control or exposure modulation based on body size. Count of known CT and cardiac nuc  med studies performed in previous 12 months: 0.     COMPARISON:   CT of the abdomen and pelvis from 11/14/2019    FINDINGS:  Abdomen: The liver appears cirrhotic and there is ascites. The gallbladder is prominent in size and gallstones are noted. There is a small amount of nonspecific mesenteric stranding that does not appear significantly changed from prior. Kidneys appear  unremarkable.    Pelvis: Normal appendix. Visualized bowel appears within normal limits. No acute osseous abnormality.      Impression:      Exam is limited by patient motion. The liver is cirrhotic and there is ascites. The gallbladder appears somewhat distended and there are gallstones. This findings do not appear significantly changed from the prior CT.          Signer Name: Leeanna Townsend MD   Signed: 1/4/2021 11:22 PM   Workstation Name: [id number]     [facility]    CT Angiogram Chest With & Without Contrast [[id number]] Collected: 01/04/21 2313     Updated: 01/04/21 2315    Narrative:      CT ANGIOGRAM CHEST W WO CONTRAST    INDICATION:   60-year-old male with dyspnea today. Recent TAVR.    TECHNIQUE:   CT angiogram of the chest with IV contrast. 3-D reconstructions were obtained and reviewed.   Radiation dose reduction techniques included automated exposure control or exposure modulation based on body size. Count of known CT and cardiac nuc med studies  performed in previous 12 months: 0.     COMPARISON:   CT angiogram TAVR chest/abdomen/pelvis 11/14/2019    FINDINGS:   Adequate opacification of the pulmonary arteries with no filling defects. Thoracic aorta normal in course and caliber without dissection. Heart size is normal. No pericardial effusion. Multiple prominent mediastinal and bilateral hilar lymph nodes are  unchanged from the prior exam. Aortic valve replacement.    No pleural effusion. No pneumothorax. No focal pulmonary infiltrates. Mild dependent bibasilar atelectasis.    Please refer to CT abdomen pelvis performed the same date and dictated separately for detailed findings below the diaphragm.    No acute osseous abnormality.      Impression:      1. Negative for pulmonary embolus.  2. Negative for thoracic aortic aneurysm/dissection.  3. No acute pulmonary process.  4. Multiple prominent mediastinal and bilateral hilar lymph nodes, nonspecific and unchanged from prior examination 11/14/2019.    Signer Name: Wesly Greer MD   Signed: 1/4/2021 11:13 PM   Workstation Name: [id number]    [facility]    XR Chest 1 View [[id number]] Collected: 01/04/21 1854     Updated: 01/04/21 1858    Narrative:         EXAMINATION: XR CHEST 1 VW-      INDICATION: dyspnea      COMPARISON: 02/03/2020     FINDINGS: Portable chest reveals cardiac pacer leads to be in  satisfactory position. Heart is upper limits of  normal. The lung fields  are clear. Prominence of the pulmonary vascularity. Degenerative changes  seen within the spine. Increased pulmonary vascularity bilaterally. No  definite pleural effusion or pneumothorax.           Impression:      Increased pulmonary vascularity bilaterally with enlargement  of the heart. Pacer leads in satisfactory position.              Results for orders placed during the hospital encounter of 12/13/20   Adult Transthoracic Echo Complete W/ Cont if Necessary Per Protocol    Narrative · Cardiac chamber sizes are normal.  · There is mild concentric left ventricular hypertrophy.  · Global and segmental LV wall motion is normal. The calculated LV   ejection is 56%.  · There is mild-moderate mitral regurgitation.  · There is a normally functioning (TAVR) bioprosthetic aortic valve.  · The estimated RV systolic pressure is normal.  · There are no other relevant findings on this study.          Assessment/Plan   Assessment & Plan     Active Hospital Problems    Diagnosis POA   • **Hyponatremia [E87.1] Yes   • Diastolic CHF, chronic (CMS/HCC) [I50.32] Unknown   • Hypokalemia [E87.6] Unknown   • Other cirrhosis of liver (CMS/HCC) [K74.69] Unknown   • Hyperbilirubinemia [E80.6] Unknown   • S/P TAVR (transcatheter aortic valve replacement) 11/22/19 [Z95.2] Not Applicable   • Paroxysmal atrial fibrillation [I48.0] Yes   • Class 3 severe obesity in adult  [E66.01] Yes   • YOUSUF on CPAP [G47.33, Z99.89] Not Applicable   • Nonrheumatic aortic valve stenosis [I35.0] Yes     ZORAIDA 11/15 Aortic Valve Measurements:   Stenosis   LVOT diam 2 cm      LV V1 max 138.6 cm/sec      LV V1 max PG 7.7 mmHg      LV V1 mean PG 3.9 mmHg      LV V1 VTI 27.8 cm      Ao pk eddy 469.7 cm/sec       Stenosis   Ao max PG 88.2 mmHg      Ao mean PG 50.1 mmHg      Ao V2 VTI 98.8 cm      IAN(I,D) 0.89 cm^2                • Coronary artery disease involving native heart [I25.10] Yes   • Iron deficiency anemia [D50.9] Yes   • Coal  workers pneumoconiosis  [J60] Yes   • PAD s/p L SFA stent and R fem-pop  [I73.9] Yes   • Dyslipidemia on statins  [E78.5] Yes   • COPD  [J44.9] Yes   • T2DM on metformin  [E11.9] Yes   • Hypertension [I10] Yes   • Esophageal reflux [K21.9] Yes     Patient is a 60-year-old with past medical history of COPD, type 2 diabetes, hypertension, hyperlipidemia, iron deficiency anemia, cold miners pneumoconiosis,?  diastolic CHF, YOUSUF on CPAP at night, coronary artery disease, history of aortic valve stenosis with TAVR, paroxysmal atrial fibrillation, history of complete heart block status post pacemaker placement, history of GI bleed who presents to the ER at request of his PCP for abnormal routine labs indicating hyponatremia and hypokalemia.    Hyponatremia  --Likely etiology of his weakness and confusion  --Difficult situation as patient has both peripherally volume overloaded, but laboratory data indicative of intravascular volume depletion with elevated BUN, CO2, contraction alkalosis, low sodium and potassium.  --Hold diuretic, patient currently stable on 2 L with no evidence of volume overload/effusions on CT. very gentle IV fluids overnight and every 4 hours BMP  --Urine studies, osm    Diffuse anasarca, diastolic CHF, history of AF status post TAVR  --Cardiology consult for recommendations on diuretic  --Patient noted to be significantly hyponatremic, likely secondary to recent diagnosis of cirrhosis  --Check UA for proteinuria  --Albumin  --Restart diuretic when sodium improved, will be difficult to manage overall  --BiPAP    Hypokalemia  --Replace  --Recheck    Elevated troponin, likely type II NSTEMI, history of complete heart block status post pacer  --Denies chest pain or pressure  --Trend  --cards    Hyperbilirubinemia, decompensated liver cirrhosis  --CT showing some evidence of gallstones with mild distention  --Ultrasound right upper quadrant  --GI consult    YOUSUF  --BiPAP for tonight instead of  CPAP  --Refuses ABG    COPD, coalminer's pneumoconiosis    Type 2 diabetes  --Sliding scale insulin    Hypertension    peripheral artery disease    Afib  --subtherapuetic INR, pharm to dose  --may need to change anticoagulant with new dx of cirrhosis, defer to GI and cardiology.    DVT prophylaxis:  warfarin      CODE STATUS: full  There are no questions and answers to display.       Admission Status:  I believe this patient meets INPATIENT status due to compensated cirrhosis, hyponatremia, hypokalemia.  I feel patient’s risk for adverse outcomes and need for care warrant INPATIENT evaluation and I predict the patient’s care encounter to likely last beyond 2 midnights.      Mina Rios, DO  01/04/21

## 2021-01-05 NOTE — PLAN OF CARE
Goal Outcome Evaluation:  Plan of Care Reviewed With: patient  Progress: improving   SLP evaluation completed. Will sign-off for dysphagia and cog-comm. Please see note for further details and recommendations.

## 2021-01-05 NOTE — PROGRESS NOTES
[facility] Medicine Services  PROGRESS NOTE    Patient Name: Sai Weinberg  : 1960  MRN: [id number]    Date of Admission: 2021  Primary Care Physician: Dewayne Cole MD    Subjective   Subjective     CC:  F/u anasarca    HPI:  Patient resting in bed. He has no complaints. Says his swelling and SOA have been worsening for over a month. No chest pain.    ROS:  Gen- No fevers, chills  CV- No chest pain, palpitations  Resp- No cough, +dyspnea  GI- No N/V/D, abd pain    Objective   Objective     Vital Signs:   Temp:  [97.5 °F (36.4 °C)-99.1 °F (37.3 °C)] 97.5 °F (36.4 °C)  Heart Rate:  [58-74] 65  Resp:  [20-22] 20  BP: (109-140)/(54-96) 136/65        Physical Exam:  Constitutional: No acute distress, awake, alert, sitting up in bed eating breakfast  HENT: NCAT, mucous membranes moist  Respiratory: Clear to auscultation bilaterally, respiratory effort normal   Cardiovascular: RRR, no murmurs, rubs, or gallops  Gastrointestinal: Positive bowel sounds, soft, nontender  Musculoskeletal: 3+ bilateral ankle edema  Psychiatric: Appropriate affect, cooperative  Neurologic: Oriented x 3, strength symmetric in all extremities, Cranial Nerves grossly intact to confrontation, speech clear   Skin: No rashes      Results Reviewed:  Results from last 7 days   Lab Units 21  0859 21  0016 21  0012 21  1730   WBC 10*3/mm3 5.73  --  5.07 4.72   HEMOGLOBIN g/dL 12.2*  --  12.4* 12.6*   HEMATOCRIT % 33.4*  --  34.1* 34.2*   PLATELETS 10*3/mm3 61*  --  60* 64*   INR  2.29* 1.97*  --  1.84*     Results from last 7 days   Lab Units 21  0859 21  0203 21  0011 21  1730   SODIUM mmol/L 124* 123* 123* 121*   POTASSIUM mmol/L 3.3* 3.0* 2.9* 2.6*   CHLORIDE mmol/L 82* 81* 78* 76*   CO2 mmol/L 33.0* 34.0* 36.0* 35.0*   BUN mg/dL 34* 35* 50* 48*   CREATININE mg/dL 0.87 0.97 0.88 0.86   GLUCOSE mg/dL 113* 116* 117* 131*   CALCIUM mg/dL 8.4* 8.1* 8.9 9.3   ALT  (SGPT) U/L  --   --   --  38   AST (SGOT) U/L  --   --   --  58*   TROPONIN T ng/mL 0.049*  --  0.060* 0.054*   PROBNP pg/mL  --   --   --  3,595.0*     Estimated Creatinine Clearance: 101.3 mL/min (by C-G formula based on SCr of 0.87 mg/dL).    Microbiology Results Abnormal     Procedure Component Value - Date/Time    COVID PRE-OP / PRE-PROCEDURE SCREENING ORDER (NO ISOLATION) - Swab, Nasopharynx [[id number]]  (Normal) Collected: 01/04/21 2158    Lab Status: Final result Specimen: Swab from Nasopharynx Updated: 01/05/21 0121    Narrative:      The following orders were created for panel order COVID PRE-OP / PRE-PROCEDURE SCREENING ORDER (NO ISOLATION) - Swab, Nasopharynx.  Procedure                               Abnormality         Status                     ---------                               -----------         ------                     COVID-19 and FLU A/B PCR...[[id number]]  Normal              Final result                 Please view results for these tests on the individual orders.    COVID-19 and FLU A/B PCR - Swab, Nasopharynx [[id number]]  (Normal) Collected: 01/04/21 2158    Lab Status: Final result Specimen: Swab from Nasopharynx Updated: 01/05/21 0121     COVID19 Not Detected     Influenza A PCR Not Detected     Influenza B PCR Not Detected    Narrative:      Fact sheet for providers: https://www.fda.gov/media/395208/download    Fact sheet for patients: https://www.fda.gov/media/373533/download    Test performed by PCR.          Imaging Results (Last 24 Hours)     Procedure Component Value Units Date/Time    US Gallbladder [[id number]] Collected: 01/05/21 0819     Updated: 01/05/21 1136    Narrative:      EXAMINATION: US GALLBLADDER-01/05/2021:     INDICATION: Gallstones, distended,; E87.6-Hypokalemia;  E87.9-Lmha-lzdvavtpxx and hyponatremia; R40.0-Somnolence.      TECHNIQUE: Ultrasound right upper quadrant abdomen.     COMPARISON: CT 01/04/2021.     FINDINGS: The visualized portions of the pancreas  are within normal  limits on limited evaluation.     The liver demonstrates increased echogenicity with coarsened echotexture  demonstrating nodular contour of a cirrhotic hepatic morphology without  focal liver lesion. Trace perihepatic ascites.     The gallbladder with sludge in the dependent portion and mobile  shadowing echogenic foci of cholelithiasis, however, no wall thickening  or pericholecystic fluid clearly evident.     The common bile duct measures 5 mm in diameter at the level of the ehsan  hepatis within normal limits.     The right kidney measures 12.6 cm in length without evidence of  hydronephrosis, contour deforming mass or obvious calculi.       Impression:      1. Cirrhotic hepatic morphology with perihepatic ascites, however, no  focal liver lesion.     2. Cholelithiasis without inflammatory wall thickening of the  gallbladder.     D:  01/05/2021  E:  01/05/2021     This report was finalized on 1/5/2021 11:33 AM by Dr. Chris Hart.       XR Chest 1 View [[id number]] Collected: 01/04/21 1854     Updated: 01/05/21 0828    Narrative:      EXAMINATION: XR CHEST 1 VW-01/04/2021:      INDICATION: Dyspnea.      COMPARISON: 02/03/2020.     FINDINGS: Portable chest reveals cardiac pacer leads to be in  satisfactory position. The heart is upper limits of normal. The lung  fields are clear. Prominence of the pulmonary vascularity. Degenerative  changes seen within the spine. Increased pulmonary vascularity  bilaterally. No definite pleural effusion or pneumothorax.       Impression:      Increased pulmonary vascularity bilaterally with enlargement  of the heart. Pacer leads in satisfactory position.     D:  01/04/2021  E:  01/05/2021             CT Abdomen Pelvis With Contrast [[id number]] Collected: 01/04/21 2322     Updated: 01/04/21 2324    Narrative:      CT Abdomen Pelvis W    INDICATION:   Abdominal pain.    TECHNIQUE:   CT of the abdomen and pelvis without IV contrast. Coronal and sagittal  reconstructions were obtained.  Radiation dose reduction techniques included automated exposure control or exposure modulation based on body size. Count of known CT and cardiac nuc  med studies performed in previous 12 months: 0.     COMPARISON:   CT of the abdomen and pelvis from 11/14/2019    FINDINGS:  Abdomen: The liver appears cirrhotic and there is ascites. The gallbladder is prominent in size and gallstones are noted. There is a small amount of nonspecific mesenteric stranding that does not appear significantly changed from prior. Kidneys appear  unremarkable.    Pelvis: Normal appendix. Visualized bowel appears within normal limits. No acute osseous abnormality.      Impression:      Exam is limited by patient motion. The liver is cirrhotic and there is ascites. The gallbladder appears somewhat distended and there are gallstones. This findings do not appear significantly changed from the prior CT.          Signer Name: Leeanna Townsend MD   Signed: 1/4/2021 11:22 PM   Workstation Name: [id number]    [facility]    CT Angiogram Chest With & Without Contrast [[id number]] Collected: 01/04/21 2313     Updated: 01/04/21 2315    Narrative:      CT ANGIOGRAM CHEST W WO CONTRAST    INDICATION:   60-year-old male with dyspnea today. Recent TAVR.    TECHNIQUE:   CT angiogram of the chest with IV contrast. 3-D reconstructions were obtained and reviewed.   Radiation dose reduction techniques included automated exposure control or exposure modulation based on body size. Count of known CT and cardiac nuc med studies  performed in previous 12 months: 0.     COMPARISON:   CT angiogram TAVR chest/abdomen/pelvis 11/14/2019    FINDINGS:   Adequate opacification of the pulmonary arteries with no filling defects. Thoracic aorta normal in course and caliber without dissection. Heart size is normal. No pericardial effusion. Multiple prominent mediastinal and bilateral hilar lymph nodes are  unchanged from the  prior exam. Aortic valve replacement.    No pleural effusion. No pneumothorax. No focal pulmonary infiltrates. Mild dependent bibasilar atelectasis.    Please refer to CT abdomen pelvis performed the same date and dictated separately for detailed findings below the diaphragm.    No acute osseous abnormality.      Impression:      1. Negative for pulmonary embolus.  2. Negative for thoracic aortic aneurysm/dissection.  3. No acute pulmonary process.  4. Multiple prominent mediastinal and bilateral hilar lymph nodes, nonspecific and unchanged from prior examination 11/14/2019.    Signer Name: Wesly Greer MD   Signed: 1/4/2021 11:13 PM   Workstation Name: [id number]    [facility]          Results for orders placed during the hospital encounter of 12/13/20   Adult Transthoracic Echo Complete W/ Cont if Necessary Per Protocol    Narrative · Cardiac chamber sizes are normal.  · There is mild concentric left ventricular hypertrophy.  · Global and segmental LV wall motion is normal. The calculated LV   ejection is 56%.  · There is mild-moderate mitral regurgitation.  · There is a normally functioning (TAVR) bioprosthetic aortic valve.  · The estimated RV systolic pressure is normal.  · There are no other relevant findings on this study.          I have reviewed the medications:  Scheduled Meds:[START ON 1/6/2021] aspirin, 81 mg, Oral, Daily  atorvastatin, 40 mg, Oral, Daily  budesonide-formoterol, 2 puff, Inhalation, BID - RT  carvedilol, 12.5 mg, Oral, BID With Meals  DULoxetine, 30 mg, Oral, Daily  ferrous sulfate, 325 mg, Oral, Daily With Breakfast  hydrOXYzine, 50 mg, Oral, Daily  insulin lispro, 0-7 Units, Subcutaneous, TID AC  ipratropium, 0.5 mg, Nebulization, 4x Daily - RT  magnesium oxide, 400 mg, Oral, Daily  pantoprazole, 40 mg, Oral, Every Other Day  rOPINIRole, 2 mg, Oral, Every Other Day  sodium chloride, 10 mL, Intravenous, Q12H  tamsulosin, 0.4 mg, Oral, Daily  warfarin, 4 mg,  Oral, Daily      Continuous Infusions:Pharmacy to dose warfarin,       PRN Meds:.•  albuterol  •  dextrose  •  dextrose  •  glucagon (human recombinant)  •  oxyCODONE  •  Pharmacy to dose warfarin  •  potassium chloride  •  potassium chloride  •  potassium chloride  •  Sodium Chloride (PF)  •  sodium chloride    Assessment/Plan   Assessment & Plan     Active Hospital Problems    Diagnosis  POA   • **Hyponatremia [E87.1]  Yes   • Diastolic CHF, chronic (CMS/HCC) [I50.32]  Unknown   • Hypokalemia [E87.6]  Unknown   • Other cirrhosis of liver (CMS/HCC) [K74.69]  Unknown   • Hyperbilirubinemia [E80.6]  Unknown   • S/P TAVR (transcatheter aortic valve replacement) 11/22/19 [Z95.2]  Not Applicable   • Paroxysmal atrial fibrillation [I48.0]  Yes   • Class 3 severe obesity in adult  [E66.01]  Yes   • YOUSUF on CPAP [G47.33, Z99.89]  Not Applicable   • Nonrheumatic aortic valve stenosis [I35.0]  Yes   • Coronary artery disease involving native heart [I25.10]  Yes   • Iron deficiency anemia [D50.9]  Yes   • 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        Brief Hospital Course to date:  Sai Weinberg is a 60 y.o. male with past medical history of COPD, type 2 diabetes, hypertension, hyperlipidemia, iron deficiency anemia, cold miners pneumoconiosis,?  diastolic CHF, YOUSUF on CPAP at night, coronary artery disease, history of aortic valve stenosis with TAVR, paroxysmal atrial fibrillation, history of complete heart block status post pacemaker placement, history of GI bleed who presents to the ER at request of his PCP for abnormal routine labs indicating hyponatremia and hypokalemia    Hyponatremia  --Difficult situation as patient has both peripherally  volume overloaded, but laboratory data indicative of intravascular volume depletion, likely 3rd spacing secondary to low albumin/cirrhosis  --Hold diuretic, s/p gentle fluids. Will stop.   --urine studies consistent with both intravascular volume depletion and questionable SIADH (low urine Na, low serum osm, low urine osm). Hold Cymbalta  --continue to monitor serial BMPs     Diffuse anasarca  Diastolic CHF  Aortic Stenosis s/p TAVR  --Cardiology following, appreciate recommendatoins  --Restart diuretic when sodium improved, will be difficult to manage overall  --BiPAP PRN     Hypokalemia  --Replace per protocol      Elevated troponin  --likely type II NSTEMI, history of complete heart block status post pacer  --Denies chest pain or pressure    PAF  Hx of Complete Heart block s/p PPM  --subtherapuetic INR, pharm to dose     Hyperbilirubinemia  Decompensated Cirrhosis with ascites   TCP  --Ultrasound right upper quadrant showing cholelithiasis w/out choledocholithiasis or inflammation  --GI consulted  --CT abd/pelvis showing cirrhotic liver    COPD, hx of coalminer's pneumoconiosis     Type 2 diabetes  --Sliding scale insulin     Hypertension     PVD s/p R fem-pop bypass and SFA stenting 2017     DVT prophylaxis:  warfarin  Disposition: I expect the patient to be discharged TBD    CODE STATUS:   Code Status and Medical Interventions:   Ordered at: 01/05/21 0010     Level Of Support Discussed With:    Patient     Code Status:    CPR     Medical Interventions (Level of Support Prior to Arrest):    Full       Melly Robertson, DO  01/05/21

## 2021-01-05 NOTE — CONSULTS
[facility] Consult Note      Referring Provider: Suzy Duarte MD  Primary Provider:  Dewayne Cole MD  Reason for Consultation: Volume overload.     Problem list:    1. Coronary artery disease   A. History of remote MI, with normal heart cath 15 years ago in [place], ID[facility]  B. Stress MPS 10/9/19: LV mild-mod dilated, large area of moderate-severe ischemia in the inferior wall  D. RIKY to mid RCA, 10/30/19  2. Aortic stenosis, severe              A. 70 mmHg AVG; 0.83 cm² by cath, 10/30/2019              B. Cath EF equals 65%, 10/30/2019  C. ZORAIDA 11/15/19: EF 60%, severe AS with mean gradient 50mmHg, moderate MR  D. TAVR, 11/22/19  E. Echo, 1/21/20: normal LV function, bioprosthetic AV with normal function, mild MR   F. Echo, 12/13/2020: Normal functioning bioprosthetic AV, mild-moderate MR  3. Peripheral arterial disease  A. S/p remote R fem-pop bypass  B. Left femoral cutdown with angioplasty and stent placement to left SFA by Dr. Rosa January, 2017               i. Complicated by infection of left groin incision  C. CTA Abdominal aorta with runoff 09/23/19: no high grade stenosis of bilateral common femoral arteries; patent fem-popliteal graft on the right and patent trifurcation vessels; multifocal severe stenosis of the SFA on the left but without total occlusion, single vessel runoff via posterior tibial artery  4. Hypertension   5. Hyperlipidemia  6. DM2  7. Coal worker's pneumoconiosis   8. Tobacco abuse and severe COPD  A. Stopped smoking circa 2013, continues to chew tobacco  9. History of back injury with lumbar disc disease and chronic back pain   10. History of colon cancer, remote, s/p partial colectomy              A. Noncompliant with subsequent colonoscopies  12. History of acute renal failure, resolved per patient  13. YOUSUF on CPAP  14. Morbid obesity  15. Surgical history:  A. Partial colectomy  B. Right fem-pop bypass  C. Abdominal hernia repair  16. Paroxysmal AF,  "19               A. Hospitalized [facility] for 5 days               D. Dismissed with HF circa 19                    1. LE edema and creatinine=2.77, resolved   17. Complete heart block after TAVR, 19 s/p [place] dual chamber PPM  18. Chronic hypochromic, microcytic anemia.     History of present illness:  Mr. Weinberg is a 59 y/o male with the above noted past medical history who presented to [facility] ED 2020 with complaints of \"abnormal labs\". PCP checked labs which were notable for hyponatremia and hypokalemia. He was directed to present to ED for further management. Upon arrival, workup confirmed electrolyte abnormalities. Medicine admitted patient overnight. Cardiology has been consulted for congestive heart failure. At home he was on Bumex 2 mg PRN and potassium 10 mEq BID. He is unable to say what exactly he was taking at home. Here his Bumex has been held and he was gently fluid resuscitated. Potassium and sodium have improved slightly.     Patient is a poor historian and does not answer questions.  He does admit to drinking large quantities of water at home.    Review of Systems  Pertinent positives are listed in the HPI.  All other systems reviewed are negative.     Social History:  Social History     Socioeconomic History   • Marital status:      Spouse name: Not on file   • Number of children: 2   • Years of education: Not on file   • Highest education level: Not on file   Occupational History   • Occupation: DISABLED      Comment: BACK AND LUNG PROBLEMS   Tobacco Use   • Smoking status: Former Smoker     Packs/day: 2.00     Years: 30.00     Pack years: 60.00     Types: Cigarettes     Start date:      Quit date:      Years since quittin.0   • Smokeless tobacco: Current User     Types: Chew   • Tobacco comment: Stopped smoking 1 year ago. Smoked 35 years up to 2ppd.   Substance and Sexual Activity   • Alcohol use: No   • Drug use: No   • Sexual activity: Defer "   Social History Narrative    Lives in [place].       Family History:  Family History   Problem Relation Age of Onset   • Lung cancer Mother    • Heart attack Mother    • Hypertension Mother    • Diabetes Mother    • Diabetes Father    • Hypertension Father    • Heart attack Father        Home/Current Medications:      Current Facility-Administered Medications:   •  albuterol (PROVENTIL) nebulizer solution 0.083% 2.5 mg/3mL, 2.5 mg, Nebulization, Q4H PRN, Mina Rios, DO  •  atorvastatin (LIPITOR) tablet 40 mg, 40 mg, Oral, Daily, Mina Rios, DO, 40 mg at 01/05/21 0834  •  budesonide-formoterol (SYMBICORT) 80-4.5 MCG/ACT inhaler 2 puff, 2 puff, Inhalation, BID - RT, Mina Rios, , 2 puff at 01/05/21 0930  •  carvedilol (COREG) tablet 12.5 mg, 12.5 mg, Oral, BID With Meals, Mina Rios, , 12.5 mg at 01/05/21 0835  •  clopidogrel (PLAVIX) tablet 75 mg, 75 mg, Oral, Daily, Mina Rios, DO, 75 mg at 01/05/21 0834  •  dextrose (D50W) 25 g/ 50mL Intravenous Solution 25 g, 25 g, Intravenous, Q15 Min PRN, Mina Rios, DO  •  dextrose (GLUTOSE) oral gel 15 g, 15 g, Oral, Q15 Min PRN, Mina Rios, DO  •  DULoxetine (CYMBALTA) DR capsule 30 mg, 30 mg, Oral, Daily, Mina Rios, DO, 30 mg at 01/05/21 0834  •  ferrous sulfate tablet 325 mg, 325 mg, Oral, Daily With Breakfast, Mina Rios, DO, 325 mg at 01/05/21 0834  •  glucagon (human recombinant) (GLUCAGEN DIAGNOSTIC) injection 1 mg, 1 mg, Subcutaneous, Q15 Min PRN, Mina Rios, DO  •  hydrOXYzine (ATARAX) tablet 50 mg, 50 mg, Oral, Daily, Mina Rios, , 50 mg at 01/05/21 0834  •  insulin lispro (humaLOG, ADMELOG) injection 0-7 Units, 0-7 Units, Subcutaneous, TID AC, Mina Rios DO  •  ipratropium (ATROVENT) nebulizer solution 0.5 mg, 0.5 mg, Nebulization, 4x Daily - RT, Mina Rios DO, 0.5 mg at 01/05/21 0930  •  lactated ringers infusion, 50 mL/hr, Intravenous, Continuous, Mina Rios DO, Last Rate: 50 mL/hr at 01/05/21  "0025, 50 mL/hr at 01/05/21 0025  •  magnesium oxide (MAGOX) tablet 400 mg, 400 mg, Oral, Daily, Mina Rios, , 400 mg at 01/05/21 0834  •  oxyCODONE (ROXICODONE) immediate release tablet 10 mg, 10 mg, Oral, Q8H PRN, Mina Rios, , 10 mg at 01/05/21 0051  •  pantoprazole (PROTONIX) EC tablet 40 mg, 40 mg, Oral, Every Other Day, Mina Rios DO, 40 mg at 01/05/21 0618  •  Pharmacy to dose warfarin, , Does not apply, Continuous PRN, Mina Rios DO  •  potassium chloride (MICRO-K) CR capsule 10 mEq, 10 mEq, Oral, BID With Meals, Mina Rios, , 10 mEq at 01/05/21 0834  •  potassium chloride 10 mEq in 100 mL IVPB, 10 mEq, Intravenous, Q1H PRN, Caio Diop MD, Last Rate: 100 mL/hr at 01/05/21 0501, 10 mEq at 01/05/21 0501  •  rOPINIRole (REQUIP) tablet 2 mg, 2 mg, Oral, Every Other Day, Mina Rios DO, 2 mg at 01/05/21 0835  •  Sodium Chloride (PF) 0.9 % 10 mL, 10 mL, Intravenous, PRN, Caio Diop MD  •  sodium chloride 0.9 % flush 10 mL, 10 mL, Intravenous, Q12H, Mina Rios DO, 10 mL at 01/05/21 0837  •  sodium chloride 0.9 % flush 10 mL, 10 mL, Intravenous, PRN, Mina Rios DO, 10 mL at 01/05/21 0034  •  tamsulosin (FLOMAX) 24 hr capsule 0.4 mg, 0.4 mg, Oral, Daily, Mina Rios DO, 0.4 mg at 01/05/21 0834  •  warfarin (COUMADIN) tablet 5 mg, 5 mg, Oral, Daily, Carlos Espinoza, PharmD, 5 mg at 01/05/21 0052    Allergies:  Tylenol [acetaminophen]    Objective     Vital Sign Min/Max for last 24 hours  Temp  Min: 97.5 °F (36.4 °C)  Max: 99.1 °F (37.3 °C)   BP  Min: 109/55  Max: 140/96   Pulse  Min: 58  Max: 74   Resp  Min: 20  Max: 22   SpO2  Min: 88 %  Max: 94 %   No data recorded   Weight  Min: 104 kg (230 lb)  Max: 106 kg (232 lb 14.4 oz)     Flowsheet Rows      First Filed Value   Admission Height  165.1 cm (65\") Documented at 01/04/2021 1707   Admission Weight  104 kg (230 lb) Documented at 01/04/2021 1707          Physical Exam:  GENERAL: This is a well-developed, " well-nourished, male who is in no acute distress.   SKIN: Pink and warm without rash or abnormality noted.   HEENT: Head is normocephalic and atraumatic. Pupils are equal and reactive to light bilaterally. Mucous membranes are pink and moist.   NECK: Supple without lymphadenopathy or thyromegaly. There is no jugular venous distention at 30°.  LUNGS: tachypnic. Clear to auscultation bilaterally without wheezing, rhonchi, or rales noted.   CARDIOVASCULAR: The heart has a regular rate with a normal S1 and S2. There is no murmur, gallop, rub, or click appreciated. The PMI is nondisplaced. Carotid upstrokes are 2+ and symmetrical without bruits.   ABDOMEN: Distended with positive bowel sounds x4. Fluid wave noted. The patient denies tenderness of palpitation.   MUSCULOSKELETAL: There are no obvious bony abnormalities. Normal range of tenderness to palpation.   NEUROLOGICAL: Asterixis noted. Patient unable to answer questions  PERIPHERAL VASCULAR:  Posterior tibial and dorsalis pedis pulses are not palpable, likely owed to edema. There is 2+ peripheral edema.   PSYCH: Appears slightly agitated.       EKG:  V paced  Tele: AV paced    Labs:      Lab Results   Component Value Date    WBC 5.73 01/05/2021    HGB 12.2 (L) 01/05/2021    HCT 33.4 (L) 01/05/2021    MCV 91.8 01/05/2021    PLT 61 (L) 01/05/2021     Lab Results   Component Value Date    GLUCOSE 116 (H) 01/05/2021    BUN 35 (H) 01/05/2021    CREATININE 0.97 01/05/2021    EGFRIFNONA 79 01/05/2021    BCR 36.1 (H) 01/05/2021    K 3.0 (L) 01/05/2021    CO2 34.0 (H) 01/05/2021    CALCIUM 8.1 (L) 01/05/2021    ALBUMIN 2.60 (L) 01/04/2021    AST 58 (H) 01/04/2021    ALT 38 01/04/2021     Lab Results   Component Value Date    CKTOTAL 109 11/12/2019    TROPONINT 0.060 (C) 01/05/2021     Lab Results   Component Value Date    INR 1.97 (H) 01/05/2021    INR 1.84 (H) 01/04/2021    INR 1.19 (H) 11/25/2019    PROTIME 22.1 (H) 01/05/2021    PROTIME 20.9 (H) 01/04/2021    PROTIME 14.6  (H) 11/25/2019     Lab Results   Component Value Date    CHOL 88 11/21/2019    TRIG 87 11/21/2019    HDL 32 (L) 11/21/2019    LDL 39 11/21/2019        Results Review: I reviewed the patients new clinical results.    Echo 12/13/2020:   · Cardiac chamber sizes are normal.  · There is mild concentric left ventricular hypertrophy.  · Global and segmental LV wall motion is normal. The calculated LV ejection is 56%.  · There is mild-moderate mitral regurgitation.  · There is a normally functioning (TAVR) bioprosthetic aortic valve.  · The estimated RV systolic pressure is normal.  · There are no other relevant findings on this study.    ASSESSMENT:    1. Volume overload  Diastolic heart failure  - Normal EF on echo 12/13/2020.   - CXR demonstrates increased pulmonary vascularity bilaterally without pleural effusions    2. CAD, s/p RIKY 10/2019  - Troponin flat/mildly elevated. Does not appear to be ACS related.   -On chronic Plavix therapy    3. Aortic stenosis, s/p TAVR  - Reviewed most recent echo with normal function of aortic valve    4. PAF  complete heart block  - S/p PPM  - CHADSVASC = 3, on Coumadin with therapeutic INR   HAS-BLED = 3    5. Hyponatremia  Hypokalemia  - per primary team    6. Cirrhosis  - Decompensated with ascites, hepatic encephalopathy     7. PAD  - S/p remote R fem-pop bypass and left femoral cutdown with angioplasty and stent placement to left SFA by Dr. Rosa January, 2017   - CTA Abdominal aorta with runoff 09/23/19: no high grade stenosis of bilateral common femoral arteries; patent fem-popliteal graft on the right and patent trifurcation vessels; multifocal severe stenosis of the SFA on the left but without total occlusion, single vessel runoff via posterior tibial artery    8. Thrombocytopenia, presumed due to cirrhosis.     PLAN:    -  Patient appears to be intravascularly dry. Hyponatremia and hypokalemia appear to be gradulally improving with holding Bumex. Continue to hold for  now.   - Stop Plavix. Start ASA 81 mg.  - Will discuss issue of Coumadin with his electrophysiologist- Dr. Reyes.   - Will interrogate pacemaker to determine Afib burden.        I discussed the patients findings and my recommendations with patient.    Scribed for Maranda Cerda MD by GLORIA Parson. 1/5/2021  09:39 EST    I Maranda Cerda MD personally performed the services described in this documentation as scribed by the above individual in my presence, and it is both accurate and complete.    Maranda Cerda MD, FACC    Addendum: I discussed the patient's above issues with Dr. Galvan.  His left atrial appendage velocities were low normal in November 2019 by ZORAIDA at 40 cm/s.  He is at risk for bleeding with his diagnosis of cirrhosis and potential development of esophageal varices as well as associated thrombocytopenia.  It may be reasonable to consider him for a Watchman device in the future.  His pacemaker interrogation showed no evidence of atrial fibrillation or atrial flutter today.    Maranda Cerda MD, FACC

## 2021-01-05 NOTE — PLAN OF CARE
Problem: Adult Inpatient Plan of Care  Goal: Plan of Care Review  Recent Flowsheet Documentation  Taken 1/5/2021 3363 by Zahra Seo, PT  Progress: no change  Plan of Care Reviewed With: patient  Outcome Summary: Patient able to take a few steps from bed with RW, requiring min assist x2, refused further ambulation d/t B hip pain. Min assist x2 to t/f to chair. All mobility limited by pain, lethargy, and confusion. Recommend SNF at d/c. Will continue to progress as able.   Goal Outcome Evaluation:  Plan of Care Reviewed With: patient  Progress: improving  Outcome Summary: Patient able to take a few steps from bed with RW, requiring min assist x2, refused further ambulation d/t B hip pain. Min assist x2 to t/f to chair. All mobility limited by pain, lethargy, and confusion. Recommend SNF at d/c. Will continue to progress as able.

## 2021-01-05 NOTE — PROGRESS NOTES
"Pharmacy Consult  -  Warfarin  Sai Weinberg is a  60 y.o. male   Height - 165.1 cm (65\")  Weight - 106 kg (232 lb 14.4 oz)    Consulting Service - hospitalist  Indication - cardiac dysrhythmia  Goal INR - 2-3  Home Regimen:   - warfarin 4 mg daily (confirmed with patient)  Bridge Therapy: No     Drug-Drug Interactions with current regimen:   clopidogrel    Warfarin Dosing During Admission:  Date             INR             Dose                Education Provided:    Discharge Follow up:  Following Provider -  Follow up time range or appointment -    Labs:  Results from last 7 days   Lab Units 01/04/21  1730   INR  1.84*   HEMOGLOBIN g/dL 12.6*   HEMATOCRIT % 34.2*   PLATELETS 10*3/mm3 64*     Results from last 7 days   Lab Units 01/04/21  1730   SODIUM mmol/L 121*   POTASSIUM mmol/L 2.6*   CHLORIDE mmol/L 76*   CO2 mmol/L 35.0*   BUN mg/dL 48*   CREATININE mg/dL 0.86   CALCIUM mg/dL 9.3   BILIRUBIN mg/dL 3.9*   ALK PHOS U/L 193*   ALT (SGPT) U/L 38   AST (SGOT) U/L 58*   GLUCOSE mg/dL 131*     Current dietary intake:   Diet Order   Procedures    Diet Regular; Cardiac       Assessment/Plan:   1. Mr. Weinberg states he had not taken his warfarin prior to coming to the hospital on 1/4.  I will include a dose for now to make up that dose.  His INR is currently subtherapeutic.  Increase INR to 5 mg daily for now.  2. Daily PT-INR labs have been ordered.  3. Pharmacy will adjust Mr. Weinberg's warfarin dose as needed based on daily INR result.    Thank you  Carlos Espinoza, PharmD, BCPS  1/5/2021  00:24 EST  "

## 2021-01-05 NOTE — PROGRESS NOTES
Discharge Planning Assessment  [facility]     Patient Name: Sai Weinberg  MRN: [id number]  Today's Date: 1/5/2021    Admit Date: 1/4/2021    Discharge Needs Assessment     Row Name 01/05/21 1337       Living Environment    Lives With  spouse    Name(s) of Who Lives With Patient  Eliana    Current Living Arrangements  home/apartment/condo    Primary Care Provided by  self       Transition Planning    Patient/Family Anticipates Transition to  inpatient rehabilitation facility    Transportation Anticipated  family or friend will provide       Discharge Needs Assessment    Readmission Within the Last 30 Days  no previous admission in last 30 days    Equipment Currently Used at Home  cane, straight;oxygen;cpap;wheelchair    Concerns to be Addressed  discharge planning    Anticipated Changes Related to Illness  inability to care for self    Discharge Facility/Level of Care Needs  acute rehab    Current Discharge Risk  chronically ill        Discharge Plan     Row Name 01/05/21 3591       Plan    Plan Comments  I spoke with Eliana, Mr. Weinberg's wife on the phone. They live in [facility] together. At baseline Mr. Weinberg ambulates in the home with a straight cane. He is independent with activities of daily living. He manages his own medications. He wears continuous oxygen at home and has a CPAP through [facility]. He is not currently followed by home health services. Per Nursing, Mr. Weinberg is not following directions at this time and has some confusion. PT/OT are recommending that he go to rehab after this admission. His insurance will only cover acute rehab at this time and it will require a precert. Case management will follow his plan of care and facilitate discharge planning.    Final Discharge Disposition Code  30 - still a patient        Continued Care and Services - Admitted Since 1/4/2021    Coordination has not been started for this encounter.         Demographic Summary     Row Name 01/05/21  8936       General Information    General Information Comments  I confirmed that Dewayne Cole is Mr. Weinberg's PCP. [employer] is his insurer.        Functional Status     Row Name 01/05/21 1338       Functional Status, IADL    Medications  independent    Meal Preparation  independent    Housekeeping  completely dependent    Laundry  completely dependent    Shopping  completely dependent        Psychosocial    No documentation.       Abuse/Neglect    No documentation.       Legal    No documentation.       Substance Abuse    No documentation.       Patient Forms    No documentation.           Marvin Castro RN

## 2021-01-06 NOTE — PROGRESS NOTES
"Pharmacy Consult  -  Warfarin  Sai Weinberg is a  60 y.o. male   Height - 165.1 cm (65\")  Weight - 105 kg (232 lb)    Consulting Service: Hospitalist  Indication: A fib  Goal INR: 2-3  Home Regimen: Warfarin 4 mg daily (pharmacist confirmed with patient on admission)    Bridge Therapy: No     Drug-Drug Interactions with current regimen:     Aspirin - may increase risk of bleeding     Warfarin Dosing During Admission:    Date  1/4 1/5 1/6         INR  1.84 2.29 3.2         Dose  5 mg 4 mg HOLD            Education Provided: Patient on warfarin prior to admission, though patient is currently confused and not appropriate for education. Pharmacist available for education throughout admission if needed.    Discharge Follow up:     Following Provider - Dr. Cole     Follow up time range or appointment - Recommend repeat INR within 2-3 days of discharge    Labs:  Results from last 7 days   Lab Units 01/06/21  0601 01/05/21  0859 01/05/21  0016 01/05/21  0012 01/04/21  1730   INR  3.20* 2.29* 1.97*  --  1.84*   HEMOGLOBIN g/dL 12.5* 12.2*  --  12.4* 12.6*   HEMATOCRIT % 36.2* 33.4*  --  34.1* 34.2*   PLATELETS 10*3/mm3 71* 61*  --  60* 64*     Results from last 7 days   Lab Units 01/06/21  0601 01/05/21  2359 01/05/21  1907 01/05/21  1808  01/04/21  1730   SODIUM mmol/L 127* 124*  --  123*   < > 121*   POTASSIUM mmol/L 3.7 3.9 4.0 3.8   < > 2.6*   CHLORIDE mmol/L 85* 84*  --  83*   < > 76*   CO2 mmol/L 34.0* 31.0*  --  32.0*   < > 35.0*   BUN mg/dL 34* 33*  --  34*   < > 48*   CREATININE mg/dL 0.79 0.76  --  0.80   < > 0.86   CALCIUM mg/dL 8.8 8.4*  --  8.4*   < > 9.3   BILIRUBIN mg/dL  --   --   --   --   --  3.9*   ALK PHOS U/L  --   --   --   --   --  193*   ALT (SGPT) U/L  --   --   --   --   --  38   AST (SGOT) U/L  --   --   --   --   --  58*   GLUCOSE mg/dL 140* 135*  --  134*   < > 131*    < > = values in this interval not displayed.     Current dietary intake: Patient just admitted, 25% of breakfast documented " 1/5 and 1/6.    Diet Order   Procedures    Diet Regular; Cardiac     Assessment/Plan:     Patient's INR is 3.2 today.  Due to INR jump, will hold warfarin dose tonight.  Daily PT/INR ordered.  Monitor signs/symptoms of bleeding, dietary intake, and drug-drug interactions. Make dose adjustments as necessary.  Pharmacy will continue to follow.    Kaela Webb, PharmD, BCPS  1/6/2021  11:16 EST

## 2021-01-06 NOTE — PROGRESS NOTES
[facility] Medicine Services  PROGRESS NOTE    Patient Name: Sai Weinberg  : 1960  MRN: [id number]    Date of Admission: 2021  Primary Care Physician: Dewayne Cole MD    Subjective   Subjective     CC:  Hyponatremia, cirrhosis, DHF     HPI:  States he is ok. States its  and able to tell me his name. Denies pain     ROS:  Gen- No fevers, chills  CV- No chest pain, palpitations  Resp- No cough, dyspnea  GI- No N/V/D, abd pain     Objective   Objective     Vital Signs:   Temp:  [97.9 °F (36.6 °C)-98.9 °F (37.2 °C)] 98.4 °F (36.9 °C)  Heart Rate:  [62-71] 69  Resp:  [18-22] 20  BP: (132-143)/(60-66) 134/65        Physical Exam:  Constitutional: sleepy but wakes   HENT: NCAT, mucous membranes moist  Respiratory: mild increased effort; bilateral rhonchi   Cardiovascular: RRR, no murmurs, rubs, or gallops  Gastrointestinal: Positive bowel sounds, soft, nontender, nondistended  Musculoskeletal: +3 bilateral ankle edema  Psychiatric: flat affect, cooperative  Neurologic: Oriented x 2, strength symmetric in all extremities, Cranial Nerves grossly intact to confrontation, speech clear  Skin: No rashes    Results Reviewed:  Results from last 7 days   Lab Units 21  0601 21  0859 21  0016 21  0012   WBC 10*3/mm3 6.30 5.73  --  5.07   HEMOGLOBIN g/dL 12.5* 12.2*  --  12.4*   HEMATOCRIT % 36.2* 33.4*  --  34.1*   PLATELETS 10*3/mm3 71* 61*  --  60*   INR  3.20* 2.29* 1.97*  --      Results from last 7 days   Lab Units 21  1216 21  0601 21  2359  21  0859  21  0011 21  1730   SODIUM mmol/L 123* 127* 124*   < > 124*   < > 123* 121*   POTASSIUM mmol/L 3.6 3.7 3.9   < > 3.3*   < > 2.9* 2.6*   CHLORIDE mmol/L 84* 85* 84*   < > 82*   < > 78* 76*   CO2 mmol/L 34.0* 34.0* 31.0*   < > 33.0*   < > 36.0* 35.0*   BUN mg/dL 35* 34* 33*   < > 34*   < > 50* 48*   CREATININE mg/dL 0.76 0.79 0.76   < > 0.87   < > 0.88 0.86   GLUCOSE mg/dL  136* 140* 135*   < > 113*   < > 117* 131*   CALCIUM mg/dL 8.9 8.8 8.4*   < > 8.4*   < > 8.9 9.3   ALT (SGPT) U/L  --   --   --   --   --   --   --  38   AST (SGOT) U/L  --   --   --   --   --   --   --  58*   TROPONIN T ng/mL  --   --   --   --  0.049*  --  0.060* 0.054*   PROBNP pg/mL  --   --   --   --   --   --   --  3,595.0*    < > = values in this interval not displayed.     Estimated Creatinine Clearance: 115.4 mL/min (by C-G formula based on SCr of 0.76 mg/dL).    Microbiology Results Abnormal     Procedure Component Value - Date/Time    COVID PRE-OP / PRE-PROCEDURE SCREENING ORDER (NO ISOLATION) - Swab, Nasopharynx [[id number]]  (Normal) Collected: 01/04/21 2158    Lab Status: Final result Specimen: Swab from Nasopharynx Updated: 01/05/21 0121    Narrative:      The following orders were created for panel order COVID PRE-OP / PRE-PROCEDURE SCREENING ORDER (NO ISOLATION) - Swab, Nasopharynx.  Procedure                               Abnormality         Status                     ---------                               -----------         ------                     COVID-19 and FLU A/B PCR...[[id number]]  Normal              Final result                 Please view results for these tests on the individual orders.    COVID-19 and FLU A/B PCR - Swab, Nasopharynx [[id number]]  (Normal) Collected: 01/04/21 2158    Lab Status: Final result Specimen: Swab from Nasopharynx Updated: 01/05/21 0121     COVID19 Not Detected     Influenza A PCR Not Detected     Influenza B PCR Not Detected    Narrative:      Fact sheet for providers: https://www.fda.gov/media/421533/download    Fact sheet for patients: https://www.fda.gov/media/874374/download    Test performed by PCR.          Imaging Results (Last 24 Hours)     Procedure Component Value Units Date/Time    XR Chest 1 View [[id number]] Collected: 01/04/21 1854     Updated: 01/06/21 1213    Narrative:      EXAMINATION: XR CHEST 1 VW-01/04/2021:      INDICATION: Dyspnea.       COMPARISON: 02/03/2020.     FINDINGS: Portable chest reveals cardiac pacer leads to be in  satisfactory position. The heart is upper limits of normal. The lung  fields are clear. Prominence of the pulmonary vascularity. Degenerative  changes seen within the spine. Increased pulmonary vascularity  bilaterally. No definite pleural effusion or pneumothorax.       Impression:      Increased pulmonary vascularity bilaterally with enlargement  of the heart. Pacer leads in satisfactory position.     D:  01/04/2021  E:  01/05/2021     This report was finalized on 1/6/2021 12:10 PM by Dr. Naty Panchal MD.             Results for orders placed during the hospital encounter of 12/13/20   Adult Transthoracic Echo Complete W/ Cont if Necessary Per Protocol    Narrative · Cardiac chamber sizes are normal.  · There is mild concentric left ventricular hypertrophy.  · Global and segmental LV wall motion is normal. The calculated LV   ejection is 56%.  · There is mild-moderate mitral regurgitation.  · There is a normally functioning (TAVR) bioprosthetic aortic valve.  · The estimated RV systolic pressure is normal.  · There are no other relevant findings on this study.          I have reviewed the medications:  Scheduled Meds:Pharmacy Consult, , Does not apply, BID  aspirin, 81 mg, Oral, Daily  atorvastatin, 40 mg, Oral, Daily  budesonide-formoterol, 2 puff, Inhalation, BID - RT  carvedilol, 12.5 mg, Oral, BID With Meals  ferrous sulfate, 325 mg, Oral, Daily With Breakfast  hydrOXYzine, 50 mg, Oral, Daily  insulin lispro, 0-7 Units, Subcutaneous, TID AC  ipratropium, 0.5 mg, Nebulization, 4x Daily - RT  lactulose, 20 g, Oral, TID  magnesium oxide, 400 mg, Oral, Daily  pantoprazole, 40 mg, Oral, Every Other Day  rOPINIRole, 2 mg, Oral, Every Other Day  sodium chloride, 10 mL, Intravenous, Q12H  tamsulosin, 0.4 mg, Oral, Daily  warfarin (COUMADIN) (dosing per levels), , Does not apply, Daily      Continuous Infusions:Pharmacy  to dose warfarin,       PRN Meds:.albuterol  •  dextrose  •  dextrose  •  glucagon (human recombinant)  •  oxyCODONE  •  Pharmacy to dose warfarin  •  potassium chloride  •  potassium chloride  •  potassium chloride  •  Sodium Chloride (PF)  •  sodium chloride    Assessment/Plan   Assessment & Plan     Active Hospital Problems    Diagnosis  POA   • **Hyponatremia [E87.1]  Yes   • Diastolic CHF, chronic (CMS/HCC) [I50.32]  Unknown   • Hypokalemia [E87.6]  Unknown   • Other cirrhosis of liver (CMS/HCC) [K74.69]  Unknown   • Hyperbilirubinemia [E80.6]  Unknown   • S/P TAVR (transcatheter aortic valve replacement) 11/22/19 [Z95.2]  Not Applicable   • Paroxysmal atrial fibrillation [I48.0]  Yes   • Class 3 severe obesity in adult  [E66.01]  Yes   • YOUSUF on CPAP [G47.33, Z99.89]  Not Applicable   • Nonrheumatic aortic valve stenosis [I35.0]  Yes   • Coronary artery disease involving native heart [I25.10]  Yes   • Iron deficiency anemia [D50.9]  Yes   • 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        Brief Hospital Course to date:  Sai Weinberg is a 60 y.o. male with past medical history of COPD, type 2 diabetes, hypertension, hyperlipidemia, iron deficiency anemia, cold miners pneumoconiosis,?  diastolic CHF, YOUSUF on CPAP at night, coronary artery disease, history of aortic valve stenosis with TAVR, paroxysmal atrial fibrillation, history of complete heart block status post pacemaker placement, history of GI bleed who presents to the ER at request of his PCP for abnormal routine labs indicating hyponatremia and hypokalemia     Hyponatremia  --Difficult situation as patient has both peripherally volume overloaded, but laboratory data indicative of  intravascular volume depletion, likely 3rd spacing secondary to low albumin/cirrhosis  -- diuretics held; s/p fluids   --urine studies consistent with both intravascular volume depletion and questionable SIADH (low urine Na, low serum osm, low urine osm). Hold Cymbalta  -- nephrology consult      Diffuse anasarca  Diastolic CHF  Aortic Stenosis s/p TAVR  --Cardiology following, appreciate recommendatoins  --Restart diuretic when sodium improved, will be difficult to manage overall  --BiPAP PRN     Hypokalemia  --Replace per protocol      Elevated troponin  --likely type II NSTEMI, history of complete heart block status post pacer     PAF  Hx of Complete Heart block s/p PPM  -- on warfarin - pharmacy dosing      Hyperbilirubinemia  Decompensated Cirrhosis with ascites   TCP  --Ultrasound right upper quadrant showing cholelithiasis w/out choledocholithiasis or inflammation  --GI consulted - additional workup pending   --CT abd/pelvis showing cirrhotic liver  - cont lactulose      COPD, hx of coalminer's pneumoconiosis     Type 2 diabetes  --Sliding scale insulin     Hypertension     PVD s/p R fem-pop bypass and SFA stenting 2017     DVT prophylaxis:  warfarin  Disposition: I expect the patient to be discharged TBD    CODE STATUS:   Code Status and Medical Interventions:   Ordered at: 01/05/21 0010     Level Of Support Discussed With:    Patient     Code Status:    CPR     Medical Interventions (Level of Support Prior to Arrest):    Full       Piper Stoddard DO  01/06/21

## 2021-01-06 NOTE — PROGRESS NOTES
[facility]  PROGRESS NOTE    Date of Admission: 1/4/2021  Date of Service: 01/06/21    Primary Care Physician: Dewayne Cole MD    Chief Complaint: f/u abnormal labs, encephalopathy   Problem List:   1. Coronary artery disease   A. History of remote MI, with normal heart cath 15 years ago in [place], [facility]  B. Stress MPS 10/9/19: LV mild-mod dilated, large area of moderate-severe ischemia in the inferior wall  D. RIKY to mid RCA, 10/30/19  2. Aortic stenosis, severe              A. 70 mmHg AVG; 0.83 cm² by cath, 10/30/2019              B. Cath EF equals 65%, 10/30/2019  C. ZORAIDA 11/15/19: EF 60%, severe AS with mean gradient 50mmHg, moderate MR  D. TAVR, 11/22/19  E. Echo, 1/21/20: normal LV function, bioprosthetic AV with normal function, mild MR   F. Echo, 12/13/2020: Normal functioning bioprosthetic AV, mild-moderate MR  3. Peripheral arterial disease  A. S/p remote R fem-pop bypass  B. Left femoral cutdown with angioplasty and stent placement to left SFA by Dr. Rosa January, 2017               i. Complicated by infection of left groin incision  C. CTA Abdominal aorta with runoff 09/23/19: no high grade stenosis of bilateral common femoral arteries; patent fem-popliteal graft on the right and patent trifurcation vessels; multifocal severe stenosis of the SFA on the left but without total occlusion, single vessel runoff via posterior tibial artery  4. Hypertension   5. Hyperlipidemia  6. DM2  7. Coal worker's pneumoconiosis   8. Tobacco abuse and severe COPD  A. Stopped smoking circa 2013, continues to chew tobacco  9. History of back injury with lumbar disc disease and chronic back pain   10. History of colon cancer, remote, s/p partial colectomy              A. Noncompliant with subsequent colonoscopies  11. YOUSUF on CPAP  12. Morbid obesity  13. Surgical history:  A. Partial colectomy  B. Right fem-pop bypass  C. Abdominal hernia repair  14. Paroxysmal AF,  "11/4/19               A. Hospitalized [facility] for 5 days               D. Dismissed with HF circa 11/8/19                    1. LE edema and creatinine=2.77, resolved   15. Complete heart block after TAVR, 11/25/19 s/p [place] dual chamber PPM  16. Chronic hypochromic, microcytic anemia.  17. Cirrhosis diagnosed winter 2020    Subjective      Patient asleep at time of visit, however easily awakened to name. No cardiac complaints.       Objective   Vitals: /62 (BP Location: Right arm, Patient Position: Lying)   Pulse 62   Temp 98.6 °F (37 °C) (Axillary)   Resp 18   Ht 165.1 cm (65\")   Wt 105 kg (232 lb)   SpO2 93%   BMI 38.61 kg/m²     Physical Exam:  GENERAL: Alert, cooperative, in no acute distress.   HEENT: Normocephalic, no jugular venous distention  HEART: Regular rhythm, normal rate, and no murmurs; difficult exam   LUNGS: Diminished breath sounds bilaterally. No wheezing, rales or rhonchi.  ABDOMEN: Soft, bowel sounds present, non-tender   NEUROLOGIC: No gross focal abnormalities involving strength or sensation are noted.   EXTREMITIES: No clubbing, cyanosis, 1-2+ edema     Results:  Results from last 7 days   Lab Units 01/06/21  0601 01/05/21  0859 01/05/21  0012   WBC 10*3/mm3 6.30 5.73 5.07   HEMOGLOBIN g/dL 12.5* 12.2* 12.4*   HEMATOCRIT % 36.2* 33.4* 34.1*   PLATELETS 10*3/mm3 71* 61* 60*     Results from last 7 days   Lab Units 01/06/21  0601 01/05/21  2359 01/05/21  1907 01/05/21  1808   SODIUM mmol/L 127* 124*  --  123*   POTASSIUM mmol/L 3.7 3.9 4.0 3.8   CHLORIDE mmol/L 85* 84*  --  83*   CO2 mmol/L 34.0* 31.0*  --  32.0*   BUN mg/dL 34* 33*  --  34*   CREATININE mg/dL 0.79 0.76  --  0.80   GLUCOSE mg/dL 140* 135*  --  134*      Lab Results   Component Value Date    CHOL 88 11/21/2019    TRIG 87 11/21/2019    HDL 32 (L) 11/21/2019    LDL 39 11/21/2019    AST 58 (H) 01/04/2021    ALT 38 01/04/2021     Results from last 7 days   Lab Units 01/04/21  1730   HEMOGLOBIN A1C % 6.20*       "   Results from last 7 days   Lab Units 01/04/21  1730   TSH uIU/mL 4.250*         Results from last 7 days   Lab Units 01/06/21  0601 01/05/21  0859 01/05/21  0016   PROTIME Seconds 32.5* 24.9* 22.1*   INR  3.20* 2.29* 1.97*     Results from last 7 days   Lab Units 01/05/21  0859 01/05/21  0011 01/04/21  1730   TROPONIN T ng/mL 0.049* 0.060* 0.054*     Results from last 7 days   Lab Units 01/04/21  1730   PROBNP pg/mL 3,595.0*       Intake/Output Summary (Last 24 hours) at 1/6/2021 1010  Last data filed at 1/6/2021 0900  Gross per 24 hour   Intake 785 ml   Output 750 ml   Net 35 ml     I personally reviewed the patient's EKG/Telemetry data    Radiology Data:   CT Abdomen pelvis 1/4/21:  IMPRESSION:  Exam is limited by patient motion. The liver is cirrhotic and there is ascites. The gallbladder appears somewhat distended and there are gallstones. This findings do not appear significantly changed from the prior CT.    Echo pending     Current Medications:  Pharmacy Consult, , Does not apply, BID  aspirin, 81 mg, Oral, Daily  atorvastatin, 40 mg, Oral, Daily  budesonide-formoterol, 2 puff, Inhalation, BID - RT  carvedilol, 12.5 mg, Oral, BID With Meals  ferrous sulfate, 325 mg, Oral, Daily With Breakfast  hydrOXYzine, 50 mg, Oral, Daily  insulin lispro, 0-7 Units, Subcutaneous, TID AC  ipratropium, 0.5 mg, Nebulization, 4x Daily - RT  lactulose, 20 g, Oral, TID  magnesium oxide, 400 mg, Oral, Daily  pantoprazole, 40 mg, Oral, Every Other Day  rOPINIRole, 2 mg, Oral, Every Other Day  sodium chloride, 10 mL, Intravenous, Q12H  tamsulosin, 0.4 mg, Oral, Daily  warfarin, 4 mg, Oral, Daily      Pharmacy to dose warfarin,         Assessment and Plan:     1. Volume overload  Diastolic heart failure  - Normal EF on echo 12/13/2020.   - CXR demonstrates increased pulmonary vascularity bilaterally without pleural effusions  - repeat echo pending      2. CAD, s/p RIKY 10/2019  - asymptomatic, continue ASA, statin, BB     3.  Aortic stenosis, s/p TAVR  - Reviewed most recent echo with normal function of aortic valve     4. PAF  complete heart block  - S/p PPM  - CHADSVASC = 3, on Coumadin with therapeutic INR   HAS-BLED = 3  - Per PW: I discussed the patient's above issues with Dr. Reyes.  His left atrial appendage velocities were low normal in November 2019 by ZORAIDA at 40 cm/s.  He is at risk for bleeding with his diagnosis of cirrhosis and potential development of esophageal varices as well as associated thrombocytopenia.  It may be reasonable to consider him for a Watchman device in the future.  His pacemaker interrogation showed no evidence of atrial fibrillation or atrial flutter      5. Hyponatremia  Hypokalemia  - per primary team     6. Cirrhosis  - Decompensated with ascites, hepatic encephalopathy      7. Thrombocytopenia, presumed due to cirrhosis.         Electronically signed by Khushboo Cabral PA-C, 01/06/21, 10:20 AM EST.

## 2021-01-06 NOTE — PLAN OF CARE
Goal Outcome Evaluation:  Plan of Care Reviewed With: patient  Progress: declining  Outcome Summary: Patient oritned to self and anxious most of the day.  Patient very restless, anxious, and climbing out of bed all shift.  Patient was cooperative this morning but has been uncooperative majority of the shift.  Lactulose given but no bowel movements thus far.  Patient with complaints of pain being all over his body.  When working with patient, he breathes very hard and heavily but sats remain 90-92%.  Voids per urinal

## 2021-01-06 NOTE — PROGRESS NOTES
"GI Daily Progress Note  Subjective:    Chief Complaint:  Follow up newly diagnosed cirrhosis with trace ascites     Mr. Weinberg is lethargic but opens eyes and responds to some questions.   In regards to previous alcohol use, he \"drank quite a bit in the past.\"   Able to say he is in [place] but falls back to sleep.       Objective:    /65 (BP Location: Right arm, Patient Position: Lying)   Pulse 69   Temp 98.4 °F (36.9 °C) (Oral)   Resp 20   Ht 165.1 cm (65\")   Wt 105 kg (232 lb)   SpO2 97%   BMI 38.61 kg/m²     Physical Exam  Constitutional:       Comments: Lethargic but briefly awakens to voice   Chronically ill appearing    Cardiovascular:      Rate and Rhythm: Normal rate and regular rhythm.   Abdominal:      General: Bowel sounds are normal. There is no distension.      Palpations: Abdomen is soft.      Tenderness: There is no abdominal tenderness.      Comments: Obese abdomen, protuberant   Musculoskeletal:      Right lower leg: Edema present.      Left lower leg: Edema present.   Skin:     General: Skin is warm and dry.      Comments: Clubbing of the nails    Neurological:      Comments: Unable to obtain reliable history.   He is lethargic.           Lab  Lab Results   Component Value Date    WBC 6.30 01/06/2021    HGB 12.5 (L) 01/06/2021    HGB 12.2 (L) 01/05/2021    HGB 12.4 (L) 01/05/2021    MCV 99.7 (H) 01/06/2021    PLT 71 (L) 01/06/2021    INR 3.20 (H) 01/06/2021    INR 2.29 (H) 01/05/2021    INR 1.97 (H) 01/05/2021    INR 1.84 (H) 01/04/2021    INR 1.19 (H) 11/25/2019       Lab Results   Component Value Date    GLUCOSE 136 (H) 01/06/2021    BUN 35 (H) 01/06/2021    CREATININE 0.76 01/06/2021    EGFRIFNONA 105 01/06/2021    BCR 46.1 (H) 01/06/2021     (L) 01/06/2021    K 3.6 01/06/2021    CO2 34.0 (H) 01/06/2021    CALCIUM 8.9 01/06/2021    ALBUMIN 2.60 (L) 01/04/2021    ALKPHOS 193 (H) 01/04/2021    BILITOT 3.9 (H) 01/04/2021    BILIDIR 1.5 (H) 01/04/2021    ALT 38 01/04/2021    AST " 58 (H) 01/04/2021        Ref. Range 1/6/2021 06:01   Hep A IgM Latest Ref Range: Non-Reactive  Non-Reactive   Hepatitis B Surface Ag Latest Ref Range: Non-Reactive  Non-Reactive   Hep B S Ab Latest Ref Range: Non-Reactive  Non-Reactive   Hep B Core IgM Latest Ref Range: Non-Reactive  Non-Reactive   Hepatitis C Ab Latest Ref Range: Non-Reactive  Non-Reactive      Ref. Range 1/5/2021 08:59   Ceruloplasmin Latest Ref Range: 16 - 31 mg/dL 31      Ref. Range 1/5/2021 08:59   Ferritin Latest Ref Range: 30.00 - 400.00 ng/mL 1,006.00 (H)     Assessment:    1. Liver cirrhosis with ascites.  Unclear etiology.   Possible DAVID.  MELD-Na is 28  2. Hepatic encephalopathy   3. Hyponatremia  4. Diastolic heart failure, chronic.   5. Atrial fibrillation and chronic anticoagulation with Warfarin.   6. Thrombocytopenia    Plan:    >>> Decrease Oxycodone to 5 mg q8h PRN  >>> Begin Xifaxan 550 mg BID  >>> Begin Zoloft 100 mg daily, in transition from Cymbalta that was discontinued.    >>> He is not immune to hepatitis B.  Recommend vaccination.   Will order for today.       ROSCOE Perez  01/06/21  13:41 EST

## 2021-01-07 NOTE — PROGRESS NOTES
"Pharmacy Consult  -  Warfarin  Sai Weinberg is a  60 y.o. male   Height - 165.1 cm (65\")  Weight - 105 kg (232 lb)    Consulting Service: Hospitalist  Indication: A fib  Goal INR: 2-3  Home Regimen: Warfarin 4 mg daily (pharmacist confirmed with patient on admission)    Bridge Therapy: No     Drug-Drug Interactions with current regimen:     Aspirin - may increase risk of bleeding      Sertraline- increase risk of bleeding     Ropinirole- increase INR     Warfarin Dosing During Admission:    Date  1/4 1/5 1/6 1/7        INR  1.84 2.29 3.2 3.35        Dose  5 mg 4 mg HOLD HOLD           Education Provided: Patient on warfarin prior to admission, though patient is currently confused and not appropriate for education. Pharmacist available for education throughout admission if needed.    Discharge Follow up:     Following Provider - Dr. Cole     Follow up time range or appointment - Recommend repeat INR within 2-3 days of discharge    Labs:  Results from last 7 days   Lab Units 01/07/21  0900 01/06/21  0601 01/05/21  0859 01/05/21  0016 01/05/21  0012 01/04/21  1730   INR  3.35* 3.20* 2.29* 1.97*  --  1.84*   HEMOGLOBIN g/dL 11.8* 12.5* 12.2*  --  12.4* 12.6*   HEMATOCRIT % 33.4* 36.2* 33.4*  --  34.1* 34.2*   PLATELETS 10*3/mm3 73* 71* 61*  --  60* 64*     Results from last 7 days   Lab Units 01/07/21  0900 01/06/21  1216 01/06/21  0601  01/04/21  1730   SODIUM mmol/L 126* 123* 127*   < > 121*   POTASSIUM mmol/L 3.5 3.6 3.7   < > 2.6*   CHLORIDE mmol/L 84* 84* 85*   < > 76*   CO2 mmol/L 34.0* 34.0* 34.0*   < > 35.0*   BUN mg/dL 33* 35* 34*   < > 48*   CREATININE mg/dL 0.77 0.76 0.79   < > 0.86   CALCIUM mg/dL 9.1 8.9 8.8   < > 9.3   BILIRUBIN mg/dL  --   --   --   --  3.9*   ALK PHOS U/L  --   --   --   --  193*   ALT (SGPT) U/L  --   --   --   --  38   AST (SGOT) U/L  --   --   --   --  58*   GLUCOSE mg/dL 132* 136* 140*   < > 131*    < > = values in this interval not displayed.     Current dietary intake: Patient " currently not eating any of documented meals    Diet Order   Procedures    Diet Regular; Cardiac     Assessment/Plan:     Pharmacy consulted to dose warfarin for Afib. Prior to arrival, patient on warfarin 4mg daily.  Patient's INR continues to be supratherapeutic at 3.35, increased from yesterday. Will HOLD warfarin dose again today.  Pharmacy will continue to monitor INR, signs/symptoms of bleeding, dietary intake, and drug-drug interactions. Make dose adjustments as necessary    Shabnam Armendariz PharmD  Pharmacy Resident   1/7/2021 13:14 EST

## 2021-01-07 NOTE — PROGRESS NOTES
[facility] at [facility]  PROGRESS NOTE    Date of Admission: 1/4/2021  Date of Service: 01/07/21    Primary Care Physician: Dewayne Cole MD    Chief Complaint: f/u abnormal labs, encephalopathy   Problem List:   1. Coronary artery disease   A. History of remote MI, with normal heart cath 15 years ago in [place], [facility] ([facility])  B. Stress MPS 10/9/19: LV mild-mod dilated, large area of moderate-severe ischemia in the inferior wall  D. RIKY to mid RCA, 10/30/19  2. Aortic stenosis, severe              A. 70 mmHg AVG; 0.83 cm² by cath, 10/30/2019              B. Cath EF equals 65%, 10/30/2019  C. ZORAIDA 11/15/19: EF 60%, severe AS with mean gradient 50mmHg, moderate MR  D. TAVR, 11/22/19  E. Echo, 1/21/20: normal LV function, bioprosthetic AV with normal function, mild MR   F. Echo, 12/13/2020: Normal functioning bioprosthetic AV, mild-moderate MR  3. Peripheral arterial disease  A. S/p remote R fem-pop bypass  B. Left femoral cutdown with angioplasty and stent placement to left SFA by Dr. Rosa January, 2017               i. Complicated by infection of left groin incision  C. CTA Abdominal aorta with runoff 09/23/19: no high grade stenosis of bilateral common femoral arteries; patent fem-popliteal graft on the right and patent trifurcation vessels; multifocal severe stenosis of the SFA on the left but without total occlusion, single vessel runoff via posterior tibial artery  4. Hypertension   5. Hyperlipidemia  6. DM2  7. Coal worker's pneumoconiosis   8. Tobacco abuse and severe COPD  A. Stopped smoking circa 2013, continues to chew tobacco  9. History of back injury with lumbar disc disease and chronic back pain   10. History of colon cancer, remote, s/p partial colectomy              A. Noncompliant with subsequent colonoscopies  11. YOUSUF on CPAP  12. Morbid obesity  13. Surgical history:  A. Partial colectomy  B. Right fem-pop bypass  C. Abdominal hernia repair  14. Paroxysmal AF,  "11/4/19               A. Hospitalized [facility] for 5 days               D. Dismissed with HF circa 11/8/19                    1. LE edema and creatinine=2.77, resolved   15. Complete heart block after TAVR, 11/25/19 s/p [facility] dual chamber PPM  16. Chronic hypochromic, microcytic anemia.  17. Cirrhosis diagnosed winter 2020    Subjective      Patient asleep however easily awakens to name. No specific complaints, however history is limited as patient falls asleep easily.       Objective   Vitals: /61 (BP Location: Right arm, Patient Position: Lying)   Pulse 66   Temp 98.3 °F (36.8 °C) (Oral)   Resp 18   Ht 165.1 cm (65\")   Wt 105 kg (232 lb)   SpO2 94%   BMI 38.61 kg/m²     Physical Exam:  GENERAL: Alert, cooperative, in no acute distress.   HEENT: Normocephalic, no jugular venous distention  HEART: Regular rhythm, normal rate, and no murmurs, gallops, or rubs.   LUNGS: No wheezing, rales or rhonchi anteriorly. On 3L NC  ABDOMEN: Soft, bowel sounds present, non-tender   NEUROLOGIC: No focal abnormalities involving strength or sensation are noted.   EXTREMITIES: No clubbing, cyanosis, 1-2 +BLE edema      Results:  Results from last 7 days   Lab Units 01/07/21  0900 01/06/21  0601 01/05/21  0859   WBC 10*3/mm3 6.27 6.30 5.73   HEMOGLOBIN g/dL 11.8* 12.5* 12.2*   HEMATOCRIT % 33.4* 36.2* 33.4*   PLATELETS 10*3/mm3 73* 71* 61*     Results from last 7 days   Lab Units 01/07/21  1454 01/07/21  0900 01/06/21  1216 01/06/21  0601   SODIUM mmol/L 127* 126* 123* 127*   POTASSIUM mmol/L  --  3.5 3.6 3.7   CHLORIDE mmol/L  --  84* 84* 85*   CO2 mmol/L  --  34.0* 34.0* 34.0*   BUN mg/dL  --  33* 35* 34*   CREATININE mg/dL  --  0.77 0.76 0.79   GLUCOSE mg/dL  --  132* 136* 140*      Lab Results   Component Value Date    CHOL 88 11/21/2019    TRIG 87 11/21/2019    HDL 32 (L) 11/21/2019    LDL 39 11/21/2019    AST 58 (H) 01/04/2021    ALT 38 01/04/2021     Results from last 7 days   Lab Units 01/04/21  1730 "   HEMOGLOBIN A1C % 6.20*         Results from last 7 days   Lab Units 01/04/21  1730   TSH uIU/mL 4.250*         Results from last 7 days   Lab Units 01/07/21  0900 01/06/21  0601 01/05/21  0859   PROTIME Seconds 33.6* 32.5* 24.9*   INR  3.35* 3.20* 2.29*     Results from last 7 days   Lab Units 01/05/21  0859 01/05/21  0011 01/04/21  1730   TROPONIN T ng/mL 0.049* 0.060* 0.054*     Results from last 7 days   Lab Units 01/04/21  1730   PROBNP pg/mL 3,595.0*       Intake/Output Summary (Last 24 hours) at 1/7/2021 1624  Last data filed at 1/7/2021 0930  Gross per 24 hour   Intake 760 ml   Output 250 ml   Net 510 ml     I personally reviewed the patient's EKG/Telemetry data    Radiology Data:   Echo 1/5/21:  Interpretation Summary    · There is biatrial and right ventricular enlargement.  · Global and segmental LV wall motion is normal. The calculated LV ejection is 56%.  · There is a bioprosthetic aortic valve (TAVR) that is functionally normal.  · Mitral annular calcification with mild mitral regurgitation is appreciated.  · The calculated RV systolic pressure is moderately-severely elevated at 45mmHg-55mmHg.  · Less than 50% IVC inspiratory collapse is appreciated.  · There is no pericardial efusion.          Current Medications:  Pharmacy Consult, , Does not apply, BID  aspirin, 81 mg, Oral, Daily  atorvastatin, 40 mg, Oral, Daily  budesonide-formoterol, 2 puff, Inhalation, BID - RT  carvedilol, 12.5 mg, Oral, BID With Meals  ferrous sulfate, 325 mg, Oral, Daily With Breakfast  insulin lispro, 0-7 Units, Subcutaneous, TID AC  ipratropium, 0.5 mg, Nebulization, 4x Daily - RT  lactulose, 20 g, Oral, TID  magnesium oxide, 400 mg, Oral, Daily  pantoprazole, 40 mg, Oral, Every Other Day  rifAXIMin, 550 mg, Oral, Q12H  rOPINIRole, 2 mg, Oral, Every Other Day  sertraline, 100 mg, Oral, Daily  sodium chloride, 10 mL, Intravenous, Q12H  tamsulosin, 0.4 mg, Oral, Daily  warfarin (COUMADIN) (dosing per levels), , Does not  apply, Daily      Pharmacy to dose warfarin,   sodium chloride, 75 mL/hr      Assessment and Plan:   1. Volume overload  Diastolic heart failure  - Normal EF on echo 12/13/2020.   - CXR demonstrates increased pulmonary vascularity bilaterally without pleural effusions  - repeat echo as noted above with normal LVEF, RVSP 49mmHg      2. CAD, s/p RIKY 10/2019  - asymptomatic, continue ASA, statin, BB     3. Aortic stenosis, s/p TAVR  - Reviewed most recent echo with normal function of aortic valve     4. PAF  complete heart block  - S/p PPM  - CHADSVASC = 3, on Coumadin with therapeutic INR   HAS-BLED = 3  - Per [facility]: I discussed the patient's above issues with Dr. Reyes.  His left atrial appendage velocities were low normal in November 2019 by ZORAIDA at 40 cm/s.  He is at risk for bleeding with his diagnosis of cirrhosis and potential development of esophageal varices as well as associated thrombocytopenia.  It may be reasonable to consider him for a Watchman device in the future.  His pacemaker interrogation showed no evidence of atrial fibrillation or atrial flutter this admission   - continue to hold Warfarin and we will follow him through the weekend and make a decision RE anticoagulation      5. Hyponatremia  Hypokalemia  - per primary team     6. Cirrhosis  - Decompensated with ascites, hepatic encephalopathy      7. Thrombocytopenia, presumed due to cirrhosis.     Electronically signed by Khushboo Cabral PA-C, 01/07/21, 9:06 AM EST.

## 2021-01-07 NOTE — CONSULTS
"NAL Consult Note    Sai Weinberg  1960  [id number]    Date of Admit:  1/4/2021    Date of Consult: 1/6/2021        Requesting Provider: Suzy Duarte MD  Evaluating Physician: Francisco Slaughter MD        Reason for Consultation:  HYPONATREMIA  History of present illness:    Patient is a 60 y.o.  Yr old male  WITH A H/O OF MULTIPLE MEDICAL PROBLEMS INCLUDING CIRRHOSIS, DIASTOLIC CHF, S/P TAVR, AFIB, YOUSUF, OBESITY, \" BLACK LUNG \", COPD, HLP, DEPRESSION, ANEMIA, CAD, GI BLEED WHO WE ARE ASKED TO SEE FOR HYPONATREMIA ( [phone] OVER THE PAST 2 DAYS. BASELINE UNKNOWN ). PATIENT LETHARGIC AND UNABLE TO PROVIDE ANY SIGNIFICANT H/O. ON BUMEX AT HOME BUT NO HCTZ OR SSRI. ON CYMBALTA. NO N/V/D. W/U SO FAR: UA NO PROTEIN. U . S . U NA < 20. COVID 19 NEG. TSH 4.25.   Past Medical History:   Diagnosis Date   • Arthritis    • Black lung disease (CMS/HCC)    • BPH (benign prostatic hyperplasia)    • Cataract    • COLD (chronic obstructive lung disease) (CMS/HCC)    • Colon polyps    • Diabetes mellitus (CMS/HCC)     SINCE 2014   • Dyslipidemia    • Esophageal reflux    • Flu     HX   • Heart attack (CMS/HCC)     2006   • Herniated lumbar intervertebral disc    • History of colon resection    • Hyperlipidemia    • Hypertension    • Malignant neoplasm of colon (CMS/HCC)    • On home oxygen therapy     prn   • Open wound of left hip and thigh with complication 3/12/2017    Open draining left leg wound from femoral cutdown and angioplasty/stenting of superficial artery 1/18/2017   • Peripheral vascular disease (CMS/HCC)    • Renal failure    • Sleep apnea with use of continuous positive airway pressure (CPAP)    • Wears dentures    • Wears glasses        Past Surgical History:   Procedure Laterality Date   • ANGIOPLASTY FEMORAL ARTERY N/A 1/17/2017    Procedure: left femoral cutdown with aortagram and left SFA stent and angioplasty;  Surgeon: Heladio Rosa MD;  Location: [facility] OR 15;  " Service:    • AORTAGRAM N/A 1/17/2017    Procedure: AORTAGRAM WITH RUNOFFS and  STENT left SFA;  Surgeon: Heladio Rosa MD;  Location:  [facility];  Service:    • AORTIC VALVE REPAIR/REPLACEMENT N/A 11/22/2019    Procedure: TRANSAPICAL TRANSCATHETER AORTIC VALVE REPLACEMENT WITH TRANSESOPHAGEAL ECHOCARDIOLGRAM;  Surgeon: Danish St MD;  Location:  [facility];  Service: Cardiothoracic   • AORTIC VALVE REPAIR/REPLACEMENT N/A 11/22/2019    Procedure: Transapical Transcatheter Aortic Valve Replacement;  Surgeon: Lory Shepherd MD;  Location:  [facility];  Service: Cardiovascular   • CARDIAC CATHETERIZATION      NO INTERVENTION   • CARDIAC CATHETERIZATION Left 10/30/2019    Procedure: Left Heart Cath;  Surgeon: Milad Cordon MD;  Location:  [facility];  Service: Cardiology   • CARDIAC ELECTROPHYSIOLOGY PROCEDURE Left 11/25/2019    Procedure: Pacemaker DC new;  Surgeon: Maranda Cerda MD;  Location:  [facility];  Service: Cardiology   • COLON SURGERY      x 2   • COLONOSCOPY     • FEMORAL FEMORAL BYPASS N/A 3/13/2017    Procedure: INCISION AND DRAINAGE LEFT GROIN HEMATOMA;  Surgeon: Heladio Rosa MD;  Location:  [facility] OR;  Service:    • FEMORAL POPLITEAL BYPASS Right 01/26/2016   • OTHER SURGICAL HISTORY      PTA ILIAC STENOSIS WITH STENT   • TRANSESOPHAGEAL ECHOCARDIOGRAM (ZORAIDA) N/A 11/22/2019    Procedure: TRANSESOPHAGEAL ECHOCARDIOGRAM WITH ANESTHESIA;  Surgeon: Danish St MD;  Location:  [facility];  Service: Cardiothoracic       Social History     Socioeconomic History   • Marital status:      Spouse name: Not on file   • Number of children: 2   • Years of education: Not on file   • Highest education level: Not on file   Occupational History   • Occupation: DISABLED      Comment: BACK AND LUNG PROBLEMS   Tobacco Use   • Smoking status: Former Smoker     Packs/day: 2.00     Years: 30.00     Pack  years: 60.00     Types: Cigarettes     Start date:      Quit date: 2015     Years since quittin.0   • Smokeless tobacco: Current User     Types: Chew   • Tobacco comment: Stopped smoking 1 year ago. Smoked 35 years up to 2ppd.   Substance and Sexual Activity   • Alcohol use: No   • Drug use: No   • Sexual activity: Defer   Social History Narrative    Lives in [place].       family history includes Diabetes in his father and mother; Heart attack in his father and mother; Hypertension in his father and mother; Lung cancer in his mother.    Allergies   Allergen Reactions   • Tylenol [Acetaminophen] Other (See Comments)     Liver disease       Medication:    Current Facility-Administered Medications:   •  ! Home medications stored in central pharmacy. Please  before discharge., , Does not apply, BID, Chevy Lee, PharmD, Stopped at 21 0915  •  albuterol (PROVENTIL) nebulizer solution 0.083% 2.5 mg/3mL, 2.5 mg, Nebulization, Q4H PRN, Mina Rios, DO  •  aspirin EC tablet 81 mg, 81 mg, Oral, Daily, Bronwyn Mata, APRN, 81 mg at 21  •  atorvastatin (LIPITOR) tablet 40 mg, 40 mg, Oral, Daily, Mina Rios, DO, 40 mg at 21 09  •  budesonide-formoterol (SYMBICORT) 80-4.5 MCG/ACT inhaler 2 puff, 2 puff, Inhalation, BID - RT, Mina Rios, , 2 puff at 21 0807  •  carvedilol (COREG) tablet 12.5 mg, 12.5 mg, Oral, BID With Meals, Mina Rios, DO, Stopped at 21 1744  •  dextrose (D50W) 25 g/ 50mL Intravenous Solution 25 g, 25 g, Intravenous, Q15 Min PRN, Mina Rios, DO  •  dextrose (GLUTOSE) oral gel 15 g, 15 g, Oral, Q15 Min PRN, Mina Rios, DO  •  ferrous sulfate tablet 325 mg, 325 mg, Oral, Daily With Breakfast, Mina Rios, , 325 mg at 21 0914  •  glucagon (human recombinant) (GLUCAGEN DIAGNOSTIC) injection 1 mg, 1 mg, Subcutaneous, Q15 Min PRN, Mina Rios,   •  hydrOXYzine (ATARAX) tablet 50 mg, 50 mg, Oral, Daily,  Mina Rios DO, 50 mg at 01/06/21 0914  •  insulin lispro (humaLOG, ADMELOG) injection 0-7 Units, 0-7 Units, Subcutaneous, TID AC, Mina Rios DO, Stopped at 01/06/21 1241  •  ipratropium (ATROVENT) nebulizer solution 0.5 mg, 0.5 mg, Nebulization, 4x Daily - RT, Mina Rios DO, 0.5 mg at 01/06/21 1231  •  lactulose (CHRONULAC) 10 GM/15ML solution 20 g, 20 g, Oral, TID, Lara Torres PA, Stopped at 01/06/21 1705  •  magnesium oxide (MAGOX) tablet 400 mg, 400 mg, Oral, Daily, Mina Rios DO, 400 mg at 01/06/21 0914  •  oxyCODONE (ROXICODONE) immediate release tablet 5 mg, 5 mg, Oral, Q8H PRN, Brunner, Mark I, MD, 5 mg at 01/06/21 1411  •  pantoprazole (PROTONIX) EC tablet 40 mg, 40 mg, Oral, Every Other Day, Mina Rios DO, 40 mg at 01/05/21 0618  •  Pharmacy to dose warfarin, , Does not apply, Continuous PRN, Mina Rios DO  •  potassium chloride (KLOR-CON) packet 40 mEq, 40 mEq, Oral, PRN, Melly Robertson R, DO  •  potassium chloride (MICRO-K) CR capsule 40 mEq, 40 mEq, Oral, PRN, Melly Robertson R, DO, 40 mEq at 01/05/21 1607  •  potassium chloride 10 mEq in 100 mL IVPB, 10 mEq, Intravenous, Q1H PRN, Caio Diop MD, Last Rate: 100 mL/hr at 01/05/21 0501, 10 mEq at 01/05/21 0501  •  riFAXIMin (XIFAXAN) tablet 550 mg, 550 mg, Oral, Q12H, Brunner, Mark I, MD  •  rOPINIRole (REQUIP) tablet 2 mg, 2 mg, Oral, Every Other Day, Mina Rios DO, 2 mg at 01/05/21 0835  •  sertraline (ZOLOFT) tablet 100 mg, 100 mg, Oral, Daily, Brunner, Mark I, MD, 100 mg at 01/06/21 1411  •  Sodium Chloride (PF) 0.9 % 10 mL, 10 mL, Intravenous, PRN, Caio Diop MD  •  sodium chloride 0.9 % flush 10 mL, 10 mL, Intravenous, Q12H, Mina Rios, , 10 mL at 01/06/21 0915  •  sodium chloride 0.9 % flush 10 mL, 10 mL, Intravenous, PRN, Mina Rios, , 10 mL at 01/05/21 0034  •  tamsulosin (FLOMAX) 24 hr capsule 0.4 mg, 0.4 mg, Oral, Daily, Mina Rios DO, 0.4 mg at 01/06/21 0914  •  warfarin  (COUMADIN) (dosing per levels), , Does not apply, Daily, Kaela Webb, PharmD, Stopped at 01/06/21 1821    Medications Prior to Admission   Medication Sig Dispense Refill Last Dose   • atorvastatin (LIPITOR) 40 MG tablet Take 40 mg by mouth daily.      • bumetanide (BUMEX) 2 MG tablet Take 2 mg by mouth 2 (Two) Times a Day As Needed.      • carvedilol (COREG) 12.5 MG tablet Take 1 tablet by mouth 2 (Two) Times a Day With Meals. (Patient taking differently: Take 12.5 mg by mouth Daily.) 60 tablet 11    • clopidogrel (PLAVIX) 75 MG tablet Take 1 tablet by mouth Daily. 30 tablet 11    • DULoxetine (CYMBALTA) 30 MG capsule Take 30 mg by mouth Daily.      • magnesium oxide (MAGOX) 400 (241.3 Mg) MG tablet tablet Take 400 mg by mouth Daily.      • metFORMIN (GLUCOPHAGE) 850 MG tablet Take 850 mg by mouth 2 (Two) Times a Day With Meals.      • pantoprazole (PROTONIX) 40 MG EC tablet Take 40 mg by mouth Every Other Day.      • potassium chloride (MICRO-K) 10 MEQ CR capsule Take 10 mEq by mouth 2 (Two) Times a Day.      • tamsulosin (FLOMAX) 0.4 MG capsule 24 hr capsule Take 0.4 mg by mouth Daily.      • warfarin (COUMADIN) 4 MG tablet Take 4 mg by mouth Daily.      • albuterol (PROVENTIL HFA;VENTOLIN HFA) 108 (90 BASE) MCG/ACT inhaler Inhale 2 puffs Every 4 (Four) Hours As Needed for wheezing.      • budesonide-formoterol (SYMBICORT) 80-4.5 MCG/ACT inhaler Inhale 2 puffs 2 (Two) Times a Day.      • Cholecalciferol (Vitamin D) 50 MCG (2000 UT) capsule Take 2,000 Units by mouth Daily.      • ferrous sulfate 324 (65 FE) MG tablet delayed-release EC tablet Take 324 mg by mouth Daily With Breakfast.      • hydrOXYzine pamoate (VISTARIL) 50 MG capsule Take 1 capsule by mouth Daily.      • losartan (COZAAR) 50 MG tablet Take 50 mg by mouth Daily.      • nitroglycerin (NITROSTAT) 0.4 MG SL tablet Place 0.4 mg under the tongue Every 5 (Five) Minutes As Needed for Chest Pain. Take no more than 3 doses in 15 minutes.      •  "oxyCODONE (ROXICODONE) 10 MG tablet Take 10 mg by mouth Every 8 (Eight) Hours As Needed.      • probiotic (CULTURELLE) capsule capsule Take 1 capsule by mouth Daily. 30 capsule 1    • rOPINIRole (REQUIP) 2 MG tablet Take 1 tablet by mouth Every Other Day.      • Testosterone Cypionate 100 MG/ML solution Inject 2 mL as directed Every 14 (Fourteen) Days.      • tiotropium (SPIRIVA) 18 MCG per inhalation capsule Place 1 capsule into inhaler and inhale Daily.          Review of Systems: UNOBTAINABLE  Physical Exam:   Vital Signs   Blood pressure 125/68, pulse 71, temperature 98.8 °F (37.1 °C), temperature source Axillary, resp. rate 18, height 165.1 cm (65\"), weight 105 kg (232 lb), SpO2 90 %.     GENERAL: LETHARGIC. CONFUSED., in no acute distress.   HEENT: Normocephalic, atraumatic.  PER.  No conjunctivitis. No icterus. Oropharynx clear without evidence of thrush or exudate. No evidence of peridontal disease.    NECK: Supple without nuchal rigidity. No mass.  LYMPH: No painful cervical, axillary or inguinal lymphadenopathy.  HEART: RRR; No murmur, rubs, gallops. No bruits in neck, abdomen, or groins, no edema  LUNGS: Normal respiratory effort. Nonlabored. No dullness.  No crepitus.  Clear to auscultation bilaterally without wheezing, rales, rhonchi.  ABDOMEN: Soft, nontender, nondistended. Positive bowel sounds. No rebound or guarding. NO mass or HSM.  JOINTS:  Full range of motion, no redness or tenderness.  EXT:  No cyanosis, clubbing.   :  NO CVAT. BLADDER NOT DISTENDED.  SKIN: Warm and dry without rash  NEURO: VERY LETHARGIC. CONFUSED.  PSYCHIATRIC: UNOBTAINABLE.    Laboratory Data  Results from last 7 days   Lab Units 01/06/21  0601 01/05/21  0859 01/05/21  0012   HEMOGLOBIN g/dL 12.5* 12.2* 12.4*   HEMATOCRIT % 36.2* 33.4* 34.1*     Results from last 7 days   Lab Units 01/06/21  1216 01/06/21  0601 01/05/21  2359 01/05/21  1907 01/05/21  1808  01/04/21  1730   SODIUM mmol/L 123* 127* 124*  --  123*   < > 121* "   POTASSIUM mmol/L 3.6 3.7 3.9 4.0 3.8   < > 2.6*   CHLORIDE mmol/L 84* 85* 84*  --  83*   < > 76*   CO2 mmol/L 34.0* 34.0* 31.0*  --  32.0*   < > 35.0*   BUN mg/dL 35* 34* 33*  --  34*   < > 48*   CREATININE mg/dL 0.76 0.79 0.76  --  0.80   < > 0.86   CALCIUM mg/dL 8.9 8.8 8.4*  --  8.4*   < > 9.3   MAGNESIUM mg/dL  --   --   --   --   --   --  2.7*   ALBUMIN g/dL  --   --   --   --   --   --  2.60*    < > = values in this interval not displayed.     Results from last 7 days   Lab Units 01/06/21  1216   GLUCOSE mg/dL 136*     Results from last 7 days   Lab Units 01/05/21  0030   COLOR UA  Yellow   CLARITY UA  Clear   PH, URINE  5.5   SPECIFIC GRAVITY, URINE  >=1.030   GLUCOSE UA  Negative   KETONES UA  Negative   BILIRUBIN UA  Negative   PROTEIN UA  Negative   BLOOD UA  Negative   LEUKOCYTES UA  Trace*   NITRITE UA  Negative     Results from last 7 days   Lab Units 01/04/21  1730   ALK PHOS U/L 193*   BILIRUBIN mg/dL 3.9*   BILIRUBIN DIRECT mg/dL 1.5*   ALT (SGPT) U/L 38   AST (SGOT) U/L 58*     Estimated Creatinine Clearance: 115.4 mL/min (by C-G formula based on SCr of 0.76 mg/dL).    Radiology:      Impression: HYPONATREMIA LIKELY SECONDARY TO CIRRHOSIS. WORSE. SOME SIADH WITH U OSM > S OSM. CIRRHOSIS APPARTENLY ETOH ABUSE IN THE PAST. COPD. D CHF. PLAN: STRICT 1000 ML PO FLUID RESTRICTION PER DAY. CHECK AM CORTISOL. WILL FOLLOW.      PLAN: Thank you for asking us to see Sai Weinberg, I recommend the following:       Francisco Slaughter MD  1/6/2021  19:04 EST

## 2021-01-07 NOTE — PLAN OF CARE
Goal Outcome Evaluation:  Plan of Care Reviewed With: patient  Progress: improving  Outcome Summary: Pt received in bathroom and ambulated back to bed with RW and mod A for directioning. Very forward leaned posture and pushes RW hard needing therapist guidance. Sat EOB for extended period of time and returned to supine with mod A x 2.

## 2021-01-07 NOTE — THERAPY TREATMENT NOTE
Patient Name: Sai Weinberg  : 1960    MRN: [id number]                              Today's Date: 2021       Admit Date: 2021    Visit Dx:     ICD-10-CM ICD-9-CM   1. Hypokalemia  E87.6 276.8   2. Hyponatremia  E87.1 276.1   3. Somnolence  R40.0 780.09     Patient Active Problem List   Diagnosis   • Dyslipidemia on statins    • Arthritis   • COPD    • T2DM on metformin    • Esophageal reflux   • Hypertension   • Chewing tobacco use   • PAD s/p L SFA stent and R fem-pop    • LANA- resolved    • Iron deficiency anemia   • Coal workers pneumoconiosis    • Coronary artery disease involving native heart   • Nonrheumatic aortic valve stenosis   • Acute on chronic systolic heart failure    • YOUSUF on CPAP   • GIB (recent + FOBT without overt bleeding)    • Paroxysmal atrial fibrillation   • Former smoker   • Class 3 severe obesity in adult    • CHB (complete heart block) (CMS/HCC)   • S/P TAVR (transcatheter aortic valve replacement) 19   • Hyponatremia   • Diastolic CHF, chronic (CMS/HCC)   • Hypokalemia   • Other cirrhosis of liver (CMS/HCC)   • Hyperbilirubinemia     Past Medical History:   Diagnosis Date   • Arthritis    • Black lung disease (CMS/HCC)    • BPH (benign prostatic hyperplasia)    • Cataract    • COLD (chronic obstructive lung disease) (CMS/HCC)    • Colon polyps    • Diabetes mellitus (CMS/HCC)     SINCE    • Dyslipidemia    • Esophageal reflux    • Flu     HX   • Heart attack (CMS/HCC)        • Herniated lumbar intervertebral disc    • History of colon resection    • Hyperlipidemia    • Hypertension    • Malignant neoplasm of colon (CMS/HCC)    • On home oxygen therapy     prn   • Open wound of left hip and thigh with complication 3/12/2017    Open draining left leg wound from femoral cutdown and angioplasty/stenting of superficial artery 2017   • Peripheral vascular disease (CMS/HCC)    • Renal failure    • Sleep apnea with use of continuous positive airway pressure  (CPAP)    • Wears dentures    • Wears glasses      Past Surgical History:   Procedure Laterality Date   • ANGIOPLASTY FEMORAL ARTERY N/A 1/17/2017    Procedure: left femoral cutdown with aortagram and left SFA stent and angioplasty;  Surgeon: Heladio Rosa MD;  Location:  [facility];  Service:    • AORTAGRAM N/A 1/17/2017    Procedure: AORTAGRAM WITH RUNOFFS and  STENT left SFA;  Surgeon: Heladio Rosa MD;  Location:  [facility];  Service:    • AORTIC VALVE REPAIR/REPLACEMENT N/A 11/22/2019    Procedure: TRANSAPICAL TRANSCATHETER AORTIC VALVE REPLACEMENT WITH TRANSESOPHAGEAL ECHOCARDIOLGRAM;  Surgeon: Danish St MD;  Location:  [facility];  Service: Cardiothoracic   • AORTIC VALVE REPAIR/REPLACEMENT N/A 11/22/2019    Procedure: Transapical Transcatheter Aortic Valve Replacement;  Surgeon: Lory Shepherd MD;  Location:  [facility];  Service: Cardiovascular   • CARDIAC CATHETERIZATION      NO INTERVENTION   • CARDIAC CATHETERIZATION Left 10/30/2019    Procedure: Left Heart Cath;  Surgeon: Milad Cordon MD;  Location:  [facility];  Service: Cardiology   • CARDIAC ELECTROPHYSIOLOGY PROCEDURE Left 11/25/2019    Procedure: Pacemaker DC new;  Surgeon: Maranda Cerda MD;  Location:  [facility] LOCATION;  Service: Cardiology   • COLON SURGERY      x 2   • COLONOSCOPY     • FEMORAL FEMORAL BYPASS N/A 3/13/2017    Procedure: INCISION AND DRAINAGE LEFT GROIN HEMATOMA;  Surgeon: Heladio Rosa MD;  Location:  [facility];  Service:    • FEMORAL POPLITEAL BYPASS Right 01/26/2016   • OTHER SURGICAL HISTORY      PTA ILIAC STENOSIS WITH STENT   • TRANSESOPHAGEAL ECHOCARDIOGRAM (ZORAIDA) N/A 11/22/2019    Procedure: TRANSESOPHAGEAL ECHOCARDIOGRAM WITH ANESTHESIA;  Surgeon: Danish St MD;  Location:  [facility];  Service: Cardiothoracic     General Information     Row Name 01/07/21 5816          Physical Therapy Time and Intention     Document Type  therapy note (daily note)  -     Mode of Treatment  physical therapy  -     Row Name 01/07/21 1503          General Information    Patient Profile Reviewed  yes  -     Existing Precautions/Restrictions  fall;oxygen therapy device and L/min;pacemaker  -     Row Name 01/07/21 1503          Cognition    Orientation Status (Cognition)  oriented to;person;disoriented to;place;situation;time;verbal cues/prompts needed for orientation  -     Row Name 01/07/21 1503          Safety Issues, Functional Mobility    Safety Issues Affecting Function (Mobility)  ability to follow commands;at risk behavior observed;awareness of need for assistance;impulsivity;judgment;positioning of assistive device;problem-solving;safety precaution awareness;sequencing abilities;safety precautions follow-through/compliance;insight into deficits/self-awareness  -     Impairments Affecting Function (Mobility)  balance;cognition;endurance/activity tolerance;postural/trunk control;pain;strength;shortness of breath  -     Cognitive Impairments, Mobility Safety/Performance  attention;awareness, need for assistance;impulsivity;insight into deficits/self-awareness;judgment;problem-solving/reasoning;safety precaution awareness;safety precaution follow-through;sequencing abilities  -     Comment, Safety Issues/Impairments (Mobility)  Consistent cues for direction of tasks.  -       User Key  (r) = Recorded By, (t) = Taken By, (c) = Cosigned By    Initials Name Provider Type     Patrick Bolden, PT Physical Therapist        Mobility     Row Name 01/07/21 1504          Bed Mobility    Bed Mobility  sit-supine;scooting/bridging  -     Scooting/Bridging [place] (Bed Mobility)  maximum assist (25% patient effort);2 person assist;verbal cues  -     Sit-Supine [place] (Bed Mobility)  moderate assist (50% patient effort);verbal cues;2 person assist  -     Assistive Device (Bed Mobility)  head of bed elevated;bed  rails;draw sheet  -     Comment (Bed Mobility)  Verbal cues needed for correct sequencing and assist at BLE and trunk to return supine.  -     Row Name 01/07/21 1504          Transfers    Comment (Transfers)  Verbal cues for hand placement with use of RW and for correct sequencing. Required therapist holding RW tightly to prevent from pushing out too far.  -     Row Name 01/07/21 1504          Sit-Stand Transfer    Sit-Stand [place] (Transfers)  minimum assist (75% patient effort);verbal cues;2 person assist  -     Assistive Device (Sit-Stand Transfers)  walker, front-wheeled  -     Row Name 01/07/21 1504          Gait/Stairs (Locomotion)    Tina Level (Gait)  verbal cues;moderate assist (50% patient effort);1 person assist  -     Assistive Device (Gait)  walker, front-wheeled  [facility]     Distance in Feet (Gait)  20 feet  -     Deviations/Abnormal Patterns (Gait)  bilateral deviations;remedios decreased;gait speed decreased;stride length decreased;base of support, wide  -     Bilateral Gait Deviations  forward flexed posture;heel strike decreased  -     Comment (Gait/Stairs)  Pt ambulated from bathroom to bed with use of RW and step to gait pattern. very wide KELBY and unsteady. Required mod A for control of RW to prevent from rolling out in front of him too far.  -       User Key  (r) = Recorded By, (t) = Taken By, (c) = Cosigned By    Initials Name Provider Type     Patrick Bolden, PT Physical Therapist        Obj/Interventions     Row Name 01/07/21 1503          Balance    Balance Assessment  sitting static balance;sitting dynamic balance;standing static balance;standing dynamic balance  -     Static Sitting Balance  mild impairment;unsupported;sitting, edge of bed  -     Dynamic Sitting Balance  mild impairment;unsupported;sitting, edge of bed  [facility]     Static Standing Balance  mild impairment;supported;standing  -     Dynamic Standing Balance  moderate  impairment;supported;standing  -     Balance Interventions  sitting;standing;sit to stand;supported;static;minimal challenge;dynamic  -     Comment, Balance  Pt sat EOB for 12 minutes able to support himself upright with SBA.  -       User Key  (r) = Recorded By, (t) = Taken By, (c) = Cosigned By    Initials Name Provider Type     Patrick Bolden, PT Physical Therapist        Goals/Plan    No documentation.       Clinical Impression     [facility] Name 01/07/21 1508          Pain    Additional Documentation  Pain Scale: FACES Pre/Post-Treatment (Group)  -JH     Row Name 01/07/21 1508          Pain Scale: Numbers Pre/Post-Treatment    Pain Intervention(s)  Repositioned;Ambulation/increased activity  -[facility] Name 01/07/21 1508          Pain Scale: FACES Pre/Post-Treatment    Pain: FACES Scale, Pretreatment  4-->hurts little more  -     Posttreatment Pain Rating  4-->hurts little more  -     Pain Location - Side  Bilateral  -     Pain Location - Orientation  lower  -     Pain Location  back  -     Pre/Posttreatment Pain Comment  tolerated  -[facility] Name 01/07/21 1504          Plan of Care Review    Plan of Care Reviewed With  patient  -     Progress  improving  -     Outcome Summary  Pt received in bathroom and ambulated back to bed with RW and mod A for directioning. Very forward leaned posture and pushes RW hard needing therapist guidance. Sat EOB for extended period of time and returned to supine with mod A x 2.  -[facility] Name 01/07/21 1506          Therapy Assessment/Plan (PT)    Rehab Potential (PT)  fair, will monitor progress closely  -     Criteria for Skilled Interventions Met (PT)  yes;meets criteria;skilled treatment is necessary  -[facility] Name 01/07/21 1509          Vital Signs    O2 Delivery Pre Treatment  supplemental O2  -     O2 Delivery Intra Treatment  supplemental O2  -     O2 Delivery Post Treatment  supplemental O2  -     Pre Patient Position  Standing  -      Intra Patient Position  Sitting  -     Post Patient Position  Supine  -     Row Name 01/07/21 1508          Positioning and Restraints    Pre-Treatment Position  bathroom  -     Post Treatment Position  bed  -     In Bed  notified nsg;supine;call light within reach;encouraged to call for assist;exit alarm on;with nsg  -       User Key  (r) = Recorded By, (t) = Taken By, (c) = Cosigned By    Initials Name Provider Type    Patrick Guadalupe, PT Physical Therapist        Outcome Measures     Row Name 01/07/21 1510          How much help from another person do you currently need...    Turning from your back to your side while in flat bed without using bedrails?  2  -JH     Moving from lying on back to sitting on the side of a flat bed without bedrails?  2  -JH     Moving to and from a bed to a chair (including a wheelchair)?  2  -JH     Standing up from a chair using your arms (e.g., wheelchair, bedside chair)?  2  -JH     Climbing 3-5 steps with a railing?  1  -JH     To walk in hospital room?  2  -     AM-PAC 6 Clicks Score (PT)  11  -     Row Name 01/07/21 1510          Functional Assessment    Outcome Measure Options  AM-PAC 6 Clicks Basic Mobility (PT)  -       User Key  (r) = Recorded By, (t) = Taken By, (c) = Cosigned By    Initials Name Provider Type    Patrick Guadalupe, PT Physical Therapist        Physical Therapy Education                 Title: PT OT SLP Therapies (Not Started)     Topic: Physical Therapy (In Progress)     Point: Mobility training (In Progress)     Learning Progress Summary           Patient Acceptance, E,TB, NR by  at 1/7/2021 1511    Comment: edu on safety during mobility    Acceptance, E,D, NR by LR at 1/5/2021 0925    Comment: Educated on benefits of mobility and being OOB. Educated on safety with mobility, correct supine to sit t/f technique, correct sit<->stand t/f technqiue, correct gait mechanics, and progression of POC.                   Point: Home exercise  program (In Progress)     Learning Progress Summary           Patient Acceptance, E,TB, NR by  at 1/7/2021 1511    Comment: edu on safety during mobility    Acceptance, E,D, NR by LR at 1/5/2021 0925    Comment: Educated on benefits of mobility and being OOB. Educated on safety with mobility, correct supine to sit t/f technique, correct sit<->stand t/f technqiue, correct gait mechanics, and progression of POC.                   Point: Body mechanics (In Progress)     Learning Progress Summary           Patient Acceptance, E,TB, NR by  at 1/7/2021 1511    Comment: edu on safety during mobility    Acceptance, E,D, NR by LR at 1/5/2021 0925    Comment: Educated on benefits of mobility and being OOB. Educated on safety with mobility, correct supine to sit t/f technique, correct sit<->stand t/f technqiue, correct gait mechanics, and progression of POC.                   Point: Precautions (In Progress)     Learning Progress Summary           Patient Acceptance, E,TB, NR by  at 1/7/2021 1511    Comment: edu on safety during mobility    Acceptance, E,D, NR by LR at 1/5/2021 0925    Comment: Educated on benefits of mobility and being OOB. Educated on safety with mobility, correct supine to sit t/f technique, correct sit<->stand t/f technqiue, correct gait mechanics, and progression of POC.                               User Key     Initials Effective Dates Name Provider Type Discipline     06/19/15 -  Zahra Seo, PT Physical Therapist PT     09/22/20 -  Patrick Bolden, PT Physical Therapist PT              PT Recommendation and Plan     Plan of Care Reviewed With: patient  Progress: improving  Outcome Summary: Pt received in bathroom and ambulated back to bed with RW and mod A for directioning. Very forward leaned posture and pushes RW hard needing therapist guidance. Sat EOB for extended period of time and returned to supine with mod A x 2.     Time Calculation:   PT Charges     Row Name 01/07/21 1061              Time Calculation    Start Time  1435  -      PT Received On  01/07/21  -      PT Goal Re-Cert Due Date  01/15/21  -         Time Calculation- PT    Total Timed Code Minutes- PT  25 minute(s)  -         Timed Charges    52880 - PT Therapeutic Activity Minutes  25  -        User Key  (r) = Recorded By, (t) = Taken By, (c) = Cosigned By    Initials Name Provider Type     Patrick Bolden, PT Physical Therapist        Therapy Charges for Today     Code Description Service Date Service Provider Modifiers Qty    [id number]  PT THERAPEUTIC ACT EA 15 MIN 1/7/2021 Patrick Bolden, PT GP 2          PT G-Codes  Outcome Measure Options: AM-PAC 6 Clicks Basic Mobility (PT)  AM-PAC 6 Clicks Score (PT): 11  AM-PAC 6 Clicks Score (OT): 11    Patrick Bolden PT  1/7/2021

## 2021-01-07 NOTE — PROGRESS NOTES
" LOS: 3 days   Patient Care Team:  Dewayne Cole MD as PCP - General  Dewayne Cole MD as PCP - Family Medicine    Chief Complaint: Hyponatremia     Subjective     History of Present Illness    Subjective:  Symptoms:  Stable.  No shortness of breath, chest pain, headache, chest pressure, anorexia or diarrhea.    Diet:  Poor intake.    Activity level: Normal.    Pain:  He reports no pain.      Na 126 appetite remains poor. Urine studies consistent with hypovolemic hyponatremia. However given findings of cirrhosis possibly of hyponatremia related to liver disease.   History taken from: patient    Objective     Vital Sign Min/Max for last 24 hours  Temp  Min: 98 °F (36.7 °C)  Max: 98.8 °F (37.1 °C)   BP  Min: 121/61  Max: 150/55   Pulse  Min: 69  Max: 79   Resp  Min: 18  Max: 26   SpO2  Min: 77 %  Max: 94 %   Flow (L/min)  Min: 3  Max: 5   Weight  Min: 105 kg (232 lb)  Max: 105 kg (232 lb)     Flowsheet Rows      First Filed Value   Admission Height  165.1 cm (65\") Documented at 01/04/2021 1707   Admission Weight  104 kg (230 lb) Documented at 01/04/2021 1707          I/O this shift:  In: 540 [P.O.:540]  Out: 100 [Urine:100]  I/O last 3 completed shifts:  In: 940 [P.O.:940]  Out: 150 [Urine:150]    Objective:  General Appearance:  Comfortable.    Vital signs: (most recent): Blood pressure 121/61, pulse 72, temperature 98.3 °F (36.8 °C), temperature source Oral, resp. rate 26, height 165.1 cm (65\"), weight 105 kg (232 lb), SpO2 94 %.  No fever.    Output: Producing urine.    HEENT: Normal HEENT exam.    Lungs:  Normal respiratory rate.  Breath sounds clear to auscultation.  He is not in respiratory distress.  No rales.    Heart: Normal rate.  Regular rhythm.  S1 normal and S2 normal.    Abdomen: Abdomen is soft and non-distended.  There are no signs of ascites.  Bowel sounds are normal.     Extremities: There is no dependent edema.    Pulses: Distal pulses are intact.    Neurological: Patient is alert and " oriented to person, place and time.  Normal strength.              Results Review:     I reviewed the patient's new clinical results.    WBC WBC   Date Value Ref Range Status   01/07/2021 6.27 3.40 - 10.80 10*3/mm3 Final   01/06/2021 6.30 3.40 - 10.80 10*3/mm3 Final   01/05/2021 5.73 3.40 - 10.80 10*3/mm3 Final   01/05/2021 5.07 3.40 - 10.80 10*3/mm3 Final     Comment:     Modified report. Previous result was 5.28 10*3/mm3 on 1/5/2021 at 0036 EST.   01/04/2021 4.72 3.40 - 10.80 10*3/mm3 Final      HGB Hemoglobin   Date Value Ref Range Status   01/07/2021 11.8 (L) 13.0 - 17.7 g/dL Final   01/06/2021 12.5 (L) 13.0 - 17.7 g/dL Final   01/05/2021 12.2 (L) 13.0 - 17.7 g/dL Final   01/05/2021 12.4 (L) 13.0 - 17.7 g/dL Final     Comment:     Modified report. Previous result was 12.5 g/dL on 1/5/2021 at 0036 EST.   01/04/2021 12.6 (L) 13.0 - 17.7 g/dL Final      HCT Hematocrit   Date Value Ref Range Status   01/07/2021 33.4 (L) 37.5 - 51.0 % Final   01/06/2021 36.2 (L) 37.5 - 51.0 % Final   01/05/2021 33.4 (L) 37.5 - 51.0 % Final   01/05/2021 34.1 (L) 37.5 - 51.0 % Final   01/04/2021 34.2 (L) 37.5 - 51.0 % Final      Platlets No results found for: LABPLAT   MCV MCV   Date Value Ref Range Status   01/07/2021 94.1 79.0 - 97.0 fL Final   01/06/2021 99.7 (H) 79.0 - 97.0 fL Final   01/05/2021 91.8 79.0 - 97.0 fL Final   01/05/2021 92.2 79.0 - 97.0 fL Final     Comment:     Modified report. Previous result was 93.7 fL on 1/5/2021 at 0036 EST.   01/04/2021 91.9 79.0 - 97.0 fL Final          Sodium Sodium   Date Value Ref Range Status   01/07/2021 126 (L) 136 - 145 mmol/L Final   01/06/2021 123 (L) 136 - 145 mmol/L Final   01/06/2021 127 (L) 136 - 145 mmol/L Final   01/05/2021 124 (L) 136 - 145 mmol/L Final   01/05/2021 123 (L) 136 - 145 mmol/L Final   01/05/2021 124 (L) 136 - 145 mmol/L Final   01/05/2021 123 (L) 136 - 145 mmol/L Final   01/05/2021 123 (L) 136 - 145 mmol/L Final   01/04/2021 121 (L) 136 - 145 mmol/L Final       Potassium Potassium   Date Value Ref Range Status   01/07/2021 3.5 3.5 - 5.2 mmol/L Final   01/06/2021 3.6 3.5 - 5.2 mmol/L Final     Comment:     Slight hemolysis detected by analyzer. Results may be affected.   01/06/2021 3.7 3.5 - 5.2 mmol/L Final   01/05/2021 3.9 3.5 - 5.2 mmol/L Final     Comment:     Slight hemolysis detected by analyzer. Results may be affected.   01/05/2021 4.0 3.5 - 5.2 mmol/L Final     Comment:     Slight hemolysis detected by analyzer. Results may be affected.   01/05/2021 3.8 3.5 - 5.2 mmol/L Final     Comment:     Slight hemolysis detected by analyzer. Results may be affected.   01/05/2021 3.3 (L) 3.5 - 5.2 mmol/L Final     Comment:     Slight hemolysis detected by analyzer. Results may be affected.   01/05/2021 3.0 (L) 3.5 - 5.2 mmol/L Final   01/05/2021 2.9 (L) 3.5 - 5.2 mmol/L Final     Comment:     Slight hemolysis detected by analyzer. Results may be affected.   01/04/2021 2.6 (C) 3.5 - 5.2 mmol/L Final     Comment:     Slight hemolysis detected by analyzer. Results may be affected.      Chloride Chloride   Date Value Ref Range Status   01/07/2021 84 (L) 98 - 107 mmol/L Final   01/06/2021 84 (L) 98 - 107 mmol/L Final   01/06/2021 85 (L) 98 - 107 mmol/L Final   01/05/2021 84 (L) 98 - 107 mmol/L Final   01/05/2021 83 (L) 98 - 107 mmol/L Final   01/05/2021 82 (L) 98 - 107 mmol/L Final   01/05/2021 81 (L) 98 - 107 mmol/L Final   01/05/2021 78 (L) 98 - 107 mmol/L Final   01/04/2021 76 (L) 98 - 107 mmol/L Final      CO2 CO2   Date Value Ref Range Status   01/07/2021 34.0 (H) 22.0 - 29.0 mmol/L Final   01/06/2021 34.0 (H) 22.0 - 29.0 mmol/L Final   01/06/2021 34.0 (H) 22.0 - 29.0 mmol/L Final   01/05/2021 31.0 (H) 22.0 - 29.0 mmol/L Final   01/05/2021 32.0 (H) 22.0 - 29.0 mmol/L Final   01/05/2021 33.0 (H) 22.0 - 29.0 mmol/L Final   01/05/2021 34.0 (H) 22.0 - 29.0 mmol/L Final   01/05/2021 36.0 (H) 22.0 - 29.0 mmol/L Final   01/04/2021 35.0 (H) 22.0 - 29.0 mmol/L Final      BUN BUN    Date Value Ref Range Status   01/07/2021 33 (H) 8 - 23 mg/dL Final   01/06/2021 35 (H) 8 - 23 mg/dL Final   01/06/2021 34 (H) 8 - 23 mg/dL Final   01/05/2021 33 (H) 8 - 23 mg/dL Final   01/05/2021 34 (H) 8 - 23 mg/dL Final   01/05/2021 34 (H) 8 - 23 mg/dL Final   01/05/2021 35 (H) 8 - 23 mg/dL Final   01/05/2021 50 (H) 8 - 23 mg/dL Final   01/04/2021 48 (H) 8 - 23 mg/dL Final      Creatinine Creatinine   Date Value Ref Range Status   01/07/2021 0.77 0.76 - 1.27 mg/dL Final   01/06/2021 0.76 0.76 - 1.27 mg/dL Final   01/06/2021 0.79 0.76 - 1.27 mg/dL Final   01/05/2021 0.76 0.76 - 1.27 mg/dL Final   01/05/2021 0.80 0.76 - 1.27 mg/dL Final   01/05/2021 0.87 0.76 - 1.27 mg/dL Final   01/05/2021 0.97 0.76 - 1.27 mg/dL Final   01/05/2021 0.88 0.76 - 1.27 mg/dL Final   01/04/2021 0.86 0.76 - 1.27 mg/dL Final      Calcium Calcium   Date Value Ref Range Status   01/07/2021 9.1 8.6 - 10.5 mg/dL Final   01/06/2021 8.9 8.6 - 10.5 mg/dL Final   01/06/2021 8.8 8.6 - 10.5 mg/dL Final   01/05/2021 8.4 (L) 8.6 - 10.5 mg/dL Final   01/05/2021 8.4 (L) 8.6 - 10.5 mg/dL Final   01/05/2021 8.4 (L) 8.6 - 10.5 mg/dL Final   01/05/2021 8.1 (L) 8.6 - 10.5 mg/dL Final   01/05/2021 8.9 8.6 - 10.5 mg/dL Final   01/04/2021 9.3 8.6 - 10.5 mg/dL Final      PO4 No results found for: CAPO4   Albumin Albumin   Date Value Ref Range Status   01/04/2021 2.60 (L) 3.50 - 5.20 g/dL Final      Magnesium Magnesium   Date Value Ref Range Status   01/07/2021 2.3 1.6 - 2.4 mg/dL Final   01/04/2021 2.7 (H) 1.6 - 2.4 mg/dL Final      Uric Acid No results found for: URICACID     Medication Review: Yes    Assessment/Plan       Hyponatremia    Dyslipidemia on statins     COPD     T2DM on metformin     Esophageal reflux    Hypertension    PAD s/p L SFA stent and R fem-pop     Iron deficiency anemia    Coal workers pneumoconiosis     Coronary artery disease involving native heart    Nonrheumatic aortic valve stenosis    YOUSUF on CPAP    Paroxysmal atrial  fibrillation    Class 3 severe obesity in adult     S/P TAVR (transcatheter aortic valve replacement) 11/22/19    Diastolic CHF, chronic (CMS/HCC)    Hypokalemia    Other cirrhosis of liver (CMS/HCC)    Hyperbilirubinemia      Assessment & Plan     Hypovolemic hyponatremia  Na on admission 121. Most recent 123-126.   Appetite remains poor.   Urine Na<20 Urine osm 333 serum osm 266.    Alkalosis: Likely contraction alkalosis      Recs  - Will start IV fluids @ 75cc/hr  - Serial NA monitoring Q8hr. Goal Na correction 6-8/24hr  - Agree with holding diuretics    Lonnie Elizabeth MD  01/07/21  15:13 EST

## 2021-01-07 NOTE — PROGRESS NOTES
[facility] Medicine Services  PROGRESS NOTE    Patient Name: Sai Weinberg  : 1960  MRN: [id number]    Date of Admission: 2021  Primary Care Physician: Dewayne Cole MD    Subjective   Subjective     CC:  Hyponatremia, DHF, cirrhosis     HPI:  Intermittently clear. States it is  but knows his name and where he is. Per nursing, not keeping his O2 on. Intermittently confused.     ROS:  Difficult to obtain due to mental status - denies pain     Objective   Objective     Vital Signs:   Temp:  [98 °F (36.7 °C)-98.8 °F (37.1 °C)] 98.6 °F (37 °C)  Heart Rate:  [69-79] 76  Resp:  [18-20] 20  BP: (125-150)/(54-70) 147/70        Physical Exam:  Constitutional: intermittently lucid   HENT: NCAT, mucous membranes moist   Respiratory: heavy breathing; bilateral rhonchi   Cardiovascular: RRR, no murmurs, rubs, or gallops  Gastrointestinal: Positive bowel sounds, soft, nontender, nondistended  Musculoskeletal: +2-3 bilateral ankle edema  Psychiatric: altered   Neurologic: Oriented x 2, strength symmetric in all extremities, Cranial Nerves grossly intact to confrontation, speech clear  Skin: No rashes    Results Reviewed:  Results from last 7 days   Lab Units 21  0900 21  0601 21  0859   WBC 10*3/mm3 6.27 6.30 5.73   HEMOGLOBIN g/dL 11.8* 12.5* 12.2*   HEMATOCRIT % 33.4* 36.2* 33.4*   PLATELETS 10*3/mm3 73* 71* 61*   INR  3.35* 3.20* 2.29*     Results from last 7 days   Lab Units 21  0900 21  1216 21  0601  21  0859  21  0011 21  1730   SODIUM mmol/L 126* 123* 127*   < > 124*   < > 123* 121*   POTASSIUM mmol/L 3.5 3.6 3.7   < > 3.3*   < > 2.9* 2.6*   CHLORIDE mmol/L 84* 84* 85*   < > 82*   < > 78* 76*   CO2 mmol/L 34.0* 34.0* 34.0*   < > 33.0*   < > 36.0* 35.0*   BUN mg/dL 33* 35* 34*   < > 34*   < > 50* 48*   CREATININE mg/dL 0.77 0.76 0.79   < > 0.87   < > 0.88 0.86   GLUCOSE mg/dL 132* 136* 140*   < > 113*   < > 117* 131*    CALCIUM mg/dL 9.1 8.9 8.8   < > 8.4*   < > 8.9 9.3   ALT (SGPT) U/L  --   --   --   --   --   --   --  38   AST (SGOT) U/L  --   --   --   --   --   --   --  58*   TROPONIN T ng/mL  --   --   --   --  0.049*  --  0.060* 0.054*   PROBNP pg/mL  --   --   --   --   --   --   --  3,595.0*    < > = values in this interval not displayed.     Estimated Creatinine Clearance: 113.9 mL/min (by C-G formula based on SCr of 0.77 mg/dL).    Microbiology Results Abnormal     Procedure Component Value - Date/Time    COVID PRE-OP / PRE-PROCEDURE SCREENING ORDER (NO ISOLATION) - Swab, Nasopharynx [[id number]]  (Normal) Collected: 01/04/21 2158    Lab Status: Final result Specimen: Swab from Nasopharynx Updated: 01/05/21 0121    Narrative:      The following orders were created for panel order COVID PRE-OP / PRE-PROCEDURE SCREENING ORDER (NO ISOLATION) - Swab, Nasopharynx.  Procedure                               Abnormality         Status                     ---------                               -----------         ------                     COVID-19 and FLU A/B PCR...[[id number]]  Normal              Final result                 Please view results for these tests on the individual orders.    COVID-19 and FLU A/B PCR - Swab, Nasopharynx [[id number]]  (Normal) Collected: 01/04/21 2158    Lab Status: Final result Specimen: Swab from Nasopharynx Updated: 01/05/21 0121     COVID19 Not Detected     Influenza A PCR Not Detected     Influenza B PCR Not Detected    Narrative:      Fact sheet for providers: https://www.fda.gov/media/405627/download    Fact sheet for patients: https://www.fda.gov/media/855241/download    Test performed by PCR.          Imaging Results (Last 24 Hours)     ** No results found for the last 24 hours. **          Results for orders placed during the hospital encounter of 01/04/21   Adult Transthoracic Echo Complete W/ Cont if Necessary Per Protocol    Narrative · There is biatrial and right ventricular  enlargement.  · Global and segmental LV wall motion is normal. The calculated LV   ejection is 56%.  · There is a bioprosthetic aortic valve (TAVR) that is functionally   normal.  · Mitral annular calcification with mild mitral regurgitation is   appreciated.  · The calculated RV systolic pressure is moderately-severely elevated at   45mmHg-55mmHg.  · Less than 50% IVC inspiratory collapse is appreciated.  · There is no pericardial efusion.          I have reviewed the medications:  Scheduled Meds:Pharmacy Consult, , Does not apply, BID  aspirin, 81 mg, Oral, Daily  atorvastatin, 40 mg, Oral, Daily  budesonide-formoterol, 2 puff, Inhalation, BID - RT  carvedilol, 12.5 mg, Oral, BID With Meals  ferrous sulfate, 325 mg, Oral, Daily With Breakfast  hydrOXYzine, 50 mg, Oral, Daily  insulin lispro, 0-7 Units, Subcutaneous, TID AC  ipratropium, 0.5 mg, Nebulization, 4x Daily - RT  lactulose, 20 g, Oral, TID  magnesium oxide, 400 mg, Oral, Daily  pantoprazole, 40 mg, Oral, Every Other Day  rifAXIMin, 550 mg, Oral, Q12H  rOPINIRole, 2 mg, Oral, Every Other Day  sertraline, 100 mg, Oral, Daily  sodium chloride, 10 mL, Intravenous, Q12H  tamsulosin, 0.4 mg, Oral, Daily  warfarin (COUMADIN) (dosing per levels), , Does not apply, Daily      Continuous Infusions:Pharmacy to dose warfarin,       PRN Meds:.albuterol  •  dextrose  •  dextrose  •  glucagon (human recombinant)  •  oxyCODONE  •  Pharmacy to dose warfarin  •  potassium chloride  •  potassium chloride  •  potassium chloride  •  Sodium Chloride (PF)  •  sodium chloride    Assessment/Plan   Assessment & Plan     Active Hospital Problems    Diagnosis  POA   • **Hyponatremia [E87.1]  Yes   • Diastolic CHF, chronic (CMS/HCC) [I50.32]  Unknown   • Hypokalemia [E87.6]  Unknown   • Other cirrhosis of liver (CMS/HCC) [K74.69]  Unknown   • Hyperbilirubinemia [E80.6]  Unknown   • S/P TAVR (transcatheter aortic valve replacement) 11/22/19 [Z95.2]  Not Applicable   • Paroxysmal  atrial fibrillation [I48.0]  Yes   • Class 3 severe obesity in adult  [E66.01]  Yes   • YOUSUF on CPAP [G47.33, Z99.89]  Not Applicable   • Nonrheumatic aortic valve stenosis [I35.0]  Yes   • Coronary artery disease involving native heart [I25.10]  Yes   • Iron deficiency anemia [D50.9]  Yes   • 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        Brief Hospital Course to date:  Sai Weinberg is a 60 y.o. male with past medical history of COPD, type 2 diabetes, hypertension, hyperlipidemia, iron deficiency anemia, cold miners pneumoconiosis,?  diastolic CHF, YOUSUF on CPAP at night, coronary artery disease, history of aortic valve stenosis with TAVR, paroxysmal atrial fibrillation, history of complete heart block status post pacemaker placement, history of GI bleed who presents to the ER at request of his PCP for abnormal routine labs indicating hyponatremia and hypokalemia     Hyponatremia  --Difficult situation as patient has both peripherally volume overloaded, but laboratory data indicative of intravascular volume depletion, likely 3rd spacing secondary to low albumin/cirrhosis  -- diuretics held; s/p fluids with Na trending down   --urine studies consistent with both intravascular volume depletion and questionable SIADH (low urine Na, low serum osm, low urine osm).  -- nephrology consult   - AM cortisol ok  - Continue fluid restriction     Metabolic encephalopathy   - Decrease oxycodone; DC hydroxyzine  - Cont lactulose and rifaximin      Diffuse anasarca  Diastolic CHF  Aortic Stenosis s/p TAVR  --Cardiology following, appreciate recommendatoins  --Restart diuretic when sodium improved, will be difficult to manage  overall  --BiPAP PRN     Hypokalemia  --Replace per protocol      Elevated troponin  --likely type II NSTEMI, history of complete heart block status post pacer     PAF  Hx of Complete Heart block s/p PPM  -- on warfarin - pharmacy dosing      Hyperbilirubinemia  Decompensated Cirrhosis with ascites   TCP  --Ultrasound right upper quadrant showing cholelithiasis w/out choledocholithiasis or inflammation  --GI consulted - additional workup pending   --CT abd/pelvis showing cirrhotic liver  - cont lactulose and rifaximin   - cymbalta discontinued and zoloft started      COPD, hx of coalminer's pneumoconiosis     Type 2 diabetes  --Sliding scale insulin     Hypertension     PVD s/p R fem-pop bypass and SFA stenting 2017     DVT prophylaxis:  warfarin  Disposition: I expect the patient to be discharged TBD    CODE STATUS:   Code Status and Medical Interventions:   Ordered at: 01/05/21 0010     Level Of Support Discussed With:    Patient     Code Status:    CPR     Medical Interventions (Level of Support Prior to Arrest):    Full       Piper Stoddard DO  01/07/21

## 2021-01-07 NOTE — POST-PROCEDURE NOTE
"GI Daily Progress Note  Subjective     Sai Hastings is a 60 y.o. male who was admitted with Hyponatremia.   More alert but still confused.  Has had 4 BM/s today    Chief Complaint:  cirrhosis    Objective     /61 (BP Location: Right arm, Patient Position: Lying)   Pulse 66   Temp 98.3 °F (36.8 °C) (Oral)   Resp 18   Ht 165.1 cm (65\")   Wt 105 kg (232 lb)   SpO2 94%   BMI 38.61 kg/m²     Intake/Output last 3 shifts:  I/O last 3 completed shifts:  In: 940 [P.O.:940]  Out: 150 [Urine:150]  Intake/Output this shift:  I/O this shift:  In: 540 [P.O.:540]  Out: 100 [Urine:100]      Physical Exam  Wt Readings from Last 3 Encounters:   01/07/21 105 kg (232 lb)   12/29/20 109 kg (239 lb 12.8 oz)   12/13/20 100 kg (221 lb)   ,body mass index is 38.61 kg/m².,@FLOWAMB(6)@,@FLOWAMB(5)@,@FLOWAMB(8)@   CONSTITUTIONAL:lying in bed  Resp CTA; no rhonchi, rales, or wheezes.  Respiration effort normal  CV RRR; no M/R/G. No lower extremity edema  GI Abd soft, NT, ND, normal active bowel sounds.    Psych: Awake but confused    DATA:Results for SAI HASTINGS (MRN [id number]) as of 1/7/2021 16:29   Ref. Range 1/7/2021 09:00   Glucose Latest Ref Range: 65 - 99 mg/dL 132 (H)   Sodium Latest Ref Range: 136 - 145 mmol/L 126 (L)   Potassium Latest Ref Range: 3.5 - 5.2 mmol/L 3.5   CO2 Latest Ref Range: 22.0 - 29.0 mmol/L 34.0 (H)   Chloride Latest Ref Range: 98 - 107 mmol/L 84 (L)   Anion Gap Latest Ref Range: 5.0 - 15.0 mmol/L 8.0   Creatinine Latest Ref Range: 0.76 - 1.27 mg/dL 0.77   BUN Latest Ref Range: 8 - 23 mg/dL 33 (H)   Results for SAI HASTINGS (MRN [id number]) as of 1/7/2021 16:29   Ref. Range 1/7/2021 09:00   Protime Latest Ref Range: 11.5 - 14.0 Seconds 33.6 (H)   INR Latest Ref Range: 0.85 - 1.16  3.35 (H)       Assessment/Plan   1. Liver cirrhosis with ascites.  Unclear etiology.   Possible DAVID.  MELD-Na is 28  2. Hepatic encephalopathy   3. Hyponatremia  4. Diastolic heart failure, chronic.   5. Atrial " fibrillation and chronic anticoagulation with Warfarin.   6. Thrombocytopenia    Hold lactulose for > 3 stools per da  Taper Oxycodone  Consider adding albumin to expand intravascular volume          Hyponatremia    Dyslipidemia on statins     COPD     T2DM on metformin     Esophageal reflux    Hypertension    PAD s/p L SFA stent and R fem-pop     Iron deficiency anemia    Coal workers pneumoconiosis     Coronary artery disease involving native heart    Nonrheumatic aortic valve stenosis    YOUSUF on CPAP    Paroxysmal atrial fibrillation    Class 3 severe obesity in adult     S/P TAVR (transcatheter aortic valve replacement) 11/22/19    Diastolic CHF, chronic (CMS/HCC)    Hypokalemia    Other cirrhosis of liver (CMS/HCC)    Hyperbilirubinemia       LOS: 3 days     Bruno Boyd MD  01/07/21  16:28 EST

## 2021-01-07 NOTE — PROGRESS NOTES
Continued Stay Note  [facility]     Patient Name: Sai Weinberg  MRN: [id number]  Today's Date: 1/7/2021    Admit Date: 1/4/2021    Discharge Plan     Row Name 01/07/21 1115       Plan    Plan Comments  Mr. Weinberg has been started on Xifaxan per GI. This requires a prior authorization. I submitted a prior authorization on Cover my meds. The Key is [id number]. This request has been approved.    I met with Mr. Weinberg at the bedside to discuss his discharge plan. I asked if he would be agreeable to going to rehab after this admission. He said no. He lives in [facility] with his wife Eliana. He stated that his daughters cannot help at home because they have small children. I encouraged him to reconsider rehab. He stated that he would talk to Eliana about coming home. If discharged home, Mr. Weinberg will need a rolling walker arranged as well as home health services.    Final Discharge Disposition Code  30 - still a patient        Discharge Codes    No documentation.             Marvin Castro RN

## 2021-01-08 NOTE — PLAN OF CARE
Problem: Fall Injury Risk  Goal: Absence of Fall and Fall-Related Injury  Outcome: Ongoing, Progressing  Intervention: Identify and Manage Contributors to Fall Injury Risk  Recent Flowsheet Documentation  Taken 1/7/2021 2050 by Ezekiel Briggs RN  Medication Review/Management: medications reviewed  Intervention: Promote Injury-Free Environment  Recent Flowsheet Documentation  Taken 1/8/2021 0400 by Ezekiel Briggs RN  Safety Promotion/Fall Prevention:   activity supervised   assistive device/personal items within reach   clutter free environment maintained   fall prevention program maintained   gait belt   nonskid shoes/slippers when out of bed   room organization consistent   safety round/check completed   toileting scheduled  Taken 1/8/2021 0200 by Ezekiel Briggs RN  Safety Promotion/Fall Prevention:   activity supervised   assistive device/personal items within reach   clutter free environment maintained   fall prevention program maintained   gait belt   nonskid shoes/slippers when out of bed   room organization consistent   safety round/check completed   toileting scheduled  Taken 1/8/2021 0000 by Ezekiel Briggs RN  Safety Promotion/Fall Prevention:   activity supervised   clutter free environment maintained   assistive device/personal items within reach   fall prevention program maintained   gait belt   nonskid shoes/slippers when out of bed   room organization consistent   safety round/check completed   toileting scheduled  Taken 1/7/2021 2200 by Ezekiel Briggs RN  Safety Promotion/Fall Prevention:   activity supervised   assistive device/personal items within reach   clutter free environment maintained   fall prevention program maintained   gait belt   nonskid shoes/slippers when out of bed   room organization consistent   safety round/check completed   toileting scheduled  Taken 1/7/2021 2050 by Ezekiel Briggs RN  Safety Promotion/Fall Prevention:   activity supervised   assistive  device/personal items within reach   clutter free environment maintained     Problem: Adult Inpatient Plan of Care  Goal: Plan of Care Review  Outcome: Ongoing, Progressing  Flowsheets (Taken 1/8/2021 0530)  Progress: improving  Plan of Care Reviewed With: patient  Outcome Summary: Recieved order for 2 point wrist restraints so CPAP could be placed without pt pulling off. Prior to this order pt was very restless, continously pulling at lines, O2 tubing. Once wrist restraints applied and cpap on, felt comfortably enough to give pt prn pain pill as he would scream out in pain if moved in bed. Pt slept without much interruption until about 4am when he called out to use urinal. At this time reassessed pt, and need for restraints. Pt was much more alert. Responding appropiately to several questions. Called and allowed pt to speak to wife. Put back on 3L NC. NS infusion continued. Monitoring Na level q8. Fluid restriction. Plan of care discussed with pt. Verbalized understanding but will need frequent monitoring and reinforcement.  Goal: Patient-Specific Goal (Individualized)  Outcome: Ongoing, Progressing  Goal: Absence of Hospital-Acquired Illness or Injury  Outcome: Ongoing, Progressing  Intervention: Identify and Manage Fall Risk  Recent Flowsheet Documentation  Taken 1/8/2021 0400 by Ezekiel Briggs, RN  Safety Promotion/Fall Prevention:   activity supervised   assistive device/personal items within reach   clutter free environment maintained   fall prevention program maintained   gait belt   nonskid shoes/slippers when out of bed   room organization consistent   safety round/check completed   toileting scheduled  Taken 1/8/2021 0200 by Ezekiel Briggs, RN  Safety Promotion/Fall Prevention:   activity supervised   assistive device/personal items within reach   clutter free environment maintained   fall prevention program maintained   gait belt   nonskid shoes/slippers when out of bed   room organization  consistent   safety round/check completed   toileting scheduled  Taken 1/8/2021 0000 by Ezekiel Briggs RN  Safety Promotion/Fall Prevention:   activity supervised   clutter free environment maintained   assistive device/personal items within reach   fall prevention program maintained   gait belt   nonskid shoes/slippers when out of bed   room organization consistent   safety round/check completed   toileting scheduled  Taken 1/7/2021 2200 by Ezekiel Briggs RN  Safety Promotion/Fall Prevention:   activity supervised   assistive device/personal items within reach   clutter free environment maintained   fall prevention program maintained   gait belt   nonskid shoes/slippers when out of bed   room organization consistent   safety round/check completed   toileting scheduled  Taken 1/7/2021 2050 by Ezekiel Briggs RN  Safety Promotion/Fall Prevention:   activity supervised   assistive device/personal items within reach   clutter free environment maintained  Intervention: Prevent Skin Injury  Recent Flowsheet Documentation  Taken 1/8/2021 0400 by Ezekiel Briggs RN  Body Position:   weight shift assistance provided   neutral body alignment  Taken 1/8/2021 0200 by Ezekiel Briggs RN  Body Position: tilted, left  Taken 1/8/2021 0000 by Ezekiel Briggs RN  Body Position:   position maintained   neutral body alignment  Taken 1/7/2021 2200 by Ezekiel Briggs RN  Body Position:   weight shift assistance provided   neutral body alignment  Taken 1/7/2021 2050 by Ezekiel Briggs RN  Body Position: side-lying, right  Intervention: Prevent and Manage VTE (venous thromboembolism) Risk  Recent Flowsheet Documentation  Taken 1/7/2021 2050 by Ezekiel Briggs RN  VTE Prevention/Management:   bilateral   dorsiflexion/plantar flexion performed   sequential compression devices off  Intervention: Prevent Infection  Recent Flowsheet Documentation  Taken 1/8/2021 0400 by Ezekiel Briggs RN  Infection  Prevention:   environmental surveillance performed   rest/sleep promoted   single patient room provided  Taken 1/8/2021 0200 by Ezekiel Briggs RN  Infection Prevention:   environmental surveillance performed   rest/sleep promoted   single patient room provided  Taken 1/8/2021 0000 by Ezekiel Briggs RN  Infection Prevention:   environmental surveillance performed   single patient room provided   rest/sleep promoted  Taken 1/7/2021 2200 by Ezekiel Briggs RN  Infection Prevention:   environmental surveillance performed   rest/sleep promoted   single patient room provided  Taken 1/7/2021 2050 by Ezekiel Briggs RN  Infection Prevention:   environmental surveillance performed   single patient room provided   rest/sleep promoted  Goal: Optimal Comfort and Wellbeing  Outcome: Ongoing, Progressing  Intervention: Provide Person-Centered Care  Recent Flowsheet Documentation  Taken 1/7/2021 2050 by Ezekiel Briggs RN  Trust Relationship/Rapport:   care explained   choices provided   questions encouraged   reassurance provided  Goal: Readiness for Transition of Care  Outcome: Ongoing, Progressing     Problem: Skin Injury Risk Increased  Goal: Skin Health and Integrity  Outcome: Ongoing, Progressing  Intervention: Optimize Skin Protection  Recent Flowsheet Documentation  Taken 1/8/2021 0400 by Ezekiel Briggs RN  Pressure Reduction Techniques:   frequent weight shift encouraged   pressure points protected   rest period provided between sit times  Head of Bed (HOB): HOB at 30-45 degrees  Pressure Reduction Devices:   positioning supports utilized   pressure-redistributing mattress utilized  Skin Protection:   adhesive use limited   incontinence pads utilized   tubing/devices free from skin contact  Taken 1/8/2021 0200 by Ezekiel Briggs RN  Pressure Reduction Techniques:   frequent weight shift encouraged   pressure points protected   rest period provided between sit times  Head of Bed (HOB): HOB at 20-30  degrees  Pressure Reduction Devices:   positioning supports utilized   pressure-redistributing mattress utilized  Skin Protection:   adhesive use limited   incontinence pads utilized   tubing/devices free from skin contact  Taken 1/8/2021 0000 by Ezekiel Briggs RN  Pressure Reduction Techniques:   frequent weight shift encouraged   pressure points protected   rest period provided between sit times  Head of Bed (HOB): [facility] at 20-30 degrees  Pressure Reduction Devices:   positioning supports utilized   pressure-redistributing mattress utilized  Skin Protection:   adhesive use limited   incontinence pads utilized   tubing/devices free from skin contact  Taken 1/7/2021 2200 by Ezekiel Briggs RN  Pressure Reduction Techniques:   frequent weight shift encouraged   pressure points protected   rest period provided between sit times   weight shift assistance provided  Head of Bed ([facility]): [facility] at 20-30 degrees  Pressure Reduction Devices:   pressure-redistributing mattress utilized   positioning supports utilized  Skin Protection:   adhesive use limited   incontinence pads utilized   tubing/devices free from skin contact  Taken 1/7/2021 2050 by Ezekiel Briggs RN  Pressure Reduction Techniques:   frequent weight shift encouraged   pressure points protected   rest period provided between sit times   weight shift assistance provided  Head of Bed ([facility]): HOB at 20-30 degrees  Pressure Reduction Devices:   pressure-redistributing mattress utilized   positioning supports utilized  Skin Protection:   adhesive use limited   incontinence pads utilized   tubing/devices free from skin contact   skin sealant/moisture barrier applied     Problem: Restraint, Nonbehavioral (Nonviolent)  Goal: Discontinuation Criteria Achieved  Outcome: Ongoing, Progressing  Intervention: Implement Least-restrictive Safety Strategies  Recent Flowsheet Documentation  Taken 1/8/2021 0400 by Ezekiel Briggs RN  Medical Device Protection:   IV  pole/bag removed from visual field   tubing secured  Taken 1/8/2021 0200 by Ezekiel Briggs RN  Medical Device Protection:   IV pole/bag removed from visual field   tubing secured  Taken 1/8/2021 0000 by Ezekiel Briggs RN  Medical Device Protection:   IV pole/bag removed from visual field   tubing secured  Taken 1/7/2021 2050 by Ezekiel Briggs RN  Diversional Activities: music  Goal: Personal Dignity and Safety Maintained  Outcome: Ongoing, Progressing  Intervention: Protect Dignity, Rights, and Personal Wellbeing  Recent Flowsheet Documentation  Taken 1/7/2021 2050 by Ezekiel Briggs RN  Trust Relationship/Rapport:   care explained   choices provided   questions encouraged   reassurance provided  Intervention: Protect Skin and Joint Integrity  Recent Flowsheet Documentation  Taken 1/8/2021 0400 by Ezekiel Briggs RN  Body Position:   weight shift assistance provided   neutral body alignment  Taken 1/8/2021 0200 by Ezekiel Briggs RN  Body Position: tilted, left  Taken 1/8/2021 0000 by Ezekiel Briggs RN  Body Position:   position maintained   neutral body alignment  Taken 1/7/2021 2200 by Ezekiel Briggs RN  Body Position:   weight shift assistance provided   neutral body alignment  Taken 1/7/2021 2050 by Ezekiel Briggs RN  Body Position: side-lying, right   Goal Outcome Evaluation:  Plan of Care Reviewed With: patient  Progress: improving  Outcome Summary: Recieved order for 2 point wrist restraints so CPAP could be placed without pt pulling off. Prior to this order pt was very restless, continously pulling at lines, O2 tubing. Once wrist restraints applied and cpap on, felt comfortably enough to give pt prn pain pill as he would scream out in pain if moved in bed. Pt slept without much interruption until about 4am when he called out to use urinal. At this time reassessed pt, and need for restraints. Pt was much more alert. Responding appropiately to several questions. Called and  allowed pt to speak to wife. Put back on 3L NC. NS infusion continued. Monitoring Na level q8. Fluid restriction. Plan of care discussed with pt. Verbalized understanding but will need frequent monitoring and reinforcement.

## 2021-01-08 NOTE — POST-PROCEDURE NOTE
Interventional Radiology Operative Note    Date: 01/08/21     Time: 17:21 EST     Pre-op Diagnosis: Liver cirrhosis with small volume ascites.  Unclear etiology.  MELD-Na is 28  Hepatic encephalopathy, Hyponatremia; Thrombocytopenia  Post-op Diagnosis: Same    Procedure: CT guided diagnostic paracentesis -   Patient severely encephalopathic. Kept trying to roll over. Needle had to be removed urgently after only obtaining 5 ml serous fluid before patient rolled on it.    Surgeon: GABRIEL Wilks M.D.  Assistants: None    Sedation: None    Estimated Blood Loss (EBL): Trace     Urine Output (UOP): N/A (short procedure)    IVF: N/A (short procedure)    Findings: Small volume ascites    Specimens: Approximately 5 mL serous fluid. Sent to lab.    Complications: No immediate    Disposition: Back to inpatient room. Stable.

## 2021-01-08 NOTE — PROGRESS NOTES
"GI Daily Progress Note  Subjective     Sai Weinberg is a 60 y.o. male who was admitted with Hyponatremia.   More alert today.    Chief Complaint:  cirrhosis    Objective     /60 (BP Location: Right arm, Patient Position: Lying)   Pulse 70   Temp 97.5 °F (36.4 °C) (Axillary)   Resp 16   Ht 165.1 cm (65\")   Wt 104 kg (229 lb 8 oz)   SpO2 (!) 89%   BMI 38.19 kg/m²     Intake/Output last 3 shifts:  I/O last 3 completed shifts:  In: 1885 [P.O.:1885]  Out: 450 [Urine:450]  Intake/Output this shift:  No intake/output data recorded.      Physical Exam  Wt Readings from Last 3 Encounters:   01/08/21 104 kg (229 lb 8 oz)   12/29/20 109 kg (239 lb 12.8 oz)   12/13/20 100 kg (221 lb)   ,body mass index is 38.19 kg/m².,@FLOWAMB(6)@,@FLOWAMB(5)@,@FLOWAMB(8)@   CONSTITUTIONAL:  Resp CTA; no rhonchi, rales, or wheezes.  Respiration effort normal  CV RRR; no M/R/G. 2+ lower extremity edema  GI Abd soft, distended and tympanitic, normal active bowel sounds.    Psych: Awake and alert    DATA:    Assessment/Plan     . Liver cirrhosis with ascites.  Unclear etiology.   Possible DAVID.  MELD-Na is 28  2. Hepatic encephalopathy   3. Hyponatremia  4. Diastolic heart failure, chronic.   5. Atrial fibrillation and chronic anticoagulation with Warfarin.   6. Thrombocytopenia      Agree with paracentesis today  BP is good      Hyponatremia    Dyslipidemia on statins     COPD     T2DM on metformin     Esophageal reflux    Hypertension    PAD s/p L SFA stent and R fem-pop     Iron deficiency anemia    Coal workers pneumoconiosis     Coronary artery disease involving native heart    Nonrheumatic aortic valve stenosis    YOUSUF on CPAP    Paroxysmal atrial fibrillation    Class 3 severe obesity in adult     S/P TAVR (transcatheter aortic valve replacement) 11/22/19    Diastolic CHF, chronic (CMS/HCC)    Hypokalemia    Other cirrhosis of liver (CMS/HCC)    Hyperbilirubinemia       LOS: 4 days     Bruno Boyd MD  01/08/21  12:10 EST  "

## 2021-01-08 NOTE — NURSING NOTE
Pt unable to lie still for paracentesis, turning and flailing on CT table.  Needle removed with < 5ml in syringe for lab specimens.  Report called to patient's nurse on 3E.  Pt in transport to return to room.

## 2021-01-08 NOTE — PROGRESS NOTES
[facility] Medicine Services  PROGRESS NOTE    Patient Name: Sai Weinberg  : 1960  MRN: [id number]    Date of Admission: 2021  Primary Care Physician: Dewayne Cole MD    Subjective   Subjective     CC:  Hyponatremia, DHF, liver failure     HPI:  More calm today. In restraints to help aid in BiPAP. Moved around during paracentesis and 5 ml removed. More SOA.     ROS:  Difficult to obtain due to mental status; + neck pain; + SOA     Objective   Objective     Vital Signs:   Temp:  [97 °F (36.1 °C)-98.4 °F (36.9 °C)] 98.1 °F (36.7 °C)  Heart Rate:  [60-72] 68  Resp:  [16-26] 18  BP: (112-157)/() 138/65        Physical Exam:  Constitutional:   HENT: NCAT, mucous membranes moist  Respiratory: tachypnea; + rhonchi  Cardiovascular: RRR, no murmurs, rubs, or gallops  Gastrointestinal: Positive bowel sounds, soft, nontender, nondistended  Musculoskeletal: +2 bilateral ankle edema  Psychiatric: intermittently cooperative   Neurologic: Oriented x 2, strength symmetric in all extremities, Cranial Nerves grossly intact to confrontation, speech clear  Skin: No rashes    Results Reviewed:  Results from last 7 days   Lab Units 21  0900 21  0601 21  0859   WBC 10*3/mm3 6.27 6.30 5.73   HEMOGLOBIN g/dL 11.8* 12.5* 12.2*   HEMATOCRIT % 33.4* 36.2* 33.4*   PLATELETS 10*3/mm3 73* 71* 61*   INR  3.35* 3.20* 2.29*     Results from last 7 days   Lab Units 21  0013 21  1950 21  1454 21  0900 21  1216 21  0601  21  0859  21  0011 21  1730   SODIUM mmol/L 128* 129* 127* 126* 123* 127*   < > 124*   < > 123* 121*   POTASSIUM mmol/L  --   --   --  3.5 3.6 3.7   < > 3.3*   < > 2.9* 2.6*   CHLORIDE mmol/L  --   --   --  84* 84* 85*   < > 82*   < > 78* 76*   CO2 mmol/L  --   --   --  34.0* 34.0* 34.0*   < > 33.0*   < > 36.0* 35.0*   BUN mg/dL  --   --   --  33* 35* 34*   < > 34*   < > 50* 48*   CREATININE mg/dL  --   --   --   0.77 0.76 0.79   < > 0.87   < > 0.88 0.86   GLUCOSE mg/dL  --   --   --  132* 136* 140*   < > 113*   < > 117* 131*   CALCIUM mg/dL  --   --   --  9.1 8.9 8.8   < > 8.4*   < > 8.9 9.3   ALT (SGPT) U/L  --   --   --   --   --   --   --   --   --   --  38   AST (SGOT) U/L  --   --   --   --   --   --   --   --   --   --  58*   TROPONIN T ng/mL  --   --   --   --   --   --   --  0.049*  --  0.060* 0.054*   PROBNP pg/mL  --   --   --   --   --   --   --   --   --   --  3,595.0*    < > = values in this interval not displayed.     Estimated Creatinine Clearance: 113.3 mL/min (by C-G formula based on SCr of 0.77 mg/dL).    Microbiology Results Abnormal     Procedure Component Value - Date/Time    COVID PRE-OP / PRE-PROCEDURE SCREENING ORDER (NO ISOLATION) - Swab, Nasopharynx [[id number]]  (Normal) Collected: 01/04/21 2158    Lab Status: Final result Specimen: Swab from Nasopharynx Updated: 01/05/21 0121    Narrative:      The following orders were created for panel order COVID PRE-OP / PRE-PROCEDURE SCREENING ORDER (NO ISOLATION) - Swab, Nasopharynx.  Procedure                               Abnormality         Status                     ---------                               -----------         ------                     COVID-19 and FLU A/B PCR...[[id number]]  Normal              Final result                 Please view results for these tests on the individual orders.    COVID-19 and FLU A/B PCR - Swab, Nasopharynx [[id number]]  (Normal) Collected: 01/04/21 2158    Lab Status: Final result Specimen: Swab from Nasopharynx Updated: 01/05/21 0121     COVID19 Not Detected     Influenza A PCR Not Detected     Influenza B PCR Not Detected    Narrative:      Fact sheet for providers: https://www.fda.gov/media/779741/download    Fact sheet for patients: https://www.fda.gov/media/700911/download    Test performed by PCR.          Imaging Results (Last 24 Hours)     ** No results found for the last 24 hours. **          Results for  orders placed during the hospital encounter of 01/04/21   Adult Transthoracic Echo Complete W/ Cont if Necessary Per Protocol    Narrative · There is biatrial and right ventricular enlargement.  · Global and segmental LV wall motion is normal. The calculated LV   ejection is 56%.  · There is a bioprosthetic aortic valve (TAVR) that is functionally   normal.  · Mitral annular calcification with mild mitral regurgitation is   appreciated.  · The calculated RV systolic pressure is moderately-severely elevated at   45mmHg-55mmHg.  · Less than 50% IVC inspiratory collapse is appreciated.  · There is no pericardial efusion.          I have reviewed the medications:  Scheduled Meds:Pharmacy Consult, , Does not apply, BID  aspirin, 81 mg, Oral, Daily  atorvastatin, 40 mg, Oral, Daily  budesonide-formoterol, 2 puff, Inhalation, BID - RT  carvedilol, 12.5 mg, Oral, BID With Meals  ferrous sulfate, 325 mg, Oral, Daily With Breakfast  insulin lispro, 0-7 Units, Subcutaneous, TID AC  ipratropium, 0.5 mg, Nebulization, 4x Daily - RT  lactulose, 20 g, Oral, TID  magnesium oxide, 400 mg, Oral, Daily  pantoprazole, 40 mg, Oral, Every Other Day  rifAXIMin, 550 mg, Oral, Q12H  rOPINIRole, 2 mg, Oral, Every Other Day  sertraline, 100 mg, Oral, Daily  sodium chloride, 10 mL, Intravenous, Q12H  tamsulosin, 0.4 mg, Oral, Daily  warfarin (COUMADIN) (dosing per levels), , Does not apply, Daily      Continuous Infusions:Pharmacy to dose warfarin,   sodium chloride, 75 mL/hr, Last Rate: 75 mL/hr (01/07/21 1650)      PRN Meds:.albuterol  •  dextrose  •  dextrose  •  glucagon (human recombinant)  •  oxyCODONE  •  Pharmacy to dose warfarin  •  potassium chloride  •  potassium chloride  •  potassium chloride  •  Sodium Chloride (PF)  •  sodium chloride    Assessment/Plan   Assessment & Plan     Active Hospital Problems    Diagnosis  POA   • **Hyponatremia [E87.1]  Yes   • Diastolic CHF, chronic (CMS/HCC) [I50.32]  Unknown   • Hypokalemia [E87.6]   Unknown   • Other cirrhosis of liver (CMS/HCC) [K74.69]  Unknown   • Hyperbilirubinemia [E80.6]  Unknown   • S/P TAVR (transcatheter aortic valve replacement) 11/22/19 [Z95.2]  Not Applicable   • Paroxysmal atrial fibrillation [I48.0]  Yes   • Class 3 severe obesity in adult  [E66.01]  Yes   • YOUSUF on CPAP [G47.33, Z99.89]  Not Applicable   • Nonrheumatic aortic valve stenosis [I35.0]  Yes   • Coronary artery disease involving native heart [I25.10]  Yes   • Iron deficiency anemia [D50.9]  Yes   • 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        Brief Hospital Course to date:  Sai Weinberg is a 60 y.o. male with past medical history of COPD, type 2 diabetes, hypertension, hyperlipidemia, iron deficiency anemia, cold miners pneumoconiosis,?  diastolic CHF, YOUSUF on CPAP at night, coronary artery disease, history of aortic valve stenosis with TAVR, paroxysmal atrial fibrillation, history of complete heart block status post pacemaker placement, history of GI bleed who presents to the ER at request of his PCP for abnormal routine labs indicating hyponatremia and hypokalemia     Hyponatremia  --Difficult situation as patient has both peripherally volume overloaded, but laboratory data indicative of intravascular volume depletion, likely 3rd spacing secondary to low albumin/cirrhosis  -- diuretics held; s/p fluids with Na trending down   --urine studies consistent with both intravascular volume depletion and questionable SIADH (low urine Na, low serum osm, low urine osm).  - AM cortisol ok  -- nephrology consulted  - off fluid restriction and trial of fluids with improved Na     Metabolic encephalopathy   - Decrease oxycodone; DC hydroxyzine  - Cont  lactulose and rifaximin   - Continue as needed restraints to help with safety and oxygenation     Acute hypoxic resp failure  - Likely related to volume overload  - PRN bumex ordered per neph  - Obtain CXR      Diffuse anasarca  Diastolic CHF  Aortic Stenosis s/p TAVR  --Cardiology following, appreciate recommendatoins  --BiPAP PRN and qHS      Hypokalemia  --Replace per protocol      Elevated troponin  --likely type II NSTEMI, history of complete heart block status post pacer     PAF  Hx of Complete Heart block s/p PPM  -- on warfarin - pharmacy dosing      Hyperbilirubinemia  Decompensated Cirrhosis with ascites   TCP  --Ultrasound right upper quadrant showing cholelithiasis w/out choledocholithiasis or inflammation  --GI consulted - YOVANI negative, anti-smooth muscle negative; AMA pending; alpha 1 antitrypsin 173   --CT abd/pelvis showing cirrhotic liver  - cont lactulose and rifaximin   - cymbalta discontinued and zoloft started   - Paracentesis attempted 1/8 with 5 ml fluid removed      COPD, hx of coalminer's pneumoconiosis     Type 2 diabetes  --Sliding scale insulin     Hypertension     PVD s/p R fem-pop bypass and SFA stenting 2017     DVT prophylaxis:  warfarin  Disposition: I expect the patient to be discharged TBD    CODE STATUS:   Code Status and Medical Interventions:   Ordered at: 01/05/21 0010     Level Of Support Discussed With:    Patient     Code Status:    CPR     Medical Interventions (Level of Support Prior to Arrest):    Full       Piper Stoddard,   01/08/21

## 2021-01-08 NOTE — PROGRESS NOTES
Continued Stay Note  [facility]     Patient Name: Sai Weinberg  MRN: [id number]  Today's Date: 1/8/2021    Admit Date: 1/4/2021    Discharge Plan     Row Name 01/08/21 1127       Plan    Plan  home with home health    Patient/Family in Agreement with Plan  yes    Plan Comments  I spoke with Mr. Weinberg's daughter Bonny on the phone. Bonny states that after her father is discharged, she will assist her mom with his care in their home. She tells me that he is very unlikely to agree to go to rehab. Case management will arrange home health with skilled nursing, PT, and OT at the time of discharge. Mr. Weinberg has a straight cane and wheelchair at home. He will likely need a rolling walker and bedside commode arranged for home use as well. Mr. Weinberg is currently in bilateral wrist restraints. GI, Cardiology and nephrology are following. Case management will follow up with his progress on Monday.    Final Discharge Disposition Code  06 - home with home health care        Discharge Codes    No documentation.             Marvin Castro RN

## 2021-01-08 NOTE — PROGRESS NOTES
[facility] at [facility]  PROGRESS NOTE    Date of Admission: 1/4/2021  Date of Service: 01/08/21    Primary Care Physician: Dewayne Cole MD    Chief Complaint: f/u abnormal labs, encephalopathy  Problem List:   1. Coronary artery disease   A. History of remote MI, with normal heart cath 15 years ago in [place], [facility]  B. Stress MPS 10/9/19: LV mild-mod dilated, large area of moderate-severe ischemia in the inferior wall  D. RIKY to mid RCA, 10/30/19  2. Aortic stenosis, severe              A. 70 mmHg AVG; 0.83 cm² by cath, 10/30/2019              B. Cath EF equals 65%, 10/30/2019  C. ZORAIDA 11/15/19: EF 60%, severe AS with mean gradient 50mmHg, moderate MR  D. TAVR, 11/22/19  E. Echo, 1/21/20: normal LV function, bioprosthetic AV with normal function, mild MR   F. Echo, 12/13/2020: Normal functioning bioprosthetic AV, mild-moderate MR  3. Peripheral arterial disease  A. S/p remote R fem-pop bypass  B. Left femoral cutdown with angioplasty and stent placement to left SFA by Dr. Rosa January, 2017               i. Complicated by infection of left groin incision  C. CTA Abdominal aorta with runoff 09/23/19: no high grade stenosis of bilateral common femoral arteries; patent fem-popliteal graft on the right and patent trifurcation vessels; multifocal severe stenosis of the SFA on the left but without total occlusion, single vessel runoff via posterior tibial artery  4. Hypertension   5. Hyperlipidemia  6. DM2  7. Coal worker's pneumoconiosis   8. Tobacco abuse and severe COPD  A. Stopped smoking circa 2013, continues to chew tobacco  9. History of back injury with lumbar disc disease and chronic back pain   10. History of colon cancer, remote, s/p partial colectomy              A. Noncompliant with subsequent colonoscopies  11. YOUSUF on CPAP  12. Morbid obesity  13. Surgical history:  A. Partial colectomy  B. Right fem-pop bypass  C. Abdominal hernia repair  14. Paroxysmal AF,  "11/4/19               A. Hospitalized [facility] for 5 days               D. Dismissed with HF circa 11/8/19                    1. LE edema and creatinine=2.77, resolved   15. Complete heart block after TAVR, 11/25/19 s/p [place] dual chamber PPM  16. Chronic hypochromic, microcytic anemia.  17. Cirrhosis diagnosed winter 2020    Subjective      Patient remains lethargic but easily awakens. Confused at time of visit, and thinks he is in [facility].     Objective   Vitals: /65 (BP Location: Right arm, Patient Position: Lying)   Pulse 68   Temp 98.1 °F (36.7 °C) (Oral)   Resp 18   Ht 165.1 cm (65\")   Wt 104 kg (229 lb 8 oz)   SpO2 95%   BMI 38.19 kg/m²     Physical Exam:  GENERAL: Alert, cooperative, in no acute distress.   HEENT: Normocephalic, no jugular venous distention  HEART: Regular rhythm, normal rate, and no murmurs  LUNGS:  No wheezing, rales or rhonchi anteriorly. On CPAP 93%  ABDOMEN: Soft, bowel sounds present, non-tender   NEUROLOGIC: No gross focal abnormalities involving strength or sensation are noted.   EXTREMITIES: No clubbing, cyanosis,2+BLE edema.     Results:  Results from last 7 days   Lab Units 01/08/21  0847 01/07/21  0900 01/06/21  0601   WBC 10*3/mm3 6.19 6.27 6.30   HEMOGLOBIN g/dL 11.9* 11.8* 12.5*   HEMATOCRIT % 33.4* 33.4* 36.2*   PLATELETS 10*3/mm3 77* 73* 71*     Results from last 7 days   Lab Units 01/08/21  0013 01/07/21  1950 01/07/21  1454 01/07/21  0900 01/06/21  1216 01/06/21  0601   SODIUM mmol/L 128* 129* 127* 126* 123* 127*   POTASSIUM mmol/L  --   --   --  3.5 3.6 3.7   CHLORIDE mmol/L  --   --   --  84* 84* 85*   CO2 mmol/L  --   --   --  34.0* 34.0* 34.0*   BUN mg/dL  --   --   --  33* 35* 34*   CREATININE mg/dL  --   --   --  0.77 0.76 0.79   GLUCOSE mg/dL  --   --   --  132* 136* 140*      Lab Results   Component Value Date    CHOL 88 11/21/2019    TRIG 87 11/21/2019    HDL 32 (L) 11/21/2019    LDL 39 11/21/2019    AST 58 (H) 01/04/2021    ALT 38 01/04/2021 "     Results from last 7 days   Lab Units 01/04/21  1730   HEMOGLOBIN A1C % 6.20*         Results from last 7 days   Lab Units 01/04/21  1730   TSH uIU/mL 4.250*         Results from last 7 days   Lab Units 01/07/21  0900 01/06/21  0601 01/05/21  0859   PROTIME Seconds 33.6* 32.5* 24.9*   INR  3.35* 3.20* 2.29*     Results from last 7 days   Lab Units 01/05/21  0859 01/05/21  0011 01/04/21  1730   TROPONIN T ng/mL 0.049* 0.060* 0.054*     Results from last 7 days   Lab Units 01/04/21  1730   PROBNP pg/mL 3,595.0*       Intake/Output Summary (Last 24 hours) at 1/8/2021 1053  Last data filed at 1/8/2021 0700  Gross per 24 hour   Intake 1125 ml   Output 200 ml   Net 925 ml     I personally reviewed the patient's EKG/Telemetry data    Current Medications:  Pharmacy Consult, , Does not apply, BID  aspirin, 81 mg, Oral, Daily  atorvastatin, 40 mg, Oral, Daily  budesonide-formoterol, 2 puff, Inhalation, BID - RT  carvedilol, 12.5 mg, Oral, BID With Meals  ferrous sulfate, 325 mg, Oral, Daily With Breakfast  insulin lispro, 0-7 Units, Subcutaneous, TID AC  ipratropium, 0.5 mg, Nebulization, 4x Daily - RT  lactulose, 20 g, Oral, TID  magnesium oxide, 400 mg, Oral, Daily  pantoprazole, 40 mg, Oral, Every Other Day  rifAXIMin, 550 mg, Oral, Q12H  rOPINIRole, 2 mg, Oral, Every Other Day  sertraline, 100 mg, Oral, Daily  sodium chloride, 10 mL, Intravenous, Q12H  tamsulosin, 0.4 mg, Oral, Daily  warfarin (COUMADIN) (dosing per levels), , Does not apply, Daily      Pharmacy to dose warfarin,   sodium chloride, 75 mL/hr, Last Rate: 75 mL/hr (01/08/21 0933)        Assessment and Plan:     1. Diastolic heart failure  - Normal EF on echo 12/13/2020.   - CXR demonstrates increased pulmonary vascularity bilaterally without pleural effusions  - repeat echo this admission with normal LVEF, RVSP 49mmHg      2. CAD, s/p RIKY 10/2019  - asymptomatic, continue ASA, statin, BB     3. Aortic stenosis, s/p TAVR  - Reviewed most recent echo with  normal function of aortic valve     4. PAF  complete heart block  - S/p PPM  - CHADSVASC = 3, on Coumadin with therapeutic INR   HAS-BLED = 3  - Per [facility]: I discussed the patient's above issues with Dr. Reyes.  His left atrial appendage velocities were low normal in November 2019 by ZORAIDA at 40 cm/s.  He is at risk for bleeding with his diagnosis of cirrhosis and potential development of esophageal varices as well as associated thrombocytopenia.  It may be reasonable to consider him for a [facility] device in the future.  His pacemaker interrogation showed no evidence of atrial fibrillation or atrial flutter this admission   - continue to hold Warfarin and we will follow him through the weekend and make a decision RE anticoagulation      5. Hyponatremia  Hypokalemia  - per primary team/nephrology      6. Cirrhosis  - Decompensated with ascites, hepatic encephalopathy      7. Thrombocytopenia, presumed due to cirrhosis.     Electronically signed by Khushboo Cabral PA-C, 01/08/21, 10:55 AM EST.

## 2021-01-09 NOTE — PROGRESS NOTES
"Pharmacy Consult  -  Warfarin  Sai Weinberg is a  60 y.o. male   Height - 165.1 cm (65\")  Weight - 101 kg (223 lb 6.4 oz)    Consulting Service: Hospitalist  Indication: A fib  Goal INR: 2-3  Home Regimen: Warfarin 4 mg daily (pharmacist confirmed with patient on admission)    Bridge Therapy: No     Drug-Drug Interactions with current regimen:     Aspirin - may increase risk of bleeding      Sertraline- increase risk of bleeding     Ropinirole- increase INR     Warfarin Dosing During Admission:    Date  1/4 1/5 1/6 1/7 1/8 1/9      INR  1.84 2.29 3.2 3.35 3.58 4.32      Dose  5 mg 4 mg HOLD HOLD  HOLD HOLD        Education Provided: Patient on warfarin prior to admission, though patient is currently confused and not appropriate for education. Pharmacist available for education throughout admission if needed.    Discharge Follow up:     Following Provider - Dr. Cole     Follow up time range or appointment - Recommend repeat INR within 2-3 days of discharge    Labs:  Results from last 7 days   Lab Units 01/09/21  1120 01/08/21  0849 01/08/21  0847 01/07/21  0900 01/06/21  0601 01/05/21  0859 01/05/21  0016 01/05/21  0012 01/04/21  1730   INR  4.32* 3.58*  --  3.35* 3.20* 2.29* 1.97*  --  1.84*   HEMOGLOBIN g/dL 11.0*  --  11.9* 11.8* 12.5* 12.2*  --  12.4* 12.6*   HEMATOCRIT % 31.0*  --  33.4* 33.4* 36.2* 33.4*  --  34.1* 34.2*   PLATELETS 10*3/mm3 78*  --  77* 73* 71* 61*  --  60* 64*     Results from last 7 days   Lab Units 01/09/21  1120 01/09/21  0051 01/08/21  1531 01/08/21  0847  01/07/21  0900  01/04/21  1730   SODIUM mmol/L 128* 132* 129* 128*   < > 126*   < > 121*   POTASSIUM mmol/L 3.8  --   --  4.1  --  3.5   < > 2.6*   CHLORIDE mmol/L 90*  --   --  87*  --  84*   < > 76*   CO2 mmol/L 31.0*  --   --  33.0*  --  34.0*   < > 35.0*   BUN mg/dL 29*  --   --  36*  --  33*   < > 48*   CREATININE mg/dL 0.67*  --   --  0.70*  --  0.77   < > 0.86   CALCIUM mg/dL 8.4*  --   --  8.7  --  9.1   < > 9.3   BILIRUBIN " mg/dL  --   --   --  5.3*  --   --   --  3.9*   ALK PHOS U/L  --   --   --  157*  --   --   --  193*   ALT (SGPT) U/L  --   --   --  30  --   --   --  38   AST (SGOT) U/L  --   --   --  54*  --   --   --  58*   GLUCOSE mg/dL 148*  --   --  111*  --  132*   < > 131*    < > = values in this interval not displayed.     Current dietary intake: 25% of breakfast on 1/9    Diet Order   Procedures    Diet Regular; Cardiac     Assessment/Plan:     Pharmacy consulted to dose warfarin for Afib. Prior to arrival, patient on warfarin 4mg daily.  Patient's INR again supratherapeutic at 4.32 and continues to trend up. Will HOLD warfarin dose again today. Anticipate holding warfarin throughout the weekend.  Pharmacy will continue to monitor INR, signs/symptoms of bleeding, dietary intake, and drug-drug interactions. Make dose adjustments as necessary    Thank you,  Ricarda Francois, PharmD.  Pharmacy Resident  1/9/2021 11:58 EST

## 2021-01-09 NOTE — PLAN OF CARE
Restraints continued this shift--pt pulls at IV lines, oxygen tubing, and pulls off CPAP.  Oriented to self only.  Needs great encouragement to take medications. Poor appetite. Requires very frequent breaks when eating r/t respiratory status; pt very SOA with activity; maintaining adequate saturations on 4L NC.     Goal Outcome Evaluation:  Plan of Care Reviewed With: patient  Progress: no change

## 2021-01-09 NOTE — PLAN OF CARE
Goal Outcome Evaluation:  Plan of Care Reviewed With: patient  Progress: no change  Outcome Summary: Pt alert to self only and lethargic. Pt needed assistance with cleaning of bowel movement upon arrival. Pt was able to roll toward his left and right 3 times each with mod A and use of bed rails and maintain sidelying position with SBA. Pt  deferred further OOB mobility or ther ex. BUE soft restraints put back in place at end of session. Nursing notified.

## 2021-01-09 NOTE — PROGRESS NOTES
[facility] Medicine Services  PROGRESS NOTE    Patient Name: Sai Weinberg  : 1960  MRN: [id number]    Date of Admission: 2021  Primary Care Physician: Dewayne Cole MD    Subjective   Subjective     CC:  Liver failure, hyponatremia, hypoxia     HPI:  States he is ok. A bit more with it today. Oriented x 2.     ROS:  Difficult to obtain; + Pain     Objective   Objective     Vital Signs:   Temp:  [97.5 °F (36.4 °C)-99.6 °F (37.6 °C)] 99 °F (37.2 °C)  Heart Rate:  [64-80] 67  Resp:  [16-30] 20  BP: (104-148)/(58-90) 137/72        Physical Exam:  Constitutional: appears unwell   HENT: NCAT, mucous membranes moist  Respiratory: diminished in the bases; + rhonchi; tachypnea    Cardiovascular: RRR, no murmurs, rubs, or gallops  Gastrointestinal: Positive bowel sounds, soft, nontender, nondistended  Musculoskeletal: +2 bilateral ankle edema  Psychiatric: intermittently cooperative  Neurologic: Oriented x 2, strength symmetric in all extremities, Cranial Nerves grossly intact to confrontation, speech clear  Skin: No rashes    Results Reviewed:  Results from last 7 days   Lab Units 21  0849 21  0847 21  0900 21  0601   WBC 10*3/mm3  --  6.19 6.27 6.30   HEMOGLOBIN g/dL  --  11.9* 11.8* 12.5*   HEMATOCRIT %  --  33.4* 33.4* 36.2*   PLATELETS 10*3/mm3  --  77* 73* 71*   INR  3.58*  --  3.35* 3.20*     Results from last 7 days   Lab Units 21  0051 21  1531 21  0847  21  0900 21  1216  21  0859  21  0011 21  1730   SODIUM mmol/L 132* 129* 128*   < > 126* 123*   < > 124*   < > 123* 121*   POTASSIUM mmol/L  --   --  4.1  --  3.5 3.6   < > 3.3*   < > 2.9* 2.6*   CHLORIDE mmol/L  --   --  87*  --  84* 84*   < > 82*   < > 78* 76*   CO2 mmol/L  --   --  33.0*  --  34.0* 34.0*   < > 33.0*   < > 36.0* 35.0*   BUN mg/dL  --   --  36*  --  33* 35*   < > 34*   < > 50* 48*   CREATININE mg/dL  --   --  0.70*  --  0.77 0.76   <  > 0.87   < > 0.88 0.86   GLUCOSE mg/dL  --   --  111*  --  132* 136*   < > 113*   < > 117* 131*   CALCIUM mg/dL  --   --  8.7  --  9.1 8.9   < > 8.4*   < > 8.9 9.3   ALT (SGPT) U/L  --   --  30  --   --   --   --   --   --   --  38   AST (SGOT) U/L  --   --  54*  --   --   --   --   --   --   --  58*   TROPONIN T ng/mL  --   --   --   --   --   --   --  0.049*  --  0.060* 0.054*   PROBNP pg/mL  --   --   --   --   --   --   --   --   --   --  3,595.0*    < > = values in this interval not displayed.     Estimated Creatinine Clearance: 122.7 mL/min (A) (by C-G formula based on SCr of 0.7 mg/dL (L)).    Microbiology Results Abnormal     Procedure Component Value - Date/Time    COVID PRE-OP / PRE-PROCEDURE SCREENING ORDER (NO ISOLATION) - Swab, Nasopharynx [[id number]]  (Normal) Collected: 01/04/21 2158    Lab Status: Final result Specimen: Swab from Nasopharynx Updated: 01/05/21 0121    Narrative:      The following orders were created for panel order COVID PRE-OP / PRE-PROCEDURE SCREENING ORDER (NO ISOLATION) - Swab, Nasopharynx.  Procedure                               Abnormality         Status                     ---------                               -----------         ------                     COVID-19 and FLU A/B PCR...[[id number]]  Normal              Final result                 Please view results for these tests on the individual orders.    COVID-19 and FLU A/B PCR - Swab, Nasopharynx [[id number]]  (Normal) Collected: 01/04/21 2158    Lab Status: Final result Specimen: Swab from Nasopharynx Updated: 01/05/21 0121     COVID19 Not Detected     Influenza A PCR Not Detected     Influenza B PCR Not Detected    Narrative:      Fact sheet for providers: https://www.fda.gov/media/127766/download    Fact sheet for patients: https://www.fda.gov/media/289024/download    Test performed by PCR.          Imaging Results (Last 24 Hours)     Procedure Component Value Units Date/Time    CT Guided Paracentesis [[id number]]  Collected: 01/08/21 1726     Updated: 01/08/21 1735    Narrative:      PROCEDURE: CT-guided paracentesis     Procedural Personnel  Attending physician(s): GABRIEL Wilks M.D.  Fellow physician(s): None  Resident physician(s): None  Advanced practice provider(s): None     Pre-procedure diagnosis:  Liver cirrhosis with small volume ascites.  ?Unclear etiology. ?MELD-Na is 28  Hepatic encephalopathy, Hyponatremia; Thrombocytopenia  Post-procedure diagnosis: Same  Indication: Diagnostic  Additional clinical history: None     Complications: No immediate complications.       Impression:         CT-guided paracentesis with drainage of approximately 5 mL of serous  fluid. Fluid sent for laboratory analysis  Patient severely encephalopathic. Kept trying to roll over. Needle had  to be removed urgently after only obtaining 5 ml serous fluid before  patient rolled on it.     Plan:      Resume care by clinical team.  _______________________________________________________________     PROCEDURE SUMMARY:  - Limited CT  - CT-guided paracentesis  - Additional procedure(s): None     PROCEDURE DETAILS:     Pre-procedure  Consent: Informed consent for the procedure including risks, benefits  and alternatives was obtained and time-out was performed prior to the  procedure.  Preparation: The site was prepared and draped using maximal sterile  barrier technique including cutaneous antisepsis.     Anesthesia/sedation  Level of anesthesia/sedation: No sedation     Initial abdominal CT  Initial abdominal CT was performed.  Findings: Small volume ascites. A safe window for paracentesis was  identified.     Paracentesis  Local anesthesia was administered. The peritoneal cavity was accessed  and needle position confirmed by CT. Ascites was drained. The catheter  was then removed, and a sterile bandage was applied.  Paracentesis access technique: 22-gauge Chiba needle advanced under CT  fluoroscopic guidance  Needle placed: 22-gauge  Chiba  Post-drainage CT: Not performed     Radiation Dose  CT dose length product (mGy-cm): 882      Additional Details  Additional description of procedure: None  Equipment details: None  Specimens removed: Abdominal fluid  Estimated blood loss (mL): Less than 10  Standardized report: Paracentesis     Attestation  I was present and scrubbed for the entire procedure. Imaging reviewed.  Agree with final report as written.     This report was finalized on 1/8/2021 5:28 PM by Omar Wilks.       XR Chest 1 View [[id number]] Collected: 01/08/21 1558     Updated: 01/08/21 1643    Narrative:      EXAMINATION: XR CHEST 1 VW- 01/08/2021     INDICATION: E87.6-Hypokalemia; E87.1-Icba-qqgfhjlcgd and hyponatremia;  R40.0-Somnolence      COMPARISON: 01/04/2021     FINDINGS: Portable chest reveals heart to be enlarged. There are pacer  leads in satisfactory position. Patient is status post valvular  replacement with worsening of the patchy airspace disease seen  throughout the left lung and right lower lung field in the interval.  Degenerative changes seen within the spine.          Impression:      Worsening identified of the airspace disease seen throughout  the left mid and lower lung field and right lung base. The heart is  enlarged with degenerative changes seen within the spine.     D:  01/08/2021  E:  01/08/2021     This report was finalized on 1/8/2021 4:40 PM by Dr. Naty Panchal MD.             Results for orders placed during the hospital encounter of 01/04/21   Adult Transthoracic Echo Complete W/ Cont if Necessary Per Protocol    Narrative · There is biatrial and right ventricular enlargement.  · Global and segmental LV wall motion is normal. The calculated LV   ejection is 56%.  · There is a bioprosthetic aortic valve (TAVR) that is functionally   normal.  · Mitral annular calcification with mild mitral regurgitation is   appreciated.  · The calculated RV systolic pressure is moderately-severely elevated at    45mmHg-55mmHg.  · Less than 50% IVC inspiratory collapse is appreciated.  · There is no pericardial efusion.          I have reviewed the medications:  Scheduled Meds:Pharmacy Consult, , Does not apply, BID  aspirin, 81 mg, Oral, Daily  atorvastatin, 40 mg, Oral, Daily  budesonide-formoterol, 2 puff, Inhalation, BID - RT  carvedilol, 12.5 mg, Oral, BID With Meals  ferrous sulfate, 325 mg, Oral, Daily With Breakfast  insulin lispro, 0-7 Units, Subcutaneous, TID AC  ipratropium, 0.5 mg, Nebulization, 4x Daily - RT  lactulose, 20 g, Oral, TID  lidocaine, 1 patch, Transdermal, Q24H  magnesium oxide, 400 mg, Oral, Daily  pantoprazole, 40 mg, Oral, Every Other Day  rifAXIMin, 550 mg, Oral, Q12H  rOPINIRole, 2 mg, Oral, Every Other Day  sertraline, 100 mg, Oral, Daily  sodium chloride, 10 mL, Intravenous, Q12H  tamsulosin, 0.4 mg, Oral, Daily  warfarin (COUMADIN) (dosing per levels), , Does not apply, Daily      Continuous Infusions:Pharmacy to dose warfarin,       PRN Meds:.albuterol  •  bumetanide  •  dextrose  •  dextrose  •  glucagon (human recombinant)  •  oxyCODONE  •  Pharmacy to dose warfarin  •  potassium chloride  •  potassium chloride  •  potassium chloride  •  Sodium Chloride (PF)  •  sodium chloride    Assessment/Plan   Assessment & Plan     Active Hospital Problems    Diagnosis  POA   • **Hyponatremia [E87.1]  Yes   • Diastolic CHF, chronic (CMS/HCC) [I50.32]  Unknown   • Hypokalemia [E87.6]  Unknown   • Other cirrhosis of liver (CMS/HCC) [K74.69]  Unknown   • Hyperbilirubinemia [E80.6]  Unknown   • S/P TAVR (transcatheter aortic valve replacement) 11/22/19 [Z95.2]  Not Applicable   • Paroxysmal atrial fibrillation [I48.0]  Yes   • Class 3 severe obesity in adult  [E66.01]  Yes   • YOUSUF on CPAP [G47.33, Z99.89]  Not Applicable   • Nonrheumatic aortic valve stenosis [I35.0]  Yes   • Coronary artery disease involving native heart [I25.10]  Yes   • Iron deficiency anemia [D50.9]  Yes   • 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        Brief Hospital Course to date:  Sai Weinberg is a 60 y.o. male with past medical history of COPD, type 2 diabetes, hypertension, hyperlipidemia, iron deficiency anemia, cold miners pneumoconiosis,?  diastolic CHF, YOUSUF on CPAP at night, coronary artery disease, history of aortic valve stenosis with TAVR, paroxysmal atrial fibrillation, history of complete heart block status post pacemaker placement, history of GI bleed who presents to the ER at request of his PCP for abnormal routine labs indicating hyponatremia and hypokalemia     Hyponatremia  --Difficult situation as patient has both peripherally volume overloaded, but laboratory data indicative of intravascular volume depletion, likely 3rd spacing secondary to low albumin/cirrhosis  --urine studies consistent with both intravascular volume depletion and questionable SIADH (low urine Na, low serum osm, low urine osm).  - AM cortisol ok  -- nephrology consulted  - continue IVF per nephrology      Metabolic encephalopathy   - Decrease oxycodone; DC hydroxyzine  - Cont lactulose and rifaximin   - Continue as needed restraints to help with safety and oxygenation      Acute hypoxic resp failure  - Likely related to volume overload  - PRN bumex ordered per neph  -  CXR with worsening airspace disease -- likely volume/atelectasis, however, can't rule out PNA and with worsening resp failure will treat  - Start rocephin, doxy (1/9)  - BCx pending; procal pending      Diffuse anasarca  Diastolic CHF  Aortic Stenosis s/p TAVR  --Cardiology following, appreciate recommendatoins  --BiPAP PRN and qHS      Hypokalemia  --Replace per protocol      Elevated  troponin  --likely type II NSTEMI, history of complete heart block status post pacer     PAF  Hx of Complete Heart block s/p PPM  -- on warfarin - pharmacy dosing      Hyperbilirubinemia  Decompensated Cirrhosis with ascites   TCP  --Ultrasound right upper quadrant showing cholelithiasis w/out choledocholithiasis or inflammation  --GI consulted - YOVANI negative, anti-smooth muscle negative; AMA pending; alpha 1 antitrypsin 173   --CT abd/pelvis showing cirrhotic liver  - cont lactulose and rifaximin   - cymbalta discontinued and zoloft started   - Paracentesis attempted 1/8 however, difficulty tolerating      COPD, hx of coalminer's pneumoconiosis     Type 2 diabetes  --Sliding scale insulin     Hypertension     PVD s/p R fem-pop bypass and SFA stenting 2017 - cont ASA, statin      DVT prophylaxis:  warfarin  Disposition: I expect the patient to be discharged TBD    CODE STATUS:   Code Status and Medical Interventions:   Ordered at: 01/05/21 0010     Level Of Support Discussed With:    Patient     Code Status:    CPR     Medical Interventions (Level of Support Prior to Arrest):    Full       Piper Stoddard DO  01/09/21

## 2021-01-09 NOTE — PROGRESS NOTES
" LOS: 5 days   Patient Care Team:  Dewayne Cole MD as PCP - General  Dewayne Cole MD as PCP - Family Medicine    Chief Complaint: Hyponatremia     Subjective     Abnormal Lab  Pertinent negatives include no anorexia, chest pain or fever.       Subjective:  Symptoms:  Stable.  No shortness of breath, chest pain, headache, chest pressure, anorexia or diarrhea.    Diet:  Poor intake.    Activity level: Normal.    Pain:  He reports no pain.      Na 128- 132 today. Significant AMS likely hepatic encephalopathy no significant improvement. SOB better. Unable to get paracentesis yesterday     History taken from: patient    Objective     Vital Sign Min/Max for last 24 hours  Temp  Min: 97.8 °F (36.6 °C)  Max: 99.6 °F (37.6 °C)   BP  Min: 104/58  Max: 148/75   Pulse  Min: 64  Max: 80   Resp  Min: 18  Max: 30   SpO2  Min: 85 %  Max: 96 %   Flow (L/min)  Min: 3  Max: 5   Weight  Min: 101 kg (223 lb 6.4 oz)  Max: 101 kg (223 lb 6.4 oz)     Flowsheet Rows      First Filed Value   Admission Height  165.1 cm (65\") Documented at 01/04/2021 1707   Admission Weight  104 kg (230 lb) Documented at 01/04/2021 1707          I/O this shift:  In: 336 [P.O.:336]  Out: -   I/O last 3 completed shifts:  In: 1465 [P.O.:1465]  Out: 600 [Urine:600]    Objective:  General Appearance:  Comfortable.    Vital signs: (most recent): Blood pressure 137/72, pulse 67, temperature 99 °F (37.2 °C), temperature source Axillary, resp. rate 20, height 165.1 cm (65\"), weight 101 kg (223 lb 6.4 oz), SpO2 92 %.  No fever.    Output: Producing urine.    HEENT: Normal HEENT exam.    Lungs:  Normal respiratory rate.  Breath sounds clear to auscultation.  He is not in respiratory distress.  No rales.    Heart: Normal rate.  Regular rhythm.  S1 normal and S2 normal.    Abdomen: Abdomen is soft and non-distended.  There are no signs of ascites.  Bowel sounds are normal.     Extremities: There is no dependent edema.    Pulses: Distal pulses are intact.  "   Neurological: Patient is alert and oriented to person, place and time.  Normal strength.              Results Review:     I reviewed the patient's new clinical results.    WBC WBC   Date Value Ref Range Status   01/09/2021 5.98 3.40 - 10.80 10*3/mm3 Final   01/08/2021 6.19 3.40 - 10.80 10*3/mm3 Final   01/07/2021 6.27 3.40 - 10.80 10*3/mm3 Final      HGB Hemoglobin   Date Value Ref Range Status   01/09/2021 11.0 (L) 13.0 - 17.7 g/dL Final   01/08/2021 11.9 (L) 13.0 - 17.7 g/dL Final   01/07/2021 11.8 (L) 13.0 - 17.7 g/dL Final      HCT Hematocrit   Date Value Ref Range Status   01/09/2021 31.0 (L) 37.5 - 51.0 % Final   01/08/2021 33.4 (L) 37.5 - 51.0 % Final   01/07/2021 33.4 (L) 37.5 - 51.0 % Final      Platlets No results found for: LABPLAT   MCV MCV   Date Value Ref Range Status   01/09/2021 94.2 79.0 - 97.0 fL Final   01/08/2021 95.4 79.0 - 97.0 fL Final   01/07/2021 94.1 79.0 - 97.0 fL Final          Sodium Sodium   Date Value Ref Range Status   01/09/2021 128 (L) 136 - 145 mmol/L Final   01/09/2021 132 (L) 136 - 145 mmol/L Final   01/08/2021 129 (L) 136 - 145 mmol/L Final   01/08/2021 128 (L) 136 - 145 mmol/L Final   01/08/2021 128 (L) 136 - 145 mmol/L Final   01/07/2021 129 (L) 136 - 145 mmol/L Final   01/07/2021 127 (L) 136 - 145 mmol/L Final   01/07/2021 126 (L) 136 - 145 mmol/L Final   01/06/2021 123 (L) 136 - 145 mmol/L Final      Potassium Potassium   Date Value Ref Range Status   01/09/2021 3.8 3.5 - 5.2 mmol/L Final     Comment:     Slight hemolysis detected by analyzer. Results may be affected.   01/08/2021 4.1 3.5 - 5.2 mmol/L Final     Comment:     Slight hemolysis detected by analyzer. Results may be affected.   01/07/2021 3.5 3.5 - 5.2 mmol/L Final   01/06/2021 3.6 3.5 - 5.2 mmol/L Final     Comment:     Slight hemolysis detected by analyzer. Results may be affected.      Chloride Chloride   Date Value Ref Range Status   01/09/2021 90 (L) 98 - 107 mmol/L Final   01/08/2021 87 (L) 98 - 107 mmol/L  Final   01/07/2021 84 (L) 98 - 107 mmol/L Final   01/06/2021 84 (L) 98 - 107 mmol/L Final      CO2 CO2   Date Value Ref Range Status   01/09/2021 31.0 (H) 22.0 - 29.0 mmol/L Final   01/08/2021 33.0 (H) 22.0 - 29.0 mmol/L Final   01/07/2021 34.0 (H) 22.0 - 29.0 mmol/L Final   01/06/2021 34.0 (H) 22.0 - 29.0 mmol/L Final      BUN BUN   Date Value Ref Range Status   01/09/2021 29 (H) 8 - 23 mg/dL Final   01/08/2021 36 (H) 8 - 23 mg/dL Final   01/07/2021 33 (H) 8 - 23 mg/dL Final   01/06/2021 35 (H) 8 - 23 mg/dL Final      Creatinine Creatinine   Date Value Ref Range Status   01/09/2021 0.67 (L) 0.76 - 1.27 mg/dL Final   01/08/2021 0.70 (L) 0.76 - 1.27 mg/dL Final   01/07/2021 0.77 0.76 - 1.27 mg/dL Final   01/06/2021 0.76 0.76 - 1.27 mg/dL Final      Calcium Calcium   Date Value Ref Range Status   01/09/2021 8.4 (L) 8.6 - 10.5 mg/dL Final   01/08/2021 8.7 8.6 - 10.5 mg/dL Final   01/07/2021 9.1 8.6 - 10.5 mg/dL Final   01/06/2021 8.9 8.6 - 10.5 mg/dL Final      PO4 No results found for: CAPO4   Albumin Albumin   Date Value Ref Range Status   01/08/2021 2.10 (L) 3.50 - 5.20 g/dL Final      Magnesium Magnesium   Date Value Ref Range Status   01/07/2021 2.3 1.6 - 2.4 mg/dL Final      Uric Acid No results found for: URICACID     Medication Review: Yes    Assessment/Plan       Hyponatremia    Dyslipidemia on statins     COPD     T2DM on metformin     Esophageal reflux    Hypertension    PAD s/p L SFA stent and R fem-pop     Iron deficiency anemia    Coal workers pneumoconiosis     Coronary artery disease involving native heart    Nonrheumatic aortic valve stenosis    YOUSUF on CPAP    Paroxysmal atrial fibrillation    Class 3 severe obesity in adult     S/P TAVR (transcatheter aortic valve replacement) 11/22/19    Diastolic CHF, chronic (CMS/HCC)    Hypokalemia    Other cirrhosis of liver (CMS/HCC)    Hyperbilirubinemia      Assessment & Plan     Hypovolemic hyponatremia vs hyponatremia related to liver disease.   Na on  admission 121. Most recent 128  Appetite remains poor.   Urine Na<20 Urine osm 333 serum osm 266.    Alkalosis: Likely contraction alkalosis      Recs  - Restart  NS @  50cc/hr  - Diuretics on PRN basis   - NA check Q 12hr.    Lonnie Elizabeth MD  01/09/21  11:59 EST

## 2021-01-09 NOTE — THERAPY TREATMENT NOTE
Patient Name: Sai Weinberg  : 1960    MRN: [id number]                              Today's Date: 2021       Admit Date: 2021    Visit Dx:     ICD-10-CM ICD-9-CM   1. Hypokalemia  E87.6 276.8   2. Hyponatremia  E87.1 276.1   3. Somnolence  R40.0 780.09     Patient Active Problem List   Diagnosis   • Dyslipidemia on statins    • Arthritis   • COPD    • T2DM on metformin    • Esophageal reflux   • Hypertension   • Chewing tobacco use   • PAD s/p L SFA stent and R fem-pop    • LANA- resolved    • Iron deficiency anemia   • Coal workers pneumoconiosis    • Coronary artery disease involving native heart   • Nonrheumatic aortic valve stenosis   • Acute on chronic systolic heart failure    • YOUSUF on CPAP   • GIB (recent + FOBT without overt bleeding)    • Paroxysmal atrial fibrillation   • Former smoker   • Class 3 severe obesity in adult    • CHB (complete heart block) (CMS/HCC)   • S/P TAVR (transcatheter aortic valve replacement) 19   • Hyponatremia   • Diastolic CHF, chronic (CMS/HCC)   • Hypokalemia   • Other cirrhosis of liver (CMS/HCC)   • Hyperbilirubinemia     Past Medical History:   Diagnosis Date   • Arthritis    • Black lung disease (CMS/HCC)    • BPH (benign prostatic hyperplasia)    • Cataract    • COLD (chronic obstructive lung disease) (CMS/HCC)    • Colon polyps    • Diabetes mellitus (CMS/HCC)     SINCE    • Dyslipidemia    • Esophageal reflux    • Flu     HX   • Heart attack (CMS/HCC)        • Herniated lumbar intervertebral disc    • History of colon resection    • Hyperlipidemia    • Hypertension    • Malignant neoplasm of colon (CMS/HCC)    • On home oxygen therapy     prn   • Open wound of left hip and thigh with complication 3/12/2017    Open draining left leg wound from femoral cutdown and angioplasty/stenting of superficial artery 2017   • Peripheral vascular disease (CMS/HCC)    • Renal failure    • Sleep apnea with use of continuous positive airway pressure  (CPAP)    • Wears dentures    • Wears glasses      Past Surgical History:   Procedure Laterality Date   • ANGIOPLASTY FEMORAL ARTERY N/A 1/17/2017    Procedure: left femoral cutdown with aortagram and left SFA stent and angioplasty;  Surgeon: Heladio Rosa MD;  Location:  [facility];  Service:    • AORTAGRAM N/A 1/17/2017    Procedure: AORTAGRAM WITH RUNOFFS and  STENT left SFA;  Surgeon: Heladio Rosa MD;  Location:  [email or web address];  Service:    • AORTIC VALVE REPAIR/REPLACEMENT N/A 11/22/2019    Procedure: TRANSAPICAL TRANSCATHETER AORTIC VALVE REPLACEMENT WITH TRANSESOPHAGEAL ECHOCARDIOLGRAM;  Surgeon: Danish St MD;  Location:  [email or web address];  Service: Cardiothoracic   • AORTIC VALVE REPAIR/REPLACEMENT N/A 11/22/2019    Procedure: Transapical Transcatheter Aortic Valve Replacement;  Surgeon: Lory Shepherd MD;  Location:  [email or web address];  Service: Cardiovascular   • CARDIAC CATHETERIZATION      NO INTERVENTION   • CARDIAC CATHETERIZATION Left 10/30/2019    Procedure: Left Heart Cath;  Surgeon: Milad Cordon MD;  Location:  [facility];  Service: Cardiology   • CARDIAC ELECTROPHYSIOLOGY PROCEDURE Left 11/25/2019    Procedure: Pacemaker DC new;  Surgeon: Maranda Cerda MD;  Location:  [facility];  Service: Cardiology   • COLON SURGERY      x 2   • COLONOSCOPY     • FEMORAL FEMORAL BYPASS N/A 3/13/2017    Procedure: INCISION AND DRAINAGE LEFT GROIN HEMATOMA;  Surgeon: Heladio Rosa MD;  Location:  [facility];  Service:    • FEMORAL POPLITEAL BYPASS Right 01/26/2016   • OTHER SURGICAL HISTORY      PTA ILIAC STENOSIS WITH STENT   • TRANSESOPHAGEAL ECHOCARDIOGRAM (ZORAIDA) N/A 11/22/2019    Procedure: TRANSESOPHAGEAL ECHOCARDIOGRAM WITH ANESTHESIA;  Surgeon: Danish St MD;  Location: [facility];  Service: Cardiothoracic     General Information     Row Name 01/09/21 1142          Physical Therapy Time and Intention     Document Type  therapy note (daily note)  -     Mode of Treatment  physical therapy  -     Row Name 01/09/21 1142          General Information    Patient Profile Reviewed  yes  -     Existing Precautions/Restrictions  fall;oxygen therapy device and L/min;pacemaker  -     Row Name 01/09/21 1142          Cognition    Orientation Status (Cognition)  oriented to;person;disoriented to;place;situation;time  -     Row Name 01/09/21 1142          Safety Issues, Functional Mobility    Safety Issues Affecting Function (Mobility)  ability to follow commands;at risk behavior observed;awareness of need for assistance;impulsivity;insight into deficits/self-awareness;judgment;problem-solving;safety precaution awareness;safety precautions follow-through/compliance;positioning of assistive device;sequencing abilities  -     Impairments Affecting Function (Mobility)  balance;cognition;endurance/activity tolerance;postural/trunk control;pain;strength;shortness of breath  -     Cognitive Impairments, Mobility Safety/Performance  attention;awareness, need for assistance;insight into deficits/self-awareness;judgment;problem-solving/reasoning;safety precaution awareness;safety precaution follow-through;sequencing abilities  -     Comment, Safety Issues/Impairments (Mobility)  Needs mod/max cues and mobility limited by confusion  -       User Key  (r) = Recorded By, (t) = Taken By, (c) = Cosigned By    Initials Name Provider Type     Patrick Bolden, PT Physical Therapist        Mobility     Row Name 01/09/21 1145          Bed Mobility    Bed Mobility  rolling left;rolling right;scooting/bridging  -     Rolling Left [place] (Bed Mobility)  moderate assist (50% patient effort);verbal cues;nonverbal cues (demo/gesture)  -     Rolling Right [place] (Bed Mobility)  moderate assist (50% patient effort);verbal cues;nonverbal cues (demo/gesture)  -     Scooting/Bridging [place] (Bed Mobility)  maximum  assist (25% patient effort);verbal cues;1 person assist  -     Assistive Device (Bed Mobility)  bed rails  -     Comment (Bed Mobility)  Pt rolled to his right and left 3 times each requiring verbal cues and tactile cues to initiate movements with limitation due to confusion. Pt able to maintain sidelying position for multiple minutes with grasping of bed rail for therapist to assist with hygiene.  -     Row Name 01/09/21 1145          Transfers    Comment (Transfers)  Pt deferred OOB mobility after cleaing.  -     Row Name 01/09/21 1145          Gait/Stairs (Locomotion)    Comment (Gait/Stairs)  Pt deferred OOB mobility after cleaing.  -       User Key  (r) = Recorded By, (t) = Taken By, (c) = Cosigned By    Initials Name Provider Type    Patrick Guadalupe, PT Physical Therapist        Obj/Interventions    No documentation.       Goals/Plan    No documentation.       Clinical Impression     Row Name 01/09/21 1146          Pain    Additional Documentation  Pain Scale: FACES Pre/Post-Treatment (Group)  -     Row Name 01/09/21 1146          Pain Scale: FACES Pre/Post-Treatment    Pain: FACES Scale, Pretreatment  4-->hurts little more  -     Posttreatment Pain Rating  4-->hurts little more  -     Pain Location - Side  Bilateral  -     Pain Location - Orientation  lower  -     Pain Location  back  -     Pre/Posttreatment Pain Comment  tolerated, increased with bed mobility  -     Row Name 01/09/21 1146          Plan of Care Review    Plan of Care Reviewed With  patient  -     Progress  no change  -     Outcome Summary  Pt alert to self only and lethargic. Pt needed assistance with cleaning of bowel movement upon arrival. Pt was able to roll toward his left and right 3 times each with mod A and use of bed rails and maintain sidelying position with SBA. Pt  deferred further OOB mobility or ther ex. BUE soft restraints put back in place at end of session. Nursing notified.  -     Row Name  01/09/21 1146          Therapy Assessment/Plan (PT)    Rehab Potential (PT)  fair, will monitor progress closely  -     Criteria for Skilled Interventions Met (PT)  yes;meets criteria;skilled treatment is necessary  -     Row Name 01/09/21 1146          Vital Signs    Pre Systolic BP Rehab  150  -JH     Pre Treatment Diastolic BP  85  -     Pre SpO2 (%)  94  -JH     O2 Delivery Pre Treatment  supplemental O2  -JH     Intra SpO2 (%)  91  -JH     O2 Delivery Intra Treatment  supplemental O2  -JH     Post SpO2 (%)  92  -     O2 Delivery Post Treatment  supplemental O2  -JH     Pre Patient Position  Supine  -     Intra Patient Position  Side Lying  -     Post Patient Position  Supine  -     Row Name 01/09/21 1146          Positioning and Restraints    In Bed  notified nsg;supine;call light within reach;encouraged to call for assist;exit alarm on  -     Restraints  released:;reapplied:;notified nsg:  -       User Key  (r) = Recorded By, (t) = Taken By, (c) = Cosigned By    Initials Name Provider Type    Patrick Guadalupe, MARJORIE Physical Therapist        Outcome Measures     Row Name 01/09/21 1150          How much help from another person do you currently need...    Turning from your back to your side while in flat bed without using bedrails?  2  -JH     Moving from lying on back to sitting on the side of a flat bed without bedrails?  2  -JH     Moving to and from a bed to a chair (including a wheelchair)?  2  -JH     Standing up from a chair using your arms (e.g., wheelchair, bedside chair)?  2  -JH     Climbing 3-5 steps with a railing?  1  -JH     To walk in hospital room?  2  -     AM-PAC 6 Clicks Score (PT)  11  -     Row Name 01/09/21 1150          Functional Assessment    Outcome Measure Options  AM-PAC 6 Clicks Basic Mobility (PT)  -       User Key  (r) = Recorded By, (t) = Taken By, (c) = Cosigned By    Initials Name Provider Type    Patrick Guadalupe, MARJORIE Physical Therapist        Physical  Therapy Education                 Title: PT OT SLP Therapies (Not Started)     Topic: Physical Therapy (In Progress)     Point: Mobility training (In Progress)     Learning Progress Summary           Patient Acceptance, E,TB, NR by  at 1/9/2021 1150    Comment: edu on sequencing with bed mobility    Acceptance, E,TB, NR by  at 1/7/2021 1511    Comment: edu on safety during mobility    Acceptance, E,D, NR by LR at 1/5/2021 0925    Comment: Educated on benefits of mobility and being OOB. Educated on safety with mobility, correct supine to sit t/f technique, correct sit<->stand t/f technqiue, correct gait mechanics, and progression of POC.                   Point: Home exercise program (In Progress)     Learning Progress Summary           Patient Acceptance, E,TB, NR by  at 1/9/2021 1150    Comment: edu on sequencing with bed mobility    Acceptance, E,TB, NR by  at 1/7/2021 1511    Comment: edu on safety during mobility    Acceptance, E,D, NR by LR at 1/5/2021 0925    Comment: Educated on benefits of mobility and being OOB. Educated on safety with mobility, correct supine to sit t/f technique, correct sit<->stand t/f technqiue, correct gait mechanics, and progression of POC.                   Point: Body mechanics (In Progress)     Learning Progress Summary           Patient Acceptance, E,TB, NR by  at 1/9/2021 1150    Comment: edu on sequencing with bed mobility    Acceptance, E,TB, NR by  at 1/7/2021 1511    Comment: edu on safety during mobility    Acceptance, E,D, NR by LR at 1/5/2021 0925    Comment: Educated on benefits of mobility and being OOB. Educated on safety with mobility, correct supine to sit t/f technique, correct sit<->stand t/f technqiue, correct gait mechanics, and progression of POC.                   Point: Precautions (In Progress)     Learning Progress Summary           Patient Acceptance, E,TB, NR by  at 1/9/2021 1150    Comment: edu on sequencing with bed mobility    Acceptance,  E,TB, NR by  at 1/7/2021 1511    Comment: edu on safety during mobility    Acceptance, E,D, NR by  at 1/5/2021 0925    Comment: Educated on benefits of mobility and being OOB. Educated on safety with mobility, correct supine to sit t/f technique, correct sit<->stand t/f technqiue, correct gait mechanics, and progression of POC.                               User Key     Initials Effective Dates Name Provider Type Discipline     06/19/15 -  Zahra Seo, PT Physical Therapist PT     09/22/20 -  Patrick Bolden PT Physical Therapist PT              PT Recommendation and Plan     Plan of Care Reviewed With: patient  Progress: no change  Outcome Summary: Pt alert to self only and lethargic. Pt needed assistance with cleaning of bowel movement upon arrival. Pt was able to roll toward his left and right 3 times each with mod A and use of bed rails and maintain sidelying position with SBA. Pt  deferred further OOB mobility or ther ex. BUE soft restraints put back in place at end of session. Nursing notified.     Time Calculation:   PT Charges     Row Name 01/09/21 1151             Time Calculation    Start Time  1105  -      PT Received On  01/09/21  -      PT Goal Re-Cert Due Date  01/15/21  -         Time Calculation- PT    Total Timed Code Minutes- PT  28 minute(s)  -         Timed Charges    89572 - PT Therapeutic Activity Minutes  28  -        User Key  (r) = Recorded By, (t) = Taken By, (c) = Cosigned By    Initials Name Provider Type     Patrick Bolden, PT Physical Therapist        Therapy Charges for Today     Code Description Service Date Service Provider Modifiers Qty    [id number] HC PT THERAPEUTIC ACT EA 15 MIN 1/9/2021 Patrick Bolden, PT GP 2          PT G-Codes  Outcome Measure Options: AM-PAC 6 Clicks Basic Mobility (PT)  AM-PAC 6 Clicks Score (PT): 11  AM-PAC 6 Clicks Score (OT): 11    Patrick Bolden PT  1/9/2021

## 2021-01-10 PROBLEM — I46.9 CARDIAC ARREST (HCC): Status: ACTIVE | Noted: 2021-01-01

## 2021-01-10 NOTE — PROGRESS NOTES
"GI Daily Progress Note  Subjective:    Chief Complaint: Follow-up decompensated liver cirrhosis    Mr. Weinberg is resting in bed.  Is alert to person and time disoriented to place and situation.  Notes he has never been diagnosed with cirrhosis in the past.  No new or worsening GI symptoms documented; had 3 bowel movements yesterday but none overnight.  Denies any pain at this time.    Objective:    /65 (BP Location: Right arm, Patient Position: Lying)   Pulse 66   Temp 98.1 °F (36.7 °C) (Axillary)   Resp 20   Ht 165.1 cm (65\")   Wt 101 kg (222 lb 12.8 oz)   SpO2 90%   BMI 37.08 kg/m²     Physical Exam  Vitals signs and nursing note reviewed.   Constitutional:       General: He is not in acute distress.     Appearance: Normal appearance. He is obese. He is not ill-appearing or toxic-appearing.   HENT:      Head: Normocephalic and atraumatic.   Eyes:      General: No scleral icterus.     Extraocular Movements: Extraocular movements intact.      Conjunctiva/sclera: Conjunctivae normal.      Pupils: Pupils are equal, round, and reactive to light.   Cardiovascular:      Rate and Rhythm: Normal rate and regular rhythm.      Pulses: Normal pulses.      Heart sounds: Normal heart sounds, S1 normal and S2 normal.   Pulmonary:      Effort: Pulmonary effort is normal. No respiratory distress.      Breath sounds: No stridor. Wheezing and rales present. No rhonchi.      Comments: Wet nonproductive cough  Abdominal:      General: Abdomen is flat. Bowel sounds are normal. There is distension.      Palpations: Abdomen is soft. There is no mass.      Tenderness: There is no abdominal tenderness. There is no guarding or rebound.      Hernia: No hernia is present.      Comments: Exam for organomegaly limited due to abdominal obesity.   Skin:     General: Skin is warm and dry.      Capillary Refill: Capillary refill takes less than 2 seconds.      Coloration: Skin is not jaundiced or pale.   Neurological:      Mental " Status: He is alert.      Comments: Alert to person and time but confused to place and situation.         Lab  Lab Results   Component Value Date    WBC 5.98 01/09/2021    HGB 11.0 (L) 01/09/2021    HGB 11.9 (L) 01/08/2021    HGB 11.8 (L) 01/07/2021    MCV 94.2 01/09/2021    PLT 78 (L) 01/09/2021    INR 4.32 (H) 01/09/2021    INR 3.58 (H) 01/08/2021    INR 3.35 (H) 01/07/2021    INR 3.20 (H) 01/06/2021    INR 2.29 (H) 01/05/2021       Lab Results   Component Value Date    GLUCOSE 148 (H) 01/09/2021    BUN 29 (H) 01/09/2021    CREATININE 0.67 (L) 01/09/2021    EGFRIFNONA 121 01/09/2021    BCR 43.3 (H) 01/09/2021     (L) 01/09/2021    K 3.8 01/09/2021    CO2 31.0 (H) 01/09/2021    CALCIUM 8.4 (L) 01/09/2021    ALBUMIN 2.10 (L) 01/08/2021    ALKPHOS 157 (H) 01/08/2021    BILITOT 5.3 (H) 01/08/2021    BILIDIR 1.5 (H) 01/04/2021    ALT 30 01/08/2021    AST 54 (H) 01/08/2021       Assessment:      Hyponatremia    Dyslipidemia on statins     COPD     T2DM on metformin     Esophageal reflux    Hypertension    PAD s/p L SFA stent and R fem-pop     Iron deficiency anemia    Coal workers pneumoconiosis     Coronary artery disease involving native heart    Nonrheumatic aortic valve stenosis    YOUSUF on CPAP    Paroxysmal atrial fibrillation    Class 3 severe obesity in adult     S/P TAVR (transcatheter aortic valve replacement) 11/22/19    Diastolic CHF, chronic (CMS/HCC)    Hypokalemia    Other cirrhosis of liver (CMS/HCC)    Hyperbilirubinemia    Liver cirrhosis with ascites, MELD-Na: 28  Hepatic encephalopathy, improving  Hyponatremia, improving  Diastolic heart failure  Atrial fibrillation on chronic anticoagulation with warfarin  Thrombocytopenia, related #1    Plan:  >>> Patient is still encephalopathic while on lactulose and Xifaxan therapy.  >>> Recommend a one-time lactulose enema  >>> If encephalopathy has improved, patient may require paracentesis as he is experiencing abdominal distention.    Martir Esparza,  GLORIA  01/10/21  11:23 EST

## 2021-01-10 NOTE — PROGRESS NOTES
[facility] Medicine Services  PROGRESS NOTE    Patient Name: Sai Weinberg  : 1960  MRN: [id number]    Date of Admission: 2021  Primary Care Physician: Dewayne Cole MD    Subjective   Subjective     CC:  Liver failure, hyponatremia, hypoxia     HPI:  Resting in bed in no acute distress but he remains in restraints.  He was agitated last night and earlier today.  His daughter is at bedside and patient recognizes her.  Answer some of the questions appropriately.    ROS:  Unable to obtain    Objective   Objective     Vital Signs:   Temp:  [97.6 °F (36.4 °C)-98.8 °F (37.1 °C)] 97.6 °F (36.4 °C)  Heart Rate:  [61-70] 65  Resp:  [18-22] 20  BP: (129-143)/(59-77) 141/71        Physical Exam:  Constitutional: Resting in bed in no acute respiratory or hemodynamic distress however looks very uncomfortable.  HENT: NCAT, mucous membranes moist  Respiratory: diminished in the bases; + rhonchi; tachypnea    Cardiovascular: RRR, no murmurs, rubs, or gallops  Gastrointestinal: Abdomen is very obese positive bowel sounds, soft, nontender, distended  Musculoskeletal: +2 bilateral ankle edema, morbid obesity.  Neurologic: Awake, alert, follows simple commands, confused.    Skin: No rashes    Results Reviewed:  Results from last 7 days   Lab Units 01/10/21  1024 21  1120 21  0849 21  0847   WBC 10*3/mm3 5.71 5.98  --  6.19   HEMOGLOBIN g/dL 10.7* 11.0*  --  11.9*   HEMATOCRIT % 30.9* 31.0*  --  33.4*   PLATELETS 10*3/mm3 83* 78*  --  77*   INR  4.66* 4.32* 3.58*  --    PROCALCITONIN ng/mL  --  1.07*  --   --      Results from last 7 days   Lab Units 01/10/21  1024 218 21  1120  21  0847  21  0859  21  0011 21  1730   SODIUM mmol/L 133* 134* 128*   < > 128*   < > 124*   < > 123* 121*   POTASSIUM mmol/L 3.9  --  3.8  --  4.1   < > 3.3*   < > 2.9* 2.6*   CHLORIDE mmol/L 94*  --  90*  --  87*   < > 82*   < > 78* 76*   CO2 mmol/L 32.0*   --  31.0*  --  33.0*   < > 33.0*   < > 36.0* 35.0*   BUN mg/dL 32*  --  29*  --  36*   < > 34*   < > 50* 48*   CREATININE mg/dL 0.60*  --  0.67*  --  0.70*   < > 0.87   < > 0.88 0.86   GLUCOSE mg/dL 157*  --  148*  --  111*   < > 113*   < > 117* 131*   CALCIUM mg/dL 8.8  --  8.4*  --  8.7   < > 8.4*   < > 8.9 9.3   ALT (SGPT) U/L  --   --   --   --  30  --   --   --   --  38   AST (SGOT) U/L  --   --   --   --  54*  --   --   --   --  58*   TROPONIN T ng/mL  --   --   --   --   --   --  0.049*  --  0.060* 0.054*   PROBNP pg/mL  --   --   --   --   --   --   --   --   --  3,595.0*    < > = values in this interval not displayed.     Estimated Creatinine Clearance: 143.1 mL/min (A) (by C-G formula based on SCr of 0.6 mg/dL (L)).    Microbiology Results Abnormal     Procedure Component Value - Date/Time    S. Pneumo Ag Urine or CSF - Urine, Urine, Clean Catch [[id number]]  (Normal) Collected: 01/10/21 0114    Lab Status: Final result Specimen: Urine, Clean Catch Updated: 01/10/21 1225     Strep Pneumo Ag Negative    Legionella Antigen, Urine - Urine, Urine, Clean Catch [[id number]]  (Normal) Collected: 01/10/21 0114    Lab Status: Final result Specimen: Urine, Clean Catch Updated: 01/10/21 1224     LEGIONELLA ANTIGEN, URINE Negative    Blood Culture - Blood, Blood, Arterial Line [[id number]] Collected: 01/09/21 1151    Lab Status: Preliminary result Specimen: Blood, Arterial Line Updated: 01/10/21 1201     Blood Culture No growth at 24 hours    Blood Culture - Blood, Wrist, Right [[id number]] Collected: 01/09/21 1151    Lab Status: Preliminary result Specimen: Blood from Wrist, Right Updated: 01/10/21 1201     Blood Culture No growth at 24 hours    COVID PRE-OP / PRE-PROCEDURE SCREENING ORDER (NO ISOLATION) - Swab, Nasopharynx [[id number]]  (Normal) Collected: 01/04/21 2158    Lab Status: Final result Specimen: Swab from Nasopharynx Updated: 01/05/21 0121    Narrative:      The following orders were created for panel order  COVID PRE-OP / PRE-PROCEDURE SCREENING ORDER (NO ISOLATION) - Swab, Nasopharynx.  Procedure                               Abnormality         Status                     ---------                               -----------         ------                     COVID-19 and FLU A/B PCR...[[id number]]  Normal              Final result                 Please view results for these tests on the individual orders.    COVID-19 and FLU A/B PCR - Swab, Nasopharynx [[id number]]  (Normal) Collected: 01/04/21 2158    Lab Status: Final result Specimen: Swab from Nasopharynx Updated: 01/05/21 0121     COVID19 Not Detected     Influenza A PCR Not Detected     Influenza B PCR Not Detected    Narrative:      Fact sheet for providers: https://www.fda.gov/media/428723/download    Fact sheet for patients: https://www.fda.gov/media/770690/download    Test performed by PCR.          Imaging Results (Last 24 Hours)     ** No results found for the last 24 hours. **          Results for orders placed during the hospital encounter of 01/04/21   Adult Transthoracic Echo Complete W/ Cont if Necessary Per Protocol    Narrative · There is biatrial and right ventricular enlargement.  · Global and segmental LV wall motion is normal. The calculated LV   ejection is 56%.  · There is a bioprosthetic aortic valve (TAVR) that is functionally   normal.  · Mitral annular calcification with mild mitral regurgitation is   appreciated.  · The calculated RV systolic pressure is moderately-severely elevated at   45mmHg-55mmHg.  · Less than 50% IVC inspiratory collapse is appreciated.  · There is no pericardial efusion.          I have reviewed the medications:  Scheduled Meds:Pharmacy Consult, , Does not apply, BID  aspirin, 81 mg, Oral, Daily  atorvastatin, 40 mg, Oral, Daily  budesonide-formoterol, 2 puff, Inhalation, BID - RT  carvedilol, 12.5 mg, Oral, BID With Meals  cefTRIAXone, 1 g, Intravenous, Q24H  doxycycline, 100 mg, Intravenous, Q12H  ferrous sulfate,  325 mg, Oral, Daily With Breakfast  insulin lispro, 0-7 Units, Subcutaneous, TID AC  ipratropium, 0.5 mg, Nebulization, 4x Daily - RT  lactulose, 20 g, Oral, TID  lidocaine, 1 patch, Transdermal, Q24H  magnesium oxide, 400 mg, Oral, Daily  pantoprazole, 40 mg, Oral, Every Other Day  rifAXIMin, 550 mg, Oral, Q12H  rOPINIRole, 2 mg, Oral, Every Other Day  sertraline, 100 mg, Oral, Daily  sodium chloride, 10 mL, Intravenous, Q12H  tamsulosin, 0.4 mg, Oral, Daily  warfarin (COUMADIN) (dosing per levels), , Does not apply, Daily      Continuous Infusions:Pharmacy to dose warfarin,       PRN Meds:.albuterol  •  bumetanide  •  dextrose  •  dextrose  •  glucagon (human recombinant)  •  oxyCODONE  •  Pharmacy to dose warfarin  •  potassium chloride  •  potassium chloride  •  potassium chloride  •  Sodium Chloride (PF)  •  sodium chloride    Assessment/Plan   Assessment & Plan     Active Hospital Problems    Diagnosis  POA   • **Hyponatremia [E87.1]  Yes   • Diastolic CHF, chronic (CMS/HCC) [I50.32]  Unknown   • Hypokalemia [E87.6]  Unknown   • Other cirrhosis of liver (CMS/HCC) [K74.69]  Unknown   • Hyperbilirubinemia [E80.6]  Unknown   • S/P TAVR (transcatheter aortic valve replacement) 11/22/19 [Z95.2]  Not Applicable   • Paroxysmal atrial fibrillation [I48.0]  Yes   • Class 3 severe obesity in adult  [E66.01]  Yes   • YOUSUF on CPAP [G47.33, Z99.89]  Not Applicable   • Nonrheumatic aortic valve stenosis [I35.0]  Yes   • Coronary artery disease involving native heart [I25.10]  Yes   • Iron deficiency anemia [D50.9]  Yes   • 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        Brief Hospital Course to date:  Sai Weinberg is a 60 y.o. male with past  medical history of COPD, type 2 diabetes, hypertension, hyperlipidemia, iron deficiency anemia, cold miners pneumoconiosis,?  diastolic CHF, YOUSUF on CPAP at night, coronary artery disease, history of aortic valve stenosis with TAVR, paroxysmal atrial fibrillation, history of complete heart block status post pacemaker placement, history of GI bleed who presents to the ER at request of his PCP for abnormal routine labs indicating hyponatremia and hypokalemia   (The above has been copied from my colleagues note.  However, the accuracy of the information has been checked and verified by myself.)    Hyponatremia  -- nephrology consulted  - continue IVF per nephrology      Metabolic encephalopathy   - Decrease oxycodone; DC hydroxyzine  - Cont lactulose and rifaximin   - Continue as needed restraints to help with safety and oxygenation      Acute hypoxic resp failure       Diffuse anasarca  Diastolic CHF  Aortic Stenosis s/p TAVR  --Cardiology following, appreciate recommendatoins  --BiPAP PRN and qHS      Hypokalemia  --Replace per protocol      Elevated troponin  --likely type II NSTEMI, history of complete heart block status post pacer     PAF  Hx of Complete Heart block s/p PPM  -- on warfarin - pharmacy dosing      Hyperbilirubinemia  Decompensated Cirrhosis with ascites   TCP  --Ultrasound right upper quadrant showing cholelithiasis w/out choledocholithiasis or inflammation       COPD, hx of coalminer's pneumoconiosis     Type 2 diabetes  --Sliding scale insulin     Hypertension     PVD s/p R fem-pop bypass and SFA stenting 2017 - cont ASA, statin      DVT prophylaxis:  warfarin  Disposition: I expect the patient to be discharged TBD    CODE STATUS:   Code Status and Medical Interventions:   Ordered at: 01/05/21 0010     Level Of Support Discussed With:    Patient     Code Status:    CPR     Medical Interventions (Level of Support Prior to Arrest):    Full       Xavier Rose MD  01/10/21

## 2021-01-10 NOTE — PROGRESS NOTES
" LOS: 6 days   Patient Care Team:  Dewayne Cole MD as PCP - General  Dewayne Cole MD as PCP - Family Medicine    Chief Complaint: Hyponatremia     Subjective     Abnormal Lab  Pertinent negatives include no anorexia, chest pain or fever.       Subjective:  Symptoms:  Stable.  No shortness of breath, chest pain, headache, chest pressure, anorexia or diarrhea.    Diet:  Poor intake.    Activity level: Normal.    Pain:  He reports no pain.      Na 134 this morning. AMS improving. More awake and comfortable this morning. SOB slight worse. More tachypenic. Will d/c IV fluids.     History taken from: patient    Objective     Vital Sign Min/Max for last 24 hours  Temp  Min: 98.1 °F (36.7 °C)  Max: 99.2 °F (37.3 °C)   BP  Min: 118/64  Max: 143/62   Pulse  Min: 61  Max: 75   Resp  Min: 18  Max: 21   SpO2  Min: 85 %  Max: 96 %   Flow (L/min)  Min: 4  Max: 6   Weight  Min: 101 kg (222 lb 12.8 oz)  Max: 101 kg (222 lb 12.8 oz)     Flowsheet Rows      First Filed Value   Admission Height  165.1 cm (65\") Documented at 01/04/2021 1707   Admission Weight  104 kg (230 lb) Documented at 01/04/2021 1707          No intake/output data recorded.  I/O last 3 completed shifts:  In: 2121 [P.O.:1021; I.V.:1000; IV Piggyback:100]  Out: -     Objective:  General Appearance:  Comfortable.    Vital signs: (most recent): Blood pressure 131/65, pulse 66, temperature 98.1 °F (36.7 °C), temperature source Axillary, resp. rate 20, height 165.1 cm (65\"), weight 101 kg (222 lb 12.8 oz), SpO2 90 %.  No fever.    Output: Producing urine.    HEENT: Normal HEENT exam.    Lungs:  Normal respiratory rate.  Breath sounds clear to auscultation.  He is not in respiratory distress.  No rales.    Heart: Normal rate.  Regular rhythm.  S1 normal and S2 normal.    Abdomen: Abdomen is soft and non-distended.  There are no signs of ascites.  Bowel sounds are normal.     Extremities: There is no dependent edema.    Pulses: Distal pulses are intact.  "   Neurological: Patient is alert and oriented to person, place and time.  Normal strength.              Results Review:     I reviewed the patient's new clinical results.    WBC WBC   Date Value Ref Range Status   01/10/2021 5.71 3.40 - 10.80 10*3/mm3 Final   01/09/2021 5.98 3.40 - 10.80 10*3/mm3 Final   01/08/2021 6.19 3.40 - 10.80 10*3/mm3 Final      HGB Hemoglobin   Date Value Ref Range Status   01/10/2021 10.7 (L) 13.0 - 17.7 g/dL Final   01/09/2021 11.0 (L) 13.0 - 17.7 g/dL Final   01/08/2021 11.9 (L) 13.0 - 17.7 g/dL Final      HCT Hematocrit   Date Value Ref Range Status   01/10/2021 30.9 (L) 37.5 - 51.0 % Final   01/09/2021 31.0 (L) 37.5 - 51.0 % Final   01/08/2021 33.4 (L) 37.5 - 51.0 % Final      Platlets No results found for: LABPLAT   MCV MCV   Date Value Ref Range Status   01/10/2021 95.1 79.0 - 97.0 fL Final   01/09/2021 94.2 79.0 - 97.0 fL Final   01/08/2021 95.4 79.0 - 97.0 fL Final          Sodium Sodium   Date Value Ref Range Status   01/09/2021 134 (L) 136 - 145 mmol/L Final   01/09/2021 128 (L) 136 - 145 mmol/L Final   01/09/2021 132 (L) 136 - 145 mmol/L Final   01/08/2021 129 (L) 136 - 145 mmol/L Final   01/08/2021 128 (L) 136 - 145 mmol/L Final   01/08/2021 128 (L) 136 - 145 mmol/L Final   01/07/2021 129 (L) 136 - 145 mmol/L Final   01/07/2021 127 (L) 136 - 145 mmol/L Final      Potassium Potassium   Date Value Ref Range Status   01/09/2021 3.8 3.5 - 5.2 mmol/L Final     Comment:     Slight hemolysis detected by analyzer. Results may be affected.   01/08/2021 4.1 3.5 - 5.2 mmol/L Final     Comment:     Slight hemolysis detected by analyzer. Results may be affected.      Chloride Chloride   Date Value Ref Range Status   01/09/2021 90 (L) 98 - 107 mmol/L Final   01/08/2021 87 (L) 98 - 107 mmol/L Final      CO2 CO2   Date Value Ref Range Status   01/09/2021 31.0 (H) 22.0 - 29.0 mmol/L Final   01/08/2021 33.0 (H) 22.0 - 29.0 mmol/L Final      BUN BUN   Date Value Ref Range Status   01/09/2021 29  (H) 8 - 23 mg/dL Final   01/08/2021 36 (H) 8 - 23 mg/dL Final      Creatinine Creatinine   Date Value Ref Range Status   01/09/2021 0.67 (L) 0.76 - 1.27 mg/dL Final   01/08/2021 0.70 (L) 0.76 - 1.27 mg/dL Final      Calcium Calcium   Date Value Ref Range Status   01/09/2021 8.4 (L) 8.6 - 10.5 mg/dL Final   01/08/2021 8.7 8.6 - 10.5 mg/dL Final      PO4 No results found for: CAPO4   Albumin Albumin   Date Value Ref Range Status   01/08/2021 2.10 (L) 3.50 - 5.20 g/dL Final      Magnesium No results found for: MG   Uric Acid No results found for: URICACID     Medication Review: Yes    Assessment/Plan       Hyponatremia    Dyslipidemia on statins     COPD     T2DM on metformin     Esophageal reflux    Hypertension    PAD s/p L SFA stent and R fem-pop     Iron deficiency anemia    Coal workers pneumoconiosis     Coronary artery disease involving native heart    Nonrheumatic aortic valve stenosis    YOUSUF on CPAP    Paroxysmal atrial fibrillation    Class 3 severe obesity in adult     S/P TAVR (transcatheter aortic valve replacement) 11/22/19    Diastolic CHF, chronic (CMS/HCC)    Hypokalemia    Other cirrhosis of liver (CMS/HCC)    Hyperbilirubinemia      Assessment & Plan     Hypovolemic hyponatremia vs hyponatremia related to liver disease.   Na on admission 121. Most recent 134.   Appetite remains poor.   Urine Na<20 Urine osm 333 serum osm 266.    Alkalosis: Likely contraction alkalosis      Recs  - D/C IV fluids.   - Bumex 1 mg today.   - D/C Serial NA monitoring  - Encourage oral intake    Lonnie Elizabeth MD  01/10/21  11:33 EST

## 2021-01-10 NOTE — THERAPY TREATMENT NOTE
Patient Name: Sai Weinberg  : 1960    MRN: [id number]                              Today's Date: 1/10/2021       Admit Date: 2021    Visit Dx:     ICD-10-CM ICD-9-CM   1. Hypokalemia  E87.6 276.8   2. Hyponatremia  E87.1 276.1   3. Somnolence  R40.0 780.09     Patient Active Problem List   Diagnosis   • Dyslipidemia on statins    • Arthritis   • COPD    • T2DM on metformin    • Esophageal reflux   • Hypertension   • Chewing tobacco use   • PAD s/p L SFA stent and R fem-pop    • LANA- resolved    • Iron deficiency anemia   • Coal workers pneumoconiosis    • Coronary artery disease involving native heart   • Nonrheumatic aortic valve stenosis   • Acute on chronic systolic heart failure    • YOUSUF on CPAP   • GIB (recent + FOBT without overt bleeding)    • Paroxysmal atrial fibrillation   • Former smoker   • Class 3 severe obesity in adult    • CHB (complete heart block) (CMS/HCC)   • S/P TAVR (transcatheter aortic valve replacement) 19   • Hyponatremia   • Diastolic CHF, chronic (CMS/HCC)   • Hypokalemia   • Other cirrhosis of liver (CMS/HCC)   • Hyperbilirubinemia     Past Medical History:   Diagnosis Date   • Arthritis    • Black lung disease (CMS/HCC)    • BPH (benign prostatic hyperplasia)    • Cataract    • COLD (chronic obstructive lung disease) (CMS/HCC)    • Colon polyps    • Diabetes mellitus (CMS/HCC)     SINCE    • Dyslipidemia    • Esophageal reflux    • Flu     HX   • Heart attack (CMS/HCC)        • Herniated lumbar intervertebral disc    • History of colon resection    • Hyperlipidemia    • Hypertension    • Malignant neoplasm of colon (CMS/HCC)    • On home oxygen therapy     prn   • Open wound of left hip and thigh with complication 3/12/2017    Open draining left leg wound from femoral cutdown and angioplasty/stenting of superficial artery 2017   • Peripheral vascular disease (CMS/HCC)    • Renal failure    • Sleep apnea with use of continuous positive airway pressure  (CPAP)    • Wears dentures    • Wears glasses      Past Surgical History:   Procedure Laterality Date   • ANGIOPLASTY FEMORAL ARTERY N/A 1/17/2017    Procedure: left femoral cutdown with aortagram and left SFA stent and angioplasty;  Surgeon: Heladio Rosa MD;  Location:  [facility];  Service:    • AORTAGRAM N/A 1/17/2017    Procedure: AORTAGRAM WITH RUNOFFS and  STENT left SFA;  Surgeon: Heladio Rosa MD;  Location:  [facility];  Service:    • AORTIC VALVE REPAIR/REPLACEMENT N/A 11/22/2019    Procedure: TRANSAPICAL TRANSCATHETER AORTIC VALVE REPLACEMENT WITH TRANSESOPHAGEAL ECHOCARDIOLGRAM;  Surgeon: Danish St MD;  Location:  [facility];  Service: Cardiothoracic   • AORTIC VALVE REPAIR/REPLACEMENT N/A 11/22/2019    Procedure: Transapical Transcatheter Aortic Valve Replacement;  Surgeon: Lory Shepherd MD;  Location:  [facility];  Service: Cardiovascular   • CARDIAC CATHETERIZATION      NO INTERVENTION   • CARDIAC CATHETERIZATION Left 10/30/2019    Procedure: Left Heart Cath;  Surgeon: Milad Cordon MD;  Location:  [facility] LOCATION;  Service: Cardiology   • CARDIAC ELECTROPHYSIOLOGY PROCEDURE Left 11/25/2019    Procedure: Pacemaker DC new;  Surgeon: Maranda Cerda MD;  Location:  [facility] CATH INVASIVE LOCATION;  Service: Cardiology   • COLON SURGERY      x 2   • COLONOSCOPY     • FEMORAL FEMORAL BYPASS N/A 3/13/2017    Procedure: INCISION AND DRAINAGE LEFT GROIN HEMATOMA;  Surgeon: Heladio Rosa MD;  Location:  [facility];  Service:    • FEMORAL POPLITEAL BYPASS Right 01/26/2016   • OTHER SURGICAL HISTORY      PTA ILIAC STENOSIS WITH STENT   • TRANSESOPHAGEAL ECHOCARDIOGRAM (ZORAIDA) N/A 11/22/2019    Procedure: TRANSESOPHAGEAL ECHOCARDIOGRAM WITH ANESTHESIA;  Surgeon: Danish St MD;  Location: [facility];  Service: Cardiothoracic     General Information     Row Name 01/10/21 1455          OT Time and Intention    Document Type   therapy note (daily note)  -     Mode of Treatment  occupational therapy  -     Row Name 01/10/21 1455          General Information    Existing Precautions/Restrictions  fall;oxygen therapy device and L/min  -     Barriers to Rehab  medically complex;cognitive status  -     Row Name 01/10/21 1455          Cognition    Orientation Status (Cognition)  oriented to;person Pt with decreased alert level. Pt answering questions, but unable to maintain alert level throughout.  -     Row Name 01/10/21 1455          Safety Issues, Functional Mobility    Safety Issues Affecting Function (Mobility)  ability to follow commands;awareness of need for assistance;insight into deficits/self-awareness;safety precaution awareness  -     Impairments Affecting Function (Mobility)  balance;cognition;strength;grasp;endurance/activity tolerance  -     Cognitive Impairments, Mobility Safety/Performance  awareness, need for assistance;insight into deficits/self-awareness;judgment;problem-solving/reasoning  -       User Key  (r) = Recorded By, (t) = Taken By, (c) = Cosigned By    Initials Name Provider Type     Maribel Hebert, OT Occupational Therapist          Mobility/ADL's     Row Name 01/10/21 1503          Activities of Daily Living    BADL Assessment/Intervention  grooming  -     Row Name 01/10/21 1503          Grooming Assessment/Training    [place] Level (Grooming)  set up;wash face, hands;dependent (less than 25% patient effort);hair care, combing/brushing  -     Position (Grooming)  supine  -       User Key  (r) = Recorded By, (t) = Taken By, (c) = Cosigned By    Initials Name Provider Type     Maribel Hebert OT Occupational Therapist        Obj/Interventions     Row Name 01/10/21 1503          Shoulder (Therapeutic Exercise)    Shoulder (Therapeutic Exercise)  AAROM (active assistive range of motion)  -     Shoulder AAROM (Therapeutic Exercise)   bilateral;flexion;extension;aBduction;aDduction;10 repetitions;supine  -     Row Name 01/10/21 1503          Elbow/Forearm (Therapeutic Exercise)    Elbow/Forearm (Therapeutic Exercise)  AAROM (active assistive range of motion)  -     Elbow/Forearm AAROM (Therapeutic Exercise)  bilateral;flexion;extension;supination;pronation;10 repetitions  -     Row Name 01/10/21 1503          Wrist (Therapeutic Exercise)    Wrist (Therapeutic Exercise)  AAROM (active assistive range of motion)  -     Wrist AAROM (Therapeutic Exercise)  bilateral;flexion;extension;10 repetitions  -     Row Name 01/10/21 1503          Hand (Therapeutic Exercise)    Hand (Therapeutic Exercise)  AROM (active range of motion)  -     Hand AROM/AAROM (Therapeutic Exercise)  AAROM (active assistive range of motion);finger flexion;finger extension;10 repetitions  -     Row Name 01/10/21 1503          Therapeutic Exercise    Therapeutic Exercise  shoulder;wrist;hand;elbow/forearm  -     Row Name 01/10/21 1503          Hip (Therapeutic Exercise)    Hip (Therapeutic Exercise)  AAROM (active assistive range of motion)  -     Hip AAROM (Therapeutic Exercise)  bilateral;flexion;extension;10 repetitions  -     Row Name 01/10/21 1503          Knee (Therapeutic Exercise)    Knee (Therapeutic Exercise)  AAROM (active assistive range of motion)  -     Knee AAROM (Therapeutic Exercise)  bilateral;flexion;extension;10 repetitions  -     Row Name 01/10/21 1503          Ankle (Therapeutic Exercise)    Ankle (Therapeutic Exercise)  AAROM (active assistive range of motion)  -     Ankle AAROM (Therapeutic Exercise)  bilateral;dorsiflexion;plantarflexion;10 repetitions  -       User Key  (r) = Recorded By, (t) = Taken By, (c) = Cosigned By    Initials Name Provider Type     Maribel Hebert OT Occupational Therapist        Goals/Plan    No documentation.       Clinical Impression     Row Name 01/10/21 1505          Pain Assessment     Additional Documentation  Pain Scale: FACES Pre/Post-Treatment (Group)  -JR     Row Name 01/10/21 1505          Pain Scale: Numbers Pre/Post-Treatment    Pretreatment Pain Rating  0/10 - no pain  -JR     Posttreatment Pain Rating  2/10  -JR     Pain Location - Side  Right  -JR     Pain Location  shoulder  -JR     Row Name 01/10/21 1505          Plan of Care Review    Plan of Care Reviewed With  patient;daughter  -JR     Progress  improving  -JR     Outcome Summary  Pt with decreased alert level this date. Pt confused, but cooperative. Recommend continued skilled OT services and SNF at d/c.  -JR     Row Name 01/10/21 1505          Therapy Plan Review/Discharge Plan (OT)    Anticipated Discharge Disposition (OT)  skilled nursing facility  -     Row Name 01/10/21 1505          Vital Signs    Pre Systolic BP Rehab  129  -JR     Pre Treatment Diastolic BP  59  -JR     Post Systolic BP Rehab  132  -JR     Post Treatment Diastolic BP  65  -JR     Pretreatment Heart Rate (beats/min)  67  -JR     Posttreatment Heart Rate (beats/min)  68  -JR     Pre SpO2 (%)  91  -JR     O2 Delivery Pre Treatment  supplemental O2  -JR     Post SpO2 (%)  95  -JR     O2 Delivery Post Treatment  supplemental O2  -JR     Pre Patient Position  Supine  -JR     Intra Patient Position  Supine  -JR     Post Patient Position  Supine  -JR     Row Name 01/10/21 1505          Positioning and Restraints    Pre-Treatment Position  in bed  -JR     Post Treatment Position  bed  -JR     In Bed  notified nsg;call light within reach;encouraged to call for assist;exit alarm on;side lying left  -JR     Restraints  released:;reapplied:;notified nsg:;soft limb  -JR       User Key  (r) = Recorded By, (t) = Taken By, (c) = Cosigned By    Initials Name Provider Type    Maribel Rosado, OT Occupational Therapist        Outcome Measures     Row Name 01/10/21 1505          How much help from another is currently needed...    Putting on and taking off regular lower  body clothing?  1  -JR     Bathing (including washing, rinsing, and drying)  1  -JR     Toileting (which includes using toilet bed pan or urinal)  1  -JR     Putting on and taking off regular upper body clothing  1  -JR     Taking care of personal grooming (such as brushing teeth)  1  -JR     Eating meals  1  -JR     AM-PAC 6 Clicks Score (OT)  6  -JR     Row Name 01/10/21 1507          Functional Assessment    Outcome Measure Options  AM-PAC 6 Clicks Daily Activity (OT)  -       User Key  (r) = Recorded By, (t) = Taken By, (c) = Cosigned By    Initials Name Provider Type    JR Maribel Hebert OT Occupational Therapist        Occupational Therapy Education                 Title: PT OT SLP Therapies (Not Started)     Topic: Occupational Therapy (In Progress)     Point: ADL training (In Progress)     Description:   Instruct learner(s) on proper safety adaptation and remediation techniques during self care or transfers.   Instruct in proper use of assistive devices.              Learning Progress Summary           Patient Acceptance, E, NR by  at 1/10/2021 1406    Acceptance, E, NR,NL by  at 1/5/2021 0936                   Point: Home exercise program (Not Started)     Description:   Instruct learner(s) on appropriate technique for monitoring, assisting and/or progressing therapeutic exercises/activities.              Learner Progress:  Not documented in this visit.          Point: Precautions (In Progress)     Description:   Instruct learner(s) on prescribed precautions during self-care and functional transfers.              Learning Progress Summary           Patient Acceptance, E, NR,NL by GENESIS at 1/5/2021 0936                   Point: Body mechanics (In Progress)     Description:   Instruct learner(s) on proper positioning and spine alignment during self-care, functional mobility activities and/or exercises.              Learning Progress Summary           Patient Acceptance, E, NR,NL by  at 1/5/2021 0936                                User Key     Initials Effective Dates Name Provider Type Discipline     06/22/15 -  Maribel Hebert OT Occupational Therapist OT     07/18/19 -  Zahra Townsend OT Occupational Therapist OT              OT Recommendation and Plan     Plan of Care Review  Plan of Care Reviewed With: patient, daughter  Progress: improving  Outcome Summary: Pt with decreased alert level this date. Pt confused, but cooperative. Recommend continued skilled OT services and SNF at d/c.     Time Calculation:   Time Calculation- OT     Row Name 01/10/21 1508             Time Calculation- OT    OT Start Time  1406  -      OT Received On  01/10/21  -         Timed Charges    55113 - OT Therapeutic Activity Minutes  13  -JR      09971 - OT Self Care/Mgmt Minutes  10  -        User Key  (r) = Recorded By, (t) = Taken By, (c) = Cosigned By    Initials Name Provider Type     Maribel Hebert, OT Occupational Therapist        Therapy Charges for Today     Code Description Service Date Service Provider Modifiers Qty    96064131978 HC OT THERAPEUTIC ACT EA 15 MIN 1/10/2021 Maribel Hebert OT GO 1    26763579012 HC OT SELF CARE/MGMT/TRAIN EA 15 MIN 1/10/2021 Maribel Hebert OT GO 1               Maribel Hebert, OT  1/10/2021

## 2021-01-10 NOTE — PLAN OF CARE
Goal Outcome Evaluation:  Plan of Care Reviewed With: patient, daughter  Progress: improving  Outcome Summary: Pt with decreased alert level this date. Pt confused, but cooperative. Recommend continued skilled OT services and SNF at d/c.

## 2021-01-10 NOTE — PROGRESS NOTES
"Pharmacy Consult  -  Warfarin  Sai Weinberg is a  60 y.o. male   Height - 165.1 cm (65\")  Weight - 101 kg (222 lb 12.8 oz)    Consulting Service: Hospitalist  Indication: A fib  Goal INR: 2-3  Home Regimen: Warfarin 4 mg daily (pharmacist confirmed with patient on admission)    Bridge Therapy: No     Drug-Drug Interactions with current regimen:     Aspirin - may increase risk of bleeding      Sertraline- increase risk of bleeding     Ropinirole- increase INR     Warfarin Dosing During Admission:    Date  1/4 1/5 1/6 1/7 1/8 1/9 1/10     INR  1.84 2.29 3.2 3.35 3.58 4.32 4.66     Dose  5 mg 4 mg HOLD HOLD  HOLD HOLD HOLD       Education Provided: Patient on warfarin prior to admission, though patient is currently confused and not appropriate for education. Pharmacist available for education throughout admission if needed.    Discharge Follow up:     Following Provider - Dr. Cole     Follow up time range or appointment - Recommend repeat INR within 2-3 days of discharge    Labs:  Results from last 7 days   Lab Units 01/10/21  1024 01/09/21  1120 01/08/21  0849 01/08/21  0847 01/07/21  0900 01/06/21  0601 01/05/21  0859 01/05/21  0016 01/05/21  0012   INR  4.66* 4.32* 3.58*  --  3.35* 3.20* 2.29* 1.97*  --    HEMOGLOBIN g/dL 10.7* 11.0*  --  11.9* 11.8* 12.5* 12.2*  --  12.4*   HEMATOCRIT % 30.9* 31.0*  --  33.4* 33.4* 36.2* 33.4*  --  34.1*   PLATELETS 10*3/mm3 83* 78*  --  77* 73* 71* 61*  --  60*     Results from last 7 days   Lab Units 01/09/21  2058 01/09/21  1120 01/09/21  0051  01/08/21  0847  01/07/21  0900  01/04/21  1730   SODIUM mmol/L 134* 128* 132*   < > 128*   < > 126*   < > 121*   POTASSIUM mmol/L  --  3.8  --   --  4.1  --  3.5   < > 2.6*   CHLORIDE mmol/L  --  90*  --   --  87*  --  84*   < > 76*   CO2 mmol/L  --  31.0*  --   --  33.0*  --  34.0*   < > 35.0*   BUN mg/dL  --  29*  --   --  36*  --  33*   < > 48*   CREATININE mg/dL  --  0.67*  --   --  0.70*  --  0.77   < > 0.86   CALCIUM mg/dL  --  " 8.4*  --   --  8.7  --  9.1   < > 9.3   BILIRUBIN mg/dL  --   --   --   --  5.3*  --   --   --  3.9*   ALK PHOS U/L  --   --   --   --  157*  --   --   --  193*   ALT (SGPT) U/L  --   --   --   --  30  --   --   --  38   AST (SGOT) U/L  --   --   --   --  54*  --   --   --  58*   GLUCOSE mg/dL  --  148*  --   --  111*  --  132*   < > 131*    < > = values in this interval not displayed.     Current dietary intake: 25% of breakfast on 1/9    Diet Order   Procedures    Diet Regular; Cardiac     Assessment/Plan:     Pharmacy consulted to dose warfarin for Afib. Prior to arrival, patient on warfarin 4mg daily.  Patient's INR again supratherapeutic at 4.66 and continues to trend up. Will HOLD warfarin dose again today.   Pharmacy will continue to monitor INR, signs/symptoms of bleeding, dietary intake, and drug-drug interactions.    Thank you,  Ricarda Francois, PharmD.  Pharmacy Resident  1/10/2021 11:54 EST

## 2021-01-11 NOTE — PROGRESS NOTES
INTENSIVIST / PULMONARY INITIAL VISIT (CONSULT / H&P) NOTE     Hospital:  LOS: 6 days   Mr. Sai Weinberg, 60 y.o. male is followed for:   Chief Complaint   Patient presents with   • Abnormal Lab       Hyponatremia    Dyslipidemia on statins     COPD     T2DM on metformin     Esophageal reflux    Hypertension    PAD s/p L SFA stent and R fem-pop     Iron deficiency anemia    Coal workers pneumoconiosis     Coronary artery disease involving native heart    Nonrheumatic aortic valve stenosis    YOUSUF on CPAP    Paroxysmal atrial fibrillation    Class 3 severe obesity in adult     S/P TAVR (transcatheter aortic valve replacement) 11/22/19    Diastolic CHF, chronic (CMS/HCC)    Hypokalemia    Other cirrhosis of liver (CMS/HCC)    Hyperbilirubinemia         History of Present Illness   Sai Weinberg is a 60 y.o. male who was admitted on 1/4 by the Hospitalist group for HFpEF, hyponatrermia, & hepatic encephalopathy.    On the evening of the 10th the patient pulled off his NIPPV mask and spO2 dropped into 60's.  Staff came and put him back on his NIPPV but patient did not recover.  He then developed a cardiac arrest (PEA under PPM spikes).  A Code Blue was called and the patient received ACLS therapy for a prolonged period of time (~30 min) before ROSC was obtained.  The patient has been nonresponsive since ROSC was obtained w/o having received sedating medications.  Having myoclonic jerks on vent.    Past Medical History:   Diagnosis Date   • Arthritis    • Black lung disease (CMS/HCC)    • BPH (benign prostatic hyperplasia)    • Cataract    • COLD (chronic obstructive lung disease) (CMS/HCC)    • Colon polyps    • Diabetes mellitus (CMS/HCC)     SINCE 2014   • Dyslipidemia    • Esophageal reflux    • Flu     HX   • Heart attack (CMS/HCC)     2006   • Herniated lumbar intervertebral disc    • History of colon resection    • Hyperlipidemia    • Hypertension    • Malignant neoplasm of colon (CMS/HCC)    • On home oxygen  therapy     prn   • Open wound of left hip and thigh with complication 3/12/2017    Open draining left leg wound from femoral cutdown and angioplasty/stenting of superficial artery 1/18/2017   • Peripheral vascular disease (CMS/HCC)    • Renal failure    • Sleep apnea with use of continuous positive airway pressure (CPAP)    • Wears dentures    • Wears glasses      Past Surgical History:   Procedure Laterality Date   • ANGIOPLASTY FEMORAL ARTERY N/A 1/17/2017    Procedure: left femoral cutdown with aortagram and left SFA stent and angioplasty;  Surgeon: Heladio Rosa MD;  Location:  [facility];  Service:    • AORTAGRAM N/A 1/17/2017    Procedure: AORTAGRAM WITH RUNOFFS and  STENT left SFA;  Surgeon: Heladio Rosa MD;  Location:  [facility];  Service:    • AORTIC VALVE REPAIR/REPLACEMENT N/A 11/22/2019    Procedure: TRANSAPICAL TRANSCATHETER AORTIC VALVE REPLACEMENT WITH TRANSESOPHAGEAL ECHOCARDIOLGRAM;  Surgeon: Danish St MD;  Location: [facility];  Service: Cardiothoracic   • AORTIC VALVE REPAIR/REPLACEMENT N/A 11/22/2019    Procedure: Transapical Transcatheter Aortic Valve Replacement;  Surgeon: Lory Shepherd MD;  Location:  [facility];  Service: Cardiovascular   • CARDIAC CATHETERIZATION      NO INTERVENTION   • CARDIAC CATHETERIZATION Left 10/30/2019    Procedure: Left Heart Cath;  Surgeon: Milad Cordon MD;  Location: [facility] LOCATION;  Service: Cardiology   • CARDIAC ELECTROPHYSIOLOGY PROCEDURE Left 11/25/2019    Procedure: Pacemaker DC new;  Surgeon: Maranda Cerda MD;  Location:  [facility];  Service: Cardiology   • COLON SURGERY      x 2   • COLONOSCOPY     • FEMORAL FEMORAL BYPASS N/A 3/13/2017    Procedure: INCISION AND DRAINAGE LEFT GROIN HEMATOMA;  Surgeon: Heladio Rosa MD;  Location: [facility];  Service:    • FEMORAL POPLITEAL BYPASS Right 01/26/2016   • OTHER SURGICAL HISTORY      PTA ILIAC STENOSIS  WITH STENT   • TRANSESOPHAGEAL ECHOCARDIOGRAM (ZORAIDA) N/A 2019    Procedure: TRANSESOPHAGEAL ECHOCARDIOGRAM WITH ANESTHESIA;  Surgeon: Danish St MD;  Location: [place] [id number];  Service: Cardiothoracic     Family History   Problem Relation Age of Onset   • Lung cancer Mother    • Heart attack Mother    • Hypertension Mother    • Diabetes Mother    • Diabetes Father    • Hypertension Father    • Heart attack Father      Social History     Socioeconomic History   • Marital status:      Spouse name: Not on file   • Number of children: 2   • Years of education: Not on file   • Highest education level: Not on file   Occupational History   • Occupation: DISABLED      Comment: BACK AND LUNG PROBLEMS   Tobacco Use   • Smoking status: Former Smoker     Packs/day: 2.00     Years: 30.00     Pack years: 60.00     Types: Cigarettes     Start date:      Quit date:      Years since quittin.0   • Smokeless tobacco: Current User     Types: Chew   • Tobacco comment: Stopped smoking 1 year ago. Smoked 35 years up to 2ppd.   Substance and Sexual Activity   • Alcohol use: No   • Drug use: No   • Sexual activity: Defer   Social History Narrative    Lives in [place].     Allergies   Allergen Reactions   • Tylenol [Acetaminophen] Other (See Comments)     Liver disease     No current facility-administered medications on file prior to encounter.      Current Outpatient Medications on File Prior to Encounter   Medication Sig Dispense Refill   • atorvastatin (LIPITOR) 40 MG tablet Take 40 mg by mouth daily.     • bumetanide (BUMEX) 2 MG tablet Take 2 mg by mouth 2 (Two) Times a Day As Needed.     • carvedilol (COREG) 12.5 MG tablet Take 1 tablet by mouth 2 (Two) Times a Day With Meals. (Patient taking differently: Take 12.5 mg by mouth Daily.) 60 tablet 11   • clopidogrel (PLAVIX) 75 MG tablet Take 1 tablet by mouth Daily. 30 tablet 11   • DULoxetine (CYMBALTA) 30 MG capsule Take 30 mg by mouth  Daily.     • magnesium oxide (MAGOX) 400 (241.3 Mg) MG tablet tablet Take 400 mg by mouth Daily.     • metFORMIN (GLUCOPHAGE) 850 MG tablet Take 850 mg by mouth 2 (Two) Times a Day With Meals.     • pantoprazole (PROTONIX) 40 MG EC tablet Take 40 mg by mouth Every Other Day.     • potassium chloride (MICRO-K) 10 MEQ CR capsule Take 10 mEq by mouth 2 (Two) Times a Day.     • tamsulosin (FLOMAX) 0.4 MG capsule 24 hr capsule Take 0.4 mg by mouth Daily.     • warfarin (COUMADIN) 4 MG tablet Take 4 mg by mouth Daily.     • albuterol (PROVENTIL HFA;VENTOLIN HFA) 108 (90 BASE) MCG/ACT inhaler Inhale 2 puffs Every 4 (Four) Hours As Needed for wheezing.     • budesonide-formoterol (SYMBICORT) 80-4.5 MCG/ACT inhaler Inhale 2 puffs 2 (Two) Times a Day.     • Cholecalciferol (Vitamin D) 50 MCG (2000 UT) capsule Take 2,000 Units by mouth Daily.     • ferrous sulfate 324 (65 FE) MG tablet delayed-release EC tablet Take 324 mg by mouth Daily With Breakfast.     • hydrOXYzine pamoate (VISTARIL) 50 MG capsule Take 1 capsule by mouth Daily.     • losartan (COZAAR) 50 MG tablet Take 50 mg by mouth Daily.     • nitroglycerin (NITROSTAT) 0.4 MG SL tablet Place 0.4 mg under the tongue Every 5 (Five) Minutes As Needed for Chest Pain. Take no more than 3 doses in 15 minutes.     • oxyCODONE (ROXICODONE) 10 MG tablet Take 10 mg by mouth Every 8 (Eight) Hours As Needed.     • probiotic (CULTURELLE) capsule capsule Take 1 capsule by mouth Daily. 30 capsule 1   • rOPINIRole (REQUIP) 2 MG tablet Take 1 tablet by mouth Every Other Day.     • Testosterone Cypionate 100 MG/ML solution Inject 2 mL as directed Every 14 (Fourteen) Days.     • tiotropium (SPIRIVA) 18 MCG per inhalation capsule Place 1 capsule into inhaler and inhale Daily.         ROS: see HPI and H&P  Per HPI, all other systems were reviewed and were negative        OBJECTIVE     Vital Sign Min/Max for last 24 hours  Temp  Min: 97.6 °F (36.4 °C)  Max: 98.8 °F (37.1 °C)   BP  Min:  121/68  Max: 143/62   Pulse  Min: 61  Max: 74   Resp  Min: 19  Max: 32   SpO2  Min: 85 %  Max: 100 %   Flow (L/min)  Min: 4  Max: 6     Telemetry:           General Appearance:  Intubated  Eyes:  No scleral icterus or pallor, pupils pinpoint  Ears, Nose, Mouth, Throat:  Atraumatic, oral endotracheal tube  Neck:  Trachea midline, thyroid normal  Respiratory:  rhonchi to auscultation bilaterally, normal effort  Cardiovascular:  Regular rate and rhythm, no murmurs, peripheral edema  Gastrointestinal:  Soft, nontender, nondistended, no hepatosplenomegaly  Skin:  Normal temperature, no rash  Psychiatric:  No agitation  Neuro:  Unresponsive to pain, triggers vent, myoclonic jerks      SpO2: 99 % SpO2  Min: 85 %  Max: 100 %   Device: ventilator    Flow Rate: 6 Flow (L/min)  Min: 4  Max: 6     Mechanical Ventilator Settings:            Resp Rate (Set): 14 Resp Rate (Observed) Vent: 25   FiO2 (%): 60 %    PEEP/CPAP (cm H2O): 12 cm H20 Plateau Pressure (cm H2O): 0 cm H2O    Minute Ventilation (L/min) (Obs): 11.1 L/min    I:E Ratio (Obs): 1:1.10     Intake/Ouptut 24 hrs (7:00AM - 6:59 AM)  Intake & Output (last 3 days)       01/08 0701 - 01/09 0700 01/09 0701 - 01/10 0700 01/10 0701 - 01/11 0700    P.O. 940 621 780    I.V. (mL/kg)  1000 (9.9)     IV Piggyback  100     Total Intake(mL/kg) 940 (9.3) 1721 (17) 780 (7.7)    Urine (mL/kg/hr) 400 (0.2)  550 (0.4)    Stool   0    Total Output 400  550    Net +540 +1721 +230           Urine Unmeasured Occurrence 6 x 5 x 2 x    Stool Unmeasured Occurrence 5 x 1 x 3 x            Lines, Drains & Airways    Active LDAs     Name:   Placement date:   Placement time:   Site:   Days:    Peripheral IV 01/07/21 1644 Left;Posterior Forearm   01/07/21    1644    Forearm   3    Hi-Lo Evac ETT 7.5   01/10/21    2045    Oral   less than 1                Hematology:  Results from last 7 days   Lab Units 01/10/21  2140 01/10/21  1024 01/09/21  1120  01/05/21  0012   WBC 10*3/mm3 8.51 5.71 5.98   < >  5.07   HEMOGLOBIN g/dL 9.9* 10.7* 11.0*   < > 12.4*   HEMATOCRIT % 29.5* 30.9* 31.0*   < > 34.1*   PLATELETS 10*3/mm3 84* 83* 78*   < > 60*   MONOCYTES % %  --   --   --   --  11.0    < > = values in this interval not displayed.     Electrolytes, Magnesium and Phosphorus:  Results from last 7 days   Lab Units 01/10/21  1024 01/09/21 2058 01/09/21  1120  01/08/21  0847  01/07/21  0900  01/04/21  1730   SODIUM mmol/L 133* 134* 128*   < > 128*   < > 126*   < > 121*   POTASSIUM mmol/L 3.9  --  3.8  --  4.1  --  3.5   < > 2.6*   CO2 mmol/L 32.0*  --  31.0*  --  33.0*  --  34.0*   < > 35.0*   MAGNESIUM mg/dL  --   --   --   --   --   --  2.3  --  2.7*   PHOSPHORUS mg/dL  --   --   --   --   --   --  2.5  --   --     < > = values in this interval not displayed.     Renal:  Results from last 7 days   Lab Units 01/10/21  1024 01/09/21 1120 01/08/21  0847 01/07/21  0900 01/06/21  1216 01/06/21  0601   CREATININE mg/dL 0.60* 0.67* 0.70* 0.77 0.76 0.79   BUN mg/dL 32* 29* 36* 33* 35* 34*     Estimated Creatinine Clearance: 143.1 mL/min (A) (by C-G formula based on SCr of 0.6 mg/dL (L)).  Estimated Creatinine Clearance: 143.1 mL/min (A) (by C-G formula based on SCr of 0.6 mg/dL (L)).  Hepatic:  Results from last 7 days   Lab Units 01/08/21  0847 01/04/21  1730   ALK PHOS U/L 157* 193*   BILIRUBIN mg/dL 5.3* 3.9*   BILIRUBIN DIRECT mg/dL  --  1.5*   ALT (SGPT) U/L 30 38   AST (SGOT) U/L 54* 58*     Arterial Blood Gases:  Results from last 7 days   Lab Units 01/10/21  2204 01/04/21  2040   PH, ARTERIAL pH units 7.466*  --    PCO2, ARTERIAL mm Hg 44.1  --    PO2 ART mm Hg 319.0*  --    FIO2 % 100 28       Results from last 7 days   Lab Units 01/04/21  1730   HEMOGLOBIN A1C % 6.20*       Lab Results   Component Value Date    LACTATE 1.4 11/12/2019       Ct Angiogram Chest With & Without Contrast    Result Date: 1/4/2021  Narrative: CT ANGIOGRAM CHEST W WO CONTRAST INDICATION: 60-year-old male with dyspnea today. Recent TAVR.  TECHNIQUE: CT angiogram of the chest with IV contrast. 3-D reconstructions were obtained and reviewed.   Radiation dose reduction techniques included automated exposure control or exposure modulation based on body size. Count of known CT and cardiac nuc med studies performed in previous 12 months: 0. COMPARISON: CT angiogram TAVR chest/abdomen/pelvis 11/14/2019 FINDINGS: Adequate opacification of the pulmonary arteries with no filling defects. Thoracic aorta normal in course and caliber without dissection. Heart size is normal. No pericardial effusion. Multiple prominent mediastinal and bilateral hilar lymph nodes are unchanged from the prior exam. Aortic valve replacement. No pleural effusion. No pneumothorax. No focal pulmonary infiltrates. Mild dependent bibasilar atelectasis. Please refer to CT abdomen pelvis performed the same date and dictated separately for detailed findings below the diaphragm. No acute osseous abnormality.     Impression: 1. Negative for pulmonary embolus. 2. Negative for thoracic aortic aneurysm/dissection. 3. No acute pulmonary process. 4. Multiple prominent mediastinal and bilateral hilar lymph nodes, nonspecific and unchanged from prior examination 11/14/2019. Signer Name: Wesly Greer MD  Signed: 1/4/2021 11:13 PM  Workstation Name: [facility]  [facility]    Ct Abdomen Pelvis With Contrast    Result Date: 1/4/2021  Narrative: CT Abdomen Pelvis W INDICATION: Abdominal pain. TECHNIQUE: CT of the abdomen and pelvis without IV contrast. Coronal and sagittal reconstructions were obtained.  Radiation dose reduction techniques included automated exposure control or exposure modulation based on body size. Count of known CT and cardiac nuc med studies performed in previous 12 months: 0. COMPARISON: CT of the abdomen and pelvis from 11/14/2019 FINDINGS: Abdomen: The liver appears cirrhotic and there is ascites. The gallbladder is prominent in size and gallstones are  noted. There is a small amount of nonspecific mesenteric stranding that does not appear significantly changed from prior. Kidneys appear unremarkable. Pelvis: Normal appendix. Visualized bowel appears within normal limits. No acute osseous abnormality.     Impression: Exam is limited by patient motion. The liver is cirrhotic and there is ascites. The gallbladder appears somewhat distended and there are gallstones. This findings do not appear significantly changed from the prior CT. Signer Name: Leeanna Townsend MD  Signed: 1/4/2021 11:22 PM  Workstation Name: [id number]  [facility]    Us Gallbladder    Result Date: 1/5/2021  Narrative: EXAMINATION: US GALLBLADDER-01/05/2021:  INDICATION: Gallstones, distended,; E87.6-Hypokalemia; E87.1-Wivx-klsiznlawi and hyponatremia; R40.0-Somnolence.  TECHNIQUE: Ultrasound right upper quadrant abdomen.  COMPARISON: CT 01/04/2021.  FINDINGS: The visualized portions of the pancreas are within normal limits on limited evaluation.  The liver demonstrates increased echogenicity with coarsened echotexture demonstrating nodular contour of a cirrhotic hepatic morphology without focal liver lesion. Trace perihepatic ascites.  The gallbladder with sludge in the dependent portion and mobile shadowing echogenic foci of cholelithiasis, however, no wall thickening or pericholecystic fluid clearly evident.  The common bile duct measures 5 mm in diameter at the level of the ehsan hepatis within normal limits.  The right kidney measures 12.6 cm in length without evidence of hydronephrosis, contour deforming mass or obvious calculi.      Impression: 1. Cirrhotic hepatic morphology with perihepatic ascites, however, no focal liver lesion.  2. Cholelithiasis without inflammatory wall thickening of the gallbladder.  D:  01/05/2021 E:  01/05/2021  This report was finalized on 1/5/2021 11:33 AM by Dr. Chris Hart.      Xr Chest 1 View    Result Date: 1/10/2021  Narrative: CR Chest 1  Vw INDICATION: Chest x-ray postcode ET tube placement. COMPARISON:  Chest x-ray 1/8/2020. FINDINGS: Single portable AP view(s) of the chest. The endotracheal tube is satisfactory. Cardiac silhouette is moderately enlarged and there is a left-sided pacer which is unchanged. There our fairly diffuse bilateral abnormal reticulonodular infiltrates as well as abnormal thickening of the central bronchovascular soft tissues. Right costophrenic angle is excluded from the film. Allowing for this there is no pneumothorax. There is mild improvement in aeration/airspace disease since yesterday's film.     Impression: Endotracheal tube is satisfactory. No pneumothorax allowing for exclusion the right costophrenic angle. 2. No change in moderate cardiac silhouette enlargement or pacemaker. 3. Fairly diffuse bilateral reticulonodular infiltrates showing mild improvement in aeration since yesterday's film. Signer Name: Radha De Jesus MD  Signed: 1/10/2021 9:55 PM  [facility]station [facility]: [facility]  [facility]    Xr Chest 1 View    Result Date: 1/8/2021  Narrative: EXAMINATION: XR CHEST 1 VW- 01/08/2021  INDICATION: E87.6-Hypokalemia; E87.5-Oqod-grnllgjemx and hyponatremia; R40.0-Somnolence  COMPARISON: 01/04/2021  FINDINGS: Portable chest reveals heart to be enlarged. There are pacer leads in satisfactory position. Patient is status post valvular replacement with worsening of the patchy airspace disease seen throughout the left lung and right lower lung field in the interval. Degenerative changes seen within the spine.        Impression: Worsening identified of the airspace disease seen throughout the left mid and lower lung field and right lung base. The heart is enlarged with degenerative changes seen within the spine.  D:  01/08/2021 E:  01/08/2021  This report was finalized on 1/8/2021 4:40 PM by Dr. Naty Panchal MD.      Xr Chest 1 View    Result Date: 1/6/2021  Narrative: EXAMINATION: XR CHEST 1  VW-01/04/2021:  INDICATION: Dyspnea.  COMPARISON: 02/03/2020.  FINDINGS: Portable chest reveals cardiac pacer leads to be in satisfactory position. The heart is upper limits of normal. The lung fields are clear. Prominence of the pulmonary vascularity. Degenerative changes seen within the spine. Increased pulmonary vascularity bilaterally. No definite pleural effusion or pneumothorax.      Impression: Increased pulmonary vascularity bilaterally with enlargement of the heart. Pacer leads in satisfactory position.  D:  01/04/2021 E:  01/05/2021  This report was finalized on 1/6/2021 12:10 PM by Dr. Naty Panchal MD.      Ct Guided Paracentesis    Result Date: 1/8/2021  Narrative: PROCEDURE: CT-guided paracentesis  Procedural Personnel Attending physician(s): GABRIEL Wilks M.D. Fellow physician(s): None Resident physician(s): None Advanced practice provider(s): None  Pre-procedure diagnosis:  Liver cirrhosis with small volume ascites. ?Unclear etiology. ?MELD-Na is 28 Hepatic encephalopathy, Hyponatremia; Thrombocytopenia Post-procedure diagnosis: Same Indication: Diagnostic Additional clinical history: None  Complications: No immediate complications.      Impression:  CT-guided paracentesis with drainage of approximately 5 mL of serous fluid. Fluid sent for laboratory analysis Patient severely encephalopathic. Kept trying to roll over. Needle had to be removed urgently after only obtaining 5 ml serous fluid before patient rolled on it.  Plan:  Resume care by clinical team. _______________________________________________________________  PROCEDURE SUMMARY: - Limited CT - CT-guided paracentesis - Additional procedure(s): None  PROCEDURE DETAILS:  Pre-procedure Consent: Informed consent for the procedure including risks, benefits and alternatives was obtained and time-out was performed prior to the procedure. Preparation: The site was prepared and draped using maximal sterile barrier technique including cutaneous  antisepsis.  Anesthesia/sedation Level of anesthesia/sedation: No sedation  Initial abdominal CT Initial abdominal CT was performed. Findings: Small volume ascites. A safe window for paracentesis was identified.  Paracentesis Local anesthesia was administered. The peritoneal cavity was accessed and needle position confirmed by CT. Ascites was drained. The catheter was then removed, and a sterile bandage was applied. Paracentesis access technique: 22-gauge Chiba needle advanced under CT fluoroscopic guidance Needle placed: 22-gauge Chiba Post-drainage CT: Not performed  Radiation Dose CT dose length product (mGy-cm): 882  Additional Details Additional description of procedure: None Equipment details: None Specimens removed: Abdominal fluid Estimated blood loss (mL): Less than 10 Standardized report: Paracentesis  Attestation I was present and scrubbed for the entire procedure. Imaging reviewed. Agree with final report as written.  This report was finalized on 1/8/2021 5:28 PM by Omar Wilks.        Relevant imaging studies and labs from 01/10/21 were reviewed and interpreted by me    Medications (drips):  EPINEPHrine  fentanyl 10 mcg/mL  norepinephrine  Pharmacy to dose vancomycin  Pharmacy to dose warfarin  propofol        Pharmacy Consult, , Does not apply, BID  [START ON 1/11/2021] artificial tears, , Both Eyes, Q2H  aspirin, 81 mg, Oral, Daily  atorvastatin, 40 mg, Oral, Daily  budesonide-formoterol, 2 puff, Inhalation, BID - RT  cefTRIAXone, 1 g, Intravenous, Q24H  chlorhexidine, 15 mL, Mouth/Throat, Q12H  doxycycline, 100 mg, Intravenous, Q12H  ipratropium, 0.5 mg, Nebulization, 4x Daily - RT  lactulose, 20 g, Oral, TID  levETIRAcetam, 1,000 mg, Intravenous, Once  lidocaine, 1 patch, Transdermal, Q24H  magnesium sulfate, 4 g, Intravenous, Once  [START ON 1/11/2021] pantoprazole, 40 mg, Intravenous, Q24H  rifAXIMin, 550 mg, Oral, Q12H  sodium chloride, 10 mL, Intravenous, Q12H  vancomycin, 25 mg/kg,  "Intravenous, Once  warfarin (COUMADIN) (dosing per levels), , Does not apply, Daily        Assessment/Plan   IMPRESSION / PLAN     Inpatient Problem List:  60 y.o.male:  Active Hospital Problems    Diagnosis   • **Hyponatremia   • Diastolic CHF, chronic (CMS/HCC)   • Hypokalemia   • Other cirrhosis of liver (CMS/HCC)   • Hyperbilirubinemia   • S/P TAVR (transcatheter aortic valve replacement) 11/22/19   • Paroxysmal atrial fibrillation   • Class 3 severe obesity in adult    • YOUSUF on CPAP   • Nonrheumatic aortic valve stenosis     ZORAIDA 11/15 Aortic Valve Measurements:   Stenosis   LVOT diam 2 cm      LV V1 max 138.6 cm/sec      LV V1 max PG 7.7 mmHg      LV V1 mean PG 3.9 mmHg      LV V1 VTI 27.8 cm      Ao pk eddy 469.7 cm/sec       Stenosis   Ao max PG 88.2 mmHg      Ao mean PG 50.1 mmHg      Ao V2 VTI 98.8 cm      IAN(I,D) 0.89 cm^2                • Coronary artery disease involving native heart   • Iron deficiency anemia   • Coal workers pneumoconiosis    • PAD s/p L SFA stent and R fem-pop    • Dyslipidemia on statins    • COPD    • T2DM on metformin    • Hypertension   • Esophageal reflux        Impression:  60 y.o.male with relevant PMH of COPD, 60 py smoking - quit 6  Years ago, T2DM, HTN, HLD, YOUSUF on CPAP, HFpEF, CAD w/ prior stent, AS s/p TAVR, PAF, CHB s/p PPM, home Plavix / Coumadin, recent diagnosis of cirrhosis (likely DAVID) admitted 1/4/2021 after routine labs showed at PCP showed proBNP ~3500, Na 121, K 2.6.  Patient found to have anasarca, weakness, and confusion on admission.    Hospital course complicated by Acute on Chronic HFpEF, decompensated cirrhosis w/ hepatic encephalopathy and ascites, and persistent hyponatremia.  Has been followed by GI, Cardiology, Nephrology.    Had escalating O2 requirements today.  Placed on Bipap.  Apparently \"ripped it off\" and developed respiratory, followed by PEA arrest that lasted ~ 30 min.  Patient appears to have acute on chronic HFpEF w/ hemorrhagic pulmonary " edema vs aspiration pna.  ~3 liters+ fluid balance past couple days.  Comatose post arrest with myoclonic jerks.    Plan:  HIE / PSE - TTM w/ goal temp 36 degrees.  Continue treatment of PSE w/ rifaxamin / lactulose.  Check ammonia in am.  Poor prognosis based on length of code and post code exam.    Acute Hypoxemic Respiratory Failure - suspect hemorrhagic pulmonary edema.  Correct coagulopathy.  Currently on epi gtt.  Lung protective settings.  Moderate to High PEEP until improved.  Avoid IVF.  Empiric Abx.    Shock - change epi to levophed, wean as able    CHF - as above.  Hold anti-hypertensive's.  Diuresis when stable enough.    Cirrhosis - ascites was very minimal.  GI following.  Suspect DAVID.  Mitochondrial Ab was positive.    If patient survives from neurologic standpoint he may benefit from RHC to ascertain PHTN, I.e. portopulmonary HTN vs DHF (I suspect latter).    DVT / PUD prophylaxis    NPO    Critical Care time spent in direct patient care: 45 minutes (excluding procedure time, if applicable) including high complexity decision making to assess, manipulate, and support vital organ system failure in this individual who has impairment of one or more vital organ systems such that there is a high probability of imminent or life threatening deterioration in the patient’s condition.       Gee Thakkar MD  Intensive Care Medicine  01/10/21 22:21 EST

## 2021-01-11 NOTE — SIGNIFICANT NOTE
01/11/21 0814   SLP Deferred Reason   SLP Deferred Reason Routine  (Consult received for cognitive-communication eval per post-TTM protocol. Pt currently intubated. Will place on hold and await re-consult when appropriate.)

## 2021-01-11 NOTE — NURSING NOTE
Pt hooked up for CHRISTINA.  Skin checked at 0700.  Recording started at 0745.  Will recheck skin later today.

## 2021-01-11 NOTE — SIGNIFICANT NOTE
At approximately 2030  Primary RN entered pt room due to pulse oximetry levels dropping. Upon entering the room, RN found that his CPAP tubing had become disconnected, and pt was pale/ ashen and unresponsive. Upon reconnecting the tubing, pt did not rebound. RRT was called. Pt then stopped breathing and a code blue was called, see following notes.

## 2021-01-11 NOTE — CONSULTS
Pharmacy Consult-Vancomycin Dosing  Sai Weinberg is a  60 y.o. male receiving vancomycin therapy.     Indication: Sepsis, PNA  Consulting Provider: Pulmonology  ID Consult: N    Goal Trough:    Current Antimicrobial Therapy  Anti-Infectives (From admission, onward)      Ordered     Dose/Rate Route Frequency Start Stop    01/10/21 2343  piperacillin-tazobactam (ZOSYN) 3.375 g in iso-osmotic dextrose 50 ml (premix)     Ordering Provider: Francisco Mayberry IV, PharmD    3.375 g  over 4 Hours Intravenous Every 8 Hours 01/11/21 0800 01/18/21 0759    01/10/21 2343  piperacillin-tazobactam (ZOSYN) 4.5 g in iso-osmotic dextrose 100 mL IVPB (premix)     Ordering Provider: Francisco Mayberry IV, PharmD    4.5 g  over 30 Minutes Intravenous Once 01/11/21 0030 01/11/21 0047    01/10/21 2220  vancomycin 2500 mg/500 mL 0.9% NS IVPB ([facility])     Ordering Provider: Francisco Mayberry IV, PharmD    25 mg/kg × 101 kg  over 180 Minutes Intravenous Once 01/10/21 2315 01/11/21 0250    01/10/21 2218  Pharmacy to dose vancomycin     Ordering Provider: Huy Lawson APRN     Does not apply Continuous PRN 01/10/21 2218 01/20/21 2217    01/09/21 0842  doxycycline (VIBRAMYCIN) 100 mg/100 mL 0.9% NS MBP     Ordering Provider: Piper Stoddard DO    100 mg  over 60 Minutes Intravenous Every 12 Hours 01/09/21 0930 01/14/21 0929    01/06/21 1344  riFAXIMin (XIFAXAN) tablet 550 mg     Shabnam Armendariz [facility] reviewed the order on 01/08/21 1408.   Ordering Provider: Brunner, Mark I, MD    550 mg Oral Every 12 Hours Scheduled 01/06/21 1430 02/05/21 0859            Allergies  Allergies as of 01/04/2021 - Reviewed 01/04/2021   Allergen Reaction Noted    Tylenol [acetaminophen] Other (See Comments) 10/30/2019       Labs    Results from last 7 days   Lab Units 01/11/21  0341 01/10/21  2140 01/10/21  1024   BUN mg/dL 42* 33* 32*   CREATININE mg/dL 1.28* 1.01 0.60*       Results from last 7 days   Lab Units 01/11/21  0341 01/10/21  7021  "01/10/21  1024   WBC 10*3/mm3 10.09 8.51 5.71       Evaluation of Dosing     Last Dose Received in the ED/Outside Facility: N/A  Is Patient on Dialysis or Renal Replacement: N    Ht - 165.1 cm (65\")  Wt - 101 kg (222 lb 12.8 oz)    Estimated Creatinine Clearance: 67.1 mL/min (A) (by C-G formula based on SCr of 1.28 mg/dL (H)).    Intake & Output (last 3 days)         01/08 0701 - 01/09 0700 01/09 0701 - 01/10 0700 01/10 0701 - 01/11 0700    P.O. 940 621 780    I.V. (mL/kg)  1000 (9.9)     Blood   296    IV Piggyback  100 650    Total Intake(mL/kg) 940 (9.3) 1721 (17) 1726 (17.1)    Urine (mL/kg/hr) 400 (0.2)  625 (0.3)    Stool   0    Total Output 400  625    Net +540 +1721 +1101           Urine Unmeasured Occurrence 6 x 5 x 2 x    Stool Unmeasured Occurrence 5 x 1 x 3 x            Microbiology and Radiology  Microbiology Results (last 10 days)       Procedure Component Value - Date/Time    Respiratory Culture - Sputum, ET Suction [[id number]] Collected: 01/11/21 0308    Lab Status: Preliminary result Specimen: Sputum from ET Suction Updated: 01/11/21 0419     Gram Stain Moderate (3+) WBCs per low power field      Few (2+) Epithelial cells per low power field      Moderate (3+) Mixed bacterial jhony      Few (2+) Yeast    S. Pneumo Ag Urine or CSF - Urine, Urine, Clean Catch [[id number]]  (Normal) Collected: 01/10/21 0114    Lab Status: Final result Specimen: Urine, Clean Catch Updated: 01/10/21 1225     Strep Pneumo Ag Negative    Legionella Antigen, Urine - Urine, Urine, Clean Catch [[id number]]  (Normal) Collected: 01/10/21 0114    Lab Status: Final result Specimen: Urine, Clean Catch Updated: 01/10/21 1224     LEGIONELLA ANTIGEN, URINE Negative    Blood Culture - Blood, Blood, Arterial Line [[id number]] Collected: 01/09/21 1151    Lab Status: Preliminary result Specimen: Blood, Arterial Line Updated: 01/10/21 1201     Blood Culture No growth at 24 hours    Blood Culture - Blood, Wrist, Right [[id number]] Collected: " 01/09/21 1151    Lab Status: Preliminary result Specimen: Blood from Wrist, Right Updated: 01/10/21 1201     Blood Culture No growth at 24 hours    COVID PRE-OP / PRE-PROCEDURE SCREENING ORDER (NO ISOLATION) - Swab, Nasopharynx [[id number]]  (Normal) Collected: 01/04/21 2158    Lab Status: Final result Specimen: Swab from Nasopharynx Updated: 01/05/21 0121    Narrative:      The following orders were created for panel order COVID PRE-OP / PRE-PROCEDURE SCREENING ORDER (NO ISOLATION) - Swab, Nasopharynx.  Procedure                               Abnormality         Status                     ---------                               -----------         ------                     COVID-19 and FLU A/B PCR...[[id number]]  Normal              Final result                 Please view results for these tests on the individual orders.    COVID-19 and FLU A/B PCR - Swab, Nasopharynx [[id number]]  (Normal) Collected: 01/04/21 2158    Lab Status: Final result Specimen: Swab from Nasopharynx Updated: 01/05/21 0121     COVID19 Not Detected     Influenza A PCR Not Detected     Influenza B PCR Not Detected    Narrative:      Fact sheet for providers: https://www.fda.gov/media/509466/download    Fact sheet for patients: https://www.fda.gov/media/151516/download    Test performed by PCR.            Vancomycin Levels:                        Assessment/Plan:    Morbidly obese male, moderate age. Status post respiratory arrest Code Blue with ROSC >30 minutes after start.     Renal markers unlikely to be reflective of acute renal function; SCr already increased 30% only 5 hours post arrest. Patient now receiving multiple vasoactive medications for hemodynamic support and being cooled for TTM in the post-arrest setting.     2500 mg (25 mg/kg) IV vancomycin infusion now complete, no further vancomycin as per above.  Random vancomycin level Tuesday or as per rounding clinical pharmacist later this morning pending reassessment.     Target steady  state trough 15-20 mcg/mL.   Thank you for this consult.  Francisco Mayberry IV, PharmD, BCPS  1/11/2021  05:19 EST

## 2021-01-11 NOTE — PROGRESS NOTES
Clinical Nutrition   Reason For Visit: MDR, LOS day 7    Patient Name: Sai Weinberg  YOB: 1960  MRN: [id number]  Date of Encounter: 01/11/21 10:57 EST  Admission date: 1/4/2021      Nutrition Assessment     Admission Problem List:  Hyponatremia  Hepatic encephalopathy  A/c CHF  Decompensated cirrhosis  PEA arrest (1/10)  Vent (1/10)  TTM started (1/10)       PMH: He  has a past medical history of Arthritis, Black lung disease (CMS/HCC), BPH (benign prostatic hyperplasia), Cataract, COLD (chronic obstructive lung disease) (CMS/HCC), Colon polyps, Diabetes mellitus (CMS/HCC), Dyslipidemia, Esophageal reflux, Flu, Heart attack (CMS/HCC), Herniated lumbar intervertebral disc, History of colon resection, Hyperlipidemia, Hypertension, Malignant neoplasm of colon (CMS/HCC), On home oxygen therapy, Open wound of left hip and thigh with complication (3/12/2017), Peripheral vascular disease (CMS/HCC), Renal failure, Sleep apnea with use of continuous positive airway pressure (CPAP), Wears dentures, and Wears glasses.   PSxH: He  has a past surgical history that includes Colon surgery; Other surgical history; Cardiac catheterization; Colonoscopy; Aortagram (N/A, 1/17/2017); Angioplasty Femoral Artery (N/A, 1/17/2017); Femoral Femoral Bypass (N/A, 3/13/2017); femoral popliteal bypass (Right, 01/26/2016); Cardiac catheterization (Left, 10/30/2019); Aortic valve replacement (N/A, 11/22/2019); transesophageal echocardiogram (abbie) (N/A, 11/22/2019); Aortic valve replacement (N/A, 11/22/2019); and Cardiac electrophysiology procedure (Left, 11/25/2019).         Applicable Nutrition-Related Information:  (1/4) PO diet- cardiac (11% PO intake of 12 documented meals)  (1/10) NPO       Reported/Observed/Food/Nutrition Related History     Pt reached cooling temp at 0400 this AM. cEEG in progress. Pt maxed on epi, levophed, and versed.       Anthropometrics   Height: 65 in  Weight: 222 lb per bed scale (1/10)  BMI:  37.1  BMI classification: Obese Class II: 35-39.9kg/m2   IBW: 136 lb      Labs reviewed   Labs reviewed: Yes    Results from last 7 days   Lab Units 01/11/21  0926 01/11/21  0341 01/10/21  2140   SODIUM mmol/L 139 138 139   POTASSIUM mmol/L 4.4 4.1 4.3   CHLORIDE mmol/L 98 97* 94*   CO2 mmol/L 33.0* 31.0* 29.0   BUN mg/dL 47* 42* 33*   CREATININE mg/dL 1.74* 1.28* 1.01   GLUCOSE mg/dL 154* 164* 137*   CALCIUM mg/dL 8.6 8.5* 9.4   PHOSPHORUS mg/dL 7.5* 5.5* 8.3*   MAGNESIUM mg/dL 2.9* 2.9* 2.3     Results from last 7 days   Lab Units 01/11/21  0926 01/11/21  0341 01/10/21  2140   WBC 10*3/mm3 9.50 10.09 8.51   ALBUMIN g/dL 2.00* 1.80* 1.80*     Results from last 7 days   Lab Units 01/11/21  1028 01/11/21  0752 01/11/21  0619 01/11/21  0406 01/11/21  0308 01/10/21  2033   GLUCOSE mg/dL 143* 168* 177* 167* 169* 127     Lab Results   Lab Value Date/Time    HGBA1C 6.20 (H) 01/04/2021 1730    HGBA1C 5.10 11/21/2019 1315     Medications reviewed   Medications reviewed: Yes  Pertinent: lasix, keppra, protonix, antibiotic, depacon  GTT: epinephrine at 0.3 mcg/kg/min, versed, levophed at 0.2 mcg/kg/min, phenylephrine at 0.5 mcg/kg/min, propofol at 24.2 ml/hr      Needs Assessment   Height used:   Weight used:   IBW:     Estimated Calories needs:       Estimated Protein needs:       Estimated Fluid needs:       Current Nutrition Prescription   PO: NPO Diet       EN:   Patient isn't on Tube Feeding  Route: N/A  Verified at bedside: N/A      Evaluation of Received Nutrient/Fluid Intake:        Nutrition Diagnosis   1/11  Problem No nutrition diagnosis at this time -- POC/GOC pending at this time   Etiology    Signs/Symptoms          Intervention   Intervention: Follow treatment progress, Care plan reviewed   -Pt currently being cooled via TTM protocol post arrest      Goal:   General: POC/GOC pending  PO: N/A   EN/PN: N/A      Monitoring/Evaluation:   Monitoring/Evaluation: Per protocol, Symptoms, POC/GOC, Hemodynamic  stability    Lainey Ornelas, MS RD/LD CNSC  Time Spent: 30 minutes

## 2021-01-11 NOTE — PROGRESS NOTES
" LOS: 7 days   Patient Care Team:  Dewayne Cole MD as PCP - General  Dewayne Cole MD as PCP - Family Medicine    Chief Complaint: Hyponatremia     Subjective     History of Present Illness    Subjective:  Symptoms:  Stable.  No shortness of breath, headache, chest pressure or diarrhea.    Diet:  Poor intake.    Activity level: Normal.    Pain:  He reports no pain.      Unfortunately had PEA arrest with ROSC after 30 min last night. Transferred to ICU on 2 pressors. Cr rising 1.2 from 0.6mg/dl UOP dropping. CT chest showed extensive pulmonary infiltrates and b/l effusion.     History taken from: patient    Objective     Vital Sign Min/Max for last 24 hours  Temp  Min: 94.8 °F (34.9 °C)  Max: 99.1 °F (37.3 °C)   BP  Min: 74/50  Max: 141/71   Pulse  Min: 59  Max: 74   Resp  Min: 14  Max: 32   SpO2  Min: 64 %  Max: 100 %   Flow (L/min)  Min: 5  Max: 6   No data recorded     Flowsheet Rows      First Filed Value   Admission Height  165.1 cm (65\") Documented at 01/04/2021 1707   Admission Weight  104 kg (230 lb) Documented at 01/04/2021 1707          I/O this shift:  In: 345 [Blood:195; IV Piggyback:150]  Out: 50 [Urine:50]  I/O last 3 completed shifts:  In: 3953.3 [P.O.:1005; I.V.:1902.3; Blood:296; IV Piggyback:750]  Out: 665 [Urine:665]    Objective:  General Appearance:  Comfortable.    Vital signs: (most recent): Blood pressure 95/51, pulse 70, temperature 94.8 °F (34.9 °C), temperature source Esophageal, resp. rate 14, height 165.1 cm (65\"), weight 101 kg (222 lb 12.8 oz), SpO2 96 %.    Output: Producing urine.    HEENT: Normal HEENT exam.    Lungs:  Normal respiratory rate.  Breath sounds clear to auscultation.  He is not in respiratory distress.  No rales.    Heart: Normal rate.  Regular rhythm.  S1 normal and S2 normal.    Abdomen: Abdomen is soft and non-distended.  There are no signs of ascites.  Bowel sounds are normal.     Extremities: There is no dependent edema.    Pulses: Distal pulses are " intact.    Neurological: Patient is alert and oriented to person, place and time.  Normal strength.              Results Review:     I reviewed the patient's new clinical results.    WBC WBC   Date Value Ref Range Status   01/11/2021 9.50 3.40 - 10.80 10*3/mm3 Final   01/11/2021 10.09 3.40 - 10.80 10*3/mm3 Final   01/10/2021 8.51 3.40 - 10.80 10*3/mm3 Final   01/10/2021 5.71 3.40 - 10.80 10*3/mm3 Final   01/09/2021 5.98 3.40 - 10.80 10*3/mm3 Final      HGB Hemoglobin   Date Value Ref Range Status   01/11/2021 9.0 (L) 13.0 - 17.7 g/dL Final   01/11/2021 9.2 (L) 13.0 - 17.7 g/dL Final   01/10/2021 9.9 (L) 13.0 - 17.7 g/dL Final   01/10/2021 10.7 (L) 13.0 - 17.7 g/dL Final   01/09/2021 11.0 (L) 13.0 - 17.7 g/dL Final      HCT Hematocrit   Date Value Ref Range Status   01/11/2021 26.8 (L) 37.5 - 51.0 % Final   01/11/2021 26.6 (L) 37.5 - 51.0 % Final   01/10/2021 29.5 (L) 37.5 - 51.0 % Final   01/10/2021 30.9 (L) 37.5 - 51.0 % Final   01/09/2021 31.0 (L) 37.5 - 51.0 % Final      Platlets No results found for: LABPLAT   MCV MCV   Date Value Ref Range Status   01/11/2021 99.3 (H) 79.0 - 97.0 fL Final   01/11/2021 95.0 79.0 - 97.0 fL Final   01/10/2021 99.0 (H) 79.0 - 97.0 fL Final   01/10/2021 95.1 79.0 - 97.0 fL Final   01/09/2021 94.2 79.0 - 97.0 fL Final          Sodium Sodium   Date Value Ref Range Status   01/11/2021 139 136 - 145 mmol/L Final   01/11/2021 138 136 - 145 mmol/L Final   01/10/2021 139 136 - 145 mmol/L Final   01/10/2021 133 (L) 136 - 145 mmol/L Final   01/09/2021 134 (L) 136 - 145 mmol/L Final   01/09/2021 128 (L) 136 - 145 mmol/L Final   01/09/2021 132 (L) 136 - 145 mmol/L Final   01/08/2021 129 (L) 136 - 145 mmol/L Final      Potassium Potassium   Date Value Ref Range Status   01/11/2021 4.4 3.5 - 5.2 mmol/L Final   01/11/2021 4.1 3.5 - 5.2 mmol/L Final   01/10/2021 4.3 3.5 - 5.2 mmol/L Final     Comment:     Slight hemolysis detected by analyzer. Results may be affected.   01/10/2021 3.9 3.5 - 5.2  mmol/L Final   01/09/2021 3.8 3.5 - 5.2 mmol/L Final     Comment:     Slight hemolysis detected by analyzer. Results may be affected.      Chloride Chloride   Date Value Ref Range Status   01/11/2021 98 98 - 107 mmol/L Final   01/11/2021 97 (L) 98 - 107 mmol/L Final   01/10/2021 94 (L) 98 - 107 mmol/L Final   01/10/2021 94 (L) 98 - 107 mmol/L Final   01/09/2021 90 (L) 98 - 107 mmol/L Final      CO2 CO2   Date Value Ref Range Status   01/11/2021 33.0 (H) 22.0 - 29.0 mmol/L Final   01/11/2021 31.0 (H) 22.0 - 29.0 mmol/L Final   01/10/2021 29.0 22.0 - 29.0 mmol/L Final   01/10/2021 32.0 (H) 22.0 - 29.0 mmol/L Final   01/09/2021 31.0 (H) 22.0 - 29.0 mmol/L Final      BUN BUN   Date Value Ref Range Status   01/11/2021 47 (H) 8 - 23 mg/dL Final   01/11/2021 42 (H) 8 - 23 mg/dL Final   01/10/2021 33 (H) 8 - 23 mg/dL Final   01/10/2021 32 (H) 8 - 23 mg/dL Final   01/09/2021 29 (H) 8 - 23 mg/dL Final      Creatinine Creatinine   Date Value Ref Range Status   01/11/2021 1.74 (H) 0.76 - 1.27 mg/dL Final   01/11/2021 1.28 (H) 0.76 - 1.27 mg/dL Final   01/10/2021 1.01 0.76 - 1.27 mg/dL Final   01/10/2021 0.60 (L) 0.76 - 1.27 mg/dL Final   01/09/2021 0.67 (L) 0.76 - 1.27 mg/dL Final      Calcium Calcium   Date Value Ref Range Status   01/11/2021 8.6 8.6 - 10.5 mg/dL Final   01/11/2021 8.5 (L) 8.6 - 10.5 mg/dL Final   01/10/2021 9.4 8.6 - 10.5 mg/dL Final   01/10/2021 8.8 8.6 - 10.5 mg/dL Final   01/09/2021 8.4 (L) 8.6 - 10.5 mg/dL Final      PO4 No results found for: CAPO4   Albumin Albumin   Date Value Ref Range Status   01/11/2021 2.00 (L) 3.50 - 5.20 g/dL Final   01/11/2021 1.80 (L) 3.50 - 5.20 g/dL Final   01/10/2021 1.80 (L) 3.50 - 5.20 g/dL Final      Magnesium Magnesium   Date Value Ref Range Status   01/11/2021 2.9 (H) 1.6 - 2.4 mg/dL Final   01/11/2021 2.9 (H) 1.6 - 2.4 mg/dL Final   01/10/2021 2.3 1.6 - 2.4 mg/dL Final      Uric Acid No results found for: URICACID     Medication Review: Yes    Assessment/Plan        Hyponatremia    Dyslipidemia on statins     COPD     T2DM on metformin     Esophageal reflux    Hypertension    PAD s/p L SFA stent and R fem-pop     Iron deficiency anemia    Coal workers pneumoconiosis     Coronary artery disease involving native heart    Nonrheumatic aortic valve stenosis    YOUSUF on CPAP    Paroxysmal atrial fibrillation    Class 3 severe obesity in adult     S/P TAVR (transcatheter aortic valve replacement) 11/22/19    Diastolic CHF, chronic (CMS/HCC)    Hypokalemia    Other cirrhosis of liver (CMS/HCC)    Hyperbilirubinemia    Cardiac arrest (CMS/HCC)      Assessment & Plan     LANA: Likely ishcemic ATN post cardiac arrest and prolong downtime ROSC 30 min. Cr 1.2 from 0.6. UOP dropping    Hypovolemic hyponatremia: Seen early for hyponatremia. Interval improvement in Na with IV fluids.     PEA arrest: ROSC in 30 min. Now Intubated on MV and 2 pressors     Volume overload: Worsening b/l infiltrates and b/l effusion. Does have tense belly and possible effusion     Hepatic encephalopathy: On lactulose and rifaximin     Cirrhosis: Thought to be due to DAVID          Recs  - LANA likely ischemic ATN. High risk for needing RRT. Will try aggressive diuretics to determine renal response. Overall prognosis is poor. Not an ideal candidate for long term HD due to liver disease. Will discuss with family.  - Bumex 2 mg IV and diuril 500mg IV.   - Strict I/o  - MAP 65 to 70       Lonnie Elizabeth MD  01/11/21  11:22 EST

## 2021-01-11 NOTE — PROGRESS NOTES
[facility] Medicine Services  CRITICAL CARE NOTE    Patient Name: Sai Weinberg  : 1960  MRN: [id number]    Date of Admission: 2021  Length of Stay: 6    Subjective     Event/HPI:  Respiratory failure    Nighttime APC responded to a rapid response.  Upon arrival patient was noted to have pulled off his BiPAP.  Patient was found to be in respiratory failure and CODE BLUE was called.  Code was ran per ACLS protocol.  Intensivist nurse practitioner arrived and placed a left femoral line, respiratory therapy intubated the patient.  After from prolonged course of chest compressions, there was return of spontaneous heart rhythm.  Also spoke with Dr. Gee Thakkar and made him aware of the patient status.  Patient was transferred to ICU for further care    Objective     Vital Signs:   Temp:  [97.6 °F (36.4 °C)-98.8 °F (37.1 °C)] 97.8 °F (36.6 °C)  Heart Rate:  [61-70] 70  Resp:  [19-23] 22  BP: (129-143)/(59-77) 132/59  FiO2 (%):  [100 %] 100 %       Pertinent Physical Exam:  Constitutional: Unresponsive  HENT: NCAT, mucous membranes moist  Respiratory: No spontaneous respirations  Cardiovascular: Paced rhythm on monitor  Gastrointestinal: Obese  Musculoskeletal: No bilateral ankle edema, no clubbing or cyanosis to extremities  Psychiatric: Unresponsive  Neurologic: Unresponsive  Skin: Gray ashen color      Results Reviewed:  I have personally reviewed current lab, radiology, and data and agree.    Results from last 7 days   Lab Units 01/10/21  1024 21  1120 21  0849 21  0847   WBC 10*3/mm3 5.71 5.98  --  6.19   HEMOGLOBIN g/dL 10.7* 11.0*  --  11.9*   HEMATOCRIT % 30.9* 31.0*  --  33.4*   PLATELETS 10*3/mm3 83* 78*  --  77*   INR  4.66* 4.32* 3.58*  --      Results from last 7 days   Lab Units 01/10/21  1024 218 21  1120  21  0847  21  1730   SODIUM mmol/L 133* 134* 128*   < > 128*   < > 121*   POTASSIUM mmol/L 3.9  --  3.8  --  4.1    < > 2.6*   CHLORIDE mmol/L 94*  --  90*  --  87*   < > 76*   CO2 mmol/L 32.0*  --  31.0*  --  33.0*   < > 35.0*   BUN mg/dL 32*  --  29*  --  36*   < > 48*   CREATININE mg/dL 0.60*  --  0.67*  --  0.70*   < > 0.86   GLUCOSE mg/dL 157*  --  148*  --  111*   < > 131*   CALCIUM mg/dL 8.8  --  8.4*  --  8.7   < > 9.3   ALT (SGPT) U/L  --   --   --   --  30  --  38   AST (SGOT) U/L  --   --   --   --  54*  --  58*    < > = values in this interval not displayed.     No results found for: BNP  No results found for: PHART, PCO2    Microbiology Results Abnormal     Procedure Component Value - Date/Time    S. Pneumo Ag Urine or CSF - Urine, Urine, Clean Catch [[id number]]  (Normal) Collected: 01/10/21 0114    Lab Status: Final result Specimen: Urine, Clean Catch Updated: 01/10/21 1225     Strep Pneumo Ag Negative    Legionella Antigen, Urine - Urine, Urine, Clean Catch [[id number]]  (Normal) Collected: 01/10/21 0114    Lab Status: Final result Specimen: Urine, Clean Catch Updated: 01/10/21 1224     LEGIONELLA ANTIGEN, URINE Negative    Blood Culture - Blood, Blood, Arterial Line [[id number]] Collected: 01/09/21 1151    Lab Status: Preliminary result Specimen: Blood, Arterial Line Updated: 01/10/21 1201     Blood Culture No growth at 24 hours    Blood Culture - Blood, Wrist, Right [[id number]] Collected: 01/09/21 1151    Lab Status: Preliminary result Specimen: Blood from Wrist, Right Updated: 01/10/21 1201     Blood Culture No growth at 24 hours    COVID PRE-OP / PRE-PROCEDURE SCREENING ORDER (NO ISOLATION) - Swab, Nasopharynx [[id number]]  (Normal) Collected: 01/04/21 2158    Lab Status: Final result Specimen: Swab from Nasopharynx Updated: 01/05/21 0121    Narrative:      The following orders were created for panel order COVID PRE-OP / PRE-PROCEDURE SCREENING ORDER (NO ISOLATION) - Swab, Nasopharynx.  Procedure                               Abnormality         Status                     ---------                                -----------         ------                     COVID-19 and FLU A/B PCR...[[id number]]  Normal              Final result                 Please view results for these tests on the individual orders.    COVID-19 and FLU A/B PCR - Swab, Nasopharynx [[id number]]  (Normal) Collected: 01/04/21 2158    Lab Status: Final result Specimen: Swab from Nasopharynx Updated: 01/05/21 0121     COVID19 Not Detected     Influenza A PCR Not Detected     Influenza B PCR Not Detected    Narrative:      Fact sheet for providers: https://www.fda.gov/media/620390/download    Fact sheet for patients: https://www.fda.gov/media/583710/download    Test performed by PCR.          Imaging Results (Last 24 Hours)     Procedure Component Value Units Date/Time    XR Chest 1 View [[id number]] Resulted: 01/10/21 2140     Updated: 01/10/21 2144        Results for orders placed during the hospital encounter of 01/04/21   Adult Transthoracic Echo Complete W/ Cont if Necessary Per Protocol    Narrative · There is biatrial and right ventricular enlargement.  · Global and segmental LV wall motion is normal. The calculated LV   ejection is 56%.  · There is a bioprosthetic aortic valve (TAVR) that is functionally   normal.  · Mitral annular calcification with mild mitral regurgitation is   appreciated.  · The calculated RV systolic pressure is moderately-severely elevated at   45mmHg-55mmHg.  · Less than 50% IVC inspiratory collapse is appreciated.  · There is no pericardial efusion.          I have reviewed the current medications.    Assessment / Plan     Active Hospital Problems    Diagnosis POA   • **Hyponatremia [E87.1] Yes   • Diastolic CHF, chronic (CMS/HCC) [I50.32] Unknown   • Hypokalemia [E87.6] Unknown   • Other cirrhosis of liver (CMS/HCC) [K74.69] Unknown   • Hyperbilirubinemia [E80.6] Unknown   • S/P TAVR (transcatheter aortic valve replacement) 11/22/19 [Z95.2] Not Applicable   • Paroxysmal atrial fibrillation [I48.0] Yes   • Class 3 severe  obesity in adult  [E66.01] Yes   • YOUSUF on CPAP [G47.33, Z99.89] Not Applicable   • Nonrheumatic aortic valve stenosis [I35.0] Yes     ZORAIDA 11/15 Aortic Valve Measurements:   Stenosis   LVOT diam 2 cm      LV V1 max 138.6 cm/sec      LV V1 max PG 7.7 mmHg      LV V1 mean PG 3.9 mmHg      LV V1 VTI 27.8 cm      Ao pk eddy 469.7 cm/sec       Stenosis   Ao max PG 88.2 mmHg      Ao mean PG 50.1 mmHg      Ao V2 VTI 98.8 cm      IAN(I,D) 0.89 cm^2                • Coronary artery disease involving native heart [I25.10] Yes   • Iron deficiency anemia [D50.9] Yes   • 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   • PAD s/p L SFA stent and R fem-pop  [I73.9] Yes   • Dyslipidemia on statins  [E78.5] Yes   • COPD  [J44.9] Yes   • T2DM on metformin  [E11.9] Yes   • Hypertension [I10] Yes   • Esophageal reflux [K21.9] Yes         Pertinent Assessment & Plan:   Acute cardio respiratory failure  -Patient resuscitated with ACLS protocol, intubated and transferred to ICU      CODE STATUS:    Code Status and Medical Interventions:   Ordered at: 01/05/21 0010     Level Of Support Discussed With:    Patient     Code Status:    CPR     Medical Interventions (Level of Support Prior to Arrest):    Full         Critical Care time spent in direct patient care:    46 minutes (excluding procedure time, if applicable) including high complexity decision making to assess and treat vital organ system failure in this individual who has impairment of one or more vital organ systems such that there is a high probability of imminent or life threatening deterioration in the patient’s condition. Failure to initiate the above interventions on an urgent basis would likely result in sudden, clinically significant or life threatening deterioration in the patient's condition.      Brittany Back,   01/10/21

## 2021-01-11 NOTE — PROGRESS NOTES
Critical Care Note     LOS: 7 days   Patient Care Team:  Dewayne Cole MD as PCP - General  Dewayne Cole MD as PCP - Family Medicine    Chief Complaint/Reason for visit:    Chief Complaint   Patient presents with   • Abnormal Lab   Cardiac arrest  COPD  Peripheral vascular disease  Diabetes mellitus type 2  Coronary artery disease, previous stent  Status post TAVR November 22, 2019  Congestive heart failure  Cirrhosis, new diagnosis    Subjective     60-year-old gentleman hospitalized on January 4 with congestive heart failure, hyponatremia, hepatic encephalopathy.  He has a known history of coronary artery disease, coal workers pneumoconiosis, chronic iron deficiency anemia, hypertension, dyslipidemia, proximal atrial fibrillation and complete heart block requiring permanent pacemaker, GI bleed.  His echocardiogram on admission revealed an EF of 56% a bioprosthetic aortic valve functioning normally and an elevated RV systolic pressure at 45 to 55 mmHg.  Despite significant third spaced edema he appeared to be intravascularly volume depleted.  GI did assess him and felt that he had liver cirrhosis of unclear etiology with associated ascites, hepatic encephalopathy.  On the evening of January 10 around 2030 RN entered the room and his pulse oximetry level was dropping.  He had removed his CPAP tubing and was pale and unresponsive.  She reconnected the tubing but his oxygenation did not resolve improve and then he respiratory arrested and a CODE BLUE was called.  IV access was difficult.  He received prolonged CPR for at least 30 minutes before restoration of spontaneous circulation achieved.  Ultimately a right femoral line had to be placed because of pacemaker wires would not allow threading from above.  He was noted to have myoclonic jerking.  He could not support his blood pressure without high-dose epinephrine, norepinephrine drips.  Targeted temperature management initiated.    Interval History:  "    He achieved his target temperature around 0200 today.  He continues to require maximum epinephrine and norepinephrine drips to maintain an adequate blood pressure  Only 67 mL of urine over the last 6 hours  He was started on Keppra last night as well as a Versed and propofol drips for his myoclonic jerking and possible seizures  This morning EEG revealed nonconvulsive status epilepticus  Neurology did an urgent consult this morning and added valproate  He was empirically placed on vancomycin and Zosyn    Review of Systems:    All systems were reviewed and negative except as noted in subjective.    Medical history, surgical history, social history, family history reviewed    Objective     Intake/Output:    Intake/Output Summary (Last 24 hours) at 1/11/2021 1244  Last data filed at 1/11/2021 1200  Gross per 24 hour   Intake 2673.33 ml   Output 732 ml   Net 1941.33 ml       Nutrition:  NPO Diet    Infusions:  EPINEPHrine, 0.02-0.3 mcg/kg/min, Last Rate: 0.3 mcg/kg/min (01/11/21 1152)  fentanyl 10 mcg/mL,  mcg/hr  midazolam, 1-10 mg/hr, Last Rate: 10 mg/hr (01/11/21 0749)  norepinephrine, 0.02-0.3 mcg/kg/min  norepinephrine, 0.02-0.3 mcg/kg/min, Last Rate: 0.3 mcg/kg/min (01/11/21 1154)  Pharmacy Consult,   Pharmacy to dose vancomycin,   Pharmacy to dose warfarin,   phenylephrine, 0.5-3 mcg/kg/min, Last Rate: 1.1 mcg/kg/min (01/11/21 1146)  propofol, 5-50 mcg/kg/min, Last Rate: 40 mcg/kg/min (01/11/21 1019)        Mechanical Ventilator Settings:            Resp Rate (Set): 14     FiO2 (%): 50 %  PEEP/CPAP (cm H2O): 12 cm H20    Minute Ventilation (L/min) (Obs): 6.01 L/min  Resp Rate (Observed) Vent: 14  I:E Ratio (Set): 1:0.30  I:E Ratio (Obs): 1:3.30    PIP Observed (cm H2O): 24 cm H2O  Plateau Pressure (cm H2O): 25 cm H2O    Telemetry: Paced rhythm             Vital Signs  Blood pressure (!) 89/50, pulse 70, temperature 96.8 °F (36 °C), temperature source Esophageal, resp. rate 14, height 165.1 cm (65\"), " weight 101 kg (222 lb 12.8 oz), SpO2 97 %.    Physical Exam:  General Appearance:   Pale appearing gentleman, supine in bed   Head:   Atraumatic   Eyes:          Pupils large and unresponsive, jaundice   Ears:     Throat:  Orally intubated   Neck:  Trachea midline, no palpable thyroid   Back:      Lungs:    Breath sounds are bilateral with coarse rhonchi    Heart:   S1, S2 auscultated with systolic murmur   Abdomen:    No bowel sounds, slightly distended, fluid wave   Rectal:   Deferred   Extremities:  2+ pitting edema, left radial arterial line   Pulses:  No pedal pulses bilaterally   Skin:  Cool and dry   Lymph nodes:    Neurologic:  No corneal reflex, cough reflex, gag reflex, pupil reflex, no response to pain      Results Review:     I reviewed the patient's new clinical results.   Results from last 7 days   Lab Units 01/11/21  0926 01/11/21  0341 01/10/21  2140   SODIUM mmol/L 139 138 139   POTASSIUM mmol/L 4.4 4.1 4.3   CHLORIDE mmol/L 98 97* 94*   CO2 mmol/L 33.0* 31.0* 29.0   BUN mg/dL 47* 42* 33*   CREATININE mg/dL 1.74* 1.28* 1.01   CALCIUM mg/dL 8.6 8.5* 9.4   BILIRUBIN mg/dL 4.9* 4.7* 3.8*   ALK PHOS U/L 161* 172* 196*   ALT (SGPT) U/L 55* 56* 59*   AST (SGOT) U/L 118* 129* 134*   GLUCOSE mg/dL 154* 164* 137*     Results from last 7 days   Lab Units 01/11/21  0926 01/11/21  0341 01/10/21  2140  01/05/21  0012   WBC 10*3/mm3 9.50 10.09 8.51   < > 5.07   HEMOGLOBIN g/dL 9.0* 9.2* 9.9*   < > 12.4*   HEMATOCRIT % 26.8* 26.6* 29.5*   < > 34.1*   PLATELETS 10*3/mm3 104* 99* 84*   < > 60*   MONOCYTES % %  --   --   --   --  11.0    < > = values in this interval not displayed.     Results from last 7 days   Lab Units 01/11/21  0416   PH, ARTERIAL pH units 7.462*   PO2 ART mm Hg 106.0   PCO2, ARTERIAL mm Hg 48.9*   HCO3 ART mmol/L 34.9*     Lab Results   Component Value Date    BLOODCX No growth at 2 days 01/09/2021    BLOODCX No growth at 2 days 01/09/2021     No results found for: URINECX    I reviewed the  patient's new imaging including images and reports.  COMPARISON:    1/10/2021 at 2125 hours     FINDINGS:  Single portable AP view(s) of the chest.     Endotracheal tube is in the mid trachea in good position. Esophageal probe tip and NG tube tip are within the proximal stomach as seen on abdominal radiograph obtained this evening. The tips are not seen on this film. Heart is enlarged status post TAVR.  Diffuse bilateral pulmonary opacities may reflect edema. There is a small left effusion. No pneumothorax identified.     IMPRESSION:  Tubes and lines as above. The esophageal probe and NG tube tips are in the proximal stomach as seen on the abdominal radiograph from this evening. Pulmonary edema is noted. No pneumothorax.     Signer Name: Heladio Oh MD   Signed: 1/10/2021 11:48 PM   Workstation Name: [email or web address]    [facility]     FINDINGS:   There is no displaced calvarial fracture. The visualized paranasal sinuses and mastoid air cells are clear. There are atherosclerotic vascular calcifications of the base the brain.     There is generalized atrophy greater than expected for age group. There is some motion degradation of the study. There is no evidence for acute intracranial hemorrhage or extra axial fluid collection. There is moderate white matter low attenuation which  is nonspecific but likely due to small vessel disease. There are likely small age-indeterminate lacuna in the bilateral basal ganglia. There is a low-attenuation area in the left midbrain consistent with an age-indeterminate small vessel insults and a  low-attenuation area in the left mid milton about 9 mm in largest dimension suspicious for an age-indeterminate small vessel insult. There is possible low-attenuation at the medial occipital cortex bilaterally. At this time the gray-white junction is still  maintained, but if there is concern for global hypoxic ischemic insult, follow-up imaging is recommended, preferably  with MRI if the patient is a candidate. There is no intracranial mass affect. There is been cataract surgery on the left.     IMPRESSION:     1. No acute intracranial hemorrhage or mass effect.  2. Atrophy is greater than expected for age group and there is moderate probable sequelae of small vessel disease including age-indeterminate lacunar insults in the brainstem and basal ganglia.  3. There is possible low-attenuation involving the medial bilateral occipital lobes. Given clinical concern for a global hypoxic ischemic insult and seizure activity, this is best further characterized with an MRI if the patient is candidate.      Signer Name: Radha De Jesus MD   Signed: 1/10/2021 11:04 PM  COMPARISON:    CT chest with contrast 1/4/2021.     FINDINGS:  Endotracheal tube satisfactory. There is a left-sided pacemaker. There are postoperative changes of TAVR. There our new dependent moderate sized pleural effusions. There our new dependent infiltrates. Please correlate for clinical evidence of aspiration.  There our also new extensive bilateral nondependent infiltrates. This could be due to pneumonia or pulmonary edema. Please correlate for clinical evidence of infection. There our multiple rib fractures with comminution and displacement which are new and  consistent with recent cardiac resuscitation. There is no pericardial effusion. There are coronary artery calcifications and/or stents. There is a catheter passing into the stomach. The liver is abnormal with nodularity. The spleen is enlarged and there  is ascites in the abdomen. Findings are most consistent with cirrhosis and portal hypertension. The gallbladder is mildly distended with a dependent small gallstone. See the separate abdomen pelvis CT report. There are vascular calcifications without  suggestion of thoracic aortic aneurysm.     IMPRESSION:     1. Interval development of moderate dependent pleural effusions and large dependent confluent infiltrates as  well as extensive more diffuse alveolar infiltrates throughout the bilateral lungs.  2. There are multiple new comminuted depressed rib fractures consistent with recent resuscitation but there is no pneumothorax.  3. There is a pacemaker and TAVR. There is no pericardial effusion. Endotracheal tube satisfactory. Catheter terminates in the stomach.  4. See separate abdomen pelvis CT report.     Signer Name: Radha De Jesus MD   Signed: 1/10/2021 11:36 PM    TECHNIQUE:   CT of the abdomen and pelvis without IV contrast. Coronal and sagittal reconstructions were obtained.  Radiation dose reduction techniques included automated exposure control or exposure modulation based on body size. Count of known CT and cardiac nuc  med studies performed in previous 12 months: 2.      COMPARISON:   1/4/2021     FINDINGS:  Please see chest CT report dictated separately. There is motion degradation. There is atherosclerotic disease with no aortic aneurysm. Tiny gallstone is noted. Gallbladder is mildly distended. No biliary obstruction is identified. Liver is cirrhotic. No  renal or ureteral stones are seen, and there is no hydronephrosis. There is a small vascular calcification in the left kidney. Solid organs are otherwise normal. There is a very small amount of ascites in the abdomen and pelvis. Urinary bladder is  decompressed by a Nunn catheter. There is a sigmoid anastomosis. There are a few scattered colonic diverticula, but there is no evidence of acute diverticulitis or acute colitis. The appendix is normal. No small bowel obstruction is seen. There is a  right groin central venous catheter in place with the tip at the level of the distal IVC. There are some bubbles of soft tissue gas in the left groin which could be due to recent attempted line placement. Correlate clinically.     IMPRESSION:     1. Cirrhosis with a very small amount of ascites.  2. No renal or ureteral stones. No hydronephrosis.  3. Cholelithiasis with a  mildly distended gallbladder. No biliary obstruction.  4. No acute findings in the GI tract. No bowel obstruction.              Signer Name: Heladio Oh MD   Signed: 1/10/2021 11:33 PM    Interpretation Summary    · There is biatrial and right ventricular enlargement.  · Global and segmental LV wall motion is normal. The calculated LV ejection is 56%.  · There is a bioprosthetic aortic valve (TAVR) that is functionally normal.  · Mitral annular calcification with mild mitral regurgitation is appreciated.  · The calculated RV systolic pressure is moderately-severely elevated at 45mmHg-55mmHg.  · Less than 50% IVC inspiratory collapse is appreciated.  · There is no pericardial efusion.            All medications reviewed.   Pharmacy Consult, , Does not apply, BID  artificial tears, , Both Eyes, Q2H  bumetanide, 2 mg, Intravenous, Q12H  chlorhexidine, 15 mL, Mouth/Throat, Q12H  chlorothiazide sodium (DIURIL) injection, 500 mg, Intravenous, Daily  ipratropium-albuterol, 3 mL, Nebulization, Q6H - RT  levETIRAcetam, 1,000 mg, Intravenous, Q12H  pantoprazole, 40 mg, Intravenous, Q24H  piperacillin-tazobactam, 3.375 g, Intravenous, Q8H  sodium chloride, 10 mL, Intravenous, Q12H  valproate sodium, 500 mg, Intravenous, Q8H  vancomycin (dosing per levels), , Does not apply, Daily          Assessment/Plan       Hyponatremia    Nonrheumatic aortic valve stenosis    COPD     T2DM on metformin     Hypertension    PAD s/p L SFA stent and R fem-pop     Iron deficiency anemia    Coal workers pneumoconiosis     Coronary artery disease involving native heart    YOUSUF on CPAP    Paroxysmal atrial fibrillation    Dyslipidemia on statins     Esophageal reflux    Class 3 severe obesity in adult     S/P TAVR (transcatheter aortic valve replacement) 11/22/19    Diastolic CHF, chronic (CMS/HCC)    Hypokalemia    Other cirrhosis of liver (CMS/HCC)    Hyperbilirubinemia    Cardiac arrest (CMS/HCC)      Chronically ill 60-year-old gentleman  with previous TAVR, permanent pacemaker for heart block, peripheral artery disease, diabetes mellitus, COPD, liver cirrhosis who suffered a cardiac arrest with prolonged CPR of at least 30 minutes before restoration of spontaneous circulation.  Targeted temperature management initiated and he reached his target temperature around 2 AM.  He is currently on epinephrine and norepinephrine maxed to maintain an adequate blood pressure.  He is practically anuric with serum creatinine elevated at 1.74, compared to 1.28 yesterday.    Mechanical ventilation settings are tidal volume 450 rate 14 PEEP of 12 and 55% with blood gas pH 7.46, PCO2 48, PO2 106.  Chest x-ray is consistent with edema and effusions.  His preevent echocardiogram revealed a left ventricular EF of 56% but significant pulmonary hypertension with estimated RV systolic pressure of 45-55, well-seated TAVR that is functioning normally.    He was anemic on admission with a hemoglobin of 9.2 today, 10.7 on January 10 but he has had significant IV fluids.  He does not appear to have gastric bleeding or melena.  Most concerning is his neurologic status.  His exam is extremely poor he basically has no response and is not overbreathing the ventilator.  His EEG revealed nonconvulsive status epilepticus despite receiving Keppra last night and Versed drip of 10 mg/h, propofol drip of 40 mcg/kg/min.  Neurology did evaluate and added valproate.  His EEG revealed a burst suppression pattern with the underlying status epilepticus.  She failed his prognosis for meaningful full survival was grim.    PLAN:    Targeted temperature management with plans to rewarm at 2 AM  Continue epinephrine, norepinephrine for blood pressure support  Add phenylephrine if needed    Maintain full mechanical ventilatory support, do not lower PEEP as his x-ray reveals pulmonary edema    Propofol, Versed drips  Keppra, valproate for seizures    Monitor potassium, and hopefully we will not need to  treat for a high level  Monitor H&H    Empiric antibiotics, vancomycin, Zosyn  Stop Bumex, Diuril as I think it is futile given the high-dose pressors he is requiring    Continue bronchodilators    Nursing staff did call the family and they are on their way to the hospital        VTE Prophylaxis: None    Stress Ulcer Prophylaxis: Protonix    Latrice Quintanilla MD  01/11/21  12:44 EST      Time: Critical care 45 min  I personally provided care to this critically ill patient as documented above.  Critical care time does not include time spent on separately billed procedures.  Non of my critical care time was concurrent with other critical care providers.

## 2021-01-11 NOTE — PLAN OF CARE
Goal Outcome Evaluation:  Plan of Care Reviewed With: patient, daughter  Progress: no change       Pt. Arrived on the unit with code team doing compression. When ROSC was achieved and the patient was stable TTM was started. The desired temp of 36(C) was reached at 0401 without complication. The Pt. Is currently on epinephrine, levophed ,versed and propofol. 4 MG of ativan was given for persistent myoclonic jerking. Jerking quickly resolved within 45 min. Neuro- unresponsive since arriving on unit.  Does have a gag when suctioning. Lungs remain diminished. Vent settings managed by RT per APRN. Heart rate 70-80. Paced (PPM) with PVC's. SBP . MAP ~60-65. Faint PP found with doppler. UOP improved slightly this AM. Total UOP since MN is 35ml. [facility]A called, and will follow pt. With daily phone calls ([facility]CS-3 prior to sedation). Critical results called to APRN throughout the night and interventions completed as appropriate.

## 2021-01-11 NOTE — PROGRESS NOTES
Continued Stay Note   [place]     Patient Name: Sai Weinberg  MRN: [id number]  Today's Date: 1/11/2021    Admit Date: 1/4/2021    Discharge Plan     Row Name 01/11/21 1320       Plan    Plan  TBD    Patient/Family in Agreement with Plan  unable to assess    Plan Comments  Patient coded over  night.  Now in ICU sedated and intubated.  Continuous EEG. Keppra load.  Artic Sun.  CM following    Final Discharge Disposition Code  30 - still a patient        Discharge Codes    No documentation.             Clau Deal RN

## 2021-01-11 NOTE — PAYOR COMM NOTE
"Moriah Pérez RN  Utilization Review  P: [phone]  F: [phone]    Ref # [id number]  Updated clinicals for continued stay    Rogelio Hastings (60 y.o. Male)     Date of Birth Social Security Number Address Home Phone MRN    1960  [address] [facility] [address]  [id number]    Anabaptist Marital Status          None        Admission Date Admission Type Admitting Provider Attending Provider Department, Room/Bed    1/4/21 Emergency Gee Thakkar MD Thompson, Patton Weddington, MD [facility] 2HSI, S261/1    Discharge Date Discharge Disposition Discharge Destination                       Attending Provider: Gee Thakkar MD    Allergies: Tylenol [Acetaminophen]    Isolation: None   Infection: MRSA (01/11/21)   Code Status: CPR    Ht: 165.1 cm (65\")   Wt: 101 kg (222 lb 12.8 oz)    Admission Cmt: None   Principal Problem: Hyponatremia [E87.1]                 Active Insurance as of 1/4/2021     Primary Coverage     Payor Plan Insurance Group Employer/Plan Group    [employer] [place] [employer] [place]ICA[place]      Payor Plan Address Payor Plan Phone Number Payor Plan Fax Number Effective Dates    [address] [address] [phone]  12/26/2019 - None Entered    [facility] [id number]       Subscriber Name Subscriber Birth Date Member ID       ROGELIO HASTINGS 1960 [id number]                 Emergency Contacts      (Rel.) Home Phone Work Phone Mobile Phone    NANCY HASTINGS (Spouse) [phone] -- --    Karina Brennan (Daughter) [phone] -- [phone]    JOSUÉ HORTON (Daughter) [phone] -- --               Physician Progress Notes (last 24 hours) (Notes from 01/10/21 1107 through 01/11/21 1107)      Gee Thakkar MD at 01/10/21 2221          INTENSIVIST / PULMONARY INITIAL VISIT (CONSULT / H&P) NOTE     Hospital:  LOS: 6 days   Mr. Rogelio Hastings, 60 y.o. male is followed for:   Chief Complaint   Patient presents with   • " Abnormal Lab       Hyponatremia    Dyslipidemia on statins     COPD     T2DM on metformin     Esophageal reflux    Hypertension    PAD s/p L SFA stent and R fem-pop     Iron deficiency anemia    Coal workers pneumoconiosis     Coronary artery disease involving native heart    Nonrheumatic aortic valve stenosis    YOUSUF on CPAP    Paroxysmal atrial fibrillation    Class 3 severe obesity in adult     S/P TAVR (transcatheter aortic valve replacement) 11/22/19    Diastolic CHF, chronic (CMS/HCC)    Hypokalemia    Other cirrhosis of liver (CMS/HCC)    Hyperbilirubinemia         History of Present Illness   Sai Weinberg is a 60 y.o. male who was admitted on 1/4 by the Hospitalist group for HFpEF, hyponatrermia, & hepatic encephalopathy.    On the evening of the 10th the patient pulled off his NIPPV mask and spO2 dropped into 60's.  Staff came and put him back on his NIPPV but patient did not recover.  He then developed a cardiac arrest (PEA under PPM spikes).  A Code Blue was called and the patient received ACLS therapy for a prolonged period of time (~30 min) before ROSC was obtained.  The patient has been nonresponsive since ROSC was obtained w/o having received sedating medications.  Having myoclonic jerks on vent.    Past Medical History:   Diagnosis Date   • Arthritis    • Black lung disease (CMS/HCC)    • BPH (benign prostatic hyperplasia)    • Cataract    • COLD (chronic obstructive lung disease) (CMS/HCC)    • Colon polyps    • Diabetes mellitus (CMS/HCC)     SINCE 2014   • Dyslipidemia    • Esophageal reflux    • Flu     HX   • Heart attack (CMS/HCC)     2006   • Herniated lumbar intervertebral disc    • History of colon resection    • Hyperlipidemia    • Hypertension    • Malignant neoplasm of colon (CMS/HCC)    • On home oxygen therapy     prn   • Open wound of left hip and thigh with complication 3/12/2017    Open draining left leg wound from femoral cutdown and angioplasty/stenting of superficial artery  1/18/2017   • Peripheral vascular disease (CMS/[facility])    • Renal failure    • Sleep apnea with use of continuous positive airway pressure (CPAP)    • Wears dentures    • Wears glasses      Past Surgical History:   Procedure Laterality Date   • ANGIOPLASTY FEMORAL ARTERY N/A 1/17/2017    Procedure: left femoral cutdown with aortagram and left SFA stent and angioplasty;  Surgeon: Heladio Rosa MD;  Location:  [facility];  Service:    • AORTAGRAM N/A 1/17/2017    Procedure: AORTAGRAM WITH RUNOFFS and  STENT left SFA;  Surgeon: Heladio Rosa MD;  Location:  [facility];  Service:    • AORTIC VALVE REPAIR/REPLACEMENT N/A 11/22/2019    Procedure: TRANSAPICAL TRANSCATHETER AORTIC VALVE REPLACEMENT WITH TRANSESOPHAGEAL ECHOCARDIOLGRAM;  Surgeon: Danish St MD;  Location:  [facility];  Service: Cardiothoracic   • AORTIC VALVE REPAIR/REPLACEMENT N/A 11/22/2019    Procedure: Transapical Transcatheter Aortic Valve Replacement;  Surgeon: Lory Shepherd MD;  Location:  [facility];  Service: Cardiovascular   • CARDIAC CATHETERIZATION      NO INTERVENTION   • CARDIAC CATHETERIZATION Left 10/30/2019    Procedure: Left Heart Cath;  Surgeon: Milad Cordon MD;  Location:  [facility];  Service: Cardiology   • CARDIAC ELECTROPHYSIOLOGY PROCEDURE Left 11/25/2019    Procedure: Pacemaker DC new;  Surgeon: Maranda Cerda MD;  Location:  [facility];  Service: Cardiology   • COLON SURGERY      x 2   • COLONOSCOPY     • FEMORAL FEMORAL BYPASS N/A 3/13/2017    Procedure: INCISION AND DRAINAGE LEFT GROIN HEMATOMA;  Surgeon: Heladio Rosa MD;  Location:  [facility];  Service:    • FEMORAL POPLITEAL BYPASS Right 01/26/2016   • OTHER SURGICAL HISTORY      PTA ILIAC STENOSIS WITH STENT   • TRANSESOPHAGEAL ECHOCARDIOGRAM (ZORAIDA) N/A 11/22/2019    Procedure: TRANSESOPHAGEAL ECHOCARDIOGRAM WITH ANESTHESIA;  Surgeon: Danish St MD;  Location:  [facility];  Service: Cardiothoracic     Family History   Problem Relation Age of Onset   • Lung cancer Mother    • Heart attack Mother    • Hypertension Mother    • Diabetes Mother    • Diabetes Father    • Hypertension Father    • Heart attack Father      Social History     Socioeconomic History   • Marital status:      Spouse name: Not on file   • Number of children: 2   • Years of education: Not on file   • Highest education level: Not on file   Occupational History   • Occupation: DISABLED      Comment: BACK AND LUNG PROBLEMS   Tobacco Use   • Smoking status: Former Smoker     Packs/day: 2.00     Years: 30.00     Pack years: 60.00     Types: Cigarettes     Start date:      Quit date:      Years since quittin.0   • Smokeless tobacco: Current User     Types: Chew   • Tobacco comment: Stopped smoking 1 year ago. Smoked 35 years up to 2ppd.   Substance and Sexual Activity   • Alcohol use: No   • Drug use: No   • Sexual activity: Defer   Social History Narrative    Lives in [place].     Allergies   Allergen Reactions   • Tylenol [Acetaminophen] Other (See Comments)     Liver disease     No current facility-administered medications on file prior to encounter.      Current Outpatient Medications on File Prior to Encounter   Medication Sig Dispense Refill   • atorvastatin (LIPITOR) 40 MG tablet Take 40 mg by mouth daily.     • bumetanide (BUMEX) 2 MG tablet Take 2 mg by mouth 2 (Two) Times a Day As Needed.     • carvedilol (COREG) 12.5 MG tablet Take 1 tablet by mouth 2 (Two) Times a Day With Meals. (Patient taking differently: Take 12.5 mg by mouth Daily.) 60 tablet 11   • clopidogrel (PLAVIX) 75 MG tablet Take 1 tablet by mouth Daily. 30 tablet 11   • DULoxetine (CYMBALTA) 30 MG capsule Take 30 mg by mouth Daily.     • magnesium oxide (MAGOX) 400 (241.3 Mg) MG tablet tablet Take 400 mg by mouth Daily.     • metFORMIN (GLUCOPHAGE) 850 MG tablet Take 850 mg by mouth 2 (Two) Times a Day  With Meals.     • pantoprazole (PROTONIX) 40 MG EC tablet Take 40 mg by mouth Every Other Day.     • potassium chloride (MICRO-K) 10 MEQ CR capsule Take 10 mEq by mouth 2 (Two) Times a Day.     • tamsulosin (FLOMAX) 0.4 MG capsule 24 hr capsule Take 0.4 mg by mouth Daily.     • warfarin (COUMADIN) 4 MG tablet Take 4 mg by mouth Daily.     • albuterol (PROVENTIL HFA;VENTOLIN HFA) 108 (90 BASE) MCG/ACT inhaler Inhale 2 puffs Every 4 (Four) Hours As Needed for wheezing.     • budesonide-formoterol (SYMBICORT) 80-4.5 MCG/ACT inhaler Inhale 2 puffs 2 (Two) Times a Day.     • Cholecalciferol (Vitamin D) 50 MCG (2000 UT) capsule Take 2,000 Units by mouth Daily.     • ferrous sulfate 324 (65 FE) MG tablet delayed-release EC tablet Take 324 mg by mouth Daily With Breakfast.     • hydrOXYzine pamoate (VISTARIL) 50 MG capsule Take 1 capsule by mouth Daily.     • losartan (COZAAR) 50 MG tablet Take 50 mg by mouth Daily.     • nitroglycerin (NITROSTAT) 0.4 MG SL tablet Place 0.4 mg under the tongue Every 5 (Five) Minutes As Needed for Chest Pain. Take no more than 3 doses in 15 minutes.     • oxyCODONE (ROXICODONE) 10 MG tablet Take 10 mg by mouth Every 8 (Eight) Hours As Needed.     • probiotic (CULTURELLE) capsule capsule Take 1 capsule by mouth Daily. 30 capsule 1   • rOPINIRole (REQUIP) 2 MG tablet Take 1 tablet by mouth Every Other Day.     • Testosterone Cypionate 100 MG/ML solution Inject 2 mL as directed Every 14 (Fourteen) Days.     • tiotropium (SPIRIVA) 18 MCG per inhalation capsule Place 1 capsule into inhaler and inhale Daily.         ROS: see HPI and H&P  Per HPI, all other systems were reviewed and were negative     Objective   OBJECTIVE     Vital Sign Min/Max for last 24 hours  Temp  Min: 97.6 °F (36.4 °C)  Max: 98.8 °F (37.1 °C)   BP  Min: 121/68  Max: 143/62   Pulse  Min: 61  Max: 74   Resp  Min: 19  Max: 32   SpO2  Min: 85 %  Max: 100 %   Flow (L/min)  Min: 4  Max: 6     Telemetry:           General  Appearance:  Intubated  Eyes:  No scleral icterus or pallor, pupils pinpoint  Ears, Nose, Mouth, Throat:  Atraumatic, oral endotracheal tube  Neck:  Trachea midline, thyroid normal  Respiratory:  rhonchi to auscultation bilaterally, normal effort  Cardiovascular:  Regular rate and rhythm, no murmurs, peripheral edema  Gastrointestinal:  Soft, nontender, nondistended, no hepatosplenomegaly  Skin:  Normal temperature, no rash  Psychiatric:  No agitation  Neuro:  Unresponsive to pain, triggers vent, myoclonic jerks      SpO2: 99 % SpO2  Min: 85 %  Max: 100 %   Device: ventilator    Flow Rate: 6 Flow (L/min)  Min: 4  Max: 6     Mechanical Ventilator Settings:            Resp Rate (Set): 14 Resp Rate (Observed) Vent: 25   FiO2 (%): 60 %    PEEP/CPAP (cm H2O): 12 cm H20 Plateau Pressure (cm H2O): 0 cm H2O    Minute Ventilation (L/min) (Obs): 11.1 L/min    I:E Ratio (Obs): 1:1.10     Intake/Ouptut 24 hrs (7:00AM - 6:59 AM)  Intake & Output (last 3 days)       01/08 0701 - 01/09 0700 01/09 0701 - 01/10 0700 01/10 0701 - 01/11 0700    P.O. 940 621 780    I.V. (mL/kg)  1000 (9.9)     IV Piggyback  100     Total Intake(mL/kg) 940 (9.3) 1721 (17) 780 (7.7)    Urine (mL/kg/hr) 400 (0.2)  550 (0.4)    Stool   0    Total Output 400  550    Net +540 +1721 +230           Urine Unmeasured Occurrence 6 x 5 x 2 x    Stool Unmeasured Occurrence 5 x 1 x 3 x            Lines, Drains & Airways    Active LDAs     Name:   Placement date:   Placement time:   Site:   Days:    Peripheral IV 01/07/21 1644 Left;Posterior Forearm   01/07/21    1644    Forearm   3    Hi-Lo Evac ETT 7.5   01/10/21    2045    Oral   less than 1                Hematology:  Results from last 7 days   Lab Units 01/10/21  2140 01/10/21  1024 01/09/21  1120  01/05/21  0012   WBC 10*3/mm3 8.51 5.71 5.98   < > 5.07   HEMOGLOBIN g/dL 9.9* 10.7* 11.0*   < > 12.4*   HEMATOCRIT % 29.5* 30.9* 31.0*   < > 34.1*   PLATELETS 10*3/mm3 84* 83* 78*   < > 60*   MONOCYTES % %  --   --    --   --  11.0    < > = values in this interval not displayed.     Electrolytes, Magnesium and Phosphorus:  Results from last 7 days   Lab Units 01/10/21  1024 01/09/21 2058 01/09/21  1120  01/08/21  0847  01/07/21  0900 01/04/21  1730   SODIUM mmol/L 133* 134* 128*   < > 128*   < > 126*   < > 121*   POTASSIUM mmol/L 3.9  --  3.8  --  4.1  --  3.5   < > 2.6*   CO2 mmol/L 32.0*  --  31.0*  --  33.0*  --  34.0*   < > 35.0*   MAGNESIUM mg/dL  --   --   --   --   --   --  2.3  --  2.7*   PHOSPHORUS mg/dL  --   --   --   --   --   --  2.5  --   --     < > = values in this interval not displayed.     Renal:  Results from last 7 days   Lab Units 01/10/21  1024 01/09/21  1120 01/08/21  0847 01/07/21  0900 01/06/21  1216 01/06/21  0601   CREATININE mg/dL 0.60* 0.67* 0.70* 0.77 0.76 0.79   BUN mg/dL 32* 29* 36* 33* 35* 34*     Estimated Creatinine Clearance: 143.1 mL/min (A) (by C-G formula based on SCr of 0.6 mg/dL (L)).  Estimated Creatinine Clearance: 143.1 mL/min (A) (by C-G formula based on SCr of 0.6 mg/dL (L)).  Hepatic:  Results from last 7 days   Lab Units 01/08/21  0847 01/04/21  1730   ALK PHOS U/L 157* 193*   BILIRUBIN mg/dL 5.3* 3.9*   BILIRUBIN DIRECT mg/dL  --  1.5*   ALT (SGPT) U/L 30 38   AST (SGOT) U/L 54* 58*     Arterial Blood Gases:  Results from last 7 days   Lab Units 01/10/21  2204 01/04/21  2040   PH, ARTERIAL pH units 7.466*  --    PCO2, ARTERIAL mm Hg 44.1  --    PO2 ART mm Hg 319.0*  --    FIO2 % 100 28       Results from last 7 days   Lab Units 01/04/21  1730   HEMOGLOBIN A1C % 6.20*       Lab Results   Component Value Date    LACTATE 1.4 11/12/2019       Ct Angiogram Chest With & Without Contrast    Result Date: 1/4/2021  Narrative: CT ANGIOGRAM CHEST W WO CONTRAST INDICATION: 60-year-old male with dyspnea today. Recent TAVR. TECHNIQUE: CT angiogram of the chest with IV contrast. 3-D reconstructions were obtained and reviewed.   Radiation dose reduction techniques included automated exposure  control or exposure modulation based on body size. Count of known CT and cardiac nuc med studies performed in previous 12 months: 0. COMPARISON: CT angiogram TAVR chest/abdomen/pelvis 11/14/2019 FINDINGS: Adequate opacification of the pulmonary arteries with no filling defects. Thoracic aorta normal in course and caliber without dissection. Heart size is normal. No pericardial effusion. Multiple prominent mediastinal and bilateral hilar lymph nodes are unchanged from the prior exam. Aortic valve replacement. No pleural effusion. No pneumothorax. No focal pulmonary infiltrates. Mild dependent bibasilar atelectasis. Please refer to CT abdomen pelvis performed the same date and dictated separately for detailed findings below the diaphragm. No acute osseous abnormality.     Impression: 1. Negative for pulmonary embolus. 2. Negative for thoracic aortic aneurysm/dissection. 3. No acute pulmonary process. 4. Multiple prominent mediastinal and bilateral hilar lymph nodes, nonspecific and unchanged from prior examination 11/14/2019. Signer Name: Wesly Greer MD  Signed: 1/4/2021 11:13 PM  Workstation Name: [email or web address]  [facility]    Ct Abdomen Pelvis With Contrast    Result Date: 1/4/2021  Narrative: CT Abdomen Pelvis W INDICATION: Abdominal pain. TECHNIQUE: CT of the abdomen and pelvis without IV contrast. Coronal and sagittal reconstructions were obtained.  Radiation dose reduction techniques included automated exposure control or exposure modulation based on body size. Count of known CT and cardiac nuc med studies performed in previous 12 months: 0. COMPARISON: CT of the abdomen and pelvis from 11/14/2019 FINDINGS: Abdomen: The liver appears cirrhotic and there is ascites. The gallbladder is prominent in size and gallstones are noted. There is a small amount of nonspecific mesenteric stranding that does not appear significantly changed from prior. Kidneys appear unremarkable. Pelvis: Normal  appendix. Visualized bowel appears within normal limits. No acute osseous abnormality.     Impression: Exam is limited by patient motion. The liver is cirrhotic and there is ascites. The gallbladder appears somewhat distended and there are gallstones. This findings do not appear significantly changed from the prior CT. Signer Name: Leeanna Townsend MD  Signed: 1/4/2021 11:22 PM  Workstation Name: [id number]  [facility] Gallbladder    Result Date: 1/5/2021  Narrative: EXAMINATION: US GALLBLADDER-01/05/2021:  INDICATION: Gallstones, distended,; E87.6-Hypokalemia; E87.2-Izjx-qdaiyifybm and hyponatremia; R40.0-Somnolence.  TECHNIQUE: Ultrasound right upper quadrant abdomen.  COMPARISON: CT 01/04/2021.  FINDINGS: The visualized portions of the pancreas are within normal limits on limited evaluation.  The liver demonstrates increased echogenicity with coarsened echotexture demonstrating nodular contour of a cirrhotic hepatic morphology without focal liver lesion. Trace perihepatic ascites.  The gallbladder with sludge in the dependent portion and mobile shadowing echogenic foci of cholelithiasis, however, no wall thickening or pericholecystic fluid clearly evident.  The common bile duct measures 5 mm in diameter at the level of the ehsan hepatis within normal limits.  The right kidney measures 12.6 cm in length without evidence of hydronephrosis, contour deforming mass or obvious calculi.      Impression: 1. Cirrhotic hepatic morphology with perihepatic ascites, however, no focal liver lesion.  2. Cholelithiasis without inflammatory wall thickening of the gallbladder.  D:  01/05/2021 E:  01/05/2021  This report was finalized on 1/5/2021 11:33 AM by Dr. Chris Hart.      Xr Chest 1 View    Result Date: 1/10/2021  Narrative: CR Chest 1 Vw INDICATION: Chest x-ray postcode ET tube placement. COMPARISON:  Chest x-ray 1/8/2020. FINDINGS: Single portable AP view(s) of the chest. The endotracheal tube is  satisfactory. Cardiac silhouette is moderately enlarged and there is a left-sided pacer which is unchanged. There our fairly diffuse bilateral abnormal reticulonodular infiltrates as well as abnormal thickening of the central bronchovascular soft tissues. Right costophrenic angle is excluded from the film. Allowing for this there is no pneumothorax. There is mild improvement in aeration/airspace disease since yesterday's film.     Impression: Endotracheal tube is satisfactory. No pneumothorax allowing for exclusion the right costophrenic angle. 2. No change in moderate cardiac silhouette enlargement or pacemaker. 3. Fairly diffuse bilateral reticulonodular infiltrates showing mild improvement in aeration since yesterday's film. Signer Name: Radha De Jesus MD  Signed: 1/10/2021 9:55 PM  Workstation Name: [email or web address]  [facility]    Xr Chest 1 View    Result Date: 1/8/2021  Narrative: EXAMINATION: XR CHEST 1 VW- 01/08/2021  INDICATION: E87.6-Hypokalemia; E87.0-Yhge-trerexljzl and hyponatremia; R40.0-Somnolence  COMPARISON: 01/04/2021  FINDINGS: Portable chest reveals heart to be enlarged. There are pacer leads in satisfactory position. Patient is status post valvular replacement with worsening of the patchy airspace disease seen throughout the left lung and right lower lung field in the interval. Degenerative changes seen within the spine.        Impression: Worsening identified of the airspace disease seen throughout the left mid and lower lung field and right lung base. The heart is enlarged with degenerative changes seen within the spine.  D:  01/08/2021 E:  01/08/2021  This report was finalized on 1/8/2021 4:40 PM by Dr. Naty Panchal MD.      Xr Chest 1 View    Result Date: 1/6/2021  Narrative: EXAMINATION: XR CHEST 1 VW-01/04/2021:  INDICATION: Dyspnea.  COMPARISON: 02/03/2020.  FINDINGS: Portable chest reveals cardiac pacer leads to be in satisfactory position. The heart is upper limits  of normal. The lung fields are clear. Prominence of the pulmonary vascularity. Degenerative changes seen within the spine. Increased pulmonary vascularity bilaterally. No definite pleural effusion or pneumothorax.      Impression: Increased pulmonary vascularity bilaterally with enlargement of the heart. Pacer leads in satisfactory position.  D:  01/04/2021 E:  01/05/2021  This report was finalized on 1/6/2021 12:10 PM by Dr. Naty Panchal MD.      Ct Guided Paracentesis    Result Date: 1/8/2021  Narrative: PROCEDURE: CT-guided paracentesis  Procedural Personnel Attending physician(s): GABRIEL Wilks M.D. Fellow physician(s): None Resident physician(s): None Advanced practice provider(s): None  Pre-procedure diagnosis:  Liver cirrhosis with small volume ascites. ?Unclear etiology. ?MELD-Na is 28 Hepatic encephalopathy, Hyponatremia; Thrombocytopenia Post-procedure diagnosis: Same Indication: Diagnostic Additional clinical history: None  Complications: No immediate complications.      Impression:  CT-guided paracentesis with drainage of approximately 5 mL of serous fluid. Fluid sent for laboratory analysis Patient severely encephalopathic. Kept trying to roll over. Needle had to be removed urgently after only obtaining 5 ml serous fluid before patient rolled on it.  Plan:  Resume care by clinical team. _______________________________________________________________  PROCEDURE SUMMARY: - Limited CT - CT-guided paracentesis - Additional procedure(s): None  PROCEDURE DETAILS:  Pre-procedure Consent: Informed consent for the procedure including risks, benefits and alternatives was obtained and time-out was performed prior to the procedure. Preparation: The site was prepared and draped using maximal sterile barrier technique including cutaneous antisepsis.  Anesthesia/sedation Level of anesthesia/sedation: No sedation  Initial abdominal CT Initial abdominal CT was performed. Findings: Small volume ascites. A safe window  for paracentesis was identified.  Paracentesis Local anesthesia was administered. The peritoneal cavity was accessed and needle position confirmed by CT. Ascites was drained. The catheter was then removed, and a sterile bandage was applied. Paracentesis access technique: 22-gauge Chiba needle advanced under CT fluoroscopic guidance Needle placed: 22-gauge Chiba Post-drainage CT: Not performed  Radiation Dose CT dose length product (mGy-cm): 882  Additional Details Additional description of procedure: None Equipment details: None Specimens removed: Abdominal fluid Estimated blood loss (mL): Less than 10 Standardized report: Paracentesis  Attestation I was present and scrubbed for the entire procedure. Imaging reviewed. Agree with final report as written.  This report was finalized on 1/8/2021 5:28 PM by Omar Wilks.        Relevant imaging studies and labs from 01/10/21 were reviewed and interpreted by me    Medications (drips):  EPINEPHrine  fentanyl 10 mcg/mL  norepinephrine  Pharmacy to dose vancomycin  Pharmacy to dose warfarin  propofol        Pharmacy Consult, , Does not apply, BID  [START ON 1/11/2021] artificial tears, , Both Eyes, Q2H  aspirin, 81 mg, Oral, Daily  atorvastatin, 40 mg, Oral, Daily  budesonide-formoterol, 2 puff, Inhalation, BID - RT  cefTRIAXone, 1 g, Intravenous, Q24H  chlorhexidine, 15 mL, Mouth/Throat, Q12H  doxycycline, 100 mg, Intravenous, Q12H  ipratropium, 0.5 mg, Nebulization, 4x Daily - RT  lactulose, 20 g, Oral, TID  levETIRAcetam, 1,000 mg, Intravenous, Once  lidocaine, 1 patch, Transdermal, Q24H  magnesium sulfate, 4 g, Intravenous, Once  [START ON 1/11/2021] pantoprazole, 40 mg, Intravenous, Q24H  rifAXIMin, 550 mg, Oral, Q12H  sodium chloride, 10 mL, Intravenous, Q12H  vancomycin, 25 mg/kg, Intravenous, Once  warfarin (COUMADIN) (dosing per levels), , Does not apply, Daily        Assessment/Plan   IMPRESSION / PLAN     Inpatient Problem List:  60 y.o.male:  Active Hospital  "Problems    Diagnosis   • **Hyponatremia   • Diastolic CHF, chronic (CMS/HCC)   • Hypokalemia   • Other cirrhosis of liver (CMS/HCC)   • Hyperbilirubinemia   • S/P TAVR (transcatheter aortic valve replacement) 11/22/19   • Paroxysmal atrial fibrillation   • Class 3 severe obesity in adult    • YOUSUF on CPAP   • Nonrheumatic aortic valve stenosis     ZORAIDA 11/15 Aortic Valve Measurements:   Stenosis   LVOT diam 2 cm      LV V1 max 138.6 cm/sec      LV V1 max PG 7.7 mmHg      LV V1 mean PG 3.9 mmHg      LV V1 VTI 27.8 cm      Ao pk eddy 469.7 cm/sec       Stenosis   Ao max PG 88.2 mmHg      Ao mean PG 50.1 mmHg      Ao V2 VTI 98.8 cm      IAN(I,D) 0.89 cm^2                • Coronary artery disease involving native heart   • Iron deficiency anemia   • Coal workers pneumoconiosis    • PAD s/p L SFA stent and R fem-pop    • Dyslipidemia on statins    • COPD    • T2DM on metformin    • Hypertension   • Esophageal reflux        Impression:  60 y.o.male with relevant PMH of COPD, 60 py smoking - quit 6  Years ago, T2DM, HTN, HLD, YOUSUF on CPAP, HFpEF, CAD w/ prior stent, AS s/p TAVR, PAF, CHB s/p PPM, home Plavix / Coumadin, recent diagnosis of cirrhosis (likely DAVID) admitted 1/4/2021 after routine labs showed at PCP showed proBNP ~3500, Na 121, K 2.6.  Patient found to have anasarca, weakness, and confusion on admission.    Hospital course complicated by Acute on Chronic HFpEF, decompensated cirrhosis w/ hepatic encephalopathy and ascites, and persistent hyponatremia.  Has been followed by GI, Cardiology, Nephrology.    Had escalating O2 requirements today.  Placed on Bipap.  Apparently \"ripped it off\" and developed respiratory, followed by PEA arrest that lasted ~ 30 min.  Patient appears to have acute on chronic HFpEF w/ hemorrhagic pulmonary edema vs aspiration pna.  ~3 liters+ fluid balance past couple days.  Comatose post arrest with myoclonic jerks.    Plan:  HIE / PSE - TTM w/ goal temp 36 degrees.  Continue treatment of " PSE w/ rifaxamin / lactulose.  Check ammonia in am.  Poor prognosis based on length of code and post code exam.    Acute Hypoxemic Respiratory Failure - suspect hemorrhagic pulmonary edema.  Correct coagulopathy.  Currently on epi gtt.  Lung protective settings.  Moderate to High PEEP until improved.  Avoid IVF.  Empiric Abx.    Shock - change epi to levophed, wean as able    CHF - as above.  Hold anti-hypertensive's.  Diuresis when stable enough.    Cirrhosis - ascites was very minimal.  GI following.  Suspect DAVID.  Mitochondrial Ab was positive.    If patient survives from neurologic standpoint he may benefit from RHC to ascertain PHTN, I.e. portopulmonary HTN vs DHF (I suspect latter).    DVT / PUD prophylaxis    NPO    Critical Care time spent in direct patient care: 45 minutes (excluding procedure time, if applicable) including high complexity decision making to assess, manipulate, and support vital organ system failure in this individual who has impairment of one or more vital organ systems such that there is a high probability of imminent or life threatening deterioration in the patient’s condition.       Gee Thakkar MD  Intensive Care Medicine  01/10/21 22:21 EST         Electronically signed by Gee Thakkar MD at 01/10/21 0055     Brittany Back DO at 01/10/21 2147              [facility] Medicine Services  CRITICAL CARE NOTE    Patient Name: Sai Weinberg  : 1960  MRN: [id number]    Date of Admission: 2021  Length of Stay: 6    Subjective     Event/HPI:  Respiratory failure    Nighttime APC responded to a rapid response.  Upon arrival patient was noted to have pulled off his BiPAP.  Patient was found to be in respiratory failure and CODE BLUE was called.  Code was ran per ACLS protocol.  Intensivist nurse practitioner arrived and placed a left femoral line, respiratory therapy intubated the patient.  After from prolonged course of chest  compressions, there was return of spontaneous heart rhythm.  Also spoke with Dr. Gee Thakkar and made him aware of the patient status.  Patient was transferred to ICU for further care    Objective     Vital Signs:   Temp:  [97.6 °F (36.4 °C)-98.8 °F (37.1 °C)] 97.8 °F (36.6 °C)  Heart Rate:  [61-70] 70  Resp:  [19-23] 22  BP: (129-143)/(59-77) 132/59  FiO2 (%):  [100 %] 100 %       Pertinent Physical Exam:  Constitutional: Unresponsive  HENT: NCAT, mucous membranes moist  Respiratory: No spontaneous respirations  Cardiovascular: Paced rhythm on monitor  Gastrointestinal: Obese  Musculoskeletal: No bilateral ankle edema, no clubbing or cyanosis to extremities  Psychiatric: Unresponsive  Neurologic: Unresponsive  Skin: Gray ashen color      Results Reviewed:  I have personally reviewed current lab, radiology, and data and agree.    Results from last 7 days   Lab Units 01/10/21  1024 01/09/21  1120 01/08/21  0849 01/08/21  0847   WBC 10*3/mm3 5.71 5.98  --  6.19   HEMOGLOBIN g/dL 10.7* 11.0*  --  11.9*   HEMATOCRIT % 30.9* 31.0*  --  33.4*   PLATELETS 10*3/mm3 83* 78*  --  77*   INR  4.66* 4.32* 3.58*  --      Results from last 7 days   Lab Units 01/10/21  1024 01/09/21 2058 01/09/21  1120  01/08/21  0847  01/04/21  1730   SODIUM mmol/L 133* 134* 128*   < > 128*   < > 121*   POTASSIUM mmol/L 3.9  --  3.8  --  4.1   < > 2.6*   CHLORIDE mmol/L 94*  --  90*  --  87*   < > 76*   CO2 mmol/L 32.0*  --  31.0*  --  33.0*   < > 35.0*   BUN mg/dL 32*  --  29*  --  36*   < > 48*   CREATININE mg/dL 0.60*  --  0.67*  --  0.70*   < > 0.86   GLUCOSE mg/dL 157*  --  148*  --  111*   < > 131*   CALCIUM mg/dL 8.8  --  8.4*  --  8.7   < > 9.3   ALT (SGPT) U/L  --   --   --   --  30  --  38   AST (SGOT) U/L  --   --   --   --  54*  --  58*    < > = values in this interval not displayed.     No results found for: BNP  No results found for: PHART, PCO2    Microbiology Results Abnormal     Procedure Component Value - Date/Time    S.  Pneumo Ag Urine or CSF - Urine, Urine, Clean Catch [[id number]]  (Normal) Collected: 01/10/21 0114    Lab Status: Final result Specimen: Urine, Clean Catch Updated: 01/10/21 1225     Strep Pneumo Ag Negative    Legionella Antigen, Urine - Urine, Urine, Clean Catch [[id number]]  (Normal) Collected: 01/10/21 0114    Lab Status: Final result Specimen: Urine, Clean Catch Updated: 01/10/21 1224     LEGIONELLA ANTIGEN, URINE Negative    Blood Culture - Blood, Blood, Arterial Line [[id number]] Collected: 01/09/21 1151    Lab Status: Preliminary result Specimen: Blood, Arterial Line Updated: 01/10/21 1201     Blood Culture No growth at 24 hours    Blood Culture - Blood, Wrist, Right [[id number]] Collected: 01/09/21 1151    Lab Status: Preliminary result Specimen: Blood from Wrist, Right Updated: 01/10/21 1201     Blood Culture No growth at 24 hours    COVID PRE-OP / PRE-PROCEDURE SCREENING ORDER (NO ISOLATION) - Swab, Nasopharynx [[id number]]  (Normal) Collected: 01/04/21 2158    Lab Status: Final result Specimen: Swab from Nasopharynx Updated: 01/05/21 0121    Narrative:      The following orders were created for panel order COVID PRE-OP / PRE-PROCEDURE SCREENING ORDER (NO ISOLATION) - Swab, Nasopharynx.  Procedure                               Abnormality         Status                     ---------                               -----------         ------                     COVID-19 and FLU A/B PCR...[[id number]]  Normal              Final result                 Please view results for these tests on the individual orders.    COVID-19 and FLU A/B PCR - Swab, Nasopharynx [[id number]]  (Normal) Collected: 01/04/21 2158    Lab Status: Final result Specimen: Swab from Nasopharynx Updated: 01/05/21 0121     COVID19 Not Detected     Influenza A PCR Not Detected     Influenza B PCR Not Detected    Narrative:      Fact sheet for providers: https://www.fda.gov/media/388257/download    Fact sheet for patients:  https://www.fda.gov/media/350560/download    Test performed by PCR.          Imaging Results (Last 24 Hours)     Procedure Component Value Units Date/Time    XR Chest 1 View [[id number]] Resulted: 01/10/21 2140     Updated: 01/10/21 2144        Results for orders placed during the hospital encounter of 01/04/21   Adult Transthoracic Echo Complete W/ Cont if Necessary Per Protocol    Narrative · There is biatrial and right ventricular enlargement.  · Global and segmental LV wall motion is normal. The calculated LV   ejection is 56%.  · There is a bioprosthetic aortic valve (TAVR) that is functionally   normal.  · Mitral annular calcification with mild mitral regurgitation is   appreciated.  · The calculated RV systolic pressure is moderately-severely elevated at   45mmHg-55mmHg.  · Less than 50% IVC inspiratory collapse is appreciated.  · There is no pericardial efusion.          I have reviewed the current medications.    Assessment / Plan     Active Hospital Problems    Diagnosis POA   • **Hyponatremia [E87.1] Yes   • Diastolic CHF, chronic (CMS/HCC) [I50.32] Unknown   • Hypokalemia [E87.6] Unknown   • Other cirrhosis of liver (CMS/HCC) [K74.69] Unknown   • Hyperbilirubinemia [E80.6] Unknown   • S/P TAVR (transcatheter aortic valve replacement) 11/22/19 [Z95.2] Not Applicable   • Paroxysmal atrial fibrillation [I48.0] Yes   • Class 3 severe obesity in adult  [E66.01] Yes   • YOUSUF on CPAP [G47.33, Z99.89] Not Applicable   • Nonrheumatic aortic valve stenosis [I35.0] Yes     ZORAIDA 11/15 Aortic Valve Measurements:   Stenosis   LVOT diam 2 cm      LV V1 max 138.6 cm/sec      LV V1 max PG 7.7 mmHg      LV V1 mean PG 3.9 mmHg      LV V1 VTI 27.8 cm      Ao pk eddy 469.7 cm/sec       Stenosis   Ao max PG 88.2 mmHg      Ao mean PG 50.1 mmHg      Ao V2 VTI 98.8 cm      IAN(I,D) 0.89 cm^2                • Coronary artery disease involving native heart [I25.10] Yes   • Iron deficiency anemia [D50.9] Yes   • 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] Yes   • PAD s/p L SFA stent and R fem-pop  [I73.9] Yes   • Dyslipidemia on statins  [E78.5] Yes   • COPD  [J44.9] Yes   • T2DM on metformin  [E11.9] Yes   • Hypertension [I10] Yes   • Esophageal reflux [K21.9] Yes         Pertinent Assessment & Plan:   Acute cardio respiratory failure  -Patient resuscitated with ACLS protocol, intubated and transferred to ICU      CODE STATUS:    Code Status and Medical Interventions:   Ordered at: 21 0010     Level Of Support Discussed With:    Patient     Code Status:    CPR     Medical Interventions (Level of Support Prior to Arrest):    Full         Critical Care time spent in direct patient care:    46 minutes (excluding procedure time, if applicable) including high complexity decision making to assess and treat vital organ system failure in this individual who has impairment of one or more vital organ systems such that there is a high probability of imminent or life threatening deterioration in the patient’s condition. Failure to initiate the above interventions on an urgent basis would likely result in sudden, clinically significant or life threatening deterioration in the patient's condition.      Brittany Back DO  01/10/21                                Electronically signed by Brittany Back DO at 01/10/21 0042     Xavier Rose MD at 01/10/21 1719              [facility] Medicine Services  PROGRESS NOTE    Patient Name: Sai Weinberg  : 1960  MRN: [id number]    Date of Admission: 2021  Primary Care Physician: Dewayne Cole MD    Subjective   Subjective     CC:  Liver failure, hyponatremia, hypoxia     HPI:  Resting in bed in no acute distress but he remains in restraints.  He was agitated last night and earlier today.  His daughter is at bedside and patient recognizes her.  Answer some of the questions appropriately.    ROS:  Unable to obtain    Objective   Objective     Vital Signs:   Temp:   [97.6 °F (36.4 °C)-98.8 °F (37.1 °C)] 97.6 °F (36.4 °C)  Heart Rate:  [61-70] 65  Resp:  [18-22] 20  BP: (129-143)/(59-77) 141/71        Physical Exam:  Constitutional: Resting in bed in no acute respiratory or hemodynamic distress however looks very uncomfortable.  HENT: NCAT, mucous membranes moist  Respiratory: diminished in the bases; + rhonchi; tachypnea    Cardiovascular: RRR, no murmurs, rubs, or gallops  Gastrointestinal: Abdomen is very obese positive bowel sounds, soft, nontender, distended  Musculoskeletal: +2 bilateral ankle edema, morbid obesity.  Neurologic: Awake, alert, follows simple commands, confused.    Skin: No rashes    Results Reviewed:  Results from last 7 days   Lab Units 01/10/21  1024 01/09/21  1120 01/08/21  0849 01/08/21  0847   WBC 10*3/mm3 5.71 5.98  --  6.19   HEMOGLOBIN g/dL 10.7* 11.0*  --  11.9*   HEMATOCRIT % 30.9* 31.0*  --  33.4*   PLATELETS 10*3/mm3 83* 78*  --  77*   INR  4.66* 4.32* 3.58*  --    PROCALCITONIN ng/mL  --  1.07*  --   --      Results from last 7 days   Lab Units 01/10/21  1024 01/09/21 2058 01/09/21  1120  01/08/21  0847  01/05/21  0859  01/05/21  0011 01/04/21  1730   SODIUM mmol/L 133* 134* 128*   < > 128*   < > 124*   < > 123* 121*   POTASSIUM mmol/L 3.9  --  3.8  --  4.1   < > 3.3*   < > 2.9* 2.6*   CHLORIDE mmol/L 94*  --  90*  --  87*   < > 82*   < > 78* 76*   CO2 mmol/L 32.0*  --  31.0*  --  33.0*   < > 33.0*   < > 36.0* 35.0*   BUN mg/dL 32*  --  29*  --  36*   < > 34*   < > 50* 48*   CREATININE mg/dL 0.60*  --  0.67*  --  0.70*   < > 0.87   < > 0.88 0.86   GLUCOSE mg/dL 157*  --  148*  --  111*   < > 113*   < > 117* 131*   CALCIUM mg/dL 8.8  --  8.4*  --  8.7   < > 8.4*   < > 8.9 9.3   ALT (SGPT) U/L  --   --   --   --  30  --   --   --   --  38   AST (SGOT) U/L  --   --   --   --  54*  --   --   --   --  58*   TROPONIN T ng/mL  --   --   --   --   --   --  0.049*  --  0.060* 0.054*   PROBNP pg/mL  --   --   --   --   --   --   --   --   --  3,595.0*       < > = values in this interval not displayed.     Estimated Creatinine Clearance: 143.1 mL/min (A) (by C-G formula based on SCr of 0.6 mg/dL (L)).    Microbiology Results Abnormal     Procedure Component Value - Date/Time    S. Pneumo Ag Urine or CSF - Urine, Urine, Clean Catch [[id number]]  (Normal) Collected: 01/10/21 0114    Lab Status: Final result Specimen: Urine, Clean Catch Updated: 01/10/21 1225     Strep Pneumo Ag Negative    Legionella Antigen, Urine - Urine, Urine, Clean Catch [[id number]]  (Normal) Collected: 01/10/21 0114    Lab Status: Final result Specimen: Urine, Clean Catch Updated: 01/10/21 1224     LEGIONELLA ANTIGEN, URINE Negative    Blood Culture - Blood, Blood, Arterial Line [[id number]] Collected: 01/09/21 1151    Lab Status: Preliminary result Specimen: Blood, Arterial Line Updated: 01/10/21 1201     Blood Culture No growth at 24 hours    Blood Culture - Blood, Wrist, Right [[id number]] Collected: 01/09/21 1151    Lab Status: Preliminary result Specimen: Blood from Wrist, Right Updated: 01/10/21 1201     Blood Culture No growth at 24 hours    COVID PRE-OP / PRE-PROCEDURE SCREENING ORDER (NO ISOLATION) - Swab, Nasopharynx [[id number]]  (Normal) Collected: 01/04/21 2158    Lab Status: Final result Specimen: Swab from Nasopharynx Updated: 01/05/21 0121    Narrative:      The following orders were created for panel order COVID PRE-OP / PRE-PROCEDURE SCREENING ORDER (NO ISOLATION) - Swab, Nasopharynx.  Procedure                               Abnormality         Status                     ---------                               -----------         ------                     COVID-19 and FLU A/B PCR...[[id number]]  Normal              Final result                 Please view results for these tests on the individual orders.    COVID-19 and FLU A/B PCR - Swab, Nasopharynx [[id number]]  (Normal) Collected: 01/04/21 2158    Lab Status: Final result Specimen: Swab from Nasopharynx Updated: 01/05/21 0121      COVID19 Not Detected     Influenza A PCR Not Detected     Influenza B PCR Not Detected    Narrative:      Fact sheet for providers: https://www.fda.gov/media/783625/download    Fact sheet for patients: https://www.fda.gov/media/914458/download    Test performed by PCR.          Imaging Results (Last 24 Hours)     ** No results found for the last 24 hours. **          Results for orders placed during the hospital encounter of 01/04/21   Adult Transthoracic Echo Complete W/ Cont if Necessary Per Protocol    Narrative · There is biatrial and right ventricular enlargement.  · Global and segmental LV wall motion is normal. The calculated LV   ejection is 56%.  · There is a bioprosthetic aortic valve (TAVR) that is functionally   normal.  · Mitral annular calcification with mild mitral regurgitation is   appreciated.  · The calculated RV systolic pressure is moderately-severely elevated at   45mmHg-55mmHg.  · Less than 50% IVC inspiratory collapse is appreciated.  · There is no pericardial efusion.          I have reviewed the medications:  Scheduled Meds:Pharmacy Consult, , Does not apply, BID  aspirin, 81 mg, Oral, Daily  atorvastatin, 40 mg, Oral, Daily  budesonide-formoterol, 2 puff, Inhalation, BID - RT  carvedilol, 12.5 mg, Oral, BID With Meals  cefTRIAXone, 1 g, Intravenous, Q24H  doxycycline, 100 mg, Intravenous, Q12H  ferrous sulfate, 325 mg, Oral, Daily With Breakfast  insulin lispro, 0-7 Units, Subcutaneous, TID AC  ipratropium, 0.5 mg, Nebulization, 4x Daily - RT  lactulose, 20 g, Oral, TID  lidocaine, 1 patch, Transdermal, Q24H  magnesium oxide, 400 mg, Oral, Daily  pantoprazole, 40 mg, Oral, Every Other Day  rifAXIMin, 550 mg, Oral, Q12H  rOPINIRole, 2 mg, Oral, Every Other Day  sertraline, 100 mg, Oral, Daily  sodium chloride, 10 mL, Intravenous, Q12H  tamsulosin, 0.4 mg, Oral, Daily  warfarin (COUMADIN) (dosing per levels), , Does not apply, Daily      Continuous Infusions:Pharmacy to dose warfarin,              PRN Meds:.albuterol  •  bumetanide  •  dextrose  •  dextrose  •  glucagon (human recombinant)  •  oxyCODONE  •  Pharmacy to dose warfarin  •  potassium chloride  •  potassium chloride  •  potassium chloride  •  Sodium Chloride (PF)  •  sodium chloride    Assessment/Plan   Assessment & Plan     Active Hospital Problems    Diagnosis  POA   • **Hyponatremia [E87.1]  Yes   • Diastolic CHF, chronic (CMS/HCC) [I50.32]  Unknown   • Hypokalemia [E87.6]  Unknown   • Other cirrhosis of liver (CMS/HCC) [K74.69]  Unknown   • Hyperbilirubinemia [E80.6]  Unknown   • S/P TAVR (transcatheter aortic valve replacement) 11/22/19 [Z95.2]  Not Applicable   • Paroxysmal atrial fibrillation [I48.0]  Yes   • Class 3 severe obesity in adult  [E66.01]  Yes   • YOUSUF on CPAP [G47.33, Z99.89]  Not Applicable   • Nonrheumatic aortic valve stenosis [I35.0]  Yes   • Coronary artery disease involving native heart [I25.10]  Yes   • Iron deficiency anemia [D50.9]  Yes   • 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        Brief Hospital Course to date:  Sai Weinberg is a 60 y.o. male with past medical history of COPD, type 2 diabetes, hypertension, hyperlipidemia, iron deficiency anemia, cold miners pneumoconiosis,?  diastolic CHF, YOUSUF on CPAP at night, coronary artery disease, history of aortic valve stenosis with TAVR, paroxysmal atrial fibrillation, history of complete heart block status post pacemaker placement, history of GI bleed who presents to the ER at request of his PCP for abnormal routine labs indicating hyponatremia and hypokalemia   (The above has been copied from my colleagues note.  However, the accuracy of the information has been checked and verified by  myself.)    Hyponatremia  -- nephrology consulted  - continue IVF per nephrology      Metabolic encephalopathy   - Decrease oxycodone; DC hydroxyzine  - Cont lactulose and rifaximin   - Continue as needed restraints to help with safety and oxygenation      Acute hypoxic resp failure       Diffuse anasarca  Diastolic CHF  Aortic Stenosis s/p TAVR  --Cardiology following, appreciate recommendatoins  --BiPAP PRN and qHS      Hypokalemia  --Replace per protocol      Elevated troponin  --likely type II NSTEMI, history of complete heart block status post pacer     PAF  Hx of Complete Heart block s/p PPM  -- on warfarin - pharmacy dosing      Hyperbilirubinemia  Decompensated Cirrhosis with ascites   TCP  --Ultrasound right upper quadrant showing cholelithiasis w/out choledocholithiasis or inflammation       COPD, hx of coalminer's pneumoconiosis     Type 2 diabetes  --Sliding scale insulin     Hypertension     PVD s/p R fem-pop bypass and SFA stenting 2017 - cont ASA, statin      DVT prophylaxis:  warfarin  Disposition: I expect the patient to be discharged TBD    CODE STATUS:   Code Status and Medical Interventions:   Ordered at: 01/05/21 0010     Level Of Support Discussed With:    Patient     Code Status:    CPR     Medical Interventions (Level of Support Prior to Arrest):    Full       Xavier Rose MD  01/10/21                Electronically signed by Xavier Rose MD at 01/10/21 6577     Lonnie Elizabeth MD at 01/10/21 1139           LOS: 6 days   Patient Care Team:  Dewayne Cole MD as PCP - General  Dewayne Cole MD as PCP - Family Medicine    Chief Complaint: Hyponatremia     Subjective     Abnormal Lab  Pertinent negatives include no anorexia, chest pain or fever.       Subjective:  Symptoms:  Stable.  No shortness of breath, chest pain, headache, chest pressure, anorexia or diarrhea.    Diet:  Poor intake.    Activity level: Normal.    Pain:  He reports no pain.      Na 134 this morning. AMS  "improving. More awake and comfortable this morning. SOB slight worse. More tachypenic. Will d/c IV fluids.     History taken from: patient    Objective     Vital Sign Min/Max for last 24 hours  Temp  Min: 98.1 °F (36.7 °C)  Max: 99.2 °F (37.3 °C)   BP  Min: 118/64  Max: 143/62   Pulse  Min: 61  Max: 75   Resp  Min: 18  Max: 21   SpO2  Min: 85 %  Max: 96 %   Flow (L/min)  Min: 4  Max: 6   Weight  Min: 101 kg (222 lb 12.8 oz)  Max: 101 kg (222 lb 12.8 oz)     Flowsheet Rows      First Filed Value   Admission Height  165.1 cm (65\") Documented at 01/04/2021 1707   Admission Weight  104 kg (230 lb) Documented at 01/04/2021 1707          No intake/output data recorded.  I/O last 3 completed shifts:  In: 2121 [P.O.:1021; I.V.:1000; IV Piggyback:100]  Out: -     Objective:  General Appearance:  Comfortable.    Vital signs: (most recent): Blood pressure 131/65, pulse 66, temperature 98.1 °F (36.7 °C), temperature source Axillary, resp. rate 20, height 165.1 cm (65\"), weight 101 kg (222 lb 12.8 oz), SpO2 90 %.  No fever.    Output: Producing urine.    HEENT: Normal HEENT exam.    Lungs:  Normal respiratory rate.  Breath sounds clear to auscultation.  He is not in respiratory distress.  No rales.    Heart: Normal rate.  Regular rhythm.  S1 normal and S2 normal.    Abdomen: Abdomen is soft and non-distended.  There are no signs of ascites.  Bowel sounds are normal.     Extremities: There is no dependent edema.    Pulses: Distal pulses are intact.    Neurological: Patient is alert and oriented to person, place and time.  Normal strength.              Results Review:     I reviewed the patient's new clinical results.    WBC WBC   Date Value Ref Range Status   01/10/2021 5.71 3.40 - 10.80 10*3/mm3 Final   01/09/2021 5.98 3.40 - 10.80 10*3/mm3 Final   01/08/2021 6.19 3.40 - 10.80 10*3/mm3 Final      HGB Hemoglobin   Date Value Ref Range Status   01/10/2021 10.7 (L) 13.0 - 17.7 g/dL Final   01/09/2021 11.0 (L) 13.0 - 17.7 g/dL Final "   01/08/2021 11.9 (L) 13.0 - 17.7 g/dL Final      HCT Hematocrit   Date Value Ref Range Status   01/10/2021 30.9 (L) 37.5 - 51.0 % Final   01/09/2021 31.0 (L) 37.5 - 51.0 % Final   01/08/2021 33.4 (L) 37.5 - 51.0 % Final      Platlets No results found for: LABPLAT   MCV MCV   Date Value Ref Range Status   01/10/2021 95.1 79.0 - 97.0 fL Final   01/09/2021 94.2 79.0 - 97.0 fL Final   01/08/2021 95.4 79.0 - 97.0 fL Final          Sodium Sodium   Date Value Ref Range Status   01/09/2021 134 (L) 136 - 145 mmol/L Final   01/09/2021 128 (L) 136 - 145 mmol/L Final   01/09/2021 132 (L) 136 - 145 mmol/L Final   01/08/2021 129 (L) 136 - 145 mmol/L Final   01/08/2021 128 (L) 136 - 145 mmol/L Final   01/08/2021 128 (L) 136 - 145 mmol/L Final   01/07/2021 129 (L) 136 - 145 mmol/L Final   01/07/2021 127 (L) 136 - 145 mmol/L Final      Potassium Potassium   Date Value Ref Range Status   01/09/2021 3.8 3.5 - 5.2 mmol/L Final     Comment:     Slight hemolysis detected by analyzer. Results may be affected.   01/08/2021 4.1 3.5 - 5.2 mmol/L Final     Comment:     Slight hemolysis detected by analyzer. Results may be affected.      Chloride Chloride   Date Value Ref Range Status   01/09/2021 90 (L) 98 - 107 mmol/L Final   01/08/2021 87 (L) 98 - 107 mmol/L Final      CO2 CO2   Date Value Ref Range Status   01/09/2021 31.0 (H) 22.0 - 29.0 mmol/L Final   01/08/2021 33.0 (H) 22.0 - 29.0 mmol/L Final      BUN BUN   Date Value Ref Range Status   01/09/2021 29 (H) 8 - 23 mg/dL Final   01/08/2021 36 (H) 8 - 23 mg/dL Final      Creatinine Creatinine   Date Value Ref Range Status   01/09/2021 0.67 (L) 0.76 - 1.27 mg/dL Final   01/08/2021 0.70 (L) 0.76 - 1.27 mg/dL Final      Calcium Calcium   Date Value Ref Range Status   01/09/2021 8.4 (L) 8.6 - 10.5 mg/dL Final   01/08/2021 8.7 8.6 - 10.5 mg/dL Final      PO4 No results found for: CAPO4   Albumin Albumin   Date Value Ref Range Status   01/08/2021 2.10 (L) 3.50 - 5.20 g/dL Final      Magnesium  "No results found for: MG   Uric Acid No results found for: URICACID     Medication Review: Yes    Assessment/Plan       Hyponatremia    Dyslipidemia on statins     COPD     T2DM on metformin     Esophageal reflux    Hypertension    PAD s/p L SFA stent and R fem-pop     Iron deficiency anemia    Coal workers pneumoconiosis     Coronary artery disease involving native heart    Nonrheumatic aortic valve stenosis    YOUSUF on CPAP    Paroxysmal atrial fibrillation    Class 3 severe obesity in adult     S/P TAVR (transcatheter aortic valve replacement) 11/22/19    Diastolic CHF, chronic (CMS/HCC)    Hypokalemia    Other cirrhosis of liver (CMS/HCC)    Hyperbilirubinemia      Assessment & Plan     Hypovolemic hyponatremia vs hyponatremia related to liver disease.   Na on admission 121. Most recent 134.   Appetite remains poor.   Urine Na<20 Urine osm 333 serum osm 266.    Alkalosis: Likely contraction alkalosis      Recs  - D/C IV fluids.   - Bumex 1 mg today.   - D/C Serial NA monitoring  - Encourage oral intake    Lonnie Elizabeth MD  01/10/21  11:33 EST            Electronically signed by Lonnie Elizabeth MD at 01/10/21 1135     Martir Esparza APRN at 01/10/21 1118     Attestation signed by Bruno Boyd MD at 01/10/21 1834    I have reviewed this documentation and agree.                    GI Daily Progress Note  Subjective:    Chief Complaint: Follow-up decompensated liver cirrhosis    Mr. Weinberg is resting in bed.  Is alert to person and time disoriented to place and situation.  Notes he has never been diagnosed with cirrhosis in the past.  No new or worsening GI symptoms documented; had 3 bowel movements yesterday but none overnight.  Denies any pain at this time.    Objective:    /65 (BP Location: Right arm, Patient Position: Lying)   Pulse 66   Temp 98.1 °F (36.7 °C) (Axillary)   Resp 20   Ht 165.1 cm (65\")   Wt 101 kg (222 lb 12.8 oz)   SpO2 90%   BMI 37.08 kg/m²     Physical Exam  Vitals signs and " nursing note reviewed.   Constitutional:       General: He is not in acute distress.     Appearance: Normal appearance. He is obese. He is not ill-appearing or toxic-appearing.   HENT:      Head: Normocephalic and atraumatic.   Eyes:      General: No scleral icterus.     Extraocular Movements: Extraocular movements intact.      Conjunctiva/sclera: Conjunctivae normal.      Pupils: Pupils are equal, round, and reactive to light.   Cardiovascular:      Rate and Rhythm: Normal rate and regular rhythm.      Pulses: Normal pulses.      Heart sounds: Normal heart sounds, S1 normal and S2 normal.   Pulmonary:      Effort: Pulmonary effort is normal. No respiratory distress.      Breath sounds: No stridor. Wheezing and rales present. No rhonchi.      Comments: Wet nonproductive cough  Abdominal:      General: Abdomen is flat. Bowel sounds are normal. There is distension.      Palpations: Abdomen is soft. There is no mass.      Tenderness: There is no abdominal tenderness. There is no guarding or rebound.      Hernia: No hernia is present.      Comments: Exam for organomegaly limited due to abdominal obesity.   Skin:     General: Skin is warm and dry.      Capillary Refill: Capillary refill takes less than 2 seconds.      Coloration: Skin is not jaundiced or pale.   Neurological:      Mental Status: He is alert.      Comments: Alert to person and time but confused to place and situation.         Lab  Lab Results   Component Value Date    WBC 5.98 01/09/2021    HGB 11.0 (L) 01/09/2021    HGB 11.9 (L) 01/08/2021    HGB 11.8 (L) 01/07/2021    MCV 94.2 01/09/2021    PLT 78 (L) 01/09/2021    INR 4.32 (H) 01/09/2021    INR 3.58 (H) 01/08/2021    INR 3.35 (H) 01/07/2021    INR 3.20 (H) 01/06/2021    INR 2.29 (H) 01/05/2021       Lab Results   Component Value Date    GLUCOSE 148 (H) 01/09/2021    BUN 29 (H) 01/09/2021    CREATININE 0.67 (L) 01/09/2021    EGFRIFNONA 121 01/09/2021    BCR 43.3 (H) 01/09/2021     (L) 01/09/2021       K 3.8 01/09/2021    CO2 31.0 (H) 01/09/2021    CALCIUM 8.4 (L) 01/09/2021    ALBUMIN 2.10 (L) 01/08/2021    ALKPHOS 157 (H) 01/08/2021    BILITOT 5.3 (H) 01/08/2021    BILIDIR 1.5 (H) 01/04/2021    ALT 30 01/08/2021    AST 54 (H) 01/08/2021       Assessment:      Hyponatremia    Dyslipidemia on statins     COPD     T2DM on metformin     Esophageal reflux    Hypertension    PAD s/p L SFA stent and R fem-pop     Iron deficiency anemia    Coal workers pneumoconiosis     Coronary artery disease involving native heart    Nonrheumatic aortic valve stenosis    YOUSUF on CPAP    Paroxysmal atrial fibrillation    Class 3 severe obesity in adult     S/P TAVR (transcatheter aortic valve replacement) 11/22/19    Diastolic CHF, chronic (CMS/HCC)    Hypokalemia    Other cirrhosis of liver (CMS/HCC)    Hyperbilirubinemia    Liver cirrhosis with ascites, MELD-Na: 28  Hepatic encephalopathy, improving  Hyponatremia, improving  Diastolic heart failure  Atrial fibrillation on chronic anticoagulation with warfarin  Thrombocytopenia, related #1    Plan:  >>> Patient is still encephalopathic while on lactulose and Xifaxan therapy.  >>> Recommend a one-time lactulose enema  >>> If encephalopathy has improved, patient may require paracentesis as he is experiencing abdominal distention.    Martir Esparza, GLORIA  01/10/21  11:23 EST      Electronically signed by Bruno Boyd MD at 01/10/21 1834          Consult Notes (last 24 hours) (Notes from 01/10/21 1107 through 01/11/21 1107)      Francisco Mayberry IV, PharmD at 01/11/21 0520          Pharmacy Consult-Vancomycin Dosing  Sai Weinberg is a  60 y.o. male receiving vancomycin therapy.     Indication: Sepsis, PNA  Consulting Provider: Pulmonology  ID Consult: N    Goal Trough:    Current Antimicrobial Therapy  Anti-Infectives (From admission, onward)      Ordered     Dose/Rate Route Frequency Start Stop    01/10/21 4980  piperacillin-tazobactam (ZOSYN) 3.375 g in iso-osmotic dextrose  "50 ml (premix)     Ordering Provider: Francisco Mayberry IV, PharmD    3.375 g  over 4 Hours Intravenous Every 8 Hours 01/11/21 0800 01/18/21 0759    01/10/21 2343  piperacillin-tazobactam (ZOSYN) 4.5 g in iso-osmotic dextrose 100 mL IVPB (premix)     Ordering Provider: Francisco Mayberry IV, PharmD    4.5 g  over 30 Minutes Intravenous Once 01/11/21 0030 01/11/21 0047    01/10/21 2220  vancomycin 2500 mg/500 mL 0.9% NS IVPB ([facility])     Ordering Provider: Francisco Mayberry IV, PharmD    25 mg/kg × 101 kg  over 180 Minutes Intravenous Once 01/10/21 2315 01/11/21 0250    01/10/21 2218  Pharmacy to dose vancomycin     Ordering Provider: Huy Lawson APRN     Does not apply Continuous PRN 01/10/21 2218 01/20/21 2217    01/09/21 0842  doxycycline (VIBRAMYCIN) 100 mg/100 mL 0.9% NS MBP     Ordering Provider: Piper Stoddard DO    100 mg  over 60 Minutes Intravenous Every 12 Hours 01/09/21 0930 01/14/21 0929    01/06/21 1344  riFAXIMin (XIFAXAN) tablet 550 mg     Shabnam Armendariz [facility] reviewed the order on 01/08/21 1408.   Ordering Provider: Brunner, Mark I, MD    550 mg Oral Every 12 Hours Scheduled 01/06/21 1430 02/05/21 0859            Allergies  Allergies as of 01/04/2021 - Reviewed 01/04/2021   Allergen Reaction Noted   • Tylenol [acetaminophen] Other (See Comments) 10/30/2019       Labs    Results from last 7 days   Lab Units 01/11/21  0341 01/10/21  2140 01/10/21  1024   BUN mg/dL 42* 33* 32*   CREATININE mg/dL 1.28* 1.01 0.60*       Results from last 7 days   Lab Units 01/11/21  0341 01/10/21  2140 01/10/21  1024   WBC 10*3/mm3 10.09 8.51 5.71       Evaluation of Dosing     Last Dose Received in the ED/Outside Facility: N/A  Is Patient on Dialysis or Renal Replacement: N    Ht - 165.1 cm (65\")  Wt - 101 kg (222 lb 12.8 oz)    Estimated Creatinine Clearance: 67.1 mL/min (A) (by C-G formula based on SCr of 1.28 mg/dL (H)).    Intake & Output (last 3 days)         01/08 0701 - 01/09 0700 01/09 0701 - 01/10 " 0700 01/10 0701 - 01/11 0700    P.O. 940 621 780    I.V. (mL/kg)  1000 (9.9)     Blood   296    IV Piggyback  100 650    Total Intake(mL/kg) 940 (9.3) 1721 (17) 1726 (17.1)    Urine (mL/kg/hr) 400 (0.2)  625 (0.3)    Stool   0    Total Output 400  625    Net +540 +1721 +1101           Urine Unmeasured Occurrence 6 x 5 x 2 x    Stool Unmeasured Occurrence 5 x 1 x 3 x            Microbiology and Radiology  Microbiology Results (last 10 days)       Procedure Component Value - Date/Time    Respiratory Culture - Sputum, ET Suction [[id number]] Collected: 01/11/21 0308    Lab Status: Preliminary result Specimen: Sputum from ET Suction Updated: 01/11/21 0419     Gram Stain Moderate (3+) WBCs per low power field      Few (2+) Epithelial cells per low power field      Moderate (3+) Mixed bacterial jhony      Few (2+) Yeast    S. Pneumo Ag Urine or CSF - Urine, Urine, Clean Catch [[id number]]  (Normal) Collected: 01/10/21 0114    Lab Status: Final result Specimen: Urine, Clean Catch Updated: 01/10/21 1225     Strep Pneumo Ag Negative    Legionella Antigen, Urine - Urine, Urine, Clean Catch [[id number]]  (Normal) Collected: 01/10/21 0114    Lab Status: Final result Specimen: Urine, Clean Catch Updated: 01/10/21 1224     LEGIONELLA ANTIGEN, URINE Negative    Blood Culture - Blood, Blood, Arterial Line [[id number]] Collected: 01/09/21 1151    Lab Status: Preliminary result Specimen: Blood, Arterial Line Updated: 01/10/21 1201     Blood Culture No growth at 24 hours    Blood Culture - Blood, Wrist, Right [[id number]] Collected: 01/09/21 1151    Lab Status: Preliminary result Specimen: Blood from Wrist, Right Updated: 01/10/21 1201     Blood Culture No growth at 24 hours    COVID PRE-OP / PRE-PROCEDURE SCREENING ORDER (NO ISOLATION) - Swab, Nasopharynx [[id number]]  (Normal) Collected: 01/04/21 2158    Lab Status: Final result Specimen: Swab from Nasopharynx Updated: 01/05/21 0121    Narrative:      The following orders were created for  panel order COVID PRE-OP / PRE-PROCEDURE SCREENING ORDER (NO ISOLATION) - Swab, Nasopharynx.  Procedure                               Abnormality         Status                     ---------                               -----------         ------                     COVID-19 and FLU A/B PCR...[[id number]]  Normal              Final result                 Please view results for these tests on the individual orders.    COVID-19 and FLU A/B PCR - Swab, Nasopharynx [[id number]]  (Normal) Collected: 01/04/21 2158    Lab Status: Final result Specimen: Swab from Nasopharynx Updated: 01/05/21 0121     COVID19 Not Detected     Influenza A PCR Not Detected     Influenza B PCR Not Detected    Narrative:      Fact sheet for providers: https://www.fda.gov/media/421634/download    Fact sheet for patients: https://www.fda.gov/media/574259/download    Test performed by PCR.            Vancomycin Levels:                        Assessment/Plan:    Morbidly obese male, moderate age. Status post respiratory arrest Code Blue with ROSC >30 minutes after start.     Renal markers unlikely to be reflective of acute renal function; SCr already increased 30% only 5 hours post arrest. Patient now receiving multiple vasoactive medications for hemodynamic support and being cooled for TTM in the post-arrest setting.     2500 mg (25 mg/kg) IV vancomycin infusion now complete, no further vancomycin as per above.  Random vancomycin level Tuesday or as per rounding clinical pharmacist later this morning pending reassessment.     Target steady state trough 15-20 mcg/mL.   Thank you for this consult.  Francisco Mayberry IV, PharmD, BCPS  1/11/2021  05:19 EST      Electronically signed by Francisco Mayberry IV, PharmD at 01/11/21 1704

## 2021-01-11 NOTE — CONSULTS
Referring Provider: Dwight  Reason for Consultation: post arrest, status epilepticus    Patient Care Team:  Dewayne Cole MD as PCP - General  Dewayne Cole MD as PCP - Family Medicine    Chief complaint post cardiac arrest, status epilepticus    Subjective .     History of present illness:    Mr Sai Weinberg is a 61 y/o man who was admitted on 1/4/2021 for weakness and confusion. Patient had cardiac arrest last night with prolonged resuscitation.  Neurology consulted for status epilepticus seen on continuous EEG.    Patient has medical history including but not limited to: diastolic heart failure, peripheral artery disease s/p R fem-pop bypass, aortic valve stenosis s/p TAVR, paroxysmal atrial fibrillation, heart block s/p pacmaker, chronic obstructive pulmonary disease, obstructive sleep apnea, hypertension, hyperlipidemia, and cirrhosis/DAVID.     He has been treated for hyponatremia (Na on admission 121). During course of admission, patient has had episodes of aggression and confusion and needed to be restrained.     Acute respiratory failure overnight with sats in the 60s. Patient then had PEA with prolonged CPR. Noted to have myoclonic movements and cEEG ordered which shows status epilepticus.     On my exam, patient is sedated with propofol and versed, intubated and nonresponsive.     Review of Systems  Unable to obtain/patient intubated     History  Past Medical History:   Diagnosis Date   • Arthritis    • Black lung disease (CMS/HCC)    • BPH (benign prostatic hyperplasia)    • Cataract    • COLD (chronic obstructive lung disease) (CMS/HCC)    • Colon polyps    • Diabetes mellitus (CMS/HCC)     SINCE 2014   • Dyslipidemia    • Esophageal reflux    • Flu     HX   • Heart attack (CMS/HCC)     2006   • Herniated lumbar intervertebral disc    • History of colon resection    • Hyperlipidemia    • Hypertension    • Malignant neoplasm of colon (CMS/HCC)    • On home oxygen therapy     prn   •  Open wound of left hip and thigh with complication 3/12/2017    Open draining left leg wound from femoral cutdown and angioplasty/stenting of superficial artery 1/18/2017   • Peripheral vascular disease (CMS/HCC)    • Renal failure    • Sleep apnea with use of continuous positive airway pressure (CPAP)    • Wears dentures    • Wears glasses    ,   Past Surgical History:   Procedure Laterality Date   • ANGIOPLASTY FEMORAL ARTERY N/A 1/17/2017    Procedure: left femoral cutdown with aortagram and left SFA stent and angioplasty;  Surgeon: Heladio Rosa MD;  Location:  [email or web address];  Service:    • AORTAGRAM N/A 1/17/2017    Procedure: AORTAGRAM WITH RUNOFFS and  STENT left SFA;  Surgeon: Heladio Rosa MD;  Location:  [email or web address];  Service:    • AORTIC VALVE REPAIR/REPLACEMENT N/A 11/22/2019    Procedure: TRANSAPICAL TRANSCATHETER AORTIC VALVE REPLACEMENT WITH TRANSESOPHAGEAL ECHOCARDIOLGRAM;  Surgeon: Danish St MD;  Location: [facility];  Service: Cardiothoracic   • AORTIC VALVE REPAIR/REPLACEMENT N/A 11/22/2019    Procedure: Transapical Transcatheter Aortic Valve Replacement;  Surgeon: Lory Shepherd MD;  Location:  [email or web address];  Service: Cardiovascular   • CARDIAC CATHETERIZATION      NO INTERVENTION   • CARDIAC CATHETERIZATION Left 10/30/2019    Procedure: Left Heart Cath;  Surgeon: Milad Cordon MD;  Location: [facility] CATH INVASIVE LOCATION;  Service: Cardiology   • CARDIAC ELECTROPHYSIOLOGY PROCEDURE Left 11/25/2019    Procedure: Pacemaker DC new;  Surgeon: Maranda Cerda MD;  Location:  [email or web address] CATH INVASIVE LOCATION;  Service: Cardiology   • COLON SURGERY      x 2   • COLONOSCOPY     • FEMORAL FEMORAL BYPASS N/A 3/13/2017    Procedure: INCISION AND DRAINAGE LEFT GROIN HEMATOMA;  Surgeon: Heladio Rosa MD;  Location: [facility] OR;  Service:    • FEMORAL POPLITEAL BYPASS Right 01/26/2016   • OTHER SURGICAL HISTORY      PTA ILIAC STENOSIS WITH STENT   •  TRANSESOPHAGEAL ECHOCARDIOGRAM (ZORAIDA) N/A 2019    Procedure: TRANSESOPHAGEAL ECHOCARDIOGRAM WITH ANESTHESIA;  Surgeon: Danish St MD;  Location: [place] [address];  Service: Cardiothoracic   ,   Family History   Problem Relation Age of Onset   • Lung cancer Mother    • Heart attack Mother    • Hypertension Mother    • Diabetes Mother    • Diabetes Father    • Hypertension Father    • Heart attack Father    ,   Social History     Tobacco Use   • Smoking status: Former Smoker     Packs/day: 2.00     Years: 30.00     Pack years: 60.00     Types: Cigarettes     Start date:      Quit date:      Years since quittin.0   • Smokeless tobacco: Current User     Types: Chew   • Tobacco comment: Stopped smoking 1 year ago. Smoked 35 years up to 2ppd.   Substance Use Topics   • Alcohol use: No   • Drug use: No     E-cigarette/Vaping     E-cigarette/Vaping Substances     E-cigarette/Vaping Devices       ,   Medications Prior to Admission   Medication Sig Dispense Refill Last Dose   • atorvastatin (LIPITOR) 40 MG tablet Take 40 mg by mouth daily.      • bumetanide (BUMEX) 2 MG tablet Take 2 mg by mouth 2 (Two) Times a Day As Needed.      • carvedilol (COREG) 12.5 MG tablet Take 1 tablet by mouth 2 (Two) Times a Day With Meals. (Patient taking differently: Take 12.5 mg by mouth Daily.) 60 tablet 11    • clopidogrel (PLAVIX) 75 MG tablet Take 1 tablet by mouth Daily. 30 tablet 11    • DULoxetine (CYMBALTA) 30 MG capsule Take 30 mg by mouth Daily.      • magnesium oxide (MAGOX) 400 (241.3 Mg) MG tablet tablet Take 400 mg by mouth Daily.      • metFORMIN (GLUCOPHAGE) 850 MG tablet Take 850 mg by mouth 2 (Two) Times a Day With Meals.      • pantoprazole (PROTONIX) 40 MG EC tablet Take 40 mg by mouth Every Other Day.      • potassium chloride (MICRO-K) 10 MEQ CR capsule Take 10 mEq by mouth 2 (Two) Times a Day.      • tamsulosin (FLOMAX) 0.4 MG capsule 24 hr capsule Take 0.4 mg by mouth Daily.      • warfarin  (COUMADIN) 4 MG tablet Take 4 mg by mouth Daily.      • albuterol (PROVENTIL HFA;VENTOLIN HFA) 108 (90 BASE) MCG/ACT inhaler Inhale 2 puffs Every 4 (Four) Hours As Needed for wheezing.      • budesonide-formoterol (SYMBICORT) 80-4.5 MCG/ACT inhaler Inhale 2 puffs 2 (Two) Times a Day.      • Cholecalciferol (Vitamin D) 50 MCG (2000 UT) capsule Take 2,000 Units by mouth Daily.      • ferrous sulfate 324 (65 FE) MG tablet delayed-release EC tablet Take 324 mg by mouth Daily With Breakfast.      • hydrOXYzine pamoate (VISTARIL) 50 MG capsule Take 1 capsule by mouth Daily.      • losartan (COZAAR) 50 MG tablet Take 50 mg by mouth Daily.      • nitroglycerin (NITROSTAT) 0.4 MG SL tablet Place 0.4 mg under the tongue Every 5 (Five) Minutes As Needed for Chest Pain. Take no more than 3 doses in 15 minutes.      • oxyCODONE (ROXICODONE) 10 MG tablet Take 10 mg by mouth Every 8 (Eight) Hours As Needed.      • probiotic (CULTURELLE) capsule capsule Take 1 capsule by mouth Daily. 30 capsule 1    • rOPINIRole (REQUIP) 2 MG tablet Take 1 tablet by mouth Every Other Day.      • Testosterone Cypionate 100 MG/ML solution Inject 2 mL as directed Every 14 (Fourteen) Days.      • tiotropium (SPIRIVA) 18 MCG per inhalation capsule Place 1 capsule into inhaler and inhale Daily.      , Scheduled Meds:  Pharmacy Consult, , Does not apply, BID  artificial tears, , Both Eyes, Q2H  bumetanide, 2 mg, Intravenous, Q12H  chlorhexidine, 15 mL, Mouth/Throat, Q12H  chlorothiazide sodium (DIURIL) injection, 500 mg, Intravenous, Daily  ipratropium-albuterol, 3 mL, Nebulization, Q6H - RT  levETIRAcetam, 1,000 mg, Intravenous, Q12H  pantoprazole, 40 mg, Intravenous, Q24H  piperacillin-tazobactam, 3.375 g, Intravenous, Q8H  sodium chloride, 10 mL, Intravenous, Q12H  vancomycin (dosing per levels), , Does not apply, Daily    , Continuous Infusions:  EPINEPHrine, 0.02-0.3 mcg/kg/min, Last Rate: 0.3 mcg/kg/min (01/11/21 0553)  fentanyl 10 mcg/mL,   "mcg/hr  midazolam, 1-10 mg/hr, Last Rate: 10 mg/hr (01/11/21 0749)  norepinephrine, 0.02-0.3 mcg/kg/min  norepinephrine, 0.02-0.3 mcg/kg/min, Last Rate: 0.2 mcg/kg/min (01/11/21 0648)  Pharmacy Consult,   Pharmacy to dose vancomycin,   Pharmacy to dose warfarin,   phenylephrine, 0.5-3 mcg/kg/min, Last Rate: 1.1 mcg/kg/min (01/11/21 1146)  propofol, 5-50 mcg/kg/min, Last Rate: 40 mcg/kg/min (01/11/21 1019)    , PRN Meds:  fentanyl 10 mcg/mL  •  magnesium sulfate  •  norepinephrine  •  Pharmacy Consult  •  Pharmacy to dose vancomycin  •  Pharmacy to dose warfarin  •  potassium chloride  •  Sodium Chloride (PF)  •  sodium chloride  •  vecuronium and Allergies:  Tylenol [acetaminophen]    Objective     Vital Signs   Blood pressure 94/52, pulse 70, temperature 96.6 °F (35.9 °C), temperature source Bladder, resp. rate 15, height 165.1 cm (65\"), weight 101 kg (222 lb 12.8 oz), SpO2 95 %.    Physical Exam:  General-intubated, sedated, unresponsive  Head/eyes-atraumatic, pupils small/nonreactive  ENT-moist membrane, pink membrane  Respiratory-intubated, on supplemental oxygen  Cardiac-regular rate and rhythm, no pitting edema  Abdomen-soft, nontender  Skin-warm dry intact  Speech-intubated, unresponsive, not following commands  Cranial nerves-pupils small/not reactive, not tracking, no corneal, no gag, no cough, no obvious facial weakness but patient is intubated  Mental status-unable to answer orientation questions is intubated and unresponsive  Cognitive status-poor attention/comatose  Motor-normal bulk and tone, no spontaneous movement of any extremity, no tremor  Reflexes-plantar, plantar flexor response  Sensation-withdraws to pain in all extremities  Unable to test gait/coordination as patient is comatose    Results Review:    Na 154, creatinine 1.74, ast 118, alt 55, lactate 23, wbc 9.5, platelets 104    cEEG burst suppression pattern with status epilepticus     Personally reviewed CT head w/o contrast, agree with " radiology, age indeterminate small infarcts/no edema  IMPRESSION:     1. No acute intracranial hemorrhage or mass effect.  2. Atrophy is greater than expected for age group and there is moderate probable sequelae of small vessel disease including age-indeterminate lacunar insults in the brainstem and basal ganglia.  3. There is possible low-attenuation involving the medial bilateral occipital lobes. Given clinical concern for a global hypoxic ischemic insult and seizure activity, this is best further characterized with an MRI if the patient is candidate.       Assessment/Plan       Hyponatremia    Dyslipidemia on statins     COPD     T2DM on metformin     Esophageal reflux    Hypertension    PAD s/p L SFA stent and R fem-pop     Iron deficiency anemia    Coal workers pneumoconiosis     Coronary artery disease involving native heart    Nonrheumatic aortic valve stenosis    YOUSUF on CPAP    Paroxysmal atrial fibrillation    Class 3 severe obesity in adult     S/P TAVR (transcatheter aortic valve replacement) 11/22/19    Diastolic CHF, chronic (CMS/HCC)    Hypokalemia    Other cirrhosis of liver (CMS/HCC)    Hyperbilirubinemia    Cardiac arrest (CMS/HCC)      59 y/o man with multiple comorbidities s/p respiratory failure and PEA who is now in status epilepticus.    Patient is currently on TTM.     Exam is very poor with nonresponsive pupils, no cough, gag or corneals. CT head w/o contrast no acute edema noted but it is still very early. Might consider MRI brain in the next few days if needed for prognostication.     Prognosis is grim and do not expect this to be survivable event.     Plan  -keppra 1500mg bid  -loading with depakote, maintenance dose 500mg tid, will need to monitor LFTs and level ordered for am   -continue propofol and versed  -cEEG until tomorrow        CC time spent >30    I discussed the patient's findings and my recommendations with primary team, bedside nurse     Tanya Barragan,  MD  01/11/21  09:01 EST

## 2021-01-11 NOTE — SIGNIFICANT NOTE
Spoke to his wife and 2 daughters about the events of last night and is very poor prognosis for neurologic recovery.  I gave them several options and told them that he is on maximum support right now and that if he cardiac arrested I felt that we should not do further CPR.  After some deliberation they have decided no CPR.  Once he is rewarmed and we have stopped his seizures if his mentation remains poor then I would further recommend comfort measures and withdrawal of support.

## 2021-01-11 NOTE — PROCEDURES
"Insert Arterial Line    Date/Time: 1/10/2021 10:20 PM  Performed by: Huy Lawson APRN  Authorized by: Huy Lawson APRN   Consent: The procedure was performed in an emergent situation.  Required items: required blood products, implants, devices, and special equipment available  Patient identity confirmed: anonymous protocol, patient vented/unresponsive  Time out: Immediately prior to procedure a \"time out\" was called to verify the correct patient, procedure, equipment, support staff and site/side marked as required.  Preparation: Patient was prepped and draped in the usual sterile fashion.  Indications: multiple ABGs, respiratory failure and hemodynamic monitoring  Location: left radial    Sedation:  Patient sedated: no    Kristian's test normal: yes  Needle gauge: 20  Seldinger technique: Seldinger technique used  Number of attempts: 3  Post-procedure: line sutured and dressing applied  Post-procedure CMS: unchanged  Patient tolerance: patient tolerated the procedure well with no immediate complications        "

## 2021-01-11 NOTE — PROCEDURES
"Insert Central Line At Bedside    Date/Time: 1/10/2021 10:38 PM  Performed by: Huy Lawson APRN  Authorized by: Huy Lawson APRN   Consent: The procedure was performed in an emergent situation.  Required items: required blood products, implants, devices, and special equipment available  Patient identity confirmed: anonymous protocol, patient vented/unresponsive  Time out: Immediately prior to procedure a \"time out\" was called to verify the correct patient, procedure, equipment, support staff and site/side marked as required.  Indications: vascular access    Sedation:  Patient sedated: no    Preparation: skin prepped with 2% chlorhexidine  Skin prep agent dried: skin prep agent completely dried prior to procedure  Sterile barriers: all five maximum sterile barriers used - cap, mask, sterile gown, sterile gloves, and large sterile sheet  Hand hygiene: hand hygiene performed prior to central venous catheter insertion  Location details: right femoral  Patient position: flat  Catheter type: triple lumen  Catheter size: 7 Fr  Pre-procedure: landmarks identified  Ultrasound guidance: yes  Sterile ultrasound techniques: sterile gel and sterile probe covers were used  Number of attempts: 1  Successful placement: yes  Post-procedure: line sutured and dressing applied  Assessment: blood return through all ports,  free fluid flow,  placement verified by x-ray and no pneumothorax on x-ray  Patient tolerance: patient tolerated the procedure well with no immediate complications  Comments: 2 previous attempts while having code event on floor at bilateral femoral sites were unsuccessful.          "

## 2021-01-11 NOTE — PROGRESS NOTES
[facility] at [facility]  PROGRESS NOTE    Date of Admission: 1/4/2021  Date of Service: 01/11/21    Primary Care Physician: Dewayne Cole MD    Chief Complaint: f/u respiratory arrest, PAF   Problem List:   1. Coronary artery disease   A. History of remote MI, with normal heart cath 15 years ago in [place], [facility]  B. Stress MPS 10/9/19: LV mild-mod dilated, large area of moderate-severe ischemia in the inferior wall  D. RIKY to mid RCA, 10/30/19  2. Aortic stenosis, severe              A. 70 mmHg AVG; 0.83 cm² by cath, 10/30/2019              B. Cath EF equals 65%, 10/30/2019  C. ZORAIDA 11/15/19: EF 60%, severe AS with mean gradient 50mmHg, moderate MR  D. TAVR, 11/22/19  E. Echo, 1/21/20: normal LV function, bioprosthetic AV with normal function, mild MR   F. Echo, 12/13/2020: Normal functioning bioprosthetic AV, mild-moderate MR  3. Peripheral arterial disease  A. S/p remote R fem-pop bypass  B. Left femoral cutdown with angioplasty and stent placement to left SFA by Dr. Rosa January, 2017               i. Complicated by infection of left groin incision  C. CTA Abdominal aorta with runoff 09/23/19: no high grade stenosis of bilateral common femoral arteries; patent fem-popliteal graft on the right and patent trifurcation vessels; multifocal severe stenosis of the SFA on the left but without total occlusion, single vessel runoff via posterior tibial artery  4. Hypertension   5. Hyperlipidemia  6. DM2  7. Coal worker's pneumoconiosis   8. Tobacco abuse and severe COPD  A. Stopped smoking circa 2013, continues to chew tobacco  9. History of back injury with lumbar disc disease and chronic back pain   10. History of colon cancer, remote, s/p partial colectomy              A. Noncompliant with subsequent colonoscopies  11. YOUSUF on CPAP  12. Morbid obesity  13. Surgical history:  A. Partial colectomy  B. Right fem-pop bypass  C. Abdominal hernia repair  14. Paroxysmal AF,  "11/4/19               A. Hospitalized [facility] for 5 days               D. Dismissed with HF circa 11/8/19                    1. LE edema and creatinine=2.77, resolved   15. Complete heart block after TAVR, 11/25/19 s/p [facility] dual chamber PPM  16. Chronic hypochromic, microcytic anemia.  17. Cirrhosis diagnosed winter 2020  18. Respiratory arrest followed by PEA with prolonged CPR January 10, 2021    Subjective      Events of yesterday noted. Apparently patient had respiratory arrest followed by PEA arrest with prolonged ACLS measures prior to ROSC. He is now on multiple pressors and in status epilepticus with poor prognosis.      Objective   Vitals: BP (!) 89/50   Pulse 70   Temp 96.8 °F (36 °C) (Esophageal)   Resp 14   Ht 165.1 cm (65\")   Wt 101 kg (222 lb 12.8 oz)   SpO2 97%   BMI 37.08 kg/m²     Physical Exam:  GENERAL: Intubated, sedated  HEART: Regular rhythm, normal rate, and no murmurs  LUNGS: Intubated, on mechanical ventilation. No wheezing, rales or rhonchi.  NEUROLOGIC: per Neuro   EXTREMITIES: No clubbing, cyanosis     Results:  Results from last 7 days   Lab Units 01/11/21  0926 01/11/21  0341 01/10/21  2140   WBC 10*3/mm3 9.50 10.09 8.51   HEMOGLOBIN g/dL 9.0* 9.2* 9.9*   HEMATOCRIT % 26.8* 26.6* 29.5*   PLATELETS 10*3/mm3 104* 99* 84*     Results from last 7 days   Lab Units 01/11/21  0926 01/11/21  0341 01/10/21  2140   SODIUM mmol/L 139 138 139   POTASSIUM mmol/L 4.4 4.1 4.3   CHLORIDE mmol/L 98 97* 94*   CO2 mmol/L 33.0* 31.0* 29.0   BUN mg/dL 47* 42* 33*   CREATININE mg/dL 1.74* 1.28* 1.01   GLUCOSE mg/dL 154* 164* 137*      Lab Results   Component Value Date    CHOL 88 11/21/2019    TRIG 87 11/21/2019    HDL 32 (L) 11/21/2019    LDL 39 11/21/2019     (H) 01/11/2021    ALT 55 (H) 01/11/2021     Results from last 7 days   Lab Units 01/04/21  1730   HEMOGLOBIN A1C % 6.20*         Results from last 7 days   Lab Units 01/04/21  1730   TSH uIU/mL 4.250*         Results from last 7 " days   Lab Units 01/11/21  0926 01/11/21  0341 01/10/21  2140 01/10/21  1024   PROTIME Seconds  --  33.4* 49.2* 43.8*   INR   --  3.31* 5.41* 4.66*   APTT seconds 55.5* 61.4* 58.9*  --      Results from last 7 days   Lab Units 01/11/21  0341 01/10/21  2140 01/05/21  0859   CK TOTAL U/L  --  99  --    TROPONIN T ng/mL 0.135* 0.060* 0.049*     Results from last 7 days   Lab Units 01/11/21  0341   PROBNP pg/mL 10,637.0*       Intake/Output Summary (Last 24 hours) at 1/11/2021 1223  Last data filed at 1/11/2021 1039  Gross per 24 hour   Intake 2673.33 ml   Output 722 ml   Net 1951.33 ml     I personally reviewed the patient's EKG/Telemetry data    Radiology Data:   CXR 1/10/21:  IMPRESSION:  Tubes and lines as above. The esophageal probe and NG tube tips are in the proximal stomach as seen on the abdominal radiograph from this evening. Pulmonary edema is noted. No pneumothorax.    Current Medications:  Pharmacy Consult, , Does not apply, BID  artificial tears, , Both Eyes, Q2H  bumetanide, 2 mg, Intravenous, Q12H  chlorhexidine, 15 mL, Mouth/Throat, Q12H  chlorothiazide sodium (DIURIL) injection, 500 mg, Intravenous, Daily  ipratropium-albuterol, 3 mL, Nebulization, Q6H - RT  levETIRAcetam, 1,000 mg, Intravenous, Q12H  pantoprazole, 40 mg, Intravenous, Q24H  piperacillin-tazobactam, 3.375 g, Intravenous, Q8H  sodium chloride, 10 mL, Intravenous, Q12H  valproate sodium, 500 mg, Intravenous, Q8H  vancomycin (dosing per levels), , Does not apply, Daily      EPINEPHrine, 0.02-0.3 mcg/kg/min, Last Rate: 0.3 mcg/kg/min (01/11/21 1152)  fentanyl 10 mcg/mL,  mcg/hr  midazolam, 1-10 mg/hr, Last Rate: 10 mg/hr (01/11/21 0749)  norepinephrine, 0.02-0.3 mcg/kg/min  norepinephrine, 0.02-0.3 mcg/kg/min, Last Rate: 0.3 mcg/kg/min (01/11/21 1154)  Pharmacy Consult,   Pharmacy to dose vancomycin,   Pharmacy to dose warfarin,   phenylephrine, 0.5-3 mcg/kg/min, Last Rate: 1.1 mcg/kg/min (01/11/21 1146)  propofol, 5-50 mcg/kg/min,  Last Rate: 40 mcg/kg/min (01/11/21 1019)        Assessment and Plan:     1. Respiratory/PEA arrest 1/10/21  - poor prognosis per neurology and consultants   - PPM interrogation shows no ventricular arrhythmias at time of event  - on cooling protocol     2. Diastolic heart failure/ history of TAVR  - repeat echo this admission with normal LVEF, RVSP 49mmHg, normal functioning bioprosthetic aortic valve    3. PAF  complete heart block  - S/p PPM  - CHADSVASC = 3, on Coumadin with therapeutic INR   HAS-BLED = 3  - Warfarin has been held due to supratherapeutic INRs  - PPM interrogation today shows onset of rate controlled Afib since 9pm last night     4. Cirrhosis  - Decompensated with ascites, hepatic encephalopathy     We will sign off and see only if called back to do so.     Electronically signed by Khushboo Cabral PA-C, 01/11/21, 12:33 PM EST.

## 2021-01-12 NOTE — THERAPY DISCHARGE NOTE
Acute Care - Occupational Therapy Discharge Summary  [facility]     Patient Name: Sai Weinberg  : 1960  MRN: [id number]    Today's Date: 2021                 Admit Date: 2021        OT Recommendation and Plan    Visit Dx:    ICD-10-CM ICD-9-CM   1. Hypokalemia  E87.6 276.8   2. Hyponatremia  E87.1 276.1   3. Somnolence  R40.0 780.09   4. Acute on chronic systolic heart failure   I50.23 428.23                           OT Discharge Summary  Anticipated Discharge Disposition (OT): other (see comments)(DC d/t medical status, unable to participate)  Reason for Discharge: Unable to participate, Change in medical status  Outcomes Achieved: Unable to tolerate or actively participate in therapy  Discharge Destination: other (comment)(DC d/t medical status, unable to participate)      Shweta Mobley, OT  2021

## 2021-01-12 NOTE — PROGRESS NOTES
Critical Care Note     LOS: 8 days   Patient Care Team:  Dewayne Cole MD as PCP - General  Dewayne Cole MD as PCP - Family Medicine    Chief Complaint/Reason for visit:    Chief Complaint   Patient presents with   • Abnormal Lab   Cardiac arrest  COPD  Peripheral vascular disease  Diabetes mellitus type 2  Coronary artery disease, previous stent  Status post TAVR November 22, 2019  Congestive heart failure  Cirrhosis, new diagnosis    Subjective     60-year-old gentleman hospitalized on January 4 with congestive heart failure, hyponatremia, hepatic encephalopathy.  He has a known history of coronary artery disease, coal workers pneumoconiosis, chronic iron deficiency anemia, hypertension, dyslipidemia, proximal atrial fibrillation and complete heart block requiring permanent pacemaker, GI bleed.  His echocardiogram on admission revealed an EF of 56% a bioprosthetic aortic valve functioning normally and an elevated RV systolic pressure at 45 to 55 mmHg.  Despite significant third spaced edema he appeared to be intravascularly volume depleted.  GI did assess him and felt that he had liver cirrhosis of unclear etiology with associated ascites, hepatic encephalopathy.  On the evening of January 10 around 2030 RN entered the room and his pulse oximetry level was dropping.  He had removed his CPAP tubing and was pale and unresponsive.  She reconnected the tubing but his oxygenation did not resolve improve and then he respiratory arrested and a CODE BLUE was called.  IV access was difficult.  He received prolonged CPR for at least 30 minutes before restoration of spontaneous circulation achieved.  Ultimately a right femoral line had to be placed because of pacemaker wires would not allow threading from above.  He was noted to have myoclonic jerking.  He could not support his blood pressure without high-dose epinephrine, norepinephrine drips.  He was empirically placed on vancomycin and Zosyn.  Targeted  temperature management initiated.  He did achieve his goal temperature and remained cooled for 24 hours.  He continued to require maximum epinephrine, norepinephrine and phenylephrine drips to maintain an adequate systolic blood pressure.   EEG revealed nonconvulsive status epilepticus.  This was despite a Versed drip, propofol drip and Keppra.  He was evaluated by neurology.  He was given a large loading dose of valproate and seizures stopped.     Interval History:     He remains unresponsive and without reflexes.  He is not overbreathing the ventilator.  He continues to be on maximum drips to support his blood pressure  He is basically anuric.  He had 248 mL of urine in 24 hours.  Creatinine is further elevated at 2.96, BUN 65 today.  Potassium is 5.1.    Review of Systems:    All systems were reviewed and negative except as noted in subjective.    Medical history, surgical history, social history, family history reviewed    Objective     Intake/Output:    Intake/Output Summary (Last 24 hours) at 1/12/2021 1429  Last data filed at 1/12/2021 1220  Gross per 24 hour   Intake 4509.6 ml   Output 201 ml   Net 4308.6 ml       Nutrition:  NPO Diet    Infusions:  EPINEPHrine, 0.02-0.3 mcg/kg/min, Last Rate: 0.3 mcg/kg/min (01/12/21 1035)  fentanyl 10 mcg/mL,  mcg/hr  midazolam, 1-10 mg/hr, Last Rate: 6 mg/hr (01/12/21 0855)  norepinephrine, 0.02-0.3 mcg/kg/min, Last Rate: 0.3 mcg/kg/min (01/12/21 1115)  norepinephrine, 0.02-0.3 mcg/kg/min, Last Rate: 0.3 mcg/kg/min (01/11/21 1154)  Pharmacy Consult,   Pharmacy to dose vancomycin,   Pharmacy to dose warfarin,   phenylephrine, 0.5-3 mcg/kg/min, Last Rate: 3 mcg/kg/min (01/12/21 1014)  propofol, 5-50 mcg/kg/min, Last Rate: 30 mcg/kg/min (01/12/21 1221)  vasopressin, 0.03 Units/min, Last Rate: Stopped (01/12/21 0226)        Mechanical Ventilator Settings:            Resp Rate (Set): 14     FiO2 (%): 40 %  PEEP/CPAP (cm H2O): 12 cm H20    Minute Ventilation (L/min)  "(Obs): 5.98 L/min  Resp Rate (Observed) Vent: 14  I:E Ratio (Set): 1:0.30  I:E Ratio (Obs): 1:3.30    PIP Observed (cm H2O): 24 cm H2O  Plateau Pressure (cm H2O): 25 cm H2O    Telemetry: Paced rhythm             Vital Signs  Blood pressure 101/47, pulse 70, temperature 98.1 °F (36.7 °C), temperature source Esophageal, resp. rate 14, height 165.1 cm (65\"), weight 101 kg (222 lb 12.8 oz), SpO2 96 %.    Physical Exam:  General Appearance:   Pale appearing gentleman, supine in bed   Head:   Atraumatic   Eyes:          Pupils small and unresponsive, jaundice   Ears:     Throat:  Orally intubated   Neck:  Trachea midline, no palpable thyroid   Back:      Lungs:    Breath sounds are bilateral with coarse rhonchi, no chest wall crepitus    Heart:   S1, S2 auscultated with systolic murmur   Abdomen:    No bowel sounds, slightly distended, fluid wave   Rectal:   Deferred   Extremities:  2+ pitting edema, left radial arterial line, right femoral central line in place.  Toes and fingertips are dusky   Pulses:  No pedal pulses bilaterally   Skin:  Cool and dry   Lymph nodes:    Neurologic:  No corneal reflex, cough reflex, gag reflex, pupil reflex, no response to pain, all 4 extremities, no spontaneous respiration      Results Review:     I reviewed the patient's new clinical results.   Results from last 7 days   Lab Units 01/12/21  1038 01/12/21 0444 01/11/21 2215   SODIUM mmol/L 140 139 140   POTASSIUM mmol/L 5.1 4.9 4.9   CHLORIDE mmol/L 100 99 99   CO2 mmol/L 27.0 28.0 27.0   BUN mg/dL 65* 58* 56*   CREATININE mg/dL 2.96* 2.79* 2.45*   CALCIUM mg/dL 7.8* 7.7* 8.1*   BILIRUBIN mg/dL 3.0* 3.1* 3.4*   ALK PHOS U/L 141* 150* 156*   ALT (SGPT) U/L 50* 47* 50*   AST (SGOT) U/L 98* 88* 102*   GLUCOSE mg/dL 130* 125* 129*     Results from last 7 days   Lab Units 01/12/21  1038 01/12/21  0444 01/11/21 2215   WBC 10*3/mm3 11.34* 9.92 9.84   HEMOGLOBIN g/dL 9.1* 8.7* 9.0*   HEMATOCRIT % 27.5* 26.5* 27.1*   PLATELETS 10*3/mm3 109* " 102* 99*     Results from last 7 days   Lab Units 01/12/21  0357   PH, ARTERIAL pH units 7.298*   PO2 ART mm Hg 111.0*   PCO2, ARTERIAL mm Hg 55.5*   HCO3 ART mmol/L 27.2*     Lab Results   Component Value Date    BLOODCX No growth at 3 days 01/09/2021    BLOODCX No growth at 3 days 01/09/2021     No results found for: URINECX    I reviewed the patient's new imaging including images and reports.     Chest x-ray today reveals endotracheal tube above the mercy.  Cardiomegaly with small bilateral pleural effusions and persistent bibasilar infiltrates.    FINDINGS:   There is no displaced calvarial fracture. The visualized paranasal sinuses and mastoid air cells are clear. There are atherosclerotic vascular calcifications of the base the brain.     There is generalized atrophy greater than expected for age group. There is some motion degradation of the study. There is no evidence for acute intracranial hemorrhage or extra axial fluid collection. There is moderate white matter low attenuation which  is nonspecific but likely due to small vessel disease. There are likely small age-indeterminate lacuna in the bilateral basal ganglia. There is a low-attenuation area in the left midbrain consistent with an age-indeterminate small vessel insults and a  low-attenuation area in the left mid milton about 9 mm in largest dimension suspicious for an age-indeterminate small vessel insult. There is possible low-attenuation at the medial occipital cortex bilaterally. At this time the gray-white junction is still  maintained, but if there is concern for global hypoxic ischemic insult, follow-up imaging is recommended, preferably with MRI if the patient is a candidate. There is no intracranial mass affect. There is been cataract surgery on the left.     IMPRESSION:     1. No acute intracranial hemorrhage or mass effect.  2. Atrophy is greater than expected for age group and there is moderate probable sequelae of small vessel disease  including age-indeterminate lacunar insults in the brainstem and basal ganglia.  3. There is possible low-attenuation involving the medial bilateral occipital lobes. Given clinical concern for a global hypoxic ischemic insult and seizure activity, this is best further characterized with an MRI if the patient is candidate.      Signer Name: Radha De Jesus MD   Signed: 1/10/2021 11:04 PM  COMPARISON:    CT chest with contrast 1/4/2021.     FINDINGS:  Endotracheal tube satisfactory. There is a left-sided pacemaker. There are postoperative changes of TAVR. There our new dependent moderate sized pleural effusions. There our new dependent infiltrates. Please correlate for clinical evidence of aspiration.  There our also new extensive bilateral nondependent infiltrates. This could be due to pneumonia or pulmonary edema. Please correlate for clinical evidence of infection. There our multiple rib fractures with comminution and displacement which are new and  consistent with recent cardiac resuscitation. There is no pericardial effusion. There are coronary artery calcifications and/or stents. There is a catheter passing into the stomach. The liver is abnormal with nodularity. The spleen is enlarged and there  is ascites in the abdomen. Findings are most consistent with cirrhosis and portal hypertension. The gallbladder is mildly distended with a dependent small gallstone. See the separate abdomen pelvis CT report. There are vascular calcifications without  suggestion of thoracic aortic aneurysm.     IMPRESSION:     1. Interval development of moderate dependent pleural effusions and large dependent confluent infiltrates as well as extensive more diffuse alveolar infiltrates throughout the bilateral lungs.  2. There are multiple new comminuted depressed rib fractures consistent with recent resuscitation but there is no pneumothorax.  3. There is a pacemaker and TAVR. There is no pericardial effusion. Endotracheal tube  satisfactory. Catheter terminates in the stomach.  4. See separate abdomen pelvis CT report.     Signer Name: Radha De Jesus MD   Signed: 1/10/2021 11:36 PM    TECHNIQUE:   CT of the abdomen and pelvis without IV contrast. Coronal and sagittal reconstructions were obtained.  Radiation dose reduction techniques included automated exposure control or exposure modulation based on body size. Count of known CT and cardiac nuc  med studies performed in previous 12 months: 2.      COMPARISON:   1/4/2021     FINDINGS:  Please see chest CT report dictated separately. There is motion degradation. There is atherosclerotic disease with no aortic aneurysm. Tiny gallstone is noted. Gallbladder is mildly distended. No biliary obstruction is identified. Liver is cirrhotic. No  renal or ureteral stones are seen, and there is no hydronephrosis. There is a small vascular calcification in the left kidney. Solid organs are otherwise normal. There is a very small amount of ascites in the abdomen and pelvis. Urinary bladder is  decompressed by a Nunn catheter. There is a sigmoid anastomosis. There are a few scattered colonic diverticula, but there is no evidence of acute diverticulitis or acute colitis. The appendix is normal. No small bowel obstruction is seen. There is a  right groin central venous catheter in place with the tip at the level of the distal IVC. There are some bubbles of soft tissue gas in the left groin which could be due to recent attempted line placement. Correlate clinically.     IMPRESSION:     1. Cirrhosis with a very small amount of ascites.  2. No renal or ureteral stones. No hydronephrosis.  3. Cholelithiasis with a mildly distended gallbladder. No biliary obstruction.  4. No acute findings in the GI tract. No bowel obstruction.              Signer Name: Heladio Oh MD   Signed: 1/10/2021 11:33 PM    Interpretation Summary    · There is biatrial and right ventricular enlargement.  · Global and segmental LV  wall motion is normal. The calculated LV ejection is 56%.  · There is a bioprosthetic aortic valve (TAVR) that is functionally normal.  · Mitral annular calcification with mild mitral regurgitation is appreciated.  · The calculated RV systolic pressure is moderately-severely elevated at 45mmHg-55mmHg.  · Less than 50% IVC inspiratory collapse is appreciated.  · There is no pericardial efusion.            All medications reviewed.   Pharmacy Consult, , Does not apply, BID  artificial tears, , Both Eyes, Q2H  bumetanide, 2 mg, Intravenous, Q12H  chlorhexidine, 15 mL, Mouth/Throat, Q12H  chlorothiazide sodium (DIURIL) injection, 500 mg, Intravenous, Daily  ipratropium-albuterol, 3 mL, Nebulization, Q6H - RT  levETIRAcetam, 1,000 mg, Intravenous, Q12H  pantoprazole, 40 mg, Intravenous, Q24H  piperacillin-tazobactam, 3.375 g, Intravenous, Q6H  sodium chloride, 10 mL, Intravenous, Q12H  valproate sodium, 500 mg, Intravenous, Q8H  vancomycin (dosing per levels), , Does not apply, Daily          Assessment/Plan       Hyponatremia    Nonrheumatic aortic valve stenosis    COPD     T2DM on metformin     Hypertension    PAD s/p L SFA stent and R fem-pop     Iron deficiency anemia    Coal workers pneumoconiosis     Coronary artery disease involving native heart    YOUSUF on CPAP    Paroxysmal atrial fibrillation    Dyslipidemia on statins     Esophageal reflux    Class 3 severe obesity in adult     S/P TAVR (transcatheter aortic valve replacement) 11/22/19    Diastolic CHF, chronic (CMS/HCC)    Hypokalemia    Other cirrhosis of liver (CMS/HCC)    Hyperbilirubinemia    Cardiac arrest (CMS/HCC)      Chronically ill 60-year-old gentleman with previous TAVR, permanent pacemaker for heart block, peripheral artery disease, diabetes mellitus, COPD, liver cirrhosis who suffered a cardiac arrest on January 10 with prolonged CPR of at least 30 minutes before restoration of spontaneous circulation.  Targeted temperature management initiated  and he was cooled for 24 hours.  He has now been rewarmed.  He is currently on epinephrine, norepinephrine, phenyl ephedrine maxed to maintain an adequate blood pressure.  He is practically anuric with serum creatinine elevated at 2.96, compared to 1.28.    Mechanical ventilation settings are tidal volume 450 rate 14 PEEP of 12 and 50% with blood gas pH 7. 2 9, PCO2 55, PO2 111.  Chest x-ray shows effusions and basilar infiltrates.  His preevent echocardiogram revealed a left ventricular EF of 56% but significant pulmonary hypertension with estimated RV systolic pressure of 45-55, well-seated TAVR that is functioning normally.    He was anemic on admission with a hemoglobin of 8.7 today, 10.7 on January 10 but he has had significant IV fluids, and is over 5 L ahead.  He does not appear to have gastric bleeding or melena.  Most concerning is his neurologic status.  His exam is extremely poor; he basically has no response and is not overbreathing the ventilator.  His EEG initially revealed nonconvulsive status epilepticus despite receiving Keppra, Versed drip of 10 mg/h, propofol drip of 40 mcg/kg/min.  Neurology did evaluate and added valproate.  This did result in resolution of his seizures.  This morning his EEG shows severe generalized suppression consistent with a severe anoxic injury.  He remains intubated and on mechanical ventilation.  Today he has a mixed metabolic and respiratory acidosis with a pH of 7.29, PCO2 of 55.  I would expect him to overbreathing the ventilator with this pH and PCO2 and he is not.  Yesterday I did speak to his 2 daughters and wife about the events that happened and his poor likelihood of recovering brain function.  Today I discussed this outcome again and they became very emotional and I had to stop the conversation.  Yesterday they did agree to in a no CPR.  Nephrology saw him today and feel that he is not a candidate for dialysis.    PLAN:    Maintain a normal  temperature  Continue Versed and propofol  Continue to support his blood pressure  Continue to support with mechanical ventilation    Continue Keppra  Continue empiric Zosyn, he will not need further vancomycin    Nebulized bronchodilators    I personally think if we stopped his blood pressure support that we can achieve natural death as his prognosis for any meaningful recovery is now.  Right now his family is too emotional to make that decision.  We could hold all sedation and wait 24 hours but I do not think it will change his outcome.  He might get the myoclonic jerking back which will be difficult for the family to see.  They are our reality feeling a lot of acute pain and I do not wish for them to suffer unnecessarily.      VTE Prophylaxis: None    Stress Ulcer Prophylaxis: Protonix    Latrice Quintanilla MD  01/12/21  14:29 EST      Time: Critical care 45 min  I personally provided care to this critically ill patient as documented above.  Critical care time does not include time spent on separately billed procedures.  Non of my critical care time was concurrent with other critical care providers.

## 2021-01-12 NOTE — PROGRESS NOTES
"Pharmacy Consult-Vancomycin Dosing  Sai Weinberg is a  60 y.o. male receiving vancomycin therapy.     Indication: Sepsis, possible PNA  Consulting Provider: Huy Lawson APRN  ID Consult: No    Goal Trough: 15 - 20 mcg/mL    Current Antimicrobial Therapy  Anti-Infectives (From admission, onward)      Ordered     Dose/Rate Route Frequency Start Stop    01/11/21 1536  piperacillin-tazobactam (ZOSYN) 3.375 g in iso-osmotic dextrose 50 ml (premix)     Ordering Provider: Latrice Quintanilla MD    3.375 g  over 30 Minutes Intravenous Every 6 Hours 01/12/21 0000 01/17/21 2359    01/11/21 0521  vancomycin - no scheduled doses (Pharmacy dosing per levels)     Ordering Provider: Francisco Mayberry IV PharmD     Does not apply Daily 01/11/21 0900 01/13/21 0859    01/10/21 2343  piperacillin-tazobactam (ZOSYN) 4.5 g in iso-osmotic dextrose 100 mL IVPB (premix)     Ordering Provider: Francisco Mayberry IV PharmD    4.5 g  over 30 Minutes Intravenous Once 01/11/21 0030 01/11/21 0047    01/10/21 2220  vancomycin 2500 mg/500 mL 0.9% NS IVPB ([facility])     Ordering Provider: Francisco Mayberry IV PharmD    25 mg/kg × 101 kg  over 180 Minutes Intravenous Once 01/10/21 2315 01/11/21 0250    01/10/21 2218  Pharmacy to dose vancomycin     Ordering Provider: Huy Lawson APRN     Does not apply Continuous PRN 01/10/21 2218 01/20/21 2217          Allergies  Allergies as of 01/04/2021 - Reviewed 01/04/2021   Allergen Reaction Noted    Tylenol [acetaminophen] Other (See Comments) 10/30/2019     Labs    Results from last 7 days   Lab Units 01/12/21  0444 01/11/21  2215 01/11/21  1557   BUN mg/dL 58* 56* 49*   CREATININE mg/dL 2.79* 2.45* 2.13*     Results from last 7 days   Lab Units 01/12/21  0444 01/11/21  2215 01/11/21  1557   WBC 10*3/mm3 9.92 9.84 9.62     Evaluation of Dosing     Last Dose Received in the ED/Outside Facility: [facility]  Is Patient on Dialysis or Renal Replacement: No    Ht - 165.1 cm (65\")  Wt - 101 kg " (222 lb 12.8 oz)    Estimated Creatinine Clearance: 30.8 mL/min (A) (by C-G formula based on SCr of 2.79 mg/dL (H)).  Intake & Output (last 3 days)         01/09 0701 - 01/10 0700 01/10 0701 - 01/11 0700 01/11 0701 - 01/12 0700 01/12 0701 - 01/13 0700    P.O. 621 780      I.V. (mL/kg) 1000 (9.9) 902.3 (8.9) 4710.7 (46.6)     Blood  296 195     IV Piggyback 100 650 350     Total Intake(mL/kg) 1721 (17) 2628.3 (26) 5255.7 (52)     Urine (mL/kg/hr)  665 (0.3) 248 (0.1)     Stool  0      Total Output  665 248     Net +1721 +1963.3 +5007.7             Urine Unmeasured Occurrence 5 x 2 x      Stool Unmeasured Occurrence 1 x 3 x            Microbiology and Radiology  Microbiology Results (last 10 days)       Procedure Component Value - Date/Time    Respiratory Culture - Sputum, ET Suction [[id number]] Collected: 01/11/21 0308    Lab Status: Preliminary result Specimen: Sputum from ET Suction Updated: 01/11/21 0419     Gram Stain Moderate (3+) WBCs per low power field      Few (2+) Epithelial cells per low power field      Moderate (3+) Mixed bacterial jhoyn      Few (2+) Yeast    MRSA Screen, PCR (Inpatient) - Swab, Nares [[id number]]  (Abnormal) Collected: 01/11/21 0308    Lab Status: Final result Specimen: Swab from Nares Updated: 01/11/21 0833     MRSA PCR Positive    S. Pneumo Ag Urine or CSF - Urine, Urine, Clean Catch [[id number]]  (Normal) Collected: 01/10/21 0114    Lab Status: Final result Specimen: Urine, Clean Catch Updated: 01/10/21 1225     Strep Pneumo Ag Negative    Legionella Antigen, Urine - Urine, Urine, Clean Catch [[id number]]  (Normal) Collected: 01/10/21 0114    Lab Status: Final result Specimen: Urine, Clean Catch Updated: 01/10/21 1224     LEGIONELLA ANTIGEN, URINE Negative    Blood Culture - Blood, Blood, Arterial Line [[id number]] Collected: 01/09/21 1151    Lab Status: Preliminary result Specimen: Blood, Arterial Line Updated: 01/11/21 1200     Blood Culture No growth at 2 days    Blood Culture -  Blood, Wrist, Right [[id number]] Collected: 01/09/21 1151    Lab Status: Preliminary result Specimen: Blood from Wrist, Right Updated: 01/11/21 1200     Blood Culture No growth at 2 days    COVID PRE-OP / PRE-PROCEDURE SCREENING ORDER (NO ISOLATION) - Swab, Nasopharynx [[id number]]  (Normal) Collected: 01/04/21 2158    Lab Status: Final result Specimen: Swab from Nasopharynx Updated: 01/05/21 0121    Narrative:      The following orders were created for panel order COVID PRE-OP / PRE-PROCEDURE SCREENING ORDER (NO ISOLATION) - Swab, Nasopharynx.  Procedure                               Abnormality         Status                     ---------                               -----------         ------                     COVID-19 and FLU A/B PCR...[[id number]]  Normal              Final result                 Please view results for these tests on the individual orders.    COVID-19 and FLU A/B PCR - Swab, Nasopharynx [[id number]]  (Normal) Collected: 01/04/21 2158    Lab Status: Final result Specimen: Swab from Nasopharynx Updated: 01/05/21 0121     COVID19 Not Detected     Influenza A PCR Not Detected     Influenza B PCR Not Detected    Narrative:      Fact sheet for providers: https://www.fda.gov/media/596148/download    Fact sheet for patients: https://www.fda.gov/media/841524/download    Test performed by PCR.          Evaluation of Level    Results from last 7 days   Lab Units 01/12/21  0444   VANCOMYCIN RM mcg/mL 18.50               Assessment/Plan:    Pharmacy to dose vancomycin for sepsis, possible pna. Goal trough 15 - 20 mcg/mL.  Patient receiving vancomycin, dosing per levels.  Patient CrCl today is 2.79, increased from 1.28 on 01/11. Still maxed on three pressers, minimal urine output per RN documentation.  Vancomycin random this morning resulted therapeutic at 18.50 mcg/mL.  Will not give additional vancomycin at this time, will assess vancomycin level with random level 01/13 at 0600.   Pharmacy will continue to  monitor renal function, cultures and sensitivities, and clinical status to adjust regimen as necessary.    Thanks,     Fermin Roy, PharmD, [facility]   Pharmacy Resident   1/12/2021 08:17 EST

## 2021-01-12 NOTE — SIGNIFICANT NOTE
Patient status is unchanged.  He continues to require maximum epinephrine, norepinephrine, phenylephrine to support his blood pressure and is anuric.  Neurologic exam remains grim with no reflexes or response to pain.  I spoke to his wife and 2 daughters about his prognosis and they had a difficult time with the information and I had to stop the conversation.  They wanted me to speak to their niece, cousin who is an RN.  I spoke to Kasie about events that have occurred and what his current status and exam are.  She is going to talk to her aunt and cousins.  It is my opinion that we are only prolonging the inevitable.  I think we should remove his medication that is porting his blood pressure and allow natural death.  I do not think we will need to remove the breathing tube.  He is extremely sensitive to the epinephrine.  He remains a no CPR in the event of a arrest.

## 2021-01-12 NOTE — PROGRESS NOTES
Continued Stay Note  [facility]     Patient Name: Sai Weinberg  MRN: [id number]  Today's Date: 1/12/2021    Admit Date: 1/4/2021    Discharge Plan     Row Name 01/12/21 1310       Plan    Plan  TBD    Patient/Family in Agreement with Plan  unable to assess    Plan Comments  Remains sedated and vented.  Keppra with addition of Depakote.  MRI suggests brain injury.  Following    Final Discharge Disposition Code  30 - still a patient        Discharge Codes    No documentation.             Clau Deal RN

## 2021-01-12 NOTE — PROGRESS NOTES
Significant events from 1/10/21 reviewed.    Patient now with severe anoxic brain injury and grim prognosis.        GI will sign off at this time.  Please call us for any questions or concerns.

## 2021-01-12 NOTE — PROGRESS NOTES
"Neurology       Patient Care Team:  Dewayne Cole MD as PCP - General  Dewayne Cole MD as PCP - Family Medicine    Chief complaint hypoxic encephalopathy, status epilepticus      Subjective .     History: No history obtainable from patient who is now rewarmed.  In brief, 60-year-old man admitted with confusion and hyponatremia, had PEA arrest with prolonged CPR and EEG showed status epilepticus.  Neurologic exam showed nonresponsive pupils, no cough, gag or corneal reflexes.  Patient was on Keppra and Depakote added.  Placed on targeted temperature management, since rewarmed.  This morning was on Versed, propofol, epinephrine, Levophed and phenylephrine, sedative drugs being decreased to lower doses without neurologic responses to any stimuli per nursing.  Pressors maximized.    ROS: Not obtainable    Objective     Vital Signs   Blood pressure 103/50, pulse 70, temperature 98.1 °F (36.7 °C), temperature source Esophageal, resp. rate 14, height 165.1 cm (65\"), weight 101 kg (222 lb 12.8 oz), SpO2 97 %.    Physical Exam:              Neuro: wd mawm lying intubated, in nad. No response to verbal, tactile, painful stim in bilateral ext.   Pupils 2mm, unresponsive, gaze sl dysconjugate, no dolls. No corneal responses. No response to noxious facial stim  Tone normal; no abnormal mvmt observed  No response to plantar stim    Results Review:              EEG overnight showed nonconvulsive seizures responsive to anticonvulsant therapy with transition to generalized periodic discharges at 1 Hz and severe generalized suppression of underlying background, most likely reflective of severe anoxic injury.  Head CT images from 1/10/2020 reviewed, agree no acute changes  Labs reviewed  Lactate elevated 2.8  Ionized calcium low at 1.11  Magnesium elevated 2.8  Phosphorus elevated 8.9  CMP with glucose 125 BUN 58 creatinine 2.79 GFR 23 calcium 7.7 albumin to 1.7 ALT 47 AST 88 alk phos 150 total bilirubin 3.1  CBC white " count 9.9 H&H 8.7 and 26.5 platelets 102  Depakote level this morning 58  Ammonia 58  INR 1.74      Assessment/Plan     Severe anoxic brain injury --  59 yo man admitted with hyponatremia, had PEA arrest with prolonged resuscitation, s/p TTM, now on multiple pressors.  Initial EEG showed status epilepticus, loaded with anticonvulsants and EEG now shows evolving GPEDs consistent with evolving anoxic injury.   Athough pt not yet 24 hours post warming, all clinical signs c/w severe brain injury. Agree with plan to further decrease sedation to confirm poor responsiveness, but EEG suggests severe injury. Prognosis grim.    I discussed the patients findings and my recommendations with nursing staff and primary care team    Kasie Morales MD  01/12/21  10:37 EST

## 2021-01-12 NOTE — PROGRESS NOTES
" LOS: 8 days   Patient Care Team:  Dewayne Cole MD as PCP - General  Dewayne Cole MD as PCP - Family Medicine    Chief Complaint: Hyponatremia     Subjective     Wrosening renal function cr 2.7 Minimal UOP. Severe neurologic injury on EEG. Prognosis is poor. Is DNR now. Discussed with daughter and wife about declining renal function and poor overall prognosis. Family decided against HD. Continue with medical management    Subjective:  Symptoms:  Stable.  No shortness of breath, headache, chest pressure or diarrhea.    Diet:  Poor intake.    Activity level: Normal.    Pain:  He reports no pain.      History taken from: RN    Objective     Vital Sign Min/Max for last 24 hours  Temp  Min: 95.9 °F (35.5 °C)  Max: 98.8 °F (37.1 °C)   BP  Min: 77/41  Max: 126/39   Pulse  Min: 65  Max: 70   Resp  Min: 14  Max: 14   SpO2  Min: 93 %  Max: 100 %   No data recorded   No data recorded     Flowsheet Rows      First Filed Value   Admission Height  165.1 cm (65\") Documented at 01/04/2021 1707   Admission Weight  104 kg (230 lb) Documented at 01/04/2021 1707          I/O this shift:  In: 50 [IV Piggyback:50]  Out: 30 [Urine:30]  I/O last 3 completed shifts:  In: 7104 [I.V.:5613; Blood:491; IV Piggyback:1000]  Out: 363 [Urine:363]    Objective:  General Appearance:  Comfortable.    Vital signs: (most recent): Blood pressure 101/47, pulse 70, temperature 98.1 °F (36.7 °C), temperature source Esophageal, resp. rate 14, height 165.1 cm (65\"), weight 101 kg (222 lb 12.8 oz), SpO2 96 %.    Output: Producing urine.    HEENT: Normal HEENT exam.    Lungs:  Normal respiratory rate.  Breath sounds clear to auscultation.  He is not in respiratory distress.  No rales.    Heart: Normal rate.  Regular rhythm.  S1 normal and S2 normal.    Abdomen: Abdomen is soft and non-distended.  There are no signs of ascites.  Bowel sounds are normal.     Extremities: There is no dependent edema.    Pulses: Distal pulses are intact.  "   Neurological: Patient is alert and oriented to person, place and time.  Normal strength.              Results Review:     I reviewed the patient's new clinical results.    WBC WBC   Date Value Ref Range Status   01/12/2021 11.34 (H) 3.40 - 10.80 10*3/mm3 Final   01/12/2021 9.92 3.40 - 10.80 10*3/mm3 Final   01/11/2021 9.84 3.40 - 10.80 10*3/mm3 Final   01/11/2021 9.62 3.40 - 10.80 10*3/mm3 Final   01/11/2021 9.50 3.40 - 10.80 10*3/mm3 Final   01/11/2021 10.09 3.40 - 10.80 10*3/mm3 Final   01/10/2021 8.51 3.40 - 10.80 10*3/mm3 Final   01/10/2021 5.71 3.40 - 10.80 10*3/mm3 Final      HGB Hemoglobin   Date Value Ref Range Status   01/12/2021 9.1 (L) 13.0 - 17.7 g/dL Final   01/12/2021 8.7 (L) 13.0 - 17.7 g/dL Final   01/11/2021 9.0 (L) 13.0 - 17.7 g/dL Final   01/11/2021 9.1 (L) 13.0 - 17.7 g/dL Final   01/11/2021 9.0 (L) 13.0 - 17.7 g/dL Final   01/11/2021 9.2 (L) 13.0 - 17.7 g/dL Final   01/10/2021 9.9 (L) 13.0 - 17.7 g/dL Final   01/10/2021 10.7 (L) 13.0 - 17.7 g/dL Final      HCT Hematocrit   Date Value Ref Range Status   01/12/2021 27.5 (L) 37.5 - 51.0 % Final   01/12/2021 26.5 (L) 37.5 - 51.0 % Final   01/11/2021 27.1 (L) 37.5 - 51.0 % Final   01/11/2021 26.8 (L) 37.5 - 51.0 % Final   01/11/2021 26.8 (L) 37.5 - 51.0 % Final   01/11/2021 26.6 (L) 37.5 - 51.0 % Final   01/10/2021 29.5 (L) 37.5 - 51.0 % Final   01/10/2021 30.9 (L) 37.5 - 51.0 % Final      Platlets No results found for: LABPLAT   MCV MCV   Date Value Ref Range Status   01/12/2021 103.0 (H) 79.0 - 97.0 fL Final   01/12/2021 101.5 (H) 79.0 - 97.0 fL Final   01/11/2021 100.7 (H) 79.0 - 97.0 fL Final   01/11/2021 101.9 (H) 79.0 - 97.0 fL Final   01/11/2021 99.3 (H) 79.0 - 97.0 fL Final   01/11/2021 95.0 79.0 - 97.0 fL Final   01/10/2021 99.0 (H) 79.0 - 97.0 fL Final   01/10/2021 95.1 79.0 - 97.0 fL Final          Sodium Sodium   Date Value Ref Range Status   01/12/2021 140 136 - 145 mmol/L Final   01/12/2021 139 136 - 145 mmol/L Final   01/11/2021 140  136 - 145 mmol/L Final   01/11/2021 139 136 - 145 mmol/L Final   01/11/2021 139 136 - 145 mmol/L Final   01/11/2021 138 136 - 145 mmol/L Final   01/10/2021 139 136 - 145 mmol/L Final   01/10/2021 133 (L) 136 - 145 mmol/L Final   01/09/2021 134 (L) 136 - 145 mmol/L Final      Potassium Potassium   Date Value Ref Range Status   01/12/2021 5.1 3.5 - 5.2 mmol/L Final     Comment:     Slight hemolysis detected by analyzer. Results may be affected.   01/12/2021 4.9 3.5 - 5.2 mmol/L Final   01/11/2021 4.9 3.5 - 5.2 mmol/L Final     Comment:     Slight hemolysis detected by analyzer. Results may be affected.   01/11/2021 4.6 3.5 - 5.2 mmol/L Final   01/11/2021 4.4 3.5 - 5.2 mmol/L Final   01/11/2021 4.1 3.5 - 5.2 mmol/L Final   01/10/2021 4.3 3.5 - 5.2 mmol/L Final     Comment:     Slight hemolysis detected by analyzer. Results may be affected.   01/10/2021 3.9 3.5 - 5.2 mmol/L Final      Chloride Chloride   Date Value Ref Range Status   01/12/2021 100 98 - 107 mmol/L Final   01/12/2021 99 98 - 107 mmol/L Final   01/11/2021 99 98 - 107 mmol/L Final   01/11/2021 98 98 - 107 mmol/L Final   01/11/2021 98 98 - 107 mmol/L Final   01/11/2021 97 (L) 98 - 107 mmol/L Final   01/10/2021 94 (L) 98 - 107 mmol/L Final   01/10/2021 94 (L) 98 - 107 mmol/L Final      CO2 CO2   Date Value Ref Range Status   01/12/2021 27.0 22.0 - 29.0 mmol/L Final   01/12/2021 28.0 22.0 - 29.0 mmol/L Final   01/11/2021 27.0 22.0 - 29.0 mmol/L Final   01/11/2021 32.0 (H) 22.0 - 29.0 mmol/L Final   01/11/2021 33.0 (H) 22.0 - 29.0 mmol/L Final   01/11/2021 31.0 (H) 22.0 - 29.0 mmol/L Final   01/10/2021 29.0 22.0 - 29.0 mmol/L Final   01/10/2021 32.0 (H) 22.0 - 29.0 mmol/L Final      BUN BUN   Date Value Ref Range Status   01/12/2021 65 (H) 8 - 23 mg/dL Final   01/12/2021 58 (H) 8 - 23 mg/dL Final   01/11/2021 56 (H) 8 - 23 mg/dL Final   01/11/2021 49 (H) 8 - 23 mg/dL Final   01/11/2021 47 (H) 8 - 23 mg/dL Final   01/11/2021 42 (H) 8 - 23 mg/dL Final    01/10/2021 33 (H) 8 - 23 mg/dL Final   01/10/2021 32 (H) 8 - 23 mg/dL Final      Creatinine Creatinine   Date Value Ref Range Status   01/12/2021 2.96 (H) 0.76 - 1.27 mg/dL Final   01/12/2021 2.79 (H) 0.76 - 1.27 mg/dL Final   01/11/2021 2.45 (H) 0.76 - 1.27 mg/dL Final   01/11/2021 2.13 (H) 0.76 - 1.27 mg/dL Final   01/11/2021 1.74 (H) 0.76 - 1.27 mg/dL Final   01/11/2021 1.28 (H) 0.76 - 1.27 mg/dL Final   01/10/2021 1.01 0.76 - 1.27 mg/dL Final   01/10/2021 0.60 (L) 0.76 - 1.27 mg/dL Final      Calcium Calcium   Date Value Ref Range Status   01/12/2021 7.8 (L) 8.6 - 10.5 mg/dL Final   01/12/2021 7.7 (L) 8.6 - 10.5 mg/dL Final   01/11/2021 8.1 (L) 8.6 - 10.5 mg/dL Final   01/11/2021 8.0 (L) 8.6 - 10.5 mg/dL Final   01/11/2021 8.6 8.6 - 10.5 mg/dL Final   01/11/2021 8.5 (L) 8.6 - 10.5 mg/dL Final   01/10/2021 9.4 8.6 - 10.5 mg/dL Final   01/10/2021 8.8 8.6 - 10.5 mg/dL Final      PO4 No results found for: CAPO4   Albumin Albumin   Date Value Ref Range Status   01/12/2021 1.70 (L) 3.50 - 5.20 g/dL Final   01/12/2021 1.70 (L) 3.50 - 5.20 g/dL Final   01/11/2021 1.80 (L) 3.50 - 5.20 g/dL Final   01/11/2021 2.00 (L) 3.50 - 5.20 g/dL Final   01/11/2021 2.00 (L) 3.50 - 5.20 g/dL Final   01/11/2021 1.80 (L) 3.50 - 5.20 g/dL Final   01/10/2021 1.80 (L) 3.50 - 5.20 g/dL Final      Magnesium Magnesium   Date Value Ref Range Status   01/12/2021 2.8 (H) 1.6 - 2.4 mg/dL Final   01/12/2021 2.8 (H) 1.6 - 2.4 mg/dL Final   01/11/2021 2.9 (H) 1.6 - 2.4 mg/dL Final   01/11/2021 2.8 (H) 1.6 - 2.4 mg/dL Final   01/11/2021 2.9 (H) 1.6 - 2.4 mg/dL Final   01/11/2021 2.9 (H) 1.6 - 2.4 mg/dL Final   01/10/2021 2.3 1.6 - 2.4 mg/dL Final      Uric Acid No results found for: URICACID     Medication Review: Yes    Assessment/Plan       Hyponatremia    Dyslipidemia on statins     COPD     T2DM on metformin     Esophageal reflux    Hypertension    PAD s/p L SFA stent and R fem-pop     Iron deficiency anemia    Coal workers pneumoconiosis      Coronary artery disease involving native heart    Nonrheumatic aortic valve stenosis    YOUSUF on CPAP    Paroxysmal atrial fibrillation    Class 3 severe obesity in adult     S/P TAVR (transcatheter aortic valve replacement) 11/22/19    Diastolic CHF, chronic (CMS/HCC)    Hypokalemia    Other cirrhosis of liver (CMS/HCC)    Hyperbilirubinemia    Cardiac arrest (CMS/HCC)      Assessment & Plan     LANA: Likely ishcemic ATN post cardiac arrest and prolong downtime ROSC 30 min. Cr 1.2 from 0.6. UOP dropping    Hypovolemic hyponatremia: Seen early for hyponatremia. Interval improvement in Na with IV fluids.     PEA arrest: ROSC in 30 min. Now Intubated on MV and 2 pressors     Volume overload: Worsening b/l infiltrates and b/l effusion. Does have tense belly and possible effusion     Hepatic encephalopathy: On lactulose and rifaximin     Cirrhosis: Thought to be due to DAVID          Recs  - LANA likely ischemic ATN. Not a dailysis candidate due to poor neurologic status, on 3 pressors. Family decided DNR no dialysis. . Will try aggressive diuretics to determine renal response.     - Bumex 2 mg IV   - Strict I/o  - MAP 65 to 70       Lonnie Elizabeth MD  01/12/21  14:00 EST

## 2021-01-12 NOTE — PROGRESS NOTES
Multidisciplinary Rounds    Time: 20min  Patient Name: Sai Weinberg  Date of Encounter: 01/12/21 09:23 EST  MRN: [id number]  Admission date: 1/4/2021      Reason for visit: MDR. RD to continue to follow per protocol.     Additional information obtained during MDR: Pt rewarmed this AM (1/12). Pt continues on the vent, versed, propofol, epinephrine at 0.3 mcg/kg/min, levophed at 0.3 mcg/kg/min, and phenylephrine at 2.5 mcg/kg/min.     Current diet: NPO Diet      Intervention:  Follow treatment plan  Care plan reviewed    Follow up:   Per protocol      Lainey Ornelas MS RD/[facility]  09:23 EST

## 2021-01-13 NOTE — NURSING NOTE
Patient slowly declined over night after the decision was made to remove pressors before shift change. Patient on comfort measures.    Patient death appeared imminent at approx 0330. Family called and on their way. House supervisor and APRN updated on patient status.   Patient went PEA at approx 0341. PPM continued to fire however it was not generating pulsatile activaty. House supervisor assessed patient and made the declaration of death at bedside at 0341.

## 2021-01-13 NOTE — PAYOR COMM NOTE
"Moriah Pérez RN  Utilization Review  P: [phone]  F: [phone]    Ref # [id number]  DC date = 2021 (patient )    Rogelio Hastings (Dcsd. Male)     Date of Birth Social Security Number Address Home Phone MRN    1960  [address] [facility] [address]  [id number]    Bahai Marital Status          None        Admission Date Admission Type Admitting Provider Attending Provider Department, Room/Bed    21 Emergency Gee Thakkar MD  [facility] 2HSIC, S261/    Discharge Date Discharge Disposition Discharge Destination        2021               Attending Provider: (none)   Allergies: Tylenol [Acetaminophen]    Isolation: None   Infection: MRSA (21)   Code Status: No CPR    Ht: 165.1 cm (65\")   Wt: 101 kg (222 lb 12.8 oz)    Admission Cmt: None   Principal Problem: Hyponatremia [E87.1]                 Active Insurance as of 2021     Primary Coverage     Payor Plan Insurance Group Employer/Plan Group    [employer] [place] [employer] [place]ICA[place]      Payor Plan Address Payor Plan Phone Number Payor Plan Fax Number Effective Dates    [address] [address] [phone]  2019 - None Entered    [facility] [id number]       Subscriber Name Subscriber Birth Date Member ID       ROGELIO HASTINGS 1960 [id number]                 Emergency Contacts      (Rel.) Home Phone Work Phone Mobile Phone    DARLINGNANCY (Spouse) [phone] -- --    Karina Brennan (Daughter) [phone] -- [phone]    JOSUÉ HORTON (Daughter) [phone] -- --               Discharge Summary      Huy Lawson APRN at 21 0346          Admit date: 2021  Date/Time of Death:  2021 @ 0341    Admission Diagnosis:  Present on Admission:  • Hyponatremia  • Dyslipidemia on statins   • COPD   • T2DM on metformin   • Hypertension  • PAD s/p L SFA stent and R fem-pop   • Esophageal reflux  • Iron deficiency anemia  • Coal workers " pneumoconiosis   • Coronary artery disease involving native heart  • Nonrheumatic aortic valve stenosis  • Paroxysmal atrial fibrillation  • Class 3 severe obesity in adult       Discharge Diagnosis:     Hyponatremia    Cardiac arrest (CMS/HCC)    Dyslipidemia on statins     COPD     T2DM on metformin     Esophageal reflux    Hypertension    PAD s/p L SFA stent and R fem-pop     Iron deficiency anemia    Coal workers pneumoconiosis     Coronary artery disease involving native heart    Nonrheumatic aortic valve stenosis    YOUSUF on CPAP    Paroxysmal atrial fibrillation    Class 3 severe obesity in adult     S/P TAVR (transcatheter aortic valve replacement) 11/22/19    Diastolic CHF, chronic (CMS/HCC)    Hypokalemia    Other cirrhosis of liver (CMS/HCC)    Hyperbilirubinemia      Hospital Course:  Patient is a 60 y.o. male admitted on 1/4 by Hospital Medicine for confusion,hyponatremia, and hypokalemia.  Work up showed patient to be intravascularly dehydrated and Cardiology was consulted on 1/5 to aid in management.  He was also found to have cirrhosis of the liver.  Gastroenterology was consulted on 1/5 for aid in managing his hepatic encephalopathy.  Nephrology was consulted on 1/6 to aid in management of his hyponatremia felt to be secondary to 3rd spacing from his cirrhosis.  Interrogation of his PPM on 1/7 showed no incidences of PAF this admission and his Coumadin was held due to the risk of esophageal varicies.  The patient had episodes of aggression and confusion complicating compliance with therapy.  He was gently hydrated w/ IV fluids w/ the goal of correcting sodium gradually.  A paracentesis was attempted on 1/8 however the patient was unable to hold still for the procedure.    On the 9th the patient began having hypoxic respiratory failure w/ worsening airspace disease reported on CXR.  Cultures were obtained and the patient was placed empirically on Rocephin/Doxy.  The patient's sodium improved however  the patient remained encephalopathic.  Coumadin continued to be held given the patient's elevating INR. The patient had worsening dyspnea on the morning of 1/10 and was diuresed.    On the evening of 1/10 a RRT was called after the patient pulled off his NIPPV and became hypoxic.  Despite reapplication of the NIPPV mask the patient had a cardiopulmonary arrest.  ACLS protocol was followed, the patient was intubated, and ROSC was achieved after ~ 30 minutes.  The patient was unfortunately not responsive after ROSC was obtained and, given the risk of anoxic brain injury, was started on targeted temperature management (TTM) in the ICU.  He was noted to be having myoclonic jerking upon arrival to the ICU.  Support lines were placed and the patient was started on vasopressors for hypotension.  He was started on broad spectrum antibiotics.  Neurology was consulted on  and an EEG obtained the same day showed status epilepticus despite Keppra, versed, and propofol.  Neurology added Valproate which was successful in limiting the seiures.  Unfortunately, the patient showed evidence of severe anoxic brain injury.  The grim prognosis was discussed with the patient's family and the decision was made to make him Comfort Measures Only.  He was maintained on the ventilator, but otherwise therapies were stopped that did not support his comfort.  The patient had cardiac standstill @ 0341.    Procedures Performed:    01/10 2240 Insert Arterial Line  01/10 2145 Insert Central Line At Bedside    Consults:   Dr. OCTAVIANO Cerda - Cardiology  GLORIA He - Gastroenterology  Dr. MARCUS Slaughter - Nephrology  [facility]      Condition on Discharge:       GLORIA Ybarra  21  03:46 EST    Time: Discharge 31 min    Electronically signed by Huy Lawson APRN at 21 9628

## 2021-01-13 NOTE — NURSING NOTE
Co-sign: I have reviewed assessments by FRANCISCO Jimenez from today's shift. I agree with assessments, notes, and VS entries. CAMILLE Rodriguez notified (director [facility]). -------------------CAMILLE Johnson RN---------------

## 2021-01-13 NOTE — NURSING NOTE
De-escalation of care: After conversing w/ family and ICU nurse practitioner the decision was made to de-escalate care and stop all the vasoactive drugs (EPI, NORepi, Phenylephrine). Pt continues on Versed and Propofol gtts. Mech vent still in use as per discussion between Dr. Quintanilla and the family earlier today. Family at bedside at this time. Pt is no CPR and comfort measures are this time. Report given to oncoming shift. --------------CAMILLE Johnson RN-------------------------------

## 2021-01-13 NOTE — DISCHARGE SUMMARY
Admit date: 1/4/2021  Date/Time of Death:  1/13/2021 @ 0341    Admission Diagnosis:  Present on Admission:  • Hyponatremia  • Dyslipidemia on statins   • COPD   • T2DM on metformin   • Hypertension  • PAD s/p L SFA stent and R fem-pop   • Esophageal reflux  • Iron deficiency anemia  • Coal workers pneumoconiosis   • Coronary artery disease involving native heart  • Nonrheumatic aortic valve stenosis  • Paroxysmal atrial fibrillation  • Class 3 severe obesity in adult       Discharge Diagnosis:     Hyponatremia    Cardiac arrest (CMS/HCC)    Dyslipidemia on statins     COPD     T2DM on metformin     Esophageal reflux    Hypertension    PAD s/p L SFA stent and R fem-pop     Iron deficiency anemia    Coal workers pneumoconiosis     Coronary artery disease involving native heart    Nonrheumatic aortic valve stenosis    YOUSUF on CPAP    Paroxysmal atrial fibrillation    Class 3 severe obesity in adult     S/P TAVR (transcatheter aortic valve replacement) 11/22/19    Diastolic CHF, chronic (CMS/HCC)    Hypokalemia    Other cirrhosis of liver (CMS/HCC)    Hyperbilirubinemia      Hospital Course:  Patient is a 60 y.o. male admitted on 1/4 by Hospital Medicine for confusion,hyponatremia, and hypokalemia.  Work up showed patient to be intravascularly dehydrated and Cardiology was consulted on 1/5 to aid in management.  He was also found to have cirrhosis of the liver.  Gastroenterology was consulted on 1/5 for aid in managing his hepatic encephalopathy.  Nephrology was consulted on 1/6 to aid in management of his hyponatremia felt to be secondary to 3rd spacing from his cirrhosis.  Interrogation of his PPM on 1/7 showed no incidences of PAF this admission and his Coumadin was held due to the risk of esophageal varicies.  The patient had episodes of aggression and confusion complicating compliance with therapy.  He was gently hydrated w/ IV fluids w/ the goal of correcting sodium gradually.  A paracentesis was attempted on 1/8  however the patient was unable to hold still for the procedure.    On the 9th the patient began having hypoxic respiratory failure w/ worsening airspace disease reported on CXR.  Cultures were obtained and the patient was placed empirically on Rocephin/Doxy.  The patient's sodium improved however the patient remained encephalopathic.  Coumadin continued to be held given the patient's elevating INR. The patient had worsening dyspnea on the morning of 1/10 and was diuresed.    On the evening of 1/10 a RRT was called after the patient pulled off his NIPPV and became hypoxic.  Despite reapplication of the NIPPV mask the patient had a cardiopulmonary arrest.  ACLS protocol was followed, the patient was intubated, and ROSC was achieved after ~ 30 minutes.  The patient was unfortunately not responsive after ROSC was obtained and, given the risk of anoxic brain injury, was started on targeted temperature management (TTM) in the ICU.  He was noted to be having myoclonic jerking upon arrival to the ICU.  Support lines were placed and the patient was started on vasopressors for hypotension.  He was started on broad spectrum antibiotics.  Neurology was consulted on 1/11 and an EEG obtained the same day showed status epilepticus despite Keppra, versed, and propofol.  Neurology added Valproate which was successful in limiting the seiures.  Unfortunately, the patient showed evidence of severe anoxic brain injury.  The grim prognosis was discussed with the patient's family and the decision was made to make him Comfort Measures Only.  He was maintained on the ventilator, but otherwise therapies were stopped that did not support his comfort.  The patient had cardiac standstill @ 0341.    Procedures Performed:    01/10 2240 Insert Arterial Line  01/10 2145 Insert Central Line At Bedside    Consults:   Dr. OCTAVIANO Cerda - Cardiology  GLORIA He - Gastroenterology  Dr. MARCUS Slaughter - Nephrology  Dr. OCTAVIANO Landon-Oseas -  Neurology      Condition on Discharge:       Huy Lawson, APRN  21  03:46 EST    Time: Discharge 31 min

## 2021-01-13 NOTE — PROGRESS NOTES
Case Management Discharge Note      Final Note:          Selected Continued Care - Discharged on 2021 Admission date: 2021 - Discharge disposition:     Destination    No services have been selected for the patient.              Durable Medical Equipment    No services have been selected for the patient.              Dialysis/Infusion    No services have been selected for the patient.              Home Medical Care    No services have been selected for the patient.              Therapy    No services have been selected for the patient.              Community Resources    No services have been selected for the patient.                       Final Discharge Disposition Code: 41 -  in medical facility

## 2021-01-14 LAB
BACTERIA SPEC AEROBE CULT: NORMAL
BACTERIA SPEC AEROBE CULT: NORMAL

## (undated) DEVICE — BALN EVERCROSS OTW .035 5F 6X80 135

## (undated) DEVICE — SLV REPOSTNG CATH STRL 60CM

## (undated) DEVICE — BOWL UTIL STRL 32OZ

## (undated) DEVICE — ANTIBACTERIAL UNDYED BRAIDED (POLYGLACTIN 910), SYNTHETIC ABSORBABLE SUTURE: Brand: COATED VICRYL

## (undated) DEVICE — DRSNG SURG AQUACEL AG 9X10CM

## (undated) DEVICE — MEDI-VAC YANKAUER SUCTION HANDLE W/BULBOUS TIP: Brand: CARDINAL HEALTH

## (undated) DEVICE — ADHS LIQ MASTISOL 2/3ML

## (undated) DEVICE — SI AVANTI+ 7F STD W/GW  NO OBT: Brand: AVANTI

## (undated) DEVICE — CATH IMG DRAGONFLY OPTIS 2.7F 135CM

## (undated) DEVICE — AVANTI + 4F STD W/GW: Brand: AVANTI

## (undated) DEVICE — GUIDE CATHETER: Brand: MACH1™

## (undated) DEVICE — SYR LUERLOK 30CC

## (undated) DEVICE — SOL NS 500ML

## (undated) DEVICE — CANNULA,ADULT,SOFT-TOUCH,7TUBE,SC: Brand: MEDLINE

## (undated) DEVICE — INTRO TEAR AWAY/LVD W/SD PRT 6F 13CM

## (undated) DEVICE — CATH DIAG EXPO M/ PK 6FR FL4/FR4 PIG 3PK

## (undated) DEVICE — SUT PROLN 6/0 C1 D/A 30IN 8706H

## (undated) DEVICE — DRSNG WND GZ PAD BORDERED 4X8IN STRL

## (undated) DEVICE — PRESSURE MONITORING ACCESSORY: Brand: TRUWAVE

## (undated) DEVICE — SKIN AFFIX SURG ADHESIVE 72/CS 0.55ML: Brand: MEDLINE

## (undated) DEVICE — ELECTRD DEFIB M/FUNC PROPADZ STRL 2PK

## (undated) DEVICE — ANGIO-SEAL VIP VASCULAR CLOSURE DEVICE: Brand: ANGIO-SEAL

## (undated) DEVICE — NC TREK™ CORONARY DILATATION CATHETER 4.5 MM X 20 MM / RAPID-EXCHANGE: Brand: NC TREK™

## (undated) DEVICE — PK CATH CARD 10

## (undated) DEVICE — GUIDELINER CATHETERS ARE INTENDED TO BE USED IN CONJUNCTION WITH GUIDE CATHETERS TO ACCESS DISCRETE REGIONS OF THE CORONARY AND/OR PERIPHERAL VASCULATURE, AND TO FACILITATE PLACEMENT OF INTERVENTIONAL DEVICES.: Brand: GUIDELINER® V3 CATHETER

## (undated) DEVICE — PRESSURE MONITORING SET: Brand: TRUWAVE, VAMP

## (undated) DEVICE — RADIFOCUS GLIDEWIRE: Brand: GLIDEWIRE

## (undated) DEVICE — GW CERBRL FC PTFE STD/STR .035 260CM

## (undated) DEVICE — DIL VESL 7F.038 20CM

## (undated) DEVICE — KT MANIFOLD CATHLAB CUST

## (undated) DEVICE — SUT SILK 4/0 TIES 18IN A183H

## (undated) DEVICE — TBG INJ CONTRL EXCITE RA 1200PSI 48IN

## (undated) DEVICE — AIRWY 90MM NO9

## (undated) DEVICE — PINNACLE INTRODUCER SHEATH: Brand: PINNACLE

## (undated) DEVICE — GLIDEX™ COATED HYDROPHILIC GUIDEWIRE: Brand: MAGIC TORQUE™

## (undated) DEVICE — CATH DIAG EXPO .056 AL1 6F 100CM

## (undated) DEVICE — Device

## (undated) DEVICE — NC TREK CORONARY DILATATION CATHETER 2.5 MM X 20 MM / RAPID-EXCHANGE: Brand: NC TREK

## (undated) DEVICE — SUT PROLN 7/0 BV1 24IN 4PK M8702

## (undated) DEVICE — GW J TP FIX CORE .035 150

## (undated) DEVICE — SWAN-GANZ THERMODILUTION CATHETER: Brand: SWAN-GANZ

## (undated) DEVICE — BALN NC/EUPHORA RX 3.00X20MM

## (undated) DEVICE — SPNG GZ WOVN 4X4IN 12PLY 10/BX STRL

## (undated) DEVICE — CLTH CLENS READYCLEANSE PERI CARE PK/5

## (undated) DEVICE — DEV COMP RAD PRELUDESYNC 24CM

## (undated) DEVICE — CVR HNDL LT SURG ACCSSRY BLU STRL

## (undated) DEVICE — SNAP KOVER: Brand: UNBRANDED

## (undated) DEVICE — TOTAL TRAY, 16FR 10ML SIL FOLEY, URN: Brand: MEDLINE

## (undated) DEVICE — COVADERM: Brand: DEROYAL

## (undated) DEVICE — CANNULA,ADULT,SOFT-TOUCH,7'TUBE,UC: Brand: PENDING

## (undated) DEVICE — GLV SURG PREMIERPRO MIC LTX PF SZ7 BRN

## (undated) DEVICE — SUT SILK 3/0 TIES 18IN A184H

## (undated) DEVICE — CATH IV ANGIOCATH/AUTOGARD 18G 1.25IN GR

## (undated) DEVICE — TP CLTH MEDIPORE SFT 2IN 10YD

## (undated) DEVICE — NDL PERC 1PRT THNWALL W/BASEPLT 18G 7CM

## (undated) DEVICE — CATH PACE PACEL BIPOL 5F110CM

## (undated) DEVICE — SYR LUERLOK 20CC BX/50

## (undated) DEVICE — SEALANT HEMO TACHOSIL FIBRIN PTCH 9.5X4.8CM

## (undated) DEVICE — 3M(TM) TEGADERM(TM) IV TRANSPARENT FILM DRESSING WITH BORDER 1655: Brand: 3M™ TEGADERM™

## (undated) DEVICE — SUCTION CANISTER, 2500CC, RIGID: Brand: DEROYAL

## (undated) DEVICE — CABL PACE ATRIAL PT BLU

## (undated) DEVICE — GW LUGE .014 182 CM

## (undated) DEVICE — PRESSURE MONITORING SET: Brand: TRUWAVE

## (undated) DEVICE — CATH GUIDE SOFTVU FLUSH HT PIG .035 4F 65CM

## (undated) DEVICE — BANDAGE ROLL,100% COTTON, 6 PLY, LARGE: Brand: KERLIX

## (undated) DEVICE — APPL CHLORAPREP W/TINT 26ML BLU

## (undated) DEVICE — LEX CATH LAB MINOR: Brand: MEDLINE INDUSTRIES, INC.

## (undated) DEVICE — MEDI-VAC NON-CONDUCTIVE SUCTION TUBING: Brand: CARDINAL HEALTH

## (undated) DEVICE — NC TREK CORONARY DILATATION CATHETER 2.0 MM X 20 MM / RAPID-EXCHANGE: Brand: NC TREK

## (undated) DEVICE — SAFESECURE,SECUREMENT,FOLEY CATH,STERILE: Brand: MEDLINE

## (undated) DEVICE — SUT PROLN 3/0 SH D/A 36IN 8522H

## (undated) DEVICE — PK VASC 10

## (undated) DEVICE — SYR LUERLOK 50ML

## (undated) DEVICE — PAD E/S GRND SGL/FOIL 9FT/CORD DISP

## (undated) DEVICE — BLAKE SILICONE DRAIN, 19 FR ROUND, HUBLESS WITH 1/4" BENDABLE TROCAR: Brand: BLAKE

## (undated) DEVICE — INTRO SHEATH ENGAGE W/50 GW .038 8F12

## (undated) DEVICE — 3M™ IOBAN™ 2 ANTIMICROBIAL INCISE DRAPE 6651EZ: Brand: IOBAN™ 2

## (undated) DEVICE — PK ANGIO OR 10

## (undated) DEVICE — SHEET, DRAPE, SPLIT, STERILE: Brand: MEDLINE

## (undated) DEVICE — INTRO SHEATH ENGAGE TR SS/STD .025 6F 12CM

## (undated) DEVICE — DEV INFL MONARCH 25W

## (undated) DEVICE — BALN NC/EUPHORA RX 4.00X20MM

## (undated) DEVICE — 3M™ TEGADERM™ CHG DRESSING 25/CARTON 4 CARTONS/CASE 1658: Brand: TEGADERM™

## (undated) DEVICE — 2000CC GUARDIAN II: Brand: GUARDIAN

## (undated) DEVICE — SUT PROLN CARDIO M/HL 2/0 36IN 8843H

## (undated) DEVICE — CATH GUIDE BERN 5F 65CM

## (undated) DEVICE — GW AMPLTZ SUPERSTIFF STR .035IN 180CM

## (undated) DEVICE — NC TREK CORONARY DILATATION CATHETER 3.0 MM X 20 MM / RAPID-EXCHANGE: Brand: NC TREK

## (undated) DEVICE — CATHETER,URETHRAL,REDRUBBER,STRL,18FR: Brand: MEDLINE

## (undated) DEVICE — SOL LR 1000ML

## (undated) DEVICE — ANGIOGRAPHIC CATHETER: Brand: EXPO™

## (undated) DEVICE — PK HEART OPN 10

## (undated) DEVICE — COVER,TABLE,HVY DUTY,60"X90",STRL: Brand: MEDLINE

## (undated) DEVICE — SUT PROLN 3/0 8842H

## (undated) DEVICE — GW STARTER FXD CORE STR .035 3X260CM

## (undated) DEVICE — Device: Brand: MEDEX

## (undated) DEVICE — MINI TREK CORONARY DILATATION CATHETER 1.50 MM X 20 MM / RAPID-EXCHANGE: Brand: MINI TREK

## (undated) DEVICE — 3M™ STERI-STRIP™ REINFORCED ADHESIVE SKIN CLOSURES, R1547, 1/2 IN X 4 IN (12 MM X 100 MM), 6 STRIPS/ENVELOPE: Brand: 3M™ STERI-STRIP™

## (undated) DEVICE — SOL BETADINE 4OZ